# Patient Record
Sex: MALE | Race: WHITE | NOT HISPANIC OR LATINO | Employment: FULL TIME | URBAN - METROPOLITAN AREA
[De-identification: names, ages, dates, MRNs, and addresses within clinical notes are randomized per-mention and may not be internally consistent; named-entity substitution may affect disease eponyms.]

---

## 2017-11-02 ENCOUNTER — APPOINTMENT (OUTPATIENT)
Dept: RADIOLOGY | Facility: MEDICAL CENTER | Age: 27
DRG: 580 | End: 2017-11-02
Attending: EMERGENCY MEDICINE

## 2017-11-02 ENCOUNTER — HOSPITAL ENCOUNTER (INPATIENT)
Facility: MEDICAL CENTER | Age: 27
LOS: 1 days | DRG: 580 | End: 2017-11-04
Attending: EMERGENCY MEDICINE | Admitting: INTERNAL MEDICINE

## 2017-11-02 DIAGNOSIS — R09.02 OXYGEN DESATURATION: ICD-10-CM

## 2017-11-02 DIAGNOSIS — E66.01 MORBID OBESITY (HCC): ICD-10-CM

## 2017-11-02 LAB
ALBUMIN SERPL BCP-MCNC: 3.9 G/DL (ref 3.2–4.9)
ALBUMIN/GLOB SERPL: 0.9 G/DL
ALP SERPL-CCNC: 91 U/L (ref 30–99)
ALT SERPL-CCNC: 21 U/L (ref 2–50)
ANION GAP SERPL CALC-SCNC: 9 MMOL/L (ref 0–11.9)
AST SERPL-CCNC: 21 U/L (ref 12–45)
BASOPHILS # BLD AUTO: 0.4 % (ref 0–1.8)
BASOPHILS # BLD: 0.04 K/UL (ref 0–0.12)
BILIRUB SERPL-MCNC: 0.4 MG/DL (ref 0.1–1.5)
BUN SERPL-MCNC: 9 MG/DL (ref 8–22)
CALCIUM SERPL-MCNC: 9.3 MG/DL (ref 8.5–10.5)
CHLORIDE SERPL-SCNC: 102 MMOL/L (ref 96–112)
CO2 SERPL-SCNC: 24 MMOL/L (ref 20–33)
CREAT SERPL-MCNC: 0.51 MG/DL (ref 0.5–1.4)
EOSINOPHIL # BLD AUTO: 0.17 K/UL (ref 0–0.51)
EOSINOPHIL NFR BLD: 1.8 % (ref 0–6.9)
ERYTHROCYTE [DISTWIDTH] IN BLOOD BY AUTOMATED COUNT: 53 FL (ref 35.9–50)
GFR SERPL CREATININE-BSD FRML MDRD: >60 ML/MIN/1.73 M 2
GLOBULIN SER CALC-MCNC: 4.3 G/DL (ref 1.9–3.5)
GLUCOSE SERPL-MCNC: 108 MG/DL (ref 65–99)
HCT VFR BLD AUTO: 52.8 % (ref 42–52)
HGB BLD-MCNC: 16.2 G/DL (ref 14–18)
IMM GRANULOCYTES # BLD AUTO: 0.04 K/UL (ref 0–0.11)
IMM GRANULOCYTES NFR BLD AUTO: 0.4 % (ref 0–0.9)
LACTATE BLD-SCNC: 1.8 MMOL/L (ref 0.5–2)
LYMPHOCYTES # BLD AUTO: 1.61 K/UL (ref 1–4.8)
LYMPHOCYTES NFR BLD: 17.3 % (ref 22–41)
MCH RBC QN AUTO: 26.4 PG (ref 27–33)
MCHC RBC AUTO-ENTMCNC: 30.7 G/DL (ref 33.7–35.3)
MCV RBC AUTO: 86 FL (ref 81.4–97.8)
MONOCYTES # BLD AUTO: 0.72 K/UL (ref 0–0.85)
MONOCYTES NFR BLD AUTO: 7.7 % (ref 0–13.4)
NEUTROPHILS # BLD AUTO: 6.72 K/UL (ref 1.82–7.42)
NEUTROPHILS NFR BLD: 72.4 % (ref 44–72)
NRBC # BLD AUTO: 0 K/UL
NRBC BLD AUTO-RTO: 0 /100 WBC
PLATELET # BLD AUTO: 282 K/UL (ref 164–446)
PMV BLD AUTO: 9.8 FL (ref 9–12.9)
POTASSIUM SERPL-SCNC: 4.2 MMOL/L (ref 3.6–5.5)
PROT SERPL-MCNC: 8.2 G/DL (ref 6–8.2)
RBC # BLD AUTO: 6.14 M/UL (ref 4.7–6.1)
SODIUM SERPL-SCNC: 135 MMOL/L (ref 135–145)
WBC # BLD AUTO: 9.3 K/UL (ref 4.8–10.8)

## 2017-11-02 PROCEDURE — 83036 HEMOGLOBIN GLYCOSYLATED A1C: CPT

## 2017-11-02 PROCEDURE — 83735 ASSAY OF MAGNESIUM: CPT

## 2017-11-02 PROCEDURE — 87040 BLOOD CULTURE FOR BACTERIA: CPT

## 2017-11-02 PROCEDURE — 99285 EMERGENCY DEPT VISIT HI MDM: CPT

## 2017-11-02 PROCEDURE — 80053 COMPREHEN METABOLIC PANEL: CPT

## 2017-11-02 PROCEDURE — 70487 CT MAXILLOFACIAL W/DYE: CPT

## 2017-11-02 PROCEDURE — 83605 ASSAY OF LACTIC ACID: CPT

## 2017-11-02 PROCEDURE — 84443 ASSAY THYROID STIM HORMONE: CPT

## 2017-11-02 PROCEDURE — 700111 HCHG RX REV CODE 636 W/ 250 OVERRIDE (IP): Performed by: EMERGENCY MEDICINE

## 2017-11-02 PROCEDURE — 700117 HCHG RX CONTRAST REV CODE 255: Performed by: EMERGENCY MEDICINE

## 2017-11-02 PROCEDURE — 304561 HCHG STAT O2

## 2017-11-02 PROCEDURE — 85025 COMPLETE CBC W/AUTO DIFF WBC: CPT

## 2017-11-02 PROCEDURE — 94760 N-INVAS EAR/PLS OXIMETRY 1: CPT

## 2017-11-02 PROCEDURE — 304562 HCHG STAT O2 MASK/CANNULA

## 2017-11-02 RX ORDER — AMPICILLIN AND SULBACTAM 2; 1 G/1; G/1
3 INJECTION, POWDER, FOR SOLUTION INTRAMUSCULAR; INTRAVENOUS ONCE
Status: COMPLETED | OUTPATIENT
Start: 2017-11-02 | End: 2017-11-02

## 2017-11-02 RX ADMIN — AMPICILLIN SODIUM AND SULBACTAM SODIUM 3 G: 2; 1 INJECTION, POWDER, FOR SOLUTION INTRAMUSCULAR; INTRAVENOUS at 01:02

## 2017-11-02 RX ADMIN — IOHEXOL 80 ML: 350 INJECTION, SOLUTION INTRAVENOUS at 23:42

## 2017-11-02 ASSESSMENT — PAIN SCALES - GENERAL: PAINLEVEL_OUTOF10: 10

## 2017-11-03 ENCOUNTER — RESOLUTE PROFESSIONAL BILLING HOSPITAL PROF FEE (OUTPATIENT)
Dept: HOSPITALIST | Facility: MEDICAL CENTER | Age: 27
End: 2017-11-03

## 2017-11-03 PROBLEM — J45.909 ASTHMA: Status: ACTIVE | Noted: 2017-11-03

## 2017-11-03 PROBLEM — L03.211 FACIAL CELLULITIS: Status: ACTIVE | Noted: 2017-11-03

## 2017-11-03 PROBLEM — R60.0 LOWER EXTREMITY EDEMA: Status: ACTIVE | Noted: 2017-11-03

## 2017-11-03 PROBLEM — E66.01 MORBID OBESITY (HCC): Status: ACTIVE | Noted: 2017-11-03

## 2017-11-03 PROBLEM — R09.02 HYPOXIA: Status: ACTIVE | Noted: 2017-11-03

## 2017-11-03 LAB
BNP SERPL-MCNC: 17 PG/ML (ref 0–100)
DEPRECATED D DIMER PPP IA-ACNC: 623 NG/ML(D-DU)
EKG IMPRESSION: NORMAL
EST. AVERAGE GLUCOSE BLD GHB EST-MCNC: 120 MG/DL
HBA1C MFR BLD: 5.8 % (ref 0–5.6)
MAGNESIUM SERPL-MCNC: 2.1 MG/DL (ref 1.5–2.5)
TSH SERPL DL<=0.005 MIU/L-ACNC: 2.57 UIU/ML (ref 0.3–3.7)

## 2017-11-03 PROCEDURE — 501838 HCHG SUTURE GENERAL: Performed by: ORAL & MAXILLOFACIAL SURGERY

## 2017-11-03 PROCEDURE — 700105 HCHG RX REV CODE 258: Performed by: STUDENT IN AN ORGANIZED HEALTH CARE EDUCATION/TRAINING PROGRAM

## 2017-11-03 PROCEDURE — 0W950ZZ DRAINAGE OF LOWER JAW, OPEN APPROACH: ICD-10-PCS | Performed by: ORAL & MAXILLOFACIAL SURGERY

## 2017-11-03 PROCEDURE — 700101 HCHG RX REV CODE 250

## 2017-11-03 PROCEDURE — 93970 EXTREMITY STUDY: CPT | Mod: 26 | Performed by: SURGERY

## 2017-11-03 PROCEDURE — 93970 EXTREMITY STUDY: CPT

## 2017-11-03 PROCEDURE — 160027 HCHG SURGERY MINUTES - 1ST 30 MINS LEVEL 2: Performed by: ORAL & MAXILLOFACIAL SURGERY

## 2017-11-03 PROCEDURE — 83880 ASSAY OF NATRIURETIC PEPTIDE: CPT

## 2017-11-03 PROCEDURE — 700105 HCHG RX REV CODE 258

## 2017-11-03 PROCEDURE — 0C940ZZ DRAINAGE OF BUCCAL MUCOSA, OPEN APPROACH: ICD-10-PCS | Performed by: ORAL & MAXILLOFACIAL SURGERY

## 2017-11-03 PROCEDURE — 700111 HCHG RX REV CODE 636 W/ 250 OVERRIDE (IP)

## 2017-11-03 PROCEDURE — 500126 HCHG BOVIE, NEEDLE TIP: Performed by: ORAL & MAXILLOFACIAL SURGERY

## 2017-11-03 PROCEDURE — 99222 1ST HOSP IP/OBS MODERATE 55: CPT | Mod: GC | Performed by: INTERNAL MEDICINE

## 2017-11-03 PROCEDURE — 85379 FIBRIN DEGRADATION QUANT: CPT

## 2017-11-03 PROCEDURE — A9270 NON-COVERED ITEM OR SERVICE: HCPCS | Performed by: STUDENT IN AN ORGANIZED HEALTH CARE EDUCATION/TRAINING PROGRAM

## 2017-11-03 PROCEDURE — 87070 CULTURE OTHR SPECIMN AEROBIC: CPT

## 2017-11-03 PROCEDURE — 36415 COLL VENOUS BLD VENIPUNCTURE: CPT

## 2017-11-03 PROCEDURE — 71010 DX-CHEST-PORTABLE (1 VIEW): CPT

## 2017-11-03 PROCEDURE — 87075 CULTR BACTERIA EXCEPT BLOOD: CPT

## 2017-11-03 PROCEDURE — 160036 HCHG PACU - EA ADDL 30 MINS PHASE I: Performed by: ORAL & MAXILLOFACIAL SURGERY

## 2017-11-03 PROCEDURE — 770006 HCHG ROOM/CARE - MED/SURG/GYN SEMI*

## 2017-11-03 PROCEDURE — 160002 HCHG RECOVERY MINUTES (STAT): Performed by: ORAL & MAXILLOFACIAL SURGERY

## 2017-11-03 PROCEDURE — 160048 HCHG OR STATISTICAL LEVEL 1-5: Performed by: ORAL & MAXILLOFACIAL SURGERY

## 2017-11-03 PROCEDURE — 0CDXXZ0 EXTRACTION OF LOWER TOOTH, SINGLE, EXTERNAL APPROACH: ICD-10-PCS | Performed by: ORAL & MAXILLOFACIAL SURGERY

## 2017-11-03 PROCEDURE — 160009 HCHG ANES TIME/MIN: Performed by: ORAL & MAXILLOFACIAL SURGERY

## 2017-11-03 PROCEDURE — 93010 ELECTROCARDIOGRAM REPORT: CPT | Performed by: INTERNAL MEDICINE

## 2017-11-03 PROCEDURE — 87205 SMEAR GRAM STAIN: CPT

## 2017-11-03 PROCEDURE — 160038 HCHG SURGERY MINUTES - EA ADDL 1 MIN LEVEL 2: Performed by: ORAL & MAXILLOFACIAL SURGERY

## 2017-11-03 PROCEDURE — 93005 ELECTROCARDIOGRAM TRACING: CPT | Performed by: STUDENT IN AN ORGANIZED HEALTH CARE EDUCATION/TRAINING PROGRAM

## 2017-11-03 PROCEDURE — 160035 HCHG PACU - 1ST 60 MINS PHASE I: Performed by: ORAL & MAXILLOFACIAL SURGERY

## 2017-11-03 PROCEDURE — 700102 HCHG RX REV CODE 250 W/ 637 OVERRIDE(OP): Performed by: STUDENT IN AN ORGANIZED HEALTH CARE EDUCATION/TRAINING PROGRAM

## 2017-11-03 PROCEDURE — A6250 SKIN SEAL PROTECT MOISTURIZR: HCPCS | Performed by: STUDENT IN AN ORGANIZED HEALTH CARE EDUCATION/TRAINING PROGRAM

## 2017-11-03 RX ORDER — BISACODYL 10 MG
10 SUPPOSITORY, RECTAL RECTAL
Status: DISCONTINUED | OUTPATIENT
Start: 2017-11-03 | End: 2017-11-04 | Stop reason: HOSPADM

## 2017-11-03 RX ORDER — HEPARIN SODIUM 5000 [USP'U]/ML
5000 INJECTION, SOLUTION INTRAVENOUS; SUBCUTANEOUS EVERY 8 HOURS
Status: DISCONTINUED | OUTPATIENT
Start: 2017-11-03 | End: 2017-11-03

## 2017-11-03 RX ORDER — POLYETHYLENE GLYCOL 3350 17 G/17G
1 POWDER, FOR SOLUTION ORAL
Status: DISCONTINUED | OUTPATIENT
Start: 2017-11-03 | End: 2017-11-04 | Stop reason: HOSPADM

## 2017-11-03 RX ORDER — BUPIVACAINE HYDROCHLORIDE AND EPINEPHRINE 5; 5 MG/ML; UG/ML
INJECTION, SOLUTION EPIDURAL; INTRACAUDAL; PERINEURAL
Status: DISCONTINUED | OUTPATIENT
Start: 2017-11-03 | End: 2017-11-03 | Stop reason: HOSPADM

## 2017-11-03 RX ORDER — AMOXICILLIN 250 MG
2 CAPSULE ORAL 2 TIMES DAILY
Status: DISCONTINUED | OUTPATIENT
Start: 2017-11-03 | End: 2017-11-04 | Stop reason: HOSPADM

## 2017-11-03 RX ORDER — SODIUM CHLORIDE 9 MG/ML
INJECTION, SOLUTION INTRAVENOUS CONTINUOUS
Status: DISCONTINUED | OUTPATIENT
Start: 2017-11-03 | End: 2017-11-04 | Stop reason: HOSPADM

## 2017-11-03 RX ORDER — SODIUM CHLORIDE 9 MG/ML
INJECTION, SOLUTION INTRAVENOUS
Status: COMPLETED
Start: 2017-11-03 | End: 2017-11-03

## 2017-11-03 RX ORDER — LABETALOL HYDROCHLORIDE 5 MG/ML
10 INJECTION, SOLUTION INTRAVENOUS EVERY 4 HOURS PRN
Status: DISCONTINUED | OUTPATIENT
Start: 2017-11-03 | End: 2017-11-04 | Stop reason: HOSPADM

## 2017-11-03 RX ORDER — ACETAMINOPHEN 325 MG/1
650 TABLET ORAL EVERY 6 HOURS PRN
Status: DISCONTINUED | OUTPATIENT
Start: 2017-11-03 | End: 2017-11-04 | Stop reason: HOSPADM

## 2017-11-03 RX ORDER — MAGNESIUM HYDROXIDE 1200 MG/15ML
LIQUID ORAL
Status: DISCONTINUED | OUTPATIENT
Start: 2017-11-03 | End: 2017-11-03 | Stop reason: HOSPADM

## 2017-11-03 RX ADMIN — ACETAMINOPHEN 650 MG: 325 TABLET, FILM COATED ORAL at 02:47

## 2017-11-03 RX ADMIN — ACETAMINOPHEN 650 MG: 325 TABLET, FILM COATED ORAL at 11:20

## 2017-11-03 RX ADMIN — SODIUM CHLORIDE: 9 INJECTION, SOLUTION INTRAVENOUS at 15:18

## 2017-11-03 RX ADMIN — FENTANYL CITRATE 25 MCG: 50 INJECTION, SOLUTION INTRAMUSCULAR; INTRAVENOUS at 20:52

## 2017-11-03 RX ADMIN — SODIUM CHLORIDE: 9 INJECTION, SOLUTION INTRAVENOUS at 02:35

## 2017-11-03 RX ADMIN — SODIUM CHLORIDE: 9 INJECTION, SOLUTION INTRAVENOUS at 02:00

## 2017-11-03 ASSESSMENT — PATIENT HEALTH QUESTIONNAIRE - PHQ9
SUM OF ALL RESPONSES TO PHQ QUESTIONS 1-9: 0
SUM OF ALL RESPONSES TO PHQ9 QUESTIONS 1 AND 2: 0
2. FEELING DOWN, DEPRESSED, IRRITABLE, OR HOPELESS: NOT AT ALL
1. LITTLE INTEREST OR PLEASURE IN DOING THINGS: NOT AT ALL

## 2017-11-03 ASSESSMENT — ENCOUNTER SYMPTOMS
VOMITING: 0
FLANK PAIN: 0
INSOMNIA: 0
EYE PAIN: 0
HEADACHES: 0
DOUBLE VISION: 0
BLURRED VISION: 0
WEIGHT LOSS: 0
CLAUDICATION: 0
TINGLING: 0
SEIZURES: 0
STRIDOR: 0
SPUTUM PRODUCTION: 0
LOSS OF CONSCIOUSNESS: 0
MYALGIAS: 1
DEPRESSION: 0
EYE REDNESS: 0
CHILLS: 0
PND: 1
DIARRHEA: 0
ORTHOPNEA: 1
DIZZINESS: 0
FALLS: 0
PALPITATIONS: 0
TREMORS: 0
ORTHOPNEA: 0
MYALGIAS: 0
WEAKNESS: 0
EYE DISCHARGE: 0
HEARTBURN: 0
NAUSEA: 0
HEMOPTYSIS: 0
ABDOMINAL PAIN: 0
SPEECH CHANGE: 0
HALLUCINATIONS: 0
MEMORY LOSS: 0
CONSTIPATION: 0
POLYDIPSIA: 0
SHORTNESS OF BREATH: 0
COUGH: 0
FEVER: 0
NERVOUS/ANXIOUS: 0
DIAPHORESIS: 0
BLOOD IN STOOL: 0
PHOTOPHOBIA: 0
NECK PAIN: 0
SORE THROAT: 0
WHEEZING: 0
BACK PAIN: 0
FOCAL WEAKNESS: 0
BRUISES/BLEEDS EASILY: 0
SENSORY CHANGE: 0

## 2017-11-03 ASSESSMENT — PAIN SCALES - GENERAL
PAINLEVEL_OUTOF10: 2
PAINLEVEL_OUTOF10: 2
PAINLEVEL_OUTOF10: 7
PAINLEVEL_OUTOF10: 6
PAINLEVEL_OUTOF10: 2
PAINLEVEL_OUTOF10: 2
PAINLEVEL_OUTOF10: 7
PAINLEVEL_OUTOF10: 2
PAINLEVEL_OUTOF10: 6

## 2017-11-03 ASSESSMENT — LIFESTYLE VARIABLES
SUBSTANCE_ABUSE: 0
ALCOHOL_USE: NO
EVER_SMOKED: NEVER

## 2017-11-03 NOTE — CARE PLAN
Problem: Infection  Goal: Will remain free from infection  Outcome: PROGRESSING AS EXPECTED      Problem: Pain Management  Goal: Pain level will decrease to patient's comfort goal  Outcome: PROGRESSING AS EXPECTED

## 2017-11-03 NOTE — PROGRESS NOTES
Spoke with oral surgeon. States that the pt has two options can take abx to help with infection or he can have the tooth pulled in the OR. Both options were explained to pt by nurse with surgeon over the phone. Pt has opted for tooth removal at this time. Surgeon had requested a consent to be printed and placed on the chart however, without knowing exactly what procedure is being done consent could not be completed at this time.

## 2017-11-03 NOTE — SENIOR ADMIT NOTE
Senior Admit Note    26 y/o M with a HTN, Asthma and eczema p/w right facial swelling and pain.  He chipped his tooth on there right several days ago. He started having pain around his right face yesterday morning.  It has increasingly worsened and he developed swelling as well.  He denies fever or chills.  Denies toothache prior to this.  Denies any dyspnea, coughing, choking, drooling and feeling of foreign body sensation in throat.     Gen: NAD   Right lower facial swelling, induration and tenderness, warmth.  No drainage.   No fluctuance  [place] 4.  No uvula swelling or tonsillar swelling.  Protecting his airway.   No drooling.   Resp: CTAB  Ext:  Edema erythema.        CT maxillofacial   1.  There is a right periapical tooth abscess involving a premolar in the right lower mandible. There is associated bony resorption or erosion of the outer aspect of the mandible at this level with extensive overlying soft tissue edema or cellulitis.   There is no drainable abscess seen in the soft tissues.  2.  Slightly prominent tonsillar tissue is also noted with slight narrowing of the airway although it is patent.  3.  There are inflammatory lymph nodes in the right side of the neck.    Periapical dental abscess  Facial cellulitis  - Not septic, WBC wnl  - No sign of abscess as there is no fluctuance  - Will start Unasyn  - Day team to consult Dental surgeon.

## 2017-11-03 NOTE — NON-PROVIDER
Internal Medicine Medical Student Note  Note Author: Jessica Jvoel, Student    Name Michael Ontiveros     1990   Age/Sex 27 y.o. male   MRN [id number]   Code Status FULL     After 5PM or if no immediate response to page, please call for cross-coverage  Attending/Team: [place] See Patient List for primary contact information  Call [phone] to page after hours   1st Call - Day Intern (R1):   Dr. Pino 2nd Call - Day Sr. Resident (R2/R3):   Dr. Salvador         Reason for interval visit  (Principal Problem)   Facial cellulitis    Interval Problem Daily Status Update  (24 hours)   Mr. Ontiveros is a 26yo male here for right facial cellulitis/dental abscess. He reports that he chipped his tooth while eating about one week ago and was planning to see a dentist in January when his insurance kicks in, but he noticed that his cheek began to swell two days ago. When he woke up the next day he noticed it had gotten significantly worse. It continued to worsen throughout the day and impaired his speech so he came to the ED last night. He has not seen a dentist in over a year and reports not brushing his teeth a few times a week and only flossing occasionally. His PMHx includes HTN, asthma, eczema and morbid obesity with likely sleep apnea. His has been experiencing some shortness of breath while lying flat for several months.    ROS    Negative except as noted above.      Current Facility-Administered Medications:   •  senna-docusate (PERICOLACE or SENOKOT S) 8.6-50 MG per tablet 2 Tab, 2 Tab, Oral, BID **AND** polyethylene glycol/lytes (MIRALAX) PACKET 1 Packet, 1 Packet, Oral, QDAY PRN **AND** magnesium hydroxide (MILK OF MAGNESIA) suspension 30 mL, 30 mL, Oral, QDAY PRN **AND** bisacodyl (DULCOLAX) suppository 10 mg, 10 mg, Rectal, QDAY PRN, Ana Cristina Nicole M.D.  •  labetalol (NORMODYNE,TRANDATE) injection 10 mg, 10 mg, Intravenous, Q4HRS PRN, Ana Cristina Nicole M.D.  •  acetaminophen (TYLENOL) tablet  "650 mg, 650 mg, Oral, Q6HRS PRN, Ana Cristina Nicole M.D., 650 mg at 11/03/17 1120  •  NS infusion, , Intravenous, Continuous, Ana Cristina Nicole M.D., Last Rate: 83 mL/hr at 11/03/17 0235  •  heparin injection 5,000 Units, 5,000 Units, Subcutaneous, Q8HRS, Ana Cristina Nicole M.D., Stopped at 11/03/17 0600          Physical Exam       Vitals:    11/02/17 2104 11/02/17 2327 11/03/17 0227 11/03/17 0800   BP: 144/74  149/80 128/67   Pulse: 94 (!) 109 86 80   Resp: 20 (!) 26 20 20   Temp: 36.4 °C (97.6 °F)  36.3 °C (97.4 °F) 36 °C (96.8 °F)   TempSrc:       SpO2: 89% 92% 93% 93%   Weight:   (!) 161.5 kg (356 lb 0.7 oz)    Height:   1.753 m (5' 9\")      Body mass index is 52.58 kg/m². Weight: (!) 161.5 kg (356 lb 0.7 oz)  Oxygen Therapy:  Pulse Oximetry: 93 %, O2 (LPM): 2, FIO2%: 4, O2 Delivery: Silicone Nasal Cannula    Physical Exam    General: 26yo morbidly obese male resting comfortably in bed with head of bed elevated.  HEENT: R cheek visibly edematous and causing dysarthria. Oropahrynx with moderate erythema and significant edema on right. Decayed molar present on bottom right row. Patient unable to speak normally.  Cardiovascular: Heart with regular rhythm no m/g/r  Lungs: CTAB, no wheezing or strifor  Extremities: LLE and RLE with significant edema, erythema, and excoriation.    Labs: CBC WNL, WBC 9.3  CMP: Glucose elevated at 108  A1C: 5.8  TSH: WNL  Blood culture pending  BNP: WNL  D-dimer: 623      Imaging:  CT maxillofacial: R periapical tooth abscess into R lower mandible with bony resorption/erosion of outer mandible. External soft tissue edema c/w cellulitis. No drainable abscess of soft tissue. Prominent tonsilar tissue with slight narrowing of airway. Inflammation of LNs in R neck.    CXR: Cardiomegaly present.    LE Duplex: No evidence of DVT. Wall thickness in left small saphenous vein.      Assessment/Plan     #Dental Abscess/Facial cellulitis  -IV Unasyn for infection  -blood cultures pending, patient is " afebrile with no signs of sepsis  -oral surgeon has agreed to do tooth extraction surgery on patient today  -will allow oral surgeon to prescribe post-op ABX  -tylenol for pain    #LE Edema  -likely a result of venous stasis  -patient denies any pain and reports legs have been that way for some time  -d-dimer elevated 623  -Duplex r/o DVT  -recommend sleep study outpatient for JONES    #Morbid obesity  -patient counseled to seek outpatient f/u  -patient plans to seek care after his insurance begins in January    #HTN  -patient will seek medical care for this as outpatient  -not currently being treated

## 2017-11-03 NOTE — PROGRESS NOTES
Patient arrived to unit, oriented x4, ambulatory to the restroom. On 2 L of oxygen,  placed on patient. R facial swelling, ice pack provided and patient medicated with PRN tylenol. IV fluids running. Updated on POC, verbalizes understanding. NPO at this time. Redness under pannus. Educated on use of call light, patient declined use of treaded socks at this time. Educated on fall precautions and prevention. Admission profile completed.

## 2017-11-03 NOTE — ASSESSMENT & PLAN NOTE
- Pt reports chipping his tooth 1 week prior to presentation. Woke up day of presentation with R tooth/jaw pain and swelling that progressively worsened. No signs of systemic infection. In ED, pt started on IV abx  - Continue IV abx (unasyn)  - NO signs of systemic infection  - Had a successful oral surgery yesterday, today he is doing better   - Continue on Abxs, switch to oral clindamycin on D/C

## 2017-11-03 NOTE — ASSESSMENT & PLAN NOTE
- Pt reports history of asthma and eczema. Reports having albuterol inhaler from [place] prn exacerbations. Not currently having an exacerbation  - Albuterol prn SOB/wheezing

## 2017-11-03 NOTE — DIETARY
NUTRITION SERVICES: BMI - Pt with BMI 52.6. Weight loss counseling not appropriate in acute care setting. RECOMMEND - Referral to outpatient nutrition services for weight management after D/C.

## 2017-11-03 NOTE — ASSESSMENT & PLAN NOTE
-Pt reports h/o bilateral LE edema, not tender on examination  - Works at a call center, sits for most of the day. No recent long car rides. On physical exam, LE with hyperpigmentation, likely 2/2 venous stasis  - LE doppler come back negative for DVT,   - Plan  - DC heparin

## 2017-11-03 NOTE — H&P
Internal Medicine Admitting History and Physical    Note Author: Ana Cristina Nicole M.D.       Name Michael Ontiveros     1990   Age/Sex 27 y.o. male   MRN [id number]   Code Status FULL     After 5PM or if no immediate response to page, please call for cross-coverage  Attending/Team: Dr. Ziegler/Giovanni See Patient List for primary contact information  Call [phone] to page    1st Call - Day Intern (R1):   Dr Nicole 2nd Call - Day Sr. Resident (R2/R3):   Dr Ceja       Chief Complaint:  Facial swelling     HPI:  26 yo M with PMH HTN, asthma, and eczema presents with acute swelling and tenderness of R face. Pt reports he chipped his tooth about a week ago while eating a steak. He just got a new job and figured he would wait until , when he could afford dental insurance, to get his tooth checked out. The morning on , he woke up and felt pain around his right lower jaw. Over the course of the day, the area became increasingly swollen an tender. Minimal relief with aspirin at home. Pt reports he has not seen a dentist in year. He tries to brush his teeth every day but misses a few days. He flosses his teeth occasionally. He denied any fevers or chills. No drooling or stridor.   He denied any SOB, cough, or chest pain.     On presentation, pt was afebrile. Tachycardic to low 100s. SBPs in 150s. 93% on RA. RR 18. No acute distress. Pt reports 7/10 pain on R side of face. CBC WNL. CMP WNL. CXR showed enlarged cardiac silhouette. CT Maxillofacial showed right periapical tooth abscess involving a premolar in the right lower mandible, associated bony resorption or erosion of the outer aspect of the mandible at this level with extensive overlying soft tissue edema or cellulitis. No drainable abscess seen in the soft tissues. Pt started on unasyn.     Review of Systems   Constitutional: Negative for chills and fever.   HENT: Negative for congestion, ear pain, hearing loss and sore throat.     Eyes: Negative for blurred vision, double vision and pain.   Respiratory: Negative for cough, sputum production, shortness of breath, wheezing and stridor.    Cardiovascular: Positive for orthopnea and leg swelling. Negative for chest pain and palpitations.   Gastrointestinal: Negative for abdominal pain, constipation, diarrhea, heartburn, nausea and vomiting.   Genitourinary: Negative for dysuria and urgency.   Musculoskeletal: Negative for falls, myalgias and neck pain.   Skin: Positive for rash. Negative for itching.        Eczematous rash on UE and LE   Neurological: Negative for dizziness, tingling, seizures, loss of consciousness, weakness and headaches.   Endo/Heme/Allergies: Positive for environmental allergies. Negative for polydipsia. Does not bruise/bleed easily.   Psychiatric/Behavioral: Negative for depression and substance abuse. The patient is not nervous/anxious and does not have insomnia.              Past Medical History:   Asthma - reports 2-3 exacerbations a year  Eczema     Past Surgical History:  No past surgical history on file.    Current Outpatient Medications:  Home Medications    **Home medications have not yet been reviewed for this encounter**     Pt reports taking an inhaler his grandmother got from him from [place] prn for asthma exacerbations, believes active ingredient is albuterol    Medication Allergy/Sensitivities:  No Known Allergies      Family History:  Mother - COPD, heart disease. Father - hip injury/immobility.    Social History:  Social History     Social History   • Marital status: Single     Spouse name: N/A   • Number of children: N/A   • Years of education: N/A     Occupational History   • Not on file.     Social History Main Topics   • Smoking status: Not on file   • Smokeless tobacco: Not on file   • Alcohol use Not on file   • Drug use: Unknown   • Sexual activity: Not on file     Other Topics Concern   • Not on file     Social History Narrative   • No narrative on  file     Living situation: Lives at home  PCP : Miguel Miller D.O.      Physical Exam     Vitals:    11/02/17 2016 11/02/17 2027 11/02/17 2104 11/02/17 2327   BP: 158/74  144/74    Pulse: (!) 108  94 (!) 109   Resp: 16  20 (!) 26   Temp: 36.2 °C (97.2 °F)  36.4 °C (97.6 °F)    TempSrc: Temporal      SpO2: 93%  89% 92%   Weight:  (!) 173.9 kg (383 lb 6.1 oz)       There is no height or weight on file to calculate BMI.  /74   Pulse (!) 109   Temp 36.4 °C (97.6 °F)   Resp (!) 26   Wt (!) 173.9 kg (383 lb 6.1 oz)   SpO2 92%   O2 therapy: Pulse Oximetry: 92 %, O2 (LPM): 4, FIO2%: 4    Physical Exam   Constitutional: He is oriented to person, place, and time and well-developed, well-nourished, and in no distress.   Morbidly obese.   HENT:   Nose: Nose normal.   Mouth/Throat: Oropharyngeal exudate present.   R facial edema, rubor, and edema. Poor dentition. Difficulty with opening mouth.   Eyes: Conjunctivae and EOM are normal.   Neck: Normal range of motion. Neck supple.   R cervical adenopathy   Cardiovascular: Regular rhythm.  Exam reveals no gallop.    No murmur heard.  Tachycardic at times.   Pulmonary/Chest: Effort normal and breath sounds normal. No stridor. No respiratory distress. He has no wheezes. He exhibits no tenderness.   Abdominal: Soft. Bowel sounds are normal. There is no tenderness.   Musculoskeletal: Normal range of motion. He exhibits no edema, tenderness or deformity.   Lymphadenopathy:     He has cervical adenopathy.   Neurological: He is alert and oriented to person, place, and time. No cranial nerve deficit.   Skin: Rash noted. There is erythema.   Hyperpigmentation and xerosis of LE b/l. Erythematous papular lesions in antecubital fossa.    Psychiatric: Affect and judgment normal.             Data Review       Old Records Request:   Completed  Current Records review and summary: Completed    Lab Data Review:  Recent Results (from the past 24 hour(s))   LACTIC ACID    Collection Time:  11/02/17 11:11 PM   Result Value Ref Range    Lactic Acid 1.8 0.5 - 2.0 mmol/L   CBC WITH DIFFERENTIAL    Collection Time: 11/02/17 11:11 PM   Result Value Ref Range    WBC 9.3 4.8 - 10.8 K/uL    RBC 6.14 (H) 4.70 - 6.10 M/uL    Hemoglobin 16.2 14.0 - 18.0 g/dL    Hematocrit 52.8 (H) 42.0 - 52.0 %    MCV 86.0 81.4 - 97.8 fL    MCH 26.4 (L) 27.0 - 33.0 pg    MCHC 30.7 (L) 33.7 - 35.3 g/dL    RDW 53.0 (H) 35.9 - 50.0 fL    Platelet Count 282 164 - 446 K/uL    MPV 9.8 9.0 - 12.9 fL    Neutrophils-Polys 72.40 (H) 44.00 - 72.00 %    Lymphocytes 17.30 (L) 22.00 - 41.00 %    Monocytes 7.70 0.00 - 13.40 %    Eosinophils 1.80 0.00 - 6.90 %    Basophils 0.40 0.00 - 1.80 %    Immature Granulocytes 0.40 0.00 - 0.90 %    Nucleated RBC 0.00 /100 WBC    Neutrophils (Absolute) 6.72 1.82 - 7.42 K/uL    Lymphs (Absolute) 1.61 1.00 - 4.80 K/uL    Monos (Absolute) 0.72 0.00 - 0.85 K/uL    Eos (Absolute) 0.17 0.00 - 0.51 K/uL    Baso (Absolute) 0.04 0.00 - 0.12 K/uL    Immature Granulocytes (abs) 0.04 0.00 - 0.11 K/uL    NRBC (Absolute) 0.00 K/uL   COMP METABOLIC PANEL    Collection Time: 11/02/17 11:11 PM   Result Value Ref Range    Sodium 135 135 - 145 mmol/L    Potassium 4.2 3.6 - 5.5 mmol/L    Chloride 102 96 - 112 mmol/L    Co2 24 20 - 33 mmol/L    Anion Gap 9.0 0.0 - 11.9    Glucose 108 (H) 65 - 99 mg/dL    Bun 9 8 - 22 mg/dL    Creatinine 0.51 0.50 - 1.40 mg/dL    Calcium 9.3 8.5 - 10.5 mg/dL    AST(SGOT) 21 12 - 45 U/L    ALT(SGPT) 21 2 - 50 U/L    Alkaline Phosphatase 91 30 - 99 U/L    Total Bilirubin 0.4 0.1 - 1.5 mg/dL    Albumin 3.9 3.2 - 4.9 g/dL    Total Protein 8.2 6.0 - 8.2 g/dL    Globulin 4.3 (H) 1.9 - 3.5 g/dL    A-G Ratio 0.9 g/dL   ESTIMATED GFR    Collection Time: 11/02/17 11:11 PM   Result Value Ref Range    GFR If African American >60 >60 mL/min/1.73 m 2    GFR If Non African American >60 >60 mL/min/1.73 m 2   Magnesium    Collection Time: 11/02/17 11:11 PM   Result Value Ref Range    Magnesium 2.1 1.5 - 2.5  mg/dL       Imaging/Procedures Review:    ndependant Imaging Review: Completed  DX-CHEST-PORTABLE (1 VIEW)   Final Result      1.  There is mild enlargement of the cardiac silhouette.      CT-MAXILLOFACIAL WITH PLUS RECONS   Final Result      1.  There is a right periapical tooth abscess involving a premolar in the right lower mandible. There is associated bony resorption or erosion of the outer aspect of the mandible at this level with extensive overlying soft tissue edema or cellulitis.    There is no drainable abscess seen in the soft tissues.   2.  Slightly prominent tonsillar tissue is also noted with slight narrowing of the airway although it is patent.   3.  There are inflammatory lymph nodes in the right side of the neck.      LE VENOUS DUPLEX (DVT)    (Results Pending)            EKG:   Pending         Assessment/Plan     * Facial cellulitis   Assessment & Plan    Pt reports chipping his tooth 1 week prior to presentation. Woke up day of presentation with R tooth/jaw pain and swelling that progressively worsened. No signs of systemic infection. In ED, pt started on IV abx  - C/w IV abx (unasyn)  - CTM for signs of systemic infection  - Day team to consult oral surgery for dental extraction  - NPO for now, IVF  - May d/c heparin for dental procedure        Lower extremity edema   Assessment & Plan    Pt reports h/o LE. 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 No recent long car rides. On physical exam, LE with hyperpigmentation, likely 2/2 venous stasis  - F/u LE doppler to r/o DVT, although low suspicion for this        Morbid obesity (CMS-[facility])   Assessment & Plan    Pt is morbidly obese. Has developed HTN, which he was trying to control with weight loss. He has been unsuccessful at losing weight. Reports having SOB while lying flat at night. May have JONES.   - F/u HbA1c, lipid profile  - Consider OP follow up to evaluate for JONES OP        Asthma   Assessment & Plan    Pt reports history of asthma  and eczema. Reports having albuterol inhaler from [place] prn exacerbations. Not currently having an exacerbation  - Albuterol prn SOB/wheezing              Anticipated Hospital stay:  >2 midnights        Quality Measures    Reviewed items::  Radiology images reviewed, Labs reviewed and Medications reviewed  Matthew catheter::  No Matthew  DVT prophylaxis pharmacological::  Heparin  DVT prophylaxis - mechanical:  SCDs  Antibiotics:  Treating active infection/contamination beyond 24 hours perioperative coverage

## 2017-11-03 NOTE — PROGRESS NOTES
Pt is alert and oriented x4. Aware that he is NPO still as the Drs are to consult oral surgeon this morning. Pt complained of pain but declined need for intervention at this time. Will continue to monitor pt.

## 2017-11-03 NOTE — CARE PLAN
Problem: Knowledge Deficit  Goal: Knowledge of disease process/condition, treatment plan, diagnostic tests, and medications will improve  Outcome: PROGRESSING AS EXPECTED  Patient on IV abx, labs ordered for tomorrow. NPO at this time, IV fluids running, awaiting oral surgery consult. Awaiting US of LE to r/o DVT. Patient on 2 L of oxygen, hx of asthma, monitoring of saturations with .     Problem: Pain Management  Goal: Pain level will decrease to patient's comfort goal  Outcome: PROGRESSING AS EXPECTED  Patient reporting tolerable pain to right side of face.  Medicated with PRN tylenol and ice pack provided for swelling to right side of face.

## 2017-11-03 NOTE — ED PROVIDER NOTES
ED Provider Note    Scribed for Maurice Jordan M.D. by Maurice Jordan. 11/2/2017,  10:40 PM.    CHIEF COMPLAINT  Chief Complaint   Patient presents with   • Tooth Abscess     pt with large amount of swelling to rt lower jaw states chipped tooth last week and noticed swelling last night denies any pain to throat denies sob       HPI  Michael Ontiveros is a 27 y.o. male who presents to the Emergency Department For dental pain and right-sided facial swelling. He reports that he chipped his tooth about a week ago, and was hoping to be able to leave it alone until getting his dental insurance on January 1. He said he had some  swelling and dull pain when he went to bed last night, but then woke up with noteworthy swelling, which has become progressive over the course of the day. He denies fevers or chills or nausea or vomiting or shortness of breath or difficulty swallowing, his speech is slightly dysarthric because of his right cheek swelling. He is not diabetic or immunosuppressed    REVIEW OF SYSTEMS  See HPI for further details. All other systems are negative.     PAST MEDICAL HISTORY  No diabetes, no immunosuppression, no daily medications.    SOCIAL HISTORY  Social History     Social History Main Topics   • Smoking status: Not on file   • Smokeless tobacco: Not on file   • Alcohol use Not on file   • Drug use: Unknown   • Sexual activity: Not on file     History   Drug use: Unknown       SURGICAL HISTORY  patient denies any surgical history    CURRENT MEDICATIONS  Home Medications    **Home medications have not yet been reviewed for this encounter**         ALLERGIES  No Known Allergies    PHYSICAL EXAM  VITAL SIGNS: /74   Pulse (!) 109   Temp 36.4 °C (97.6 °F)   Resp (!) 26   Wt (!) 173.9 kg (383 lb 6.1 oz)   SpO2 92%   Pulse ox interpretation: I interpret this pulse ox as normal.  Constitutional: Alert in no apparent distress.  HENT: Very impressive right sided facial swelling of the  "mandibular cheek. The floor of the mouth is soft. There is no evidence of Gt's angina. He has generally poor dentition, with a chip to the right 1st molar. No signs of trauma, Bilateral external ears normal, Nose normal.   Eyes: Conjunctiva normal, Non-icteric.   Neck: Normal range of motion, Supple, No stridor.   Lymphatic: No lymphadenopathy noted.   Cardiovascular: Regular slightly tachycardic rate and rhythm, no murmurs.   Thorax & Lungs: Normal breath sounds, No respiratory distress, No wheezing, No chest tenderness.   Abdomen: Bowel sounds normal, Soft, No tenderness, No masses, No pulsatile masses. No peritoneal signs.  Skin: Warm, Dry, No erythema, No rash.   Extremities: Intact distal pulses, No edema, No cyanosis.  Musculoskeletal: Good range of motion in all major joints. No or major deformities noted.   Neurologic: Alert , Normal motor function, Normal sensory function, No focal deficits noted.   Psychiatric: Affect normal, Judgment normal, Mood normal.     DIAGNOSTIC STUDIES / PROCEDURES      LABS  Labs Reviewed   CBC WITH DIFFERENTIAL - Abnormal; Notable for the following:        Result Value    RBC 6.14 (*)     Hematocrit 52.8 (*)     MCH 26.4 (*)     MCHC 30.7 (*)     RDW 53.0 (*)     Neutrophils-Polys 72.40 (*)     Lymphocytes 17.30 (*)     All other components within normal limits   COMP METABOLIC PANEL - Abnormal; Notable for the following:     Glucose 108 (*)     Globulin 4.3 (*)     All other components within normal limits   LACTIC ACID   BLOOD CULTURE    Narrative:     Per Hospital Policy: Only change Specimen Src: to \"Line\" if  specified by physician order.   ESTIMATED GFR   LACTIC ACID   URINALYSIS   URINE CULTURE(NEW)   BLOOD CULTURE     All labs reviewed by me.    RADIOLOGY  CT-MAXILLOFACIAL WITH PLUS RECONS   Final Result      1.  There is a right periapical tooth abscess involving a premolar in the right lower mandible. There is associated bony resorption or erosion of the outer " aspect of the mandible at this level with extensive overlying soft tissue edema or cellulitis.    There is no drainable abscess seen in the soft tissues.   2.  Slightly prominent tonsillar tissue is also noted with slight narrowing of the airway although it is patent.   3.  There are inflammatory lymph nodes in the right side of the neck.      DX-CHEST-PORTABLE (1 VIEW)    (Results Pending)     The radiologist's interpretation of all radiological studies have been reviewed by me.    COURSE & MEDICAL DECISION MAKING  Nursing notes, VS, PMSFHx reviewed in chart.     10:40 PM Patient seen and examined at bedside. Differential diagnosis includes but is not limited to Dental abscess, facial cellulitis, lymphadenopathy, parotitis. Ordered for IV antibiotics, IV fluids, pain medication, nothing by mouth status, and a CT of the maxillofacial bones to evaluate.     12:16 AM This patient's laboratory tests and CT scan has been completed. He has a small periapical tooth abscess, with extensive surrounding facial cellulitis. The CT does not find any drainable abscess in the soft tissues. He'll need to be admitted for continued IV antibiotics, which were started immediately upon his initial evaluation, and consultation with oral surgery for tooth extraction. I paged the [facility] internal medicine's office for admission.    12:45 AM this patient will be admitted to the [facility] internal medicine service, with whom I have reviewed the patient's presentation and results of his workup.      DISPOSITION:  Patient will be admitted to R in guarded condition.      FINAL IMPRESSION  1. Facial cellulitis  2. Dental abscess

## 2017-11-03 NOTE — ED NOTES
.  Chief Complaint   Patient presents with   • Tooth Abscess     pt with large amount of swelling to rt lower jaw states chipped tooth last week and noticed swelling last night denies any pain to throat denies sob     .Pt Informed regarding triage process and verbalized understanding to inform triage tech or RN for any changes in condition.  Placed in lobby.

## 2017-11-03 NOTE — ASSESSMENT & PLAN NOTE
- Pt is morbidly obese. Has developed HTN, which he was trying to control with weight loss. He has been unsuccessful at losing weight.   - HbA1c is 5.8  - BMI 55  Plan  - Recommend lose weight, follow up with PCP as an outpatient

## 2017-11-04 ENCOUNTER — PATIENT OUTREACH (OUTPATIENT)
Dept: HEALTH INFORMATION MANAGEMENT | Facility: OTHER | Age: 27
End: 2017-11-04

## 2017-11-04 VITALS
HEIGHT: 69 IN | BODY MASS INDEX: 46.65 KG/M2 | DIASTOLIC BLOOD PRESSURE: 60 MMHG | OXYGEN SATURATION: 92 % | TEMPERATURE: 97.6 F | SYSTOLIC BLOOD PRESSURE: 116 MMHG | RESPIRATION RATE: 20 BRPM | WEIGHT: 315 LBS | HEART RATE: 90 BPM

## 2017-11-04 PROBLEM — R09.02 OXYGEN DESATURATION: Status: ACTIVE | Noted: 2017-11-04

## 2017-11-04 LAB
ALBUMIN SERPL BCP-MCNC: 3.9 G/DL (ref 3.2–4.9)
ALBUMIN/GLOB SERPL: 0.9 G/DL
ALP SERPL-CCNC: 86 U/L (ref 30–99)
ALT SERPL-CCNC: 16 U/L (ref 2–50)
ANION GAP SERPL CALC-SCNC: 6 MMOL/L (ref 0–11.9)
APPEARANCE UR: CLEAR
AST SERPL-CCNC: 16 U/L (ref 12–45)
BACTERIA #/AREA URNS HPF: NEGATIVE /HPF
BASOPHILS # BLD AUTO: 0.2 % (ref 0–1.8)
BASOPHILS # BLD: 0.02 K/UL (ref 0–0.12)
BILIRUB SERPL-MCNC: 1 MG/DL (ref 0.1–1.5)
BILIRUB UR QL STRIP.AUTO: NEGATIVE
BUN SERPL-MCNC: 9 MG/DL (ref 8–22)
CALCIUM SERPL-MCNC: 9.4 MG/DL (ref 8.5–10.5)
CHLORIDE SERPL-SCNC: 98 MMOL/L (ref 96–112)
CHOLEST SERPL-MCNC: 110 MG/DL (ref 100–199)
CO2 SERPL-SCNC: 26 MMOL/L (ref 20–33)
COLOR UR: YELLOW
CREAT SERPL-MCNC: 0.65 MG/DL (ref 0.5–1.4)
EOSINOPHIL # BLD AUTO: 0 K/UL (ref 0–0.51)
EOSINOPHIL NFR BLD: 0 % (ref 0–6.9)
EPI CELLS #/AREA URNS HPF: ABNORMAL /HPF
ERYTHROCYTE [DISTWIDTH] IN BLOOD BY AUTOMATED COUNT: 53.8 FL (ref 35.9–50)
GFR SERPL CREATININE-BSD FRML MDRD: >60 ML/MIN/1.73 M 2
GLOBULIN SER CALC-MCNC: 4.2 G/DL (ref 1.9–3.5)
GLUCOSE SERPL-MCNC: 154 MG/DL (ref 65–99)
GLUCOSE UR STRIP.AUTO-MCNC: NEGATIVE MG/DL
GRAM STN SPEC: NORMAL
HCT VFR BLD AUTO: 51.7 % (ref 42–52)
HDLC SERPL-MCNC: 39 MG/DL
HGB BLD-MCNC: 15.8 G/DL (ref 14–18)
HYALINE CASTS #/AREA URNS LPF: ABNORMAL /LPF
IMM GRANULOCYTES # BLD AUTO: 0.05 K/UL (ref 0–0.11)
IMM GRANULOCYTES NFR BLD AUTO: 0.4 % (ref 0–0.9)
KETONES UR STRIP.AUTO-MCNC: NEGATIVE MG/DL
LDLC SERPL CALC-MCNC: 60 MG/DL
LEUKOCYTE ESTERASE UR QL STRIP.AUTO: NEGATIVE
LYMPHOCYTES # BLD AUTO: 0.58 K/UL (ref 1–4.8)
LYMPHOCYTES NFR BLD: 5.2 % (ref 22–41)
MCH RBC QN AUTO: 26.6 PG (ref 27–33)
MCHC RBC AUTO-ENTMCNC: 30.6 G/DL (ref 33.7–35.3)
MCV RBC AUTO: 86.9 FL (ref 81.4–97.8)
MICRO URNS: ABNORMAL
MONOCYTES # BLD AUTO: 0.19 K/UL (ref 0–0.85)
MONOCYTES NFR BLD AUTO: 1.7 % (ref 0–13.4)
NEUTROPHILS # BLD AUTO: 10.32 K/UL (ref 1.82–7.42)
NEUTROPHILS NFR BLD: 92.5 % (ref 44–72)
NITRITE UR QL STRIP.AUTO: NEGATIVE
NRBC # BLD AUTO: 0 K/UL
NRBC BLD AUTO-RTO: 0 /100 WBC
PH UR STRIP.AUTO: 7 [PH]
PLATELET # BLD AUTO: 302 K/UL (ref 164–446)
PMV BLD AUTO: 10.6 FL (ref 9–12.9)
POTASSIUM SERPL-SCNC: 4.3 MMOL/L (ref 3.6–5.5)
PROT SERPL-MCNC: 8.1 G/DL (ref 6–8.2)
PROT UR QL STRIP: 30 MG/DL
RBC # BLD AUTO: 5.95 M/UL (ref 4.7–6.1)
RBC # URNS HPF: ABNORMAL /HPF
RBC UR QL AUTO: NEGATIVE
SIGNIFICANT IND 70042: NORMAL
SITE SITE: NORMAL
SODIUM SERPL-SCNC: 130 MMOL/L (ref 135–145)
SOURCE SOURCE: NORMAL
SP GR UR STRIP.AUTO: 1.01
TRIGL SERPL-MCNC: 56 MG/DL (ref 0–149)
UROBILINOGEN UR STRIP.AUTO-MCNC: 1 MG/DL
WBC # BLD AUTO: 11.2 K/UL (ref 4.8–10.8)
WBC #/AREA URNS HPF: ABNORMAL /HPF

## 2017-11-04 PROCEDURE — 81001 URINALYSIS AUTO W/SCOPE: CPT

## 2017-11-04 PROCEDURE — 36415 COLL VENOUS BLD VENIPUNCTURE: CPT

## 2017-11-04 PROCEDURE — 99238 HOSP IP/OBS DSCHRG MGMT 30/<: CPT | Mod: GC | Performed by: INTERNAL MEDICINE

## 2017-11-04 PROCEDURE — 85025 COMPLETE CBC W/AUTO DIFF WBC: CPT

## 2017-11-04 PROCEDURE — 80061 LIPID PANEL: CPT

## 2017-11-04 PROCEDURE — 80053 COMPREHEN METABOLIC PANEL: CPT

## 2017-11-04 PROCEDURE — 700101 HCHG RX REV CODE 250: Performed by: ORAL & MAXILLOFACIAL SURGERY

## 2017-11-04 PROCEDURE — 87086 URINE CULTURE/COLONY COUNT: CPT

## 2017-11-04 RX ORDER — CLINDAMYCIN PHOSPHATE 600 MG/50ML
600 INJECTION, SOLUTION INTRAVENOUS EVERY 8 HOURS
Status: DISCONTINUED | OUTPATIENT
Start: 2017-11-04 | End: 2017-11-04 | Stop reason: HOSPADM

## 2017-11-04 RX ORDER — CLINDAMYCIN HYDROCHLORIDE 300 MG/1
600 CAPSULE ORAL 3 TIMES DAILY
Qty: 42 CAP | Refills: 0 | Status: SHIPPED | OUTPATIENT
Start: 2017-11-04 | End: 2017-11-11

## 2017-11-04 RX ADMIN — CLINDAMYCIN IN 5 PERCENT DEXTROSE 600 MG: 12 INJECTION, SOLUTION INTRAVENOUS at 13:11

## 2017-11-04 ASSESSMENT — ENCOUNTER SYMPTOMS
FOCAL WEAKNESS: 0
INSOMNIA: 0
DIZZINESS: 0
CLAUDICATION: 0
DOUBLE VISION: 0
BLURRED VISION: 0
STRIDOR: 0
BACK PAIN: 0
NAUSEA: 0
FEVER: 0
BLOOD IN STOOL: 0
VOMITING: 0
NECK PAIN: 0
TREMORS: 0
CONSTIPATION: 0
EYE DISCHARGE: 0
COUGH: 0
SENSORY CHANGE: 0
HEARTBURN: 0
LOSS OF CONSCIOUSNESS: 0
NERVOUS/ANXIOUS: 0
SORE THROAT: 0
BRUISES/BLEEDS EASILY: 0
HEADACHES: 0
PHOTOPHOBIA: 0
PALPITATIONS: 0
HALLUCINATIONS: 0
POLYDIPSIA: 0
CHILLS: 0
ABDOMINAL PAIN: 0
WHEEZING: 0
EYE REDNESS: 0
SPEECH CHANGE: 0
MEMORY LOSS: 0
DIARRHEA: 0
HEMOPTYSIS: 0
TINGLING: 0
SEIZURES: 0
DEPRESSION: 0
EYE PAIN: 0
DIAPHORESIS: 0
FLANK PAIN: 0
SPUTUM PRODUCTION: 0

## 2017-11-04 ASSESSMENT — PAIN SCALES - GENERAL
PAINLEVEL_OUTOF10: 2
PAINLEVEL_OUTOF10: 2
PAINLEVEL_OUTOF10: 0
PAINLEVEL_OUTOF10: 0

## 2017-11-04 ASSESSMENT — LIFESTYLE VARIABLES
SUBSTANCE_ABUSE: 0
EVER_SMOKED: NEVER

## 2017-11-04 NOTE — CONSULTS
DATE OF SERVICE:  11/04/2017    REQUESTING PHYSICIAN:  Moni Tenorio MD    REASON FOR CONSULTATION:  Evaluation and management of possible obstructive   sleep apnea syndrome.    HISTORY OF PRESENT ILLNESS:  The patient is a 27-year-old    who does not currently have medical insurance, but will beginning in 2018.  He   was admitted to [facility] on 11/02/2017 with acute   swelling and tenderness in his right face.  He was seen by Dr. Olman Sparks and diagnosed to have tooth #29 abscess resulting in right buccal   and submandibular cellulitis.  The patient subsequently underwent a simple   extraction of tooth #29 and had simple incision and drainage of the right   buccal and submandibular fascial planes.    During this hospitalization, the patient was noted to have severe nocturnal   desaturation associated with snoring and we have been asked to see him   regarding the possibility that he has obstructive sleep apnea syndrome.    Patient lives alone.  He has been told he snores.  He occasionally has   resuscitated snorts.  His sleep is restorative typically 5 of 7 days and he   does suffer from daytime sleepiness at times, generally though he feels fairly   well.    In regard to his past medical history, is significant for hypertension,   asthma, eczema, and medically significant excessive weight.    REVIEW OF SYSTEMS:  As per the history of present illness.  Additionally, the   following CONSTITUTIONAL:  Negative for chills and fever.  HEENT:  Negative for congestive ear pain, hearing loss, sore throat.  Eyes,   negative for blurred vision, double vision or pain.  RESPIRATORY:  Negative for cough, sputum production, shortness of breath,   wheezing, stridor.  CARDIOVASCULAR:  Positive for orthopnea and leg swelling.  Negative for chest   pain or palpitations.  GASTROINTESTINAL:  Negative.  GENITOURINARY:  Negative.  MUSCULOSKELETAL:  Negative.  SKIN:  Positive  rash.  NEUROLOGIC:  Negative.    ALLERGIES:  POSITIVE FOR ENVIRONMENTAL ALLERGIES.    PAST MEDICAL HISTORY:  Aforementioned.    PAST SURGICAL HISTORY:  None.    HOME MEDICATIONS:  None listed.    DRUG ALLERGIES:  None known.    FAMILY HISTORY:  Mother has COPD and heart disease.  Father has had a hip   injury and suffers from immobility.    SOCIAL HISTORY:  Lives alone, unmarried, no children.  Works as an insurance   agent.    PHYSICAL EXAMINATION:  GENERAL:  At the present time, he is watching a video.  He is in absolutely no   acute distress.  VITAL SIGNS:  Blood pressure 125/67, heart rate 86, respiratory rate was 20,   saturation 95% on 5 liters of oxygen per minute.  HEENT:  Head reveals significant swelling of his face and neck.  Eyes:  Pupils   equal, round and reactive.  Mouth, Mallampati exam cannot be performed   secondary to significant swelling and his current situation is a Mallampati 4,   but that likely relates to acute swelling related to his tooth abscess and   cellulitis.  LUNGS:  Clear.  CARDIAC:  Regular rhythm.  ABDOMEN:  Obese with a large pannus.  EXTREMITIES:  Revealed stasis changes with 2+ peripheral edema.  NEUROLOGIC:  Awake, alert and appropriate.    LABORATORY DATA:  Labs and x-rays all reviewed.  He has white count 11,200,   hemoglobin 15.8, hematocrit 51.7, platelet count 302,000.  His sodium is 130,   glucose 154, globulin 4.2, HDL 39; otherwise, his studies are normal.    ASSESSMENT:  1.  Tooth abscess with associated right buccal and submandibular cellulitis.  2.  Status post tooth extraction and simple incision and drainage of right   buccal and submandibular fascial planes.  3.  Medically significant excessive weight.  4.  Obstructive sleep apnea syndrome per original diagnosis.  The patient has   typical symptoms and was noted to have significant desaturation episodes of   apnea here in the hospital.  5.  Asthma.  6.  Eczema.  7.  Hypertension.    PLAN:  The patient apparently  will be leaving the hospital this evening.  He   will be calling [facility] to establish an appointment so that he can get a   sleep study performed.  Evidently, his insurance does not kick in until the   first of the year.    Thank you very much for asking us to participate in the care of this   challenging patient.  Discussed with his RN at the bedside.       ____________________________________     MD VIRGIL KHAN / ALBERT    DD:  11/04/2017 12:13:05  DT:  11/04/2017 13:31:16    D#:  [id number]  Job#:  [id number]    cc: Moni Morejon MD

## 2017-11-04 NOTE — CONSULTS
DATE OF SERVICE:  11/03/2017    REQUESTING FACILITY:  [facility], Maurice Jordan MD and   José Ziegler MD    REASON FOR REFERRAL:  Dental infection.    HISTORY OF PRESENT ILLNESS:  This is a 27-year-old male with a greater than   24-hour history of progressive right facial swelling, requesting assistance   from [facility].  Currently, the patient denies any   odynophagia or difficulty breathing.    PAST MEDICAL HISTORY:  Asthma and eczema.    MEDICATIONS:  Topical eczema cream.    ALLERGIES:  No known drug allergies.    SOCIAL HISTORY:  Social use of tobacco.    PHYSICAL EXAMINATION:  GENERAL:  Displays a pleasant 27-year-old male in no acute distress.  HEENT:  Displays mild to moderate right submandibular edema.  No trismus.    Significant lipomatosis preventing an accurate examination.  Intraorally, the   patient has a restored dentition with carious tooth #29 and adjacent buccal   fullness.  There does not appear to be any floor of mouth edema.    IMAGING:  One computer tomography scan notes right facial cellulitis and an   apical radiolucency on tooth #29.    ASSESSMENT:  Tooth #29 resulting in a right buccal and submandibular   cellulitis.    PLAN:  The patient was informed of all surgical risks, benefits, and   alternatives of procedure.  He understands that under general anesthesia,   tooth #29 will be removed and intraoral incision and drainage,  knowing that   the tooth extracted will not be replaced and he requires further supportive   care until stable, then will be discharged to home.  All questions were thoroughly   answered.  Consent was signed freely for the planned procedure.       ____________________________________     HERNAN Cazares / ALBERT    DD:  11/03/2017 19:48:43  DT:  11/03/2017 21:28:59    D#:  [id number]  Job#:  [id number]

## 2017-11-04 NOTE — PROGRESS NOTES
Patient evaluated and informed of the extraction with I & D for a dental abscess. He was informed of all surgical risks, benefits and alterative to the procedure. Consent signed freely. Patient to the OR for I & D with dental extraction.

## 2017-11-04 NOTE — PROGRESS NOTES
Rounding completed on pt. Report given at bedside between night shift RN and day shift RN. Pt resting quietly, denies needs at this time, call light in reach. Assumed care of pt. Pt sleeping with 10L via oxymask, desats to 58%, after RN wakes pt and he takes a few breaths, sats >90%, however continues to desat when sleeping, will continue to monitor,  on.     Per night shift report, MD aware of pt desats.

## 2017-11-04 NOTE — PROGRESS NOTES
Fadia Wilkins Fall Risk Assessment:     Last Known Fall: No falls  Mobility: Immobilized/requires assist of one person, Dizziness/generalized weakness  Medications: No meds  Mental Status/LOC/Awareness: Awake, alert, and oriented to date, place, and person  Toileting Needs: Use of assistive device (Bedside commode, bedpan, urinal)  Volume/Electrolyte Status: Use of IV fluids/tube feeds  Communication/Sensory: Visual (Glasses)/hearing deficit  Behavior: Appropriate behavior  Fadia Wilkins Fall Risk Total: 9  Fall Risk Level: LOW RISK    Universal Fall Precautions:  call light/belongings in reach, bed in low position and locked, siderails up x 2, use non-slip footwear, adequate lighting, clutter free and spill free environment, educate on level of risk, educate to call for assistance    Fall Risk Level Interventions:   TRIAL (TELE 8, NEURO, MED LIVAN 5) Low Fall Risk Interventions  Place yellow fall risk ID band on patient: refused  Provide patient/family education based on risk assessment: completed  Educate patient/family to call staff for assistance when getting out of bed: completed  Place fall precaution signage outside patient door: completed      Patient Specific Interventions:     Medication: review medications with patient and family  Mental Status/LOC/Awareness: check on patient hourly and reinforce the use of call light  Toileting: provide frquent toileting and instruct patient/family on the need to call for assistance when toileting  Volume/Electrolyte Status: ensure patient remains hydrated, monitor abnormal lab values and ensure IV fluids are appropriate  Communication/Sensory: update plan of care on whiteboard, ensure proper positioning when transferrng/ambulating and ensure patient has glasses/contacts and hearing aids/dentures  Behavioral: encourage patient to voice feelings and administer medication as ordered  Mobility: ensure bed is locked and in lowest position, provide appropriate assistive device  and instruct patient to exit bed on their strongest side

## 2017-11-04 NOTE — ASSESSMENT & PLAN NOTE
- Reports having SOB while lying flat at night. Pt desaturated to 60-70s on O2 therapy  - O2 therapy now is 10 L and the patient O2 saturation is 94  - May have JONES. Pulmonologist have been consulted (Dr. Alcantar) and he is going to see him today.  - Nursing commiunication to measure the patient O2 saturation while ambulating.

## 2017-11-04 NOTE — PROGRESS NOTES
Patient oxygen saturation level between 60-70% while patient is asleep. While patient is awake patient oxygen level between 91-95%. [facility] MD aware of patient oxygen saturation. Per MD no new orders received. MD stated they will be up to assess patient.

## 2017-11-04 NOTE — PROGRESS NOTES
Internal Medicine Interval Note  Note Author: Moni Morejon M.D.     Name Michael Ontiveros     1990   Age/Sex 27 y.o. male   MRN [id number]   Code Status FULL     After 5PM or if no immediate response to page, please call for cross-coverage  Attending/Team: Dr. Stanford See Patient List for primary contact information  Call [phone] to page    1st Call - Day Intern (R1):   Dr. Morejon 2nd Call - Day Sr. Resident (R2/R3):   Dr. Salvador         Reason for interval visit  (Principal Problem)   Facial cellulitis, and shortness of breath on lying down to sleep at night    Interval Problem Daily Status Update  (24 hours)   Mr. Ontiveros is a 26 y/o M with a PMH of HTN, Asthma and eczema. Presented to the ED complaining of right facial swelling and pain.  He chipped his tooth 7 days ago. He started having pain around his right face two days ago. It has increasingly worsened and he developed swelling as well.  He denies fever or chills.  Denies toothache prior to this.  Denies any dyspnea, coughing, choking, drooling and feeling of foreign body sensation in throat.   Yesterday he had a successful oral surgery and he is doing much better today, The nurse paged me at the early morning today because the patient oxygen was desaturating to the 60-70s and they put him on 10 L of O2 therapy, most probably an JONES because of his morbid obesity, the pulmonologist has been consulted (Katharine WESTBROOK) and will follow his recommendations.     Principal Problem:    Facial cellulitis POA: Had a successful oral surgery yesterday, doing myuch better today , on Unasyn, switch him to clindamycin on D/C      Overview: CT maxillofacial       1.  There is a right periapical tooth abscess involving a premolar in the       right lower mandible. There is associated bony resorption or erosion of       the outer aspect of the mandible at this level with extensive overlying       soft tissue edema or cellulitis.       There  is no drainable abscess seen in the soft tissues.      2.  Slightly prominent tonsillar tissue is also noted with slight       narrowing of the airway although it is patent.      3.  There are inflammatory lymph nodes in the right side of the neck.  Active Problems:    Asthma POA: Stable, no SOB, or wheezing. Desaturate during the night to 60/70s, pulmonologist on board, will follow his recommendation    Morbid obesity (CMS-HCC) POA: BMI is 55, failed to loss weight previously, developed HTN.    Lower extremity edema POA: Chronic, with stasis dermatitis, bilateral, not tender on palpation, negative doppler study, enlarged cardiac silhoute on CXR, possible JONES      Overview: LE doppler study on 11/3/2017 shows:      Bilateral lower extremities -       Complete color filling and compressibility with normal venous flow       dynamics        including spontaneous flow, response to augmentation maneuvers, and        respiratory phasicity. No evidence of superficial or deep venous       thrombosis. The peroneal and posterior tibial veins are difficult to       assess for        compressibility, but flow response to augmentation is demonstrated.  Wall       thickness visualized in the left small saphenous vein.      No priors available for comparison.    Review of Systems   Constitutional: Positive for malaise/fatigue. Negative for chills, diaphoresis and fever.   HENT: Negative for ear discharge, ear pain, hearing loss, nosebleeds, sore throat and tinnitus.    Eyes: Negative for blurred vision, double vision, photophobia, pain, discharge and redness.   Respiratory: Negative for cough, hemoptysis, sputum production, wheezing and stridor.    Cardiovascular: Positive for leg swelling. Negative for chest pain, palpitations and claudication.   Gastrointestinal: Negative for abdominal pain, blood in stool, constipation, diarrhea, heartburn, melena, nausea and vomiting.   Genitourinary: Negative for dysuria, flank pain,  frequency, hematuria and urgency.   Musculoskeletal: Negative for back pain and neck pain.   Skin: Negative for itching.   Neurological: Negative for dizziness, tingling, tremors, sensory change, speech change, focal weakness, seizures, loss of consciousness and headaches.   Endo/Heme/Allergies: Negative for environmental allergies and polydipsia. Does not bruise/bleed easily.   Psychiatric/Behavioral: Negative for depression, hallucinations, memory loss, substance abuse and suicidal ideas. The patient is not nervous/anxious and does not have insomnia.        Consultants/Specialty  Pulmonologist  Oral surgeon    Disposition  Inpatient    Quality Measures    Reviewed items::  EKG reviewed, Labs reviewed and Medications reviewed  Matthew catheter::  No Matthew          Physical Exam       Vitals:    11/03/17 2203 11/04/17 0000 11/04/17 0400 11/04/17 0800   BP: 113/67 120/67 121/69 125/67   Pulse: 92 95 85 82   Resp: 20 (!) 21 (!) 22 20   Temp:  36.3 °C (97.3 °F) 36.3 °C (97.4 °F) 36.3 °C (97.3 °F)   TempSrc:       SpO2: 99% 93% 92% 95%   Weight:  (!) 171.2 kg (377 lb 6.8 oz)     Height:         Body mass index is 55.74 kg/m². Weight: (!) 171.2 kg (377 lb 6.8 oz)  Oxygen Therapy:  Pulse Oximetry: 95 %, O2 (LPM): 5, O2 Delivery: Oxymask    Physical Exam   Constitutional: He is oriented to person, place, and time.   HENT:   Head: Normocephalic and atraumatic.   Right Ear: External ear normal.   Left Ear: External ear normal.   Eyes: Conjunctivae and EOM are normal. Pupils are equal, round, and reactive to light. Right eye exhibits no discharge. Left eye exhibits no discharge. No scleral icterus.   Neck: No JVD present. No tracheal deviation present. No thyromegaly present.   Cardiovascular: Exam reveals no gallop and no friction rub.    No murmur heard.  Pulmonary/Chest: Breath sounds normal. No stridor. He has no wheezes. He has no rales. He exhibits no tenderness.   Abdominal: He exhibits no distension and no mass. There  is no tenderness. There is no rebound and no guarding.   Genitourinary: No discharge found.   Musculoskeletal: He exhibits edema. He exhibits no tenderness or deformity.   Lymphadenopathy:     He has cervical adenopathy.   Neurological: He is alert and oriented to person, place, and time. No cranial nerve deficit.   Skin: No rash noted. He is not diaphoretic. No erythema.         Lab Data Review:         11/4/2017  11:55 AM    Recent Labs      11/02/17 2311 11/04/17 0259   SODIUM  135  130*   POTASSIUM  4.2  4.3   CHLORIDE  102  98   CO2  24  26   BUN  9  9   CREATININE  0.51  0.65   MAGNESIUM  2.1   --    CALCIUM  9.3  9.4       Recent Labs      11/02/17 2311 11/04/17   0259   ALTSGPT  21  16   ASTSGOT  21  16   ALKPHOSPHAT  91  86   TBILIRUBIN  0.4  1.0   GLUCOSE  108*  154*       Recent Labs      11/02/17 2311 11/04/17 0259   RBC  6.14*  5.95   HEMOGLOBIN  16.2  15.8   HEMATOCRIT  52.8*  51.7   PLATELETCT  282  302       Recent Labs      11/02/17 2311 11/04/17 0259   WBC  9.3  11.2*   NEUTSPOLYS  72.40*  92.50*   LYMPHOCYTES  17.30*  5.20*   MONOCYTES  7.70  1.70   EOSINOPHILS  1.80  0.00   BASOPHILS  0.40  0.20   ASTSGOT  21  16   ALTSGPT  21  16   ALKPHOSPHAT  91  86   TBILIRUBIN  0.4  1.0           Assessment/Plan     * Facial cellulitis   Assessment & Plan    - Pt reports chipping his tooth 1 week prior to presentation. Woke up day of presentation with R tooth/jaw pain and swelling that progressively worsened. No signs of systemic infection. In ED, pt started on IV abx  - Continue IV abx (unasyn)  - NO signs of systemic infection  - Had a successful oral surgery yesterday, today he is doing better   - Continue on Abxs, switch to oral clindamycin on D/C        Oxygen desaturation   Assessment & Plan    - Reports having SOB while lying flat at night. Pt desaturated to 60-70s on O2 therapy  - O2 therapy now is 10 L and the patient O2 saturation is 94  - May have JONES. Pulmonologist have been  consulted (Dr. Alcantar) and he is going to see him today.  - Nursing commiunication to measure the patient O2 saturation while ambulating.        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 No recent long car rides. On physical exam, LE with hyperpigmentation, likely 2/2 venous stasis  - LE doppler come back negative for DVT,   - Plan  - DC heparin        Morbid obesity (CMS-HCC)   Assessment & Plan    - Pt is morbidly obese. Has developed HTN, which he was trying to control with weight loss. He has been unsuccessful at losing weight.   - HbA1c is 5.8  - BMI 55  Plan  - Recommend lose weight, follow up with PCP as an outpatient         Asthma   Assessment & Plan      - Pt reports history of asthma and eczema. Reports having albuterol inhaler from [place] prn exacerbations. Not currently having an exacerbation  - Albuterol prn SOB/wheezing

## 2017-11-04 NOTE — OR NURSING
Pt via bed, accompanied by transport, was transferred to [facility] at 2145. Report given to JOANNA Lo

## 2017-11-04 NOTE — PROGRESS NOTES
RN spoke with SW, attempting to set up home O2 for pt, also spoke with scheduling, attempting to set up sleep apnea appointment for pt. Will follow up.

## 2017-11-04 NOTE — PROGRESS NOTES
Internal Medicine Interval Note  Note Author: Moni Morejon M.D.     Name Michael Ontiveros     1990   Age/Sex 27 y.o. male   MRN [id number]   Code Status FULL     After 5PM or if no immediate response to page, please call for cross-coverage  Attending/Team: Dr. Stanford See Patient List for primary contact information  Call [phone] to page    1st Call - Day Intern (R1):   Dr. Morejon 2nd Call - Day Sr. Resident (R2/R3):   Dr. Salvador         Reason for interval visit  (Principal Problem)   Facial cellulitis    Interval Problem Daily Status Update  (24 hours)   Mr. Ontiveros is a 28 y/o M with a PMH of HTN, Asthma and eczema. Presented to the ED complaining of right facial swelling and pain.  He chipped his tooth 7 days ago. He started having pain around his right face two days ago. It has increasingly worsened and he developed swelling as well.  He denies fever or chills.  Denies toothache prior to this.  Denies any dyspnea, coughing, choking, drooling and feeling of foreign body sensation in throat.     Principal Problem:    Facial cellulitis POA: On IV Unasyn, scheduled to have an operation with dental surgeon today at 7.30 pm, D/C  Heparin      Overview: CT maxillofacial       1.  There is a right periapical tooth abscess involving a premolar in the       right lower mandible. There is associated bony resorption or erosion of       the outer aspect of the mandible at this level with extensive overlying       soft tissue edema or cellulitis.       There is no drainable abscess seen in the soft tissues.      2.  Slightly prominent tonsillar tissue is also noted with slight       narrowing of the airway although it is patent.      3.  There are inflammatory lymph nodes in the right side of the neck.  Active Problems:    Asthma POA: Stable, no SOB, or wheezing.    Morbid obesity (CMS-HCC) POA: BMI is 55, failed to loss weight previously, developed HTN.    Lower extremity edema POA:  Chronic, with stasis dermatitis, bilateral, not tender on palpation, negative doppler study, enlarged cardiac silhoute on CXR, possible JONES, recommended sleep study as an outpatient      Overview: LE doppler study on 11/3/2017 shows:      Bilateral lower extremities -       Complete color filling and compressibility with normal venous flow       dynamics        including spontaneous flow, response to augmentation maneuvers, and        respiratory phasicity. No evidence of superficial or deep venous       thrombosis. The peroneal and posterior tibial veins are difficult to       assess for        compressibility, but flow response to augmentation is demonstrated.  Wall       thickness visualized in the left small saphenous vein.      No priors available for comparison.        Review of Systems   Constitutional: Negative for chills, diaphoresis, fever, malaise/fatigue and weight loss.   HENT: Negative for congestion, ear discharge, ear pain, hearing loss, nosebleeds, sore throat and tinnitus.    Eyes: Negative for blurred vision, double vision, photophobia, pain, discharge and redness.   Respiratory: Negative for cough, hemoptysis, sputum production, shortness of breath, wheezing and stridor.    Cardiovascular: Positive for leg swelling and PND. Negative for chest pain, palpitations, orthopnea and claudication.   Gastrointestinal: Negative for abdominal pain, blood in stool, constipation, diarrhea, heartburn, melena, nausea and vomiting.   Genitourinary: Negative for dysuria, flank pain, frequency, hematuria and urgency.   Musculoskeletal: Positive for joint pain and myalgias. Negative for back pain, falls and neck pain.   Skin: Negative for itching and rash.   Neurological: Negative for dizziness, tingling, tremors, sensory change, speech change, focal weakness, seizures, loss of consciousness, weakness and headaches.   Endo/Heme/Allergies: Negative for environmental allergies and polydipsia. Does not bruise/bleed  "easily.   Psychiatric/Behavioral: Negative for depression, hallucinations, memory loss, substance abuse and suicidal ideas. The patient is not nervous/anxious.        Consultants/Specialty  - Dental surgeon    Disposition  Inpatient    Quality Measures    Reviewed items::  Labs reviewed, Medications reviewed, EKG reviewed and Radiology images reviewed  Matthew catheter::  No Matthew          Physical Exam       Vitals:    11/02/17 2327 11/03/17 0227 11/03/17 0800 11/03/17 1600   BP:  149/80 128/67 152/83   Pulse: (!) 109 86 80 83   Resp: (!) 26 20 20 (!) 22   Temp:  36.3 °C (97.4 °F) 36 °C (96.8 °F) 36.4 °C (97.5 °F)   TempSrc:       SpO2: 92% 93% 93% 95%   Weight:  (!) 161.5 kg (356 lb 0.7 oz)     Height:  1.753 m (5' 9\")       Body mass index is 52.58 kg/m². Weight: (!) 161.5 kg (356 lb 0.7 oz)  Oxygen Therapy:  Pulse Oximetry: 95 %, O2 (LPM): 0, FIO2%: 4, O2 Delivery: None (Room Air)    Physical Exam   Constitutional: He is oriented to person, place, and time. He appears distressed.   HENT:   Head: Normocephalic.   Right Ear: Tympanic membrane mobility is abnormal.   Left Ear: External ear normal.   Mouth/Throat: Oral lesions present. Abnormal dentition. Dental abscesses and dental caries present. No tonsillar abscesses.   Eyes: Conjunctivae are normal. Pupils are equal, round, and reactive to light. Right eye exhibits no discharge. Left eye exhibits no discharge. No scleral icterus.   Neck: No JVD present. No tracheal deviation present. No thyromegaly present.   Cardiovascular: Exam reveals no gallop and no friction rub.    No murmur heard.  Pulmonary/Chest: Effort normal and breath sounds normal. No stridor. No respiratory distress. He has no wheezes. He has no rales. He exhibits no tenderness.   Abdominal: Soft. Bowel sounds are normal. He exhibits no mass. There is no tenderness. There is no rebound and no guarding.   Genitourinary: No discharge found.   Musculoskeletal: He exhibits edema and tenderness. "   Lymphadenopathy:     He has cervical adenopathy.   Neurological: He is alert and oriented to person, place, and time. He displays normal reflexes. No cranial nerve deficit. Coordination normal.   Skin: He is not diaphoretic. There is erythema.         Lab Data Review:         11/3/2017  5:42 PM    Recent Labs      11/02/17   2311   SODIUM  135   POTASSIUM  4.2   CHLORIDE  102   CO2  24   BUN  9   CREATININE  0.51   MAGNESIUM  2.1   CALCIUM  9.3       Recent Labs      11/02/17   2311   ALTSGPT  21   ASTSGOT  21   ALKPHOSPHAT  91   TBILIRUBIN  0.4   GLUCOSE  108*       Recent Labs      11/02/17 2311   RBC  6.14*   HEMOGLOBIN  16.2   HEMATOCRIT  52.8*   PLATELETCT  282       Recent Labs      11/02/17 2311   WBC  9.3   NEUTSPOLYS  72.40*   LYMPHOCYTES  17.30*   MONOCYTES  7.70   EOSINOPHILS  1.80   BASOPHILS  0.40   ASTSGOT  21   ALTSGPT  21   ALKPHOSPHAT  91   TBILIRUBIN  0.4           Assessment/Plan     * Facial cellulitis   Assessment & Plan    - Pt reports chipping his tooth 1 week prior to presentation. Woke up day of presentation with R tooth/jaw pain and swelling that progressively worsened. No signs of systemic infection. In ED, pt started on IV abx  - Continue IV abx (unasyn)  - NO signs of systemic infection  - have an operation scheduled for today at 7:30 pm with the oral surgery  - NPO for now  - D/C heparin for dental procedure        Lower extremity edema   Assessment & Plan    - Pt reports h/o bilateral LE edema, not tender on examination  - Works at a call center, sits for most of the day. No recent long car rides. On physical exam, LE with hyperpigmentation, likely 2/2 venous stasis  - LE doppler come back negative for DVT,   - Plan  - DC heparin        Morbid obesity (CMS-HCC)   Assessment & Plan    - Pt is morbidly obese. Has developed HTN, which he was trying to control with weight loss. He has been unsuccessful at losing weight. Reports having SOB while lying flat at night. May have JONES.   -  HbA1c is 5.8  - BMI 55  Plan  - Recommended sleep study as an out patient  - Recommend lose weight, follow up with PCP as an outpatient         Asthma   Assessment & Plan    - Stable  - Pt reports history of asthma and eczema. Reports having albuterol inhaler from [place] prn exacerbations. Not currently having an exacerbation  - Albuterol prn SOB/wheezing

## 2017-11-04 NOTE — CARE PLAN
Problem: Safety  Goal: Will remain free from falls    Intervention: Assess risk factors for falls  Environment is clutter free. Personal possession and bedside table near patient. Patient encouraged to call when needing assistance. Call light within reach. Bed locked and in the lowest position. Hourly rounding in place.       Problem: Respiratory:  Goal: Respiratory status will improve    Intervention: Assess and monitor pulmonary status  Assessing patient respiratory pattern and breath sounds. No SOB and dyspnea noticed. Patient on 5L of O2 via Oxymask, monitoring the need to titrate oxygen therapy. Oxygen saturation monitoring in place.

## 2017-11-04 NOTE — PROGRESS NOTES
Fadia Wilkins Fall Risk Assessment:     Last Known Fall: No falls  Mobility: Immobilized/requires assist of one person, Dizziness/generalized weakness  Medications: No meds  Mental Status/LOC/Awareness: Awake, alert, and oriented to date, place, and person  Toileting Needs: Use of assistive device (Bedside commode, bedpan, urinal)  Volume/Electrolyte Status: Use of IV fluids/tube feeds  Communication/Sensory: Visual (Glasses)/hearing deficit  Behavior: Appropriate behavior  Fadia Wilkins Fall Risk Total: 5  Fall Risk Level: NO RISK     Universal Fall Precautions:  call light/belongings in reach, bed in low position and locked, siderails up x 2, use non-slip footwear, adequate lighting, clutter free and spill free environment, educate on level of risk, educate to call for assistance     Fall Risk Level Interventions:   TRIAL (TELE 8, NEURO, MED LIVAN 5) Low Fall Risk Interventions  Place yellow fall risk ID band on patient: refused  Provide patient/family education based on risk assessment: completed  Educate patient/family to call staff for assistance when getting out of bed: completed  Place fall precaution signage outside patient door: completed       Patient Specific Interventions:      Medication: review medications with patient and family  Mental Status/LOC/Awareness: check on patient hourly and reinforce the use of call light  Toileting: provide frquent toileting and instruct patient/family on the need to call for assistance when toileting  Volume/Electrolyte Status: ensure patient remains hydrated, monitor abnormal lab values and ensure IV fluids are appropriate  Communication/Sensory: update plan of care on whiteboard, ensure proper positioning when transferrng/ambulating and ensure patient has glasses/contacts and hearing aids/dentures  Behavioral: encourage patient to voice feelings and administer medication as ordered  Mobility: ensure bed is locked and in lowest position, provide appropriate assistive  device and instruct patient to exit bed on their strongest side

## 2017-11-04 NOTE — FACE TO FACE
Face to Face Note  -  Durable Medical Equipment    aGbriela Salvador M.D. - NPI: [id number]  I certify that this patient is under my care and that they had a durable medical equipment(DME)face to face encounter by myself that meets the physician DME face-to-face encounter requirements with this patient on:    Date of encounter:   Patient:                    MRN:                       YOB: 2017  Michael Ontiveros  [id number]  1990     The encounter with the patient was in whole, or in part, for the following medical condition, which is the primary reason for durable medical equipment:  Other - hypoxia    I certify that, based on my findings, the following durable medical equipment is medically necessary:  Oxygen.    HOME O2 Saturation Measurements:(Values must be present for Home Oxygen orders)  Room air sat at rest: 87  Room air sat with amb: 85 (pt can tolerate ambulating approx 200ft on RA, with sats >90)  With liters of O2: 2, O2 sat at rest with O2: 97  With Liters of O2: 2, O2 sat with amb with O2 : 95  Is the patient mobile?: Yes    My Clinical findings support the need for the above equipment due to:  Hypoxia    Supporting Symptoms: hypoxia

## 2017-11-04 NOTE — DISCHARGE PLANNING
Medical Social Work    Referral: SW notified by bedside RN that pt will possibly be needing O2 prior to dc and sleep apnea study. Bedside RN also mentioned there is some issues w/ pt's insurance.    Intervention: SW read PFA notes that explained pt works full-time but won't be able to enroll in insurance benefits until January 1st. In the meantime, PFA has completed a FAP application with pt to assist in paying for this hospital stay. Furthermore, pt is over income for the month of November and cannot qualify for Medicaid at this time.     Plan: SW waiting for results of O2 stats and O2 needs. SW will remain available for dc planning needs.

## 2017-11-04 NOTE — PROGRESS NOTES
Report received from JOANNA Sherman. Patient transported to room via Bed. Patient is AAOx4. Patient appears calm and in no acute distress. Patient on 5L of O2 via Oxymask. Swelling noted on Right side facial, ice pack in place. Patient oral mucosa cleansed with water and oral swabs. Patient states having pain 2 out of 10 on right side of JAW, declines intervention at this time. Labs and orders reviewed. Assessment completed.Plan of care discussed with patient; questions have been answered at this time. Patient needs have been met at this time. Patient encouraged to call when needing assistance. Call light within reach. SCDs On and in place. Bed locked and in the lowest position. Hourly rounding in place.

## 2017-11-04 NOTE — OP REPORT
DATE OF SERVICE:  11/03/2017    PROCEDURE:  1.  Simple extraction of tooth #29.  2.  Simple incision and drainage of right buccal and submandibular fascial   planes.    PREOPERATIVE DIAGNOSES:  1.  Carious, tooth #29.  2.  Odontogenic right buccal and submandibular cellulitis.    POSTOPERATIVE DIAGNOSES:  1.  Carious, tooth #29.  2.  Odontogenic right buccal and submandibular cellulitis.    SURGEON:  Olman Sparks DDS.    ANESTHESIOLOGIST:  Felix Peterson MD.    ANESTHESIA TYPE:  General endotracheal intubation.    DRAINS:  None.    SPECIMENS:  Culture and sensitivity from odontogenic intraoral wound.    ESTIMATED BLOOD LOSS:  Less than 10 mL    COMPLICATIONS:  None.    HISTORY OF PRESENT ILLNESS:  This is a 27-year-old male, well known to Dr. Sparks, scheduled to extract tooth #29 under main operating conditions and an   intraoral incision and drainage.  The patient was informed of all surgical   risks, benefits, and alternatives of procedure.  Consent was signed freely for   the following procedure.    PROCEDURE IN DETAIL:  Once uncomplicated general endotracheal intubation was   performed per anesthesia, the patient was then properly prepped and draped as   per any intraoral procedure.  Local anesthetic was applied via infiltrative and  block anesthetic techniques and a moist throat pack was placed.  Tooth #29 was   simply forceps removed.  A 1 cm depth of the vestibule incision was made   adjacent to tooth #29 and #30 with blunt dissection exploring the right buccal   space and submandibular space.  Approximately 3 mL of purulent discharge was   expressed from the wound, which was sent for cultures and sensitivities.  The   exploration continued bluntly to the right inferior border of the mandible. To   ensure no additional loculation of purulent material was present,  a sulcular   incision was made on the lingual aspect behind teeth #27, #28 across the   edentulous side of #29 to #30.  Subperiosteal  dissection explored the   right sublingual space noting no additional loculations of purulent material.    The wounds were irrigated with copious amounts of normal saline and loosely   closed with one 4-0 chromic gut in an interrupted fashion completing the   procedure.  The moist throat pack was removed.  There were no operative   complications.  The patient was extubated in the main operating room with   spontaneous respirations en route PACU.       ____________________________________     HRENAN Cazares / ALBERT    DD:  11/03/2017 20:13:05  DT:  11/03/2017 21:45:09    D#:  [id number]  Job#:  [id number]

## 2017-11-05 LAB
BACTERIA WND AEROBE CULT: NORMAL
GRAM STN SPEC: NORMAL
SIGNIFICANT IND 70042: NORMAL
SITE SITE: NORMAL
SOURCE SOURCE: NORMAL

## 2017-11-05 NOTE — DISCHARGE PLANNING
Medical Social Work    BERNARDA received page indicating pt has O2 orders and can d/c when this arrives. BERNARDA reviewed chart and notes pt does not have insurance. O2 companies contacted by BERNARDA Byrd and advised they cannot take self-pay patients after-hours. BERNARDA called [facility] [employer] and spoke with Malou who indicated pt can use portable concentrator 24/7 and the cost is $60/wk or $240/mo. BERNARDA informed that an approved services could not be used for a portable and would need administration approval. BERNARDA spoke with pt at bedside who presented as eager to leave. He cannot afford $240/mo, but states he can pay for 1 week. BERNARDA f/u with RN who stated she would speak with MD about pt's safety should he d/c with one week of O2. RN will call BERNARDA with MD response and BERNARDA will call [facility] [employer] to deliver equipment if approved.

## 2017-11-05 NOTE — PROGRESS NOTES
Page out to SW about setting up home O2 for pt, sats charted, pt aware awaiting for home O2 set up. Would like SW to come speak with pt. Waiting return call.

## 2017-11-05 NOTE — DISCHARGE INSTRUCTIONS
Discharge Instructions    Discharged to home by car with friend. Discharged via wheelchair, hospital escort: Yes.  Special equipment needed: Oxygen    Be sure to schedule a follow-up appointment with your primary care doctor or any specialists as instructed.     -Please advise pt to follow up with PCP and pulmonology.   Pt has  1week of oxygen supply, he needs to follow up with PCP within a week to arrange for home oxygen.   Pt agrees to schedule an appt within a week     Discharge Plan:   Diet Plan: Discussed (Regular)  Activity Level: Discussed (As tolerated)  Confirmed Follow up Appointment: Patient to Call and Schedule Appointment (Follow up with Pulmonologist as planned, and sleep apnea study)  Confirmed Symptoms Management: Discussed  Medication Reconciliation Updated: Yes  Influenza Vaccine Indication: Patient Refuses    I understand that a diet low in cholesterol, fat, and sodium is recommended for good health. Unless I have been given specific instructions below for another diet, I accept this instruction as my diet prescription.   Other diet: Regular    Special Instructions:     Clindamycin capsules  What is this medicine?  CLINDAMYCIN (KLIN da MYE sin) is a lincosamide antibiotic. It is used to treat certain kinds of bacterial infections. It will not work for colds, flu, or other viral infections.  This medicine may be used for other purposes; ask your health care provider or pharmacist if you have questions.  COMMON BRAND NAME(S): Cleocin  What should I tell my health care provider before I take this medicine?  They need to know if you have any of these conditions:  -kidney disease  -liver disease  -stomach problems like colitis  -an unusual or allergic reaction to clindamycin, lincomycin, or other medicines, foods, dyes like tartrazine or preservatives  -pregnant or trying to get pregnant  -breast-feeding  How should I use this medicine?  Take this medicine by mouth with a full glass of water. Follow  the directions on the prescription label. You can take this medicine with food or on an empty stomach. If the medicine upsets your stomach, take it with food. Take your medicine at regular intervals. Do not take your medicine more often than directed. Take all of your medicine as directed even if you think your are better. Do not skip doses or stop your medicine early.  Talk to your pediatrician regarding the use of this medicine in children. Special care may be needed.  Overdosage: If you think you have taken too much of this medicine contact a poison control center or emergency room at once.  NOTE: This medicine is only for you. Do not share this medicine with others.  What if I miss a dose?  If you miss a dose, take it as soon as you can. If it is almost time for your next dose, take only that dose. Do not take double or extra doses.  What may interact with this medicine?  -chloramphenicol  -erythromycin  -kaolin products  This list may not describe all possible interactions. Give your health care provider a list of all the medicines, herbs, non-prescription drugs, or dietary supplements you use. Also tell them if you smoke, drink alcohol, or use illegal drugs. Some items may interact with your medicine.  What should I watch for while using this medicine?  Tell your doctor or healthcare professional if your symptoms do not start to get better or if they get worse.  Do not treat diarrhea with over the counter products. Contact your doctor if you have diarrhea that lasts more than 2 days or if it is severe and watery.  What side effects may I notice from receiving this medicine?  Side effects that you should report to your doctor or health care professional as soon as possible:  -allergic reactions like skin rash, itching or hives, swelling of the face, lips, or tongue  -dark urine  -pain on swallowing  -redness, blistering, peeling or loosening of the skin, including inside the mouth  -unusual bleeding or  bruising  -unusually weak or tired  -yellowing of eyes or skin  Side effects that usually do not require medical attention (report to your doctor or health care professional if they continue or are bothersome):  -diarrhea  -itching in the rectal or genital area  -joint pain  -nausea, vomiting  -stomach pain  This list may not describe all possible side effects. Call your doctor for medical advice about side effects. You may report side effects to FDA at 9-772-TNE-2739.  Where should I keep my medicine?  Keep out of the reach of children.  Store at room temperature between 20 and 25 degrees C (68 and 77 degrees F). Throw away any unused medicine after the expiration date.  NOTE: This sheet is a summary. It may not cover all possible information. If you have questions about this medicine, talk to your doctor, pharmacist, or health care provider.  © 2014, Elsevier/Gold Standard. (5/11/2011 10:12:31 AM)      Cerebral Hypoxia  Cerebral hypoxia occurs when your brain does not get enough oxygen because of low blood flow. Cerebral hypoxia can affect parts of your brain or all of your brain. Without enough oxygen, brain cells can start to die within five minutes.  Cerebral hypoxia can be mild or severe. You may have cerebral hypoxia in which your brain receives less oxygen but you continue to have blood flow. You also may have cerebral hypoxia that causes reduced oxygen along with interrupted blood flow. Not getting enough blood and oxygen can cause a stroke. Mild cerebral hypoxia may clear up completely. Severe cerebral hypoxia can cause permanent brain damage.  CAUSES  Mild cerebral hypoxia may be caused by:  · Choking.  · Breathing in smoke.  · Being at extremely high altitudes for long periods of time or when your body is not used to being at moderately high altitudes.  · Carbon monoxide poisoning.  · Any disease or condition that prevents movement of the muscles that you need in order to breathe.  Severe cerebral  hypoxia may be caused by:  · Head trauma.  · Complications from general anesthetic.  · Drug overdose.  · Certain heart problems.  · Drowning.  · Suffocation.  · Blood clot in the brain (stroke).  RISK FACTORS  Cerebral hypoxia is often unexpected and accidental. However, the following factors may increase your risk for cerebral hypoxia:  · Being at higher risk for cardiovascular disease due to:  ¨ High blood pressure (hypertension).  ¨ High cholesterol.  ¨ Smoking.  ¨ Being male.  ¨ Being overweight or obese.  ¨ Being older in age.  ¨ A family history of heart disease.  ¨ Lack of exercise.  ¨ Diabetes.  ¨ Stress.  ¨ Drinking too much alcohol.  ¨ Using illegal street drugs, such as cocaine and methamphetamines.  · Participating in high-risk activities that can result in:  ¨ Head injury or trauma.  ¨ Drowning.  ¨ Suffocation.  · Hiking at high altitudes.  · Exposure to carbon monoxide.  · Having surgery that requires anesthetic.  SIGNS AND SYMPTOMS  Signs and symptoms of cerebral hypoxia depend on the severity and the length of time that the brain has been without oxygen.  Signs and symptoms of mild cerebral hypoxia may include:  · Memory loss.  · Not being able to pay attention.  · Loss of coordination.  · Poor judgment.  · Headache.  Signs and symptoms of severe cerebral hypoxia may include:  · Lack of breathing.  · A state of deep unconsciousness (coma).  · Eyes that do not respond to light.  · Seizures.  · Numbness or weakness on one side of your body.  · Slurred speech.  · Facial drooping.  DIAGNOSIS  Your health care provider may diagnose cerebral hypoxia based on your symptoms and your medical history. Your health care provider will also do a physical exam. You may have tests to determine the cause of hypoxia and to find out how severe it is. These may include:  · Blood tests to check the oxygen level in your blood.  · Electrocardiogram (ECG) to check how well your heart is functioning.  · Electroencephalogram  (EEG) to test for abnormal brain activity or seizures.  · Imaging studies to check your heart, lungs, and brain. These may include:  ¨ Echocardiogram to get an image of your heart.  ¨ MRI and CT scans to check for brain damage.  TREATMENT  Emergency treatment of cerebral hypoxia starts with taking measures to restore oxygen to your brain and keep your body functioning (life support). You may:  · Have a breathing tube put in to keep your airway open (intubation).  · Get oxygen.  · Have a machine to help you breathe (mechanical ventilation).  · Get fluids or blood through an IV line.  · Take medicines to control blood pressure, heart rate, and seizures.  Long-term treatment of cerebral hypoxia depends on the amount of brain damage. Treatment may include:  · Physical therapy.  · Mental health therapy.  · Nutritional therapy.  HOME CARE INSTRUCTIONS  What you need to do at home will depend on the severity of the cerebral hypoxia. Follow the instructions that your health care provider gives to you. In general:  · Keep all follow-up visits as directed by your health care provider. This is important. This includes any rehabilitation that your health care provider may suggest, such as:  ¨ Physical therapy.  ¨ Mental health therapy.  ¨ Nutritional therapy.  · Take medicines only as directed by your health care provider.  · Follow any restrictions that are given to you by your health care provider.  SEEK IMMEDIATE MEDICAL CARE IF:  · You have symptoms of cerebral hypoxia that change or get worse during recovery.  · You have symptoms of cerebral hypoxia that return during or after recovery.  · You have a seizure.  · You have chest pain or have difficulty breathing.     This information is not intended to replace advice given to you by your health care provider. Make sure you discuss any questions you have with your health care provider.     Document Released: 12/08/2003 Document Revised: 01/08/2016 Document Reviewed:  07/29/2015  [employer] Interactive Patient Education ©2016 [employer]        Cellulitis  Cellulitis is an infection of the skin and the tissue beneath it. The infected area is usually red and tender. Cellulitis occurs most often in the arms and lower legs.   CAUSES   Cellulitis is caused by bacteria that enter the skin through cracks or cuts in the skin. The most common types of bacteria that cause cellulitis are staphylococci and streptococci.  SIGNS AND SYMPTOMS   · Redness and warmth.  · Swelling.  · Tenderness or pain.  · Fever.  DIAGNOSIS   Your health care provider can usually determine what is wrong based on a physical exam. Blood tests may also be done.  TREATMENT   Treatment usually involves taking an antibiotic medicine.  HOME CARE INSTRUCTIONS   · Take your antibiotic medicine as directed by your health care provider. Finish the antibiotic even if you start to feel better.  · Keep the infected arm or leg elevated to reduce swelling.  · Apply a warm cloth to the affected area up to 4 times per day to relieve pain.  · Take medicines only as directed by your health care provider.  · Keep all follow-up visits as directed by your health care provider.  SEEK MEDICAL CARE IF:   · You notice red streaks coming from the infected area.  · Your red area gets larger or turns dark in color.  · Your bone or joint underneath the infected area becomes painful after the skin has healed.  · Your infection returns in the same area or another area.  · You notice a swollen bump in the infected area.  · You develop new symptoms.  · You have a fever.  SEEK IMMEDIATE MEDICAL CARE IF:   · You feel very sleepy.  · You develop vomiting or diarrhea.  · You have a general ill feeling (malaise) with muscle aches and pains.  MAKE SURE YOU:   · Understand these instructions.  · Will watch your condition.  · Will get help right away if you are not doing well or get worse.     This information is not intended to replace advice given to  you by your health care provider. Make sure you discuss any questions you have with your health care provider.     Document Released: 09/27/2006 Document Revised: 01/08/2016 Document Reviewed: 03/04/2013  Elsevier Interactive Patient Education ©2016 "[employer] Inc.      · Is patient discharged on Warfarin / Coumadin?   No     · Is patient Post Blood Transfusion?  No    Depression / Suicide Risk    As you are discharged from this [facility] facility, it is important to learn how to keep safe from harming yourself.    Recognize the warning signs:  · Abrupt changes in personality, positive or negative- including increase in energy   · Giving away possessions  · Change in eating patterns- significant weight changes-  positive or negative  · Change in sleeping patterns- unable to sleep or sleeping all the time   · Unwillingness or inability to communicate  · Depression  · Unusual sadness, discouragement and loneliness  · Talk of wanting to die  · Neglect of personal appearance   · Rebelliousness- reckless behavior  · Withdrawal from people/activities they love  · Confusion- inability to concentrate     If you or a loved one observes any of these behaviors or has concerns about self-harm, here's what you can do:  · Talk about it- your feelings and reasons for harming yourself  · Remove any means that you might use to hurt yourself (examples: pills, rope, extension cords, firearm)  · Get professional help from the community (Mental Health, Substance Abuse, psychological counseling)  · Do not be alone:Call your Safe Contact- someone whom you trust who will be there for you.  · Call your local CRISIS HOTLINE 763-2726 or 819-498-1151  · Call your local Children's Mobile Crisis Response Team [place] (810) 114-5286 or www.[employer]  · Call the toll free National Suicide Prevention Hotlines   · National Suicide Prevention Lifeline 003-956-JOFY (9345)  · National Hope Line Network 800-SUICIDE (650-6025)

## 2017-11-05 NOTE — DISCHARGE PLANNING
Received call from [facility] , referral has been received and faxed to [facility] at [phone] on 11-04-17.  Per [facility], she has called [facility], and will speak with patient about cash payment for services.

## 2017-11-05 NOTE — PROGRESS NOTES
Report received from night RN. Assumed patient care at 1900. Patient is AAOx4. Patient appears calm and in no acute distress. Labs and Orders reviewed. Assessment completed. Patient denies any pain at this time. Discharge instruction and education provided to patient. Patient educated on medication prescription. Patient instructed to schedule an appointment with Miguel Miller as soon as possible within a week to arrange for home oxygen.  Patient provided with one week of oxygen supply by [facility]. Patient verbally demonstrated understanding of using 1L of O2 while resting and 2L of O2 while ambulating. Patient needs have been met at this time. Patient question have been answered at this time. Wheelchair assistance provided, patient refuse. Patient escorted off unit with CNA via walking with 2L of oxygen via nasal cannula.

## 2017-11-06 LAB
BACTERIA SPEC ANAEROBE CULT: NORMAL
BACTERIA UR CULT: NORMAL
SIGNIFICANT IND 70042: NORMAL
SIGNIFICANT IND 70042: NORMAL
SITE SITE: NORMAL
SITE SITE: NORMAL
SOURCE SOURCE: NORMAL
SOURCE SOURCE: NORMAL

## 2017-11-08 LAB
BACTERIA BLD CULT: NORMAL
BACTERIA BLD CULT: NORMAL
SIGNIFICANT IND 70042: NORMAL
SIGNIFICANT IND 70042: NORMAL
SITE SITE: NORMAL
SITE SITE: NORMAL
SOURCE SOURCE: NORMAL
SOURCE SOURCE: NORMAL

## 2019-05-05 ENCOUNTER — HOSPITAL ENCOUNTER (EMERGENCY)
Facility: MEDICAL CENTER | Age: 29
End: 2019-05-05
Attending: EMERGENCY MEDICINE
Payer: COMMERCIAL

## 2019-05-05 VITALS
BODY MASS INDEX: 47.74 KG/M2 | RESPIRATION RATE: 18 BRPM | SYSTOLIC BLOOD PRESSURE: 123 MMHG | OXYGEN SATURATION: 93 % | DIASTOLIC BLOOD PRESSURE: 66 MMHG | HEIGHT: 68 IN | WEIGHT: 315 LBS | TEMPERATURE: 97.6 F | HEART RATE: 88 BPM

## 2019-05-05 DIAGNOSIS — J03.90 TONSILLITIS: ICD-10-CM

## 2019-05-05 DIAGNOSIS — H65.01 RIGHT ACUTE SEROUS OTITIS MEDIA, RECURRENCE NOT SPECIFIED: ICD-10-CM

## 2019-05-05 LAB — S PYO DNA SPEC NAA+PROBE: NOT DETECTED

## 2019-05-05 PROCEDURE — 87651 STREP A DNA AMP PROBE: CPT

## 2019-05-05 PROCEDURE — 99284 EMERGENCY DEPT VISIT MOD MDM: CPT

## 2019-05-05 PROCEDURE — A9270 NON-COVERED ITEM OR SERVICE: HCPCS | Performed by: EMERGENCY MEDICINE

## 2019-05-05 PROCEDURE — 700102 HCHG RX REV CODE 250 W/ 637 OVERRIDE(OP): Performed by: EMERGENCY MEDICINE

## 2019-05-05 RX ORDER — AZITHROMYCIN 250 MG/1
TABLET, FILM COATED ORAL
Qty: 6 TAB | Refills: 0 | Status: SHIPPED | OUTPATIENT
Start: 2019-05-05 | End: 2019-08-21

## 2019-05-05 RX ORDER — AZITHROMYCIN 250 MG/1
500 TABLET, FILM COATED ORAL ONCE
Status: COMPLETED | OUTPATIENT
Start: 2019-05-05 | End: 2019-05-05

## 2019-05-05 RX ADMIN — AZITHROMYCIN 500 MG: 250 TABLET, FILM COATED ORAL at 16:00

## 2019-05-05 NOTE — ED TRIAGE NOTES
.  Chief Complaint   Patient presents with   • Sore Throat     x 2    • Congestion     x 4    • Fever     intermittent     Ambulated to triage. Pt c/o sore throat and tonsil swelling x 2 days. cold and congestion x 4 days. Intermittent fevers resolved now. Speaking in full sentences

## 2019-05-05 NOTE — ED PROVIDER NOTES
"ED Provider Note    CHIEF COMPLAINT  Chief Complaint   Patient presents with   • Sore Throat     x 2    • Congestion     x 4    • Fever     intermittent       HPI  Michael Ontiveros is a 28 y.o. male who presents to emerge from today with sore throat, congestion, fever.  Patient states is been present for 4 days getting worse.  No nausea no vomiting.    REVIEW OF SYSTEMS  See HPI for further details. All other systems are negative.      PAST MEDICAL HISTORY  Past Medical History:   Diagnosis Date   • Eczema        FAMILY HISTORY  History reviewed. No pertinent family history.    SOCIAL HISTORY  Social History     Social History   • Marital status: Single     Spouse name: N/A   • Number of children: N/A   • Years of education: N/A     Social History Main Topics   • Smoking status: Never Smoker   • Smokeless tobacco: Never Used   • Alcohol use Yes      Comment: rare   • Drug use: No   • Sexual activity: Not on file     Other Topics Concern   • Not on file     Social History Narrative   • No narrative on file       SURGICAL HISTORY  Past Surgical History:   Procedure Laterality Date   • DENTAL EXTRACTION(S)  11/3/2017    Procedure: DENTAL EXTRACTION(S);  Surgeon: Olman Sparks D.D.S.;  Location: SURGERY [facility];  Service: Oral Surgery       CURRENT MEDICATIONS  Home Medications     Reviewed by Valerie Hernandez R.N. (Registered Nurse) on 05/05/19 at 1558  Med List Status: Complete   Medication Last Dose Status   Non Formulary Request  Active                ALLERGIES  Allergies   Allergen Reactions   • Pistachio    • [place] Anaphylaxis     pistachios       PHYSICAL EXAM  VITAL SIGNS: /87   Pulse 94   Temp 36.4 °C (97.6 °F) (Temporal)   Resp 17   Ht 1.727 m (5' 8\")   Wt (!) 174.5 kg (384 lb 11.2 oz)   SpO2 93%   BMI 58.49 kg/m²  Room air O2: 93    Constitutional :  Well developed, Well nourished, No acute distress, Non-toxic appearance.   HENT: Pharynx injected purulent discharge from " the left tonsillar pillar no obstruction or masses.  Right TM is erythematous and retracted.  Eyes: perrla  Neck: Normal range of motion, No tenderness, Supple, No stridor.   Lymphatic: Submandibular lymphadenopathy.   Cardiovascular: Normal heart rate, Normal rhythm, No murmurs, No rubs, No gallops.   Thorax & Lungs: Clear to auscultation all fields.  Skin: Warm, Dry, No erythema, No rash.   Extremities: Intact distal pulses, No edema, No tenderness, No cyanosis, No clubbing.           COURSE & MEDICAL DECISION MAKING  Pertinent Labs & Imaging studies reviewed. (See chart for details)  Culture obtained pending rapid strep negative.  Patient has otitis media he was placed on Zithromax given first dose here in the emergency room.  Follow-up with primary care physician Dr. Miller note for work was provided.    FINAL IMPRESSION  1.  Acute tonsillitis  2.  Right otitis media  3.      Electronically signed by: Jimmie Alatorre, 5/5/2019

## 2019-05-06 NOTE — ED NOTES
Pt has been provided written and  Verbal information on home care and worsening s/s of tonsillitis and ear infection. He has been educated on when to return to ED, how to take his antibiotics and the use of otc medications for symptoms. A work note was provided by MD. Pt left ED with family, he walked to lobby with upright and steady gait.

## 2019-05-19 ENCOUNTER — HOSPITAL ENCOUNTER (EMERGENCY)
Facility: MEDICAL CENTER | Age: 29
End: 2019-05-19
Attending: EMERGENCY MEDICINE
Payer: COMMERCIAL

## 2019-05-19 VITALS
TEMPERATURE: 98 F | WEIGHT: 315 LBS | OXYGEN SATURATION: 92 % | DIASTOLIC BLOOD PRESSURE: 67 MMHG | SYSTOLIC BLOOD PRESSURE: 109 MMHG | HEART RATE: 63 BPM | RESPIRATION RATE: 20 BRPM | BODY MASS INDEX: 57.96 KG/M2

## 2019-05-19 DIAGNOSIS — J02.9 PHARYNGITIS, UNSPECIFIED ETIOLOGY: ICD-10-CM

## 2019-05-19 LAB — HETEROPH AB SER QL: NEGATIVE

## 2019-05-19 PROCEDURE — 99283 EMERGENCY DEPT VISIT LOW MDM: CPT

## 2019-05-19 PROCEDURE — 36415 COLL VENOUS BLD VENIPUNCTURE: CPT

## 2019-05-19 PROCEDURE — 86308 HETEROPHILE ANTIBODY SCREEN: CPT

## 2019-05-19 NOTE — ED TRIAGE NOTES
"Pt to triage, pt c/o sore throat. Recurrent in nature, was sen here for same two weeks ago, put on antibx, \" was better for a little bit, but now is beginning to pain have again\"   "

## 2019-05-19 NOTE — ED PROVIDER NOTES
"ED Provider Note    ER PROVIDER NOTE        CHIEF COMPLAINT  Chief Complaint   Patient presents with   • Sore Throat       [facility]  Michael Ontiveros is a 28 y.o. male who presents to the emergency department complaining of sore throat and fatigue.  Patient reports he has had symptoms over the last 2 weeks, was seen here, at that point was having sore throat with some \"white dots\" as well as ear pain.  He states his strep test was negative, but was placed on azithromycin, seem to improve although his symptoms have persisted.  He denies any fevers or chills.  Denies any headaches.  Denies any rash.  Denies any cough or difficulty breathing.  No drooling or difficulty swallowing    REVIEW OF SYSTEMS  Pertinent positives include sore throat. Pertinent negatives include no fever. See HPI for details. All other systems reviewed and are negative.    PAST MEDICAL HISTORY   has a past medical history of Eczema.    SOCIAL HISTORY  Social History   Substance Use Topics   • Smoking status: Never Smoker   • Smokeless tobacco: Never Used   • Alcohol use Yes      Comment: rare       SURGICAL HISTORY   has a past surgical history that includes dental extraction(s) (11/3/2017).    CURRENT MEDICATIONS  Home Medications     Reviewed by Kimani Dietrich R.N. (Registered Nurse) on 05/19/19 at 1256  Med List Status: Partial   Medication Last Dose Status   azithromycin (ZITHROMAX) 250 MG Tab  Active   Non Formulary Request  Active                ALLERGIES  Allergies   Allergen Reactions   • Pistachio    • [place] Anaphylaxis     pistachios       PHYSICAL EXAM  VITAL SIGNS: /74   Pulse 62   Temp 36.7 °C (98 °F) (Temporal)   Resp 18   Wt (!) 172.9 kg (381 lb 2.8 oz)   SpO2 94%   BMI 57.96 kg/m²   Pulse ox interpretation: I interpret this pulse ox as normal.    Constitutional: Alert.  In no apparent distress.  HENT: Normocephalic, Atraumatic, Bilateral external ears normal. Nose normal.  Oropharynx is mildly erythematous, no " exudate, uvula is midline, minimally enlarged tonsils, no lingual elevation or pooled secretions, no intraoral edema  Canals are clear bilaterally, TMs are unremarkable without signs of infection  Lymph: No cervical lymphadenopathy  Eyes: Pupils are equal and reactive. Conjunctiva normal, non-icteric.   Heart: Regular rate and rhythm, no murmurs.    Lungs: Clear to auscultation bilaterally.  Skin: Warm, Dry, No erythema, No rash.   Musculoskeletal: No tenderness or major deformities noted. No edema.  Neurologic: Alert, Grossly non-focal.   Psychiatric: Affect normal, Judgment normal, Mood normal, Appears appropriate and not intoxicated.     DIAGNOSTIC STUDIES / PROCEDURES    Labs Reviewed   HETEROPHILE AB SCREEN         RADIOLOGY  No orders to display     The radiologist's interpretation of all radiological studies have been reviewed by me.    COURSE & MEDICAL DECISION MAKING  Nursing notes, VS, PMSFHx reviewed in chart.    1:24 PM - Patient seen and examined at bedside. Ordered for mono to evaluate his symptoms.     2:59 PM  Patient reevaluated, updated on results, plan for discharge    Decision Making:  This is a 28 y.o. male presents sore throat.  This may be more viral in nature even potential allergic which I discussed with patient.  His mono test is negative, he had a negative strep PCR, and I do not feel this represents a bacterial process. I also considered other diagnoses such as deep space infection, RPA, PTA however given the full range of motion in the neck, lack of swelling, clinical examination overall well appearance this seems unlikely. Also considered Ludwigs but the clinical examination, overall appearance, lack of submandibular symptoms make this unlikely.The pt is tolerating PO, there is no evidence of airway distress and will be discharged with strict return precautions which the patient understood well    The patient will return for new or worsening symptoms and is stable at the time of  discharge.    The patient is referred to a primary physician for blood pressure management, diabetic screening, and for all other preventative health concerns.      DISPOSITION:  Patient will be discharged home in stable condition.    FOLLOW UP:  Miguel Miller D.O.  [facility] [address]  [phone]    In 1 week  As needed      OUTPATIENT MEDICATIONS:  New Prescriptions    No medications on file         FINAL IMPRESSION  1. Pharyngitis, unspecified etiology        The note accurately reflects work and decisions made by me.  Gian Donohue  5/19/2019  3:00 PM

## 2019-08-21 ENCOUNTER — APPOINTMENT (OUTPATIENT)
Dept: RADIOLOGY | Facility: IMAGING CENTER | Age: 29
End: 2019-08-21
Attending: PHYSICIAN ASSISTANT
Payer: COMMERCIAL

## 2019-08-21 ENCOUNTER — APPOINTMENT (OUTPATIENT)
Dept: RADIOLOGY | Facility: MEDICAL CENTER | Age: 29
DRG: 871 | End: 2019-08-21
Attending: EMERGENCY MEDICINE
Payer: COMMERCIAL

## 2019-08-21 ENCOUNTER — HOSPITAL ENCOUNTER (INPATIENT)
Facility: MEDICAL CENTER | Age: 29
LOS: 7 days | DRG: 871 | End: 2019-08-28
Attending: EMERGENCY MEDICINE | Admitting: INTERNAL MEDICINE
Payer: COMMERCIAL

## 2019-08-21 ENCOUNTER — OFFICE VISIT (OUTPATIENT)
Dept: URGENT CARE | Facility: CLINIC | Age: 29
End: 2019-08-21
Payer: COMMERCIAL

## 2019-08-21 VITALS — TEMPERATURE: 99.8 F | RESPIRATION RATE: 24 BRPM | HEART RATE: 115 BPM | OXYGEN SATURATION: 75 %

## 2019-08-21 DIAGNOSIS — R05.9 COUGH: ICD-10-CM

## 2019-08-21 DIAGNOSIS — N17.9 AKI (ACUTE KIDNEY INJURY) (HCC): ICD-10-CM

## 2019-08-21 DIAGNOSIS — I27.20 PULMONARY HYPERTENSION (HCC): ICD-10-CM

## 2019-08-21 DIAGNOSIS — R09.02 HYPOXIA: ICD-10-CM

## 2019-08-21 DIAGNOSIS — R09.02 OXYGEN DESATURATION: ICD-10-CM

## 2019-08-21 DIAGNOSIS — I26.09 ACUTE PULMONARY EMBOLISM WITH ACUTE COR PULMONALE, UNSPECIFIED PULMONARY EMBOLISM TYPE (HCC): ICD-10-CM

## 2019-08-21 DIAGNOSIS — E66.01 MORBID OBESITY (HCC): ICD-10-CM

## 2019-08-21 DIAGNOSIS — J45.909 UNCOMPLICATED ASTHMA, UNSPECIFIED ASTHMA SEVERITY, UNSPECIFIED WHETHER PERSISTENT: ICD-10-CM

## 2019-08-21 DIAGNOSIS — J18.9 PNEUMONIA OF RIGHT LOWER LOBE DUE TO INFECTIOUS ORGANISM: ICD-10-CM

## 2019-08-21 LAB
ALBUMIN SERPL BCP-MCNC: 3.4 G/DL (ref 3.2–4.9)
ALBUMIN/GLOB SERPL: 0.9 G/DL
ALP SERPL-CCNC: 97 U/L (ref 30–99)
ALT SERPL-CCNC: 30 U/L (ref 2–50)
ANION GAP SERPL CALC-SCNC: 11 MMOL/L (ref 0–11.9)
ANISOCYTOSIS BLD QL SMEAR: ABNORMAL
APPEARANCE UR: CLEAR
AST SERPL-CCNC: 26 U/L (ref 12–45)
BASOPHILS # BLD AUTO: 1 % (ref 0–1.8)
BASOPHILS # BLD: 0.14 K/UL (ref 0–0.12)
BILIRUB SERPL-MCNC: 1.2 MG/DL (ref 0.1–1.5)
BILIRUB UR QL STRIP.AUTO: NEGATIVE
BUN SERPL-MCNC: 10 MG/DL (ref 8–22)
CALCIUM SERPL-MCNC: 9 MG/DL (ref 8.5–10.5)
CHLORIDE SERPL-SCNC: 102 MMOL/L (ref 96–112)
CO2 SERPL-SCNC: 25 MMOL/L (ref 20–33)
COLOR UR: NORMAL
CREAT SERPL-MCNC: 0.85 MG/DL (ref 0.5–1.4)
EKG IMPRESSION: NORMAL
EOSINOPHIL # BLD AUTO: 0 K/UL (ref 0–0.51)
EOSINOPHIL NFR BLD: 0 % (ref 0–6.9)
ERYTHROCYTE [DISTWIDTH] IN BLOOD BY AUTOMATED COUNT: 59.4 FL (ref 35.9–50)
FLUAV RNA SPEC QL NAA+PROBE: NEGATIVE
FLUBV RNA SPEC QL NAA+PROBE: NEGATIVE
GLOBULIN SER CALC-MCNC: 3.8 G/DL (ref 1.9–3.5)
GLUCOSE SERPL-MCNC: 103 MG/DL (ref 65–99)
GLUCOSE UR STRIP.AUTO-MCNC: NEGATIVE MG/DL
HCT VFR BLD AUTO: 55.7 % (ref 42–52)
HGB BLD-MCNC: 15.7 G/DL (ref 14–18)
INR PPP: 1.17 (ref 0.87–1.13)
KETONES UR STRIP.AUTO-MCNC: NEGATIVE MG/DL
LACTATE BLD-SCNC: 1.6 MMOL/L (ref 0.5–2)
LACTATE BLD-SCNC: 2.1 MMOL/L (ref 0.5–2)
LEUKOCYTE ESTERASE UR QL STRIP.AUTO: NEGATIVE
LYMPHOCYTES # BLD AUTO: 3.31 K/UL (ref 1–4.8)
LYMPHOCYTES NFR BLD: 24 % (ref 22–41)
MANUAL DIFF BLD: NORMAL
MCH RBC QN AUTO: 23.7 PG (ref 27–33)
MCHC RBC AUTO-ENTMCNC: 28.2 G/DL (ref 33.7–35.3)
MCV RBC AUTO: 84 FL (ref 81.4–97.8)
METAMYELOCYTES NFR BLD MANUAL: 1 %
MICRO URNS: NORMAL
MICROCYTES BLD QL SMEAR: ABNORMAL
MONOCYTES # BLD AUTO: 1.52 K/UL (ref 0–0.85)
MONOCYTES NFR BLD AUTO: 11 % (ref 0–13.4)
MORPHOLOGY BLD-IMP: NORMAL
NEUTROPHILS # BLD AUTO: 8.69 K/UL (ref 1.82–7.42)
NEUTROPHILS NFR BLD: 62 % (ref 44–72)
NEUTS BAND NFR BLD MANUAL: 1 % (ref 0–10)
NITRITE UR QL STRIP.AUTO: NEGATIVE
NRBC # BLD AUTO: 0.16 K/UL
NRBC BLD-RTO: 1.2 /100 WBC
NT-PROBNP SERPL IA-MCNC: 2524 PG/ML (ref 0–125)
PH UR STRIP.AUTO: 5.5 [PH] (ref 5–8)
PLATELET # BLD AUTO: 126 K/UL (ref 164–446)
PLATELET BLD QL SMEAR: NORMAL
POLYCHROMASIA BLD QL SMEAR: NORMAL
POTASSIUM SERPL-SCNC: 4.2 MMOL/L (ref 3.6–5.5)
PROCALCITONIN SERPL-MCNC: 0.25 NG/ML
PROT SERPL-MCNC: 7.2 G/DL (ref 6–8.2)
PROT UR QL STRIP: NEGATIVE MG/DL
PROTHROMBIN TIME: 15.2 SEC (ref 12–14.6)
RBC # BLD AUTO: 6.63 M/UL (ref 4.7–6.1)
RBC BLD AUTO: PRESENT
RBC UR QL AUTO: NEGATIVE
SMUDGE CELLS BLD QL SMEAR: NORMAL
SODIUM SERPL-SCNC: 138 MMOL/L (ref 135–145)
SP GR UR STRIP.AUTO: 1.02
TROPONIN T SERPL-MCNC: 117 NG/L (ref 6–19)
UROBILINOGEN UR STRIP.AUTO-MCNC: 1 MG/DL
WBC # BLD AUTO: 13.8 K/UL (ref 4.8–10.8)

## 2019-08-21 PROCEDURE — 94760 N-INVAS EAR/PLS OXIMETRY 1: CPT

## 2019-08-21 PROCEDURE — 84145 PROCALCITONIN (PCT): CPT

## 2019-08-21 PROCEDURE — 81003 URINALYSIS AUTO W/O SCOPE: CPT

## 2019-08-21 PROCEDURE — 700111 HCHG RX REV CODE 636 W/ 250 OVERRIDE (IP): Performed by: EMERGENCY MEDICINE

## 2019-08-21 PROCEDURE — A9270 NON-COVERED ITEM OR SERVICE: HCPCS | Performed by: INTERNAL MEDICINE

## 2019-08-21 PROCEDURE — 96375 TX/PRO/DX INJ NEW DRUG ADDON: CPT

## 2019-08-21 PROCEDURE — 80053 COMPREHEN METABOLIC PANEL: CPT

## 2019-08-21 PROCEDURE — 83880 ASSAY OF NATRIURETIC PEPTIDE: CPT

## 2019-08-21 PROCEDURE — 87040 BLOOD CULTURE FOR BACTERIA: CPT | Mod: 91

## 2019-08-21 PROCEDURE — 99204 OFFICE O/P NEW MOD 45 MIN: CPT | Performed by: PHYSICIAN ASSISTANT

## 2019-08-21 PROCEDURE — 87502 INFLUENZA DNA AMP PROBE: CPT

## 2019-08-21 PROCEDURE — 99285 EMERGENCY DEPT VISIT HI MDM: CPT

## 2019-08-21 PROCEDURE — 94640 AIRWAY INHALATION TREATMENT: CPT

## 2019-08-21 PROCEDURE — 700102 HCHG RX REV CODE 250 W/ 637 OVERRIDE(OP): Performed by: INTERNAL MEDICINE

## 2019-08-21 PROCEDURE — 85610 PROTHROMBIN TIME: CPT

## 2019-08-21 PROCEDURE — 96365 THER/PROPH/DIAG IV INF INIT: CPT

## 2019-08-21 PROCEDURE — 83605 ASSAY OF LACTIC ACID: CPT | Mod: 91

## 2019-08-21 PROCEDURE — 700105 HCHG RX REV CODE 258: Performed by: EMERGENCY MEDICINE

## 2019-08-21 PROCEDURE — 93005 ELECTROCARDIOGRAM TRACING: CPT | Performed by: EMERGENCY MEDICINE

## 2019-08-21 PROCEDURE — 85027 COMPLETE CBC AUTOMATED: CPT

## 2019-08-21 PROCEDURE — 71045 X-RAY EXAM CHEST 1 VIEW: CPT

## 2019-08-21 PROCEDURE — 99223 1ST HOSP IP/OBS HIGH 75: CPT | Performed by: INTERNAL MEDICINE

## 2019-08-21 PROCEDURE — 36415 COLL VENOUS BLD VENIPUNCTURE: CPT

## 2019-08-21 PROCEDURE — 85007 BL SMEAR W/DIFF WBC COUNT: CPT

## 2019-08-21 PROCEDURE — 770020 HCHG ROOM/CARE - TELE (206)

## 2019-08-21 PROCEDURE — 700111 HCHG RX REV CODE 636 W/ 250 OVERRIDE (IP): Performed by: INTERNAL MEDICINE

## 2019-08-21 PROCEDURE — 84484 ASSAY OF TROPONIN QUANT: CPT

## 2019-08-21 PROCEDURE — 71046 X-RAY EXAM CHEST 2 VIEWS: CPT | Mod: TC | Performed by: PHYSICIAN ASSISTANT

## 2019-08-21 PROCEDURE — 700101 HCHG RX REV CODE 250: Performed by: EMERGENCY MEDICINE

## 2019-08-21 RX ORDER — PROMETHAZINE HYDROCHLORIDE 25 MG/1
12.5-25 TABLET ORAL EVERY 4 HOURS PRN
Status: DISCONTINUED | OUTPATIENT
Start: 2019-08-21 | End: 2019-08-28 | Stop reason: HOSPADM

## 2019-08-21 RX ORDER — ACETAMINOPHEN 325 MG/1
650 TABLET ORAL EVERY 6 HOURS PRN
Status: DISCONTINUED | OUTPATIENT
Start: 2019-08-21 | End: 2019-08-22

## 2019-08-21 RX ORDER — SODIUM CHLORIDE 9 MG/ML
INJECTION, SOLUTION INTRAVENOUS CONTINUOUS
Status: DISCONTINUED | OUTPATIENT
Start: 2019-08-21 | End: 2019-08-22

## 2019-08-21 RX ORDER — ENALAPRILAT 1.25 MG/ML
1.25 INJECTION INTRAVENOUS EVERY 6 HOURS PRN
Status: DISCONTINUED | OUTPATIENT
Start: 2019-08-21 | End: 2019-08-28 | Stop reason: HOSPADM

## 2019-08-21 RX ORDER — ONDANSETRON 2 MG/ML
4 INJECTION INTRAMUSCULAR; INTRAVENOUS EVERY 4 HOURS PRN
Status: DISCONTINUED | OUTPATIENT
Start: 2019-08-21 | End: 2019-08-28 | Stop reason: HOSPADM

## 2019-08-21 RX ORDER — METHYLPREDNISOLONE SODIUM SUCCINATE 40 MG/ML
40 INJECTION, POWDER, LYOPHILIZED, FOR SOLUTION INTRAMUSCULAR; INTRAVENOUS EVERY 6 HOURS
Status: DISCONTINUED | OUTPATIENT
Start: 2019-08-21 | End: 2019-08-23

## 2019-08-21 RX ORDER — BISACODYL 10 MG
10 SUPPOSITORY, RECTAL RECTAL
Status: DISCONTINUED | OUTPATIENT
Start: 2019-08-21 | End: 2019-08-22

## 2019-08-21 RX ORDER — SODIUM CHLORIDE, SODIUM LACTATE, POTASSIUM CHLORIDE, AND CALCIUM CHLORIDE .6; .31; .03; .02 G/100ML; G/100ML; G/100ML; G/100ML
1000 INJECTION, SOLUTION INTRAVENOUS
Status: DISCONTINUED | OUTPATIENT
Start: 2019-08-21 | End: 2019-08-22

## 2019-08-21 RX ORDER — SODIUM CHLORIDE, SODIUM LACTATE, POTASSIUM CHLORIDE, AND CALCIUM CHLORIDE .6; .31; .03; .02 G/100ML; G/100ML; G/100ML; G/100ML
30 INJECTION, SOLUTION INTRAVENOUS ONCE
Status: COMPLETED | OUTPATIENT
Start: 2019-08-21 | End: 2019-08-21

## 2019-08-21 RX ORDER — POLYETHYLENE GLYCOL 3350 17 G/17G
1 POWDER, FOR SOLUTION ORAL
Status: DISCONTINUED | OUTPATIENT
Start: 2019-08-21 | End: 2019-08-22

## 2019-08-21 RX ORDER — AMOXICILLIN 250 MG
2 CAPSULE ORAL 2 TIMES DAILY
Status: DISCONTINUED | OUTPATIENT
Start: 2019-08-21 | End: 2019-08-22

## 2019-08-21 RX ORDER — IPRATROPIUM BROMIDE AND ALBUTEROL SULFATE 2.5; .5 MG/3ML; MG/3ML
3 SOLUTION RESPIRATORY (INHALATION)
Status: DISCONTINUED | OUTPATIENT
Start: 2019-08-21 | End: 2019-08-24

## 2019-08-21 RX ORDER — PROMETHAZINE HYDROCHLORIDE 25 MG/1
12.5-25 SUPPOSITORY RECTAL EVERY 4 HOURS PRN
Status: DISCONTINUED | OUTPATIENT
Start: 2019-08-21 | End: 2019-08-28 | Stop reason: HOSPADM

## 2019-08-21 RX ORDER — SODIUM CHLORIDE, SODIUM LACTATE, POTASSIUM CHLORIDE, AND CALCIUM CHLORIDE .6; .31; .03; .02 G/100ML; G/100ML; G/100ML; G/100ML
1000 INJECTION, SOLUTION INTRAVENOUS
Status: DISCONTINUED | OUTPATIENT
Start: 2019-08-21 | End: 2019-08-25

## 2019-08-21 RX ORDER — IPRATROPIUM BROMIDE AND ALBUTEROL SULFATE 2.5; .5 MG/3ML; MG/3ML
3 SOLUTION RESPIRATORY (INHALATION)
Status: COMPLETED | OUTPATIENT
Start: 2019-08-21 | End: 2019-08-21

## 2019-08-21 RX ORDER — AZITHROMYCIN 500 MG/1
500 INJECTION, POWDER, LYOPHILIZED, FOR SOLUTION INTRAVENOUS ONCE
Status: COMPLETED | OUTPATIENT
Start: 2019-08-21 | End: 2019-08-21

## 2019-08-21 RX ORDER — PROCHLORPERAZINE EDISYLATE 5 MG/ML
5-10 INJECTION INTRAMUSCULAR; INTRAVENOUS EVERY 4 HOURS PRN
Status: DISCONTINUED | OUTPATIENT
Start: 2019-08-21 | End: 2019-08-28 | Stop reason: HOSPADM

## 2019-08-21 RX ORDER — AZITHROMYCIN 250 MG/1
500 TABLET, FILM COATED ORAL DAILY
Status: DISCONTINUED | OUTPATIENT
Start: 2019-08-22 | End: 2019-08-22

## 2019-08-21 RX ORDER — ONDANSETRON 4 MG/1
4 TABLET, ORALLY DISINTEGRATING ORAL EVERY 4 HOURS PRN
Status: DISCONTINUED | OUTPATIENT
Start: 2019-08-21 | End: 2019-08-28 | Stop reason: HOSPADM

## 2019-08-21 RX ORDER — SODIUM CHLORIDE, SODIUM LACTATE, POTASSIUM CHLORIDE, AND CALCIUM CHLORIDE .6; .31; .03; .02 G/100ML; G/100ML; G/100ML; G/100ML
500 INJECTION, SOLUTION INTRAVENOUS
Status: DISCONTINUED | OUTPATIENT
Start: 2019-08-21 | End: 2019-08-22

## 2019-08-21 RX ADMIN — CEFTRIAXONE SODIUM 2 G: 2 INJECTION, POWDER, FOR SOLUTION INTRAMUSCULAR; INTRAVENOUS at 19:25

## 2019-08-21 RX ADMIN — IPRATROPIUM BROMIDE AND ALBUTEROL SULFATE 3 ML: .5; 3 SOLUTION RESPIRATORY (INHALATION) at 22:18

## 2019-08-21 RX ADMIN — AZITHROMYCIN MONOHYDRATE 500 MG: 500 INJECTION, POWDER, LYOPHILIZED, FOR SOLUTION INTRAVENOUS at 19:30

## 2019-08-21 RX ADMIN — SODIUM CHLORIDE, POTASSIUM CHLORIDE, SODIUM LACTATE AND CALCIUM CHLORIDE 5103 ML: 600; 310; 30; 20 INJECTION, SOLUTION INTRAVENOUS at 19:24

## 2019-08-21 RX ADMIN — METHYLPREDNISOLONE SODIUM SUCCINATE 40 MG: 40 INJECTION, POWDER, FOR SOLUTION INTRAMUSCULAR; INTRAVENOUS at 20:45

## 2019-08-21 RX ADMIN — IPRATROPIUM BROMIDE AND ALBUTEROL SULFATE 3 ML: .5; 3 SOLUTION RESPIRATORY (INHALATION) at 19:43

## 2019-08-21 RX ADMIN — SENNOSIDES, DOCUSATE SODIUM 2 TABLET: 50; 8.6 TABLET, FILM COATED ORAL at 20:50

## 2019-08-21 RX ADMIN — ALBUTEROL SULFATE 15 MG: 5 SOLUTION RESPIRATORY (INHALATION) at 20:13

## 2019-08-21 ASSESSMENT — COPD QUESTIONNAIRES
COPD SCREENING SCORE: 1
DO YOU EVER COUGH UP ANY MUCUS OR PHLEGM?: NO/ONLY WITH OCCASIONAL COLDS OR INFECTIONS
DURING THE PAST 4 WEEKS HOW MUCH DID YOU FEEL SHORT OF BREATH: NONE/LITTLE OF THE TIME
HAVE YOU SMOKED AT LEAST 100 CIGARETTES IN YOUR ENTIRE LIFE: NO/DON'T KNOW

## 2019-08-21 ASSESSMENT — LIFESTYLE VARIABLES
DO YOU DRINK ALCOHOL: NO
EVER_SMOKED: NEVER

## 2019-08-21 ASSESSMENT — ENCOUNTER SYMPTOMS
DIARRHEA: 0
SPUTUM PRODUCTION: 1
WHEEZING: 0
ABDOMINAL PAIN: 0
DEPRESSION: 0
FEVER: 1
HEADACHES: 0
SPUTUM PRODUCTION: 0
TINGLING: 0
DIZZINESS: 0
LOSS OF CONSCIOUSNESS: 0
NAUSEA: 0
MYALGIAS: 0
RHINORRHEA: 1
DIZZINESS: 0
CHILLS: 0
PALPITATIONS: 0
SYNCOPE: 0
HEMOPTYSIS: 0
WEAKNESS: 0
FEVER: 0
CONSTIPATION: 0
SHORTNESS OF BREATH: 1
DIARRHEA: 0
VOMITING: 0
FALLS: 0
VOMITING: 0
CHILLS: 0
MUSCULOSKELETAL NEGATIVE: 1
STRIDOR: 0
SHORTNESS OF BREATH: 1
COUGH: 1
ABDOMINAL PAIN: 0
COUGH: 1
NAUSEA: 0

## 2019-08-22 ENCOUNTER — APPOINTMENT (OUTPATIENT)
Dept: RADIOLOGY | Facility: MEDICAL CENTER | Age: 29
DRG: 871 | End: 2019-08-22
Attending: INTERNAL MEDICINE
Payer: COMMERCIAL

## 2019-08-22 ENCOUNTER — APPOINTMENT (OUTPATIENT)
Dept: CARDIOLOGY | Facility: MEDICAL CENTER | Age: 29
DRG: 871 | End: 2019-08-22
Attending: INTERNAL MEDICINE
Payer: COMMERCIAL

## 2019-08-22 PROBLEM — J96.02 ACUTE RESPIRATORY FAILURE WITH HYPOXIA AND HYPERCAPNIA (HCC): Status: ACTIVE | Noted: 2019-08-22

## 2019-08-22 PROBLEM — A41.9 SEPSIS (HCC): Status: ACTIVE | Noted: 2019-08-22

## 2019-08-22 PROBLEM — R73.9 HYPERGLYCEMIA: Status: ACTIVE | Noted: 2019-08-22

## 2019-08-22 PROBLEM — D75.1 POLYCYTHEMIA: Status: ACTIVE | Noted: 2019-08-22

## 2019-08-22 PROBLEM — J18.9 PNEUMONIA: Status: ACTIVE | Noted: 2019-08-22

## 2019-08-22 PROBLEM — R65.20 SEVERE SEPSIS (HCC): Status: ACTIVE | Noted: 2019-08-22

## 2019-08-22 PROBLEM — I21.A1 TYPE 2 MYOCARDIAL INFARCTION (HCC): Status: ACTIVE | Noted: 2019-08-22

## 2019-08-22 PROBLEM — J45.901 ASTHMA WITH EXACERBATION: Status: ACTIVE | Noted: 2019-08-22

## 2019-08-22 PROBLEM — G47.33 OSA (OBSTRUCTIVE SLEEP APNEA): Status: ACTIVE | Noted: 2019-08-22

## 2019-08-22 PROBLEM — J96.01 ACUTE RESPIRATORY FAILURE WITH HYPOXIA (HCC): Status: ACTIVE | Noted: 2019-08-22

## 2019-08-22 LAB
ACTION RANGE TRIGGERED IACRT: YES
ALBUMIN SERPL BCP-MCNC: 3.3 G/DL (ref 3.2–4.9)
ALBUMIN/GLOB SERPL: 0.8 G/DL
ALP SERPL-CCNC: 96 U/L (ref 30–99)
ALT SERPL-CCNC: 28 U/L (ref 2–50)
ANION GAP SERPL CALC-SCNC: 9 MMOL/L (ref 0–11.9)
ANISOCYTOSIS BLD QL SMEAR: ABNORMAL
APTT PPP: 26.8 SEC (ref 24.7–36)
AST SERPL-CCNC: 24 U/L (ref 12–45)
BASE EXCESS BLDA CALC-SCNC: -3 MMOL/L (ref -4–3)
BASE EXCESS BLDA CALC-SCNC: 0 MMOL/L (ref -4–3)
BASE EXCESS BLDA CALC-SCNC: 2 MMOL/L (ref -4–3)
BASE EXCESS BLDA CALC-SCNC: 2 MMOL/L (ref -4–3)
BASOPHILS # BLD AUTO: 0 % (ref 0–1.8)
BASOPHILS # BLD: 0 K/UL (ref 0–0.12)
BILIRUB SERPL-MCNC: 1.1 MG/DL (ref 0.1–1.5)
BODY TEMPERATURE: ABNORMAL CENTIGRADE
BODY TEMPERATURE: ABNORMAL DEGREES
BUN SERPL-MCNC: 10 MG/DL (ref 8–22)
CALCIUM SERPL-MCNC: 9.1 MG/DL (ref 8.5–10.5)
CFT BLD TEG: 5.2 MIN (ref 5–10)
CFT P HPASE BLD TEG: 5.4 MIN (ref 5–10)
CHLORIDE SERPL-SCNC: 102 MMOL/L (ref 96–112)
CLOT ANGLE BLD TEG: 58.7 DEGREES (ref 53–72)
CLOT ANGLE P HPASE BLD TEG: 58.7 DEGREES (ref 53–72)
CLOT INIT P HPASE BLD TEG: 2.5 MIN (ref 1–3)
CLOT LYSIS 30M P MA LENFR BLD TEG: 0 % (ref 0–8)
CLOT LYSIS 30M P MA LENFR BLD TEG: 0 % (ref 0–8)
CO2 BLDA-SCNC: 28 MMOL/L (ref 20–33)
CO2 BLDA-SCNC: 31 MMOL/L (ref 20–33)
CO2 BLDA-SCNC: 33 MMOL/L (ref 20–33)
CO2 SERPL-SCNC: 25 MMOL/L (ref 20–33)
CREAT SERPL-MCNC: 0.73 MG/DL (ref 0.5–1.4)
CT.EXTRINSIC BLD ROTEM: 2.5 MIN (ref 1–3)
D DIMER PPP IA.FEU-MCNC: 3.89 UG/ML (FEU) (ref 0–0.5)
EOSINOPHIL # BLD AUTO: 0 K/UL (ref 0–0.51)
EOSINOPHIL NFR BLD: 0 % (ref 0–6.9)
ERYTHROCYTE [DISTWIDTH] IN BLOOD BY AUTOMATED COUNT: 60 FL (ref 35.9–50)
FIBRINOGEN PPP-MCNC: 464 MG/DL (ref 215–460)
GLOBULIN SER CALC-MCNC: 3.9 G/DL (ref 1.9–3.5)
GLUCOSE BLD-MCNC: 156 MG/DL (ref 65–99)
GLUCOSE BLD-MCNC: 160 MG/DL (ref 65–99)
GLUCOSE BLD-MCNC: 168 MG/DL (ref 65–99)
GLUCOSE BLD-MCNC: 178 MG/DL (ref 65–99)
GLUCOSE SERPL-MCNC: 180 MG/DL (ref 65–99)
HCO3 BLDA-SCNC: 23 MMOL/L (ref 17–25)
HCO3 BLDA-SCNC: 27 MMOL/L (ref 17–25)
HCO3 BLDA-SCNC: 29 MMOL/L (ref 17–25)
HCO3 BLDA-SCNC: 31.1 MMOL/L (ref 17–25)
HCT VFR BLD AUTO: 53.6 % (ref 42–52)
HGB BLD-MCNC: 15 G/DL (ref 14–18)
HOROWITZ INDEX BLDA+IHG-RTO: 114 MM[HG]
HOROWITZ INDEX BLDA+IHG-RTO: 144 MM[HG]
HOROWITZ INDEX BLDA+IHG-RTO: 54 MM[HG]
HYPOCHROMIA BLD QL SMEAR: ABNORMAL
INR PPP: 1.29 (ref 0.87–1.13)
INST. QUALIFIED PATIENT IIQPT: YES
LACTATE BLD-SCNC: 1 MMOL/L (ref 0.5–2)
LACTATE BLD-SCNC: 1.4 MMOL/L (ref 0.5–2)
LG PLATELETS BLD QL SMEAR: NORMAL
LV EJECT FRACT  99904: 70
LV EJECT FRACT MOD 2C 99903: 67.23
LV EJECT FRACT MOD 4C 99902: 74.34
LV EJECT FRACT MOD BP 99901: 73.14
LYMPHOCYTES # BLD AUTO: 0.93 K/UL (ref 1–4.8)
LYMPHOCYTES NFR BLD: 9.8 % (ref 22–41)
MANUAL DIFF BLD: NORMAL
MCF BLD TEG: 64.6 MM (ref 50–70)
MCF P HPASE BLD TEG: 66.2 MM (ref 50–70)
MCH RBC QN AUTO: 23.4 PG (ref 27–33)
MCHC RBC AUTO-ENTMCNC: 28 G/DL (ref 33.7–35.3)
MCV RBC AUTO: 83.8 FL (ref 81.4–97.8)
MICROCYTES BLD QL SMEAR: ABNORMAL
MONOCYTES # BLD AUTO: 0.26 K/UL (ref 0–0.85)
MONOCYTES NFR BLD AUTO: 2.7 % (ref 0–13.4)
MORPHOLOGY BLD-IMP: NORMAL
NEUTROPHILS # BLD AUTO: 8.31 K/UL (ref 1.82–7.42)
NEUTROPHILS NFR BLD: 83.9 % (ref 44–72)
NEUTS BAND NFR BLD MANUAL: 3.6 % (ref 0–10)
NRBC # BLD AUTO: 0.12 K/UL
NRBC BLD-RTO: 1.3 /100 WBC
O2/TOTAL GAS SETTING VFR VENT: 100 %
O2/TOTAL GAS SETTING VFR VENT: 100 %
O2/TOTAL GAS SETTING VFR VENT: 70 %
PCO2 BLDA: 45.8 MMHG (ref 26–37)
PCO2 BLDA: 51 MMHG (ref 26–37)
PCO2 BLDA: 52.8 MMHG (ref 26–37)
PCO2 BLDA: 68.2 MMHG (ref 26–37)
PCO2 TEMP ADJ BLDA: 50.3 MMHG (ref 26–37)
PH BLDA: 7.27 [PH] (ref 7.4–7.5)
PH BLDA: 7.33 [PH] (ref 7.4–7.5)
PH BLDA: 7.33 [PH] (ref 7.4–7.5)
PH BLDA: 7.35 [PH] (ref 7.4–7.5)
PH TEMP ADJ BLDA: 7.34 [PH] (ref 7.4–7.5)
PLATELET # BLD AUTO: 123 K/UL (ref 164–446)
PLATELET BLD QL SMEAR: NORMAL
PO2 BLDA: 101 MMHG (ref 64–87)
PO2 BLDA: 114 MMHG (ref 64–87)
PO2 BLDA: 54 MMHG (ref 64–87)
PO2 BLDA: 81.9 MMHG (ref 64–87)
PO2 TEMP ADJ BLDA: 99 MMHG (ref 64–87)
POLYCHROMASIA BLD QL SMEAR: NORMAL
POTASSIUM SERPL-SCNC: 4.4 MMOL/L (ref 3.6–5.5)
PROT SERPL-MCNC: 7.2 G/DL (ref 6–8.2)
PROTHROMBIN TIME: 16.4 SEC (ref 12–14.6)
RBC # BLD AUTO: 6.4 M/UL (ref 4.7–6.1)
RBC BLD AUTO: PRESENT
SAO2 % BLDA: 81 % (ref 93–99)
SAO2 % BLDA: 93.8 % (ref 93–99)
SAO2 % BLDA: 97 % (ref 93–99)
SAO2 % BLDA: 98 % (ref 93–99)
SODIUM SERPL-SCNC: 136 MMOL/L (ref 135–145)
SPECIMEN DRAWN FROM PATIENT: ABNORMAL
TEG ALGORITHM TGALG: NORMAL
WBC # BLD AUTO: 9.5 K/UL (ref 4.8–10.8)

## 2019-08-22 PROCEDURE — 85007 BL SMEAR W/DIFF WBC COUNT: CPT

## 2019-08-22 PROCEDURE — 700111 HCHG RX REV CODE 636 W/ 250 OVERRIDE (IP): Performed by: INTERNAL MEDICINE

## 2019-08-22 PROCEDURE — 87449 NOS EACH ORGANISM AG IA: CPT

## 2019-08-22 PROCEDURE — 93970 EXTREMITY STUDY: CPT

## 2019-08-22 PROCEDURE — 770022 HCHG ROOM/CARE - ICU (200)

## 2019-08-22 PROCEDURE — 31624 DX BRONCHOSCOPE/LAVAGE: CPT | Performed by: INTERNAL MEDICINE

## 2019-08-22 PROCEDURE — 71045 X-RAY EXAM CHEST 1 VIEW: CPT

## 2019-08-22 PROCEDURE — 96376 TX/PRO/DX INJ SAME DRUG ADON: CPT

## 2019-08-22 PROCEDURE — 94640 AIRWAY INHALATION TREATMENT: CPT

## 2019-08-22 PROCEDURE — 85576 BLOOD PLATELET AGGREGATION: CPT

## 2019-08-22 PROCEDURE — 85384 FIBRINOGEN ACTIVITY: CPT

## 2019-08-22 PROCEDURE — C1751 CATH, INF, PER/CENT/MIDLINE: HCPCS

## 2019-08-22 PROCEDURE — 87102 FUNGUS ISOLATION CULTURE: CPT

## 2019-08-22 PROCEDURE — 87206 SMEAR FLUORESCENT/ACID STAI: CPT

## 2019-08-22 PROCEDURE — 82962 GLUCOSE BLOOD TEST: CPT | Mod: 91

## 2019-08-22 PROCEDURE — 5A1945Z RESPIRATORY VENTILATION, 24-96 CONSECUTIVE HOURS: ICD-10-PCS | Performed by: INTERNAL MEDICINE

## 2019-08-22 PROCEDURE — 99233 SBSQ HOSP IP/OBS HIGH 50: CPT | Performed by: HOSPITALIST

## 2019-08-22 PROCEDURE — 87281 PNEUMOCYSTIS CARINII AG IF: CPT

## 2019-08-22 PROCEDURE — 87205 SMEAR GRAM STAIN: CPT

## 2019-08-22 PROCEDURE — 85730 THROMBOPLASTIN TIME PARTIAL: CPT

## 2019-08-22 PROCEDURE — A9270 NON-COVERED ITEM OR SERVICE: HCPCS | Performed by: INTERNAL MEDICINE

## 2019-08-22 PROCEDURE — 99292 CRITICAL CARE ADDL 30 MIN: CPT | Mod: 25 | Performed by: INTERNAL MEDICINE

## 2019-08-22 PROCEDURE — 71275 CT ANGIOGRAPHY CHEST: CPT

## 2019-08-22 PROCEDURE — 02HV33Z INSERTION OF INFUSION DEVICE INTO SUPERIOR VENA CAVA, PERCUTANEOUS APPROACH: ICD-10-PCS | Performed by: INTERNAL MEDICINE

## 2019-08-22 PROCEDURE — 85027 COMPLETE CBC AUTOMATED: CPT

## 2019-08-22 PROCEDURE — 0B9C8ZX DRAINAGE OF RIGHT UPPER LUNG LOBE, VIA NATURAL OR ARTIFICIAL OPENING ENDOSCOPIC, DIAGNOSTIC: ICD-10-PCS | Performed by: INTERNAL MEDICINE

## 2019-08-22 PROCEDURE — 93306 TTE W/DOPPLER COMPLETE: CPT

## 2019-08-22 PROCEDURE — 85610 PROTHROMBIN TIME: CPT

## 2019-08-22 PROCEDURE — 83605 ASSAY OF LACTIC ACID: CPT

## 2019-08-22 PROCEDURE — 36556 INSERT NON-TUNNEL CV CATH: CPT

## 2019-08-22 PROCEDURE — 700105 HCHG RX REV CODE 258

## 2019-08-22 PROCEDURE — 36620 INSERTION CATHETER ARTERY: CPT

## 2019-08-22 PROCEDURE — 700105 HCHG RX REV CODE 258: Performed by: INTERNAL MEDICINE

## 2019-08-22 PROCEDURE — 36620 INSERTION CATHETER ARTERY: CPT | Performed by: INTERNAL MEDICINE

## 2019-08-22 PROCEDURE — 700102 HCHG RX REV CODE 250 W/ 637 OVERRIDE(OP): Performed by: INTERNAL MEDICINE

## 2019-08-22 PROCEDURE — 700117 HCHG RX CONTRAST REV CODE 255: Performed by: INTERNAL MEDICINE

## 2019-08-22 PROCEDURE — 87070 CULTURE OTHR SPECIMN AEROBIC: CPT

## 2019-08-22 PROCEDURE — 3E043XZ INTRODUCTION OF VASOPRESSOR INTO CENTRAL VEIN, PERCUTANEOUS APPROACH: ICD-10-PCS | Performed by: INTERNAL MEDICINE

## 2019-08-22 PROCEDURE — 0B9J8ZX DRAINAGE OF LEFT LOWER LUNG LOBE, VIA NATURAL OR ARTIFICIAL OPENING ENDOSCOPIC, DIAGNOSTIC: ICD-10-PCS | Performed by: INTERNAL MEDICINE

## 2019-08-22 PROCEDURE — 80053 COMPREHEN METABOLIC PANEL: CPT

## 2019-08-22 PROCEDURE — 87116 MYCOBACTERIA CULTURE: CPT

## 2019-08-22 PROCEDURE — 36556 INSERT NON-TUNNEL CV CATH: CPT | Mod: RT | Performed by: INTERNAL MEDICINE

## 2019-08-22 PROCEDURE — 85379 FIBRIN DEGRADATION QUANT: CPT

## 2019-08-22 PROCEDURE — 700101 HCHG RX REV CODE 250: Performed by: EMERGENCY MEDICINE

## 2019-08-22 PROCEDURE — 37799 UNLISTED PX VASCULAR SURGERY: CPT

## 2019-08-22 PROCEDURE — 82803 BLOOD GASES ANY COMBINATION: CPT | Mod: 91

## 2019-08-22 PROCEDURE — 93503 INSERT/PLACE HEART CATHETER: CPT

## 2019-08-22 PROCEDURE — 87186 SC STD MICRODIL/AGAR DIL: CPT

## 2019-08-22 PROCEDURE — 0BH17EZ INSERTION OF ENDOTRACHEAL AIRWAY INTO TRACHEA, VIA NATURAL OR ARTIFICIAL OPENING: ICD-10-PCS | Performed by: INTERNAL MEDICINE

## 2019-08-22 PROCEDURE — 0B9D8ZX DRAINAGE OF RIGHT MIDDLE LUNG LOBE, VIA NATURAL OR ARTIFICIAL OPENING ENDOSCOPIC, DIAGNOSTIC: ICD-10-PCS | Performed by: INTERNAL MEDICINE

## 2019-08-22 PROCEDURE — 700101 HCHG RX REV CODE 250: Performed by: INTERNAL MEDICINE

## 2019-08-22 PROCEDURE — 700111 HCHG RX REV CODE 636 W/ 250 OVERRIDE (IP)

## 2019-08-22 PROCEDURE — 0B9G8ZX DRAINAGE OF LEFT UPPER LUNG LOBE, VIA NATURAL OR ARTIFICIAL OPENING ENDOSCOPIC, DIAGNOSTIC: ICD-10-PCS | Performed by: INTERNAL MEDICINE

## 2019-08-22 PROCEDURE — 99291 CRITICAL CARE FIRST HOUR: CPT | Mod: 25 | Performed by: INTERNAL MEDICINE

## 2019-08-22 PROCEDURE — 93306 TTE W/DOPPLER COMPLETE: CPT | Mod: 26 | Performed by: INTERNAL MEDICINE

## 2019-08-22 PROCEDURE — 31500 INSERT EMERGENCY AIRWAY: CPT

## 2019-08-22 PROCEDURE — 85347 COAGULATION TIME ACTIVATED: CPT | Mod: 91

## 2019-08-22 PROCEDURE — 31500 INSERT EMERGENCY AIRWAY: CPT | Performed by: INTERNAL MEDICINE

## 2019-08-22 PROCEDURE — 94002 VENT MGMT INPAT INIT DAY: CPT

## 2019-08-22 PROCEDURE — 87015 SPECIMEN INFECT AGNT CONCNTJ: CPT | Mod: 91

## 2019-08-22 PROCEDURE — 302978 HCHG BRONCHOSCOPY-DIAGNOSTIC

## 2019-08-22 PROCEDURE — B548ZZA ULTRASONOGRAPHY OF SUPERIOR VENA CAVA, GUIDANCE: ICD-10-PCS | Performed by: INTERNAL MEDICINE

## 2019-08-22 PROCEDURE — 700101 HCHG RX REV CODE 250

## 2019-08-22 PROCEDURE — 2Y41X5Z PACKING OF NASAL REGION USING PACKING MATERIAL: ICD-10-PCS | Performed by: INTERNAL MEDICINE

## 2019-08-22 PROCEDURE — 0B9F8ZX DRAINAGE OF RIGHT LOWER LUNG LOBE, VIA NATURAL OR ARTIFICIAL OPENING ENDOSCOPIC, DIAGNOSTIC: ICD-10-PCS | Performed by: INTERNAL MEDICINE

## 2019-08-22 PROCEDURE — 30901 CONTROL OF NOSEBLEED: CPT | Mod: 50 | Performed by: INTERNAL MEDICINE

## 2019-08-22 PROCEDURE — 31645 BRNCHSC W/THER ASPIR 1ST: CPT | Performed by: INTERNAL MEDICINE

## 2019-08-22 PROCEDURE — 87077 CULTURE AEROBIC IDENTIFY: CPT

## 2019-08-22 RX ORDER — AZITHROMYCIN 250 MG/1
500 TABLET, FILM COATED ORAL DAILY
Status: COMPLETED | OUTPATIENT
Start: 2019-08-23 | End: 2019-08-23

## 2019-08-22 RX ORDER — KETAMINE HYDROCHLORIDE 50 MG/ML
INJECTION, SOLUTION INTRAMUSCULAR; INTRAVENOUS
Status: COMPLETED
Start: 2019-08-22 | End: 2019-08-22

## 2019-08-22 RX ORDER — AMOXICILLIN 250 MG
2 CAPSULE ORAL 2 TIMES DAILY
Status: DISCONTINUED | OUTPATIENT
Start: 2019-08-22 | End: 2019-08-26

## 2019-08-22 RX ORDER — PHENYLEPHRINE HCL IN 0.9% NACL 0.5 MG/5ML
SYRINGE (ML) INTRAVENOUS
Status: COMPLETED
Start: 2019-08-22 | End: 2019-08-22

## 2019-08-22 RX ORDER — BISACODYL 10 MG
10 SUPPOSITORY, RECTAL RECTAL
Status: DISCONTINUED | OUTPATIENT
Start: 2019-08-22 | End: 2019-08-26

## 2019-08-22 RX ORDER — SODIUM CHLORIDE 9 MG/ML
INJECTION, SOLUTION INTRAVENOUS
Status: COMPLETED
Start: 2019-08-22 | End: 2019-08-22

## 2019-08-22 RX ORDER — FAMOTIDINE 20 MG/1
20 TABLET, FILM COATED ORAL EVERY 12 HOURS
Status: DISCONTINUED | OUTPATIENT
Start: 2019-08-22 | End: 2019-08-24

## 2019-08-22 RX ORDER — MAGNESIUM SULFATE HEPTAHYDRATE 40 MG/ML
2 INJECTION, SOLUTION INTRAVENOUS ONCE
Status: COMPLETED | OUTPATIENT
Start: 2019-08-22 | End: 2019-08-22

## 2019-08-22 RX ORDER — KETAMINE HYDROCHLORIDE 50 MG/ML
100 INJECTION, SOLUTION INTRAMUSCULAR; INTRAVENOUS ONCE
Status: COMPLETED | OUTPATIENT
Start: 2019-08-22 | End: 2019-08-22

## 2019-08-22 RX ORDER — PROPOFOL 10 MG/ML
200 INJECTION, EMULSION INTRAVENOUS ONCE
Status: COMPLETED | OUTPATIENT
Start: 2019-08-22 | End: 2019-08-22

## 2019-08-22 RX ORDER — FUROSEMIDE 10 MG/ML
40 INJECTION INTRAMUSCULAR; INTRAVENOUS ONCE
Status: COMPLETED | OUTPATIENT
Start: 2019-08-22 | End: 2019-08-22

## 2019-08-22 RX ORDER — POTASSIUM CHLORIDE 20 MEQ/1
40 TABLET, EXTENDED RELEASE ORAL ONCE
Status: COMPLETED | OUTPATIENT
Start: 2019-08-22 | End: 2019-08-22

## 2019-08-22 RX ORDER — PROPOFOL 10 MG/ML
100 INJECTION, EMULSION INTRAVENOUS ONCE
Status: DISCONTINUED | OUTPATIENT
Start: 2019-08-22 | End: 2019-08-22

## 2019-08-22 RX ORDER — ROCURONIUM BROMIDE 10 MG/ML
40 INJECTION, SOLUTION INTRAVENOUS ONCE
Status: COMPLETED | OUTPATIENT
Start: 2019-08-22 | End: 2019-08-22

## 2019-08-22 RX ORDER — POLYETHYLENE GLYCOL 3350 17 G/17G
1 POWDER, FOR SOLUTION ORAL
Status: DISCONTINUED | OUTPATIENT
Start: 2019-08-22 | End: 2019-08-26

## 2019-08-22 RX ORDER — HEPARIN SODIUM 5000 [USP'U]/100ML
INJECTION, SOLUTION INTRAVENOUS CONTINUOUS
Status: DISCONTINUED | OUTPATIENT
Start: 2019-08-22 | End: 2019-08-23

## 2019-08-22 RX ORDER — FAMOTIDINE 20 MG/1
20 TABLET, FILM COATED ORAL EVERY 12 HOURS
Status: DISCONTINUED | OUTPATIENT
Start: 2019-08-22 | End: 2019-08-22

## 2019-08-22 RX ORDER — SODIUM CHLORIDE 9 MG/ML
INJECTION, SOLUTION INTRAVENOUS
Status: DISCONTINUED
Start: 2019-08-22 | End: 2019-08-22

## 2019-08-22 RX ORDER — PROPOFOL 10 MG/ML
100 INJECTION, EMULSION INTRAVENOUS ONCE
Status: COMPLETED | OUTPATIENT
Start: 2019-08-22 | End: 2019-08-22

## 2019-08-22 RX ORDER — ACETAMINOPHEN 325 MG/1
650 TABLET ORAL EVERY 6 HOURS PRN
Status: DISCONTINUED | OUTPATIENT
Start: 2019-08-22 | End: 2019-08-26

## 2019-08-22 RX ORDER — HEPARIN SODIUM 1000 [USP'U]/ML
4400 INJECTION, SOLUTION INTRAVENOUS; SUBCUTANEOUS PRN
Status: DISCONTINUED | OUTPATIENT
Start: 2019-08-22 | End: 2019-08-23

## 2019-08-22 RX ORDER — HEPARIN SODIUM 1000 [USP'U]/ML
4000 INJECTION, SOLUTION INTRAVENOUS; SUBCUTANEOUS ONCE
Status: COMPLETED | OUTPATIENT
Start: 2019-08-22 | End: 2019-08-22

## 2019-08-22 RX ADMIN — PROPOFOL 55 MCG/KG/MIN: 10 INJECTION, EMULSION INTRAVENOUS at 17:12

## 2019-08-22 RX ADMIN — Medication 50 MCG/HR: at 20:22

## 2019-08-22 RX ADMIN — ROCURONIUM BROMIDE 40 MG: 10 INJECTION, SOLUTION INTRAVENOUS at 12:58

## 2019-08-22 RX ADMIN — PROPOFOL 30 MCG/KG/MIN: 10 INJECTION, EMULSION INTRAVENOUS at 15:00

## 2019-08-22 RX ADMIN — FUROSEMIDE 40 MG: 10 INJECTION, SOLUTION INTRAMUSCULAR; INTRAVENOUS at 05:40

## 2019-08-22 RX ADMIN — POTASSIUM CHLORIDE 40 MEQ: 20 TABLET, EXTENDED RELEASE ORAL at 05:40

## 2019-08-22 RX ADMIN — INSULIN HUMAN 2 UNITS: 100 INJECTION, SOLUTION PARENTERAL at 12:51

## 2019-08-22 RX ADMIN — PROPOFOL 30 MCG/KG/MIN: 10 INJECTION, EMULSION INTRAVENOUS at 19:12

## 2019-08-22 RX ADMIN — IPRATROPIUM BROMIDE AND ALBUTEROL SULFATE 3 ML: .5; 3 SOLUTION RESPIRATORY (INHALATION) at 19:46

## 2019-08-22 RX ADMIN — INSULIN HUMAN 2 UNITS: 100 INJECTION, SOLUTION PARENTERAL at 07:38

## 2019-08-22 RX ADMIN — IPRATROPIUM BROMIDE AND ALBUTEROL SULFATE 3 ML: .5; 3 SOLUTION RESPIRATORY (INHALATION) at 06:30

## 2019-08-22 RX ADMIN — HEPARIN SODIUM 4000 UNITS: 1000 INJECTION, SOLUTION INTRAVENOUS; SUBCUTANEOUS at 18:27

## 2019-08-22 RX ADMIN — KETAMINE HYDROCHLORIDE 100 MG: 50 INJECTION, SOLUTION INTRAMUSCULAR; INTRAVENOUS at 13:07

## 2019-08-22 RX ADMIN — HUMAN ALBUMIN MICROSPHERES AND PERFLUTREN 3 ML: 10; .22 INJECTION, SOLUTION INTRAVENOUS at 18:00

## 2019-08-22 RX ADMIN — METHYLPREDNISOLONE SODIUM SUCCINATE 40 MG: 40 INJECTION, POWDER, FOR SOLUTION INTRAMUSCULAR; INTRAVENOUS at 17:08

## 2019-08-22 RX ADMIN — EPOPROSTENOL SODIUM 0.05 MCG/KG/MIN: 1.5 INJECTION, POWDER, LYOPHILIZED, FOR SOLUTION INTRAVENOUS at 16:11

## 2019-08-22 RX ADMIN — FUROSEMIDE 40 MG: 10 INJECTION, SOLUTION INTRAMUSCULAR; INTRAVENOUS at 08:56

## 2019-08-22 RX ADMIN — IPRATROPIUM BROMIDE AND ALBUTEROL SULFATE 3 ML: .5; 3 SOLUTION RESPIRATORY (INHALATION) at 14:58

## 2019-08-22 RX ADMIN — IPRATROPIUM BROMIDE AND ALBUTEROL SULFATE 3 ML: .5; 3 SOLUTION RESPIRATORY (INHALATION) at 10:44

## 2019-08-22 RX ADMIN — FENTANYL CITRATE 100 MCG: 50 INJECTION, SOLUTION INTRAMUSCULAR; INTRAVENOUS at 19:14

## 2019-08-22 RX ADMIN — PROPOFOL 30 MCG/KG/MIN: 10 INJECTION, EMULSION INTRAVENOUS at 21:36

## 2019-08-22 RX ADMIN — CEFTRIAXONE SODIUM 2 G: 2 INJECTION, POWDER, FOR SOLUTION INTRAMUSCULAR; INTRAVENOUS at 05:37

## 2019-08-22 RX ADMIN — AZITHROMYCIN 500 MG: 250 TABLET, FILM COATED ORAL at 05:36

## 2019-08-22 RX ADMIN — PROPOFOL 200 MG: 10 INJECTION, EMULSION INTRAVENOUS at 12:57

## 2019-08-22 RX ADMIN — FAMOTIDINE 20 MG: 20 TABLET ORAL at 17:08

## 2019-08-22 RX ADMIN — IOHEXOL 75 ML: 350 INJECTION, SOLUTION INTRAVENOUS at 15:56

## 2019-08-22 RX ADMIN — HEPARIN SODIUM 1500 UNITS/HR: 5000 INJECTION, SOLUTION INTRAVENOUS at 18:27

## 2019-08-22 RX ADMIN — EPOPROSTENOL SODIUM 0.05 MCG/KG/MIN: 1.5 INJECTION, POWDER, LYOPHILIZED, FOR SOLUTION INTRAVENOUS at 20:08

## 2019-08-22 RX ADMIN — ENOXAPARIN SODIUM 40 MG: 100 INJECTION SUBCUTANEOUS at 05:37

## 2019-08-22 RX ADMIN — IPRATROPIUM BROMIDE AND ALBUTEROL SULFATE 3 ML: .5; 3 SOLUTION RESPIRATORY (INHALATION) at 03:00

## 2019-08-22 RX ADMIN — NOREPINEPHRINE BITARTRATE 5 MCG/MIN: 1 INJECTION INTRAVENOUS at 14:03

## 2019-08-22 RX ADMIN — IPRATROPIUM BROMIDE AND ALBUTEROL SULFATE 3 ML: .5; 3 SOLUTION RESPIRATORY (INHALATION) at 22:08

## 2019-08-22 RX ADMIN — METHYLPREDNISOLONE SODIUM SUCCINATE 40 MG: 40 INJECTION, POWDER, FOR SOLUTION INTRAMUSCULAR; INTRAVENOUS at 05:36

## 2019-08-22 RX ADMIN — SENNOSIDES, DOCUSATE SODIUM 2 TABLET: 50; 8.6 TABLET, FILM COATED ORAL at 17:08

## 2019-08-22 RX ADMIN — MAGNESIUM SULFATE IN WATER 2 G: 40 INJECTION, SOLUTION INTRAVENOUS at 08:56

## 2019-08-22 RX ADMIN — METHYLPREDNISOLONE SODIUM SUCCINATE 40 MG: 40 INJECTION, POWDER, FOR SOLUTION INTRAMUSCULAR; INTRAVENOUS at 12:48

## 2019-08-22 RX ADMIN — INSULIN HUMAN 2 UNITS: 100 INJECTION, SOLUTION PARENTERAL at 20:39

## 2019-08-22 RX ADMIN — PROPOFOL 100 MG: 10 INJECTION, EMULSION INTRAVENOUS at 14:29

## 2019-08-22 RX ADMIN — FENTANYL CITRATE 100 MCG: 50 INJECTION, SOLUTION INTRAMUSCULAR; INTRAVENOUS at 23:07

## 2019-08-22 RX ADMIN — PROPOFOL 100 MG: 10 INJECTION, EMULSION INTRAVENOUS at 13:25

## 2019-08-22 RX ADMIN — Medication 200 MCG: at 13:39

## 2019-08-22 RX ADMIN — METHYLPREDNISOLONE SODIUM SUCCINATE 40 MG: 40 INJECTION, POWDER, FOR SOLUTION INTRAMUSCULAR; INTRAVENOUS at 23:07

## 2019-08-22 RX ADMIN — ALTEPLASE 100 MG: KIT at 16:41

## 2019-08-22 RX ADMIN — PROPOFOL 30 MCG/KG/MIN: 10 INJECTION, EMULSION INTRAVENOUS at 13:17

## 2019-08-22 RX ADMIN — EPOPROSTENOL SODIUM 0.03 MCG/KG/MIN: 1.5 INJECTION, POWDER, LYOPHILIZED, FOR SOLUTION INTRAVENOUS at 16:08

## 2019-08-22 RX ADMIN — METHYLPREDNISOLONE SODIUM SUCCINATE 40 MG: 40 INJECTION, POWDER, FOR SOLUTION INTRAMUSCULAR; INTRAVENOUS at 02:00

## 2019-08-22 RX ADMIN — INSULIN HUMAN 2 UNITS: 100 INJECTION, SOLUTION PARENTERAL at 17:06

## 2019-08-22 RX ADMIN — SODIUM CHLORIDE 50 ML: 9 INJECTION, SOLUTION INTRAVENOUS at 18:26

## 2019-08-22 ASSESSMENT — ENCOUNTER SYMPTOMS
NECK PAIN: 0
SPEECH CHANGE: 0
FEVER: 1
SPUTUM PRODUCTION: 1
HEADACHES: 0
STRIDOR: 0
SEIZURES: 0
SENSORY CHANGE: 0
CHILLS: 1
PALPITATIONS: 0
COUGH: 1
BLOOD IN STOOL: 0
ABDOMINAL PAIN: 0
DOUBLE VISION: 0
MYALGIAS: 0
VOMITING: 0
WHEEZING: 1
FOCAL WEAKNESS: 0
NERVOUS/ANXIOUS: 0
SHORTNESS OF BREATH: 1
HALLUCINATIONS: 0
PHOTOPHOBIA: 0
BRUISES/BLEEDS EASILY: 0
BLURRED VISION: 0
HEMOPTYSIS: 0
NAUSEA: 0
SINUS PAIN: 0
CLAUDICATION: 0

## 2019-08-22 ASSESSMENT — LIFESTYLE VARIABLES
HAVE YOU EVER FELT YOU SHOULD CUT DOWN ON YOUR DRINKING: NO
EVER FELT BAD OR GUILTY ABOUT YOUR DRINKING: NO
EVER HAD A DRINK FIRST THING IN THE MORNING TO STEADY YOUR NERVES TO GET RID OF A HANGOVER: NO
HAVE PEOPLE ANNOYED YOU BY CRITICIZING YOUR DRINKING: NO
ALCOHOL_USE: NO
CONSUMPTION TOTAL: NEGATIVE
HOW MANY TIMES IN THE PAST YEAR HAVE YOU HAD 5 OR MORE DRINKS IN A DAY: 0
TOTAL SCORE: 0
ON A TYPICAL DAY WHEN YOU DRINK ALCOHOL HOW MANY DRINKS DO YOU HAVE: 0
TOTAL SCORE: 0
TOTAL SCORE: 0
AVERAGE NUMBER OF DAYS PER WEEK YOU HAVE A DRINK CONTAINING ALCOHOL: 0

## 2019-08-22 ASSESSMENT — COGNITIVE AND FUNCTIONAL STATUS - GENERAL
DAILY ACTIVITIY SCORE: 24
SUGGESTED CMS G CODE MODIFIER DAILY ACTIVITY: CH
SUGGESTED CMS G CODE MODIFIER MOBILITY: CH
MOBILITY SCORE: 24

## 2019-08-22 ASSESSMENT — PATIENT HEALTH QUESTIONNAIRE - PHQ9
2. FEELING DOWN, DEPRESSED, IRRITABLE, OR HOPELESS: NOT AT ALL
SUM OF ALL RESPONSES TO PHQ9 QUESTIONS 1 AND 2: 0
1. LITTLE INTEREST OR PLEASURE IN DOING THINGS: NOT AT ALL

## 2019-08-22 NOTE — PROGRESS NOTES
2 RN Skin Check:    Bruise/cyst to sacrum, not open.  Bilateral ears red but blanching.  Tyler/scabs to LE.  Feet dry and cracked.  Otherwise skin intact.

## 2019-08-22 NOTE — ED NOTES
Critical troponin of 117 called in by lab. Reported to hospitalist. No new orders received, will continue to monitor. Pt denies any pain at this time.

## 2019-08-22 NOTE — PROCEDURES
Procedures  Procedure: right internal jugular central line insertion, us guided    : Dr Gant    Reason: Septic shock requiring vasopressors and acute respiratory failure with pneumonia and ards, severe hypoxia    The patient's right neck region was prepped and draped in sterile fashion using chlorhexidine scrub. Anesthesia was achieved with 1% lidocaine. The right internal jugular vein was accessed under ultrasound guidance using a finder needle Venous blood was withdrawn and the needle was withdrawn after a guidewire was advanced through the needle catheter. A small incision was made with a blade scalpel and the needle was exchanged for a dilator over the guidewire until appropriate dilation was obtained. The dilator was removed and a central venous triple-lumen catheter was advanced over the guidewire and secured into place with 2 sutures at 17 cm. At time of procedure completion, all ports aspirated and flushed properly. Post-procedure x-ray adequate placement.  2 additional sutures placed for skin closure.  Sterile procedure done throughout entire procedure by all participating.    Complications: none    Blood loss: < 4 cc

## 2019-08-22 NOTE — ED NOTES
RT at bedside. Pt changed from nonrebreather to oxymask but oxygen saturation down to 86% on oxymask. Pt to be placed on high flow nasal cannula by RT.

## 2019-08-22 NOTE — PROGRESS NOTES
Progress note, See Dr Ponce note for further details of transfer from medicine floor to ICU.    In summary, 27 yo male with hx of marcel and asthma feels like he has a cold.  Recent trip to [place].  He has been sleeping with air conditioner.  He came back to [place] on the 15th.  Since Sunday worsening cough and sputum protuction with wheezing.  He has a rescue inhaler at home that he uses occasionally.  At urgent care 75% fio2.  Sent  To [facility] ER.  He was initially admitted to medicine floor and now transferred to the ICU.  He has pneumonia on chest xray. Treated for sepsis.  RR 40s and on high flow nasal cannula 60 L and 100%, sating 80-95%.      Interval events  Alert and oriented by lethargic  On 100% fio2 high flow, 60%  sats as above  Deferred bipap prior, on further discussion ok with bipap  Needs to be stable bipap, then cta imaging chest  lovenox dvt px, ok for bid due to wt  azithro and rocephin  duonebs scheduled  Steroids  May need intubation  40 mg lasix additional on top of prior 40 mg    Addendum  Patient desating to mid 80s on high flow  Failed trial of non rebreather as he deferred bipap to see if could get imaging  Discussed options with patient, he continued to not want bipap.   Continued desaturations on high flow  Intubated  Bronchoscopy with thick secretions  RLL segment BAL, Improved peak and oxygenation post bronch  Significant hypoxia, likely ARDS, with bag mask vent with 15 peep to get sats to 85-90%  Put on vent, initially 12 peep, advanced to 20 peep, gas showed acidosis with pao2 54, sao2 80-82%  Changed to peep 22  Volume prior down to 400, approx 5.5 cc/kg, then increased back to 450  rr up to 35  Plateau 32-38, peak 30-40  Arterial line placed  Central line  Propofol drip  fent drip  Levophed for map > 65  Called mother to discuss patient condition  If continues not to improve then may need proning  Flolan started first as patient will be difficult to prone  sats did improve to  89%  Repeat abg reviewed  Multiple vent adjustments   Discussed with family  Massive PE on CTA, both pulmonary arteries occluded with multiple infarctions, likely pneumonia on top of infarcted areas, my guess is aspiration pneumonia from respiratory failure  Discussed with family/friend, ongoing sob for a few weeks, likely at some point had massive pe on top of smaller pe   mg dosing due to severehemodynamic instability    Reviewed labs, notes, orders and imaging    Assessment and Plan:  Sepsis with respiratory failure, lung likely source  Pneumonia  Pulmonary edema  Right heart failure  Lower extremity edema  Acute hypoxic respiratory failure  Massive PE  Aspiration  Septic, obstructive and cardiogenic shock    Patient is critically ill. The patient continues to have severe hypoxia on 60L 100% high flow nasal cannula High work of breathing in 40s.  He was eventually intubated due to not tolerating high flow. Hypoxic and progressed vent to peep 22, multiple vent changes.  Flolan started.  mg for massive PE.  Heparin drip.  Levophed for map > 65.  Extensive discussion with family.  If untreated there is a high chance of deterioration and eventually death. The critical that has been undertaken is medically complex. There has been no overlap in critical care time. Critical Care Time not including procedures: 168 minutes

## 2019-08-22 NOTE — ED TRIAGE NOTES
Chief Complaint   Patient presents with   • Shortness of Breath     Pt BIB EMS, report of pt with increased SOB for 2 wks. Pt reports hx of asthma, fevers on and off. pt reports pain upon inspiration in lower right ribs. pt found to be 75% on RM PTA. placed on non rebreather PTA. Pt sitting up in bed with mild WOB.    • Fever   • Flu Like Symptoms     ERP at bedside.

## 2019-08-22 NOTE — ASSESSMENT & PLAN NOTE
Likely aspiration secondary to PE/respiratory failure/encephalopathy  4 days ceftriaxone, neg bal, augmentin for last day  Complete 8/25 evening

## 2019-08-22 NOTE — ASSESSMENT & PLAN NOTE
Resolved, continue monitoring, continue oxygen per protocol respiratory care per protocol  Continue weaning o2

## 2019-08-22 NOTE — ASSESSMENT & PLAN NOTE
Massive PE  Pneumonia, aspiration  Tpa 100 mg, then ekos cath as was not fully improving  Intubated initial day, then extubated 8/24  Tolerated well  Fluids/diuresis as necessary  Right heart failure, acute on chronic pulmonary htn  JONES  Remains on o2  Needs continued diuresis  Mobilizing  Heparin drip, transition to  Long term anticoagulation

## 2019-08-22 NOTE — CONSULTS
"PULMONARY AND CRITICAL CARE MEDICINE CONSULTATION    Date of Consultation:  8/22/2019    Requesting Physician:  Richar Matt DO    Consulting Physician:  David Gifford MD    Reason for Consultation: Critical care management in gentleman with respiratory failure, severe sepsis, asthma and pneumonia    Chief Complaint: Dyspnea    History of Present Illness:    I was kindly asked to see and evaluate Michael Ontiveros, a 28 y.o. male for evaluation and management of the above problem.    This gentleman has a history of obesity and asthma.  He tells me that he slept under an air conditioner about 2 weeks ago when caught a \"chest cold.\"  Last week he went to [place] for a family reunion.  He returned to [place] on or about 8/15/2019.  Since Sunday, he has been feeling worse with worsening cough, sputum production, wheezing, dyspnea and chest tightness.  He is bringing up yellow sputum.  He denies hemoptysis.  He has lower extremity edema.  He does not use home oxygen.  He has a rescue inhaler at home that he uses on an as-needed basis.  He presented to an urgent care center where he was found to have an oxygen saturation of 75% on room air.  He was sent to [facility] for admission.  He was initially admitted to the telemetry unit.  This morning I was asked to see and evaluate him due to increasing oxygen requirements.    Medications Prior to Admission:    No current facility-administered medications on file prior to encounter.      No current outpatient medications on file prior to encounter.       Current Medications:      Current Facility-Administered Medications:   •  furosemide (LASIX) injection 40 mg, 40 mg, Intravenous, Once, David Gifford M.D.  •  potassium chloride SA (Kdur) tablet 40 mEq, 40 mEq, Oral, Once, David Gifford M.D.  •  insulin regular (HUMULIN R) injection 2-9 Units, 2-9 Units, Subcutaneous, 4X/DAY ACHS **AND** Accu-Chek ACHS, , , Q AC AND BEDTIME(S) **AND** NOTIFY MD " and PharmD, , , Once **AND** glucose 4 g chewable tablet 16 g, 16 g, Oral, Q15 MIN PRN **AND** DEXTROSE 10% BOLUS 250 mL, 250 mL, Intravenous, Q15 MIN PRN, David Gifford M.D.  •  senna-docusate (PERICOLACE or SENOKOT S) 8.6-50 MG per tablet 2 Tab, 2 Tab, Oral, BID, 2 Tab at 08/21/19 2050 **AND** polyethylene glycol/lytes (MIRALAX) PACKET 1 Packet, 1 Packet, Oral, QDAY PRN **AND** magnesium hydroxide (MILK OF MAGNESIA) suspension 30 mL, 30 mL, Oral, QDAY PRN **AND** bisacodyl (DULCOLAX) suppository 10 mg, 10 mg, Rectal, QDAY PRN, Richar Matt D.O.  •  Respiratory Care per Protocol, , Nebulization, Continuous RT, BARBARA Wolff.O.  •  lactated ringers infusion (BOLUS): BMI greater than 30, 1,000 mL, Intravenous, Once PRN, Richar Matt D.O.  •  enoxaparin (LOVENOX) inj 40 mg, 40 mg, Subcutaneous, DAILY, Richar Matt D.O.  •  acetaminophen (TYLENOL) tablet 650 mg, 650 mg, Oral, Q6HRS PRN, Richar Matt D.O.  •  cefTRIAXone (ROCEPHIN) 2 g in  mL IVPB, 2 g, Intravenous, Q24HRS, Richar Matt D.O.  •  azithromycin (ZITHROMAX) tablet 500 mg, 500 mg, Oral, DAILY, Richar Matt D.O.  •  enalaprilat (VASOTEC) injection 1.25 mg, 1.25 mg, Intravenous, Q6HRS PRN, Richar Matt D.O.  •  ondansetron (ZOFRAN) syringe/vial injection 4 mg, 4 mg, Intravenous, Q4HRS PRN, Richar Matt D.O.  •  ondansetron (ZOFRAN ODT) dispertab 4 mg, 4 mg, Oral, Q4HRS PRN, Richar Matt D.O.  •  promethazine (PHENERGAN) tablet 12.5-25 mg, 12.5-25 mg, Oral, Q4HRS PRN, SALINAS WolffO.  •  promethazine (PHENERGAN) suppository 12.5-25 mg, 12.5-25 mg, Rectal, Q4HRS PRN, SALINAS WolffO.  •  prochlorperazine (COMPAZINE) injection 5-10 mg, 5-10 mg, Intravenous, Q4HRS PRN, SALINAS WolffO.  •  methylPREDNISolone (SOLU-MEDROL) 40 MG injection 40 mg, 40 mg, Intravenous, Q6HRS, BARBARA Wolff.O., 40 mg at 08/22/19 0200  •  ipratropium-albuterol (DUONEB) nebulizer solution, 3 mL, Nebulization,  Q4HRS (RT), Antonio Mathis D.O., 3 mL at 08/22/19 0300    Allergies:    Pistachio and Bow    Past Surgical History:    Past Surgical History:   Procedure Laterality Date   • DENTAL EXTRACTION(S)  11/3/2017    Procedure: DENTAL EXTRACTION(S);  Surgeon: Olman Sparks D.D.S.;  [facility]: SURGERY [facility];  Service: Oral Surgery       Past Medical History:    Past Medical History:   Diagnosis Date   • Eczema        Social History:    Social History     Socioeconomic History   • Marital status: Single     Spouse name: Not on file   • Number of children: Not on file   • Years of education: Not on file   • Highest education level: Not on file   Occupational History   • Not on file   Social Needs   • Financial resource strain: Not on file   • Food insecurity:     Worry: Not on file     Inability: Not on file   • Transportation needs:     Medical: Not on file     Non-medical: Not on file   Tobacco Use   • Smoking status: Never Smoker   • Smokeless tobacco: Never Used   Substance and Sexual Activity   • Alcohol use: Yes     Comment: rare   • Drug use: No   • Sexual activity: Not on file   Lifestyle   • Physical activity:     Days per week: Not on file     Minutes per session: Not on file   • Stress: Not on file   Relationships   • Social connections:     Talks on phone: Not on file     Gets together: Not on file     Attends [employer] service: Not on file     Active member of club or organization: Not on file     Attends meetings of clubs or organizations: Not on file     Relationship status: Not on file   • Intimate partner violence:     Fear of current or ex partner: Not on file     Emotionally abused: Not on file     Physically abused: Not on file     Forced sexual activity: Not on file   Other Topics Concern   • Not on file   Social History Narrative   • Not on file       Family History:    No family history on file.    Review of System:    Review of Systems   Constitutional: Positive for chills,  "fever and malaise/fatigue.   HENT: Negative for ear discharge, ear pain and sinus pain.    Eyes: Negative for blurred vision, double vision and photophobia.   Respiratory: Positive for cough, sputum production, shortness of breath and wheezing. Negative for hemoptysis and stridor.    Cardiovascular: Positive for leg swelling. Negative for palpitations and claudication.   Gastrointestinal: Negative for abdominal pain, blood in stool, nausea and vomiting.   Genitourinary: Negative for dysuria, hematuria and urgency.   Musculoskeletal: Negative for joint pain, myalgias and neck pain.   Skin: Negative for rash.   Neurological: Negative for sensory change, speech change, focal weakness, seizures and headaches.   Endo/Heme/Allergies: Does not bruise/bleed easily.   Psychiatric/Behavioral: Negative for hallucinations and suicidal ideas. The patient is not nervous/anxious.        Physical Examination:    /73   Pulse 100   Temp 36.3 °C (97.3 °F) (Temporal)   Resp (!) 22   Ht 1.727 m (5' 8\")   Wt (!) 176.3 kg (388 lb 10.7 oz)   SpO2 92%   BMI 59.10 kg/m²   Physical Exam   Constitutional: He is oriented to person, place, and time.   Obese fellow   HENT:   Head: Normocephalic and atraumatic.   Right Ear: External ear normal.   Left Ear: External ear normal.   Nose: Nose normal.   Eyes: Pupils are equal, round, and reactive to light. Conjunctivae are normal. Right eye exhibits no discharge. Left eye exhibits no discharge.   Neck: Normal range of motion. Neck supple. No tracheal deviation present.   Cardiovascular: Intact distal pulses. Exam reveals no friction rub.   Sinus tachycardia   Pulmonary/Chest: No stridor. He has wheezes (Scattered wheezes bilaterally). He has rales (Scattered coarse crackles bilaterally).   Abdominal: Soft. Bowel sounds are normal. He exhibits no distension. There is no tenderness. There is no rebound.   Obese   Musculoskeletal: Normal range of motion. He exhibits edema. He exhibits no " tenderness.   No clubbing or cyanosis   Neurological: He is alert and oriented to person, place, and time. No cranial nerve deficit. Coordination normal. GCS score is 15.   No focal weakness   Skin: Skin is warm and dry. No rash noted. He is not diaphoretic. No pallor.       Laboratory Data:    Recent Labs     08/22/19  0441   [id number] 7.33*   [id number] 45.8*   [id number] 81.9   CWPP0GCA 93.8   ARTHCO3 23   ARTBE -3     Recent Labs     08/21/19 1900 08/22/19  0301   WBC 13.8* 9.5   RBC 6.63* 6.40*   HEMOGLOBIN 15.7 15.0   HEMATOCRIT 55.7* 53.6*   MCV 84.0 83.8   MCH 23.7* 23.4*   MCHC 28.2* 28.0*   RDW 59.4* 60.0*   PLATELETCT 126* 123*     Recent Labs     08/21/19 1900 08/22/19  0301   SODIUM 138 136   POTASSIUM 4.2 4.4   CHLORIDE 102 102   CO2 25 25   GLUCOSE 103* 180*   BUN 10 10   CREATININE 0.85 0.73   CALCIUM 9.0 9.1                   Imaging:    I personally viewed the CXR and CT scan images as well as reviewed the radiology interpretation reports.    DX-CHEST-PORTABLE (1 VIEW)   Final Result      1.  Enlarged cardiac silhouette consistent with findings of vascular congestion/edema.   2.  Right middle lobe and lower lobe airspace opacities are suspicious for pneumonia.      DX-CHEST-LIMITED (1 VIEW)    (Results Pending)   EC-ECHOCARDIOGRAM COMPLETE W/O CONT    (Results Pending)       Assessment and Plan:    * Acute respiratory failure with hypoxia and hypercapnia (HCC)- (present on admission)  Assessment & Plan  Secondary to community acquired pneumonia, acute exacerbation of asthma  He is on very high supplemental oxygen requiring a HFNC  I suspect that he has chronic right heart failure and pulmonary hypertension  Check echocardiogram  Stop IV fluids  Force diuresis with furosemide      Community acquired pneumonia of right lower lobe of lung (HCC)  Assessment & Plan  Blood cultures obtained - follow-up results  Empiric Rocephin and azithromycin    Severe sepsis (HCC)- (present on admission)  Assessment &  Plan  Associated acute respiratory failure  Pulmonary source  Lactic acid has normalized  Continue broad-spectrum source directed empiric antimicrobial chemotherapy  Follow-up cultures  Influenza screen negative    Asthma with exacerbation- (present on admission)  Assessment & Plan  IV Solu-Medrol, 40 mg every 6 hours  Bronchodilators    JONES (obstructive sleep apnea)  Assessment & Plan  Query JONES/OHS  He will need a PSG after he is dismissed and fully recovered from his acute pulmonary disease    Hyperglycemia- (present on admission)  Assessment & Plan  Check glycohemoglobin  Sliding scale insulin    Morbid obesity (HCC)- (present on admission)  Assessment & Plan  Nutrition counseling  BMI 59.1    This gentleman is critically ill.  He will be transferred to the ICU.  He is on very high supplemental oxygen requiring a HFNC.  He is at high risk for worsening respiratory system dysfunction and need for intubation and mechanical ventilatory support.  I have assessed and reassessed his respiratory status, blood pressure, hemodynamics and cardiovascular status.    High risk of deterioration and worsening vital organ dysfunction and death without the above critical care interventions.    Thank you for allowing me to participate in the care of this gentleman.  I will continue to follow him with great interest.    Critical Care Time:  60 minutes  [id number]  No time overlap  Time excludes procedures  Discussed with RN, RT, Dr. Shaka Gifford MD  Pulmonary and Critical Care Medicine

## 2019-08-22 NOTE — PROGRESS NOTES
[facility] Medicine Daily Progress Note    Date of Service  8/22/2019    Chief Complaint  28 y.o. male admitted 8/21/2019 with history of asthma admitted with suspected pneumonia and acute respiratory failure    Hospital Course         Interval Problem Update  Patient seen in the ICU  T-max 100.1  Sr ST   Afebrile  UO 1350 overnight  HFNC 60L 100%  Day 2 azithro ceftriaxone          Consultants/Specialty  Critical care    Code Status  Full code    Disposition  To be determined    Review of Systems  Review of Systems   Constitutional: Positive for fever.   Respiratory: Positive for cough and shortness of breath. Negative for hemoptysis.    Cardiovascular: Positive for leg swelling. Negative for chest pain.   Gastrointestinal: Negative for abdominal pain, nausea and vomiting.   All other systems reviewed and are negative.       Physical Exam  Temp:  [36.3 °C (97.3 °F)-37.8 °C (100.1 °F)] 36.3 °C (97.3 °F)  Pulse:  [] 100  Resp:  [15-38] 19  BP: (106-138)/(46-82) 114/73  SpO2:  [86 %-97 %] 94 %    Physical Exam   Constitutional: He is oriented to person, place, and time. He appears well-developed and well-nourished.   Obese   HENT:   Head: Normocephalic and atraumatic.   Mouth/Throat: No oropharyngeal exudate.   Eyes: Pupils are equal, round, and reactive to light. Conjunctivae are normal. Right eye exhibits no discharge. Left eye exhibits no discharge. No scleral icterus.   Neck: Neck supple. No JVD present. No tracheal deviation present.   Cardiovascular: Regular rhythm. Exam reveals no gallop and no friction rub.   No murmur heard.  Tachycardia   Pulmonary/Chest: Effort normal. No respiratory distress. He has decreased breath sounds. He has no wheezes. He has rales. He exhibits no tenderness.   Abdominal: Soft. Bowel sounds are normal. He exhibits no distension. There is no tenderness. There is no rebound.   Musculoskeletal: He exhibits edema. He exhibits no tenderness.   Neurological: He is alert and oriented  to person, place, and time. No cranial nerve deficit. He exhibits normal muscle tone.   Skin: Skin is warm and dry. He is not diaphoretic. No cyanosis. Nails show no clubbing.   Chronic stasis dermatitis changes   Psychiatric: He has a normal mood and affect. His behavior is normal. Thought content normal.   Nursing note and vitals reviewed.      Fluids    Intake/Output Summary (Last 24 hours) at 8/22/2019 0730  Last data filed at 8/21/2019 1955  Gross per 24 hour   Intake 100 ml   Output --   Net 100 ml       Laboratory  Recent Labs     08/21/19 1900 08/22/19  0301   WBC 13.8* 9.5   RBC 6.63* 6.40*   HEMOGLOBIN 15.7 15.0   HEMATOCRIT 55.7* 53.6*   MCV 84.0 83.8   MCH 23.7* 23.4*   MCHC 28.2* 28.0*   RDW 59.4* 60.0*   PLATELETCT 126* 123*     Recent Labs     08/21/19 1900 08/22/19  0301   SODIUM 138 136   POTASSIUM 4.2 4.4   CHLORIDE 102 102   CO2 25 25   GLUCOSE 103* 180*   BUN 10 10   CREATININE 0.85 0.73   CALCIUM 9.0 9.1     Recent Labs     08/21/19 1900   INR 1.17*               Imaging  DX-CHEST-LIMITED (1 VIEW)   Final Result      No significant change in pulmonary edema      DX-CHEST-PORTABLE (1 VIEW)   Final Result      1.  Enlarged cardiac silhouette consistent with findings of vascular congestion/edema.   2.  Right middle lobe and lower lobe airspace opacities are suspicious for pneumonia.      EC-ECHOCARDIOGRAM COMPLETE W/O CONT    (Results Pending)   US-EXTREMITY VENOUS LOWER BILAT    (Results Pending)   CT-CTA CHEST PULMONARY ARTERY W/ RECONS    (Results Pending)        Assessment/Plan  * Acute respiratory failure with hypoxia and hypercapnia (HCC)- (present on admission)  Assessment & Plan  Secondary to pneumonia sepsis and likely some component of obesity hypoventilation atelectasis and sleep apnea  Patient with high oxygen requirements on high flow nasal cannula  Continue antibiotics  Discussed with Dr. Gant he recommends checking CTA to rule out PE  We will also check lower extremity duplex  given stasis dermatitis changes    Community acquired pneumonia of right lower lobe of lung (HCC)  Assessment & Plan  Continue ceftriaxone and azithromycin  RT protocol    Severe sepsis (HCC)- (present on admission)  Assessment & Plan  -This is severe sepsis with the following associated acute organ dysfunction(s): acute respiratory failure.       Source pneumonia  Continue ceftriaxone and azithromycin  Follow-up on cultures  Continue supportive care and close monitoring in the ICU given high oxygen requirements at high risk of failing and requiring intubation    Asthma with exacerbation- (present on admission)  Assessment & Plan  Continue Solu-Medrol  Bronchodilators per RT protocol    JONES (obstructive sleep apnea)  Assessment & Plan  Will need formal evaluation when clinically stable    Hyperglycemia- (present on admission)  Assessment & Plan  Will start insulin sliding scale insulin and monitor CBGs  Check HbA1c    Polycythemia- (present on admission)  Assessment & Plan  Monitor CBC  Suspect component of severe obstructive sleep apnea and possible obesity hypoventilation    Morbid obesity (HCC)- (present on admission)  Assessment & Plan  Body mass index is 59.1 kg/m².      Type 2 myocardial infarction (HCC)  Assessment & Plan  Secondary to acute respiratory failure pneumonia  We will check echocardiogram      Plan of care reviewed with patient and discussed with nursing staff pharmacist and critical care    VTE prophylaxis:Lovenox

## 2019-08-22 NOTE — ED NOTES
Report received from Rajiv MARSHALL. ERP at bedside. Pt tripoding, tachypneic, rhonchi auscultated in lung fields. Pt hypoxic on room air, up to 94% on 15 L nonrebreather.

## 2019-08-22 NOTE — PROCEDURES
Procedures  DATE OF OPERATION: 8/22/2019     PREOPERATIVE DIAGNOSIS: increase in oxygen requirement, chest x-ray infiltrate and Pneumonia     POSTOPERATIVE DIAGNOSIS: purulent secretions, same and erythema     PROCEDURE PERFORMED: Fiberoptic bronchoscopy with bronchoalveolar lavage    SURGEON: Marbin Gant M.D.    ANESTHESIA: Intravenous sedation, analgesia, and pharmacologic restraint     INDICATIONS: The patient is a 28 y.o. male with acute respiratory failure with pneumonia and severe hypoxia, ARDS    FINDINGS: Purulent secretions  RUL, RML, RLL, FARZANEH and LLL and erythema of bronchial segments in all segments    SPECIMEN: Bronchoalveolar lavage for cultures, DFA, fungal culture    PROCEDURE: Following informed consent, the patient was properly identified and optimally positioned in bed. He was preoxygenated with 100% oxygen and placed on a regular ventilatory rate. Intravenous sedation, analgesia, and pharmacologic restraint was administered.    The fiberoptic bronchoscope was advanced through the indwelling endotracheal tube.  The upper airways were suctioned. The airways were systematically and sequentially inspected and lavaged. The effluent from a right lower lobe segment was collected in a sterile trap and submitted for cultures (bacterial, fungal, DFA).  There were purulent secretions present in all segments on lavage and suctioning with normal saline RLL and LLL with most significant and also present in FARZANEH, RUL and RML in moderate amounts.  Erythema of airway especially of bronchial segments and right and left main stem. Extensive lavage in all airway segment in RLL, RML, RUL, LLL and FARZANEH.     The patient tolerated the procedure well. There were no apparent complications.    ____________________________________   Marbin Gant M.D.    DD: 8/22/2019  3:34 PM

## 2019-08-22 NOTE — PROCEDURES
Procedures  Procedure: endotracheal intubation    : Dr Gant    Reason: Acute respiratory failure with severe hypoxia, septic shock with pneumonia and ARDS    Permit obtained from patient and consent signed. A MAC 4 blade was inserted into the oropharynx at which time the vocal cords were visualized. A 8.0-Polish endotracheal tube was inserted and visualized going through the vocal cords. The stylette was removed. Colorimetric change was visualized on the CO2 meter. Breath sounds were heard in both lung fields equally. The endotracheal tube was placed at 22 cm, measured at the teeth. ET tube pulled back 2 cm with post procedure bronchoscopy.  Post procedure cxray reviewed and adequate placement.     Complications: none

## 2019-08-22 NOTE — ASSESSMENT & PLAN NOTE
Monitor CBC  Suspect component of severe obstructive sleep apnea and possible obesity hypoventilation

## 2019-08-22 NOTE — H&P
Hospital Medicine History & Physical Note    Date of Service  8/21/2019    Primary Care Physician  Miguel Miller D.O.    Consultants  None    Code Status  Full    Chief Complaint  Shortness of breath    History of Presenting Illness  28 y.o. male who presented 8/21/2019 with shortness of breath.  Patient states 2 weeks ago he fell asleep in front of an air conditioning unit, after that he received a chest cold.  Patient then got on a flight flew out of town for a couple days and then back and his chest cold became worse.  He then complained of some chest tightness.  On Sunday night he had improved and thought everything was getting better.  On Monday his shortness of breath became worse.  Last night and today he began having right chest pain, went to urgent care today and they sent him here.  His pain is worse with inhalation, sharp, 5/10 at its worse.  He does have a history of asthma.  Upon arrival he was noted to be hypoxic, satting 70% on room air.  He does complain of a mild cough that is nonproductive.  He also complains of mild fever.  I did discuss the case including labs and imaging with the ER physician.    Review of Systems  Review of Systems   Constitutional: Negative for chills, fever and malaise/fatigue.   HENT: Negative for congestion.    Respiratory: Positive for cough and shortness of breath. Negative for sputum production and stridor.    Cardiovascular: Positive for chest pain. Negative for palpitations and leg swelling.   Gastrointestinal: Negative for abdominal pain, constipation, diarrhea, nausea and vomiting.   Genitourinary: Negative for dysuria and urgency.   Musculoskeletal: Negative for falls and myalgias.   Neurological: Negative for dizziness, tingling, loss of consciousness, weakness and headaches.   Psychiatric/Behavioral: Negative for depression and suicidal ideas.   All other systems reviewed and are negative.      Past Medical History   has a past medical history of  Eczema.    Surgical History   has a past surgical history that includes dental extraction(s) (11/3/2017).     Family History  Asthma, coronary artery disease    Social History   reports that he has never smoked. He has never used smokeless tobacco. He reports that he drinks alcohol. He reports that he does not use drugs.    Allergies  Allergies   Allergen Reactions   • Pistachio    • [place] Anaphylaxis     pistachios       Medications  None       Physical Exam  Temp:  [37.2 °C (99 °F)] 37.2 °C (99 °F)  Pulse:  [100-109] 109  Resp:  [30-53] 53  BP: (138)/(82) 138/82  SpO2:  [86 %-97 %] 86 %    Physical Exam   Constitutional: He is oriented to person, place, and time. He appears well-developed and well-nourished.  Non-toxic appearance. No distress.   HENT:   Head: Normocephalic and atraumatic. Not macrocephalic and not microcephalic. Head is without raccoon's eyes and without Stuart's sign.   Right Ear: External ear normal.   Left Ear: External ear normal.   Mouth/Throat: Mucous membranes are dry. No oropharyngeal exudate.   Eyes: Conjunctivae are normal. Right eye exhibits no discharge. Left eye exhibits no discharge. No scleral icterus.   Neck: Normal range of motion. Neck supple. No tracheal deviation, no edema and no erythema present.   Cardiovascular: Regular rhythm, normal heart sounds and intact distal pulses. Tachycardia present. Exam reveals no gallop, no friction rub and no decreased pulses.   No murmur heard.  Pulmonary/Chest: Effort normal. No stridor. Tachypnea noted. No respiratory distress. He has decreased breath sounds. He has no wheezes. He has no rhonchi. He has rales. He exhibits no tenderness.   Abdominal: Soft. Bowel sounds are normal. He exhibits no distension. There is no splenomegaly or hepatomegaly. There is no tenderness. There is no rebound and no guarding.   Morbidly obese    Musculoskeletal: Normal range of motion. He exhibits no edema, tenderness or deformity.   Lymphadenopathy:     He  has no cervical adenopathy.   Neurological: He is alert and oriented to person, place, and time. No cranial nerve deficit. Coordination normal.   Skin: Skin is warm and dry. No rash noted. He is not diaphoretic. No cyanosis or erythema. No pallor. Nails show no clubbing.   Bilateral LE changes of chronic venous stasis    Psychiatric: He has a normal mood and affect. His speech is normal and behavior is normal. Judgment and thought content normal. Cognition and memory are normal.   Nursing note and vitals reviewed.      Laboratory:  Recent Labs     08/21/19 1900   WBC 13.8*   RBC 6.63*   HEMOGLOBIN 15.7   HEMATOCRIT 55.7*   MCV 84.0   MCH 23.7*   MCHC 28.2*   RDW 59.4*   PLATELETCT 126*     Recent Labs     08/21/19 1900   SODIUM 138   POTASSIUM 4.2   CHLORIDE 102   CO2 25   GLUCOSE 103*   BUN 10   CREATININE 0.85   CALCIUM 9.0     Recent Labs     08/21/19 1900   ALTSGPT 30   ASTSGOT 26   ALKPHOSPHAT 97   TBILIRUBIN 1.2   GLUCOSE 103*         Recent Labs     08/21/19 1900   NTPROBNP 2524*         Recent Labs     08/21/19 1900   TROPONINT 117*       Urinalysis:    No results found     Imaging:  DX-CHEST-PORTABLE (1 VIEW)   Final Result      1.  Enlarged cardiac silhouette consistent with findings of vascular congestion/edema.   2.  Right middle lobe and lower lobe airspace opacities are suspicious for pneumonia.      DX-CHEST-LIMITED (1 VIEW)    (Results Pending)         Assessment/Plan:  I anticipate this patient will require at least two midnights for appropriate medical management, necessitating inpatient admission.    * Acute respiratory failure with hypoxia (HCC)- (present on admission)  Assessment & Plan  -Due to pneumonia causing asthma exacerbation  -Patient initially was placed on a nonrebreather but then was quickly able to be on mask at 12 L  -I have discussed the case with respiratory therapy, I feel he be more comfortable on high flow  -Transition to high flow nasal cannula    Sepsis (HCC)- (present  on admission)  Assessment & Plan  -This is severe sepsis with the following associated acute organ dysfunction(s): acute respiratory failure.   -Due to pneumonia  -Start IV Rocephin and azithromycin  -Await culture results  -Sepsis order set has been initiated, patient will receive significant IV fluids  -Trend lactic acid  -Patient currently is hemodynamically stable    Asthma with exacerbation- (present on admission)  Assessment & Plan  -Due to pneumonia  -Start Rocephin and azithromycin  -Start IV Solu-Medrol  -Start respiratory care per protocol    Pneumonia  Assessment & Plan  -Community-acquired pneumonia, start Rocephin and azithromycin  -Await culture results  -Causing sepsis  -I did personally review the chest x-ray, also compared it to the previous one obtained earlier in the ER visit, I did note right-sided multi lobe pneumonia  -There was some mention by the radiologist about return for interstitial edema, patient does not have cause to be fluid overloaded however will closely monitor this and repeat chest x-ray in the morning    Hyperglycemia- (present on admission)  Assessment & Plan  -Mild however patient will now be on IV steroids  -Continue to monitor, if worsens, consider sliding scale    Polycythemia- (present on admission)  Assessment & Plan  -Due to hypoxia  -Repeat CBC in the morning      VTE prophylaxis: Lovenox

## 2019-08-22 NOTE — ED NOTES
Pt updated that room is no cleaning. Apologized to pt about continued delay. Pt verbalized understanding. Watching TV. VSS.

## 2019-08-22 NOTE — ED PROVIDER NOTES
ED Provider Note    CHIEF COMPLAINT  Chief Complaint   Patient presents with   • Shortness of Breath     Pt BIB EMS, report of pt with increased SOB for 2 wks. Pt reports hx of asthma, fevers on and off. pt reports pain upon inspiration in lower right ribs. pt found to be 75% on RM PTA. placed on non rebreather PTA. Pt sitting up in bed with mild WOB.    • Fever   • Flu Like Symptoms       HPI  Michael Ontiveros is a 28 y.o. male who presents to the emergency department complaint of shortness of breath, cough and chest discomfort in the right side.  He states it started approximately 2 weeks ago it is any increasing symptomatology.  Increasing shortness of breath and productive cough.  Subjective fevers associate as well.  Denies severe chest pain although does have chest pressure in the right side of his chest especially takes a big breath.  Denies swelling of his lower extremities, rash, nausea, vomiting, abdominal pain.  He does use Advair at home and does have a history of asthma.  The patient was seen by EMS earlier today and saturation of oxygen was 75% on room air therefore he is placed on a nonrebreather.  He did respond appropriately to oxygen supplementation with a saturation 95 percentile on a nonrebreather mask.  REVIEW OF SYSTEMS  Positives as above. Pertinent negatives include pedal edema, hemoptysis, abdominal pain  All other review of systems are negative    PAST MEDICAL HISTORY  Past Medical History:   Diagnosis Date   • Eczema        FAMILY HISTORY  Noncontributory    SOCIAL HISTORY  Social History     Socioeconomic History   • Marital status: Single     Spouse name: Not on file   • Number of children: Not on file   • Years of education: Not on file   • Highest education level: Not on file   Occupational History   • Not on file   Social Needs   • Financial resource strain: Not on file   • Food insecurity:     Worry: Not on file     Inability: Not on file   • Transportation needs:      Medical: Not on file     Non-medical: Not on file   Tobacco Use   • Smoking status: Never Smoker   • Smokeless tobacco: Never Used   Substance and Sexual Activity   • Alcohol use: Yes     Comment: rare   • Drug use: No   • Sexual activity: Not on file   Lifestyle   • Physical activity:     Days per week: Not on file     Minutes per session: Not on file   • Stress: Not on file   Relationships   • Social connections:     Talks on phone: Not on file     Gets together: Not on file     Attends Yazidi service: Not on file     Active member of club or organization: Not on file     Attends meetings of clubs or organizations: Not on file     Relationship status: Not on file   • Intimate partner violence:     Fear of current or ex partner: Not on file     Emotionally abused: Not on file     Physically abused: Not on file     Forced sexual activity: Not on file   Other Topics Concern   • Not on file   Social History Narrative   • Not on file       SURGICAL HISTORY  Past Surgical History:   Procedure Laterality Date   • DENTAL EXTRACTION(S)  11/3/2017    Procedure: DENTAL EXTRACTION(S);  Surgeon: Olman Sparks D.D.S.;  Location: SURGERY [facility];  Service: Oral Surgery       CURRENT MEDICATIONS  Home Medications    **Home medications have not yet been reviewed for this encounter**         ALLERGIES  Allergies   Allergen Reactions   • Pistachio    • [place] Anaphylaxis     pistachios       PHYSICAL EXAM  VITAL SIGNS: /82   Pulse 100   Temp 37.2 °C (99 °F) (Temporal)   Resp (!) 53   Wt (!) 170.1 kg (375 lb)   SpO2 93%   BMI 57.02 kg/m²      Constitutional: Obese male non-toxic appearance.   Eyes: PERRLA, EOMI, Conjunctiva normal, No discharge.   Cardiovascular: Normal heart rate, Normal rhythm, No murmurs, No rubs, No gallops, and intact distal pulses.   Thorax & Lungs: Slight respiratory distress with use of accessory muscles are inspiration and expiration, rales or rhonchi specifically in the right  lower lung field, positive egophony  abdomen: Bowel sounds normal, Soft, No tenderness, No guarding, No rebound, No pulsatile masses.   Skin: Warm, Dry, left lower extremity erythema, no purpura, no petechiae   extremities: Full range of motion, no deformity, bipedal edema edema.  Neurologic: Alert & oriented x 3, No focal deficits noted, acting appropriately on exam.  Psychiatric: Affect normal for clinical presentation.      RADIOLOGY/PROCEDURES  DX-CHEST-PORTABLE (1 VIEW)   Final Result      1.  Enlarged cardiac silhouette consistent with findings of vascular congestion/edema.   2.  Right middle lobe and lower lobe airspace opacities are suspicious for pneumonia.        Results for orders placed or performed during the hospital encounter of 08/21/19   LACTIC ACID   Result Value Ref Range    Lactic Acid 2.1 (H) 0.5 - 2.0 mmol/L   CBC WITH DIFFERENTIAL   Result Value Ref Range    WBC 13.8 (H) 4.8 - 10.8 K/uL    RBC 6.63 (H) 4.70 - 6.10 M/uL    Hemoglobin 15.7 14.0 - 18.0 g/dL    Hematocrit 55.7 (H) 42.0 - 52.0 %    MCV 84.0 81.4 - 97.8 fL    MCH 23.7 (L) 27.0 - 33.0 pg    MCHC 28.2 (L) 33.7 - 35.3 g/dL    RDW 59.4 (H) 35.9 - 50.0 fL    Platelet Count 126 (L) 164 - 446 K/uL    Neutrophils-Polys 62.00 44.00 - 72.00 %    Lymphocytes 24.00 22.00 - 41.00 %    Monocytes 11.00 0.00 - 13.40 %    Eosinophils 0.00 0.00 - 6.90 %    Basophils 1.00 0.00 - 1.80 %    Nucleated RBC 1.20 /100 WBC    Neutrophils (Absolute) 8.69 (H) 1.82 - 7.42 K/uL    Lymphs (Absolute) 3.31 1.00 - 4.80 K/uL    Monos (Absolute) 1.52 (H) 0.00 - 0.85 K/uL    Eos (Absolute) 0.00 0.00 - 0.51 K/uL    Baso (Absolute) 0.14 (H) 0.00 - 0.12 K/uL    NRBC (Absolute) 0.16 K/uL    Anisocytosis 1+     Microcytosis 1+    COMP METABOLIC PANEL   Result Value Ref Range    Sodium 138 135 - 145 mmol/L    Potassium 4.2 3.6 - 5.5 mmol/L    Chloride 102 96 - 112 mmol/L    Co2 25 20 - 33 mmol/L    Anion Gap 11.0 0.0 - 11.9    Glucose 103 (H) 65 - 99 mg/dL    Bun 10 8 - 22  mg/dL    Creatinine 0.85 0.50 - 1.40 mg/dL    Calcium 9.0 8.5 - 10.5 mg/dL    AST(SGOT) 26 12 - 45 U/L    ALT(SGPT) 30 2 - 50 U/L    Alkaline Phosphatase 97 30 - 99 U/L    Total Bilirubin 1.2 0.1 - 1.5 mg/dL    Albumin 3.4 3.2 - 4.9 g/dL    Total Protein 7.2 6.0 - 8.2 g/dL    Globulin 3.8 (H) 1.9 - 3.5 g/dL    A-G Ratio 0.9 g/dL   proBrain Natriuretic Peptide, NT   Result Value Ref Range    NT-proBNP 2524 (H) 0 - 125 pg/mL   Influenza By PCR, A/B   Result Value Ref Range    Influenza virus A RNA Negative Negative    Influenza virus B, PCR Negative Negative   ESTIMATED GFR   Result Value Ref Range    GFR If African American >60 >60 mL/min/1.73 m 2    GFR If Non African American >60 >60 mL/min/1.73 m 2   DIFFERENTIAL MANUAL   Result Value Ref Range    Bands-Stabs 1.00 0.00 - 10.00 %    Metamyelocytes 1.00 %    Manual Diff Status PERFORMED    PERIPHERAL SMEAR REVIEW   Result Value Ref Range    Peripheral Smear Review see below    PLATELET ESTIMATE   Result Value Ref Range    Plt Estimation Decreased    MORPHOLOGY   Result Value Ref Range    RBC Morphology Present     Polychromia 1+     Smudge Cells Few    EKG   Result Value Ref Range    Report       [facility] Emergency Dept.    Test Date:  2019  Pt Name:    VERN STARR                Department: ER  MRN:        [id number]                      Room:       [facility]  Gender:     Male                         Technician: [id number]  :        1990                   Requested By:SUNIL DAVIS  Order #:    [id number]                    Reading MD: SUNIL DAVIS, DO    Measurements  Intervals                                Axis  Rate:       109                          P:          62  GA:         148                          QRS:        157  QRSD:       68                           T:          9  QT:         324  QTc:        437    Interpretive Statements  SINUS TACHYCARDIA  LEFT POSTERIOR FASCICULAR BLOCK  Compared to ECG 2017  12:39:09  Left posterior fascicular block now present  Sinus rhythm no longer present  Right-axis deviation no longer present    Electronically Signed On 8- 20:09:23 PDT by SUNIL DAVIS DO             COURSE & MEDICAL DECISION MAKING  Pertinent Labs & Imaging studies reviewed. (See chart for details)  This is a pleasant 20-year-old gentleman presents with pneumonia and possible heart failure with elevated BNP of 2500.  X-ray also does reveal evidence of vascular congestion.  Does not have a history and is likely the patient is experiencing pneumonia resulting in his fluid like presentation on x-ray as well as BNP.  The patient has received Pedroza antibiotic to form of ceftriaxone 2 g IV, azithromycin 500 mg IV for presumed commune acquired pneumonia.  He is received albuterol Atrovent nebulized treatment with slight improvement of symptoms, we have switched him over to an oxygen mask at 10 L/min, saturations are maintaining in the high 90 percentile.  I discussed the patient with Dr. Matt for further evaluation and management hospitalization.  The initial fluid bolus was 30 mils per kilogram of the patient has a lactic acid of 2.1, evidence of vascular congestion on x-ray as well as elevated BNP.  For this reason, the patient has had his fluid  to 150 mils per hour.    CRITICAL CARE  The very real possibilty of a deterioration of this patient's condition required the highest level of my preparedness for sudden, emergent intervention.  I provided critical care services, which included medication orders, frequent reevaluations of the patient's condition and response to treatment, ordering and reviewing test results, and discussing the case with various consultants.  The critical care time associated with the care of the patient was 35 minutes. Review chart for interventions. This time is exclusive of any other billable procedures.       FINAL IMPRESSION  Pneumonia  Sepsis  Hypoxia  Critical care time  35-minute  Electronically signed by: Antonio Mathis, 8/21/2019 7:12 PM

## 2019-08-22 NOTE — PROCEDURES
Procedures  Arterial line  Reason: Hemodynamic monitoring and respiratory failure with frequent ABG, on vasopressors    Procedure:  Sterile procedure.  Draped appropriately.  Jareth test normal.  US guided.  Needle inserted with flash obtained.  Guidewire inserted through needle, catheter progressed over wire, wire removed, catheter sutured in place with two sutures, chlorhexidine patch and bandage applied.  Connected appropriately.  No complications. Less than 4 cc blood loss.

## 2019-08-22 NOTE — CARE PLAN
Problem: Safety  Goal: Will remain free from injury  Note:   Bed in low position, 2 side rails up, call light within reach, patient refusing to wear socks but verbalizes understanding to call when needing to use bathroom and states will use socks, will continue to monitor.      Problem: Knowledge Deficit  Goal: Knowledge of disease process/condition, treatment plan, diagnostic tests, and medications will improve  Note:   Patient updated on plan of care for the day, patient verbalized understanding. All questions and concerns addressed at this time.

## 2019-08-22 NOTE — DIETARY
NUTRITION SERVICES: BMI - Pt with BMI >40 (=Body mass index is 59.1 kg/m².), morbid obesity. Weight loss counseling not appropriate in acute care setting. RECOMMEND - Referral to outpatient nutrition services for weight management after D/C.

## 2019-08-22 NOTE — PROGRESS NOTES
0428 - Patient's oxygen saturation 85-89% on high flow 50/90. Patient's RR 28-36. When patient falls asleep oxygen drops to low 80's. Respiratory updated and rapid nurse came to see patient. Dr. Matt paged and notified. Orders received for STAT ABG and stated he'd have Dr. Gifford come and see the patient.     2257 - Dr. Gifford at bedside to see patient. Orders to be placed to transfer patient to ICU for closer monitoring.    5641 - Patient transferred to T606 with chart and belongings along with 2 ICU RN's.

## 2019-08-22 NOTE — ASSESSMENT & PLAN NOTE
-This is severe sepsis with the following associated acute organ dysfunction(s): acute respiratory failure.       Sepsis resolved  Completed 5 days of amoxicillin.

## 2019-08-22 NOTE — PROGRESS NOTES
Received report from ER and assumed care of patient. Patient is alert and oriented x4. Patient very SOB with ambulation on 15 L nonrebreather mask for transportation. High flow NC set up in room, respiratory at bedside, patient's oxygen saturation at 90% with highflow. Tele monitor reads SR/ low ST. Patient able to ambulate as standby. Currently has LR running at 150ml/hr for his bolus. Per ER RN after current liter bag hanging that'll only be his second liter given from bolus order. Patient was updated on the plan of care for the day. Call light within reach, bed in low position, 2 side rails up. Educated on fall risk, verbalizes understanding. Will continue to monitor.

## 2019-08-22 NOTE — PROGRESS NOTES
Subjective:      Michael Ontiveros is a 28 y.o. male who presents with Shortness of Breath (x 2 days with chest tightness)            Shortness of Breath   This is a new problem. The current episode started 1 to 4 weeks ago (2 weeks). The problem occurs constantly. The problem has been gradually worsening. Associated symptoms include a fever, leg swelling, rhinorrhea and sputum production. Pertinent negatives include no abdominal pain, chest pain, hemoptysis, rash, syncope, vomiting or wheezing. Nothing aggravates the symptoms. The treatment provided mild relief.     Patient presents to urgent care reporting a 2 week history of worsening productive cough, chest congestion, and SOB. Over the past 2 days he has become extremely SOB. [place] 1 [place] ago. PMH is significant for asthma. No history of smoking or pneumonia. He denies hemoptysis or history of blood clots.     Review of Systems   Constitutional: Positive for fever and malaise/fatigue. Negative for chills.   HENT: Positive for rhinorrhea. Negative for congestion.    Respiratory: Positive for cough, sputum production and shortness of breath. Negative for hemoptysis and wheezing.    Cardiovascular: Positive for leg swelling. Negative for chest pain and syncope.   Gastrointestinal: Negative for abdominal pain, diarrhea, nausea and vomiting.   Genitourinary: Negative.    Musculoskeletal: Negative.    Skin: Negative for rash.   Neurological: Negative for dizziness.   All other systems reviewed and are negative.       Objective:     Pulse (!) 115   Temp 37.7 °C (99.8 °F) (Temporal)   Resp (!) 24   SpO2 (!) 75%      Physical Exam   Constitutional: He is oriented to person, place, and time. He appears well-developed and well-nourished. He appears distressed.   Morbidly obese, tri-poding throughout exam   HENT:   Head: Normocephalic and atraumatic.   Eyes: Pupils are equal, round, and reactive  to light.   Neck: Normal range of motion.   Cardiovascular: Regular rhythm and normal heart sounds.   No murmur heard.  tachycardic   Pulmonary/Chest: Tachypnea noted. He is in respiratory distress. He has wheezes. He has rales.   Musculoskeletal: Normal range of motion.   Lower extremity edema bilaterally with stasis dermatitis    Neurological: He is alert and oriented to person, place, and time.   Skin: Skin is warm. He is diaphoretic.   Psychiatric: He has a normal mood and affect. His behavior is normal.   Nursing note and vitals reviewed.         PMH:  has a past medical history of Eczema.  MEDS:   Current Outpatient Medications:   •  Non Formulary Request, Gelmacin - topic cream from [place], Disp: , Rfl:   •  azithromycin (ZITHROMAX) 250 MG Tab, Take two tabs by mouth on day one, then one tab by mouth daily on days 2-5., Disp: 6 Tab, Rfl: 0  ALLERGIES:   Allergies   Allergen Reactions   • Pistachio    • [place] Anaphylaxis     pistachios     SURGHX:   Past Surgical History:   Procedure Laterality Date   • DENTAL EXTRACTION(S)  11/3/2017    Procedure: DENTAL EXTRACTION(S);  Surgeon: Olman Sparks D.D.S.;  Location: SURGERY [facility];  Service: Oral Surgery     SOCHX:  reports that he has never smoked. He has never used smokeless tobacco. He reports that he drinks alcohol. He reports that he does not use drugs.  FH: family history is not on file.       Assessment/Plan:     1. Pneumonia of right lower lobe due to infectious organism (HCC)  - DX-CHEST-2 VIEWS; Future        Impression       Right middle lobe and right lower lobe airspace opacities likely represent pneumonia.    Stable mild cardiomegaly.       2. Hypoxia    Patient found to be hypoxic at 75% upon arrival to urgent care. Patient placed on 6 L oxygen with oxygen saturation improving to 90 %. CXR shows significant right sided pneumonia. Recommend patient go directly to the ED for further evaluation and likely inpatient management. Patient  ultimately agreed to be transported by ambulance. Patient left urgent care with EMS.

## 2019-08-22 NOTE — ASSESSMENT & PLAN NOTE
Secondary to pneumonia sepsis and PE    Tolerated extubation on 8/24/2019  Continue Xarelto, change to lasix po in am.

## 2019-08-22 NOTE — ED NOTES
Pt ambulated steady to & from restroom. Pt continues to become dyspneic on exertion, improves with rest. Pt offered urinal to decrease exertion but pt declines. Will continue to monitor.

## 2019-08-22 NOTE — ED NOTES
Med Rec Updated and Complete per Pt at bedside  Allergies Reviewed  No PO ABX last 14 days.    Pt states he has inhalers but has not been using them in months.  Pt reports using Gelmacin from [place] but that it has been more than 2 weeks since his last use.

## 2019-08-22 NOTE — ASSESSMENT & PLAN NOTE
Sleep study post hospitalizaiton recommended  Likely needs cpap or bipap especially in setting of severe pulmonary htn  o2 prn for sao2 90% or better

## 2019-08-22 NOTE — ED NOTES
Pt states feeling better, sitting on side of bed watching videos on cellphone. Waiting for ready room assignment. Apologized to pt about delay. On continuous cardiac monitoring & pulse oximetry/repeat blood presure. Will continue to monitor.

## 2019-08-22 NOTE — ED NOTES
Pt continues resting, sitting up at bedside watching videos on cellphone. Denies any needs/concerns. Continues to report improvement with interventions. NS infusion delayed due to LR running in at 150 ml/hr. MD stated only 1 L to go at a time due to elevated BNP.

## 2019-08-23 ENCOUNTER — APPOINTMENT (OUTPATIENT)
Dept: RADIOLOGY | Facility: MEDICAL CENTER | Age: 29
DRG: 871 | End: 2019-08-23
Attending: RADIOLOGY
Payer: COMMERCIAL

## 2019-08-23 ENCOUNTER — APPOINTMENT (OUTPATIENT)
Dept: RADIOLOGY | Facility: MEDICAL CENTER | Age: 29
DRG: 871 | End: 2019-08-23
Attending: INTERNAL MEDICINE
Payer: COMMERCIAL

## 2019-08-23 PROBLEM — R57.0 CARDIOGENIC SHOCK (HCC): Status: ACTIVE | Noted: 2019-08-23

## 2019-08-23 PROBLEM — I26.09 PULMONARY EMBOLISM WITH ACUTE COR PULMONALE (HCC): Status: ACTIVE | Noted: 2019-08-23

## 2019-08-23 PROBLEM — N17.9 AKI (ACUTE KIDNEY INJURY) (HCC): Status: ACTIVE | Noted: 2019-08-23

## 2019-08-23 PROBLEM — I27.20 PULMONARY HYPERTENSION (HCC): Status: ACTIVE | Noted: 2019-08-23

## 2019-08-23 PROBLEM — R57.9 SHOCK (HCC): Status: ACTIVE | Noted: 2019-08-23

## 2019-08-23 LAB
ACTION RANGE TRIGGERED IACRT: NO
ACTION RANGE TRIGGERED IACRT: NO
ANION GAP SERPL CALC-SCNC: 10 MMOL/L (ref 0–11.9)
ANISOCYTOSIS BLD QL SMEAR: ABNORMAL
APTT PPP: 30.5 SEC (ref 24.7–36)
APTT PPP: 31.4 SEC (ref 24.7–36)
APTT PPP: 32.7 SEC (ref 24.7–36)
BASE EXCESS BLDA CALC-SCNC: 1 MMOL/L (ref -4–3)
BASE EXCESS BLDA CALC-SCNC: 2 MMOL/L (ref -4–3)
BASOPHILS # BLD AUTO: 0 % (ref 0–1.8)
BASOPHILS # BLD: 0 K/UL (ref 0–0.12)
BODY TEMPERATURE: ABNORMAL DEGREES
BODY TEMPERATURE: ABNORMAL DEGREES
BUN SERPL-MCNC: 31 MG/DL (ref 8–22)
CALCIUM SERPL-MCNC: 8.9 MG/DL (ref 8.5–10.5)
CHLORIDE SERPL-SCNC: 102 MMOL/L (ref 96–112)
CO2 BLDA-SCNC: 27 MMOL/L (ref 20–33)
CO2 BLDA-SCNC: 30 MMOL/L (ref 20–33)
CO2 SERPL-SCNC: 25 MMOL/L (ref 20–33)
CREAT SERPL-MCNC: 1.77 MG/DL (ref 0.5–1.4)
CREAT UR-MCNC: 45.1 MG/DL
EOSINOPHIL # BLD AUTO: 0 K/UL (ref 0–0.51)
EOSINOPHIL NFR BLD: 0 % (ref 0–6.9)
ERYTHROCYTE [DISTWIDTH] IN BLOOD BY AUTOMATED COUNT: 59.1 FL (ref 35.9–50)
EST. AVERAGE GLUCOSE BLD GHB EST-MCNC: 128 MG/DL
GLUCOSE BLD-MCNC: 157 MG/DL (ref 65–99)
GLUCOSE BLD-MCNC: 163 MG/DL (ref 65–99)
GLUCOSE BLD-MCNC: 175 MG/DL (ref 65–99)
GLUCOSE SERPL-MCNC: 187 MG/DL (ref 65–99)
GRAM STN SPEC: NORMAL
HBA1C MFR BLD: 6.1 % (ref 0–5.6)
HCO3 BLDA-SCNC: 26 MMOL/L (ref 17–25)
HCO3 BLDA-SCNC: 28.1 MMOL/L (ref 17–25)
HCT VFR BLD AUTO: 47.5 % (ref 42–52)
HGB BLD-MCNC: 14 G/DL (ref 14–18)
HOROWITZ INDEX BLDA+IHG-RTO: 132 MM[HG]
HOROWITZ INDEX BLDA+IHG-RTO: 196 MM[HG]
INST. QUALIFIED PATIENT IIQPT: YES
INST. QUALIFIED PATIENT IIQPT: YES
L PNEUMO AG UR QL IA: NEGATIVE
LG PLATELETS BLD QL SMEAR: NORMAL
LYMPHOCYTES # BLD AUTO: 2.4 K/UL (ref 1–4.8)
LYMPHOCYTES NFR BLD: 26.1 % (ref 22–41)
MAGNESIUM SERPL-MCNC: 2.3 MG/DL (ref 1.5–2.5)
MANUAL DIFF BLD: NORMAL
MCH RBC QN AUTO: 24.6 PG (ref 27–33)
MCHC RBC AUTO-ENTMCNC: 29.5 G/DL (ref 33.7–35.3)
MCV RBC AUTO: 83.6 FL (ref 81.4–97.8)
MICROCYTES BLD QL SMEAR: ABNORMAL
MONOCYTES # BLD AUTO: 0.24 K/UL (ref 0–0.85)
MONOCYTES NFR BLD AUTO: 2.6 % (ref 0–13.4)
MORPHOLOGY BLD-IMP: NORMAL
NEUTROPHILS # BLD AUTO: 6.56 K/UL (ref 1.82–7.42)
NEUTROPHILS NFR BLD: 67 % (ref 44–72)
NEUTS BAND NFR BLD MANUAL: 4.3 % (ref 0–10)
NRBC # BLD AUTO: 0.02 K/UL
NRBC BLD-RTO: 0.2 /100 WBC
O2/TOTAL GAS SETTING VFR VENT: 50 %
O2/TOTAL GAS SETTING VFR VENT: 65 %
PCO2 BLDA: 42.1 MMHG (ref 26–37)
PCO2 BLDA: 49.1 MMHG (ref 26–37)
PCO2 TEMP ADJ BLDA: 41.4 MMHG (ref 26–37)
PCO2 TEMP ADJ BLDA: 49.1 MMHG (ref 26–37)
PH BLDA: 7.37 [PH] (ref 7.4–7.5)
PH BLDA: 7.4 [PH] (ref 7.4–7.5)
PH TEMP ADJ BLDA: 7.37 [PH] (ref 7.4–7.5)
PH TEMP ADJ BLDA: 7.4 [PH] (ref 7.4–7.5)
PHOSPHATE SERPL-MCNC: 5.1 MG/DL (ref 2.5–4.5)
PLATELET # BLD AUTO: 116 K/UL (ref 164–446)
PLATELET BLD QL SMEAR: NORMAL
PO2 BLDA: 86 MMHG (ref 64–87)
PO2 BLDA: 98 MMHG (ref 64–87)
PO2 TEMP ADJ BLDA: 83 MMHG (ref 64–87)
PO2 TEMP ADJ BLDA: 98 MMHG (ref 64–87)
POLYCHROMASIA BLD QL SMEAR: NORMAL
POTASSIUM SERPL-SCNC: 4.3 MMOL/L (ref 3.6–5.5)
RBC # BLD AUTO: 5.68 M/UL (ref 4.7–6.1)
RBC BLD AUTO: PRESENT
RHODAMINE-AURAMINE STN SPEC: NORMAL
SAO2 % BLDA: 96 % (ref 93–99)
SAO2 % BLDA: 97 % (ref 93–99)
SIGNIFICANT IND 70042: NORMAL
SIGNIFICANT IND 70042: NORMAL
SITE SITE: NORMAL
SITE SITE: NORMAL
SMUDGE CELLS BLD QL SMEAR: NORMAL
SODIUM SERPL-SCNC: 137 MMOL/L (ref 135–145)
SODIUM UR-SCNC: 81 MMOL/L
SOURCE SOURCE: NORMAL
SOURCE SOURCE: NORMAL
SPECIMEN DRAWN FROM PATIENT: ABNORMAL
SPECIMEN DRAWN FROM PATIENT: ABNORMAL
WBC # BLD AUTO: 9.2 K/UL (ref 4.8–10.8)

## 2019-08-23 PROCEDURE — 83735 ASSAY OF MAGNESIUM: CPT

## 2019-08-23 PROCEDURE — B31T1ZZ FLUOROSCOPY OF LEFT PULMONARY ARTERY USING LOW OSMOLAR CONTRAST: ICD-10-PCS | Performed by: RADIOLOGY

## 2019-08-23 PROCEDURE — 85730 THROMBOPLASTIN TIME PARTIAL: CPT | Mod: 91

## 2019-08-23 PROCEDURE — 3E06317 INTRODUCTION OF OTHER THROMBOLYTIC INTO CENTRAL ARTERY, PERCUTANEOUS APPROACH: ICD-10-PCS | Performed by: RADIOLOGY

## 2019-08-23 PROCEDURE — 770022 HCHG ROOM/CARE - ICU (200)

## 2019-08-23 PROCEDURE — 71045 X-RAY EXAM CHEST 1 VIEW: CPT

## 2019-08-23 PROCEDURE — 99233 SBSQ HOSP IP/OBS HIGH 50: CPT | Performed by: HOSPITALIST

## 2019-08-23 PROCEDURE — 36015 PLACE CATHETER IN ARTERY: CPT | Mod: LT

## 2019-08-23 PROCEDURE — 700105 HCHG RX REV CODE 258: Performed by: INTERNAL MEDICINE

## 2019-08-23 PROCEDURE — 84300 ASSAY OF URINE SODIUM: CPT

## 2019-08-23 PROCEDURE — 37211 THROMBOLYTIC ART THERAPY: CPT | Mod: RT

## 2019-08-23 PROCEDURE — 99153 MOD SED SAME PHYS/QHP EA: CPT

## 2019-08-23 PROCEDURE — 99222 1ST HOSP IP/OBS MODERATE 55: CPT | Performed by: INTERNAL MEDICINE

## 2019-08-23 PROCEDURE — 80048 BASIC METABOLIC PNL TOTAL CA: CPT

## 2019-08-23 PROCEDURE — 94640 AIRWAY INHALATION TREATMENT: CPT

## 2019-08-23 PROCEDURE — 82803 BLOOD GASES ANY COMBINATION: CPT

## 2019-08-23 PROCEDURE — 700111 HCHG RX REV CODE 636 W/ 250 OVERRIDE (IP): Performed by: RADIOLOGY

## 2019-08-23 PROCEDURE — 84100 ASSAY OF PHOSPHORUS: CPT

## 2019-08-23 PROCEDURE — 83036 HEMOGLOBIN GLYCOSYLATED A1C: CPT

## 2019-08-23 PROCEDURE — 700101 HCHG RX REV CODE 250: Performed by: EMERGENCY MEDICINE

## 2019-08-23 PROCEDURE — 99291 CRITICAL CARE FIRST HOUR: CPT | Performed by: INTERNAL MEDICINE

## 2019-08-23 PROCEDURE — 82962 GLUCOSE BLOOD TEST: CPT | Mod: 91

## 2019-08-23 PROCEDURE — 99292 CRITICAL CARE ADDL 30 MIN: CPT | Performed by: INTERNAL MEDICINE

## 2019-08-23 PROCEDURE — 700102 HCHG RX REV CODE 250 W/ 637 OVERRIDE(OP): Performed by: INTERNAL MEDICINE

## 2019-08-23 PROCEDURE — 700111 HCHG RX REV CODE 636 W/ 250 OVERRIDE (IP)

## 2019-08-23 PROCEDURE — 700117 HCHG RX CONTRAST REV CODE 255: Performed by: RADIOLOGY

## 2019-08-23 PROCEDURE — 82570 ASSAY OF URINE CREATININE: CPT

## 2019-08-23 PROCEDURE — 700111 HCHG RX REV CODE 636 W/ 250 OVERRIDE (IP): Performed by: INTERNAL MEDICINE

## 2019-08-23 PROCEDURE — 85007 BL SMEAR W/DIFF WBC COUNT: CPT

## 2019-08-23 PROCEDURE — 94003 VENT MGMT INPAT SUBQ DAY: CPT

## 2019-08-23 PROCEDURE — A9270 NON-COVERED ITEM OR SERVICE: HCPCS | Performed by: INTERNAL MEDICINE

## 2019-08-23 PROCEDURE — 700101 HCHG RX REV CODE 250: Performed by: INTERNAL MEDICINE

## 2019-08-23 PROCEDURE — 37799 UNLISTED PX VASCULAR SURGERY: CPT

## 2019-08-23 PROCEDURE — 85027 COMPLETE CBC AUTOMATED: CPT

## 2019-08-23 PROCEDURE — B31S1ZZ FLUOROSCOPY OF RIGHT PULMONARY ARTERY USING LOW OSMOLAR CONTRAST: ICD-10-PCS | Performed by: RADIOLOGY

## 2019-08-23 RX ORDER — NYSTATIN 100000 [USP'U]/G
POWDER TOPICAL 3 TIMES DAILY
Status: DISCONTINUED | OUTPATIENT
Start: 2019-08-23 | End: 2019-08-28 | Stop reason: HOSPADM

## 2019-08-23 RX ORDER — HEPARIN SODIUM 5000 [USP'U]/100ML
INJECTION, SOLUTION INTRAVENOUS CONTINUOUS
Status: DISCONTINUED | OUTPATIENT
Start: 2019-08-23 | End: 2019-08-24

## 2019-08-23 RX ORDER — SODIUM CHLORIDE 9 MG/ML
500 INJECTION, SOLUTION INTRAVENOUS
Status: DISCONTINUED | OUTPATIENT
Start: 2019-08-23 | End: 2019-08-23 | Stop reason: HOSPADM

## 2019-08-23 RX ORDER — SODIUM CHLORIDE 9 MG/ML
INJECTION, SOLUTION INTRAVENOUS CONTINUOUS
Status: DISCONTINUED | OUTPATIENT
Start: 2019-08-23 | End: 2019-08-23

## 2019-08-23 RX ORDER — HEPARIN SODIUM 1000 [USP'U]/ML
4400 INJECTION, SOLUTION INTRAVENOUS; SUBCUTANEOUS PRN
Status: DISCONTINUED | OUTPATIENT
Start: 2019-08-23 | End: 2019-08-24

## 2019-08-23 RX ORDER — MIDAZOLAM HYDROCHLORIDE 1 MG/ML
INJECTION INTRAMUSCULAR; INTRAVENOUS
Status: COMPLETED
Start: 2019-08-23 | End: 2019-08-23

## 2019-08-23 RX ORDER — SODIUM CHLORIDE 9 MG/ML
INJECTION, SOLUTION INTRAVENOUS CONTINUOUS
Status: ACTIVE | OUTPATIENT
Start: 2019-08-23 | End: 2019-08-23

## 2019-08-23 RX ORDER — MIDAZOLAM HYDROCHLORIDE 1 MG/ML
.5-2 INJECTION INTRAMUSCULAR; INTRAVENOUS PRN
Status: DISCONTINUED | OUTPATIENT
Start: 2019-08-23 | End: 2019-08-23 | Stop reason: HOSPADM

## 2019-08-23 RX ORDER — HEPARIN SODIUM,PORCINE 1000/ML
VIAL (ML) INJECTION
Status: DISPENSED
Start: 2019-08-23 | End: 2019-08-24

## 2019-08-23 RX ORDER — FUROSEMIDE 10 MG/ML
40 INJECTION INTRAMUSCULAR; INTRAVENOUS
Status: DISCONTINUED | OUTPATIENT
Start: 2019-08-23 | End: 2019-08-24

## 2019-08-23 RX ORDER — PREDNISONE 10 MG/1
40 TABLET ORAL DAILY
Status: COMPLETED | OUTPATIENT
Start: 2019-08-24 | End: 2019-08-24

## 2019-08-23 RX ORDER — HEPARIN SODIUM 5000 [USP'U]/100ML
500 INJECTION, SOLUTION INTRAVENOUS CONTINUOUS
Status: ACTIVE | OUTPATIENT
Start: 2019-08-23 | End: 2019-08-23

## 2019-08-23 RX ORDER — HEPARIN SODIUM 5000 [USP'U]/100ML
INJECTION, SOLUTION INTRAVENOUS
Status: COMPLETED
Start: 2019-08-23 | End: 2019-08-23

## 2019-08-23 RX ORDER — HEPARIN SODIUM 5000 [USP'U]/100ML
500 INJECTION, SOLUTION INTRAVENOUS CONTINUOUS
Status: DISCONTINUED | OUTPATIENT
Start: 2019-08-23 | End: 2019-08-23

## 2019-08-23 RX ORDER — FUROSEMIDE 10 MG/ML
40 INJECTION INTRAMUSCULAR; INTRAVENOUS ONCE
Status: DISCONTINUED | OUTPATIENT
Start: 2019-08-23 | End: 2019-08-23

## 2019-08-23 RX ADMIN — PROPOFOL 25 MCG/KG/MIN: 10 INJECTION, EMULSION INTRAVENOUS at 03:18

## 2019-08-23 RX ADMIN — SODIUM CHLORIDE: 9 INJECTION, SOLUTION INTRAVENOUS at 17:15

## 2019-08-23 RX ADMIN — HEPARIN SODIUM 4400 UNITS: 1000 INJECTION, SOLUTION INTRAVENOUS; SUBCUTANEOUS at 16:34

## 2019-08-23 RX ADMIN — INSULIN HUMAN 2 UNITS: 100 INJECTION, SOLUTION PARENTERAL at 11:39

## 2019-08-23 RX ADMIN — IPRATROPIUM BROMIDE AND ALBUTEROL SULFATE 3 ML: .5; 3 SOLUTION RESPIRATORY (INHALATION) at 14:25

## 2019-08-23 RX ADMIN — FENTANYL CITRATE 50 MCG: 50 INJECTION, SOLUTION INTRAMUSCULAR; INTRAVENOUS at 17:50

## 2019-08-23 RX ADMIN — IPRATROPIUM BROMIDE AND ALBUTEROL SULFATE 3 ML: .5; 3 SOLUTION RESPIRATORY (INHALATION) at 06:17

## 2019-08-23 RX ADMIN — HEPARIN SODIUM 500 UNITS/HR: 5000 INJECTION, SOLUTION INTRAVENOUS at 18:43

## 2019-08-23 RX ADMIN — NYSTATIN: 100000 POWDER TOPICAL at 11:41

## 2019-08-23 RX ADMIN — SENNOSIDES, DOCUSATE SODIUM 2 TABLET: 50; 8.6 TABLET, FILM COATED ORAL at 05:02

## 2019-08-23 RX ADMIN — IPRATROPIUM BROMIDE AND ALBUTEROL SULFATE 3 ML: .5; 3 SOLUTION RESPIRATORY (INHALATION) at 10:17

## 2019-08-23 RX ADMIN — HEPARIN SODIUM 4400 UNITS: 1000 INJECTION, SOLUTION INTRAVENOUS; SUBCUTANEOUS at 01:38

## 2019-08-23 RX ADMIN — CEFTRIAXONE SODIUM 2 G: 2 INJECTION, POWDER, FOR SOLUTION INTRAMUSCULAR; INTRAVENOUS at 05:03

## 2019-08-23 RX ADMIN — PROPOFOL 25 MCG/KG/MIN: 10 INJECTION, EMULSION INTRAVENOUS at 00:08

## 2019-08-23 RX ADMIN — INSULIN HUMAN 2 UNITS: 100 INJECTION, SOLUTION PARENTERAL at 05:39

## 2019-08-23 RX ADMIN — IPRATROPIUM BROMIDE AND ALBUTEROL SULFATE 3 ML: .5; 3 SOLUTION RESPIRATORY (INHALATION) at 02:32

## 2019-08-23 RX ADMIN — ALTEPLASE 1 MG/HR: KIT at 18:42

## 2019-08-23 RX ADMIN — IPRATROPIUM BROMIDE AND ALBUTEROL SULFATE 3 ML: .5; 3 SOLUTION RESPIRATORY (INHALATION) at 22:22

## 2019-08-23 RX ADMIN — IPRATROPIUM BROMIDE AND ALBUTEROL SULFATE 3 ML: .5; 3 SOLUTION RESPIRATORY (INHALATION) at 19:23

## 2019-08-23 RX ADMIN — EPOPROSTENOL SODIUM 0.05 MCG/KG/MIN: 1.5 INJECTION, POWDER, LYOPHILIZED, FOR SOLUTION INTRAVENOUS at 04:57

## 2019-08-23 RX ADMIN — ALTEPLASE 1 MG/HR: KIT at 17:15

## 2019-08-23 RX ADMIN — EPOPROSTENOL SODIUM 0.05 MCG/KG/MIN: 1.5 INJECTION, POWDER, LYOPHILIZED, FOR SOLUTION INTRAVENOUS at 08:55

## 2019-08-23 RX ADMIN — MIDAZOLAM 2 MG: 1 INJECTION INTRAMUSCULAR; INTRAVENOUS at 17:50

## 2019-08-23 RX ADMIN — EPOPROSTENOL SODIUM 0.05 MCG/KG/MIN: 1.5 INJECTION, POWDER, LYOPHILIZED, FOR SOLUTION INTRAVENOUS at 00:40

## 2019-08-23 RX ADMIN — HEPARIN SODIUM 4400 UNITS: 1000 INJECTION, SOLUTION INTRAVENOUS; SUBCUTANEOUS at 08:45

## 2019-08-23 RX ADMIN — EPOPROSTENOL SODIUM 0.05 MCG/KG/MIN: 1.5 INJECTION, POWDER, LYOPHILIZED, FOR SOLUTION INTRAVENOUS at 12:54

## 2019-08-23 RX ADMIN — MIDAZOLAM HYDROCHLORIDE 2 MG: 1 INJECTION, SOLUTION INTRAMUSCULAR; INTRAVENOUS at 17:50

## 2019-08-23 RX ADMIN — MIDAZOLAM 2 MG: 1 INJECTION INTRAMUSCULAR; INTRAVENOUS at 18:08

## 2019-08-23 RX ADMIN — FUROSEMIDE 40 MG: 10 INJECTION, SOLUTION INTRAMUSCULAR; INTRAVENOUS at 09:31

## 2019-08-23 RX ADMIN — FENTANYL CITRATE 50 MCG: 50 INJECTION, SOLUTION INTRAMUSCULAR; INTRAVENOUS at 18:07

## 2019-08-23 RX ADMIN — HEPARIN SODIUM 1700 UNITS: 5000 INJECTION, SOLUTION INTRAVENOUS at 11:15

## 2019-08-23 RX ADMIN — AZITHROMYCIN 500 MG: 250 TABLET, FILM COATED ORAL at 05:02

## 2019-08-23 RX ADMIN — FAMOTIDINE 20 MG: 20 TABLET ORAL at 05:02

## 2019-08-23 RX ADMIN — IOHEXOL 70 ML: 300 INJECTION, SOLUTION INTRAVENOUS at 18:51

## 2019-08-23 RX ADMIN — METHYLPREDNISOLONE SODIUM SUCCINATE 40 MG: 40 INJECTION, POWDER, FOR SOLUTION INTRAMUSCULAR; INTRAVENOUS at 05:02

## 2019-08-23 NOTE — CONSULTS
Reason for Consult:  Asked by Dr Gant to see this patient with  Pulmonary embolism  Patient's PCP: Miguel Miller D.O.    CC:   Chief Complaint   Patient presents with   • Shortness of Breath     Pt BIB EMS, report of pt with increased SOB for 2 wks. Pt reports hx of asthma, fevers on and off. pt reports pain upon inspiration in lower right ribs. pt found to be 75% on RM PTA. placed on non rebreather PTA. Pt sitting up in bed with mild WOB.    • Fever   • Flu Like Symptoms       HPI: 28-year-old male patient admitted for pneumonia, massive pulmonary embolism.  He is awake but currently intubated, history is limited from the patient.  History obtained from chart review, other physicians.  He was admitted initially on 821 with shortness of breath, chest x-ray showed possible pneumonia, he required significant amount of oxygen was also complaining of sharp chest pains.  He underwent CTA of the chest which showed massive pulmonary embolism, bronchoscopy revealed associated pneumonia as well.  Patient was intubated yesterday for respiratory failure, hemodynamic collapse and was given TPA, currently feels better, blood pressure is stabilized.  Echocardiogram showed severe right ventricular enlargement.  Denies pain currently.    Medications / Drug list prior to admission:  No current facility-administered medications on file prior to encounter.      No current outpatient medications on file prior to encounter.       Current list of administered Medications:    Current Facility-Administered Medications:   •  nystatin (MYCOSTATIN) powder, , Topical, TID, Marbin Gant M.D.  •  [START ON 8/24/2019] predniSONE (DELTASONE) tablet 40 mg, 40 mg, Oral, DAILY, Marbin Gant M.D.  •  furosemide (LASIX) injection 40 mg, 40 mg, Intravenous, Q DAY, Marbin Gant M.D., 40 mg at 08/23/19 0931  •  furosemide (LASIX) injection 40 mg, 40 mg, Intravenous, Once, Marbin Gant M.D.  •  insulin regular (HUMULIN R) injection 2-9 Units, 2-9  Units, Subcutaneous, 4X/DAY ACHS, 2 Units at 08/23/19 1139 **AND** Accu-Chek ACHS, , , Q AC AND BEDTIME(S) **AND** NOTIFY MD and PharmD, , , Once **AND** glucose 4 g chewable tablet 16 g, 16 g, Oral, Q15 MIN PRN **AND** DEXTROSE 10% BOLUS 250 mL, 250 mL, Intravenous, Q15 MIN PRN, David Gifford M.D.  •  norepinephrine (LEVOPHED) 8 mg in  mL Infusion, 0-30 mcg/min, Intravenous, Continuous, Marbin Gant M.D., Stopped at 08/22/19 1632  •  Respiratory Care per Protocol, , Nebulization, Continuous RT, Marbin Gant M.D.  •  MD Alert...ICU Electrolyte Replacement per Pharmacy, , Other, PHARMACY TO DOSE, Marbin Gant M.D.  •  lidocaine (XYLOCAINE) 1 % injection 1-2 mL, 1-2 mL, Tracheal Tube, Q30 MIN PRN, Marbin Gant M.D.  •  Pharmacy Consult: Enteral tube insertion - review meds/change route/product selection, , Other, PHARMACY TO DOSE, Marbin Gant M.D.  •  propofol (DIPRIVAN) injection, 0-80 mcg/kg/min, Intravenous, Continuous, Stopped at 08/23/19 0700 **AND** Triglycerides Starting now and then Every 3 Days, , , Every 3 Days (0300), Marbin Gant M.D.  •  fentaNYL (SUBLIMAZE) injection 25 mcg, 25 mcg, Intravenous, Q HOUR PRN **OR** fentaNYL (SUBLIMAZE) injection 50 mcg, 50 mcg, Intravenous, Q HOUR PRN **OR** fentaNYL (SUBLIMAZE) injection 100 mcg, 100 mcg, Intravenous, Q HOUR PRN, Marbin Gant M.D., 100 mcg at 08/22/19 2307  •  acetaminophen (TYLENOL) tablet 650 mg, 650 mg, Enteral Tube, Q6HRS PRN, Marbin Gant M.D.  •  famotidine (PEPCID) tablet 20 mg, 20 mg, Enteral Tube, Q12HRS, 20 mg at 08/23/19 0502 **OR** famotidine (PEPCID) injection 20 mg, 20 mg, Intravenous, Q12HRS, Marbin Gant M.D.  •  senna-docusate (PERICOLACE or SENOKOT S) 8.6-50 MG per tablet 2 Tab, 2 Tab, Enteral Tube, BID, 2 Tab at 08/23/19 0502 **AND** polyethylene glycol/lytes (MIRALAX) PACKET 1 Packet, 1 Packet, Enteral Tube, QDAY PRN **AND** magnesium hydroxide (MILK OF MAGNESIA) suspension 30 mL, 30 mL, Enteral Tube,  QDAY PRN **AND** bisacodyl (DULCOLAX) suppository 10 mg, 10 mg, Rectal, QDAY PRN, Marbin Gant M.D.  •  lidocaine (XYLOCAINE) 1 % injection 0.5 mL, 0.5 mL, Intradermal, Once PRN, Marbin Gant M.D.  •  epoprostenol (FLOLAN) 1.5 mg in glycine diluent for flolan 50 mL for Inhalation, 0.01-0.05 mcg/kg/min (Order-Specific), Inhalation, Continuous, Marbin Gant M.D., Last Rate: 12 mL/hr at 08/23/19 1254, 0.05 mcg/kg/min at 08/23/19 1254  •  [COMPLETED] heparin injection 4,000 Units, 4,000 Units, Intravenous, Once, 4,000 Units at 08/22/19 1827 **AND** heparin injection 4,400 Units, 4,400 Units, Intravenous, PRN, 4,400 Units at 08/23/19 0845 **AND** heparin infusion 25,000 units in 500 mL 0.45% NACL, , Intravenous, Continuous, Last Rate: 34 mL/hr at 08/23/19 0845, 1,700 Units at 08/23/19 1115 **AND** Protocol 440 Heparin Weight Based DO NOT GIVE ANY HEPARIN BOLUS TO STROKE PATIENT, , , CONTINUOUS **AND** Protocol 440 Heparin Weight Based Discontinue Enoxaparin (Lovenox), Dabigatran (Pradaxa), Rivaroxaban (Xarelto), Apixaban (Eliquis), Edoxaban (Savaysa, Lixiana), Fondaparinux (Arixtra) and Argatroban prior to heparin administration, , , CONTINUOUS **AND** Protocol 440 Heparin Weight Based Draw baseline aPTT, PT, and platelet count if not already done, , , CONTINUOUS **AND** Protocol 440 Heparin Weight Based Draw aPTT 6 hours after beginning infusion. , , , CONTINUOUS **AND** Protocol 440 Heparin Weight Based Draw Platelet count every three days. Contact MD if platelet is 50% lower than baseline count., , , CONTINUOUS **AND** Protocol 440 Heparin Weight Based Record Patient Data, , , CONTINUOUS **AND** Protocol 440 Heparin Weight Based INSTRUCTIONS, , , CONTINUOUS **AND** Protocol 440 Heparin Weight Based Review aPTT results 6 hours after infusion is begun as detailed, , , CONTINUOUS **AND** Protocol 440 Heparin Weight Based Adjust heparin to maintain aPTT between 55-96 sec, , , CONTINUOUS **AND** Protocol 440 Heparin  Weight Based Order aPTT 6 hours after any rate change or hold until aPTT is therapeutic (55-96 seconds), , , CONTINUOUS **AND** Protocol 440 Heparin Weight Based Documentation and verification, , , CONTINUOUS, Marbin Gant M.D.  •  fentaNYL (SUBLIMAZE) 50 mcg/mL in 50mL (Continuous Infusion), , Intravenous, Continuous, David Gifford M.D., Last Rate: 1.5 mL/hr at 08/23/19 0847, 75 mcg/hr at 08/23/19 0847  •  lactated ringers infusion (BOLUS): BMI greater than 30, 1,000 mL, Intravenous, Once PRN, BARBARA Wolff.O.  •  cefTRIAXone (ROCEPHIN) 2 g in  mL IVPB, 2 g, Intravenous, Q24HRS, Richar Matt D.O., Stopped at 08/23/19 0533  •  enalaprilat (VASOTEC) injection 1.25 mg, 1.25 mg, Intravenous, Q6HRS PRN, BARBARA Wolff.O.  •  ondansetron (ZOFRAN) syringe/vial injection 4 mg, 4 mg, Intravenous, Q4HRS PRN, BARBARA Wolff.O.  •  ondansetron (ZOFRAN ODT) dispertab 4 mg, 4 mg, Oral, Q4HRS PRN, Richar Matt D.O.  •  promethazine (PHENERGAN) tablet 12.5-25 mg, 12.5-25 mg, Oral, Q4HRS PRN, Richar Matt D.O.  •  promethazine (PHENERGAN) suppository 12.5-25 mg, 12.5-25 mg, Rectal, Q4HRS PRN, BARBARA Wolff.O.  •  prochlorperazine (COMPAZINE) injection 5-10 mg, 5-10 mg, Intravenous, Q4HRS PRN, BARBARA Wolff.O.  •  ipratropium-albuterol (DUONEB) nebulizer solution, 3 mL, Nebulization, Q4HRS (RT), Antonio Mathis D.O., 3 mL at 08/23/19 1017    Past Medical History:   Diagnosis Date   • Eczema        Past Surgical History:   Procedure Laterality Date   • DENTAL EXTRACTION(S)  11/3/2017    Procedure: DENTAL EXTRACTION(S);  Surgeon: Olman Sparks D.D.S.;  Location: SURGERY [facility];  Service: Oral Surgery     No family history of coronary artery disease    Social History     Socioeconomic History   • Marital status: Single     Spouse name: Not on file   • Number of children: Not on file   • Years of education: Not on file   • Highest education level: Not on file    Occupational History   • Not on file   Social Needs   • Financial resource strain: Not on file   • Food insecurity:     Worry: Not on file     Inability: Not on file   • Transportation needs:     Medical: Not on file     Non-medical: Not on file   Tobacco Use   • Smoking status: Never Smoker   • Smokeless tobacco: Never Used   Substance and Sexual Activity   • Alcohol use: Yes     Comment: rare   • Drug use: No   • Sexual activity: Not on file   Lifestyle   • Physical activity:     Days per week: Not on file     Minutes per session: Not on file   • Stress: Not on file   Relationships   • Social connections:     Talks on phone: Not on file     Gets together: Not on file     Attends [employer] service: Not on file     Active member of club or organization: Not on file     Attends meetings of clubs or organizations: Not on file     Relationship status: Not on file   • Intimate partner violence:     Fear of current or ex partner: Not on file     Emotionally abused: Not on file     Physically abused: Not on file     Forced sexual activity: Not on file   Other Topics Concern   • Not on file   Social History Narrative   • Not on file       ALLERGIES:  Allergies   Allergen Reactions   • Pistachio    • [place] Anaphylaxis     pistachios       Review of systems:  Unable to obtain, patient is intubated.    Physical exam:  Patient Vitals for the past 24 hrs:   BP Temp Temp src Pulse Resp SpO2 Height Weight   08/23/19 1016 -- -- -- -- -- 96 % -- --   08/23/19 0929 -- -- -- -- -- 97 % -- --   08/23/19 0900 -- -- -- 80 (!) 22 97 % -- --   08/23/19 0859 -- -- -- -- -- 96 % -- --   08/23/19 0800 116/57 -- Bladder 78 (!) 32 96 % -- --   08/23/19 0700 (!) 87/75 -- -- 85 (!) 34 98 % -- --   08/23/19 0615 -- -- -- -- -- 96 % -- --   08/23/19 0600 -- -- -- 78 (!) 32 96 % -- --   08/23/19 0510 -- -- -- 67 (!) 32 95 % -- --   08/23/19 0500 101/46 -- -- 68 (!) 32 -- -- --   08/23/19 0414 -- -- -- 66 (!) 32 95 % -- --   08/23/19 0400 106/49  "-- -- 75 (!) 32 96 % 1.727 m (5' 8\") (!) 173.5 kg (382 lb 8 oz)   08/23/19 0310 -- -- -- 82 (!) 32 93 % -- --   08/23/19 0300 118/55 -- -- 80 (!) 32 -- -- --   08/23/19 0232 -- -- -- -- -- 93 % -- --   08/23/19 0205 -- -- -- 71 (!) 32 93 % -- --   08/23/19 0200 110/54 -- -- 74 (!) 32 93 % -- --   08/23/19 0130 110/52 -- -- 76 (!) 32 -- -- --   08/23/19 0106 -- -- -- 74 (!) 32 93 % -- --   08/23/19 0100 104/56 -- -- 77 (!) 32 -- -- --   08/23/19 0040 -- -- -- 77 (!) 32 93 % -- --   08/23/19 0030 106/55 -- -- 78 (!) 32 -- -- --   08/23/19 0008 -- -- -- 78 (!) 30 93 % -- --   08/23/19 0000 -- -- Bladder 75 (!) 32 93 % -- --   08/22/19 2330 -- -- -- 79 (!) 32 -- -- --   08/22/19 2308 -- -- -- 78 (!) 32 96 % -- --   08/22/19 2307 -- -- -- 77 (!) 32 96 % -- --   08/22/19 2300 115/55 -- -- 79 (!) 32 -- -- --   08/22/19 2230 114/58 -- -- 80 (!) 32 -- -- --   08/22/19 2208 -- -- -- 80 (!) 32 97 % -- --   08/22/19 2200 104/55 -- -- 81 (!) 32 -- -- --   08/22/19 2130 110/56 -- -- 81 (!) 32 -- -- --   08/22/19 2108 -- -- -- 80 (!) 32 96 % -- --   08/22/19 2100 111/56 -- -- 81 (!) 32 -- -- --   08/22/19 2030 110/56 -- -- 83 (!) 32 -- -- --   08/22/19 2008 -- -- -- 83 (!) 32 96 % -- --   08/22/19 2000 114/63 -- -- 89 (!) 32 -- -- --   08/22/19 1955 -- -- -- -- -- 97 % -- --   08/22/19 1930 118/61 -- -- 81 (!) 32 -- -- --   08/22/19 1915 -- -- -- 86 (!) 32 97 % -- --   08/22/19 1914 -- -- -- 86 (!) 36 97 % -- --   08/22/19 1908 -- -- -- 83 (!) 29 96 % -- --   08/22/19 1900 126/66 -- -- 80 (!) 32 96 % -- --   08/22/19 1845 -- -- -- 83 (!) 32 95 % -- --   08/22/19 1830 111/70 -- -- 83 (!) 32 95 % -- --   08/22/19 1815 -- -- -- 85 (!) 32 94 % -- --   08/22/19 1808 -- -- -- -- -- 94 % -- --   08/22/19 1800 115/60 -- -- 87 (!) 32 94 % -- --   08/22/19 1745 -- -- -- 88 (!) 32 94 % -- --   08/22/19 1730 122/67 -- -- 90 (!) 32 89 % -- --   08/22/19 1715 -- -- -- 90 (!) 32 95 % -- --   08/22/19 1700 127/73 -- -- 93 (!) 32 93 % -- -- "   08/22/19 1645 -- -- -- 88 (!) 31 94 % -- --   08/22/19 1630 133/71 -- -- 84 (!) 26 97 % -- --   08/22/19 1615 122/71 -- -- 77 (!) 32 97 % -- --   08/22/19 1612 -- -- -- -- -- 96 % -- --   08/22/19 1600 -- -- -- 82 (!) 32 97 % -- --   08/22/19 1500 117/66 -- -- 76 (!) 32 97 % -- --   08/22/19 1459 -- -- -- 76 (!) 32 97 % -- --   08/22/19 1445 114/61 -- -- 81 (!) 10 91 % -- --   08/22/19 1430 108/54 -- -- 84 (!) 31 88 % -- --   08/22/19 1415 (!) 96/46 -- -- 84 17 (!) 83 % -- --   08/22/19 1400 (!) 87/32 36.6 °C (97.9 °F) Bladder 90 (!) 26 (!) 80 % -- --   08/22/19 1345 -- -- -- -- -- (!) 80 % -- --     General: Awake, intubated.  EYES: no jaundice  HEENT: OP clear   Neck: No bruits No JVD.   CVS:   RRR. S1 + S2. No M/R/G  Resp: Clear anteriorly.  Abdomen: Soft, NT, ND,  Skin: Grossly nothing acute no obvious rashes  Neurological: Alert, Moves all extremities, no cranial nerve defects on limited exam  Extremities: Pulse 2+ in b/l LE. No edema. No cyanosis.       Data:  Laboratory studies:  Recent Results (from the past 24 hour(s))   ISTAT ARTERIAL BLOOD GAS    Collection Time: 08/22/19  2:10 PM   Result Value Ref Range    Ph 7.267 (LL) 7.400 - 7.500    Pco2 68.2 (HH) 26.0 - 37.0 mmHg    Po2 54 (L) 64 - 87 mmHg    Tco2 33 20 - 33 mmol/L    S02 81 (L) 93 - 99 %    Hco3 31.1 (H) 17.0 - 25.0 mmol/L    BE 2 -4 - 3 mmol/L    Body Temp see below degrees    O2 Therapy 100 %    iPF Ratio 54     Specimen Arterial     Action Range Triggered YES     Inst. Qualified Patient YES    BASIC TEG    Collection Time: 08/22/19  2:30 PM   Result Value Ref Range    Reaction Time Initial-R 5.2 5.0 - 10.0 min    Clot Kinetics-K 2.5 1.0 - 3.0 min    Clot Angle-Angle 58.7 53.0 - 72.0 degrees    Maximum Clot Strength-MA 64.6 50.0 - 70.0 mm    Lysis 30 minutes-LY30 0.0 0.0 - 8.0 %    TEG Algorithm Link Algorithm    D-DIMER    Collection Time: 08/22/19  2:30 PM   Result Value Ref Range    D-Dimer Screen 3.89 (H) 0.00 - 0.50 ug/mL (FEU)    FIBRINOGEN    Collection Time: 08/22/19  2:30 PM   Result Value Ref Range    Fibrinogen 464 (H) 215 - 460 mg/dL   Prothrombin Time    Collection Time: 08/22/19  2:30 PM   Result Value Ref Range    PT 16.4 (H) 12.0 - 14.6 sec    INR 1.29 (H) 0.87 - 1.13   BASIC TEG W HEPARINASE    Collection Time: 08/22/19  2:30 PM   Result Value Ref Range    React Time Initial Hep 5.4 5.0 - 10.0 min    Clot Kinetics Heparinase 2.5 1.0 - 3.0 min    Clot Angle Heparinase 58.7 53.0 - 72.0 degrees    Max Clot Heparinase 66.2 50.0 - 70.0 mm    Lysis 30 min Heparinase 0.0 0.0 - 8.0 %   APTT    Collection Time: 08/22/19  2:30 PM   Result Value Ref Range    APTT 26.8 24.7 - 36.0 sec   ISTAT ARTERIAL BLOOD GAS    Collection Time: 08/22/19  3:22 PM   Result Value Ref Range    Ph 7.348 (L) 7.400 - 7.500    Pco2 52.8 (HH) 26.0 - 37.0 mmHg    Po2 114 (H) 64 - 87 mmHg    Tco2 31 20 - 33 mmol/L    S02 98 93 - 99 %    Hco3 29.0 (H) 17.0 - 25.0 mmol/L    BE 2 -4 - 3 mmol/L    Body Temp see below degrees    O2 Therapy 100 %    iPF Ratio 114     Specimen Arterial     Action Range Triggered YES     Inst. Qualified Patient YES    ISTAT LACTATE    Collection Time: 08/22/19  3:22 PM   Result Value Ref Range    iStat Lactate 1.0 0.5 - 2.0 mmol/L   ACCU-CHEK GLUCOSE    Collection Time: 08/22/19  5:06 PM   Result Value Ref Range    Glucose - Accu-Ck 156 (H) 65 - 99 mg/dL   EC-ECHOCARDIOGRAM COMPLETE W/ CONT    Collection Time: 08/22/19  5:20 PM   Result Value Ref Range    Eject.Frac. MOD BP 73.14     Eject.Frac. MOD 4C 74.34     Eject.Frac. MOD 2C 67.23     Left Ventrical Ejection Fraction 70    ACCU-CHEK GLUCOSE    Collection Time: 08/22/19  8:35 PM   Result Value Ref Range    Glucose - Accu-Ck 160 (H) 65 - 99 mg/dL   ISTAT ARTERIAL BLOOD GAS    Collection Time: 08/22/19  8:37 PM   Result Value Ref Range    Ph 7.331 (L) 7.400 - 7.500    Pco2 51.0 (HH) 26.0 - 37.0 mmHg    Po2 101 (H) 64 - 87 mmHg    Tco2 28 20 - 33 mmol/L    S02 97 93 - 99 %    Hco3 27.0  (H) 17.0 - 25.0 mmol/L    BE 0 -4 - 3 mmol/L    Body Temp 36.7 C degrees    O2 Therapy 70 %    iPF Ratio 144     Ph Temp [place] 7.336 (L) 7.400 - 7.500    Pco2 Temp Co 50.3 (H) 26.0 - 37.0 mmHg    Po2 Temp Cor 99 (H) 64 - 87 mmHg    Specimen Arterial     Action Range Triggered YES     Inst. Qualified Patient YES    GRAM STAIN    Collection Time: 08/22/19 10:12 PM   Result Value Ref Range    Significant Indicator .     Source RESP     Site Bronchoalveolar Lavage RLL     Gram Stain Result Rare WBCs.  No organisms seen.      APTT    Collection Time: 08/23/19 12:35 AM   Result Value Ref Range    APTT 32.7 24.7 - 36.0 sec   ACCU-CHEK GLUCOSE    Collection Time: 08/23/19 12:38 AM   Result Value Ref Range    Glucose - Accu-Ck 163 (H) 65 - 99 mg/dL   ISTAT ARTERIAL BLOOD GAS    Collection Time: 08/23/19  4:31 AM   Result Value Ref Range    Ph 7.398 (L) 7.400 - 7.500    Pco2 42.1 (H) 26.0 - 37.0 mmHg    Po2 86 64 - 87 mmHg    Tco2 27 20 - 33 mmol/L    S02 96 93 - 99 %    Hco3 26.0 (H) 17.0 - 25.0 mmol/L    BE 1 -4 - 3 mmol/L    Body Temp 36.6 C degrees    O2 Therapy 65 %    iPF Ratio 132     Ph Temp [place] 7.404 7.400 - 7.500    Pco2 Temp Co 41.4 (H) 26.0 - 37.0 mmHg    Po2 Temp Cor 83 64 - 87 mmHg    Specimen Arterial     Action Range Triggered NO     Inst. Qualified Patient YES    ACCU-CHEK GLUCOSE    Collection Time: 08/23/19  5:15 AM   Result Value Ref Range    Glucose - Accu-Ck 175 (H) 65 - 99 mg/dL   CBC WITH DIFFERENTIAL    Collection Time: 08/23/19  5:16 AM   Result Value Ref Range    WBC 9.2 4.8 - 10.8 K/uL    RBC 5.68 4.70 - 6.10 M/uL    Hemoglobin 14.0 14.0 - 18.0 g/dL    Hematocrit 47.5 42.0 - 52.0 %    MCV 83.6 81.4 - 97.8 fL    MCH 24.6 (L) 27.0 - 33.0 pg    MCHC 29.5 (L) 33.7 - 35.3 g/dL    RDW 59.1 (H) 35.9 - 50.0 fL    Platelet Count 116 (L) 164 - 446 K/uL    Neutrophils-Polys 67.00 44.00 - 72.00 %    Lymphocytes 26.10 22.00 - 41.00 %    Monocytes 2.60 0.00 - 13.40 %    Eosinophils 0.00 0.00 - 6.90 %     Basophils 0.00 0.00 - 1.80 %    Nucleated RBC 0.20 /100 WBC    Neutrophils (Absolute) 6.56 1.82 - 7.42 K/uL    Lymphs (Absolute) 2.40 1.00 - 4.80 K/uL    Monos (Absolute) 0.24 0.00 - 0.85 K/uL    Eos (Absolute) 0.00 0.00 - 0.51 K/uL    Baso (Absolute) 0.00 0.00 - 0.12 K/uL    NRBC (Absolute) 0.02 K/uL    Anisocytosis 1+     Microcytosis 1+    Basic Metabolic Panel    Collection Time: 08/23/19  5:16 AM   Result Value Ref Range    Sodium 137 135 - 145 mmol/L    Potassium 4.3 3.6 - 5.5 mmol/L    Chloride 102 96 - 112 mmol/L    Co2 25 20 - 33 mmol/L    Glucose 187 (H) 65 - 99 mg/dL    Bun 31 (H) 8 - 22 mg/dL    Creatinine 1.77 (H) 0.50 - 1.40 mg/dL    Calcium 8.9 8.5 - 10.5 mg/dL    Anion Gap 10.0 0.0 - 11.9   Magnesium    Collection Time: 08/23/19  5:16 AM   Result Value Ref Range    Magnesium 2.3 1.5 - 2.5 mg/dL   Phosphorus    Collection Time: 08/23/19  5:16 AM   Result Value Ref Range    Phosphorus 5.1 (H) 2.5 - 4.5 mg/dL   ESTIMATED GFR    Collection Time: 08/23/19  5:16 AM   Result Value Ref Range    GFR If  55 (A) >60 mL/min/1.73 m 2    GFR If Non  46 (A) >60 mL/min/1.73 m 2   DIFFERENTIAL MANUAL    Collection Time: 08/23/19  5:16 AM   Result Value Ref Range    Bands-Stabs 4.30 0.00 - 10.00 %    Manual Diff Status PERFORMED    PERIPHERAL SMEAR REVIEW    Collection Time: 08/23/19  5:16 AM   Result Value Ref Range    Peripheral Smear Review see below    PLATELET ESTIMATE    Collection Time: 08/23/19  5:16 AM   Result Value Ref Range    Plt Estimation Decreased    MORPHOLOGY    Collection Time: 08/23/19  5:16 AM   Result Value Ref Range    RBC Morphology Present     Large Platelets 1+     Polychromia 1+     Smudge Cells Moderate    APTT    Collection Time: 08/23/19  7:25 AM   Result Value Ref Range    APTT 31.4 24.7 - 36.0 sec       Imaging:  DX-CHEST-PORTABLE (1 VIEW)   Final Result      Bibasilar underinflation atelectasis which could obscure an additional process. This is  unchanged.      EC-ECHOCARDIOGRAM COMPLETE W/ CONT   Final Result      DX-ABDOMEN FOR TUBE PLACEMENT   Final Result      Feeding tube tip projects over the expected location of the gastric antrum.      CT-CTA CHEST PULMONARY ARTERY W/ RECONS   Final Result      1.  Right-sided lobar, segmental and subsegmental pulmonary emboli. Suspected subsegmental left-sided pulmonary emboli. Associated right heart strain.   2.  Scattered pulmonary infarcts. Dependent opacities in the lower lobes may represent a combination of atelectasis and infarcts, however, superimposed pneumonia is also possible.   3.  Cardiomegaly.   4.  Large right thyroid nodule measuring 2.9 cm. It can be followed on an outpatient basis.   5.  Splenomegaly.      MICHELLE SAEED was paged at 8/22/2019 4:08 PM.      DX-CHEST-PORTABLE (1 VIEW)   Final Result      1.  Well-positioned lines and tubes. No pneumothorax.   2.  Interval worsening of bilateral perihilar and bibasilar opacities, which may represent a continuation of atelectasis and developing alveolar edema.   3.  Small bilateral pleural effusions.      US-EXTREMITY VENOUS LOWER BILAT   Final Result      DX-CHEST-LIMITED (1 VIEW)   Final Result      No significant change in pulmonary edema      DX-CHEST-PORTABLE (1 VIEW)   Final Result      1.  Enlarged cardiac silhouette consistent with findings of vascular congestion/edema.   2.  Right middle lobe and lower lobe airspace opacities are suspicious for pneumonia.        Echo 8/22/2019    CONCLUSIONS  No prior study is available for comparison.   Technically difficult study - adequate information is obtained.   Sinus rhythm.  Reduced right ventricular systolic function.  Akinetic right   ventricular free wall.  Estimated right ventricular systolic pressure is >85 mmHg.  Left ventricular ejection fraction is visually estimated to be 70%.    Abnormal septal motion consistent with right ventricular (RV) volume   overload and/or elevated RV end-diastolic  pressure.  Mild tricuspid regurgitation.  Pulmonary team notified at time of reading.      EKG : personally reviewed by me  Sinus tachycardia.    All pertinent features of laboratory and imaging reviewed including primary images where applicable    Assessment / Plan:  28-year-old male patient with history of obesity, obstructive sleep apnea, polycythemia admitted with shortness of breath found to have significant bilateral pulmonary embolisms with pulmonary infarct, superimposed pneumonia.  -Right heart strain is secondary to severe pulmonary embolism.  -Patient improved hemodynamically after TPA.  Requiring less pressure support from ventilator.  -Continue anticoagulation.  -As far as pulmonary hypertension, I will make a follow-up with Dr. Mendez in few weeks to repeat an echocardiogram and follow-up on pulmonary hypertension, start therapy if needed.      It is my pleasure to participate in the care of Mr. Ontiveros.  Please do not hesitate to contact me with questions or concerns.    Dago Escamilla    8/23/2019

## 2019-08-23 NOTE — ASSESSMENT & PLAN NOTE
Secondary to large PE and also component of obstructive sleep apnea    Continue Lasix and Xarelto  Will need outpatient sleep study

## 2019-08-23 NOTE — DIETARY
"Nutrition Support Assessment:  Day 2 of admit.  Michael Ontiverso is a 28 y.o. male with admitting DX of Sepsis (HCC), Pneumonia     Current problem list:  1. VDRF  2. PNA  3. Bilateral PEs     Assessment:  Estimated Nutritional Needs based on:   Height: 172.7 cm (5' 8\")  Weight: (!) 173.5 kg (382 lb 8 oz)  Ideal Body Weight: 69.9 kg (154 lb)  Percent Ideal Body Weight: 248.4  Body mass index is 58.16 kg/m²., BMI classification: Extreme (Class III) morbid obesity    Calculation/Equation: REE per MSJ = 2683 kcal/day; RMR per [facility] (vent L/min 14.3, T max/24 hours 36.8) = 2949 kcal/day (65-70% = 1765-0657 kcal/day)  Total Calories / day: 1538 - 1748 (Calories / kg IBW: 22 - 25)  Total Grams Protein / day: 174 (Grams Protein / kg IBW: 2.5)     Evaluation:   1. Pt is intubated and unable to take PO diet.  2. Gastric Cortrak in place. Plan start TF today per discussion in Rounds.  3. Estimated needs based on critical care obesity guidelines.   4. Labs and meds reviewed.  5. BM PTA. Bowel protocol per MAR.  6. Low-calorie, low-carbohydrate, high-protein TF formula is indicated to meet pt needs.      Malnutrition Risk: None identified, negative nutritional screen.     Recommendations/Plan:  1. Start Peptamen Intense VHP @ 25 ml/hr and advance per protocol to goal rate 80 ml/hr to provide 1920 kcal, 177 grams protein, and 1613 ml free water per day.  2. Fluids per MD.    RD following.     "

## 2019-08-23 NOTE — CARE PLAN
Problem: Hemodynamic Status  Goal: Vital Signs and Fluid Balance Management  Outcome: PROGRESSING AS EXPECTED     Problem: Mechanical Ventilation  Goal: Safe management of artificial airway and ventilation  Outcome: PROGRESSING SLOWER THAN EXPECTED  Note:   Pt intubated this shift.

## 2019-08-23 NOTE — ASSESSMENT & PLAN NOTE
Secondary to massive PE  Right heart failure  Diuresis as tolerated as stabilizes  Flolan, complete doses

## 2019-08-23 NOTE — CARE PLAN
Adult Ventilation Update    Total Vent Days: 2    Patient Lines/Drains/Airways Status      Active Airway       Name: Placement date: Placement time: Site: Days:    Airway ETT Oral 8.0  08/22/19   1308   Oral  less than 1                    In the last 24 hours, the patient tolerated SBT for 0 hours.                Static Compliance (ml / cm H2O): 42 (08/22/19 2208)    Patient failed trials because of    Barriers to SBT    Length of Weaning Trial      Sputum/Suction   Cough: Moist;Productive (08/22/19 1459)  Sputum Amount: Small (08/23/19 0000)  Sputum Color: Bloody (08/23/19 0000)  Sputum Consistency: Thin;Thick (08/23/19 0000)    Mobility  Level of Mobility: Level IV (08/23/19 0000)  Activity Performed: Unable to mobilize (08/22/19 1930)  Time Activity Tolerated: 5 min (08/22/19 0600)  Distance Per Occurrence (ft.): 4 feet (08/22/19 0600)  # of Times Distance was Traveled: 6 (08/22/19 0600)  Assistance: Standby Assist (08/22/19 0600)  Ambulation Tolerance: Tolerates Well;Tires Quickly (08/22/19 0600)  Pt Calls for Assistance: Yes (08/22/19 0600)  Staff Present for Mobilization: RN (08/22/19 0600)  Gait: Steady;Wide Based (08/22/19 0600)  Assistive Devices: None (08/22/19 0600)  Mobilization Comments: kiarra Hennessy  (08/22/19 1930)    Events/Summary/Plan: Flolan syringe change (08/23/19 0040)

## 2019-08-23 NOTE — PROGRESS NOTES
[facility] Medicine Daily Progress Note    Date of Service  8/23/2019    Chief Complaint  28 y.o. male admitted 8/21/2019 with history of asthma admitted with suspected pneumonia and acute respiratory failure    Hospital Course         Interval Problem Update      CTA pos for bilateral PE and RV strain received alteplase  Alert and oriented   Off pressors   Writing notes  Fentanyl 75  -130  Afebrile  VD#2  Heparin drip  Day 2/5 of ceftriaxone  Start TF            Consultants/Specialty  Critical care    Code Status  Full code    Disposition  To be determined    Review of Systems  Review of Systems   Unable to perform ROS: Intubated        Physical Exam  Temp:  [36.6 °C (97.9 °F)] 36.6 °C (97.9 °F)  Pulse:  [] 78  Resp:  [10-36] 32  BP: ()/(32-75) 116/57  SpO2:  [74 %-98 %] 96 %    Physical Exam   Constitutional: He is oriented to person, place, and time. He appears well-developed. He is intubated.   Obese   HENT:   Head: Normocephalic and atraumatic.   Right Ear: External ear normal.   Left Ear: External ear normal.   Mouth/Throat: No oropharyngeal exudate.   cortrak in place     Eyes: Conjunctivae are normal. Right eye exhibits no discharge. Left eye exhibits no discharge. No scleral icterus.   Neck: Neck supple. No JVD present. No tracheal deviation present.   Cardiovascular: Normal rate and regular rhythm. Exam reveals no gallop and no friction rub.   No murmur heard.  Pulmonary/Chest: Effort normal. No accessory muscle usage. He is intubated. No respiratory distress. He has no wheezes. He has rales. He exhibits no tenderness.   Abdominal: Soft. Bowel sounds are normal. He exhibits no distension. There is no tenderness. There is no rebound.   Musculoskeletal: He exhibits edema. He exhibits no tenderness.   Neurological: He is alert and oriented to person, place, and time. No cranial nerve deficit. He exhibits normal muscle tone.    follows commands   Skin: Skin is warm and dry. He is not  diaphoretic. No cyanosis. Nails show no clubbing.   Stasis dermatitis changes   Psychiatric: He has a normal mood and affect. His behavior is normal.   Communicates by writing   Nursing note and vitals reviewed.      Fluids    Intake/Output Summary (Last 24 hours) at 8/23/2019 0908  Last data filed at 8/23/2019 0600  Gross per 24 hour   Intake 1128.3 ml   Output 3050 ml   Net -1921.7 ml       Laboratory  Recent Labs     08/21/19  1900 08/22/19  0301 08/23/19  0516   WBC 13.8* 9.5 9.2   RBC 6.63* 6.40* 5.68   HEMOGLOBIN 15.7 15.0 14.0   HEMATOCRIT 55.7* 53.6* 47.5   MCV 84.0 83.8 83.6   MCH 23.7* 23.4* 24.6*   MCHC 28.2* 28.0* 29.5*   RDW 59.4* 60.0* 59.1*   PLATELETCT 126* 123* 116*     Recent Labs     08/21/19  1900 08/22/19  0301 08/23/19  0516   SODIUM 138 136 137   POTASSIUM 4.2 4.4 4.3   CHLORIDE 102 102 102   CO2 25 25 25   GLUCOSE 103* 180* 187*   BUN 10 10 31*   CREATININE 0.85 0.73 1.77*   CALCIUM 9.0 9.1 8.9     Recent Labs     08/21/19  1900 08/22/19  1430 08/23/19  0035 08/23/19  0725   APTT  --  26.8 32.7 31.4   INR 1.17* 1.29*  --   --                Imaging  DX-CHEST-PORTABLE (1 VIEW)   Final Result      Bibasilar underinflation atelectasis which could obscure an additional process. This is unchanged.      EC-ECHOCARDIOGRAM COMPLETE W/ CONT   Final Result      DX-ABDOMEN FOR TUBE PLACEMENT   Final Result      Feeding tube tip projects over the expected location of the gastric antrum.      CT-CTA CHEST PULMONARY ARTERY W/ RECONS   Final Result      1.  Right-sided lobar, segmental and subsegmental pulmonary emboli. Suspected subsegmental left-sided pulmonary emboli. Associated right heart strain.   2.  Scattered pulmonary infarcts. Dependent opacities in the lower lobes may represent a combination of atelectasis and infarcts, however, superimposed pneumonia is also possible.   3.  Cardiomegaly.   4.  Large right thyroid nodule measuring 2.9 cm. It can be followed on an outpatient basis.   5.   Splenomegaly.      MICHELLE GANT was paged at 8/22/2019 4:08 PM.      DX-CHEST-PORTABLE (1 VIEW)   Final Result      1.  Well-positioned lines and tubes. No pneumothorax.   2.  Interval worsening of bilateral perihilar and bibasilar opacities, which may represent a continuation of atelectasis and developing alveolar edema.   3.  Small bilateral pleural effusions.      US-EXTREMITY VENOUS LOWER BILAT   Final Result      DX-CHEST-LIMITED (1 VIEW)   Final Result      No significant change in pulmonary edema      DX-CHEST-PORTABLE (1 VIEW)   Final Result      1.  Enlarged cardiac silhouette consistent with findings of vascular congestion/edema.   2.  Right middle lobe and lower lobe airspace opacities are suspicious for pneumonia.           Assessment/Plan  * Acute respiratory failure with hypoxia and hypercapnia (HCC)- (present on admission)  Assessment & Plan  Secondary to pneumonia  sepsis and PE  Vent management per critical care  Discussed with Dr. Gant patient's oxygen requirements improving possible extubation tomorrow if continues to improve and tolerates SBT    Community acquired pneumonia  Assessment & Plan  Continue ceftriaxone and monitor    Severe sepsis (HCC)- (present on admission)  Assessment & Plan  -This is severe sepsis with the following associated acute organ dysfunction(s): acute respiratory failure.       Source pneumonia    Continue current antibiotics    Asthma with exacerbation- (present on admission)  Assessment & Plan  No wheezing on exam today Taper steroids    JONES (obstructive sleep apnea)  Assessment & Plan  Will need formal evaluation when clinically stable    Hyperglycemia- (present on admission)  Assessment & Plan  HbA1c pending follow-up on results    Morbid obesity (HCC)- (present on admission)  Assessment & Plan  Body mass index is 59.1 kg/m².      TASHI (acute kidney injury) ([facility])  Assessment & Plan  Likely  ATN from shock and possible contrast-induced nephropathy  Monitor renal  function avoid nephrotoxic agents  We will check urine sodium  Further work-up per nephrology consultation if not improved    Shock (HCC)  Assessment & Plan  Secondary to PE  Improved    Pulmonary embolism with acute cor pulmonale (HCC)  Assessment & Plan  Status post alteplase  On heparin post fibrinolytic protocol  Transition to DOAC when more clinically stable and renal function is recovered    Pulmonary hypertension (HCC)  Assessment & Plan  Secondary to PE and also suspect underlying severe sleep apnea which will need further work-up as outpatient    Type 2 myocardial infarction (HCC)  Assessment & Plan  Secondary to acute respiratory failure and PE  Reviewed echocardiogram          VTE prophylaxis:Heparin

## 2019-08-23 NOTE — CARE PLAN
Adult Ventilation Update    Total Vent Days: 2    Patient Lines/Drains/Airways Status      Active Airway       Name: Placement date: Placement time: Site: Days:    Airway ETT Oral 8.0  08/22/19   1308   Oral  2                  Patient failed trials because of Barriers to Wean: FiO2 >60% or PEEP >10 CM H2O (08/23/19 0615)    Sputum/Suction   Cough: Non Productive (08/23/19 0232)  Sputum Amount: Small (08/23/19 0400)  Sputum Color: Clear (08/23/19 0400)  Sputum Consistency: Thin;Thick (08/23/19 0400)    Mobility  Activity Performed: Chair    Events/Summary/Plan: lowered oxygen and peep as joey, d/c flolan

## 2019-08-23 NOTE — ASSESSMENT & PLAN NOTE
Multifactorial  Obstructive with massive PE  Cardiogenic from right heart failure  Septic shock from pneumonia  Keep map > 65  Pressors as needed  Diuresis as toelrated

## 2019-08-23 NOTE — ASSESSMENT & PLAN NOTE
rvsp 80 mmhg  Massive pe  marcel contributory, obesity contributory  Right heart failure  Flolan, complete doses

## 2019-08-23 NOTE — ASSESSMENT & PLAN NOTE
Massive pe, hemodynamic instability  Both pulmonary arteries occluded   mg, then ekos  Heparin drip  Will need long term anticoagulation  rvsp 80 mmhg

## 2019-08-23 NOTE — PROGRESS NOTES
Critical Care Progress Note    Date of admission  8/21/2019    Chief Complaint  29 yo male with hx of marcel and asthma feels like he has a cold.  Recent trip to [place].  He has been sleeping with air conditioner.  He came back to [place] on the 15th.  Since Sunday worsening cough and sputum protuction with wheezing.  He has a rescue inhaler at home that he uses occasionally.  At urgent care 75% fio2.  Sent  To [facility] ER.  He was initially admitted to medicine floor and now transferred to the ICU.  He has pneumonia on chest xray. Treated for sepsis.  RR 40s and on high flow nasal cannula 60 L and 100%, sating 80-95%. Patient intubated 8/22, he refused bipap, on max high flow, high rr, unable to get to CTA for PE study, intubated, up to peep 22, flolan started, cta showed bilateral pulmonary artery PE, massive, hemodynamically unstable, given TPA, rhino rockets for nasal bleeding.  Rt heart pressure with rvsp 80 mmhg.    Hospital Course          Interval Problem Update  Reviewed last 24 hour events:  Right heart failure secondary to massive pe  S/p TPA 8/22  Rhino rockets in place  Fentanyl at 75/hr  Off propofol  Afebrile  Npo  bm prior to arrival  1 peripheral  1 central   1 art line  Ok to start tube feeds  Vent day 2  Pneumonia/infarctions/edema on chf  Weaning 20 this am, down to 18, then 16  fio2     Addendum;  Down to 16 and plateau  Still on flolan, titrating  If worsens off flolan overnight then will restart  Discussed with IR, Dr Rodriguez, due to continued hypoxia ordered EKOS as likely there is some clot remaining given unable to further wean peep and remains at high level  Discussed with patient, Benefit > risk, he expressed understanding and ok with procedure  Discussed with family  Multiple reassessments  Lasix 40 mg evening pending I/o status at that time  Reassessed post placement, successful per IR note  Tolerated well other than I gave orderes to increase peep to 20 for procedure given hypoxia.    Review  of Systems  Review of Systems   Unable to perform ROS: Intubated        Vital Signs for last 24 hours   Temp:  [35.6 °C (96.1 °F)-36.6 °C (97.9 °F)] 36.6 °C (97.9 °F)  Pulse:  [] 78  Resp:  [10-36] 32  BP: ()/(32-73) 101/46  SpO2:  [74 %-97 %] 96 %    Hemodynamic parameters for last 24 hours       Respiratory Information for the last 24 hours  [place] Vent Mode: APVCMV  Rate (breaths/min): 32  Vt Target (mL): 450  PEEP/CPAP: 20  FiO2: 65  P MEAN: 27  Control VTE (exp VT): 457    Physical Exam   Physical Exam   Constitutional: He is oriented to person, place, and time. He appears distressed.   Alert and oriented, writing messages   HENT:   Head: Normocephalic and atraumatic.   Right Ear: External ear normal.   Left Ear: External ear normal.   Mouth/Throat: No oropharyngeal exudate.   Eyes: Pupils are equal, round, and reactive to light. Conjunctivae and EOM are normal. Right eye exhibits no discharge. Left eye exhibits no discharge.   Neck: JVD present. No tracheal deviation present.   Cardiovascular: Normal rate, regular rhythm and normal heart sounds.   No murmur heard.  Pulmonary/Chest: No stridor. He is in respiratory distress. He has no wheezes. He has rales. He exhibits no tenderness.   Reduced air movement, more relaxed on ventilator   Abdominal: He exhibits distension. He exhibits no mass. There is no tenderness. There is no rebound and no guarding.   From obesity   Musculoskeletal: He exhibits edema. He exhibits no tenderness or deformity.   Neurological: He is alert and oriented to person, place, and time. He displays normal reflexes. No cranial nerve deficit. Coordination normal.   Skin: Skin is warm and dry. No rash noted. He is not diaphoretic. No erythema.   Psychiatric: He has a normal mood and affect. His behavior is normal.   Nursing note and vitals reviewed.      Medications  Current Facility-Administered Medications   Medication Dose Route Frequency Provider Last Rate Last Dose   •  insulin regular (HUMULIN R) injection 2-9 Units  2-9 Units Subcutaneous 4X/DAY ACHS David Gifford M.D.   2 Units at 08/23/19 0539    And   • glucose 4 g chewable tablet 16 g  16 g Oral Q15 MIN PRN David Gifford M.D.        And   • DEXTROSE 10% BOLUS 250 mL  250 mL Intravenous Q15 MIN PRN David Gifford M.D.       • norepinephrine (LEVOPHED) 8 mg in  mL Infusion  0-30 mcg/min Intravenous Continuous Marbin Gant M.D.   Stopped at 08/22/19 1632   • Respiratory Care per Protocol   Nebulization Continuous RT Marbin Gant M.D.       • MD Alert...ICU Electrolyte Replacement per Pharmacy   Other PHARMACY TO DOSE Marbin Gant M.D.       • lidocaine (XYLOCAINE) 1 % injection 1-2 mL  1-2 mL Tracheal Tube Q30 MIN PRN Marbin Gant M.D.       • Pharmacy Consult: Enteral tube insertion - review meds/change route/product selection   Other PHARMACY TO DOSE Marbin Gant M.D.       • propofol (DIPRIVAN) injection  0-80 mcg/kg/min Intravenous Continuous Marbin Gant M.D. 15.9 mL/hr at 08/23/19 0612 15 mcg/kg/min at 08/23/19 0612   • fentaNYL (SUBLIMAZE) injection 25 mcg  25 mcg Intravenous Q HOUR PRN Marbin Gant M.D.        Or   • fentaNYL (SUBLIMAZE) injection 50 mcg  50 mcg Intravenous Q HOUR PRN Marbin Gant M.D.        Or   • fentaNYL (SUBLIMAZE) injection 100 mcg  100 mcg Intravenous Q HOUR PRN Marbin Gant M.D.   100 mcg at 08/22/19 2307   • acetaminophen (TYLENOL) tablet 650 mg  650 mg Enteral Tube Q6HRS PRN Marbin Gant M.D.       • famotidine (PEPCID) tablet 20 mg  20 mg Enteral Tube Q12HRS Marbin Gant M.D.   20 mg at 08/23/19 0502    Or   • famotidine (PEPCID) injection 20 mg  20 mg Intravenous Q12HRS Marbin Gant M.D.       • senna-docusate (PERICOLACE or SENOKOT S) 8.6-50 MG per tablet 2 Tab  2 Tab Enteral Tube BID Marbin Gant M.D.   2 Tab at 08/23/19 0502    And   • polyethylene glycol/lytes (MIRALAX) PACKET 1 Packet  1 Packet Enteral Tube QDAY PRN Marbin Gant,  M.D.        And   • magnesium hydroxide (MILK OF MAGNESIA) suspension 30 mL  30 mL Enteral Tube QDAY PRN Marbin Gant M.D.        And   • bisacodyl (DULCOLAX) suppository 10 mg  10 mg Rectal QDAY PRN Marbin Gant M.D.       • lidocaine (XYLOCAINE) 1 % injection 0.5 mL  0.5 mL Intradermal Once PRN Marbin Gant M.D.       • epoprostenol (FLOLAN) 1.5 mg in glycine diluent for flolan 50 mL for Inhalation  0.01-0.05 mcg/kg/min (Order-Specific) Inhalation Continuous Marbin Gant M.D. 12 mL/hr at 08/23/19 0457 0.05 mcg/kg/min at 08/23/19 0457   • heparin injection 4,400 Units  4,400 Units Intravenous PRN Marbin Gant M.D.   4,400 Units at 08/23/19 0138    And   • heparin infusion 25,000 units in 500 mL 0.45% NACL   Intravenous Continuous Marbin Gant M.D. 32 mL/hr at 08/23/19 0703 1,600 Units/hr at 08/23/19 0703   • fentaNYL (SUBLIMAZE) 50 mcg/mL in 50mL (Continuous Infusion)   Intravenous Continuous David Gifford M.D. 1.5 mL/hr at 08/23/19 0319 75 mcg/hr at 08/23/19 0319   • lactated ringers infusion (BOLUS): BMI greater than 30  1,000 mL Intravenous Once PRN Richar Matt D.O.       • cefTRIAXone (ROCEPHIN) 2 g in  mL IVPB  2 g Intravenous Q24HRS Richar Matt D.O.   Stopped at 08/23/19 0533   • enalaprilat (VASOTEC) injection 1.25 mg  1.25 mg Intravenous Q6HRS PRN SALINAS WolffO.       • ondansetron (ZOFRAN) syringe/vial injection 4 mg  4 mg Intravenous Q4HRS PRN BARBARA Wolff.O.       • ondansetron (ZOFRAN ODT) dispertab 4 mg  4 mg Oral Q4HRS PRN BARBARA Wolff.O.       • promethazine (PHENERGAN) tablet 12.5-25 mg  12.5-25 mg Oral Q4HRS PRN SALINAS WolffO.       • promethazine (PHENERGAN) suppository 12.5-25 mg  12.5-25 mg Rectal Q4HRS PRN BARBARA Wolff.O.       • prochlorperazine (COMPAZINE) injection 5-10 mg  5-10 mg Intravenous Q4HRS PRN SALINAS WolffO.       • methylPREDNISolone (SOLU-MEDROL) 40 MG injection 40 mg  40 mg Intravenous Q6HRS Richar FITZGERALD  CARMEN Matt   40 mg at 08/23/19 0502   • ipratropium-albuterol (DUONEB) nebulizer solution  3 mL Nebulization Q4HRS (RT) Antonio Mathis D.O.   3 mL at 08/23/19 0617       Fluids    Intake/Output Summary (Last 24 hours) at 8/23/2019 0710  Last data filed at 8/23/2019 0600  Gross per 24 hour   Intake 1151.63 ml   Output 3500 ml   Net -2348.37 ml       Laboratory  Recent Labs     08/22/19  0441  08/22/19  1522 08/22/19  2037 08/23/19  0431   [id number] 7.33*  --   --   --   --    YJCIGM861I 45.8*  --   --   --   --    [id number] 81.9  --   --   --   --    UAMU7WYL 93.8  --   --   --   --    ARTHCO3 23  --   --   --   --    ARTBE -3  --   --   --   --    ISTATAPH  --    < > 7.348* 7.331* 7.398*   ISTATAPCO2  --    < > 52.8* 51.0* 42.1*   ISTATAPO2  --    < > 114* 101* 86   ISTATATCO2  --    < > 31 28 27   [id number]  --    < > 98 97 96   ISTATARTHCO3  --    < > 29.0* 27.0* 26.0*   ISTATARTBE  --    < > 2 0 1   ISTATTEMP  --    < > see below 36.7 C 36.6 C   ISTATFIO2  --    < > 100 70 65   ISTATSPEC  --    < > Arterial Arterial Arterial   ISTATAPHTC  --   --   --  7.336* 7.404   NDCPUYPJ3XT  --   --   --  99* 83    < > = values in this interval not displayed.         Recent Labs     08/21/19  1900 08/22/19  0301 08/23/19  0516   SODIUM 138 136 137   POTASSIUM 4.2 4.4 4.3   CHLORIDE 102 102 102   CO2 25 25 25   BUN 10 10 31*   CREATININE 0.85 0.73 1.77*   MAGNESIUM  --   --  2.3   PHOSPHORUS  --   --  5.1*   CALCIUM 9.0 9.1 8.9     Recent Labs     08/21/19  1900 08/22/19  0301 08/23/19  0516   ALTSGPT 30 28  --    ASTSGOT 26 24  --    ALKPHOSPHAT 97 96  --    TBILIRUBIN 1.2 1.1  --    GLUCOSE 103* 180* 187*     Recent Labs     08/21/19  1900 08/22/19  0301 08/23/19  0516   WBC 13.8* 9.5 9.2   NEUTSPOLYS 62.00 83.90*  --    LYMPHOCYTES 24.00 9.80*  --    MONOCYTES 11.00 2.70  --    EOSINOPHILS 0.00 0.00  --    BASOPHILS 1.00 0.00  --    ASTSGOT 26 24  --    ALTSGPT 30 28  --    ALKPHOSPHAT 97 96  --    TBILIRUBIN  1.2 1.1  --      Recent Labs     08/21/19  1900 08/22/19  0301 08/22/19  1430 08/23/19  0035 08/23/19  0516   RBC 6.63* 6.40*  --   --  5.68   HEMOGLOBIN 15.7 15.0  --   --  14.0   HEMATOCRIT 55.7* 53.6*  --   --  47.5   PLATELETCT 126* 123*  --   --  116*   PROTHROMBTM 15.2*  --  16.4*  --   --    APTT  --   --  26.8 32.7  --    INR 1.17*  --  1.29*  --   --        Imaging  X-Ray:  I have personally reviewed the images and compared with prior images.  EKG:  I have personally reviewed the images and compared with prior images.  CT:    Reviewed  Echo:   Reviewed  Ultrasound:  Reviewed    Assessment/Plan  * Acute respiratory failure with hypoxia and hypercapnia (HCC)- (present on admission)  Assessment & Plan  Massive PE, received tpa and on heparin drip  Right heart failure, rvsp 80 mmhg  Aspiration pna vs cap, continue 5 day course, may be related to encephalopathy/terrible respiratory status during PE.  Also recent trip to [place] and had air conditioner over bed. BAL purulent secretions RLL, present in other lobes  Keep map > 65  Flolan, stop after scheduled doses  Wean peep as tolerated, peep up to 22  Will need cardiology consult for right heart failure once stabilized      Community acquired pneumonia  Assessment & Plan  Treating for cap  Possible aspiration  Purulent secretions more in lower lobes, present in other lobes  5 day therapy    Severe sepsis (HCC)- (present on admission)  Assessment & Plan  Flu negative  Likely aspiration vs cap  5 day course ab  Purulent secretions bal rll, present in other lobes  Likely secondary to poor state with massive PE  Bal ntd    Asthma with exacerbation- (present on admission)  Assessment & Plan  IV Solu-Medrol, 40 mg every 6 hours  Scheduled duonebs  Change steroids to prednisone 40 mg daily for total 5 days    JONES (obstructive sleep apnea)  Assessment & Plan  Sleep study post hospitalizaiton recommended  Likely needs cpap or bipap especially in setting of severe  pulmonary htn    Hyperglycemia- (present on admission)  Assessment & Plan  SSI    Polycythemia- (present on admission)  Assessment & Plan  Likely fluid contraction, may also be due to chronic hypoxic state with JONES    Morbid obesity (HCC)- (present on admission)  Assessment & Plan  Nutrition counseling  BMI 59.1  JONES contributory to respiratory failure    Shock (HCC)  Assessment & Plan  Multifactorial  Obstructive with massive PE  Cardiogenic from right heart failure  Septic shock from pneumonia  Keep map > 65  Pressors as needed  Diuresis as toelrated    Pulmonary embolism with acute cor pulmonale (HCC)  Assessment & Plan  Massive pe, hemodynamic instability  Both pulmonary arteries occluded   mg  Heparin drip  Will need long term anticoagulation  rvsp 80 mmhg    Pulmonary hypertension (HCC)  Assessment & Plan  rvsp 80 mmhg  Massive pe  jones contributory, obesity contributory  Right heart failure  Flolan, complete doses    Cardiogenic shock (HCC)  Assessment & Plan  Secondary to massive PE  Right heart failure  Diuresis as tolerated as stabilizes  Flolan, complete doses    Type 2 myocardial infarction (HCC)  Assessment & Plan  Secondary to PE and septic/cardiogenic/obstructive shock       VTE:  Heparin  Ulcer: H2 Antagonist  Lines: Central Line  Ongoing indication addressed, Arterial Line  Ongoing indication addressed and Matthew Catheter  Ongoing indication addressed    I have performed a physical exam and reviewed and updated ROS and Plan today (8/23/2019). In review of yesterday's note (8/22/2019), there are no changes except as documented above.     Discussed patient condition and risk of morbidity and/or mortality with Family, RN, RT, Pharmacy, Code status disscussed, Charge nurse / hot rounds and Patient     The patient remains critically ill.  He is on ventilator, peep 20, titrating per abg and sao2.  Keep sao2 > 92%.  Within 24 hours of receiving TPA for massive PE, on heparin drip, adjusting per  sequential PTT.  ON vasopressors for map > 65.  Flolan for hypoxia.  Septic shock treating for pneumonia, likely aspiration. Multiple adjustments of peep, down to 16, then up to 20, multiple discussions with patient on plan of care.  Critical care time = 145 minutes in directly providing and coordinating critical care and extensive data review.  No time overlap and excludes procedures.

## 2019-08-23 NOTE — PROCEDURES
Procedures  Procedure, nasal packing with rhino rockets bilateral.    Reason, epistaxis bilateral following TPA therapy    Details:    1. Placed rhino rocket (nasal packing) in right nare, no complication and easily placed..      2. I then placed rhino rocket into left nare alongside ng tube.  No complication and easily placed.      No blood loss, patient comfortable post placement and tolerated well.

## 2019-08-23 NOTE — PROGRESS NOTES
Pulmonary and Critical Care Medicine Progress Note:    Date of service:  8/22/2019      Reassessed patient.    SR - 85, /67  Propofol at 40 - will arouse, nod and follow - moves all 4  Vent - CMV - 32, PEEP 22, 100% - on Flolan 0.05 mcg/kg/min - ABG 7.34/53/114 - vent changed to PEEP 20 and 80%  PIP - 35, Ve 14.3 - ventilator waveforms and airway mechanics reviewed in detail  Few crackles on exam - no wheezing  Cortrak in place  Epistaxis after alteplase - nasal packing in place - bleeding is controlled    CTA of pulmonary artery images reviewed.  LE venous doppler reviewed.  TEG normal at 1430 hours.    DIAGNOSES:    Acute hypoxemic respiratory failure  Severe sepsis  CAP  Acute bilateral PE - S/P alteplase  Obese  Probable JONES/OHS    MEDICAL DECISION MAKING/PLAN:    Continue full vent support.  Continue Flolan.  Try to wean PEEP as tolerated.  Continue full anticoagulation on a heparin drip on the post-fibrinolytic protocol.  Continue Rocephin and Zithromax.    Addendum:    On PEEP 20 and 70% - repeat ABG 7.33/50/99    I have assessed and reassessed his respiratory status with ventilator adjustments, airway mechanics, ventilator waveforms, blood pressure, hemodynamics, cardiovascular status, urine output and neurologic status with titration of propofol.  He is at high risk for worsening respiratory and cardiovascular system dysfunction.    The patient remains critically ill.  Critical care time = 78 minutes in directly providing and coordinating critical care and extensive data review.  No time overlap and excludes procedures.    The time spent is in addition to the time that I spent earlier today and the time spent by Dr. Gant earlier today.    Discussed with RN, RT    David Gifford MD  Pulmonary and Critical Care Medicine

## 2019-08-23 NOTE — WOUND TEAM
Wound consult received for groin/srotum, right axilla and bilateral shins. Patient seen in T606. Bilateral shins assessed, pt noted to have stable scabs that were left DEMARCO. No drainage noted. Right axilla cleansed w/ NS and gauze. Appears to be red w/ some satellite lesions extending at distal area. Pt refused for this wound care RN to assess groin and scrotum. Nystatin ordered for right axilla per wound care protocol. Bedside RN updated.

## 2019-08-24 ENCOUNTER — APPOINTMENT (OUTPATIENT)
Dept: RADIOLOGY | Facility: MEDICAL CENTER | Age: 29
DRG: 871 | End: 2019-08-24
Attending: INTERNAL MEDICINE
Payer: COMMERCIAL

## 2019-08-24 PROBLEM — J69.0 ASPIRATION PNEUMONIA (HCC): Status: ACTIVE | Noted: 2019-08-22

## 2019-08-24 PROBLEM — R57.0 CARDIOGENIC SHOCK (HCC): Status: RESOLVED | Noted: 2019-08-23 | Resolved: 2019-08-24

## 2019-08-24 LAB
ACTION RANGE TRIGGERED IACRT: NO
ACTION RANGE TRIGGERED IACRT: NO
ANION GAP SERPL CALC-SCNC: 11 MMOL/L (ref 0–11.9)
ANISOCYTOSIS BLD QL SMEAR: ABNORMAL
APTT PPP: 26.9 SEC (ref 24.7–36)
APTT PPP: 50 SEC (ref 24.7–36)
APTT PPP: 53.3 SEC (ref 24.7–36)
APTT PPP: 53.8 SEC (ref 24.7–36)
BASE EXCESS BLDA CALC-SCNC: 2 MMOL/L (ref -4–3)
BASE EXCESS BLDA CALC-SCNC: 6 MMOL/L (ref -4–3)
BASOPHILS # BLD AUTO: 0 % (ref 0–1.8)
BASOPHILS # BLD: 0 K/UL (ref 0–0.12)
BODY TEMPERATURE: ABNORMAL DEGREES
BODY TEMPERATURE: ABNORMAL DEGREES
BUN SERPL-MCNC: 43 MG/DL (ref 8–22)
BURR CELLS BLD QL SMEAR: NORMAL
CALCIUM SERPL-MCNC: 9.3 MG/DL (ref 8.5–10.5)
CHLORIDE SERPL-SCNC: 107 MMOL/L (ref 96–112)
CO2 BLDA-SCNC: 26 MMOL/L (ref 20–33)
CO2 BLDA-SCNC: 33 MMOL/L (ref 20–33)
CO2 SERPL-SCNC: 26 MMOL/L (ref 20–33)
CREAT SERPL-MCNC: 2.62 MG/DL (ref 0.5–1.4)
CRP SERPL HS-MCNC: 6.99 MG/DL (ref 0–0.75)
EOSINOPHIL # BLD AUTO: 0 K/UL (ref 0–0.51)
EOSINOPHIL NFR BLD: 0 % (ref 0–6.9)
ERYTHROCYTE [DISTWIDTH] IN BLOOD BY AUTOMATED COUNT: 59.7 FL (ref 35.9–50)
GLUCOSE BLD-MCNC: 108 MG/DL (ref 65–99)
GLUCOSE BLD-MCNC: 118 MG/DL (ref 65–99)
GLUCOSE BLD-MCNC: 122 MG/DL (ref 65–99)
GLUCOSE BLD-MCNC: 133 MG/DL (ref 65–99)
GLUCOSE BLD-MCNC: 92 MG/DL (ref 65–99)
GLUCOSE SERPL-MCNC: 118 MG/DL (ref 65–99)
HCO3 BLDA-SCNC: 25.4 MMOL/L (ref 17–25)
HCO3 BLDA-SCNC: 31.2 MMOL/L (ref 17–25)
HCT VFR BLD AUTO: 47.3 % (ref 42–52)
HGB BLD-MCNC: 13.8 G/DL (ref 14–18)
HOROWITZ INDEX BLDA+IHG-RTO: 126 MM[HG]
HOROWITZ INDEX BLDA+IHG-RTO: ABNORMAL MM[HG]
INST. QUALIFIED PATIENT IIQPT: YES
INST. QUALIFIED PATIENT IIQPT: YES
LG PLATELETS BLD QL SMEAR: NORMAL
LYMPHOCYTES # BLD AUTO: 3.33 K/UL (ref 1–4.8)
LYMPHOCYTES NFR BLD: 43.2 % (ref 22–41)
MAGNESIUM SERPL-MCNC: 2.4 MG/DL (ref 1.5–2.5)
MANUAL DIFF BLD: NORMAL
MCH RBC QN AUTO: 24.5 PG (ref 27–33)
MCHC RBC AUTO-ENTMCNC: 29.2 G/DL (ref 33.7–35.3)
MCV RBC AUTO: 83.9 FL (ref 81.4–97.8)
MICROCYTES BLD QL SMEAR: ABNORMAL
MONOCYTES # BLD AUTO: 0.19 K/UL (ref 0–0.85)
MONOCYTES NFR BLD AUTO: 2.5 % (ref 0–13.4)
MORPHOLOGY BLD-IMP: NORMAL
NEUTROPHILS # BLD AUTO: 4.18 K/UL (ref 1.82–7.42)
NEUTROPHILS NFR BLD: 53.4 % (ref 44–72)
NEUTS BAND NFR BLD MANUAL: 0.9 % (ref 0–10)
NRBC # BLD AUTO: 0 K/UL
NRBC BLD-RTO: 0 /100 WBC
O2/TOTAL GAS SETTING VFR VENT: 0.5 %
O2/TOTAL GAS SETTING VFR VENT: 50 %
PCO2 BLDA: 35.5 MMHG (ref 26–37)
PCO2 BLDA: 47.5 MMHG (ref 26–37)
PCO2 TEMP ADJ BLDA: 34.9 MMHG (ref 26–37)
PCO2 TEMP ADJ BLDA: 47.3 MMHG (ref 26–37)
PH BLDA: 7.42 [PH] (ref 7.4–7.5)
PH BLDA: 7.46 [PH] (ref 7.4–7.5)
PH TEMP ADJ BLDA: 7.43 [PH] (ref 7.4–7.5)
PH TEMP ADJ BLDA: 7.47 [PH] (ref 7.4–7.5)
PHOSPHATE SERPL-MCNC: 5.3 MG/DL (ref 2.5–4.5)
PLATELET # BLD AUTO: 123 K/UL (ref 164–446)
PLATELET BLD QL SMEAR: NORMAL
PMV BLD AUTO: 11.3 FL (ref 9–12.9)
PO2 BLDA: 63 MMHG (ref 64–87)
PO2 BLDA: 80 MMHG (ref 64–87)
PO2 TEMP ADJ BLDA: 62 MMHG (ref 64–87)
PO2 TEMP ADJ BLDA: 78 MMHG (ref 64–87)
POIKILOCYTOSIS BLD QL SMEAR: NORMAL
POTASSIUM SERPL-SCNC: 4 MMOL/L (ref 3.6–5.5)
PREALB SERPL-MCNC: 14 MG/DL (ref 18–38)
RBC # BLD AUTO: 5.64 M/UL (ref 4.7–6.1)
RBC BLD AUTO: PRESENT
SAO2 % BLDA: 92 % (ref 93–99)
SAO2 % BLDA: 97 % (ref 93–99)
SMUDGE CELLS BLD QL SMEAR: NORMAL
SODIUM SERPL-SCNC: 144 MMOL/L (ref 135–145)
SPECIMEN DRAWN FROM PATIENT: ABNORMAL
SPECIMEN DRAWN FROM PATIENT: ABNORMAL
TRIGL SERPL-MCNC: 228 MG/DL (ref 0–149)
WBC # BLD AUTO: 7.7 K/UL (ref 4.8–10.8)

## 2019-08-24 PROCEDURE — 84478 ASSAY OF TRIGLYCERIDES: CPT

## 2019-08-24 PROCEDURE — 99292 CRITICAL CARE ADDL 30 MIN: CPT | Performed by: INTERNAL MEDICINE

## 2019-08-24 PROCEDURE — 700102 HCHG RX REV CODE 250 W/ 637 OVERRIDE(OP): Performed by: INTERNAL MEDICINE

## 2019-08-24 PROCEDURE — 99291 CRITICAL CARE FIRST HOUR: CPT | Performed by: INTERNAL MEDICINE

## 2019-08-24 PROCEDURE — 85007 BL SMEAR W/DIFF WBC COUNT: CPT

## 2019-08-24 PROCEDURE — 83735 ASSAY OF MAGNESIUM: CPT

## 2019-08-24 PROCEDURE — 700101 HCHG RX REV CODE 250: Performed by: EMERGENCY MEDICINE

## 2019-08-24 PROCEDURE — 84100 ASSAY OF PHOSPHORUS: CPT

## 2019-08-24 PROCEDURE — 700111 HCHG RX REV CODE 636 W/ 250 OVERRIDE (IP): Performed by: INTERNAL MEDICINE

## 2019-08-24 PROCEDURE — 82962 GLUCOSE BLOOD TEST: CPT | Mod: 91

## 2019-08-24 PROCEDURE — 80048 BASIC METABOLIC PNL TOTAL CA: CPT

## 2019-08-24 PROCEDURE — 99233 SBSQ HOSP IP/OBS HIGH 50: CPT | Performed by: HOSPITALIST

## 2019-08-24 PROCEDURE — 85730 THROMBOPLASTIN TIME PARTIAL: CPT

## 2019-08-24 PROCEDURE — 94640 AIRWAY INHALATION TREATMENT: CPT

## 2019-08-24 PROCEDURE — 85027 COMPLETE CBC AUTOMATED: CPT

## 2019-08-24 PROCEDURE — 86140 C-REACTIVE PROTEIN: CPT

## 2019-08-24 PROCEDURE — 94003 VENT MGMT INPAT SUBQ DAY: CPT

## 2019-08-24 PROCEDURE — 92610 EVALUATE SWALLOWING FUNCTION: CPT

## 2019-08-24 PROCEDURE — 770022 HCHG ROOM/CARE - ICU (200)

## 2019-08-24 PROCEDURE — 84134 ASSAY OF PREALBUMIN: CPT

## 2019-08-24 PROCEDURE — 37799 UNLISTED PX VASCULAR SURGERY: CPT

## 2019-08-24 PROCEDURE — 99232 SBSQ HOSP IP/OBS MODERATE 35: CPT | Performed by: INTERNAL MEDICINE

## 2019-08-24 PROCEDURE — 71045 X-RAY EXAM CHEST 1 VIEW: CPT

## 2019-08-24 PROCEDURE — 94150 VITAL CAPACITY TEST: CPT

## 2019-08-24 PROCEDURE — 700105 HCHG RX REV CODE 258: Performed by: INTERNAL MEDICINE

## 2019-08-24 PROCEDURE — 82803 BLOOD GASES ANY COMBINATION: CPT | Mod: 91

## 2019-08-24 PROCEDURE — A9270 NON-COVERED ITEM OR SERVICE: HCPCS | Performed by: INTERNAL MEDICINE

## 2019-08-24 RX ORDER — SODIUM CHLORIDE, SODIUM LACTATE, POTASSIUM CHLORIDE, CALCIUM CHLORIDE 600; 310; 30; 20 MG/100ML; MG/100ML; MG/100ML; MG/100ML
1000 INJECTION, SOLUTION INTRAVENOUS
Status: DISPENSED | OUTPATIENT
Start: 2019-08-24 | End: 2019-08-24

## 2019-08-24 RX ORDER — HEPARIN SODIUM 5000 [USP'U]/100ML
INJECTION, SOLUTION INTRAVENOUS CONTINUOUS
Status: DISPENSED | OUTPATIENT
Start: 2019-08-24 | End: 2019-08-25

## 2019-08-24 RX ORDER — AMOXICILLIN AND CLAVULANATE POTASSIUM 875; 125 MG/1; MG/1
1 TABLET, FILM COATED ORAL EVERY 12 HOURS
Status: COMPLETED | OUTPATIENT
Start: 2019-08-25 | End: 2019-08-25

## 2019-08-24 RX ORDER — FUROSEMIDE 10 MG/ML
40 INJECTION INTRAMUSCULAR; INTRAVENOUS
Status: DISCONTINUED | OUTPATIENT
Start: 2019-08-24 | End: 2019-08-25

## 2019-08-24 RX ORDER — HEPARIN SODIUM 1000 [USP'U]/ML
4400 INJECTION, SOLUTION INTRAVENOUS; SUBCUTANEOUS PRN
Status: DISCONTINUED | OUTPATIENT
Start: 2019-08-24 | End: 2019-08-25

## 2019-08-24 RX ORDER — IPRATROPIUM BROMIDE AND ALBUTEROL SULFATE 2.5; .5 MG/3ML; MG/3ML
3 SOLUTION RESPIRATORY (INHALATION)
Status: DISCONTINUED | OUTPATIENT
Start: 2019-08-24 | End: 2019-08-28 | Stop reason: HOSPADM

## 2019-08-24 RX ADMIN — SENNOSIDES, DOCUSATE SODIUM 2 TABLET: 50; 8.6 TABLET, FILM COATED ORAL at 17:45

## 2019-08-24 RX ADMIN — HEPARIN SODIUM 2500 UNITS/HR: 5000 INJECTION, SOLUTION INTRAVENOUS at 19:34

## 2019-08-24 RX ADMIN — SENNOSIDES, DOCUSATE SODIUM 2 TABLET: 50; 8.6 TABLET, FILM COATED ORAL at 04:57

## 2019-08-24 RX ADMIN — HEPARIN SODIUM 4400 UNITS: 1000 INJECTION, SOLUTION INTRAVENOUS; SUBCUTANEOUS at 15:14

## 2019-08-24 RX ADMIN — NYSTATIN: 100000 POWDER TOPICAL at 17:46

## 2019-08-24 RX ADMIN — HEPARIN SODIUM 4400 UNITS: 1000 INJECTION, SOLUTION INTRAVENOUS; SUBCUTANEOUS at 07:28

## 2019-08-24 RX ADMIN — FUROSEMIDE 40 MG: 10 INJECTION, SOLUTION INTRAMUSCULAR; INTRAVENOUS at 16:03

## 2019-08-24 RX ADMIN — NYSTATIN: 100000 POWDER TOPICAL at 04:58

## 2019-08-24 RX ADMIN — IPRATROPIUM BROMIDE AND ALBUTEROL SULFATE 3 ML: .5; 3 SOLUTION RESPIRATORY (INHALATION) at 02:27

## 2019-08-24 RX ADMIN — HEPARIN SODIUM 4400 UNITS: 1000 INJECTION, SOLUTION INTRAVENOUS; SUBCUTANEOUS at 21:39

## 2019-08-24 RX ADMIN — CEFTRIAXONE SODIUM 2 G: 2 INJECTION, POWDER, FOR SOLUTION INTRAMUSCULAR; INTRAVENOUS at 04:57

## 2019-08-24 RX ADMIN — HEPARIN SODIUM 2300 UNITS/HR: 5000 INJECTION, SOLUTION INTRAVENOUS at 07:27

## 2019-08-24 RX ADMIN — NYSTATIN: 100000 POWDER TOPICAL at 12:00

## 2019-08-24 RX ADMIN — PREDNISONE 40 MG: 10 TABLET ORAL at 04:57

## 2019-08-24 RX ADMIN — SODIUM CHLORIDE, POTASSIUM CHLORIDE, SODIUM LACTATE AND CALCIUM CHLORIDE 1000 ML: 600; 310; 30; 20 INJECTION, SOLUTION INTRAVENOUS at 16:04

## 2019-08-24 RX ADMIN — FUROSEMIDE 40 MG: 10 INJECTION, SOLUTION INTRAMUSCULAR; INTRAVENOUS at 04:57

## 2019-08-24 RX ADMIN — FAMOTIDINE 20 MG: 20 TABLET ORAL at 04:57

## 2019-08-24 RX ADMIN — HEPARIN SODIUM 4400 UNITS: 1000 INJECTION, SOLUTION INTRAVENOUS; SUBCUTANEOUS at 00:37

## 2019-08-24 RX ADMIN — IPRATROPIUM BROMIDE AND ALBUTEROL SULFATE 3 ML: .5; 3 SOLUTION RESPIRATORY (INHALATION) at 06:52

## 2019-08-24 ASSESSMENT — ENCOUNTER SYMPTOMS
COUGH: 1
ABDOMINAL PAIN: 0
VOMITING: 0
PALPITATIONS: 0
NAUSEA: 0
FEVER: 0
SHORTNESS OF BREATH: 1

## 2019-08-24 ASSESSMENT — LIFESTYLE VARIABLES: EVER_SMOKED: NEVER

## 2019-08-24 ASSESSMENT — PULMONARY FUNCTION TESTS: FVC: 1.1

## 2019-08-24 NOTE — CARE PLAN
Problem: Mechanical Ventilation  Goal: Safe management of artificial airway and ventilation  Outcome: PROGRESSING AS EXPECTED     Problem: Risk for Impaired Mobility--Activity Intolerance  Goal: Mobilize and/or Transfer Safely with Maximum [place]  Outcome: PROGRESSING AS EXPECTED

## 2019-08-24 NOTE — CARE PLAN
Problem: Bronchoconstriction:  Goal: Improve in air movement and diminished wheezing  Outcome: PROGRESSING AS EXPECTED     Duoneb Q4      Problem: Ventilation Defect:  Goal: Ability to achieve and maintain unassisted ventilation or tolerate decreased levels of ventilator support  Outcome: PROGRESSING AS EXPECTED       Adult Ventilation Update    Total Vent Days: 3  CMV  32  450  +18  50%      Patient Lines/Drains/Airways Status      Active Airway       Name: Placement date: Placement time: Site: Days:    Airway ETT Oral 8.0  08/22/19   1308   Oral  1                  Plateau Pressure (Q Shift): 33 (08/23/19 1912)  Static Compliance (ml / cm H2O): 42.5 (08/24/19 0227)    Patient failed trials because of Barriers to Wean: FiO2 >60% or PEEP >10 CM H2O (08/23/19 0615)    Sputum/Suction   Cough: (no sxn at this time) (08/24/19 0227)  Sputum Amount: Small (08/24/19 0000)  Sputum Color: Clear (08/24/19 0000)  Sputum Consistency: Thin;Thick (08/24/19 0000)    Mobility  Level of Mobility: Level IV (08/23/19 0000)  Activity Performed: Unable to mobilize (08/23/19 2000)  Time Activity Tolerated: 5 min (08/22/19 0600)  Distance Per Occurrence (ft.): 4 feet (08/22/19 0600)  # of Times Distance was Traveled: 6 (08/22/19 0600)  Assistance: Standby Assist (08/22/19 0600)  Ambulation Tolerance: Tolerates Well;Tires Quickly (08/22/19 0600)  Pt Calls for Assistance: Yes (08/22/19 0600)  Staff Present for Mobilization: RN (08/22/19 0600)  Gait: Steady;Wide Based (08/22/19 0600)  Assistive Devices: None (08/22/19 0600)  Reason Not Mobilized: Bed rest (08/23/19 2000)  Mobilization Comments: post EKOS (08/23/19 2000)    Events/Summary/Plan: Ventilator Check; Duoneb tx (08/24/19 0227)  Pt slowly weaned from 100% FIO2 to 50% over night.  Initiate PEEP weaning.

## 2019-08-24 NOTE — PROGRESS NOTES
[facility] Medicine Daily Progress Note    Date of Service  8/24/2019    Chief Complaint  28 y.o. male admitted 8/21/2019 with history of asthma admitted with suspected pneumonia and acute respiratory failure    Hospital Course         Interval Problem Update      Heparin drip  SR  -170  Afebrile  TF at 25  Self diuresing  Matthew in place  Extubated this morning on 10 L nasal cannula            Consultants/Specialty  Critical care    Code Status  Full code    Disposition  To be determined    Review of Systems  Review of Systems   Constitutional: Negative for fever.   Respiratory: Positive for cough and shortness of breath.    Cardiovascular: Positive for leg swelling. Negative for chest pain and palpitations.   Gastrointestinal: Negative for abdominal pain, nausea and vomiting.   All other systems reviewed and are negative.       Physical Exam  Temp:  [36.8 °C (98.2 °F)-37 °C (98.6 °F)] 36.8 °C (98.2 °F)  Pulse:  [63-96] 96  Resp:  [16-32] 20  BP: ()/(31-74) 126/54  SpO2:  [89 %-100 %] 91 %    Physical Exam   Constitutional: He is oriented to person, place, and time. He appears well-developed and well-nourished.   HENT:   Head: Normocephalic and atraumatic.   Right Ear: External ear normal.   Left Ear: External ear normal.   Mouth/Throat: No oropharyngeal exudate.   Eyes: Conjunctivae are normal. Right eye exhibits no discharge. Left eye exhibits no discharge.   Neck: Neck supple. No JVD present. No tracheal deviation present.   Cardiovascular: Normal rate and regular rhythm. Exam reveals no gallop and no friction rub.   No murmur heard.  Pulmonary/Chest: Effort normal. No respiratory distress. He has decreased breath sounds. He has no wheezes. He has rales. He exhibits no tenderness.   Abdominal: Soft. Bowel sounds are normal. He exhibits no distension. There is no tenderness. There is no rebound.   Musculoskeletal: He exhibits edema (Improved). He exhibits no tenderness.   Neurological: He is alert and  oriented to person, place, and time. No cranial nerve deficit. He exhibits normal muscle tone.   Skin: Skin is warm and dry. He is not diaphoretic. No cyanosis. Nails show no clubbing.   Psychiatric: He has a normal mood and affect. His behavior is normal. Thought content normal.   Nursing note and vitals reviewed.      Fluids    Intake/Output Summary (Last 24 hours) at 8/24/2019 0910  Last data filed at 8/24/2019 0800  Gross per 24 hour   Intake 1698.62 ml   Output 3150 ml   Net -1451.38 ml       Laboratory  Recent Labs     08/22/19  0301 08/23/19  0516 08/24/19  0600   WBC 9.5 9.2 7.7   RBC 6.40* 5.68 5.64   HEMOGLOBIN 15.0 14.0 13.8*   HEMATOCRIT 53.6* 47.5 47.3   MCV 83.8 83.6 83.9   MCH 23.4* 24.6* 24.5*   MCHC 28.0* 29.5* 29.2*   RDW 60.0* 59.1* 59.7*   PLATELETCT 123* 116* 123*   MPV  --   --  11.3     Recent Labs     08/22/19  0301 08/23/19  0516 08/24/19  0600   SODIUM 136 137 144   POTASSIUM 4.4 4.3 4.0   CHLORIDE 102 102 107   CO2 25 25 26   GLUCOSE 180* 187* 118*   BUN 10 31* 43*   CREATININE 0.73 1.77* 2.62*   CALCIUM 9.1 8.9 9.3     Recent Labs     08/21/19  1900 08/22/19  1430  08/23/19  1530 08/23/19  2356 08/24/19  0600   APTT  --  26.8   < > 30.5 26.9 50.0*   INR 1.17* 1.29*  --   --   --   --     < > = values in this interval not displayed.         Recent Labs     08/24/19  0600   TRIGLYCERIDE 228*       Imaging  DX-CHEST-PORTABLE (1 VIEW)   Final Result         1. No significant interval change.      IR-PULMONARY ANGIOGRAM-BILATERAL   Final Result      1.  Ultrasound guided access RIGHT common femoral vein   2.  BILATERAL selective pulmonary arteriogram   3.  Placement of BILATERAL EKOS catheters   4.  Initiation of thrombolytic infusion with EKOS acoustic pulse thrombolysis therapy (US-assisted thrombolysis)            DX-CHEST-PORTABLE (1 VIEW)   Final Result      Bibasilar underinflation atelectasis which could obscure an additional process. This is unchanged.      EC-ECHOCARDIOGRAM COMPLETE  W/ CONT   Final Result      DX-ABDOMEN FOR TUBE PLACEMENT   Final Result      Feeding tube tip projects over the expected location of the gastric antrum.      CT-CTA CHEST PULMONARY ARTERY W/ RECONS   Final Result      1.  Right-sided lobar, segmental and subsegmental pulmonary emboli. Suspected subsegmental left-sided pulmonary emboli. Associated right heart strain.   2.  Scattered pulmonary infarcts. Dependent opacities in the lower lobes may represent a combination of atelectasis and infarcts, however, superimposed pneumonia is also possible.   3.  Cardiomegaly.   4.  Large right thyroid nodule measuring 2.9 cm. It can be followed on an outpatient basis.   5.  Splenomegaly.      MICHELLE SAEED was paged at 8/22/2019 4:08 PM.      DX-CHEST-PORTABLE (1 VIEW)   Final Result      1.  Well-positioned lines and tubes. No pneumothorax.   2.  Interval worsening of bilateral perihilar and bibasilar opacities, which may represent a continuation of atelectasis and developing alveolar edema.   3.  Small bilateral pleural effusions.      US-EXTREMITY VENOUS LOWER BILAT   Final Result      DX-CHEST-LIMITED (1 VIEW)   Final Result      No significant change in pulmonary edema      DX-CHEST-PORTABLE (1 VIEW)   Final Result      1.  Enlarged cardiac silhouette consistent with findings of vascular congestion/edema.   2.  Right middle lobe and lower lobe airspace opacities are suspicious for pneumonia.           Assessment/Plan  * Acute respiratory failure with hypoxia and hypercapnia (HCC)- (present on admission)  Assessment & Plan  Secondary to pneumonia sepsis and PE    Tolerated extubation on 8/24/2019  Continue close clinical monitoring  RT protocol    Community acquired pneumonia  Assessment & Plan  Antibiotics de-escalated to Augmentin follow-up on final culture and sensitivity    Severe sepsis (HCC)- (present on admission)  Assessment & Plan  -This is severe sepsis with the following associated acute organ dysfunction(s):  acute respiratory failure.       Source pneumonia  Follow-up on final cultures and sensitivities    Antibiotics de-escalated to Augmentin    Asthma with exacerbation- (present on admission)  Assessment & Plan  No wheezing on exam today Taper steroids    JONES (obstructive sleep apnea)  Assessment & Plan  Will need formal evaluation when clinically stable    Hyperglycemia- (present on admission)  Assessment & Plan  HbA1c 6.1    Reinforce lifestyle changes after discharge    Insulin sliding scale and monitor CBGs    Morbid obesity (HCC)- (present on admission)  Assessment & Plan  Body mass index is 59.1 kg/m².      TASHI (acute kidney injury) ([facility])  Assessment & Plan  Likely  ATN from shock and possible contrast-induced nephropathy    Serum creatinine worse today continue to monitor  If does not plateau and  start improving check ultrasound and consider nephrology consultation      Shock ([facility])  Assessment & Plan  Resolved    Pulmonary embolism with acute cor pulmonale (HCC)  Assessment & Plan  Status post systemic alteplase and EKOS  Continue heparin drip  Transition to DOAC if remains stable    Pulmonary hypertension (HCC)  Assessment & Plan  Secondary to PE and also suspect underlying severe sleep apnea which will need further work-up as outpatient    Consider diuresis if blood pressure remains stable    Type 2 myocardial infarction (HCC)  Assessment & Plan  Secondary to acute respiratory failure and PE          Plan of care reviewed with patient and discussed with nursing staff pharmacist and Dr. Gant    VTE prophylaxis:Heparin

## 2019-08-24 NOTE — ASSESSMENT & PLAN NOTE
Multifactorial  Likely some contrast induced nephropathy  Given 1 L ivf today  Diuresis as necessary  Adequate output  Also complicated by poor perfusion  Holding on lovenox  Transition to long term ac  Continue heparin drip

## 2019-08-24 NOTE — CARE PLAN
Problem: Safety  Goal: Will remain free from injury  Outcome: PROGRESSING AS EXPECTED     Problem: Respiratory:  Goal: Respiratory status will improve  Outcome: PROGRESSING AS EXPECTED  Intervention: Assess and monitor pulmonary status  Flowsheets  Taken 8/23/2019 2000 by Enmanuel Donohue R.N.  Resp: 24 !  SpO2: 95 %  Taken 8/22/2019 1200 by Clarisse Chavez R.N.  O2 (LPM): 60  Taken 8/24/2019 0400 by Enmanuel Donohue R.N.  Work Of Breathing / Effort: Vented  RUL Breath Sounds: Clear  RML Breath Sounds: Diminished  RLL Breath Sounds: Diminished  FARZANEH Breath Sounds: Clear  LLL Breath Sounds: Diminished     Problem: Safety  Goal: Will remain free from falls  Outcome: MET  Intervention: Assess risk factors for falls  Flowsheets  Taken 8/22/2019 0600 by Nacnie Alston R.N.  Pt Calls for Assistance: Yes  Taken 8/22/2019 0150 by Liliam Elder R.N.  History of fall: 0  Taken 8/24/2019 0410 by Enmanuel Donohue R.N.  Mobility Status Assessment: 2-2 Healthcare Providers Required for Assistance with Ambulation & Transfer  Intervention: Implement fall precautions  Flowsheets  Taken 8/24/2019 0410  Bed Alarm: Yes - Alarm On  Chair/Bed Strip Alarm: Yes - Alarm On  Taken 8/23/2019 2200  Environmental Precautions: Personal Belongings, Wastebasket, Call [place] etc. in Easy Reach;Report Given to Other Health Care Providers Regarding Fall Risk;Bed in Low Position;Communication Sign for Patients & Families

## 2019-08-24 NOTE — CARE PLAN
Problem: Communication  Goal: The ability to communicate needs accurately and effectively will improve  Intervention: [place] patient and significant other/support system to call light to alert staff of needs  Flowsheets (Taken 8/24/2019 4921)  Oriented to:: All of the Following : Location of Bathroom, Visiting Policy, Unit Routine, Call Light and Bedside Controls, Bedside Rail Policy, Smoking Policy, Rights and Responsibilities, Bedside Report, and Patient Education Notebook; Call Light & Bedside Controls; Location of bathroom; Bedside Rail Policy; Bedside Report  Intervention: Educate patient and significant other/support system about the plan of care, procedures, treatments, medications and allow for questions  Flowsheets (Taken 8/24/2019 4790)  Pt & Family Have Been Educated on Methods Available to Report Concerns Related to Care, Treatment, Services, and Patient Safety Issues: Yes  Intervention: Collaborate with speech therapy  Note:   Speech therapy notified and present after extubation.

## 2019-08-24 NOTE — PROGRESS NOTES
IR Procedure RN's Note:    BILATERAL pulmonary angiogram with thrombolysis done by Dr. Rodriguez; RIGHT femoral vein access site; pt assessed upon arrival, pt A/Ox4, pt aware of the procedure, pt baseline - intubated but oriented and able to consent; tPA infusion intervention performed; pt appears comfortable throughout the procedure with no signs of distress or discomfort; RT in procedure room to monitor the vent; access site CDI, soft with no signs of hematoma or bleeding, gauze and tegaderm for dressing; telephone report given to Clarisse; pt is back to baseline and still on the vent; pt transported to back to room by this RN, RT and transport.

## 2019-08-24 NOTE — PROGRESS NOTES
"Interval History:  Underwent EKOS catheter placement and TPA administration yesterday.  Feels well this morning.  Denies pain.    Physical Exam   Blood pressure 126/54, pulse 96, temperature 36.8 °C (98.2 °F), temperature source Bladder, resp. rate 20, height 1.727 m (5' 8\"), weight (!) 168 kg (370 lb 6 oz), SpO2 91 %.    General: Awake, intubated.  EYES: no jaundice  HEENT: OP clear   Neck: No bruits No JVD.   CVS:   RRR. S1 + S2. No M/R/G  Resp: Clear anteriorly.  Abdomen: Soft, NT, ND,  Skin: Grossly nothing acute no obvious rashes  Neurological: Alert, Moves all extremities, no cranial nerve defects on limited exam  Extremities: Pulse 2+ in b/l LE. No edema. No cyanosis.     ROS: Unable to obtain patient is intubated.      Intake/Output Summary (Last 24 hours) at 8/24/2019 0956  Last data filed at 8/24/2019 0921  Gross per 24 hour   Intake 1728.62 ml   Output 3150 ml   Net -1421.38 ml       Recent Labs     08/22/19  0301 08/23/19  0516 08/24/19  0600   WBC 9.5 9.2 7.7   RBC 6.40* 5.68 5.64   HEMOGLOBIN 15.0 14.0 13.8*   HEMATOCRIT 53.6* 47.5 47.3   MCV 83.8 83.6 83.9   MCH 23.4* 24.6* 24.5*   MCHC 28.0* 29.5* 29.2*   RDW 60.0* 59.1* 59.7*   PLATELETCT 123* 116* 123*   MPV  --   --  11.3     Recent Labs     08/22/19  0301 08/23/19  0516 08/24/19  0600   SODIUM 136 137 144   POTASSIUM 4.4 4.3 4.0   CHLORIDE 102 102 107   CO2 25 25 26   GLUCOSE 180* 187* 118*   BUN 10 31* 43*   CREATININE 0.73 1.77* 2.62*   CALCIUM 9.1 8.9 9.3     Recent Labs     08/21/19  1900 08/22/19  1430  08/23/19  1530 08/23/19  2356 08/24/19  0600   APTT  --  26.8   < > 30.5 26.9 50.0*   INR 1.17* 1.29*  --   --   --   --     < > = values in this interval not displayed.             Recent Labs     08/24/19  0600   TRIGLYCERIDE 228*       No current facility-administered medications on file prior to encounter.      No current outpatient medications on file prior to encounter.       Current Facility-Administered Medications   Medication Dose " Frequency Provider Last Rate Last Dose   • lactated ringers infusion  1,000 mL CONTINUOUS (1600 Start) Marbin Gant M.D.       • [START ON 8/25/2019] amoxicillin-clavulanate (AUGMENTIN) 875-125 MG per tablet 1 Tab  1 Tab Q12HRS Marbin Gant M.D.       • nystatin (MYCOSTATIN) powder   TID Marbin Gant M.D.       • insulin regular (HUMULIN R) injection 2-9 Units  2-9 Units Q6HRS Marbin Gant M.D.   Stopped at 08/24/19 0000    And   • glucose 4 g chewable tablet 16 g  16 g Q15 MIN PRN Marbin Gant M.D.        And   • DEXTROSE 10% BOLUS 250 mL  250 mL Q15 MIN PRN Marbin Gant M.D.       • heparin injection 4,400 Units  4,400 Units PRN Marbin Gant M.D.   4,400 Units at 08/24/19 0728    And   • heparin infusion 25,000 units in 500 mL 0.45% NACL   Continuous Marbin Gant M.D. 46 mL/hr at 08/24/19 0727 2,300 Units/hr at 08/24/19 0727   • norepinephrine (LEVOPHED) 8 mg in  mL Infusion  0-30 mcg/min Continuous Marbin Gant M.D.   Stopped at 08/22/19 1632   • Respiratory Care per Protocol   Continuous RT Marbin Gant M.D.       • MD Alert...ICU Electrolyte Replacement per Pharmacy   PHARMACY TO DOSE Marbin Gant M.D.       • lidocaine (XYLOCAINE) 1 % injection 1-2 mL  1-2 mL Q30 MIN PRN Marbni Gant M.D.       • Pharmacy Consult: Enteral tube insertion - review meds/change route/product selection   PHARMACY TO DOSE Marbin Gant M.D.       • fentaNYL (SUBLIMAZE) injection 25 mcg  25 mcg Q HOUR PRN Marbin Gant M.D.        Or   • fentaNYL (SUBLIMAZE) injection 50 mcg  50 mcg Q HOUR PRN Marbin Gant M.D.        Or   • fentaNYL (SUBLIMAZE) injection 100 mcg  100 mcg Q HOUR PRN Marbin Gant M.D.   100 mcg at 08/22/19 2307   • acetaminophen (TYLENOL) tablet 650 mg  650 mg Q6HRS PRN Marbin Gant M.D.       • famotidine (PEPCID) tablet 20 mg  20 mg Q12HRS Marbin Gant M.D.   20 mg at 08/24/19 0457    Or   • famotidine (PEPCID) injection 20 mg  20 mg Q12HRS Marbin Gant M.D.       •  senna-docusate (PERICOLACE or SENOKOT S) 8.6-50 MG per tablet 2 Tab  2 Tab BID Marbin Gant M.D.   2 Tab at 08/24/19 0457    And   • polyethylene glycol/lytes (MIRALAX) PACKET 1 Packet  1 Packet QDAY PRN Marbin Gant M.D.        And   • magnesium hydroxide (MILK OF MAGNESIA) suspension 30 mL  30 mL QDAY PRN Marbin Gant M.D.        And   • bisacodyl (DULCOLAX) suppository 10 mg  10 mg QDAY PRN Marbin Gant M.D.       • fentaNYL (SUBLIMAZE) 50 mcg/mL in 50mL (Continuous Infusion)   Continuous David Gifford M.D.   Stopped at 08/23/19 1700   • lactated ringers infusion (BOLUS): BMI greater than 30  1,000 mL Once PRN SALINAS WolffO.       • enalaprilat (VASOTEC) injection 1.25 mg  1.25 mg Q6HRS PRN SALINAS WolffO.       • ondansetron (ZOFRAN) syringe/vial injection 4 mg  4 mg Q4HRS PRN SALINAS WolffO.       • ondansetron (ZOFRAN ODT) dispertab 4 mg  4 mg Q4HRS PRN BARBARA Wolff.O.       • promethazine (PHENERGAN) tablet 12.5-25 mg  12.5-25 mg Q4HRS PRN SALINAS WolffO.       • promethazine (PHENERGAN) suppository 12.5-25 mg  12.5-25 mg Q4HRS PRN BARBARA Wolff.O.       • prochlorperazine (COMPAZINE) injection 5-10 mg  5-10 mg Q4HRS PRN SALINAS WolffO.       • ipratropium-albuterol (DUONEB) nebulizer solution  3 mL Q4HRS (RT) Antonio Mathis D.O.   3 mL at 08/24/19 0652     Last reviewed on 8/22/2019  2:28 AM by Kacy Mulligan, REYAL.  Medications reviewed    Imaging reviewed    CT-CTA CHEST PULMONARY ARTERY W/ RECONS   Final Result       1.  Right-sided lobar, segmental and subsegmental pulmonary emboli. Suspected subsegmental left-sided pulmonary emboli. Associated right heart strain.   2.  Scattered pulmonary infarcts. Dependent opacities in the lower lobes may represent a combination of atelectasis and infarcts, however, superimposed pneumonia is also possible.   3.  Cardiomegaly.   4.  Large right thyroid nodule measuring 2.9 cm. It can be followed on an  outpatient basis.   5.  Splenomegaly.       MICHELLE SAEED was paged at 8/22/2019 4:08 PM.     Echo 8/22/2019    CONCLUSIONS  No prior study is available for comparison.   Technically difficult study - adequate information is obtained.   Sinus rhythm.  Reduced right ventricular systolic function.  Akinetic right   ventricular free wall.  Estimated right ventricular systolic pressure is >85 mmHg.  Left ventricular ejection fraction is visually estimated to be 70%.    Abnormal septal motion consistent with right ventricular (RV) volume   overload and/or elevated RV end-diastolic pressure.  Mild tricuspid regurgitation.  Pulmonary team notified at time of reading.    Impressions:  28-year-old male patient with history of obesity, obstructive sleep apnea, polycythemia admitted with shortness of breath found to have significant bilateral pulmonary embolisms with pulmonary infarct, superimposed pneumonia.    Recommendations:  Patient underwent successful TPA, catheter directed thrombolysis.  This should help with the pulmonary hypertension long-term.  I already made a follow-up appointment with Dr. Mendez in few weeks.  Continue current management, anticoagulation.  Likely need lifetime anticoagulation.  Had acute kidney injury from shock, dye from CAT scan.  Most likely will improve with supportive care.  Recommend hypercoagulable work-up factor V, lupus anticoagulant.  I will sign off for now.    Discussed with primary physician and bedside nursing.    Do not hesitate to call me with questions regarding the patient care.    Dago Escamilla  Interventional cardiologist  Cell: [phone]

## 2019-08-24 NOTE — PROGRESS NOTES
Critical Care Progress Note    Date of admission  8/21/2019    Chief Complaint  27 yo male with hx of marcel and asthma feels like he has a cold.  Recent trip to [place].  He has been sleeping with air conditioner.  He came back to [place] on the 15th.  Since Sunday worsening cough and sputum protuction with wheezing.  He has a rescue inhaler at home that he uses occasionally.  At urgent care 75% fio2.  Sent  To [facility] ER.  He was initially admitted to medicine floor and now transferred to the ICU.  He has pneumonia on chest xray. Treated for sepsis.  RR 40s and on high flow nasal cannula 60 L and 100%, sating 80-95%. Patient intubated 8/22, he refused bipap, on max high flow, high rr, unable to get to CTA for PE study, intubated, up to peep 22, flolan started, cta showed bilateral pulmonary artery PE, massive, hemodynamically unstable, given TPA, rhino rockets for nasal bleeding.  Rt heart pressure with rvsp 80 mmhg.    Hospital Course          Interval Problem Update  Reviewed last 24 hour events:  Tolerated ekos  Catheters out this am  Not on pressors  Afebrile  Tube feeds  350 ml urine an hour  LR one liter  Right radial art line  Central line  Reduced peep 18 to 8, tolerated  sbt  Heparin drip  pepcid  Ab to augmentin tomorrow for one day  4 days of ceftriaxone  Total 5 days  Bal negative    addendum  sbt after weaning peep to 8, adequate  abg adequate  Extubated  Tolerated well  On 10 L oxymask  Diuresis as necessary  Given fluids am because of contrast induced nephropathy      Review of Systems  Review of Systems   Unable to perform ROS: Intubated        Vital Signs for last 24 hours   Temp:  [36.8 °C (98.2 °F)-37 °C (98.6 °F)] 36.8 °C (98.2 °F)  Pulse:  [63-95] 63  Resp:  [16-32] 32  BP: ()/(31-74) 124/60  SpO2:  [89 %-100 %] 97 %    Hemodynamic parameters for last 24 hours       Respiratory Information for the last 24 hours  Light Vent Mode: APVCMV  Rate (breaths/min): 26  Vt Target (mL):  450  PEEP/CPAP: 14  FiO2: 50  P MEAN: 25  Control VTE (exp VT): 453    Physical Exam   Physical Exam   Constitutional: He is oriented to person, place, and time. He appears distressed.   Alert and oriented, writing messages   HENT:   Head: Normocephalic and atraumatic.   Right Ear: External ear normal.   Left Ear: External ear normal.   Mouth/Throat: No oropharyngeal exudate.   Eyes: Pupils are equal, round, and reactive to light. Conjunctivae and EOM are normal. Right eye exhibits no discharge. Left eye exhibits no discharge.   Neck: JVD present. No tracheal deviation present.   Cardiovascular: Normal rate, regular rhythm and normal heart sounds.   No murmur heard.  Pulmonary/Chest: No stridor. He is in respiratory distress. He has no wheezes. He has rales. He exhibits no tenderness.   Reduced air movement, more relaxed on ventilator   Abdominal: He exhibits distension. He exhibits no mass. There is no tenderness. There is no rebound and no guarding.   From obesity   Musculoskeletal: He exhibits edema. He exhibits no tenderness or deformity.   Neurological: He is alert and oriented to person, place, and time. He displays normal reflexes. No cranial nerve deficit. Coordination normal.   Skin: Skin is warm and dry. No rash noted. He is not diaphoretic. No erythema.   Psychiatric: He has a normal mood and affect. His behavior is normal.   Nursing note and vitals reviewed.      Medications  Current Facility-Administered Medications   Medication Dose Route Frequency Provider Last Rate Last Dose   • lactated ringers infusion  1,000 mL Intravenous Once Marbin Gant M.D.       • [START ON 8/25/2019] amoxicillin-clavulanate (AUGMENTIN) 875-125 MG per tablet 1 Tab  1 Tab Oral Q12HRS Marbin Gant M.D.       • nystatin (MYCOSTATIN) powder   Topical TID Marbin Gant M.D.       • insulin regular (HUMULIN R) injection 2-9 Units  2-9 Units Subcutaneous Q6HRS Marbin Gant M.D.   Stopped at 08/24/19 0000    And   • glucose 4  g chewable tablet 16 g  16 g Oral Q15 MIN PRN Marbin Gant M.D.        And   • DEXTROSE 10% BOLUS 250 mL  250 mL Intravenous Q15 MIN PRN Marbin Gant M.D.       • heparin injection 4,400 Units  4,400 Units Intravenous PRN Marbin Gant M.D.   4,400 Units at 08/24/19 0037    And   • heparin infusion 25,000 units in 500 mL 0.45% NACL   Intravenous Continuous Marbin Gant M.D. 42 mL/hr at 08/24/19 0035 2,100 Units/hr at 08/24/19 0035   • norepinephrine (LEVOPHED) 8 mg in  mL Infusion  0-30 mcg/min Intravenous Continuous Marbin Gant M.D.   Stopped at 08/22/19 1632   • Respiratory Care per Protocol   Nebulization Continuous RT Marbin Gant M.D.       • MD Alert...ICU Electrolyte Replacement per Pharmacy   Other PHARMACY TO DOSE Marbin Gant M.D.       • lidocaine (XYLOCAINE) 1 % injection 1-2 mL  1-2 mL Tracheal Tube Q30 MIN PRN Marbin Gant M.D.       • Pharmacy Consult: Enteral tube insertion - review meds/change route/product selection   Other PHARMACY TO DOSE Marbin Gant M.D.       • fentaNYL (SUBLIMAZE) injection 25 mcg  25 mcg Intravenous Q HOUR PRN Marbin Gant M.D.        Or   • fentaNYL (SUBLIMAZE) injection 50 mcg  50 mcg Intravenous Q HOUR PRN Marbin Gant M.D.        Or   • fentaNYL (SUBLIMAZE) injection 100 mcg  100 mcg Intravenous Q HOUR PRN Marbin Gant M.D.   100 mcg at 08/22/19 2307   • acetaminophen (TYLENOL) tablet 650 mg  650 mg Enteral Tube Q6HRS PRN Marbin Gant M.D.       • famotidine (PEPCID) tablet 20 mg  20 mg Enteral Tube Q12HRS Marbin Gant M.D.   20 mg at 08/24/19 0457    Or   • famotidine (PEPCID) injection 20 mg  20 mg Intravenous Q12HRS Marbin Gant M.D.       • senna-docusate (PERICOLACE or SENOKOT S) 8.6-50 MG per tablet 2 Tab  2 Tab Enteral Tube BID Marbin Gant M.D.   2 Tab at 08/24/19 0457    And   • polyethylene glycol/lytes (MIRALAX) PACKET 1 Packet  1 Packet Enteral Tube QDAY PRN Marbin Gant M.D.        And   • magnesium hydroxide (MILK OF  MAGNESIA) suspension 30 mL  30 mL Enteral Tube QDAY PRN Marbin Gant M.D.        And   • bisacodyl (DULCOLAX) suppository 10 mg  10 mg Rectal QDAY PRN Marbin Gant M.D.       • fentaNYL (SUBLIMAZE) 50 mcg/mL in 50mL (Continuous Infusion)   Intravenous Continuous David Gifford M.D.   Stopped at 08/23/19 1700   • lactated ringers infusion (BOLUS): BMI greater than 30  1,000 mL Intravenous Once PRN Richar Matt D.O.       • enalaprilat (VASOTEC) injection 1.25 mg  1.25 mg Intravenous Q6HRS PRN BARBARA Wolff.O.       • ondansetron (ZOFRAN) syringe/vial injection 4 mg  4 mg Intravenous Q4HRS PRN BARBARA Wolff.O.       • ondansetron (ZOFRAN ODT) dispertab 4 mg  4 mg Oral Q4HRS PRN Richar Matt D.O.       • promethazine (PHENERGAN) tablet 12.5-25 mg  12.5-25 mg Oral Q4HRS PRN Richar Matt D.O.       • promethazine (PHENERGAN) suppository 12.5-25 mg  12.5-25 mg Rectal Q4HRS PRN BARBARA Wolff.O.       • prochlorperazine (COMPAZINE) injection 5-10 mg  5-10 mg Intravenous Q4HRS PRN Richar Matt D.O.       • ipratropium-albuterol (DUONEB) nebulizer solution  3 mL Nebulization Q4HRS (RT) BARBARA [email or web address]   3 mL at 08/24/19 0652       Fluids    Intake/Output Summary (Last 24 hours) at 8/24/2019 0700  Last data filed at 8/24/2019 0600  Gross per 24 hour   Intake 1693.05 ml   Output 2950 ml   Net -1256.95 ml       Laboratory  Recent Labs     08/22/19  0441  08/23/19  0431 08/23/19  1539 08/24/19  0444   HZVRC10G 7.33*  --   --   --   --    UYYCQW624M 45.8*  --   --   --   --    IXFCT396V 81.9  --   --   --   --    BROM6SKZ 93.8  --   --   --   --    ARTHCO3 23  --   --   --   --    ARTBE -3  --   --   --   --    ISTATAPH  --    < > 7.398* 7.366* 7.462   ISTATAPCO2  --    < > 42.1* 49.1* 35.5   ISTATAPO2  --    < > 86 98* 80   ISTATATCO2  --    < > 27 30 26   LBGCQHQ7ICP  --    < > 96 97 97   ISTATARTHCO3  --    < > 26.0* 28.1* 25.4*   ISTATARTBE  --    < > 1 2 2   ISTATTEMP  --     < > 36.6 C 37.0 C 36.6 C   ISTATFIO2  --    < > 65 50 .50   ISTATSPEC  --    < > Arterial Arterial Arterial   ISTATAPHTC  --    < > 7.404 7.366* 7.468   [id number]  --    < > 83 98* 78    < > = values in this interval not displayed.         Recent Labs     08/22/19 0301 08/23/19  0516 08/24/19  0600   SODIUM 136 137 144   POTASSIUM 4.4 4.3 4.0   CHLORIDE 102 102 107   CO2 25 25 26   BUN 10 31* 43*   CREATININE 0.73 1.77* 2.62*   MAGNESIUM  --  2.3 2.4   PHOSPHORUS  --  5.1* 5.3*   CALCIUM 9.1 8.9 9.3     Recent Labs     08/21/19 1900 08/22/19 0301 08/23/19  0516 08/24/19  0600   ALTSGPT 30 28  --   --    ASTSGOT 26 24  --   --    ALKPHOSPHAT 97 96  --   --    TBILIRUBIN 1.2 1.1  --   --    PREALBUMIN  --   --   --  14.0*   GLUCOSE 103* 180* 187* 118*     Recent Labs     08/21/19 1900 08/22/19 0301 08/23/19  0516 08/24/19  0600   WBC 13.8* 9.5 9.2 7.7   NEUTSPOLYS 62.00 83.90* 67.00  --    LYMPHOCYTES 24.00 9.80* 26.10  --    MONOCYTES 11.00 2.70 2.60  --    EOSINOPHILS 0.00 0.00 0.00  --    BASOPHILS 1.00 0.00 0.00  --    ASTSGOT 26 24  --   --    ALTSGPT 30 28  --   --    ALKPHOSPHAT 97 96  --   --    TBILIRUBIN 1.2 1.1  --   --      Recent Labs     08/21/19 1900 08/22/19 0301 08/22/19  1430  08/23/19  0516  08/23/19  1530 08/23/19  2356 08/24/19  0600   RBC 6.63* 6.40*  --   --  5.68  --   --   --  5.64   HEMOGLOBIN 15.7 15.0  --   --  14.0  --   --   --  13.8*   HEMATOCRIT 55.7* 53.6*  --   --  47.5  --   --   --  47.3   PLATELETCT 126* 123*  --   --  116*  --   --   --  123*   PROTHROMBTM 15.2*  --  16.4*  --   --   --   --   --   --    APTT  --   --  26.8   < >  --    < > 30.5 26.9 50.0*   INR 1.17*  --  1.29*  --   --   --   --   --   --     < > = values in this interval not displayed.       Imaging  X-Ray:  I have personally reviewed the images and compared with prior images.  EKG:  I have personally reviewed the images and compared with prior images.  CT:    Reviewed  Echo:   Reviewed  Ultrasound:   Reviewed    Assessment/Plan  * Acute respiratory failure with hypoxia and hypercapnia ([facility])- (present on admission)  Assessment & Plan  Massive PE  Pneumonia, aspiration  Tpa 100 mg, then ekos cath as was not fully improving  Intubated initial day, then extubated 8/24  Tolerated well  Fluids/diuresis as necessary  Right heart failure, acute on chronic pulmonary htn  JONES      Aspiration pneumonia ([facility])  Assessment & Plan  Likely aspiration secondary to PE/respiratory failure/encephalopathy  4 days ceftriaxone, neg bal, augmentin for last day      Severe sepsis ([facility])- (present on admission)  Assessment & Plan  5 days ab  Likely aspiration pna  Ceftriaxone, last day will do augmentin now that extubated    Asthma with exacerbation- (present on admission)  Assessment & Plan  3 days therapy, done  exac by PE/pna  Scheduled duonebs      JONES (obstructive sleep apnea)  Assessment & Plan  Sleep study post hospitalizaiton recommended  Likely needs cpap or bipap especially in setting of severe pulmonary htn    Hyperglycemia- (present on admission)  Assessment & Plan  SSI    Polycythemia- (present on admission)  Assessment & Plan  Likely fluid contraction, may also be due to chronic hypoxic state with JONES    Morbid obesity ([facility])- (present on admission)  Assessment & Plan  Nutrition counseling  BMI 59.1  JONES contributory to respiratory failure    TASHI (acute kidney injury) ([facility])  Assessment & Plan  Multifactorial  Likely some contrast induced nephropathy  Given 1 L ivf today  Diuresis as necessary  Adequate output  Also complicated by poor perfusion    Shock ([facility])  Assessment & Plan  Multifactorial  Obstructive with massive PE  Cardiogenic from right heart failure  Septic shock from pneumonia  Keep map > 65  Pressors as needed  Diuresis as toelrated    Pulmonary embolism with acute cor pulmonale ([facility])  Assessment & Plan  Massive pe, hemodynamic instability  Both pulmonary arteries occluded   mg  Heparin drip  Will need  long term anticoagulation  rvsp 80 mmhg    Pulmonary hypertension ([facility])  Assessment & Plan  rvsp 80 mmhg  Massive pe  marcel contributory, obesity contributory  Right heart failure  Flolan, complete doses    Type 2 myocardial infarction ([facility])  Assessment & Plan  Secondary to PE and septic/cardiogenic/obstructive shock       VTE:  Heparin  Ulcer: H2 Antagonist  Lines: Central Line  Ongoing indication addressed, Arterial Line  Ongoing indication addressed and Matthew Catheter  Ongoing indication addressed    I have performed a physical exam and reviewed and updated ROS and Plan today (8/24/2019). In review of yesterday's note (8/23/2019), there are no changes except as documented above.     Discussed patient condition and risk of morbidity and/or mortality with Family, RN, RT, Pharmacy, Code status disscussed, Charge nurse / hot rounds and Patient     The patient remains critically ill.  On mechanical ventilator, reduced peep from 18 to 8 over course of day, extubated, tolerated well.  Heparin drip, adjusting per sequential PTT.  S/p TPA and TPA per catheters.  vasopressors for map > 65.  Severe sepsis due to aspiration pneumonia. Critical care time = 112 minutes in directly providing and coordinating critical care and extensive data review.  No time overlap and excludes procedures.

## 2019-08-24 NOTE — PROGRESS NOTES
Patient extubated at 1028. RT and RN present for extubation. Patient on 10 liters O2 via oxy mask and tolerating well.

## 2019-08-24 NOTE — CARE PLAN
Problem: Safety  Goal: Will remain free from injury  8/24/2019 1550 by Carito Kline R.N.  Outcome: MET  8/24/2019 1550 by Carito Kline R.N.  Reactivated  8/24/2019 1549 by Carito Kline R.N.  Outcome: MET  Goal: Will remain free from falls  8/24/2019 1550 by Carito Kline R.N.  Outcome: MET  8/24/2019 1550 by Carito Kline R.N.  Reactivated  Intervention: Assess risk factors for falls  Flowsheets  Taken 8/24/2019 1550 by Carito Kline R.N.  Pt Calls for Assistance: Yes  Mobility Status Assessment: 1-1 Healthcare Provider Required for Assistance with Ambulation & Transfer  Risk for Injury-Any positive answers results in the pt being at high risk for fall related injury: Coagulation:  Therapeutic anticoagulation use (NOT prophylaxis)  Taken 8/22/2019 0150 by Liliam Elder R.N.  History of fall: 0  Intervention: Implement fall precautions  Flowsheets  Taken 8/24/2019 0410 by Enmanuel Donohue R.N.  Bed Alarm: Yes - Alarm On  Chair/Bed Strip Alarm: Yes - Alarm On  Taken 8/24/2019 1400 by Carito Kline R.N.  Environmental Precautions: Treaded Slipper Socks on Patient;Personal Belongings, Wastebasket, Call Bell etc. in Easy Reach;Transferred to Stronger Side;Bed in Low Position;Report Given to Other Health Care Providers Regarding Fall Risk;Communication Sign for Patients & Families;Mobility Assessed & Appropriate Sign Placed  Taken 8/24/2019 1550 by Carito Kline R.N.  IV Pole on Same Side of Bed as Bathroom: Yes     Problem: Respiratory:  Goal: Respiratory status will improve  Outcome: MET

## 2019-08-24 NOTE — THERAPY
"  Speech Language Therapy Clinical Swallow Evaluation completed.  Functional Status: Received call from RN regarding clinical swallow evaluation post extubation. Per RN, pt extubated at 10:28 am and voice is strong. Pt currently on 10L oxymask. No gross deficits appreciated during the oral motor evaluation and voice was slightly raspy, which pt reports is not baseline. PO trials of NTL, purees (apple sauce and pudding), and thin liquids resulted in immediate and delayed coughing and intermittent wet voice following PO which is concerning for aspiration. No overt s/sx of noted with single ice chips. Upon palpation, laryngeal elevation was weak and pt triggering 2-3 swallows per bite/sip which is concerning for potential pharyngeal residue. Pt has productive volitional cough and clearing secretions to mouth. Increased WOB noted with all PO trials and saturations dropped from mid to high 90's to 90 with quick removal and replacement of oxymask. Respiratory rate increasing to 30 intermittently. At this time, would recommend continuation of NPO with non-oral source. Okay for 5 single ice chips an hour with RN to maintain integrity of the swallow and reduce xerostomia. Dysphagia education and recommendations provided to pt and he verbalized understanding, however, pt will need ongoing education as pt with poor insight into current medical status. SLP following.    Recommendations - Diet:  Continuation of NPO with non-oral source of nutrition. Okay for 5 single ice chips an hour with RN only to maintain integrity of the swallow and reduce xerostomia                          Strategies: to be determined                          Medication Administration:  Via tubefeed  Plan of Care: Will benefit from Speech Therapy 3 times per week  Post-Acute Therapy: Recommend inpatient transitional care services for continued speech therapy services.        See \"Rehab Therapy-Acute\" Patient Summary Report for complete documentation.   "

## 2019-08-24 NOTE — OR SURGEON
Immediate Post- Operative Note        PostOp Diagnosis: B PE with R heart strain.       Procedure(s): B pulmonary angiograms with EKOS catheter placement for TPA administration.       Estimated Blood Loss: Less than 5 ml        Complications: None            8/23/2019     1825 PM     Peter Rodriguez

## 2019-08-25 ENCOUNTER — APPOINTMENT (OUTPATIENT)
Dept: RADIOLOGY | Facility: MEDICAL CENTER | Age: 29
DRG: 871 | End: 2019-08-25
Attending: INTERNAL MEDICINE
Payer: COMMERCIAL

## 2019-08-25 PROBLEM — R57.9 SHOCK (HCC): Status: RESOLVED | Noted: 2019-08-23 | Resolved: 2019-08-25

## 2019-08-25 PROBLEM — R04.2 HEMOPTYSIS: Status: ACTIVE | Noted: 2019-08-25

## 2019-08-25 LAB
ANION GAP SERPL CALC-SCNC: 9 MMOL/L (ref 0–11.9)
APTT PPP: 66.4 SEC (ref 24.7–36)
APTT PPP: 74.1 SEC (ref 24.7–36)
BACTERIA SPEC RESP CULT: ABNORMAL
BACTERIA SPEC RESP CULT: ABNORMAL
BASOPHILS # BLD AUTO: 0 % (ref 0–1.8)
BASOPHILS # BLD: 0 K/UL (ref 0–0.12)
BUN SERPL-MCNC: 47 MG/DL (ref 8–22)
CALCIUM SERPL-MCNC: 10.1 MG/DL (ref 8.5–10.5)
CHLORIDE SERPL-SCNC: 106 MMOL/L (ref 96–112)
CO2 SERPL-SCNC: 29 MMOL/L (ref 20–33)
CREAT SERPL-MCNC: 2.1 MG/DL (ref 0.5–1.4)
EOSINOPHIL # BLD AUTO: 0 K/UL (ref 0–0.51)
EOSINOPHIL NFR BLD: 0 % (ref 0–6.9)
ERYTHROCYTE [DISTWIDTH] IN BLOOD BY AUTOMATED COUNT: 62.9 FL (ref 35.9–50)
GLUCOSE BLD-MCNC: 104 MG/DL (ref 65–99)
GLUCOSE BLD-MCNC: 81 MG/DL (ref 65–99)
GLUCOSE SERPL-MCNC: 94 MG/DL (ref 65–99)
GRAM STN SPEC: ABNORMAL
HCT VFR BLD AUTO: 53.6 % (ref 42–52)
HGB BLD-MCNC: 15.2 G/DL (ref 14–18)
LYMPHOCYTES # BLD AUTO: 4.74 K/UL (ref 1–4.8)
LYMPHOCYTES NFR BLD: 66.7 % (ref 22–41)
MAGNESIUM SERPL-MCNC: 2.2 MG/DL (ref 1.5–2.5)
MANUAL DIFF BLD: NORMAL
MCH RBC QN AUTO: 24.5 PG (ref 27–33)
MCHC RBC AUTO-ENTMCNC: 28.4 G/DL (ref 33.7–35.3)
MCV RBC AUTO: 86.5 FL (ref 81.4–97.8)
MONOCYTES # BLD AUTO: 0.56 K/UL (ref 0–0.85)
MONOCYTES NFR BLD AUTO: 7.9 % (ref 0–13.4)
MORPHOLOGY BLD-IMP: NORMAL
MYELOCYTES NFR BLD MANUAL: 0.9 %
NEUTROPHILS # BLD AUTO: 1.74 K/UL (ref 1.82–7.42)
NEUTROPHILS NFR BLD: 24.5 % (ref 44–72)
NRBC # BLD AUTO: 0 K/UL
NRBC BLD-RTO: 0 /100 WBC
PHOSPHATE SERPL-MCNC: 5.6 MG/DL (ref 2.5–4.5)
PLATELET # BLD AUTO: 141 K/UL (ref 164–446)
PLATELET BLD QL SMEAR: NORMAL
PMV BLD AUTO: 10.3 FL (ref 9–12.9)
POTASSIUM SERPL-SCNC: 4.2 MMOL/L (ref 3.6–5.5)
RBC # BLD AUTO: 6.2 M/UL (ref 4.7–6.1)
SIGNIFICANT IND 70042: ABNORMAL
SITE SITE: ABNORMAL
SODIUM SERPL-SCNC: 144 MMOL/L (ref 135–145)
SOURCE SOURCE: ABNORMAL
WBC # BLD AUTO: 7.1 K/UL (ref 4.8–10.8)

## 2019-08-25 PROCEDURE — A9270 NON-COVERED ITEM OR SERVICE: HCPCS | Performed by: INTERNAL MEDICINE

## 2019-08-25 PROCEDURE — 770020 HCHG ROOM/CARE - TELE (206)

## 2019-08-25 PROCEDURE — 71045 X-RAY EXAM CHEST 1 VIEW: CPT

## 2019-08-25 PROCEDURE — 700102 HCHG RX REV CODE 250 W/ 637 OVERRIDE(OP): Performed by: INTERNAL MEDICINE

## 2019-08-25 PROCEDURE — 700111 HCHG RX REV CODE 636 W/ 250 OVERRIDE (IP): Performed by: INTERNAL MEDICINE

## 2019-08-25 PROCEDURE — 83735 ASSAY OF MAGNESIUM: CPT

## 2019-08-25 PROCEDURE — 85027 COMPLETE CBC AUTOMATED: CPT

## 2019-08-25 PROCEDURE — 82962 GLUCOSE BLOOD TEST: CPT

## 2019-08-25 PROCEDURE — 85730 THROMBOPLASTIN TIME PARTIAL: CPT | Mod: 91

## 2019-08-25 PROCEDURE — 84100 ASSAY OF PHOSPHORUS: CPT

## 2019-08-25 PROCEDURE — 80048 BASIC METABOLIC PNL TOTAL CA: CPT

## 2019-08-25 PROCEDURE — 92526 ORAL FUNCTION THERAPY: CPT

## 2019-08-25 PROCEDURE — 99233 SBSQ HOSP IP/OBS HIGH 50: CPT | Performed by: HOSPITALIST

## 2019-08-25 PROCEDURE — 99291 CRITICAL CARE FIRST HOUR: CPT | Performed by: INTERNAL MEDICINE

## 2019-08-25 PROCEDURE — 85007 BL SMEAR W/DIFF WBC COUNT: CPT

## 2019-08-25 PROCEDURE — A9270 NON-COVERED ITEM OR SERVICE: HCPCS | Performed by: HOSPITALIST

## 2019-08-25 PROCEDURE — 700102 HCHG RX REV CODE 250 W/ 637 OVERRIDE(OP): Performed by: HOSPITALIST

## 2019-08-25 RX ORDER — FUROSEMIDE 10 MG/ML
40 INJECTION INTRAMUSCULAR; INTRAVENOUS
Status: DISCONTINUED | OUTPATIENT
Start: 2019-08-25 | End: 2019-08-26

## 2019-08-25 RX ADMIN — RIVAROXABAN 15 MG: 15 TABLET, FILM COATED ORAL at 16:37

## 2019-08-25 RX ADMIN — AMOXICILLIN AND CLAVULANATE POTASSIUM 1 TABLET: 875; 125 TABLET, FILM COATED ORAL at 16:37

## 2019-08-25 RX ADMIN — SENNOSIDES, DOCUSATE SODIUM 2 TABLET: 50; 8.6 TABLET, FILM COATED ORAL at 05:10

## 2019-08-25 RX ADMIN — NYSTATIN: 100000 POWDER TOPICAL at 05:10

## 2019-08-25 RX ADMIN — HEPARIN SODIUM 2700 UNITS/HR: 5000 INJECTION, SOLUTION INTRAVENOUS at 06:55

## 2019-08-25 RX ADMIN — AMOXICILLIN AND CLAVULANATE POTASSIUM 1 TABLET: 875; 125 TABLET, FILM COATED ORAL at 05:10

## 2019-08-25 RX ADMIN — FUROSEMIDE 40 MG: 10 INJECTION, SOLUTION INTRAMUSCULAR; INTRAVENOUS at 16:59

## 2019-08-25 RX ADMIN — FUROSEMIDE 40 MG: 10 INJECTION, SOLUTION INTRAMUSCULAR; INTRAVENOUS at 05:10

## 2019-08-25 ASSESSMENT — ENCOUNTER SYMPTOMS
STRIDOR: 0
COUGH: 1
DIZZINESS: 0
COUGH: 0
POLYDIPSIA: 0
PALPITATIONS: 0
FOCAL WEAKNESS: 0
DIAPHORESIS: 0
SHORTNESS OF BREATH: 1
SPUTUM PRODUCTION: 0
NAUSEA: 0
FEVER: 0
HEARTBURN: 0
PHOTOPHOBIA: 0
HEMOPTYSIS: 0
TINGLING: 0
SENSORY CHANGE: 0
BACK PAIN: 0
BLOOD IN STOOL: 0
CHILLS: 0
VOMITING: 0
ABDOMINAL PAIN: 0
HEADACHES: 0
WEIGHT LOSS: 0
SPEECH CHANGE: 0
NERVOUS/ANXIOUS: 0
MYALGIAS: 0
BRUISES/BLEEDS EASILY: 0
BLURRED VISION: 0
HEMOPTYSIS: 1
SINUS PAIN: 0
WHEEZING: 0
DEPRESSION: 0
NECK PAIN: 0
DOUBLE VISION: 0

## 2019-08-25 NOTE — PROGRESS NOTES
Hospital Medicine Daily Progress Note    Date of Service  8/25/2019    Chief Complaint  28 y.o. male admitted 8/21/2019 with history of asthma admitted with suspected pneumonia and acute respiratory failure    Hospital Course         Interval Problem Update      Tolerated extubation yesterday  Passed SLP keithal  I 3820 O 4870  IS 2500  6L NC  Day 5 of augmentin  Increase lasix bid              Consultants/Specialty  Critical care    Code Status  Full code    Disposition  Home when medically stable    Review of Systems  Review of Systems   Constitutional: Negative for chills and fever.   Respiratory: Positive for cough, hemoptysis (mild) and shortness of breath (improved).    Cardiovascular: Positive for leg swelling. Negative for chest pain.   Gastrointestinal: Negative for abdominal pain, nausea and vomiting.   All other systems reviewed and are negative.       Physical Exam  Temp:  [36.4 °C (97.5 °F)-36.7 °C (98.1 °F)] 36.6 °C (97.9 °F)  Pulse:  [73-97] 73  Resp:  [12-32] 30  BP: ()/(32-76) 111/52  SpO2:  [75 %-100 %] 89 %    Physical Exam   Constitutional: He is oriented to person, place, and time. He appears well-developed and well-nourished.   obese   HENT:   Head: Normocephalic and atraumatic.   Mouth/Throat: Oropharynx is clear and moist.   Eyes: Pupils are equal, round, and reactive to light. Conjunctivae are normal. Right eye exhibits no discharge. Left eye exhibits no discharge.   Neck: Neck supple. No JVD present. No tracheal deviation present.   Cardiovascular: Normal rate, regular rhythm and normal heart sounds.   Pulmonary/Chest: Effort normal. No stridor. No respiratory distress. He has decreased breath sounds. He has rales. He exhibits no tenderness.   Abdominal: Soft. Bowel sounds are normal. He exhibits no distension. There is no tenderness. There is no rebound.   Musculoskeletal: He exhibits edema. He exhibits no tenderness.   Neurological: He is alert and oriented to person, place, and time.  No cranial nerve deficit. He exhibits normal muscle tone.   Skin: Skin is warm and dry. No rash noted. He is not diaphoretic. No cyanosis. Nails show no clubbing.   Psychiatric: He has a normal mood and affect. Judgment normal.   Nursing note and vitals reviewed.      Fluids    Intake/Output Summary (Last 24 hours) at 8/25/2019 0721  Last data filed at 8/25/2019 0600  Gross per 24 hour   Intake 3820.1 ml   Output 4870 ml   Net -1049.9 ml       Laboratory  Recent Labs     08/23/19  0516 08/24/19  0600 08/25/19  0524   WBC 9.2 7.7 7.1   RBC 5.68 5.64 6.20*   HEMOGLOBIN 14.0 13.8* 15.2   HEMATOCRIT 47.5 47.3 53.6*   MCV 83.6 83.9 86.5   MCH 24.6* 24.5* 24.5*   MCHC 29.5* 29.2* 28.4*   RDW 59.1* 59.7* 62.9*   PLATELETCT 116* 123* 141*   MPV  --  11.3 10.3     Recent Labs     08/23/19  0516 08/24/19  0600 08/25/19  0524   SODIUM 137 144 144   POTASSIUM 4.3 4.0 4.2   CHLORIDE 102 107 106   CO2 25 26 29   GLUCOSE 187* 118* 94   BUN 31* 43* 47*   CREATININE 1.77* 2.62* 2.10*   CALCIUM 8.9 9.3 10.1     Recent Labs     08/22/19  1430  08/24/19  1350 08/24/19  2107 08/25/19  0319   APTT 26.8   < > 53.3* 53.8* 74.1*   INR 1.29*  --   --   --   --     < > = values in this interval not displayed.         Recent Labs     08/24/19  0600   TRIGLYCERIDE 228*       Imaging  DX-CHEST-PORTABLE (1 VIEW)   Final Result      Improving interstitial opacities and atelectasis.      DX-CHEST-PORTABLE (1 VIEW)   Final Result         1. No significant interval change.      IR-PULMONARY ANGIOGRAM-BILATERAL   Final Result      1.  Ultrasound guided access RIGHT common femoral vein   2.  BILATERAL selective pulmonary arteriogram   3.  Placement of BILATERAL EKOS catheters   4.  Initiation of thrombolytic infusion with EKOS acoustic pulse thrombolysis therapy (US-assisted thrombolysis)            DX-CHEST-PORTABLE (1 VIEW)   Final Result      Bibasilar underinflation atelectasis which could obscure an additional process. This is unchanged.       EC-ECHOCARDIOGRAM COMPLETE W/ CONT   Final Result      DX-ABDOMEN FOR TUBE PLACEMENT   Final Result      Feeding tube tip projects over the expected location of the gastric antrum.      CT-CTA CHEST PULMONARY ARTERY W/ RECONS   Final Result      1.  Right-sided lobar, segmental and subsegmental pulmonary emboli. Suspected subsegmental left-sided pulmonary emboli. Associated right heart strain.   2.  Scattered pulmonary infarcts. Dependent opacities in the lower lobes may represent a combination of atelectasis and infarcts, however, superimposed pneumonia is also possible.   3.  Cardiomegaly.   4.  Large right thyroid nodule measuring 2.9 cm. It can be followed on an outpatient basis.   5.  Splenomegaly.      MICHELLE SAEED was paged at 8/22/2019 4:08 PM.      DX-CHEST-PORTABLE (1 VIEW)   Final Result      1.  Well-positioned lines and tubes. No pneumothorax.   2.  Interval worsening of bilateral perihilar and bibasilar opacities, which may represent a continuation of atelectasis and developing alveolar edema.   3.  Small bilateral pleural effusions.      US-EXTREMITY VENOUS LOWER BILAT   Final Result      DX-CHEST-LIMITED (1 VIEW)   Final Result      No significant change in pulmonary edema      DX-CHEST-PORTABLE (1 VIEW)   Final Result      1.  Enlarged cardiac silhouette consistent with findings of vascular congestion/edema.   2.  Right middle lobe and lower lobe airspace opacities are suspicious for pneumonia.           Assessment/Plan  * Acute respiratory failure with hypoxia and hypercapnia (HCC)- (present on admission)  Assessment & Plan  Secondary to pneumonia sepsis and PE    Tolerated extubation on 8/24/2019  Continue anticoagulation  Increase IV Lasix  Monitor renal function electrolytes    Aspiration pneumonia (HCC)  Assessment & Plan  Will complete 5-day course of Augmentin    Severe sepsis (HCC)- (present on admission)  Assessment & Plan  -This is severe sepsis with the following associated acute organ  dysfunction(s): acute respiratory failure.       Sepsis resolved  Will complete 5-day course of Augmentin for pneumonia today    Asthma with exacerbation- (present on admission)  Assessment & Plan  No wheezing on exam steroids discontinued    JONES (obstructive sleep apnea)  Assessment & Plan  Will need formal evaluation when clinically stable    Hyperglycemia- (present on admission)  Assessment & Plan  HbA1c 6.1    Counseled on lifestyle changes  Discussed need to establish with PCP after discharge    Morbid obesity (HCC)- (present on admission)  Assessment & Plan  Body mass index is 59.1 kg/m².      Hemoptysis  Assessment & Plan  Secondary to PE monitor    TASHI (acute kidney injury) (HCC)  Assessment & Plan  Likely  ATN from shock and possible contrast-induced nephropathy    Renal function improving  Monitor with diuresis      Pulmonary embolism with acute cor pulmonale (HCC)  Assessment & Plan  Status post systemic alteplase and EKOS  On heparin drip reviewed with patient treatment options for long-term anticoagulation with warfarin versus DOAC  He prefers DOAC we will start him on Xarelto  Will need to verify coverage prior to discharge    Pulmonary hypertension (HCC)  Assessment & Plan  Secondary to large PE and also component of obstructive sleep apnea    Continue anticoagulation  Increase Lasix  Discussed with patient need for outpatient work-up for sleep apnea    Type 2 myocardial infarction (HCC)  Assessment & Plan  Secondary to acute respiratory failure and PE          Plan of care reviewed with the patient and discussed with nursing staff and pharmacist and Dr. Gant    VTE prophylaxis:Heparin transitioning to Xarelto

## 2019-08-25 NOTE — PROGRESS NOTES
Received bedside report from PM nurse. Assumed patient care. Chart reviewed. Pt was resting in chair. A&O x 4. No concerns, complaints or distress. Patient denies pain.  SR 90's.  6L O2 via NC.  POC updated with pt and on the patient communication board. Bed locked and in the lowest position. Call light within reach.  Will continue to monitor.

## 2019-08-25 NOTE — PROGRESS NOTES
Lab called with critical result of Bronchial lavage +MRSA, rare growth at 1220. Critical lab result read back to Lab.   Dr. Gant notified of critical lab result at 1222.  Critical lab result read back by Dr. Gant.  Per Dr. Gant, no isolation needed at this time.

## 2019-08-25 NOTE — PROGRESS NOTES
Critical Care Progress Note    Date of admission  8/21/2019    Chief Complaint  27 yo male with hx of marcel and asthma feels like he has a cold.  Recent trip to [place].  He has been sleeping with air conditioner.  He came back to [place] on the 15th.  Since Sunday worsening cough and sputum protuction with wheezing.  He has a rescue inhaler at home that he uses occasionally.  At urgent care 75% fio2.  Sent  To [facility] ER.  He was initially admitted to medicine floor and now transferred to the ICU.  He has pneumonia on chest xray. Treated for sepsis.  RR 40s and on high flow nasal cannula 60 L and 100%, sating 80-95%. Patient intubated 8/22, he refused bipap, on max high flow, high rr, unable to get to CTA for PE study, intubated, up to peep 22, flolan started, cta showed bilateral pulmonary artery PE, massive, hemodynamically unstable, given TPA, rhino rockets for nasal bleeding.  Rt heart pressure with rvsp 80 mmhg.    Hospital Course          Interval Problem Update  Reviewed last 24 hour events:  Heparin drip, unable to transition to lovenox with renal failure  Swallow test ok, pull core trak  Remove triple IJ  Keep peripherals  mobilizng  IS  2500 on IS this am  6 L o2, wean to > equal 89%  Change lasix to bid  Heparin drip  augmentin last dose today  Ongoing hemoptysis, small amounts    Addendum:  MRSA small growth BAL  Improved on ceftriaxone and augmentin  Aspiration  Likely contaminant given history, non contributory to current clinical status  No hx of high risk for mrsa  Last day antibiotics today, completing therapy  In my opinion no need for isolation    Review of Systems  Review of Systems   Constitutional: Positive for malaise/fatigue. Negative for chills, diaphoresis, fever and weight loss.   HENT: Negative for congestion and sinus pain.    Eyes: Negative for blurred vision, double vision and photophobia.   Respiratory: Positive for shortness of breath. Negative for cough, hemoptysis, sputum production,  wheezing and stridor.    Cardiovascular: Negative for chest pain, palpitations and leg swelling.   Gastrointestinal: Negative for blood in stool, heartburn, melena and vomiting.   Genitourinary: Negative for dysuria and urgency.   Musculoskeletal: Negative for back pain, myalgias and neck pain.   Skin: Negative for itching and rash.   Neurological: Negative for dizziness, tingling, sensory change, speech change, focal weakness and headaches.   Endo/Heme/Allergies: Negative for polydipsia. Does not bruise/bleed easily.   Psychiatric/Behavioral: Negative for depression. The patient is not nervous/anxious.         Vital Signs for last 24 hours   Temp:  [36.4 °C (97.5 °F)-36.7 °C (98.1 °F)] 36.6 °C (97.9 °F)  Pulse:  [73-97] 73  Resp:  [12-32] 30  BP: ()/(32-76) 111/52  SpO2:  [75 %-100 %] 89 %    Hemodynamic parameters for last 24 hours       Respiratory Information for the last 24 hours  Light Vent Mode: Spont  PEEP/CPAP: 8  FiO2: 50  P Support: 5  Length of Weaning Trial (Hours): 1    Physical Exam   Physical Exam   Constitutional: He is oriented to person, place, and time. He appears well-developed and well-nourished. No distress.   Alert and oriented, writing messages   HENT:   Head: Normocephalic and atraumatic.   Right Ear: External ear normal.   Left Ear: External ear normal.   Mouth/Throat: No oropharyngeal exudate.   Eyes: Pupils are equal, round, and reactive to light. Conjunctivae and EOM are normal. Right eye exhibits no discharge. Left eye exhibits no discharge.   Neck: JVD present. No tracheal deviation present.   Cardiovascular: Normal rate, regular rhythm and normal heart sounds.   No murmur heard.  Pulmonary/Chest: No stridor. He is in respiratory distress. He has no wheezes. He has rales. He exhibits no tenderness.   Abdominal: He exhibits distension. He exhibits no mass. There is no tenderness. There is no rebound and no guarding.   From obesity   Musculoskeletal: He exhibits edema. He  exhibits no tenderness or deformity.   Neurological: He is alert and oriented to person, place, and time. He displays normal reflexes. No cranial nerve deficit. Coordination normal.   Skin: Skin is warm and dry. No rash noted. He is not diaphoretic. No erythema.   Psychiatric: He has a normal mood and affect. His behavior is normal.   Nursing note and vitals reviewed.      Medications  Current Facility-Administered Medications   Medication Dose Route Frequency Provider Last Rate Last Dose   • amoxicillin-clavulanate (AUGMENTIN) 875-125 MG per tablet 1 Tab  1 Tab Oral Q12HRS Marbin Gant M.D.   1 Tab at 08/25/19 0510   • ipratropium-albuterol (DUONEB) nebulizer solution  3 mL Nebulization Q4H PRN (RT) Marbin Gant M.D.       • furosemide (LASIX) injection 40 mg  40 mg Intravenous Q DAY Marbin Gant M.D.   40 mg at 08/25/19 0510   • MD Alert...HEPARIN WEIGHT BASED PROTOCOL Pharmacist to implement   Other PRN Marbin Gant M.D.       • heparin injection 4,400 Units  4,400 Units Intravenous PRN Marbin Gant M.D.   4,400 Units at 08/24/19 2139   • heparin infusion 25,000 units in 500 mL 0.45% NACL   Intravenous Continuous Marbin Gant M.D. 54 mL/hr at 08/25/19 0655 2,700 Units/hr at 08/25/19 0655   • nystatin (MYCOSTATIN) powder   Topical TID Marbin Gant M.D.       • insulin regular (HUMULIN R) injection 2-9 Units  2-9 Units Subcutaneous Q6HRS Marbin Gant M.D.   Stopped at 08/24/19 0000    And   • glucose 4 g chewable tablet 16 g  16 g Oral Q15 MIN PRN Marbin Gant M.D.        And   • DEXTROSE 10% BOLUS 250 mL  250 mL Intravenous Q15 MIN PRN Marbin Gant M.D.       • norepinephrine (LEVOPHED) 8 mg in  mL Infusion  0-30 mcg/min Intravenous Continuous Marbin Gant M.D.   Stopped at 08/22/19 1632   • Respiratory Care per Protocol   Nebulization Continuous RT Marbin Gant M.D.       • MD Alert...ICU Electrolyte Replacement per Pharmacy   Other PHARMACY TO DOSE Marbin Gant M.D.       •  lidocaine (XYLOCAINE) 1 % injection 1-2 mL  1-2 mL Tracheal Tube Q30 MIN PRN Marbin Gant M.D.       • Pharmacy Consult: Enteral tube insertion - review meds/change route/product selection   Other PHARMACY TO DOSE Marbin Gant M.D.       • acetaminophen (TYLENOL) tablet 650 mg  650 mg Enteral Tube Q6HRS PRN Marbin Gant M.D.       • senna-docusate (PERICOLACE or SENOKOT S) 8.6-50 MG per tablet 2 Tab  2 Tab Enteral Tube BID Marbin Gant M.D.   2 Tab at 08/25/19 0510    And   • polyethylene glycol/lytes (MIRALAX) PACKET 1 Packet  1 Packet Enteral Tube QDAY PRN Marbin Gant M.D.        And   • magnesium hydroxide (MILK OF MAGNESIA) suspension 30 mL  30 mL Enteral Tube QDAY PRN Marbin Gant M.D.        And   • bisacodyl (DULCOLAX) suppository 10 mg  10 mg Rectal QDAY PRN Marbin Gant M.D.       • lactated ringers infusion (BOLUS): BMI greater than 30  1,000 mL Intravenous Once PRN Richar Matt D.O.       • enalaprilat (VASOTEC) injection 1.25 mg  1.25 mg Intravenous Q6HRS PRN BARBARA Wolff.O.       • ondansetron (ZOFRAN) syringe/vial injection 4 mg  4 mg Intravenous Q4HRS PRN Richar Matt D.O.       • ondansetron (ZOFRAN ODT) dispertab 4 mg  4 mg Oral Q4HRS PRN Richar Matt D.O.       • promethazine (PHENERGAN) tablet 12.5-25 mg  12.5-25 mg Oral Q4HRS PRN Richar Matt D.O.       • promethazine (PHENERGAN) suppository 12.5-25 mg  12.5-25 mg Rectal Q4HRS PRN Richar Matt D.O.       • prochlorperazine (COMPAZINE) injection 5-10 mg  5-10 mg Intravenous Q4HRS PRN Richar Matt D.O.           Fluids    Intake/Output Summary (Last 24 hours) at 8/25/2019 0720  Last data filed at 8/25/2019 0600  Gross per 24 hour   Intake 3820.1 ml   Output 4870 ml   Net -1049.9 ml       Laboratory  Recent Labs     08/23/19  1539 08/24/19  0444 08/24/19  1019   ISTATAPH 7.366* 7.462 7.425   ISTATAPCO2 49.1* 35.5 47.5*   ISTATAPO2 98* 80 63*   ISTATATCO2 30 26 33   [id number] 97 97 92*   ISTATARTHCO3  28.1* 25.4* 31.2*   ISTATARTBE 2 2 6*   ISTATTEMP 37.0 C 36.6 C 36.9 C   ISTATFIO2 50 .50 50   ISTATSPEC Arterial Arterial Arterial   ISTATAPHTC 7.366* 7.468 7.427   [id number] 98* 78 62*         Recent Labs     08/23/19  0516 08/24/19  0600 08/25/19  0524   SODIUM 137 144 144   POTASSIUM 4.3 4.0 4.2   CHLORIDE 102 107 106   CO2 25 26 29   BUN 31* 43* 47*   CREATININE 1.77* 2.62* 2.10*   MAGNESIUM 2.3 2.4 2.2   PHOSPHORUS 5.1* 5.3* 5.6*   CALCIUM 8.9 9.3 10.1     Recent Labs     08/23/19  0516 08/24/19  0600 08/25/19  0524   PREALBUMIN  --  14.0*  --    GLUCOSE 187* 118* 94     Recent Labs     08/23/19  0516 08/24/19  0600 08/25/19  0524   WBC 9.2 7.7 7.1   NEUTSPOLYS 67.00 53.40 24.50*   LYMPHOCYTES 26.10 43.20* 66.70*   MONOCYTES 2.60 2.50 7.90   EOSINOPHILS 0.00 0.00 0.00   BASOPHILS 0.00 0.00 0.00     Recent Labs     08/22/19  1430  08/23/19  0516  08/24/19  0600 08/24/19  1350 08/24/19  2107 08/25/19  0319 08/25/19  0524   RBC  --   --  5.68  --  5.64  --   --   --  6.20*   HEMOGLOBIN  --   --  14.0  --  13.8*  --   --   --  15.2   HEMATOCRIT  --   --  47.5  --  47.3  --   --   --  53.6*   PLATELETCT  --   --  116*  --  123*  --   --   --  141*   PROTHROMBTM 16.4*  --   --   --   --   --   --   --   --    APTT 26.8   < >  --    < > 50.0* 53.3* 53.8* 74.1*  --    INR 1.29*  --   --   --   --   --   --   --   --     < > = values in this interval not displayed.       Imaging  X-Ray:  I have personally reviewed the images and compared with prior images.  EKG:  I have personally reviewed the images and compared with prior images.  CT:    Reviewed  Echo:   Reviewed  Ultrasound:  Reviewed    Assessment/Plan  * Acute respiratory failure with hypoxia and hypercapnia (HCC)- (present on admission)  Assessment & Plan  Massive PE  Pneumonia, aspiration  Tpa 100 mg, then ekos cath as was not fully improving  Intubated initial day, then extubated 8/24  Tolerated well  Fluids/diuresis as necessary  Right heart failure, acute  on chronic pulmonary htn  JONES  Remains on o2  Needs continued diuresis  Mobilizing  Heparin drip, transition to  Long term anticoagulation      Aspiration pneumonia (HCC)  Assessment & Plan  Likely aspiration secondary to PE/respiratory failure/encephalopathy  4 days ceftriaxone, neg bal, augmentin for last day  Complete 8/25 evening      Severe sepsis (HCC)- (present on admission)  Assessment & Plan  5 days ab  Likely aspiration pna  Ceftriaxone 4 days, 1 day augmentin  Complete 8/25 evening    Asthma with exacerbation- (present on admission)  Assessment & Plan  3 days therapy, done  exac by PE/pna  Scheduled duonebs      JONES (obstructive sleep apnea)  Assessment & Plan  Sleep study post hospitalizaiton recommended  Likely needs cpap or bipap especially in setting of severe pulmonary htn  o2 prn for sao2 90% or better    Hyperglycemia- (present on admission)  Assessment & Plan  SSI    Polycythemia- (present on admission)  Assessment & Plan  Likely fluid contraction, may also be due to chronic hypoxic state with JONES    Morbid obesity ([facility])- (present on admission)  Assessment & Plan  Nutrition counseling  BMI 59.1  JONES contributory to respiratory failure    TASHI (acute kidney injury) ([facility])  Assessment & Plan  Multifactorial  Likely some contrast induced nephropathy  Given 1 L ivf today  Diuresis as necessary  Adequate output  Also complicated by poor perfusion  Holding on lovenox  Transition to long term ac  Continue heparin drip    Shock ([facility])  Assessment & Plan  Multifactorial  Obstructive with massive PE  Cardiogenic from right heart failure  Septic shock from pneumonia  Keep map > 65  Pressors as needed  Diuresis as toelrated    Pulmonary embolism with acute cor pulmonale ([facility])  Assessment & Plan  Massive pe, hemodynamic instability  Both pulmonary arteries occluded   mg, then ekos  Heparin drip  Will need long term anticoagulation  rvsp 80 mmhg    Pulmonary hypertension (HCC)  Assessment & Plan  rvsp 80  mmhg  Massive pe  marcel contributory, obesity contributory  Right heart failure  Flolan, complete doses    Type 2 myocardial infarction (HCC)  Assessment & Plan  Secondary to PE and septic/cardiogenic/obstructive shock       VTE:  Heparin  Ulcer: H2 Antagonist  Lines: Central Line  Ongoing indication addressed, Arterial Line  Ongoing indication addressed and Matthew Catheter  Ongoing indication addressed    I have performed a physical exam and reviewed and updated ROS and Plan today (8/25/2019). In review of yesterday's note (8/24/2019), there are no changes except as documented above.     Discussed patient condition and risk of morbidity and/or mortality with Family, RN, RT, Pharmacy, Code status disscussed, Charge nurse / hot rounds and Patient     The patient remains critically ill.  Heparin drip, managing per sequential PTT and adjusting as appropriate, in setting of hemoptysis.  Remains on 6 L oxygen post TPA and ekos directed tpa for massive PE and right heart failure.  Forced diuresis.  Critical care time = 36 minutes in directly providing and coordinating critical care and extensive data review.  No time overlap and excludes procedures.

## 2019-08-25 NOTE — THERAPY
"Speech Language Therapy dysphagia treatment completed.   Functional Status:  Patient was seen for dysphagia re-assess this date per RN request. Patient doing better with 02 saturations and per RN tolerating ice chips with no s/sx of aspiration. Patient was upright in chair at bedside. Patient currently on 6L 02 via nasal cannula, labile respiratory status with increased WOB with exertion. Patient consumed PO trials of ice, thins, nectars, purees, and soft solids. Patient tolerated with no clinical s/sx of aspiration. However fragile respiratory status noted with desaturations if fast impulsive rate of eating with consecutive swallows. Patient was cued to slow down and breathing/swallowing coordination increased with out desaturations. At this time patient is at high risk for aspiration without strict adherence to swallow precautions. Recommend a D3/thin liquid diet with monitor by staff for all meals. Patient verbalized understanding of education and demonstrated swallow strategies, SBA needed. SLP to follow. HOLD PO with any changes or s/sx of aspiration.   Recommendations: Dysphagia III/thins, monitor by staff for all meals cues for breathing and slow rate during PO intake needed.   Plan of Care: Will benefit from Speech Therapy 3 times per week  Post-Acute Therapy: Discharge to home with outpatient or home health for additional skilled therapy services.    See \"Rehab Therapy-Acute\" Patient Summary Report for complete documentation.     "

## 2019-08-26 LAB
ANION GAP SERPL CALC-SCNC: 7 MMOL/L (ref 0–11.9)
ANISOCYTOSIS BLD QL SMEAR: ABNORMAL
APTT PPP: 28.4 SEC (ref 24.7–36)
BACTERIA BLD CULT: NORMAL
BACTERIA BLD CULT: NORMAL
BASOPHILS # BLD AUTO: 0 % (ref 0–1.8)
BASOPHILS # BLD: 0 K/UL (ref 0–0.12)
BUN SERPL-MCNC: 42 MG/DL (ref 8–22)
CALCIUM SERPL-MCNC: 9.8 MG/DL (ref 8.5–10.5)
CHLORIDE SERPL-SCNC: 99 MMOL/L (ref 96–112)
CO2 SERPL-SCNC: 31 MMOL/L (ref 20–33)
CREAT SERPL-MCNC: 1.69 MG/DL (ref 0.5–1.4)
EOSINOPHIL # BLD AUTO: 0.07 K/UL (ref 0–0.51)
EOSINOPHIL NFR BLD: 0.9 % (ref 0–6.9)
ERYTHROCYTE [DISTWIDTH] IN BLOOD BY AUTOMATED COUNT: 60.2 FL (ref 35.9–50)
GLUCOSE SERPL-MCNC: 107 MG/DL (ref 65–99)
HCT VFR BLD AUTO: 55.3 % (ref 42–52)
HGB BLD-MCNC: 15.4 G/DL (ref 14–18)
HYPOCHROMIA BLD QL SMEAR: ABNORMAL
LYMPHOCYTES # BLD AUTO: 4.81 K/UL (ref 1–4.8)
LYMPHOCYTES NFR BLD: 60.9 % (ref 22–41)
MANUAL DIFF BLD: NORMAL
MCH RBC QN AUTO: 23.7 PG (ref 27–33)
MCHC RBC AUTO-ENTMCNC: 27.8 G/DL (ref 33.7–35.3)
MCV RBC AUTO: 84.9 FL (ref 81.4–97.8)
MICROCYTES BLD QL SMEAR: ABNORMAL
MONOCYTES # BLD AUTO: 0.62 K/UL (ref 0–0.85)
MONOCYTES NFR BLD AUTO: 7.8 % (ref 0–13.4)
MORPHOLOGY BLD-IMP: NORMAL
NEUTROPHILS # BLD AUTO: 2.4 K/UL (ref 1.82–7.42)
NEUTROPHILS NFR BLD: 30.4 % (ref 44–72)
NRBC # BLD AUTO: 0 K/UL
NRBC BLD-RTO: 0 /100 WBC
PLATELET # BLD AUTO: 167 K/UL (ref 164–446)
PLATELET BLD QL SMEAR: NORMAL
POTASSIUM SERPL-SCNC: 4.7 MMOL/L (ref 3.6–5.5)
RBC # BLD AUTO: 6.51 M/UL (ref 4.7–6.1)
RBC BLD AUTO: PRESENT
SIGNIFICANT IND 70042: NORMAL
SIGNIFICANT IND 70042: NORMAL
SITE SITE: NORMAL
SITE SITE: NORMAL
SMUDGE CELLS BLD QL SMEAR: NORMAL
SODIUM SERPL-SCNC: 137 MMOL/L (ref 135–145)
SOURCE SOURCE: NORMAL
SOURCE SOURCE: NORMAL
WBC # BLD AUTO: 7.9 K/UL (ref 4.8–10.8)

## 2019-08-26 PROCEDURE — 700111 HCHG RX REV CODE 636 W/ 250 OVERRIDE (IP): Performed by: INTERNAL MEDICINE

## 2019-08-26 PROCEDURE — 85007 BL SMEAR W/DIFF WBC COUNT: CPT

## 2019-08-26 PROCEDURE — 85730 THROMBOPLASTIN TIME PARTIAL: CPT

## 2019-08-26 PROCEDURE — 770020 HCHG ROOM/CARE - TELE (206)

## 2019-08-26 PROCEDURE — 99232 SBSQ HOSP IP/OBS MODERATE 35: CPT | Performed by: HOSPITALIST

## 2019-08-26 PROCEDURE — 80048 BASIC METABOLIC PNL TOTAL CA: CPT

## 2019-08-26 PROCEDURE — 85027 COMPLETE CBC AUTOMATED: CPT

## 2019-08-26 PROCEDURE — 700102 HCHG RX REV CODE 250 W/ 637 OVERRIDE(OP): Performed by: HOSPITALIST

## 2019-08-26 PROCEDURE — 36415 COLL VENOUS BLD VENIPUNCTURE: CPT

## 2019-08-26 PROCEDURE — A9270 NON-COVERED ITEM OR SERVICE: HCPCS | Performed by: HOSPITALIST

## 2019-08-26 RX ORDER — FUROSEMIDE 10 MG/ML
40 INJECTION INTRAMUSCULAR; INTRAVENOUS
Status: DISCONTINUED | OUTPATIENT
Start: 2019-08-27 | End: 2019-08-28 | Stop reason: HOSPADM

## 2019-08-26 RX ORDER — BISACODYL 10 MG
10 SUPPOSITORY, RECTAL RECTAL
Status: DISCONTINUED | OUTPATIENT
Start: 2019-08-26 | End: 2019-08-28 | Stop reason: HOSPADM

## 2019-08-26 RX ORDER — ACETAMINOPHEN 325 MG/1
650 TABLET ORAL EVERY 6 HOURS PRN
Status: DISCONTINUED | OUTPATIENT
Start: 2019-08-26 | End: 2019-08-28 | Stop reason: HOSPADM

## 2019-08-26 RX ORDER — AMOXICILLIN 250 MG
2 CAPSULE ORAL 2 TIMES DAILY
Status: DISCONTINUED | OUTPATIENT
Start: 2019-08-26 | End: 2019-08-28 | Stop reason: HOSPADM

## 2019-08-26 RX ORDER — POLYETHYLENE GLYCOL 3350 17 G/17G
1 POWDER, FOR SOLUTION ORAL
Status: DISCONTINUED | OUTPATIENT
Start: 2019-08-26 | End: 2019-08-28 | Stop reason: HOSPADM

## 2019-08-26 RX ADMIN — RIVAROXABAN 15 MG: 15 TABLET, FILM COATED ORAL at 16:33

## 2019-08-26 RX ADMIN — FUROSEMIDE 40 MG: 10 INJECTION, SOLUTION INTRAMUSCULAR; INTRAVENOUS at 05:11

## 2019-08-26 RX ADMIN — RIVAROXABAN 15 MG: 15 TABLET, FILM COATED ORAL at 08:53

## 2019-08-26 ASSESSMENT — ENCOUNTER SYMPTOMS
FEVER: 0
DIZZINESS: 0
HEMOPTYSIS: 0
VOMITING: 0
SHORTNESS OF BREATH: 1
DEPRESSION: 0
COUGH: 0
NAUSEA: 0

## 2019-08-26 NOTE — PROGRESS NOTES
Pt's nasal cannula keeps falling off. Pt can be heard snoring and having apnea episodes. Educated pt and placed oxygen mask on him at 6L. Snoring and apnea much improved after using mask. All skin under mask CDI. Will monitor.

## 2019-08-26 NOTE — CARE PLAN
Problem: Respiratory:  Goal: Respiratory status will improve  Outcome: PROGRESSING AS EXPECTED  Intervention: Administer and titrate oxygen therapy  Note:   Patient's respiratory status will improve with use of antibiotics and O2 therapy. Weaning oxygen per patient's tolerance.     Problem: Skin Integrity  Goal: Skin Integrity is maintained or improved  Outcome: PROGRESSING AS EXPECTED  Intervention: Measure and Document any Skin Breakdown  Note:   Patient's skin integrity will be monitored frequently for any breakdown or emaciation

## 2019-08-26 NOTE — PROGRESS NOTES
2 RN skin check complete.   Devices in place Oxygen.  Skin assessed under devices:ears red, blanchable .  Confirmed pressure ulcers found on N/A.  New potential pressure ulcers noted on N/A. Wound consult placed N/A  Pannus pink, blanchable  BLLE purple, lee, scabs noted to shins  Right groin dressing with gauze and tegaderm C/D/I  R IJ removal with gauze and tegaderm C/D/I  The following interventions in place: Pillows and extra blankets for support, silicone oxygen tubing, moisturizer at bedside

## 2019-08-26 NOTE — PROGRESS NOTES
Assumed care this am from night shift RN. Patient asleep with O2 mask on at 6L. Call light and personal items within reach, bed locked in low position. Hourly rounding in place.

## 2019-08-26 NOTE — CARE PLAN
Problem: Nutritional:  Goal: Achieve adequate nutritional intake  Description  Patient will consume 50% of meals  Outcome: PROGRESSING AS EXPECTED    See RD note.

## 2019-08-26 NOTE — PROGRESS NOTES
Pt fell asleep on side and pulled off nasal cannula. When CNA went in to perform vitals, oxygen saturation was 75% on RA. 6L oxygen immediately placed back on pt and saturation went back up to 92%. Pt educated regarding need for oxygen and that he must have it on. CNA and this RN will monitor more often to make sure pt is wearing oxygen. No other complaints at this time. Pt A&Ox4. Will continue to monitor frequently.

## 2019-08-26 NOTE — PROGRESS NOTES
Paged hospitalist regarding isolation precautions with MRSA positive, rare growth. MD agrees that isolation not needed due to rare growth. Charge RN notified.

## 2019-08-26 NOTE — PROGRESS NOTES
[facility] Medicine Daily Progress Note    Date of Service  8/26/2019    Chief Complaint  28 y.o. male admitted 8/21/2019 with history of asthma admitted with suspected pneumonia and acute respiratory failure    Hospital Course         Interval Problem Update    Patient is resting in bed, he is having breakfast, denies any shortness of breath, he continued to be on 6 L of oxygen no chest pain no nausea no vomiting, however discussed with patient regarding the need to use incentive spirometry and try to ambulate, will decrease his Lasix to 40 daily since his blood pressure is soft today, he denies any dizziness or lightheadedness.      Consultants/Specialty  Critical care    Code Status  Full code    Disposition  Home when medically stable    Review of Systems  Review of Systems   Constitutional: Negative for fever.   Respiratory: Positive for shortness of breath (Improving). Negative for cough and hemoptysis.    Cardiovascular: Negative for chest pain and leg swelling.   Gastrointestinal: Negative for nausea and vomiting.   Genitourinary: Negative for dysuria.   Neurological: Negative for dizziness.   Psychiatric/Behavioral: Negative for depression.        Physical Exam  Temp:  [35.9 °C (96.7 °F)-36.6 °C (97.9 °F)] 36.2 °C (97.1 °F)  Pulse:  [] 81  Resp:  [20-27] 21  BP: ()/(58-82) 98/62  SpO2:  [90 %-98 %] 90 %    Physical Exam   Constitutional: He is oriented to person, place, and time. He appears well-developed and well-nourished. No distress.   HENT:   Head: Normocephalic and atraumatic.   Eyes: Conjunctivae are normal. No scleral icterus.   Neck: Neck supple.   Cardiovascular: Normal rate, regular rhythm and normal heart sounds.   Pulmonary/Chest: Effort normal. No respiratory distress. He has decreased breath sounds. He has rales. He exhibits no tenderness.   Abdominal: Soft. Bowel sounds are normal. He exhibits no distension. There is no tenderness. There is no guarding.   Musculoskeletal: He  exhibits no tenderness.   Lymphadenopathy:     He has no cervical adenopathy.   Neurological: He is alert and oriented to person, place, and time. No cranial nerve deficit. He exhibits normal muscle tone.   Skin: Skin is warm and dry. No cyanosis. Nails show no clubbing.   Psychiatric: He has a normal mood and affect. Judgment normal.   Nursing note and vitals reviewed.      Fluids    Intake/Output Summary (Last 24 hours) at 8/26/2019 1317  Last data filed at 8/25/2019 1730  Gross per 24 hour   Intake 1218 ml   Output 1600 ml   Net -382 ml       Laboratory  Recent Labs     08/24/19  0600 08/25/19  0524 08/26/19  0306   WBC 7.7 7.1 7.9   RBC 5.64 6.20* 6.51*   HEMOGLOBIN 13.8* 15.2 15.4   HEMATOCRIT 47.3 53.6* 55.3*   MCV 83.9 86.5 84.9   MCH 24.5* 24.5* 23.7*   MCHC 29.2* 28.4* 27.8*   RDW 59.7* 62.9* 60.2*   PLATELETCT 123* 141* 167   MPV 11.3 10.3  --      Recent Labs     08/24/19  0600 08/25/19  0524 08/26/19  0306   SODIUM 144 144 137   POTASSIUM 4.0 4.2 4.7   CHLORIDE 107 106 99   CO2 26 29 31   GLUCOSE 118* 94 107*   BUN 43* 47* 42*   CREATININE 2.62* 2.10* 1.69*   CALCIUM 9.3 10.1 9.8     Recent Labs     08/25/19  0319 08/25/19  0933 08/26/19  0306   APTT 74.1* 66.4* 28.4         Recent Labs     08/24/19  0600   TRIGLYCERIDE 228*       Imaging  DX-CHEST-PORTABLE (1 VIEW)   Final Result      Improving interstitial opacities and atelectasis.      DX-CHEST-PORTABLE (1 VIEW)   Final Result         1. No significant interval change.      IR-PULMONARY ANGIOGRAM-BILATERAL   Final Result      1.  Ultrasound guided access RIGHT common femoral vein   2.  BILATERAL selective pulmonary arteriogram   3.  Placement of BILATERAL EKOS catheters   4.  Initiation of thrombolytic infusion with EKOS acoustic pulse thrombolysis therapy (US-assisted thrombolysis)            DX-CHEST-PORTABLE (1 VIEW)   Final Result      Bibasilar underinflation atelectasis which could obscure an additional process. This is unchanged.       EC-ECHOCARDIOGRAM COMPLETE W/ CONT   Final Result      DX-ABDOMEN FOR TUBE PLACEMENT   Final Result      Feeding tube tip projects over the expected location of the gastric antrum.      CT-CTA CHEST PULMONARY ARTERY W/ RECONS   Final Result      1.  Right-sided lobar, segmental and subsegmental pulmonary emboli. Suspected subsegmental left-sided pulmonary emboli. Associated right heart strain.   2.  Scattered pulmonary infarcts. Dependent opacities in the lower lobes may represent a combination of atelectasis and infarcts, however, superimposed pneumonia is also possible.   3.  Cardiomegaly.   4.  Large right thyroid nodule measuring 2.9 cm. It can be followed on an outpatient basis.   5.  Splenomegaly.      MICHELLE SAEED was paged at 8/22/2019 4:08 PM.      DX-CHEST-PORTABLE (1 VIEW)   Final Result      1.  Well-positioned lines and tubes. No pneumothorax.   2.  Interval worsening of bilateral perihilar and bibasilar opacities, which may represent a continuation of atelectasis and developing alveolar edema.   3.  Small bilateral pleural effusions.      US-EXTREMITY VENOUS LOWER BILAT   Final Result      DX-CHEST-LIMITED (1 VIEW)   Final Result      No significant change in pulmonary edema      DX-CHEST-PORTABLE (1 VIEW)   Final Result      1.  Enlarged cardiac silhouette consistent with findings of vascular congestion/edema.   2.  Right middle lobe and lower lobe airspace opacities are suspicious for pneumonia.           Assessment/Plan  * Acute respiratory failure with hypoxia and hypercapnia (HCC)- (present on admission)  Assessment & Plan  Secondary to pneumonia sepsis and PE    Tolerated extubation on 8/24/2019  Continue Xarelto, decrease Lasix to once daily since his blood pressure rates low.    Aspiration pneumonia (HCC)  Assessment & Plan  Completed Augmentin treatment    Severe sepsis (HCC)- (present on admission)  Assessment & Plan  -This is severe sepsis with the following associated acute organ  dysfunction(s): acute respiratory failure.       Sepsis resolved  Completed 5 days of amoxicillin.    Asthma with exacerbation- (present on admission)  Assessment & Plan  Resolved, continue monitoring, continue oxygen per protocol respiratory care per protocol    JONES (obstructive sleep apnea)  Assessment & Plan  Will need formal evaluation when clinically stable  Will need outpatient sleep study    Hyperglycemia- (present on admission)  Assessment & Plan  HbA1c 6.1    Counseled on lifestyle changes  Off steroids this should help.    Polycythemia- (present on admission)  Assessment & Plan  Could be related to chronic hypoxia    Morbid obesity (HCC)- (present on admission)  Assessment & Plan  Body mass index is 59.1 kg/m².   Needs to lose weight      Hemoptysis  Assessment & Plan  Secondary to PE continue monitoring  hemoglobin stable    TASHI (acute kidney injury) (HCC)  Assessment & Plan  Likely  ATN from shock and possible contrast-induced nephropathy    Creatinine is stable, continue monitoring.    Pulmonary embolism with acute cor pulmonale (HCC)  Assessment & Plan  Status post systemic alteplase and EKOS  Continue on Xarelto  Continue oxygen per protocol    Pulmonary hypertension (HCC)  Assessment & Plan  Secondary to large PE and also component of obstructive sleep apnea    Continue Lasix and Xarelto  Will need outpatient sleep study    Type 2 myocardial infarction (HCC)  Assessment & Plan  Secondary to acute respiratory failure and PE  Chest pain            VTE prophylaxis: Xarelto

## 2019-08-26 NOTE — PROGRESS NOTES
Received bedside report from RN, pt care assumed, VSS. Pt AAOx4, On 6L NC, c/o 0/10 pain at this time. No signs of acute distress noted at this time. POC discussed with pt and family and verbalizes no questions. Pt denies any additional needs at this time. Bed in lowest position, pt educated on fall risk and verbalized understanding, call light within reach, hourly rounding initiated.

## 2019-08-26 NOTE — DIETARY
Nutrition Services: Update    Patient transitioned to PO diet with dysphagia 3, thin liquids, noted 2 meals with % PO intake consumed.     RD monitoring for PO intake greater than 50%.

## 2019-08-27 LAB
ANION GAP SERPL CALC-SCNC: 7 MMOL/L (ref 0–11.9)
BASOPHILS # BLD AUTO: 0.7 % (ref 0–1.8)
BASOPHILS # BLD: 0.06 K/UL (ref 0–0.12)
BUN SERPL-MCNC: 32 MG/DL (ref 8–22)
CALCIUM SERPL-MCNC: 10.3 MG/DL (ref 8.5–10.5)
CHLORIDE SERPL-SCNC: 99 MMOL/L (ref 96–112)
CO2 SERPL-SCNC: 31 MMOL/L (ref 20–33)
CREAT SERPL-MCNC: 1.24 MG/DL (ref 0.5–1.4)
EOSINOPHIL # BLD AUTO: 0.07 K/UL (ref 0–0.51)
EOSINOPHIL NFR BLD: 0.9 % (ref 0–6.9)
ERYTHROCYTE [DISTWIDTH] IN BLOOD BY AUTOMATED COUNT: 61.1 FL (ref 35.9–50)
FUNGUS SPEC FUNGUS STN: NORMAL
GLUCOSE SERPL-MCNC: 110 MG/DL (ref 65–99)
HCT VFR BLD AUTO: 57.2 % (ref 42–52)
HGB BLD-MCNC: 16.2 G/DL (ref 14–18)
IMM GRANULOCYTES # BLD AUTO: 0.03 K/UL (ref 0–0.11)
IMM GRANULOCYTES NFR BLD AUTO: 0.4 % (ref 0–0.9)
LYMPHOCYTES # BLD AUTO: 4.46 K/UL (ref 1–4.8)
LYMPHOCYTES NFR BLD: 54.9 % (ref 22–41)
MAGNESIUM SERPL-MCNC: 1.8 MG/DL (ref 1.5–2.5)
MCH RBC QN AUTO: 24.5 PG (ref 27–33)
MCHC RBC AUTO-ENTMCNC: 28.3 G/DL (ref 33.7–35.3)
MCV RBC AUTO: 86.7 FL (ref 81.4–97.8)
MONOCYTES # BLD AUTO: 0.73 K/UL (ref 0–0.85)
MONOCYTES NFR BLD AUTO: 9 % (ref 0–13.4)
NEUTROPHILS # BLD AUTO: 2.77 K/UL (ref 1.82–7.42)
NEUTROPHILS NFR BLD: 34.1 % (ref 44–72)
NRBC # BLD AUTO: 0 K/UL
NRBC BLD-RTO: 0 /100 WBC
NT-PROBNP SERPL IA-MCNC: 268 PG/ML (ref 0–125)
PLATELET # BLD AUTO: 147 K/UL (ref 164–446)
PMV BLD AUTO: 9.7 FL (ref 9–12.9)
POTASSIUM SERPL-SCNC: 4.7 MMOL/L (ref 3.6–5.5)
RBC # BLD AUTO: 6.6 M/UL (ref 4.7–6.1)
SIGNIFICANT IND 70042: NORMAL
SITE SITE: NORMAL
SODIUM SERPL-SCNC: 137 MMOL/L (ref 135–145)
SOURCE SOURCE: NORMAL
TEST NAME 95000: NORMAL
WBC # BLD AUTO: 8.1 K/UL (ref 4.8–10.8)

## 2019-08-27 PROCEDURE — 99232 SBSQ HOSP IP/OBS MODERATE 35: CPT | Performed by: HOSPITALIST

## 2019-08-27 PROCEDURE — 700111 HCHG RX REV CODE 636 W/ 250 OVERRIDE (IP): Performed by: HOSPITALIST

## 2019-08-27 PROCEDURE — 92526 ORAL FUNCTION THERAPY: CPT

## 2019-08-27 PROCEDURE — A9270 NON-COVERED ITEM OR SERVICE: HCPCS | Performed by: HOSPITALIST

## 2019-08-27 PROCEDURE — 770020 HCHG ROOM/CARE - TELE (206)

## 2019-08-27 PROCEDURE — 83735 ASSAY OF MAGNESIUM: CPT

## 2019-08-27 PROCEDURE — 80048 BASIC METABOLIC PNL TOTAL CA: CPT

## 2019-08-27 PROCEDURE — 85025 COMPLETE CBC W/AUTO DIFF WBC: CPT

## 2019-08-27 PROCEDURE — 83880 ASSAY OF NATRIURETIC PEPTIDE: CPT

## 2019-08-27 PROCEDURE — 36415 COLL VENOUS BLD VENIPUNCTURE: CPT

## 2019-08-27 PROCEDURE — 700102 HCHG RX REV CODE 250 W/ 637 OVERRIDE(OP): Performed by: HOSPITALIST

## 2019-08-27 RX ADMIN — RIVAROXABAN 15 MG: 15 TABLET, FILM COATED ORAL at 16:31

## 2019-08-27 RX ADMIN — RIVAROXABAN 15 MG: 15 TABLET, FILM COATED ORAL at 07:51

## 2019-08-27 RX ADMIN — FUROSEMIDE 40 MG: 10 INJECTION, SOLUTION INTRAMUSCULAR; INTRAVENOUS at 05:02

## 2019-08-27 ASSESSMENT — ENCOUNTER SYMPTOMS
COUGH: 0
NAUSEA: 0
ABDOMINAL PAIN: 0
DEPRESSION: 0
DIZZINESS: 0
SHORTNESS OF BREATH: 1
CHILLS: 0

## 2019-08-27 NOTE — PROGRESS NOTES
Walk test complete. Pt maintained on 4L oxymask throughout walk around the unit, approximately 500 ft. Oxygen saturation range is 86-93% with no c/o SOB, lightheadedness or weakness.

## 2019-08-27 NOTE — DISCHARGE PLANNING
Received Choice form at 0206  Agency/Facility Name: [facility]  Referral sent per Choice form @ [phone]

## 2019-08-27 NOTE — DISCHARGE PLANNING
RN CM met with pt and obtained choice for home oxygen. Choice faxed to [facility] at ext. [phone].

## 2019-08-27 NOTE — PROGRESS NOTES
Bedside report received. Assumed care of pt. Pt moved from 701 to 722 with day shift RN, pt sitting up in bed, family at bedside, A&O X4. No signs of distress, no complaints of pain at this time. Tele box on.Call light and belongings within reach. Bed locked and in lowest position.

## 2019-08-27 NOTE — FACE TO FACE
"Face to Face Note  -  Durable Medical Equipment    Walt Méndez M.D. - NPI: [id number]  I certify that this patient is under my care and that they had a durable medical equipment(DME)face to face encounter by myself that meets the physician DME face-to-face encounter requirements with this patient on:    Date of encounter:   Patient:                    MRN:                       YOB: 2019  Michael Ontiveros  [id number]  1990     The encounter with the patient was in whole, or in part, for the following medical condition, which is the primary reason for durable medical equipment:  Other - asthma, obesity, PE with right sided heart strain, likely obesity hypoventilation syndrome. pulmonary htn.    I certify that, based on my findings, the following durable medical equipment is medically necessary:  Oxygen.    HOME O2 Saturation Measurements:(Values must be present for Home Oxygen orders)        With liters of O2: 5, O2 sat at rest with O2: 94  With Liters of O2: 4, O2 sat with amb with O2 : 86-93%  Is the patient mobile?: Yes    My Clinical findings support the need for the above equipment due to:  Hypoxia    Supporting Symptoms: The patient requires supplemental oxygen, as the following interventions have been tried with limited or no improvement: \"Bronchodilators and/or steroid inhalers    "

## 2019-08-27 NOTE — THERAPY
"Speech Language Therapy dysphagia treatment completed.   Functional Status:  Pt seen for dysphagia tx with lunch meal of D3/thins via cup/straw. Pt was on 5L O2 via oxymask, though removed for meal with RN permission. Pt did not appear SOB or in any respiratory distress. Pt was impulsive with self-feeding, requiring min verbal cues and education on dysphagia/aspiration risk to reduce rate of intake and reduce bite size. Pt verbalized understanding, but did not consistently implement these strategies. No s/sx of aspiration occurred with PO intake. Recommend continuing current diet of Dysphagia 3/thins with monitoring during meal for safe swallow strategy use to minimize risk of aspiration.  Recommendations: Dysphagia 3/thins; monitor during meals; Please hold PO with any change in status or difficulty swallowing.   Plan of Care: Will benefit from Speech Therapy 3 times per week  Post-Acute Therapy: Recommend outpatient or home health transitional care services for continued speech therapy services      See \"Rehab Therapy-Acute\" Patient Summary Report for complete documentation.     "

## 2019-08-27 NOTE — PROGRESS NOTES
Monitors notified that pt had run of PSVT in 150-160 before dropping back down to sinus. Paged on call hospitalist, Dr. Gibbs returned page, updated on pt, ordered magnesium lab draw for the morning. Will continue to monitor pt.

## 2019-08-27 NOTE — CARE PLAN
Problem: Communication  Goal: The ability to communicate needs accurately and effectively will improve  Intervention: Educate patient and significant other/support system about the plan of care, procedures, treatments, medications and allow for questions  Note:   Pt and family educated on plan of care and treatments for the day.      Problem: Infection  Goal: Will remain free from infection  Intervention: Implement standard precautions and perform hand washing before and after patient contact  Note:   Standard precautions in place, foaming in and out of room

## 2019-08-27 NOTE — PROGRESS NOTES
[facility] Medicine Daily Progress Note    Date of Service  8/27/2019    Chief Complaint  28 y.o. male admitted 8/21/2019 with history of asthma admitted with suspected pneumonia and acute respiratory failure    Hospital Course         Interval Problem Update    Resting in bed, still on 6L of o2 discussed again regarding using IS, ambulating, no chest pain no focal weakness.   o2 with ambulation today.   Most likely will need o2 at home.       Consultants/Specialty  Critical care    Code Status  Full code    Disposition  Home when medically stable    Review of Systems  Review of Systems   Constitutional: Negative for chills.   Respiratory: Positive for shortness of breath (Improving). Negative for cough.    Cardiovascular: Negative for chest pain.   Gastrointestinal: Negative for abdominal pain and nausea.   Genitourinary: Negative for dysuria.   Neurological: Negative for dizziness.   Psychiatric/Behavioral: Negative for depression.        Physical Exam  Temp:  [36.2 °C (97.1 °F)-36.6 °C (97.9 °F)] 36.3 °C (97.3 °F)  Pulse:  [86-97] 96  Resp:  [19-26] 20  BP: (115-138)/(64-80) 118/75  SpO2:  [90 %-99 %] 92 %    Physical Exam   Constitutional: He is oriented to person, place, and time. He appears well-developed and well-nourished. No distress.   HENT:   Head: Normocephalic and atraumatic.   Eyes: Conjunctivae are normal.   Neck: Neck supple.   Cardiovascular: Normal rate, regular rhythm and normal heart sounds.   Pulmonary/Chest: Effort normal. No respiratory distress. He has decreased breath sounds. He has rales. He exhibits no tenderness.   Abdominal: Soft. Bowel sounds are normal. He exhibits no distension. There is no tenderness.   Musculoskeletal: He exhibits no tenderness.   Neurological: He is alert and oriented to person, place, and time. No cranial nerve deficit. He exhibits normal muscle tone.   Skin: Skin is warm and dry. No cyanosis. Nails show no clubbing.   Psychiatric: He has a normal mood and affect.  Judgment normal.   Nursing note and vitals reviewed.      Fluids  No intake or output data in the 24 hours ending 08/27/19 1314    Laboratory  Recent Labs     08/25/19  0524 08/26/19  0306 08/27/19  0241   WBC 7.1 7.9 8.1   RBC 6.20* 6.51* 6.60*   HEMOGLOBIN 15.2 15.4 16.2   HEMATOCRIT 53.6* 55.3* 57.2*   MCV 86.5 84.9 86.7   MCH 24.5* 23.7* 24.5*   MCHC 28.4* 27.8* 28.3*   RDW 62.9* 60.2* 61.1*   PLATELETCT 141* 167 147*   MPV 10.3  --  9.7     Recent Labs     08/25/19  0524 08/26/19  0306 08/27/19  0241   SODIUM 144 137 137   POTASSIUM 4.2 4.7 4.7   CHLORIDE 106 99 99   CO2 29 31 31   GLUCOSE 94 107* 110*   BUN 47* 42* 32*   CREATININE 2.10* 1.69* 1.24   CALCIUM 10.1 9.8 10.3     Recent Labs     08/25/19  0319 08/25/19  0933 08/26/19  0306   APTT 74.1* 66.4* 28.4               Imaging  DX-CHEST-PORTABLE (1 VIEW)   Final Result      Improving interstitial opacities and atelectasis.      DX-CHEST-PORTABLE (1 VIEW)   Final Result         1. No significant interval change.      IR-PULMONARY ANGIOGRAM-BILATERAL   Final Result      1.  Ultrasound guided access RIGHT common femoral vein   2.  BILATERAL selective pulmonary arteriogram   3.  Placement of BILATERAL EKOS catheters   4.  Initiation of thrombolytic infusion with EKOS acoustic pulse thrombolysis therapy (US-assisted thrombolysis)            DX-CHEST-PORTABLE (1 VIEW)   Final Result      Bibasilar underinflation atelectasis which could obscure an additional process. This is unchanged.      EC-ECHOCARDIOGRAM COMPLETE W/ CONT   Final Result      DX-ABDOMEN FOR TUBE PLACEMENT   Final Result      Feeding tube tip projects over the expected location of the gastric antrum.      CT-CTA CHEST PULMONARY ARTERY W/ RECONS   Final Result      1.  Right-sided lobar, segmental and subsegmental pulmonary emboli. Suspected subsegmental left-sided pulmonary emboli. Associated right heart strain.   2.  Scattered pulmonary infarcts. Dependent opacities in the lower lobes may  represent a combination of atelectasis and infarcts, however, superimposed pneumonia is also possible.   3.  Cardiomegaly.   4.  Large right thyroid nodule measuring 2.9 cm. It can be followed on an outpatient basis.   5.  Splenomegaly.      MICHELLE SAEED was paged at 8/22/2019 4:08 PM.      DX-CHEST-PORTABLE (1 VIEW)   Final Result      1.  Well-positioned lines and tubes. No pneumothorax.   2.  Interval worsening of bilateral perihilar and bibasilar opacities, which may represent a continuation of atelectasis and developing alveolar edema.   3.  Small bilateral pleural effusions.      US-EXTREMITY VENOUS LOWER BILAT   Final Result      DX-CHEST-LIMITED (1 VIEW)   Final Result      No significant change in pulmonary edema      DX-CHEST-PORTABLE (1 VIEW)   Final Result      1.  Enlarged cardiac silhouette consistent with findings of vascular congestion/edema.   2.  Right middle lobe and lower lobe airspace opacities are suspicious for pneumonia.           Assessment/Plan  * Acute respiratory failure with hypoxia and hypercapnia (HCC)- (present on admission)  Assessment & Plan  Secondary to pneumonia sepsis and PE    Tolerated extubation on 8/24/2019  Continue Xarelto, change to lasix po in am.    Aspiration pneumonia (HCC)  Assessment & Plan  Completed Augmentin treatment    Severe sepsis (HCC)- (present on admission)  Assessment & Plan  -This is severe sepsis with the following associated acute organ dysfunction(s): acute respiratory failure.       Sepsis resolved  Completed 5 days of amoxicillin.    Asthma with exacerbation- (present on admission)  Assessment & Plan  Resolved, continue monitoring, continue oxygen per protocol respiratory care per protocol  Continue weaning o2    JONES (obstructive sleep apnea)  Assessment & Plan  Will need formal evaluation when clinically stable  Will need outpatient sleep study    Hyperglycemia- (present on admission)  Assessment & Plan  HbA1c 6.1    Counseled on lifestyle  changes  Off steroids this should help.    Polycythemia- (present on admission)  Assessment & Plan  Could be related to chronic hypoxia    Morbid obesity (HCC)- (present on admission)  Assessment & Plan  Body mass index is 59.1 kg/m².   Needs to lose weight      Hemoptysis  Assessment & Plan  Secondary to PE continue monitoring  hemoglobin stable  Resolved.    TASHI (acute kidney injury) ([facility])  Assessment & Plan  Likely  ATN from shock and possible contrast-induced nephropathy    Cr back to baseline.     Pulmonary embolism with acute cor pulmonale (HCC)  Assessment & Plan  Status post systemic alteplase and EKOS  Continue on Xarelto  Continue oxygen per protocol    Pulmonary hypertension (HCC)  Assessment & Plan  Secondary to large PE and also component of obstructive sleep apnea    Continue Lasix and Xarelto  Will need outpatient sleep study    Type 2 myocardial infarction (HCC)  Assessment & Plan  Secondary to acute respiratory failure and PE  Chest pain            VTE prophylaxis: Xarelto

## 2019-08-27 NOTE — CARE PLAN
Problem: Communication  Goal: The ability to communicate needs accurately and effectively will improve  Outcome: PROGRESSING AS EXPECTED     Problem: Infection  Goal: Will remain free from infection  Outcome: PROGRESSING AS EXPECTED     Problem: Venous Thromboembolism (VTW)/Deep Vein Thrombosis (DVT) Prevention:  Goal: Patient will participate in Venous Thrombosis (VTE)/Deep Vein Thrombosis (DVT)Prevention Measures  Outcome: PROGRESSING AS EXPECTED     Problem: Bowel/Gastric:  Goal: Normal bowel function is maintained or improved  Outcome: PROGRESSING AS EXPECTED  Goal: Will not experience complications related to bowel motility  Outcome: PROGRESSING AS EXPECTED     Problem: Knowledge Deficit  Goal: Knowledge of disease process/condition, treatment plan, diagnostic tests, and medications will improve  Outcome: PROGRESSING AS EXPECTED  Goal: Knowledge of the prescribed therapeutic regimen will improve  Outcome: PROGRESSING AS EXPECTED     Problem: Discharge Barriers/Planning  Goal: Patient's continuum of care needs will be met  Outcome: PROGRESSING AS EXPECTED     Problem: Respiratory:  Goal: Respiratory status will improve  Outcome: PROGRESSING AS EXPECTED     Problem: Fluid Volume:  Goal: Will maintain balanced intake and output  Outcome: PROGRESSING AS EXPECTED     Problem: Safety - Medical Restraint  Goal: Remains free of injury from restraints (Restraint for Interference with Medical Device)  Description  INTERVENTIONS:  1. Determine that other, less restrictive measures have been tried or would not be effective before applying the restraint  2. Evaluate the patient's condition at the time of restraint application  3. Inform patient/family regarding the reason for restraint  4. Q2H: Monitor safety, psychosocial status, comfort, nutrition and hydration  Outcome: PROGRESSING AS EXPECTED  Goal: Free from restraint(s) (Restraint for Interference with Medical Device)  Description  INTERVENTIONS:  1. ONCE/SHIFT or  MINIMUM Q12H: Assess and document the continuing need for restraints  2. Q24H: Continued use of restraint requires LIP to perform face to face examination and written order  3. Identify and implement measures to help patient regain control  Outcome: PROGRESSING AS EXPECTED     Problem: Skin Integrity  Goal: Risk for impaired skin integrity will decrease  Outcome: PROGRESSING AS EXPECTED     Problem: Skin Integrity  Goal: Skin Integrity is maintained or improved  Outcome: PROGRESSING AS EXPECTED     Problem: Oxygenation/Respiratory Function  Goal: Patient will Achieve/Maintain Optimum Respiratory Rate/Effort  Outcome: PROGRESSING AS EXPECTED     Problem: Mechanical Ventilation  Goal: Safe management of artificial airway and ventilation  Outcome: PROGRESSING AS EXPECTED  Goal: Ability to express needs and understand communication  Outcome: PROGRESSING AS EXPECTED  Goal: Successful weaning off mechanical ventilator. Spontaneously maintains adequate gas exchange  Outcome: PROGRESSING AS EXPECTED     Problem: Hemodynamic Status  Goal: Vital Signs and Fluid Balance Management  Outcome: PROGRESSING AS EXPECTED     Problem: Pain Management  Goal: Pain level will decrease to patient's comfort goal  Outcome: PROGRESSING AS EXPECTED     Problem: Psychosocial Needs:  Goal: Level of anxiety will decrease  Outcome: PROGRESSING AS EXPECTED     Problem: Risk for Impaired Mobility--Activity Intolerance  Goal: Mobilize and/or Transfer Safely with Maximum [place]  Outcome: PROGRESSING AS EXPECTED  Goal: Activity Level appropriate for Discharge or Transfer  Outcome: PROGRESSING AS EXPECTED

## 2019-08-28 ENCOUNTER — PATIENT OUTREACH (OUTPATIENT)
Dept: HEALTH INFORMATION MANAGEMENT | Facility: OTHER | Age: 29
End: 2019-08-28

## 2019-08-28 VITALS
HEIGHT: 68 IN | OXYGEN SATURATION: 92 % | TEMPERATURE: 98.1 F | HEART RATE: 104 BPM | WEIGHT: 315 LBS | DIASTOLIC BLOOD PRESSURE: 71 MMHG | SYSTOLIC BLOOD PRESSURE: 116 MMHG | BODY MASS INDEX: 47.74 KG/M2 | RESPIRATION RATE: 20 BRPM

## 2019-08-28 PROCEDURE — 700111 HCHG RX REV CODE 636 W/ 250 OVERRIDE (IP): Performed by: HOSPITALIST

## 2019-08-28 PROCEDURE — A9270 NON-COVERED ITEM OR SERVICE: HCPCS | Performed by: HOSPITALIST

## 2019-08-28 PROCEDURE — 700102 HCHG RX REV CODE 250 W/ 637 OVERRIDE(OP): Performed by: HOSPITALIST

## 2019-08-28 PROCEDURE — 99239 HOSP IP/OBS DSCHRG MGMT >30: CPT | Performed by: HOSPITALIST

## 2019-08-28 RX ORDER — FUROSEMIDE 20 MG/1
20 TABLET ORAL DAILY
Qty: 5 TAB | Refills: 0 | Status: SHIPPED | OUTPATIENT
Start: 2019-08-28 | End: 2019-09-02

## 2019-08-28 RX ORDER — ALBUTEROL SULFATE 90 UG/1
2 AEROSOL, METERED RESPIRATORY (INHALATION) EVERY 6 HOURS PRN
Qty: 8.5 G | Refills: 0 | Status: SHIPPED | OUTPATIENT
Start: 2019-08-28 | End: 2020-03-05

## 2019-08-28 RX ADMIN — RIVAROXABAN 15 MG: 15 TABLET, FILM COATED ORAL at 07:37

## 2019-08-28 RX ADMIN — FUROSEMIDE 40 MG: 10 INJECTION, SOLUTION INTRAMUSCULAR; INTRAVENOUS at 05:27

## 2019-08-28 NOTE — DISCHARGE INSTRUCTIONS
Discharge Instructions    Discharged to home by car with relative. Discharged via wheelchair, hospital escort: Yes.  Special equipment needed: Not Applicable    Be sure to schedule a follow-up appointment with your primary care doctor or any specialists as instructed.     Discharge Plan:   Diet Plan: Discussed  Activity Level: Discussed  Confirmed Follow up Appointment: Appointment Scheduled  Confirmed Symptoms Management: Discussed  Medication Reconciliation Updated: Yes  Influenza Vaccine Indication: Patient Refuses    I understand that a diet low in cholesterol, fat, and sodium is recommended for good health. Unless I have been given specific instructions below for another diet, I accept this instruction as my diet prescription.   Other diet:     Special Instructions: None    · Is patient discharged on Warfarin / Coumadin?   No     Depression / Suicide Risk    As you are discharged from this [facility] facility, it is important to learn how to keep safe from harming yourself.    Recognize the warning signs:  · Abrupt changes in personality, positive or negative- including increase in energy   · Giving away possessions  · Change in eating patterns- significant weight changes-  positive or negative  · Change in sleeping patterns- unable to sleep or sleeping all the time   · Unwillingness or inability to communicate  · Depression  · Unusual sadness, discouragement and loneliness  · Talk of wanting to die  · Neglect of personal appearance   · Rebelliousness- reckless behavior  · Withdrawal from people/activities they love  · Confusion- inability to concentrate     If you or a loved one observes any of these behaviors or has concerns about self-harm, here's what you can do:  · Talk about it- your feelings and reasons for harming yourself  · Remove any means that you might use to hurt yourself (examples: pills, rope, extension cords, firearm)  · Get professional help from the community (Mental Health, Substance  Abuse, psychological counseling)  · Do not be alone:Call your Safe Contact- someone whom you trust who will be there for you.  · Call your local CRISIS HOTLINE 800-5250 or 673-619-4431  · Call your local Children's Mobile Crisis Response Team [place] (899) 864-1834 or www.Shozu  · Call the toll free National Suicide Prevention Hotlines   · National Suicide Prevention Lifeline 591-422-GROR (4503)  · National Hope Line Network 800-SUICIDE (413-1206)      Discharge Instructions per Walt Méndez M.D.    Needs to f/u with pulmonologist and PCP in 1 week  Continue lasix 5 more days  Check bmp in 5 days.   Needs to continue using incentive spirometry at home.       DIET: healthy diet    ACTIVITY: as tolerate    DIAGNOSIS: pulmonary embolism, asthma attack.    Return to ER if increasing shortness of breath, chest pain, fever,  chills, chest pain, dizziness, lightheadedness.            Asthma Attack Prevention  Although there is no way to prevent asthma from starting, you can take steps to control the disease and reduce its symptoms. Learn about your asthma and how to control it. Take an active role to control your asthma by working with your health care provider to create and follow an asthma action plan. An asthma action plan guides you in:  · Taking your medicines properly.  · Avoiding things that set off your asthma or make your asthma worse (asthma triggers).  · Tracking your level of asthma control.  · Responding to worsening asthma.  · Seeking emergency care when needed.  To track your asthma, keep records of your symptoms, check your peak flow number using a handheld device that shows how well air moves out of your lungs (peak flow meter), and get regular asthma checkups.   WHAT ARE SOME WAYS TO PREVENT AN ASTHMA ATTACK?  · Take medicines as directed by your health care provider.  · Keep track of your asthma symptoms and level of control.  · With your health care provider, write a detailed  plan for taking medicines and managing an asthma attack. Then be sure to follow your action plan. Asthma is an ongoing condition that needs regular monitoring and treatment.  · Identify and avoid asthma triggers. Many outdoor allergens and irritants (such as pollen, mold, cold air, and air pollution) can trigger asthma attacks. Find out what your asthma triggers are and take steps to avoid them.  · Monitor your breathing. Learn to recognize warning signs of an attack, such as coughing, wheezing, or shortness of breath. Your lung function may decrease before you notice any signs or symptoms, so regularly measure and record your peak airflow with a home peak flow meter.  · Identify and treat attacks early. If you act quickly, you are less likely to have a severe attack. You will also need less medicine to control your symptoms. When your peak flow measurements decrease and alert you to an upcoming attack, take your medicine as instructed and immediately stop any activity that may have triggered the attack. If your symptoms do not improve, get medical help.  · Pay attention to increasing quick-relief inhaler use. If you find yourself relying on your quick-relief inhaler, your asthma is not under control. See your health care provider about adjusting your treatment.  WHAT CAN MAKE MY SYMPTOMS WORSE?  A number of common things can set off or make your asthma symptoms worse and cause temporary increased inflammation of your airways. Keep track of your asthma symptoms for several weeks, detailing all the environmental and emotional factors that are linked with your asthma. When you have an asthma attack, go back to your asthma diary to see which factor, or combination of factors, might have contributed to it. Once you know what these factors are, you can take steps to control many of them. If you have allergies and asthma, it is important to take asthma prevention steps at home. Minimizing contact with the substance to  which you are allergic will help prevent an asthma attack. Some triggers and ways to avoid these triggers are:  Animal Dander:   Some people are allergic to the flakes of skin or dried saliva from animals with fur or feathers.   · There is no such thing as a hypoallergenic dog or cat breed. All dogs or cats can cause allergies, even if they don't shed.  · Keep these pets out of your home.  · If you are not able to keep a pet outdoors, keep the pet out of your bedroom and other sleeping areas at all times, and keep the door closed.  · Remove carpets and furniture covered with cloth from your home. If that is not possible, keep the pet away from fabric-covered furniture and carpets.  Dust Mites:  Many people with asthma are allergic to dust mites. Dust mites are tiny bugs that are found in every home in mattresses, pillows, carpets, fabric-covered furniture, bedcovers, clothes, stuffed toys, and other fabric-covered items.   · Cover your mattress in a special dust-proof cover.  · Cover your pillow in a special dust-proof cover, or wash the pillow each week in hot water. Water must be hotter than 130° F (54.4° C) to kill dust mites. Cold or warm water used with detergent and bleach can also be effective.  · Wash the sheets and blankets on your bed each week in hot water.  · Try not to sleep or lie on cloth-covered cushions.  · Call ahead when traveling and ask for a smoke-free hotel room. Bring your own bedding and pillows in case the hotel only supplies feather pillows and down comforters, which may contain dust mites and cause asthma symptoms.  · Remove carpets from your bedroom and those laid on concrete, if you can.  · Keep stuffed toys out of the bed, or wash the toys weekly in hot water or cooler water with detergent and bleach.  Cockroaches:  Many people with asthma are allergic to the droppings and remains of cockroaches.   · Keep food and garbage in closed containers. Never leave food out.  · Use poison baits,  traps, powders, gels, or paste (for example, boric acid).  · If a spray is used to kill cockroaches, stay out of the room until the odor goes away.  Indoor Mold:  · Fix leaky faucets, pipes, or other sources of water that have mold around them.  · Clean floors and moldy surfaces with a fungicide or diluted bleach.  · Avoid using humidifiers, vaporizers, or swamp coolers. These can spread molds through the air.  Pollen and Outdoor Mold:  · When pollen or mold spore counts are high, try to keep your windows closed.  · Stay indoors with windows closed from late morning to afternoon. Pollen and some mold spore counts are highest at that time.  · Ask your health care provider whether you need to take anti-inflammatory medicine or increase your dose of the medicine before your allergy season starts.  Other Irritants to Avoid:  · Tobacco smoke is an irritant. If you smoke, ask your health care provider how you can quit. Ask family members to quit smoking, too. Do not allow smoking in your home or car.  · If possible, do not use a wood-burning stove, kerosene heater, or fireplace. Minimize exposure to all sources of smoke, including incense, candles, fires, and fireworks.  · Try to stay away from strong odors and sprays, such as perfume, talcum powder, hair spray, and paints.  · Decrease humidity in your home and use an indoor air cleaning device. Reduce indoor humidity to below 60%. Dehumidifiers or central air conditioners can do this.  · Decrease house dust exposure by changing furnace and air cooler filters frequently.  · Try to have someone else vacuum for you once or twice a week. Stay out of rooms while they are being vacuumed and for a short while afterward.  · If you vacuum, use a dust mask from a hardware store, a double-layered or microfilter vacuum  bag, or a vacuum  with a HEPA filter.  · Sulfites in foods and beverages can be irritants. Do not drink beer or wine or eat dried fruit, processed  potatoes, or shrimp if they cause asthma symptoms.  · Cold air can trigger an asthma attack. Cover your nose and mouth with a scarf on cold or windy days.  · Several health conditions can make asthma more difficult to manage, including a runny nose, sinus infections, reflux disease, psychological stress, and sleep apnea. Work with your health care provider to manage these conditions.  · Avoid close contact with people who have a respiratory infection such as a cold or the flu, since your asthma symptoms may get worse if you catch the infection. Wash your hands thoroughly after touching items that may have been handled by people with a respiratory infection.  · Get a flu shot every year to protect against the flu virus, which often makes asthma worse for days or weeks. Also get a pneumonia shot if you have not previously had one. Unlike the flu shot, the pneumonia shot does not need to be given yearly.  Medicines:  · Talk to your health care provider about whether it is safe for you to take aspirin or non-steroidal anti-inflammatory medicines (NSAIDs). In a small number of people with asthma, aspirin and NSAIDs can cause asthma attacks. These medicines must be avoided by people who have known aspirin-sensitive asthma. It is important that people with aspirin-sensitive asthma read labels of all over-the-counter medicines used to treat pain, colds, coughs, and fever.  · Beta-blockers and ACE inhibitors are other medicines you should discuss with your health care provider.  HOW CAN I FIND OUT WHAT I AM ALLERGIC TO?  Ask your asthma health care provider about allergy skin testing or blood testing (the RAST test) to identify the allergens to which you are sensitive. If you are found to have allergies, the most important thing to do is to try to avoid exposure to any allergens that you are sensitive to as much as possible. Other treatments for allergies, such as medicines and allergy shots (immunotherapy) are available.    CAN I EXERCISE?  Follow your health care provider's advice regarding asthma treatment before exercising. It is important to maintain a regular exercise program, but vigorous exercise or exercise in cold, humid, or dry environments can cause asthma attacks, especially for those people who have exercise-induced asthma.     This information is not intended to replace advice given to you by your health care provider. Make sure you discuss any questions you have with your health care provider.     Document Released: 12/06/2010 Document Revised: 12/23/2014 Document Reviewed: 06/25/2014  [employer] Patient Education ©2016 [employer]    Asthma, F.L.A.R.E.  Most people with asthma do not get so sick that they need emergency care. The fact that you had to get emergency care may mean:  · You are not taking your long-term control medicine the right way.   · You have not been prescribed any or enough long-term control medicine.   · You are still exposed to triggers that start your asthma symptoms.   You can avoid asthma flare-ups by using this F.L.A.R.E. plan until you see your primary doctor.  F  FOLLOW UP WITH YOUR PRIMARY DOCTOR - CALL TO MAKE AN APPOINTMENT TO BE SEEN.  · If you have trouble making an appointment, ask to speak to the office nurse.   · If you do not have a primary care doctor, call to get one.   At the follow-up appointment:  · Bring all of your medications and this plan with you.   · Make an asthma action plan with your doctor that you can follow every day to keep your asthma under control.   · Write down your questions and your doctor's answers.   This will make your emergency visits rare.  L  LEARN ABOUT YOUR ASTHMA MEDICINES. TAKE ALL OF THESE MEDICINES JUST AS THE DOCTOR TELLS YOU, EVEN IF YOU ARE FEELING MUCH BETTER.  Quick-relief or Rescue  · Kind of medicine   · Name of medicine   · How much   · How often & how long you need to take it   Long-term control  · Kind of medicine   · Name of  "medicine   · How much   · How often & how long you need to take it   Steroid pills or syrup  · Kind of medicine   · Name of medicine   · How much   · How often & how long you need to take it   A  ASTHMA IS A LIFE-LONG (CHRONIC) DISEASE.  Even though your breathing is better after getting emergency care, you still need to get long-term control of your asthma. If you do not, you are at risk for more severe flare-ups and even death.  · If you use quick-relief medicine more than 2 times per week, your asthma is not under control. You need to see your doctor or an asthma specialist to make a plan to get control of your asthma.   · Take long-term control medicine every day as ordered by your doctor.   · Figure out what things make your asthma flare up and try to stay away from these \"triggers.\"   R  RESPOND TO THESE WARNING SIGNS THAT YOUR ASTHMA IS GETTING WORSE:  · Your chest feels tight.   · You are short of breath.   · You are wheezing.   · You are coughing.   · Your peak flow is getting low.   Keep taking your medicines as prescribed and call your doctor.  E  EMERGENCY CARE MAY BE NEEDED IF YOU:  · Have trouble talking.   · Are working hard to breathe (may see skin sucking in at rib cage or above breast bone).   · Need to use quick-relief medicine more often than every 4 hours.   · See your peak flow dropping.   Take your quick-relief medicine and wait 20 minutes. If you do not feel better, take it again and wait 20 minutes. If you still do not feel better, take it again and call your doctor or 911 right away!  LEARN ABOUT ASTHMA  Asthma is a life-long disease that can make it hard for you to get air in and out of your lungs. Your asthma triggers make the air tubes in your lungs get smaller. These are the tubes that carry air in and out of your lungs.  Here is what happens:  · Small breathing tubes in your lungs swell and make extra mucus.   · Muscles around the small breathing tubes get tight and make them smaller. "   · Smaller breathing tubes then get clogged with the extra mucus.   · Swelling, muscle tightness, and mucus make it harder for you to breathe. You start to cough and wheeze and your chest might feel tight.   Not all asthma flare-ups are the same. Some are worse than others. In a severe asthma flare-up, the breathing tubes get so small that air cannot get in and out of the lungs. People can die if their asthma flare-up is severe.  ASTHMA MEDICINES  · Quick-relief or Rescue medicine: should help for about 4 hours, relaxes the muscles around your breathing tubes so air can get in and out. If you need to take quick-relief medicine more than 2 times per week, your asthma is not under control, and you should ask your doctor about long-term control medicine.   · Long-term control medicine: must be taken every day in order to work right. It keeps your breathing tubes from swelling, and can prevent most asthma flare-ups. This medicine cannot stop a flare-up once it starts. During flare-ups, use quick-relief medicine right away and take your long-term control medicine as usual.   · Steroid pills or syrup: can help the swelling in your breathing tubes go away. You must take this medicine just as the doctor tells you to. Do not skip a dose, and do not stop taking it unless a doctor tells you to stop.   TRIGGERS: TELL YOUR DOCTOR ABOUT THE THINGS THAT MAKE YOUR ASTHMA WORSE.  What started or triggered your asthma flare-up this time?  COMMON ASTHMA TRIGGERS:  · Breathing in chemicals, dusts, or fumes at work.   · Colds or flu.   · Animals.   · Dust.   · Pollen and mold.   · Strong odors.   · Weather.   · Exercise.   · Cigarette and other smoke.   · Medicines.   Smoking and secondhand smoke are asthma triggers. If you smoke, choose to quit. Never let others smoke near you or your children. Call your doctor or your health plan for help quitting.  FOR MORE INFORMATION  Asthma Initiative of [place]: www.GetAsthmaHelp.org  American  Lung Association: www.lungusa.org  Asthma and Allergy Foundation of [place]: www.aafa.org  This plan and asthma information are based on the NAEPP Guidelines for the Diagnosis and Management of Asthma, (http://www.nhlbi.nig.gov/guidelines/asthma/asthgdln.htm).  Document Released: 07/26/2007 Document Revised: 03/11/2013 Document Reviewed: 10/04/2007  [employer]Care® Patient Information ©2013 [facility].        Pulmonary Embolism  A pulmonary embolism (PE) is a sudden blockage or decrease of blood flow in one lung or both lungs. Most blockages come from a blood clot that travels from the legs or the pelvis to the lungs. PE is a dangerous and potentially life-threatening condition if it is not treated right away.  What are the causes?  A pulmonary embolism occurs most commonly when a blood clot travels from one of your veins to your lungs. Rarely, PE is caused by air, fat, amniotic fluid, or part of a tumor traveling through your veins to your lungs.  What increases the risk?  A PE is more likely to develop in:  · People who smoke.  · People who are older, especially over 60 years of age.  · People who are overweight (obese).  · People who sit or lie still for a long time, such as during long-distance travel (over 4 hours), bed rest, hospitalization, or during recovery from certain medical conditions like a stroke.  · People who do not engage in much physical activity (sedentary lifestyle).  · People who have chronic breathing disorders.  · People who have a personal or family history of blood clots or blood clotting disease.  · People who have peripheral vascular disease (PVD), diabetes, or some types of cancer.  · People who have heart disease, especially if the person had a recent heart attack or has congestive heart failure.  · People who have neurological diseases that affect the legs (leg paresis).  · People who have had a traumatic injury, such as breaking a hip or leg.  · People who have recently had major or  lengthy surgery, especially on the hip, knee, or abdomen.  · People who have had a central line placed inside a large vein.  · People who take medicines that contain the hormone estrogen. These include birth control pills and hormone replacement therapy.  · Pregnancy or during childbirth or the postpartum period.  What are the signs or symptoms?  The symptoms of a PE usually start suddenly and include:  · Shortness of breath while active or at rest.  · Coughing or coughing up blood or blood-tinged mucus.  · Chest pain that is often worse with deep breaths.  · Rapid or irregular heartbeat.  · Feeling light-headed or dizzy.  · Fainting.  · Feeling anxious.  · Sweating.  There may also be pain and swelling in a leg if that is where the blood clot started.  These symptoms may represent a serious problem that is an emergency. Do not wait to see if the symptoms will go away. Get medical help right away. Call your local emergency services (911 in the [place] Do not drive yourself to the hospital.   How is this diagnosed?  Your health care provider will take a medical history and perform a physical exam. You may also have other tests, including:  · Blood tests to assess the clotting properties of your blood, assess oxygen levels in your blood, and find blood clots.  · Imaging tests, such as CT, ultrasound, MRI, X-ray, and other tests to see if you have clots anywhere in your body.  · An electrocardiogram (ECG) to look for heart strain from blood clots in the lungs.  How is this treated?  The main goals of PE treatment are:  · To stop a blood clot from growing larger.  · To stop new blood clots from forming.  The type of treatment that you receive depends on many factors, such as the cause of your PE, your risk for bleeding or developing more clots, and other medical conditions that you have. Sometimes, a combination of treatments is necessary.  This condition may be treated with:  · Medicines, including newer oral blood  thinners (anticoagulants), warfarin, low molecular weight heparins, thrombolytics, or heparins.  · Wearing compression stockings or using different types of devices.  · Surgery (rare) to remove the blood clot or to place a filter in your abdomen to stop the blood clot from traveling to your lungs.  Treatments for a PE are often divided into immediate treatment, long-term treatment (up to 3 months after PE), and extended treatment (more than 3 months after PE). Your treatment may continue for several months. This is called maintenance therapy, and it is used to prevent the forming of new blood clots. You can work with your health care provider to choose the treatment program that is best for you.  What are anticoagulants?   Anticoagulants are medicines that treat PEs. They can stop current blood clots from growing and stop new clots from forming. They cannot dissolve existing clots. Your body dissolves clots by itself over time. Anticoagulants are given by mouth, by injection, or through an IV tube.  What are thrombolytics?   Thrombolytics are clot-dissolving medicines that are used to dissolve a PE. They carry a high risk of bleeding, so they tend to be used only in severe cases or if you have very low blood pressure.  Follow these instructions at home:  If you are taking a newer oral anticoagulant:  · Take the medicine every single day at the same time each day.  · Understand what foods and drugs interact with this medicine.  · Understand that there are no regular blood tests required when using this medicine.  · Understand the side effects of this medicine, including excessive bruising or bleeding. Ask your health care provider or pharmacist about other possible side effects.  If you are taking warfarin:  · Understand how to take warfarin and know which foods can affect how warfarin works in your body.  · Understand that it is dangerous to take too much or too little warfarin. Too much warfarin increases the risk  of bleeding. Too little warfarin continues to allow the risk for blood clots.  · Follow your PT and INR blood testing schedule. The PT and INR results allow your health care provider to adjust your dose of warfarin. It is very important that you have your PT and INR tested as often as told by your health care provider.  · Avoid major changes in your diet, or tell your health care provider before you change your diet. Arrange a visit with a registered dietitian to answer your questions. Many foods, especially foods that are high in vitamin K, can interfere with warfarin and affect the PT and INR results. Eat a consistent amount of foods that are high in vitamin K, such as:  ¨ Spinach, kale, broccoli, cabbage, david greens, turnip greens, [place] sprouts, peas, cauliflower, seaweed, and parsley.  ¨ Beef liver and pork liver.  ¨ Green tea.  ¨ Soybean oil.  · Tell your health care provider about any and all medicines, vitamins, and supplements that you take, including aspirin and other over-the-counter anti-inflammatory medicines. Be especially cautious with aspirin and anti-inflammatory medicines. Do not take those before you ask your health care provider if it is safe to do so. This is important because many medicines can interfere with warfarin and affect the PT and INR results.  · Do not start or stop taking any over-the-counter or prescription medicine unless your health care provider or pharmacist tells you to do so.  If you take warfarin, you will also need to do these things:  · Hold pressure over cuts for longer than usual.  · Tell your dentist and other health care providers that you are taking warfarin before you have any procedures in which bleeding may occur.  · Avoid alcohol or drink very small amounts. Tell your health care provider if you change your alcohol intake.  · Do not use tobacco products, including cigarettes, chewing tobacco, and e-cigarettes. If you need help quitting, ask your health care  provider.  · Avoid contact sports.  General instructions  · Take over-the-counter and prescription medicines only as told by your health care provider. Anticoagulant medicines can have side effects, including easy bruising and difficulty stopping bleeding. If you are prescribed an anticoagulant, you will also need to do these things:  ¨ Hold pressure over cuts for longer than usual.  ¨ Tell your dentist and other health care providers that you are taking anticoagulants before you have any procedures in which bleeding may occur.  ¨ Avoid contact sports.  · Wear a medical alert bracelet or carry a medical alert card that says you have had a PE.  · Ask your health care provider how soon you can go back to your normal activities. Stay active to prevent new blood clots from forming.  · Make sure to exercise while traveling or when you have been sitting or standing for a long period of time. It is very important to exercise. Exercise your legs by walking or by tightening and relaxing your leg muscles often. Take frequent walks.  · Wear compression stockings as told by your health care provider to help prevent more blood clots from forming.  · Do not use tobacco products, including cigarettes, chewing tobacco, and e-cigarettes. If you need help quitting, ask your health care provider.  · Keep all follow-up appointments with your health care provider. This is important.  How is this prevented?  Take these actions to decrease your risk of developing another PE:  · Exercise regularly. For at least 30 minutes every day, engage in:  ¨ Activity that involves moving your arms and legs.  ¨ Activity that encourages good blood flow through your body by increasing your heart rate.  · Exercise your arms and legs every hour during long-distance travel (over 4 hours). Drink plenty of water and avoid drinking alcohol while traveling.  · Avoid sitting or lying in bed for long periods of time without moving your legs.  · Maintain a weight  that is appropriate for your height. Ask your health care provider what weight is healthy for you.  · If you are a woman who is over 35 years of age, avoid unnecessary use of medicines that contain estrogen. These include birth control pills.  · Do not smoke, especially if you take estrogen medicines. If you need help quitting, ask your health care provider.  · If you are at very high risk for PE, wear compression stockings.  · If you recently had a PE, have regularly scheduled ultrasound testing on your legs to check for new blood clots.  If you are hospitalized, prevention measures may include:  · Early walking after surgery, as soon as your health care provider says that it is safe.  · Receiving anticoagulants to prevent blood clots. If you cannot take anticoagulants, other options may be available, such as wearing compression stockings or using different types of devices.  Get help right away if:  · You have new or increased pain, swelling, or redness in an arm or leg.  · You have numbness or tingling in an arm or leg.  · You have shortness of breath while active or at rest.  · You have chest pain.  · You have a rapid or irregular heartbeat.  · You feel light-headed or dizzy.  · You cough up blood.  · You notice blood in your vomit, bowel movement, or urine.  · You have a fever.  These symptoms may represent a serious problem that is an emergency. Do not wait to see if the symptoms will go away. Get medical help right away. Call your local emergency services (911 in the [place] Do not drive yourself to the hospital.   This information is not intended to replace advice given to you by your health care provider. Make sure you discuss any questions you have with your health care provider.  Document Released: 12/15/2001 Document Revised: 05/25/2017 Document Reviewed: 04/13/2016  Elsevier Interactive Patient Education © 2017 [employer]      Pulmonary Hypertension  Pulmonary hypertension is high blood pressure  within the arteries in your lungs (pulmonary arteries). It is different than having high blood pressure elsewhere in your body, such as blood pressure that is measured with a blood pressure cuff. Pulmonary hypertension makes it harder for blood to flow through the lungs. As a result, the heart must work harder to pump blood through the lungs, and it may be harder for you to breathe. Over time, this can weaken the heart muscle. Pulmonary hypertension is a serious condition and it can be fatal.  What are the causes?  Many different medical conditions can cause pulmonary hypertension. Pulmonary hypertension can be categorized by cause into five groups:  Group 1   Pulmonary hypertension that is caused by abnormal growth of small blood vessels in the lungs (pulmonary arterial hypertension). The abnormal blood vessel growth may have no known cause, or it may be:  · Passed along from a parent (hereditary).  · Caused by another disease, such as a connective tissue disease (including lupus or scleroderma) or HIV.  · Caused by certain drugs or toxins.  Group 2   Pulmonary hypertension that is caused by weakness of the main chamber of the heart (left ventricle) or heart valve disease.  Group 3   Pulmonary hypertension that is caused by lung disease or low oxygen levels. Causes in this group include:  · Emphysema or chronic obstructive pulmonary disease (COPD).  · Untreated sleep apnea.  · Pulmonary fibrosis.  Group 4   Pulmonary hypertension that is caused by blood clots in the lungs (pulmonary emboli).  Group 5   Other causes of pulmonary hypertension, such as sickle cell anemia, or a mix of multiple causes.  What are the signs or symptoms?  Symptoms of this condition include:  · Shortness of breath. You may notice shortness of breath with:  ¨ Activity, such as walking.  ¨ No activity.  · Tiredness and fatigue.  · Dizziness or fainting.  · Rapid heartbeat or feeling your heart flutter or skip a beat (palpitations).  · Neck  vein enlargement.  · Bluish color to your lips and fingertips.  How is this diagnosed?  This condition may be diagnosed by:  · Chest X-ray.  · Arterial blood gases. This test checks the acidity of your blood as well as your blood oxygen and carbon dioxide levels.  · CT scan. This test can provide detailed images of your lungs.  · Pulmonary function test. This test measures how much air your lungs can hold. It also tests how well air moves in and out of your lungs.  · Electrocardiogram (ECG). This test traces the electrical activity of your heart.  · Echocardiogram. This test is used to look at your heart in motion and check how it is functioning.  · Heart catheterization. This test can measure the pressure in your pulmonary artery and the right side of your heart.  · Lung biopsy. This procedure involves checking a sample of lung tissue to find underlying causes.  How is this treated?  There is no cure for pulmonary hypertension, but treatment can help to relieve symptoms and slow the progress of the condition. Treatment can involve:  · Medicines, such as:  ¨ Blood pressure medicines.  ¨ Medicines to relax (dilate) the pulmonary blood vessels.  ¨ Water pills to get rid of extra fluid (diuretic medicines).  ¨ Blood-thinning medicines.  · Surgery. For severe pulmonary hypertension that does not respond to medical treatment, heart-lung or lung transplant may be needed.  Follow these instructions at home:  · Take medicines only as directed by your health care provider. These include over-the-counter medicines and prescription medicines. Take all medicines exactly as instructed. Do not change or stop medicines without first checking with your health care provider.  · Do not smoke. If you need help quitting, ask your health care provider.  · Eat a healthy diet.  · Limit your salt (sodium) intake to less than 2,300 mg per day.  · Stay as active as possible. Exercise as directed by your health care provider. Talk with your  health care provider about what type of exercise is safe for you.  · Avoid high altitudes.  · Avoid hot tubs and saunas.  · Avoid becoming pregnant, if this applies. Talk with your health care provider about safe methods of birth control.  · Keep all follow-up visits as directed by your health care provider. This is important.  Get help right away if:  · You have severe shortness of breath.  · You develop chest pain or pressure in your chest.  · You cough up blood.  · You develop swelling of your feet or legs.  · You have a significant increase in weight within 1-2 days.  This information is not intended to replace advice given to you by your health care provider. Make sure you discuss any questions you have with your health care provider.  Document Released: 10/14/2008 Document Revised: 07/07/2017 Document Reviewed: 06/09/2014  Elsevier Interactive Patient Education © 2017 [employer] Inc[employer]

## 2019-08-28 NOTE — PROGRESS NOTES
2 RN skin check complete.   Devices in place O2 mask, glasses.  Skin assessed under devices, redness/blanching under O2 mask straps both ears, foams placed.  Confirmed pressure ulcers found on N/A. Moisture related redness to scrotum and R axillary. Redness/dryness BLE/feet  New potential pressure ulcers noted on N/A.   The following interventions in place foam ear protectors, interdry under panus

## 2019-08-28 NOTE — PROGRESS NOTES
Pt given dc instructions, follow up, lab draws, and medication changes. Pt verbalized understanding. All questions addressed and answered. Pt dc'd with spouse to home.

## 2019-08-28 NOTE — DISCHARGE PLANNING
Agency/Facility Name: [facility]  Outcome: Resent fax.    [phone]  Agency/Facility Name: [facility]  Spoke To: Polly  Outcome: Needs O2 sat on room air to prove need for O2.

## 2019-08-28 NOTE — DISCHARGE PLANNING
LSW called Pts Pharmacy to check his co pay for Xarelto.  LSW was informed Pts co pay for 15mg and 20mg would be $40.00 each, total $80.00, for the month. FOSTERW met with Pt at bedside and provided him with the total.  Pt reported he could pay his co pay.  LSW informed Pts nurse and doctor. LSW will continue to follow and assist as needed.

## 2019-08-28 NOTE — DISCHARGE SUMMARY
Discharge Summary    CHIEF COMPLAINT ON ADMISSION  Chief Complaint   Patient presents with   • Shortness of Breath     Pt BIB EMS, report of pt with increased SOB for 2 wks. Pt reports hx of asthma, fevers on and off. pt reports pain upon inspiration in lower right ribs. pt found to be 75% on RM PTA. placed on non rebreather PTA. Pt sitting up in bed with mild WOB.    • Fever   • Flu Like Symptoms       Reason for Admission  SOB    Admission Date  8/21/2019    CODE STATUS  Full Code    HPI & HOSPITAL COURSE  Please see original H&P and consult note for specific information, patient was admitted due to SOB found to have sepsis, pneumonia, acute respiratory failure patient was intubated CTA chest showed PE he went for TPA and EKOS echo showed normal EF but showed pulmonary htn rvsp >85mmhg cardiologist consulted and recommended to continue medical management and to f/u with cardio as outpatient, patient gradually improved he was able to be extubated, started on iv lasix, patient now on xarelto, patient was transferred to telemetry patient has been weaned off o2 he is alert and oriented completed atb treatment patient hemodynamically stable, he will be dc home today he needs to f/u with PCP, cardio and pulmonologist.   Patient expressed understanding of his dc plan and agreed with it all questions answered  xarelto approved by insurance  As per patient 's nurse and charge nurse he is been ambulating w/o o2.   Patient needs sleep study as outpatient needs to f/u with PCP for this.       Therefore, he is discharged in good and stable condition to home with close outpatient follow-up.    The patient met 2-midnight criteria for an inpatient stay at the time of discharge.    Discharge Date  8/28/2019    FOLLOW UP ITEMS POST DISCHARGE  PCP in 1 week  cardio  Pulmonologist  Needs bmp in 5 days.     DISCHARGE DIAGNOSES  Principal Problem:    Acute respiratory failure with hypoxia and hypercapnia (HCC) POA: Yes  Active  Problems:    Asthma with exacerbation POA: Yes    Severe sepsis (HCC) POA: Yes    Aspiration pneumonia (HCC) POA: Unknown    Morbid obesity (HCC) POA: Yes    Polycythemia POA: Yes    Hyperglycemia POA: Yes    JONES (obstructive sleep apnea) POA: Unknown    Type 2 myocardial infarction (HCC) POA: Unknown    Pulmonary hypertension (HCC) POA: Unknown    Pulmonary embolism with acute cor pulmonale (HCC) POA: Unknown    TASHI (acute kidney injury) (HCC) POA: Unknown    Hemoptysis POA: Unknown  Resolved Problems:    Cardiogenic shock (HCC) POA: Unknown    Shock (HCC) POA: Unknown      FOLLOW UP  Future Appointments   Date Time Provider Department Center   9/6/2019  9:45 AM ASAF [facility] None   11/1/2019 10:00 AM Enmanuel Mendez M.D. [facility] None     Primary Care Physician   [facility]  [address]  [facility] [address]  [facility] [address]  [phone]    Please call to establish with a Primary Care Physician. Thank you     [facility] Primary Care Physician  [phone]    Please call to establish with a Primary Care Physician . Thank you       MEDICATIONS ON DISCHARGE     Medication List      START taking these medications      Instructions   albuterol 108 (90 Base) MCG/ACT Aers inhalation aerosol   Inhale 2 Puffs by mouth every 6 hours as needed for Shortness of Breath.  Dose:  2 Puff     furosemide 20 MG Tabs  Commonly known as:  LASIX   Take 1 Tab by mouth every day for 5 days.  Dose:  20 mg     * rivaroxaban 15 MG Tabs tablet  Commonly known as:  XARELTO   Take 1 Tab by mouth 2 times a day, with meals for 18 days.  Dose:  15 mg     * rivaroxaban 20 MG Tabs tablet  Start taking on:  9/15/2019  Commonly known as:  XARELTO   Take 1 Tab by mouth with dinner for 69 days.  Dose:  20 mg         * This list has 2 medication(s) that are the same as other medications prescribed for you. Read the directions carefully, and ask your doctor or other care provider to review them with you.                Allergies  Allergies   Allergen  Reactions   • Pistachio    • [place] Anaphylaxis     pistachios       DIET  Orders Placed This Encounter   Procedures   • Diet Order Regular     Standing Status:   Standing     Number of Occurrences:   1     Order Specific Question:   Diet:     Answer:   Regular [1]     Order Specific Question:   Texture/Fiber modifications:     Answer:   Dysphagia 3(Mechanical Soft)specify fluid consistency(question 6) [3]     Order Specific Question:   Consistency/Fluid modifications:     Answer:   Thin Liquids [3]       ACTIVITY  As tolerated.  Weight bearing as tolerated    CONSULTATIONS  Cardio  Pulmonologist      PROCEDURES  ekos     LABORATORY  Lab Results   Component Value Date    SODIUM 137 08/27/2019    POTASSIUM 4.7 08/27/2019    CHLORIDE 99 08/27/2019    CO2 31 08/27/2019    GLUCOSE 110 (H) 08/27/2019    BUN 32 (H) 08/27/2019    CREATININE 1.24 08/27/2019        Lab Results   Component Value Date    WBC 8.1 08/27/2019    HEMOGLOBIN 16.2 08/27/2019    HEMATOCRIT 57.2 (H) 08/27/2019    PLATELETCT 147 (L) 08/27/2019        Total time of the discharge process exceeds 40 minutes.

## 2019-08-28 NOTE — DISCHARGE PLANNING
Due to Pts current Oxygen stats, Pt does not qualify for home O2.  LSW sent a tiger text to doctor and also called and let nurse know.

## 2019-08-28 NOTE — CARE PLAN
Problem: Communication  Goal: The ability to communicate needs accurately and effectively will improve  Intervention: Educate patient and significant other/support system about the plan of care, procedures, treatments, medications and allow for questions  Note:   Pt educated on plan of care and treatments for the day.      Problem: Infection  Goal: Will remain free from infection  Intervention: Implement standard precautions and perform hand washing before and after patient contact  Note:   Standard precautions in place, foaming in and out of room.

## 2019-09-04 NOTE — PROGRESS NOTES
New patient; pulmonary consult suspicion of JONES/OHS; placed on Xarelto for PE; required O2 on DC  Needs sleep study STAT    Admission Date  8/21/2019     CODE STATUS  Full Code     HPI & HOSPITAL COURSE  Please see original H&P and consult note for specific information, patient was admitted due to SOB found to have sepsis, pneumonia, acute respiratory failure patient was intubated CTA chest showed PE he went for TPA and EKOS echo showed normal EF but showed pulmonary htn rvsp >85mmhg cardiologist consulted and recommended to continue medical management and to f/u with cardio as outpatient, patient gradually improved he was able to be extubated, started on iv lasix, patient now on xarelto, patient was transferred to telemetry patient has been weaned off o2 he is alert and oriented completed atb treatment patient hemodynamically stable, he will be dc home today he needs to f/u with PCP, cardio and pulmonologist.   Patient expressed understanding of his dc plan and agreed with it all questions answered  xarelto approved by insurance  As per patient 's nurse and charge nurse he is been ambulating w/o o2.   Patient needs sleep study as outpatient needs to f/u with PCP for this.      Therefore, he is discharged in good and stable condition to home with close outpatient follow-up.     The patient met 2-midnight criteria for an inpatient stay at the time of discharge.

## 2019-09-06 ENCOUNTER — OFFICE VISIT (OUTPATIENT)
Dept: PULMONOLOGY | Facility: HOSPICE | Age: 29
End: 2019-09-06
Payer: COMMERCIAL

## 2019-09-06 VITALS
OXYGEN SATURATION: 91 % | HEART RATE: 108 BPM | SYSTOLIC BLOOD PRESSURE: 110 MMHG | BODY MASS INDEX: 47.74 KG/M2 | WEIGHT: 315 LBS | DIASTOLIC BLOOD PRESSURE: 70 MMHG | RESPIRATION RATE: 16 BRPM | HEIGHT: 68 IN

## 2019-09-06 DIAGNOSIS — R93.89 ABNORMAL CT OF THE CHEST: ICD-10-CM

## 2019-09-06 DIAGNOSIS — G47.33 OSA (OBSTRUCTIVE SLEEP APNEA): ICD-10-CM

## 2019-09-06 DIAGNOSIS — R00.0 TACHYCARDIA: ICD-10-CM

## 2019-09-06 DIAGNOSIS — I27.20 PULMONARY HYPERTENSION (HCC): ICD-10-CM

## 2019-09-06 DIAGNOSIS — R60.0 LOWER EXTREMITY EDEMA: ICD-10-CM

## 2019-09-06 DIAGNOSIS — R09.02 OXYGEN DESATURATION: ICD-10-CM

## 2019-09-06 DIAGNOSIS — Z09 HOSPITAL DISCHARGE FOLLOW-UP: ICD-10-CM

## 2019-09-06 DIAGNOSIS — A41.9 SEVERE SEPSIS (HCC): ICD-10-CM

## 2019-09-06 DIAGNOSIS — R65.20 SEVERE SEPSIS (HCC): ICD-10-CM

## 2019-09-06 DIAGNOSIS — D75.1 POLYCYTHEMIA: ICD-10-CM

## 2019-09-06 DIAGNOSIS — J96.02 ACUTE RESPIRATORY FAILURE WITH HYPOXIA AND HYPERCAPNIA (HCC): ICD-10-CM

## 2019-09-06 DIAGNOSIS — Z78.9 NONSMOKER: ICD-10-CM

## 2019-09-06 DIAGNOSIS — E66.2 OBESITY HYPOVENTILATION SYNDROME (HCC): ICD-10-CM

## 2019-09-06 DIAGNOSIS — J96.01 ACUTE RESPIRATORY FAILURE WITH HYPOXIA AND HYPERCAPNIA (HCC): ICD-10-CM

## 2019-09-06 DIAGNOSIS — E66.01 MORBID OBESITY (HCC): ICD-10-CM

## 2019-09-06 DIAGNOSIS — I26.09 ACUTE PULMONARY EMBOLISM WITH ACUTE COR PULMONALE, UNSPECIFIED PULMONARY EMBOLISM TYPE (HCC): Chronic | ICD-10-CM

## 2019-09-06 PROCEDURE — 94761 N-INVAS EAR/PLS OXIMETRY MLT: CPT | Performed by: NURSE PRACTITIONER

## 2019-09-06 PROCEDURE — 99215 OFFICE O/P EST HI 40 MIN: CPT | Mod: 25 | Performed by: NURSE PRACTITIONER

## 2019-09-06 NOTE — PROCEDURES
Multi-Ox Readings  Multi Ox #1 Room air   O2 sat % at rest 91   O2 sat % on exertion 85   O2 sat average on exertion     Multi Ox #2 2 LPM   O2 sat % at rest 94   O2 sat % on exertion 91   O2 sat average on exertion       Oxygen Use     Oxygen Frequency     Duration of need     Is the patient mobile within the home?     CPAP Use?     BIPAP Use?     Servo Titration

## 2019-09-06 NOTE — PROGRESS NOTES
Chief Complaint   Patient presents with   • Follow-Up     [facility] 8/29/19, Consulted by Pulmonary/ [facility]        HPI:  Michael Ontiveros is a 28 y.o. year old male here today for follow-up on recent hospital stay.    Patient is new to clinic.  Hospitalized 8/21/2019 for shortness of breath and found to have sepsis, pneumonia and acute respiratory failure requiring intubation.  CTA of chest 8/22/2019 showed PE and went for TPA and EKOS.  Echo showed normal EF but pulmonary hypertension of RVSP greater than 85 mmHg.  Cardiology consulted and continued medical management recommended with outpatient evaluation.  Patient did gradually improve and was extubated, started on IV Lasix, transition to oral anticoagulation on Xarelto and transferred to telemetry and able to be weaned off O2.  Patient was discharged home without oxygen due to adequate saturations in hospital.  Pulmonology consulted with high suspicion of obesity hypoventilation and possible JONES.  Recommendations outpatient testing.    CTA 8/22/19:  1.  Right-sided lobar, segmental and subsegmental pulmonary emboli. Suspected subsegmental left-sided pulmonary emboli. Associated right heart strain.  2.  Scattered pulmonary infarcts. Dependent opacities in the lower lobes may represent a combination of atelectasis and infarcts, however, superimposed pneumonia is also possible.  3.  Cardiomegaly.  4.  Large right thyroid nodule measuring 2.9 cm. It can be followed on an outpatient basis.  5.  Splenomegaly.    ECHO 8/22/19:  CONCLUSIONS  No prior study is available for comparison.   Technically difficult study - adequate information is obtained.   Sinus rhythm.  Reduced right ventricular systolic function.  Akinetic right   ventricular free wall.  Estimated right ventricular systolic pressure is >85 mmHg.  Left ventricular ejection fraction is visually estimated to be 70%.    Abnormal septal motion consistent with right ventricular (RV) volume   overload  and/or elevated RV end-diastolic pressure.  Mild tricuspid regurgitation.  Pulmonary team notified at time of reading.    Patient denies a family history of blood clots or bleeding disorders.  He notes his grandfather to have had a stroke in the past.  He denies a family history of sleep apnea.  He denies any recent trauma to lower extremities.  He was recently flying to multiple destinations due to his work just prior to incident and most likely the cause.  He has never had a previous blood clot.  BMI 57.  He notes a history of asthma since childhood but well controlled with albuterol HFA inhaler.  He notes as long as he was remaining active he did not have significant symptoms.  In college if he worked out too hard he may have some symptoms but resolved with inhaler therapy.  He currently has albuterol HFA inhaler at home but is not required it.  He notes gaining weight after college.  He is very active in multiple sports in college.  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  In 2017 he weighed about 300 pounds.  In 2018 he was working long hours with multiple different shifts.  He notes a week before his hospital stay his work schedule changed to a regular 8-5 schedule with minimal traveling.    Today he denies all cardiac or respiratory symptoms.  He does have a history of pedal edema with some lower extremity discoloration.  He is tolerating anticoagulation but notes some bruising.  We reviewed precautions.  He feels he sleeps well at night and rested.  He denies morning headaches.  He has been told he snores significantly.  He denies any apneas at night per friends/family.  He notes going to bed between 10-11 PM and waking by 7 AM and only waking 1 time at night for the restroom.  He is never been previously tested for sleep apnea.  He is concerned about costs of testing due  to his insurance.          ROS: As per HPI and otherwise negative if not stated.    Past Medical History:   Diagnosis Date   • Chickenpox    • Eczema    • Influenza    • Tonsillitis        Past Surgical History:   Procedure Laterality Date   • DENTAL EXTRACTION(S)  11/3/2017    Procedure: DENTAL EXTRACTION(S);  Surgeon: Olman Sparks D.D.S.;  Location: SURGERY [facility];  Service: Oral Surgery       Family History   Problem Relation Age of Onset   • Respiratory Disease Mother        Social History     Socioeconomic History   • Marital status: Single     Spouse name: Not on file   • Number of children: Not on file   • Years of education: Not on file   • Highest education level: Not on file   Occupational History   • Not on file   Social Needs   • Financial resource strain: Not on file   • Food insecurity:     Worry: Not on file     Inability: Not on file   • Transportation needs:     Medical: Not on file     Non-medical: Not on file   Tobacco Use   • Smoking status: Never Smoker   • Smokeless tobacco: Never Used   Substance and Sexual Activity   • Alcohol use: Yes     Comment: rare   • Drug use: No   • Sexual activity: Not on file   Lifestyle   • Physical activity:     Days per week: Not on file     Minutes per session: Not on file   • Stress: Not on file   Relationships   • Social connections:     Talks on phone: Not on file     Gets together: Not on file     Attends Worship service: Not on file     Active member of club or organization: Not on file     Attends meetings of clubs or organizations: Not on file     Relationship status: Not on file   • Intimate partner violence:     Fear of current or ex partner: Not on file     Emotionally abused: Not on file     Physically abused: Not on file     Forced sexual activity: Not on file   Other Topics Concern   • Not on file   Social History Narrative   • Not on file       Allergies as of 09/06/2019 - Reviewed 09/06/2019   Allergen Reaction Noted   •  "Pistachio  05/05/2019   • [place] Anaphylaxis 05/05/2019        Vitals:  /70   Pulse (!) 108   Resp 16   Ht 1.727 m (5' 8\")   Wt (!) 170.1 kg (375 lb)   SpO2 91%     Current medications as of today   Current Outpatient Medications   Medication Sig Dispense Refill   • rivaroxaban (XARELTO) 15 MG Tab tablet Take 1 Tab by mouth 2 times a day, with meals for 18 days. 36 Tab 0   • [START ON 9/15/2019] rivaroxaban (XARELTO) 20 MG Tab tablet Take 1 Tab by mouth with dinner for 69 days. 69 Tab 0   • albuterol 108 (90 Base) MCG/ACT Aero Soln inhalation aerosol Inhale 2 Puffs by mouth every 6 hours as needed for Shortness of Breath. 8.5 g 0     No current facility-administered medications for this visit.          Physical Exam:   Gen:           Alert and oriented, No apparent distress. Mood and affect appropriate, normal interaction with examiner.  Eyes:          PERRL, EOM intact, sclere white, conjunctive moist.  Ears:          Not examined.   Hearing:     Grossly intact.  Nose:          Normal, no lesions or deformities.  Dentition:    Good dentition.  Oropharynx:   Tongue normal, posterior pharynx without erythema or exudate.  Mallampati Classification: 3/4  Neck:        Supple, trachea midline, no masses.  Respiratory Effort: No intercostal retractions or use of accessory muscles.   Lung Auscultation:      Clear to auscultation bilaterally; no rales, rhonchi or wheezing.  CV:            Regular rate and rhythm. No murmurs, rubs or gallops.  Abd:           Not examined. Large abdomen with pannus.  Lymphadenopathy: Not examined.  Gait and Station: Normal.  Digits and Nails: No clubbing, cyanosis, petechiae, or nodes.   Cranial Nerves: II-XII grossly intact.  Skin:        No rashes, lesions or ulcers noted.               Ext:           BLE discoloration and 2+ edema with mild pitting.      Assessment:  1. Hospital discharge follow-up     2. Acute respiratory failure with hypoxia and hypercapnia (HCC)  Multiple " Oximetry    CT-CHEST (THORAX) W/O    Multiple Oximetry    DME O2 New Set Up   3. Severe sepsis (HCC)     4. Acute pulmonary embolism with acute cor pulmonale, unspecified pulmonary embolism type (HCC)  CT-CHEST (THORAX) W/O   5. Pulmonary hypertension (HCC)  DME O2 New Set Up   6. JONES (obstructive sleep apnea)  Polysomnography, 4 or More   7. Oxygen desaturation  Multiple Oximetry    Multiple Oximetry    DME O2 New Set Up   8. Polycythemia     9. Lower extremity edema     10. Morbid obesity (HCC)     11. Nonsmoker  CT-CHEST (THORAX) W/O   12. Tachycardia     13. Adult BMI 50.0-59.9 kg/sq m ([facility])  Height And Weight   14. Obesity hypoventilation syndrome ([facility])  Polysomnography, 4 or More   15. Abnormal CT of the chest  CT-CHEST (THORAX) W/O       Immunizations:    Flu:recommend this fall  Pneumovax 23:not given  Prevnar 13:not given    Plan:  1.  Patient recently suffered from an acute PE with respiratory failure requiring intubation.  He is recovered quite well.  Multi-ox in office did indicate the need for oxygen therapy.  DME O2 2 L/min on exertion and nocturnally.  Continue Xarelto 15 mg twice daily and then transition to Xarelto 20 mg daily September 15, 2019.  2.  High suspicion of sleep apnea.  PSG split-night now.  May call results to patient.  3.  Discussed respiratory and sleep hygiene.  4.  Strongly encourage weight loss.  Patient is motivated to start exercising again.  I precautioned him against lifting.  He may start gentle exercise with cardio.  Reviewed dietary changes.  5.  Follow-up with cardiology as scheduled.  6.  CT scan of chest without contrast in 2 months to verify resolution of lower lobe opacities.  7.  Follow-up in 2 months at pulmonary clinic with CT scan results/PSG results to check symptoms, sooner if needed.  This is the patient's first incident of blood clot, recommend at least 3 to 6 months of therapy on Xarelto.  Due to his excessive weight and sedentary lifestyle at this moment he  is at an increased risk of this recurring.  We will discuss the possibility of stopping anticoagulation in the future.      Please note that this dictation was created using voice recognition software. I have made every reasonable attempt to correct obvious errors, but it is possible there are errors of grammar and possibly content that I did not discover before finalizing the note.

## 2019-09-12 ENCOUNTER — TELEPHONE (OUTPATIENT)
Dept: PULMONOLOGY | Facility: HOSPICE | Age: 29
End: 2019-09-12

## 2019-09-12 NOTE — TELEPHONE ENCOUNTER
LVM for pt regarding Sleep Study. Pt has results apt 9/17 and study has not been scheduled. Pt needs to call back and schedule testing prior to apt

## 2019-09-17 ENCOUNTER — TELEPHONE (OUTPATIENT)
Dept: PULMONOLOGY | Facility: HOSPICE | Age: 29
End: 2019-09-17

## 2019-09-17 ENCOUNTER — APPOINTMENT (OUTPATIENT)
Dept: SLEEP MEDICINE | Facility: MEDICAL CENTER | Age: 29
End: 2019-09-17
Payer: COMMERCIAL

## 2019-09-18 LAB
FUNGUS SPEC CULT: NORMAL
SIGNIFICANT IND 70042: NORMAL
SITE SITE: NORMAL
SOURCE SOURCE: NORMAL

## 2019-10-15 ENCOUNTER — TELEPHONE (OUTPATIENT)
Dept: CARDIOLOGY | Facility: MEDICAL CENTER | Age: 29
End: 2019-10-15

## 2019-10-15 NOTE — TELEPHONE ENCOUNTER
Called patient in regards to records for upcoming appt. W/. No reply, left voicemail to call back.

## 2019-10-18 LAB
MYCOBACTERIUM SPEC CULT: NORMAL
RHODAMINE-AURAMINE STN SPEC: NORMAL
SIGNIFICANT IND 70042: NORMAL
SITE SITE: NORMAL
SOURCE SOURCE: NORMAL

## 2019-10-27 ENCOUNTER — HOSPITAL ENCOUNTER (EMERGENCY)
Facility: MEDICAL CENTER | Age: 29
End: 2019-10-27
Attending: EMERGENCY MEDICINE
Payer: COMMERCIAL

## 2019-10-27 VITALS
RESPIRATION RATE: 16 BRPM | DIASTOLIC BLOOD PRESSURE: 90 MMHG | OXYGEN SATURATION: 91 % | SYSTOLIC BLOOD PRESSURE: 155 MMHG | TEMPERATURE: 98.4 F | HEIGHT: 69 IN | WEIGHT: 315 LBS | HEART RATE: 93 BPM | BODY MASS INDEX: 46.65 KG/M2

## 2019-10-27 DIAGNOSIS — I83.899 RUPTURED VARICOSITY: ICD-10-CM

## 2019-10-27 PROCEDURE — 304217 HCHG IRRIGATION SYSTEM

## 2019-10-27 PROCEDURE — 700101 HCHG RX REV CODE 250

## 2019-10-27 PROCEDURE — 700101 HCHG RX REV CODE 250: Performed by: EMERGENCY MEDICINE

## 2019-10-27 PROCEDURE — 99283 EMERGENCY DEPT VISIT LOW MDM: CPT

## 2019-10-27 RX ORDER — LIDOCAINE HYDROCHLORIDE 20 MG/ML
20 INJECTION, SOLUTION INFILTRATION; PERINEURAL ONCE
Status: DISCONTINUED | OUTPATIENT
Start: 2019-10-27 | End: 2019-10-28 | Stop reason: HOSPADM

## 2019-10-27 RX ORDER — LIDOCAINE HYDROCHLORIDE AND EPINEPHRINE BITARTRATE 20; .01 MG/ML; MG/ML
INJECTION, SOLUTION SUBCUTANEOUS
Status: DISCONTINUED
Start: 2019-10-27 | End: 2019-10-28 | Stop reason: HOSPADM

## 2019-10-27 RX ORDER — LIDOCAINE HYDROCHLORIDE AND EPINEPHRINE BITARTRATE 20; .01 MG/ML; MG/ML
INJECTION, SOLUTION SUBCUTANEOUS
Status: COMPLETED
Start: 2019-10-27 | End: 2019-10-27

## 2019-10-27 RX ADMIN — SILVER NITRATE APPLICATORS 3 APPLICATOR: 25; 75 STICK TOPICAL at 23:00

## 2019-10-27 RX ADMIN — LIDOCAINE HYDROCHLORIDE AND EPINEPHRINE: 20; 10 INJECTION, SOLUTION INFILTRATION; PERINEURAL at 21:45

## 2019-10-27 ASSESSMENT — LIFESTYLE VARIABLES
TOTAL SCORE: 0
EVER FELT BAD OR GUILTY ABOUT YOUR DRINKING: NO
HAVE PEOPLE ANNOYED YOU BY CRITICIZING YOUR DRINKING: NO
TOTAL SCORE: 0
HAVE YOU EVER FELT YOU SHOULD CUT DOWN ON YOUR DRINKING: NO
DOES PATIENT WANT TO STOP DRINKING: NO
CONSUMPTION TOTAL: INCOMPLETE
TOTAL SCORE: 0
DO YOU DRINK ALCOHOL: NO
EVER HAD A DRINK FIRST THING IN THE MORNING TO STEADY YOUR NERVES TO GET RID OF A HANGOVER: NO

## 2019-10-28 NOTE — ED NOTES
Patient verbalized understanding of discharge instructions, provided with discharge paperwork, gait steady, ambulated independently to SHAHEEN hong.

## 2019-10-28 NOTE — ED TRIAGE NOTES
Chief Complaint   Patient presents with   • Laceration     Left foot/ ankle laceration, on blood thinner, from hot shower

## 2019-10-28 NOTE — ED NOTES
Patient awake alert and oriented x 4, Glascow 15, bed in low position, call light within reach, on room air, attached to cardiac monitor, unlabored breathing noted, no cough noted, interacts with staff, interactions noted as appropriate, left foot/ ankle dressing applied to laceration site, no active bleeding noted at this time.

## 2019-10-28 NOTE — ED PROVIDER NOTES
"ED Provider Note    CHIEF COMPLAINT  Chief Complaint   Patient presents with   • Laceration     Left foot/ ankle laceration, on blood thinner, from hot shower       HPI  Michael Ontiveros is a 29 y.o. male who presents with bleeding from the left lower extremity.  The patient states that he got out of the shower today and he noticed there is an area medial to the left ankle that was bleeding.  He states he initially can control with pressure however subsequently took down the pressure in the scab came off and is been unable to control the bleeding.  The patient does take Xarelto for a pulmonary embolus.  He has not taken his dose this evening.  He does not have any pain to his legs.  He does not have any change in his breathing patterns.    REVIEW OF SYSTEMS  No dizziness, no recent fevers    PHYSICAL EXAM  VITAL SIGNS: /90   Pulse 97   Temp 36.9 °C (98.4 °F) (Temporal)   Resp 16   Ht 1.753 m (5' 9\")   Wt (!) 167.8 kg (370 lb)   SpO2 93%   BMI 54.64 kg/m²   In general the patient does not appear toxic    Pulmonary chest clear to auscultation bilaterally    GI abdomen is soft, the patient does have some slight erythema to his pannus inferiorly    Skin erythema described above, left lower extremity has a varicosity with mild bleeding to the medial aspect of the left ankle    COURSE & MEDICAL DECISION MAKING  Pertinent Labs & Imaging studies reviewed. (See chart for details)  This is a 29-year-old gentleman with a bleeding varicosity to the left lower extremity.  I suspect his bleeding is exacerbated by his obesity and increased venous congestion to his lower extremities.  The patient is also on Xarelto.  We applied pressure for approximately 30 minutes and the dressing was taken down the patient continued to have some mild bleeding.  Attempted cauterization however due to bleeding this was not effective.  I then placed a 4 x 4 as well as another pressure dressing and we will discharge the patient " home with instructions to elevate his lower extremity for the next 8 to 10 hours and then take the dressing down.  If he has continued bleeding he will again reapply a pressure dressing.  At this persist by tomorrow afternoon he will return for repeat examination.    FINAL IMPRESSION  1.  Bleeding varicosity left lower extremity  2.  Iatrogenic coagulopathy       Disposition  The patient will be discharged in stable condition      Electronically signed by: Denys Salter, 10/27/2019 10:32 PM

## 2019-10-28 NOTE — ED NOTES
Patient awake alert and oriented x 4, Glascow 15, bed in low position, call light within reach, on room air, attached to cardiac monitor, unlabored breathing noted, no cough noted, interacts with staff, interactions noted as appropriate, friend at bedside.

## 2019-10-31 NOTE — TELEPHONE ENCOUNTER
2nd attempt- Called patient in regards to standing lab order. No reply, left voicemail to call back.

## 2019-11-01 ENCOUNTER — OFFICE VISIT (OUTPATIENT)
Dept: CARDIOLOGY | Facility: MEDICAL CENTER | Age: 29
End: 2019-11-01
Payer: COMMERCIAL

## 2019-11-01 VITALS
BODY MASS INDEX: 46.65 KG/M2 | OXYGEN SATURATION: 92 % | HEIGHT: 69 IN | SYSTOLIC BLOOD PRESSURE: 122 MMHG | HEART RATE: 86 BPM | DIASTOLIC BLOOD PRESSURE: 74 MMHG | WEIGHT: 315 LBS

## 2019-11-01 DIAGNOSIS — J96.01 ACUTE RESPIRATORY FAILURE WITH HYPOXIA AND HYPERCAPNIA (HCC): ICD-10-CM

## 2019-11-01 DIAGNOSIS — G47.33 OSA (OBSTRUCTIVE SLEEP APNEA): Chronic | ICD-10-CM

## 2019-11-01 DIAGNOSIS — I21.A1 TYPE 2 MYOCARDIAL INFARCTION (HCC): ICD-10-CM

## 2019-11-01 DIAGNOSIS — I27.20 PULMONARY HYPERTENSION (HCC): Chronic | ICD-10-CM

## 2019-11-01 DIAGNOSIS — E66.01 MORBID OBESITY (HCC): Chronic | ICD-10-CM

## 2019-11-01 DIAGNOSIS — J69.0 ASPIRATION PNEUMONIA OF LOWER LOBE, UNSPECIFIED ASPIRATION PNEUMONIA TYPE, UNSPECIFIED LATERALITY (HCC): ICD-10-CM

## 2019-11-01 DIAGNOSIS — J96.02 ACUTE RESPIRATORY FAILURE WITH HYPOXIA AND HYPERCAPNIA (HCC): ICD-10-CM

## 2019-11-01 DIAGNOSIS — I26.09 ACUTE PULMONARY EMBOLISM WITH ACUTE COR PULMONALE, UNSPECIFIED PULMONARY EMBOLISM TYPE (HCC): Chronic | ICD-10-CM

## 2019-11-01 DIAGNOSIS — D75.1 POLYCYTHEMIA: ICD-10-CM

## 2019-11-01 DIAGNOSIS — R60.0 LOWER EXTREMITY EDEMA: ICD-10-CM

## 2019-11-01 PROCEDURE — 99214 OFFICE O/P EST MOD 30 MIN: CPT | Performed by: INTERNAL MEDICINE

## 2019-11-01 ASSESSMENT — ENCOUNTER SYMPTOMS
CHILLS: 0
CARDIOVASCULAR NEGATIVE: 1
DIZZINESS: 0
MUSCULOSKELETAL NEGATIVE: 1
CONSTITUTIONAL NEGATIVE: 1
CLAUDICATION: 0
ORTHOPNEA: 0
PND: 0
BRUISES/BLEEDS EASILY: 0
EYES NEGATIVE: 1
NEUROLOGICAL NEGATIVE: 1
PALPITATIONS: 0
RESPIRATORY NEGATIVE: 1
COUGH: 0
GASTROINTESTINAL NEGATIVE: 1
SPUTUM PRODUCTION: 0
SHORTNESS OF BREATH: 0
LOSS OF CONSCIOUSNESS: 0
HEMOPTYSIS: 0
WEAKNESS: 0
STRIDOR: 0
WHEEZING: 0
FEVER: 0
SORE THROAT: 0

## 2019-11-01 NOTE — PROGRESS NOTES
Chief Complaint   Patient presents with   • Pulmonary Hypertension   • Pulmonary Embolism     Follow up       Subjective:   Michael Ontiveros is a 29 y.o. male who presents today as a new consultation for a submassive PE.  He was started on Tikosyn TPA.  Since he left the hospital he is feeling back to normal.  Is working on weight loss.  He is pending an evaluation for sleep apnea.  He has no history of DVTs and no family history of clotting disorders.  He continues on blood thinners.  He did have a lot of travel prior to his blood clot.  Otherwise there is no inducible issues.  His echocardiogram during hospitalization showed severe pulmonary hypertension.    Past Medical History:   Diagnosis Date   • Chickenpox    • Eczema    • Influenza    • Tonsillitis      Past Surgical History:   Procedure Laterality Date   • DENTAL EXTRACTION(S)  11/3/2017    Procedure: DENTAL EXTRACTION(S);  Surgeon: Olman Sparks D.D.S.;  Location: SURGERY [facility];  Service: Oral Surgery     Family History   Problem Relation Age of Onset   • Respiratory Disease Mother      Social History     Socioeconomic History   • Marital status: Single     Spouse name: Not on file   • Number of children: Not on file   • Years of education: Not on file   • Highest education level: Not on file   Occupational History   • Not on file   Social Needs   • Financial resource strain: Not on file   • Food insecurity:     Worry: Not on file     Inability: Not on file   • Transportation needs:     Medical: Not on file     Non-medical: Not on file   Tobacco Use   • Smoking status: Never Smoker   • Smokeless tobacco: Never Used   Substance and Sexual Activity   • Alcohol use: Yes     Comment: rare   • Drug use: No   • Sexual activity: Not on file   Lifestyle   • Physical activity:     Days per week: Not on file     Minutes per session: Not on file   • Stress: Not on file   Relationships   • Social connections:     Talks on phone: Not on file     " Gets together: Not on file     Attends [employer] service: Not on file     Active member of club or organization: Not on file     Attends meetings of clubs or organizations: Not on file     Relationship status: Not on file   • Intimate partner violence:     Fear of current or ex partner: Not on file     Emotionally abused: Not on file     Physically abused: Not on file     Forced sexual activity: Not on file   Other Topics Concern   • Not on file   Social History Narrative   • Not on file     Allergies   Allergen Reactions   • Pistachio    • [place] Anaphylaxis     pistachios     Outpatient Encounter Medications as of 11/1/2019   Medication Sig Dispense Refill   • albuterol 108 (90 Base) MCG/ACT Aero Soln inhalation aerosol Inhale 2 Puffs by mouth every 6 hours as needed for Shortness of Breath. 8.5 g 0   • rivaroxaban (XARELTO) 20 MG Tab tablet Take 1 Tab by mouth with dinner for 69 days. 69 Tab 0     No facility-administered encounter medications on file as of 11/1/2019.      Review of Systems   Constitutional: Negative.  Negative for chills, fever and malaise/fatigue.   HENT: Negative.  Negative for sore throat.    Eyes: Negative.    Respiratory: Negative.  Negative for cough, hemoptysis, sputum production, shortness of breath, wheezing and stridor.    Cardiovascular: Negative.  Negative for chest pain, palpitations, orthopnea, claudication, leg swelling and PND.   Gastrointestinal: Negative.    Genitourinary: Negative.    Musculoskeletal: Negative.    Skin: Negative.    Neurological: Negative.  Negative for dizziness, loss of consciousness and weakness.   Endo/Heme/Allergies: Negative.  Does not bruise/bleed easily.   All other systems reviewed and are negative.       Objective:   /74 (BP Location: Left arm, Patient Position: Sitting, BP Cuff Size: Adult)   Pulse 86   Ht 1.74 m (5' 8.5\")   Wt (!) 175.1 kg (386 lb)   SpO2 92%   BMI 57.84 kg/m²     Physical Exam   Constitutional: He appears " well-developed and well-nourished. No distress.   HENT:   Head: Normocephalic and atraumatic.   Right Ear: External ear normal.   Left Ear: External ear normal.   Nose: Nose normal.   Mouth/Throat: No oropharyngeal exudate.   Eyes: Pupils are equal, round, and reactive to light. Conjunctivae and EOM are normal. Right eye exhibits no discharge. Left eye exhibits no discharge. No scleral icterus.   Neck: Neck supple. No JVD present.   Cardiovascular: Normal rate, regular rhythm and intact distal pulses. Exam reveals no gallop and no friction rub.   No murmur heard.  Pulmonary/Chest: Effort normal. No stridor. No respiratory distress. He has no wheezes. He has no rales. He exhibits no tenderness.   Abdominal: Soft. He exhibits no distension. There is no guarding.   Musculoskeletal: Normal range of motion. He exhibits no edema, tenderness or deformity.   Neurological: He is alert. He has normal reflexes. He displays normal reflexes. No cranial nerve deficit. He exhibits normal muscle tone. Coordination normal.   Skin: Skin is warm and dry. No rash noted. He is not diaphoretic. No erythema. No pallor.   Psychiatric: He has a normal mood and affect. His behavior is normal. Judgment and thought content normal.   Nursing note and vitals reviewed.      Assessment:     1. Acute respiratory failure with hypoxia and hypercapnia ([facility])     2. Aspiration pneumonia of lower lobe, unspecified aspiration pneumonia type, unspecified laterality ([facility])     3. Lower extremity edema  EC-ECHOCARDIOGRAM COMPLETE W/O CONT   4. Morbid obesity (HCC)  EC-ECHOCARDIOGRAM COMPLETE W/O CONT   5. JONES (obstructive sleep apnea)  EC-ECHOCARDIOGRAM COMPLETE W/O CONT   6. Polycythemia     7. Acute pulmonary embolism with acute cor pulmonale, unspecified pulmonary embolism type (HCC)  EC-ECHOCARDIOGRAM COMPLETE W/O CONT   8. Type 2 myocardial infarction ([facility])  EC-ECHOCARDIOGRAM COMPLETE W/O CONT   9. Pulmonary hypertension (HCC)  EC-ECHOCARDIOGRAM COMPLETE  W/O CONT       Medical Decision Making:  Today's Assessment / Status / Plan:     29-year-old male with a blood clot with submassive PE sleep apnea morbid obesity causing a type II myocardial infarction and severe pulmonary hypertension.  He will stay on the Xarelto for his blood clot.  We will do a follow-up echocardiogram in approximately 1 month.  Once he is through his acute issues we will consider work-up for secondary cause for blood clots.  We will await an evaluation for sleep apnea.  We will see him back in 3 months.

## 2019-11-13 ENCOUNTER — TELEPHONE (OUTPATIENT)
Dept: PULMONOLOGY | Facility: HOSPICE | Age: 29
End: 2019-11-13

## 2019-11-13 NOTE — TELEPHONE ENCOUNTER
Left detailed VM for pt to call back and schedule testing ( Sleep Study & Ct Scan) and reschedule results apt. Pt is currently scheduled to see karla on 11/15 but no testing has been completed.    Awaiting pts call back

## 2019-11-13 NOTE — TELEPHONE ENCOUNTER
----- Message from ASAF Callahan sent at 11/12/2019  8:32 AM PST -----  Patient has not completed sleep study or CT of chest for f/u 11/15/19  Would you call to see if you can ask him about this

## 2020-02-28 ENCOUNTER — HOSPITAL ENCOUNTER (OUTPATIENT)
Dept: CARDIOLOGY | Facility: MEDICAL CENTER | Age: 30
End: 2020-02-28
Attending: INTERNAL MEDICINE
Payer: COMMERCIAL

## 2020-02-28 DIAGNOSIS — G47.33 OSA (OBSTRUCTIVE SLEEP APNEA): Chronic | ICD-10-CM

## 2020-02-28 DIAGNOSIS — I21.A1 TYPE 2 MYOCARDIAL INFARCTION (HCC): ICD-10-CM

## 2020-02-28 DIAGNOSIS — R60.0 LOWER EXTREMITY EDEMA: ICD-10-CM

## 2020-02-28 DIAGNOSIS — I27.20 PULMONARY HYPERTENSION (HCC): Chronic | ICD-10-CM

## 2020-02-28 DIAGNOSIS — I26.09 ACUTE PULMONARY EMBOLISM WITH ACUTE COR PULMONALE, UNSPECIFIED PULMONARY EMBOLISM TYPE (HCC): Chronic | ICD-10-CM

## 2020-02-28 DIAGNOSIS — E66.01 MORBID OBESITY (HCC): Chronic | ICD-10-CM

## 2020-02-28 LAB
LV EJECT FRACT  99904: 70
LV EJECT FRACT MOD 4C 99902: 69.65

## 2020-02-28 PROCEDURE — 93306 TTE W/DOPPLER COMPLETE: CPT

## 2020-02-28 PROCEDURE — 93306 TTE W/DOPPLER COMPLETE: CPT | Mod: 26 | Performed by: INTERNAL MEDICINE

## 2020-03-05 ENCOUNTER — APPOINTMENT (OUTPATIENT)
Dept: RADIOLOGY | Facility: IMAGING CENTER | Age: 30
End: 2020-03-05
Attending: NURSE PRACTITIONER
Payer: COMMERCIAL

## 2020-03-05 ENCOUNTER — HOSPITAL ENCOUNTER (INPATIENT)
Facility: MEDICAL CENTER | Age: 30
LOS: 2 days | DRG: 175 | End: 2020-03-07
Attending: EMERGENCY MEDICINE | Admitting: INTERNAL MEDICINE
Payer: COMMERCIAL

## 2020-03-05 ENCOUNTER — OFFICE VISIT (OUTPATIENT)
Dept: URGENT CARE | Facility: CLINIC | Age: 30
End: 2020-03-05
Payer: COMMERCIAL

## 2020-03-05 ENCOUNTER — APPOINTMENT (OUTPATIENT)
Dept: RADIOLOGY | Facility: MEDICAL CENTER | Age: 30
DRG: 175 | End: 2020-03-05
Attending: EMERGENCY MEDICINE
Payer: COMMERCIAL

## 2020-03-05 VITALS
WEIGHT: 315 LBS | SYSTOLIC BLOOD PRESSURE: 120 MMHG | TEMPERATURE: 98 F | HEIGHT: 69 IN | DIASTOLIC BLOOD PRESSURE: 56 MMHG | BODY MASS INDEX: 46.65 KG/M2 | OXYGEN SATURATION: 93 % | HEART RATE: 105 BPM

## 2020-03-05 DIAGNOSIS — Z87.09 HISTORY OF ASTHMA: ICD-10-CM

## 2020-03-05 DIAGNOSIS — D75.1 POLYCYTHEMIA: ICD-10-CM

## 2020-03-05 DIAGNOSIS — Z86.711 HISTORY OF PULMONARY EMBOLISM: ICD-10-CM

## 2020-03-05 DIAGNOSIS — I27.20 PULMONARY HYPERTENSION (HCC): Chronic | ICD-10-CM

## 2020-03-05 DIAGNOSIS — I26.09 ACUTE PULMONARY EMBOLISM WITH ACUTE COR PULMONALE, UNSPECIFIED PULMONARY EMBOLISM TYPE (HCC): Chronic | ICD-10-CM

## 2020-03-05 DIAGNOSIS — J69.0 ASPIRATION PNEUMONIA OF LOWER LOBE, UNSPECIFIED ASPIRATION PNEUMONIA TYPE, UNSPECIFIED LATERALITY (HCC): ICD-10-CM

## 2020-03-05 DIAGNOSIS — R06.02 SHORTNESS OF BREATH: ICD-10-CM

## 2020-03-05 DIAGNOSIS — R09.02 HYPOXIA: ICD-10-CM

## 2020-03-05 DIAGNOSIS — E66.01 MORBID OBESITY (HCC): Chronic | ICD-10-CM

## 2020-03-05 DIAGNOSIS — R05.9 COUGH: ICD-10-CM

## 2020-03-05 PROBLEM — J96.01 ACUTE RESPIRATORY FAILURE WITH HYPOXEMIA (HCC): Status: ACTIVE | Noted: 2020-03-05

## 2020-03-05 PROBLEM — I26.94 MULTIPLE SUBSEGMENTAL PULMONARY EMBOLI WITHOUT ACUTE COR PULMONALE (HCC): Status: ACTIVE | Noted: 2020-03-05

## 2020-03-05 LAB
ALBUMIN SERPL BCP-MCNC: 3.8 G/DL (ref 3.2–4.9)
ALBUMIN/GLOB SERPL: 1 G/DL
ALP SERPL-CCNC: 65 U/L (ref 30–99)
ALT SERPL-CCNC: 8 U/L (ref 2–50)
ANION GAP SERPL CALC-SCNC: 11 MMOL/L (ref 7–16)
ANISOCYTOSIS BLD QL SMEAR: ABNORMAL
AST SERPL-CCNC: 23 U/L (ref 12–45)
BASOPHILS # BLD AUTO: 0.3 % (ref 0–1.8)
BASOPHILS # BLD: 0.04 K/UL (ref 0–0.12)
BILIRUB SERPL-MCNC: 1.6 MG/DL (ref 0.1–1.5)
BUN SERPL-MCNC: 10 MG/DL (ref 8–22)
CALCIUM SERPL-MCNC: 9 MG/DL (ref 8.4–10.2)
CHLORIDE SERPL-SCNC: 100 MMOL/L (ref 96–112)
CO2 SERPL-SCNC: 26 MMOL/L (ref 20–33)
COMMENT 1642: NORMAL
CREAT SERPL-MCNC: 0.59 MG/DL (ref 0.5–1.4)
EKG IMPRESSION: NORMAL
EOSINOPHIL # BLD AUTO: 0.01 K/UL (ref 0–0.51)
EOSINOPHIL NFR BLD: 0.1 % (ref 0–6.9)
ERYTHROCYTE [DISTWIDTH] IN BLOOD BY AUTOMATED COUNT: 55.1 FL (ref 35.9–50)
GIANT PLATELETS BLD QL SMEAR: NORMAL
GLOBULIN SER CALC-MCNC: 3.8 G/DL (ref 1.9–3.5)
GLUCOSE SERPL-MCNC: 107 MG/DL (ref 65–99)
HCT VFR BLD AUTO: 50.6 % (ref 42–52)
HGB BLD-MCNC: 13.8 G/DL (ref 14–18)
HYPOCHROMIA BLD QL SMEAR: ABNORMAL
IMM GRANULOCYTES # BLD AUTO: 0.06 K/UL (ref 0–0.11)
IMM GRANULOCYTES NFR BLD AUTO: 0.5 % (ref 0–0.9)
INR PPP: 1.3 (ref 0.87–1.13)
LYMPHOCYTES # BLD AUTO: 2.32 K/UL (ref 1–4.8)
LYMPHOCYTES NFR BLD: 18.8 % (ref 22–41)
MACROCYTES BLD QL SMEAR: ABNORMAL
MCH RBC QN AUTO: 20.5 PG (ref 27–33)
MCHC RBC AUTO-ENTMCNC: 27.3 G/DL (ref 33.7–35.3)
MCV RBC AUTO: 75.1 FL (ref 81.4–97.8)
MONOCYTES # BLD AUTO: 1.17 K/UL (ref 0–0.85)
MONOCYTES NFR BLD AUTO: 9.5 % (ref 0–13.4)
NEUTROPHILS # BLD AUTO: 8.74 K/UL (ref 1.82–7.42)
NEUTROPHILS NFR BLD: 70.8 % (ref 44–72)
NRBC # BLD AUTO: 0.05 K/UL
NRBC BLD-RTO: 0.4 /100 WBC
PLATELET # BLD AUTO: 196 K/UL (ref 164–446)
PLATELET BLD QL SMEAR: NORMAL
POLYCHROMASIA BLD QL SMEAR: NORMAL
POTASSIUM SERPL-SCNC: 4.7 MMOL/L (ref 3.6–5.5)
PROCALCITONIN SERPL-MCNC: 0.29 NG/ML
PROT SERPL-MCNC: 7.6 G/DL (ref 6–8.2)
PROTHROMBIN TIME: 16.4 SEC (ref 12–14.6)
RBC # BLD AUTO: 6.74 M/UL (ref 4.7–6.1)
RBC BLD AUTO: PRESENT
SODIUM SERPL-SCNC: 137 MMOL/L (ref 135–145)
TROPONIN T SERPL-MCNC: 25 NG/L (ref 6–19)
WBC # BLD AUTO: 12.3 K/UL (ref 4.8–10.8)

## 2020-03-05 PROCEDURE — 84484 ASSAY OF TROPONIN QUANT: CPT

## 2020-03-05 PROCEDURE — 85610 PROTHROMBIN TIME: CPT

## 2020-03-05 PROCEDURE — 71045 X-RAY EXAM CHEST 1 VIEW: CPT

## 2020-03-05 PROCEDURE — 99215 OFFICE O/P EST HI 40 MIN: CPT | Mod: 25 | Performed by: NURSE PRACTITIONER

## 2020-03-05 PROCEDURE — 99223 1ST HOSP IP/OBS HIGH 75: CPT | Mod: AI | Performed by: INTERNAL MEDICINE

## 2020-03-05 PROCEDURE — 87040 BLOOD CULTURE FOR BACTERIA: CPT | Mod: 91

## 2020-03-05 PROCEDURE — 85025 COMPLETE CBC W/AUTO DIFF WBC: CPT

## 2020-03-05 PROCEDURE — 80053 COMPREHEN METABOLIC PANEL: CPT

## 2020-03-05 PROCEDURE — 700105 HCHG RX REV CODE 258: Performed by: EMERGENCY MEDICINE

## 2020-03-05 PROCEDURE — 96368 THER/DIAG CONCURRENT INF: CPT

## 2020-03-05 PROCEDURE — 93005 ELECTROCARDIOGRAM TRACING: CPT

## 2020-03-05 PROCEDURE — 700117 HCHG RX CONTRAST REV CODE 255: Performed by: EMERGENCY MEDICINE

## 2020-03-05 PROCEDURE — 700111 HCHG RX REV CODE 636 W/ 250 OVERRIDE (IP): Performed by: EMERGENCY MEDICINE

## 2020-03-05 PROCEDURE — 94640 AIRWAY INHALATION TREATMENT: CPT | Performed by: NURSE PRACTITIONER

## 2020-03-05 PROCEDURE — 96375 TX/PRO/DX INJ NEW DRUG ADDON: CPT

## 2020-03-05 PROCEDURE — 84145 PROCALCITONIN (PCT): CPT

## 2020-03-05 PROCEDURE — 96366 THER/PROPH/DIAG IV INF ADDON: CPT

## 2020-03-05 PROCEDURE — 770020 HCHG ROOM/CARE - TELE (206)

## 2020-03-05 PROCEDURE — 71275 CT ANGIOGRAPHY CHEST: CPT

## 2020-03-05 PROCEDURE — 96367 TX/PROPH/DG ADDL SEQ IV INF: CPT

## 2020-03-05 PROCEDURE — 94640 AIRWAY INHALATION TREATMENT: CPT

## 2020-03-05 PROCEDURE — 99285 EMERGENCY DEPT VISIT HI MDM: CPT

## 2020-03-05 PROCEDURE — 700101 HCHG RX REV CODE 250: Performed by: EMERGENCY MEDICINE

## 2020-03-05 PROCEDURE — 96365 THER/PROPH/DIAG IV INF INIT: CPT

## 2020-03-05 RX ORDER — METHYLPREDNISOLONE SODIUM SUCCINATE 125 MG/2ML
125 INJECTION, POWDER, LYOPHILIZED, FOR SOLUTION INTRAMUSCULAR; INTRAVENOUS ONCE
Status: COMPLETED | OUTPATIENT
Start: 2020-03-05 | End: 2020-03-05

## 2020-03-05 RX ORDER — MAGNESIUM SULFATE HEPTAHYDRATE 40 MG/ML
2 INJECTION, SOLUTION INTRAVENOUS ONCE
Status: COMPLETED | OUTPATIENT
Start: 2020-03-05 | End: 2020-03-05

## 2020-03-05 RX ORDER — PROCHLORPERAZINE EDISYLATE 5 MG/ML
5-10 INJECTION INTRAMUSCULAR; INTRAVENOUS EVERY 4 HOURS PRN
Status: DISCONTINUED | OUTPATIENT
Start: 2020-03-05 | End: 2020-03-07 | Stop reason: HOSPADM

## 2020-03-05 RX ORDER — ACETAMINOPHEN 325 MG/1
650 TABLET ORAL EVERY 6 HOURS PRN
Status: DISCONTINUED | OUTPATIENT
Start: 2020-03-05 | End: 2020-03-07 | Stop reason: HOSPADM

## 2020-03-05 RX ORDER — IPRATROPIUM BROMIDE AND ALBUTEROL SULFATE 2.5; .5 MG/3ML; MG/3ML
3 SOLUTION RESPIRATORY (INHALATION)
Status: DISCONTINUED | OUTPATIENT
Start: 2020-03-05 | End: 2020-03-05

## 2020-03-05 RX ORDER — HEPARIN SODIUM 1000 [USP'U]/ML
8000 INJECTION, SOLUTION INTRAVENOUS; SUBCUTANEOUS ONCE
Status: COMPLETED | OUTPATIENT
Start: 2020-03-05 | End: 2020-03-05

## 2020-03-05 RX ORDER — IPRATROPIUM BROMIDE AND ALBUTEROL SULFATE 2.5; .5 MG/3ML; MG/3ML
3 SOLUTION RESPIRATORY (INHALATION)
Status: DISCONTINUED | OUTPATIENT
Start: 2020-03-05 | End: 2020-03-07 | Stop reason: HOSPADM

## 2020-03-05 RX ORDER — AMOXICILLIN 250 MG
2 CAPSULE ORAL 2 TIMES DAILY
Status: DISCONTINUED | OUTPATIENT
Start: 2020-03-05 | End: 2020-03-07 | Stop reason: HOSPADM

## 2020-03-05 RX ORDER — HEPARIN SODIUM 5000 [USP'U]/100ML
INJECTION, SOLUTION INTRAVENOUS CONTINUOUS
Status: DISCONTINUED | OUTPATIENT
Start: 2020-03-05 | End: 2020-03-07

## 2020-03-05 RX ORDER — POLYETHYLENE GLYCOL 3350 17 G/17G
1 POWDER, FOR SOLUTION ORAL
Status: DISCONTINUED | OUTPATIENT
Start: 2020-03-05 | End: 2020-03-07 | Stop reason: HOSPADM

## 2020-03-05 RX ORDER — IPRATROPIUM BROMIDE AND ALBUTEROL SULFATE 2.5; .5 MG/3ML; MG/3ML
3 SOLUTION RESPIRATORY (INHALATION) ONCE
Status: COMPLETED | OUTPATIENT
Start: 2020-03-05 | End: 2020-03-05

## 2020-03-05 RX ORDER — BISACODYL 10 MG
10 SUPPOSITORY, RECTAL RECTAL
Status: DISCONTINUED | OUTPATIENT
Start: 2020-03-05 | End: 2020-03-07 | Stop reason: HOSPADM

## 2020-03-05 RX ORDER — PROMETHAZINE HYDROCHLORIDE 25 MG/1
12.5-25 TABLET ORAL EVERY 4 HOURS PRN
Status: DISCONTINUED | OUTPATIENT
Start: 2020-03-05 | End: 2020-03-07 | Stop reason: HOSPADM

## 2020-03-05 RX ORDER — ONDANSETRON 4 MG/1
4 TABLET, ORALLY DISINTEGRATING ORAL EVERY 4 HOURS PRN
Status: DISCONTINUED | OUTPATIENT
Start: 2020-03-05 | End: 2020-03-07 | Stop reason: HOSPADM

## 2020-03-05 RX ORDER — HEPARIN SODIUM 1000 [USP'U]/ML
4400 INJECTION, SOLUTION INTRAVENOUS; SUBCUTANEOUS PRN
Status: DISCONTINUED | OUTPATIENT
Start: 2020-03-05 | End: 2020-03-07

## 2020-03-05 RX ORDER — PROMETHAZINE HYDROCHLORIDE 25 MG/1
12.5-25 SUPPOSITORY RECTAL EVERY 4 HOURS PRN
Status: DISCONTINUED | OUTPATIENT
Start: 2020-03-05 | End: 2020-03-07 | Stop reason: HOSPADM

## 2020-03-05 RX ORDER — AZITHROMYCIN 500 MG/1
500 INJECTION, POWDER, LYOPHILIZED, FOR SOLUTION INTRAVENOUS ONCE
Status: COMPLETED | OUTPATIENT
Start: 2020-03-05 | End: 2020-03-05

## 2020-03-05 RX ORDER — ONDANSETRON 2 MG/ML
4 INJECTION INTRAMUSCULAR; INTRAVENOUS EVERY 4 HOURS PRN
Status: DISCONTINUED | OUTPATIENT
Start: 2020-03-05 | End: 2020-03-07 | Stop reason: HOSPADM

## 2020-03-05 RX ADMIN — IPRATROPIUM BROMIDE AND ALBUTEROL SULFATE 3 ML: 2.5; .5 SOLUTION RESPIRATORY (INHALATION) at 15:06

## 2020-03-05 RX ADMIN — MAGNESIUM SULFATE 2 G: 2 INJECTION INTRAVENOUS at 16:10

## 2020-03-05 RX ADMIN — AZITHROMYCIN MONOHYDRATE 500 MG: 500 INJECTION, POWDER, LYOPHILIZED, FOR SOLUTION INTRAVENOUS at 20:07

## 2020-03-05 RX ADMIN — IOHEXOL 100 ML: 350 INJECTION, SOLUTION INTRAVENOUS at 19:40

## 2020-03-05 RX ADMIN — CEFTRIAXONE SODIUM 2 G: 2 INJECTION, POWDER, FOR SOLUTION INTRAMUSCULAR; INTRAVENOUS at 20:07

## 2020-03-05 RX ADMIN — IPRATROPIUM BROMIDE AND ALBUTEROL SULFATE 3 ML: .5; 3 SOLUTION RESPIRATORY (INHALATION) at 15:51

## 2020-03-05 RX ADMIN — HEPARIN SODIUM 8000 UNITS: 1000 INJECTION, SOLUTION INTRAVENOUS; SUBCUTANEOUS at 20:10

## 2020-03-05 RX ADMIN — METHYLPREDNISOLONE SODIUM SUCCINATE 125 MG: 125 INJECTION, POWDER, FOR SOLUTION INTRAMUSCULAR; INTRAVENOUS at 15:44

## 2020-03-05 RX ADMIN — HEPARIN SODIUM 25000 UNITS: 5000 INJECTION, SOLUTION INTRAVENOUS at 20:11

## 2020-03-05 SDOH — ECONOMIC STABILITY: FOOD INSECURITY: WITHIN THE PAST 12 MONTHS, THE FOOD YOU BOUGHT JUST DIDN'T LAST AND YOU DIDN'T HAVE MONEY TO GET MORE.: NEVER TRUE

## 2020-03-05 SDOH — HEALTH STABILITY: MENTAL HEALTH: HOW OFTEN DO YOU HAVE A DRINK CONTAINING ALCOHOL?: 2-4 TIMES A MONTH

## 2020-03-05 SDOH — ECONOMIC STABILITY: TRANSPORTATION INSECURITY
IN THE PAST 12 MONTHS, HAS LACK OF TRANSPORTATION KEPT YOU FROM MEETINGS, WORK, OR FROM GETTING THINGS NEEDED FOR DAILY LIVING?: NO

## 2020-03-05 SDOH — ECONOMIC STABILITY: FOOD INSECURITY: WITHIN THE PAST 12 MONTHS, YOU WORRIED THAT YOUR FOOD WOULD RUN OUT BEFORE YOU GOT MONEY TO BUY MORE.: NEVER TRUE

## 2020-03-05 SDOH — ECONOMIC STABILITY: TRANSPORTATION INSECURITY
IN THE PAST 12 MONTHS, HAS THE LACK OF TRANSPORTATION KEPT YOU FROM MEDICAL APPOINTMENTS OR FROM GETTING MEDICATIONS?: NO

## 2020-03-05 ASSESSMENT — ENCOUNTER SYMPTOMS
SHORTNESS OF BREATH: 1
CHILLS: 0
DIZZINESS: 0
HEMOPTYSIS: 0
FEVER: 0
SINUS PAIN: 0
EYE REDNESS: 0
WHEEZING: 1
SORE THROAT: 0
VOMITING: 0
COUGH: 1
ABDOMINAL PAIN: 0
SORE THROAT: 0
DIARRHEA: 0
WEAKNESS: 0
HEADACHES: 0
NAUSEA: 0
CONSTIPATION: 0
PALPITATIONS: 0
COUGH: 1
FOCAL WEAKNESS: 0
DEPRESSION: 0
EYE DISCHARGE: 0
ABDOMINAL PAIN: 0
SPUTUM PRODUCTION: 1
HEADACHES: 0
EYE DISCHARGE: 0
VOMITING: 0
FEVER: 0
STRIDOR: 0
SHORTNESS OF BREATH: 1
EYE REDNESS: 0
NAUSEA: 0
CHILLS: 0

## 2020-03-05 ASSESSMENT — LIFESTYLE VARIABLES
ALCOHOL_USE: NO
CONSUMPTION TOTAL: NEGATIVE
ON A TYPICAL DAY WHEN YOU DRINK ALCOHOL HOW MANY DRINKS DO YOU HAVE: 0
EVER_SMOKED: NEVER
EVER HAD A DRINK FIRST THING IN THE MORNING TO STEADY YOUR NERVES TO GET RID OF A HANGOVER: NO
AVERAGE NUMBER OF DAYS PER WEEK YOU HAVE A DRINK CONTAINING ALCOHOL: 0
TOTAL SCORE: 0
HOW MANY TIMES IN THE PAST YEAR HAVE YOU HAD 5 OR MORE DRINKS IN A DAY: 0
EVER FELT BAD OR GUILTY ABOUT YOUR DRINKING: NO
HAVE PEOPLE ANNOYED YOU BY CRITICIZING YOUR DRINKING: NO
HAVE YOU EVER FELT YOU SHOULD CUT DOWN ON YOUR DRINKING: NO
EVER_SMOKED: NEVER

## 2020-03-05 ASSESSMENT — PATIENT HEALTH QUESTIONNAIRE - PHQ9
1. LITTLE INTEREST OR PLEASURE IN DOING THINGS: NOT AT ALL
2. FEELING DOWN, DEPRESSED, IRRITABLE, OR HOPELESS: NOT AT ALL
SUM OF ALL RESPONSES TO PHQ9 QUESTIONS 1 AND 2: 0

## 2020-03-05 ASSESSMENT — FIBROSIS 4 INDEX
FIB4 SCORE: 0.89
FIB4 SCORE: 0.89

## 2020-03-05 NOTE — ED PROVIDER NOTES
ED Provider Note    CHIEF COMPLAINT  Chief Complaint   Patient presents with   • Shortness of Breath       [facility] [place]  Michael Ontiveros is a 29 y.o. male who presents with chief complaint of shortness of breath.  Patient with a history of pulmonary embolus, this occurred after patient was on multiple flights and patient was taken off anticoagulants last year.  Patient reports that he also has a history of asthma.  Reports on Monday, approximately 4 days ago he woke up with some right-sided chest pain that was worse on deep breaths with associated shortness of breath.  He reports that he feels like it is difficult to take a deep breath in and he has had some decreased exertional capacity since the onset of symptoms.  Patient denies any fevers or constitutional symptoms.  He denies any recent leg pain or leg swelling.  He reports that his symptoms are considerably less mild than the last time he had a pulmonary embolus and thinks this feels more like his asthma.  He is not a smoker.  Patient reports he has been taking albuterol with some improvement however it is progressively was less effective and therefore he came to the emergency department for further care.  Patient denies any chest pain aside from the right-sided chest pain are discussed.  He denies any associate abdominal pain.  He denies any diaphoresis.    REVIEW OF SYSTEMS  ROS    See HPI for further details. All other systems are negative.     PAST MEDICAL HISTORY   has a past medical history of Chickenpox, Eczema, Influenza, and Tonsillitis.    SOCIAL HISTORY  Social History     Tobacco Use   • Smoking status: Never Smoker   • Smokeless tobacco: Never Used   Substance and Sexual Activity   • Alcohol use: Yes     Frequency: 2-4 times a month     Comment: rare   • Drug use: No   • Sexual activity: Not Currently       SURGICAL HISTORY   has a past surgical history that includes dental extraction(s) (11/3/2017).    CURRENT MEDICATIONS  Home Medications     **Home medications have not yet been reviewed for this encounter**         ALLERGIES  Allergies   Allergen Reactions   • Pistachio    • [place] Anaphylaxis     pistachios       PHYSICAL EXAM  Physical Exam   Constitutional: He is oriented to person, place, and time. He appears well-developed and well-nourished.   Morbidly obese   HENT:   Head: Normocephalic and atraumatic.   Eyes: Pupils are equal, round, and reactive to light. Conjunctivae are normal.   Neck: Normal range of motion. Neck supple.   Cardiovascular: Normal rate and regular rhythm. Exam reveals no gallop and no friction rub.   No murmur heard.  Pulmonary/Chest: He is in respiratory distress. He has wheezes.   Scattered wheezes and rhonchi, satting in the high 70s on room air, on 15 L satting at 90%   Abdominal: Soft. Bowel sounds are normal. He exhibits no distension. There is no abdominal tenderness. There is no rebound.   Neurological: He is alert and oriented to person, place, and time.   Skin: Skin is warm and dry.   Psychiatric: He has a normal mood and affect. His behavior is normal.         DIAGNOSTIC STUDIES / PROCEDURES    EKG  EKG is as rhythm, poor R progression precordial leads, normal axis normal intervals, no ST changes consistent with acute regional ischemia    LABS  Results for orders placed or performed during the hospital encounter of 03/05/20   CBC WITH DIFFERENTIAL   Result Value Ref Range    WBC 12.3 (H) 4.8 - 10.8 K/uL    RBC 6.74 (H) 4.70 - 6.10 M/uL    Hemoglobin 13.8 (L) 14.0 - 18.0 g/dL    Hematocrit 50.6 42.0 - 52.0 %    MCV 75.1 (L) 81.4 - 97.8 fL    MCH 20.5 (L) 27.0 - 33.0 pg    MCHC 27.3 (L) 33.7 - 35.3 g/dL    RDW 55.1 (H) 35.9 - 50.0 fL    Platelet Count 196 164 - 446 K/uL    Neutrophils-Polys 70.80 44.00 - 72.00 %    Lymphocytes 18.80 (L) 22.00 - 41.00 %    Monocytes 9.50 0.00 - 13.40 %    Eosinophils 0.10 0.00 - 6.90 %    Basophils 0.30 0.00 - 1.80 %    Immature Granulocytes 0.50 0.00 - 0.90 %    Nucleated RBC 0.40  /100 WBC    Neutrophils (Absolute) 8.74 (H) 1.82 - 7.42 K/uL    Lymphs (Absolute) 2.32 1.00 - 4.80 K/uL    Monos (Absolute) 1.17 (H) 0.00 - 0.85 K/uL    Eos (Absolute) 0.01 0.00 - 0.51 K/uL    Baso (Absolute) 0.04 0.00 - 0.12 K/uL    Immature Granulocytes (abs) 0.06 0.00 - 0.11 K/uL    NRBC (Absolute) 0.05 K/uL    Hypochromia 1+     Anisocytosis 1+     Macrocytosis 1+    CMP   Result Value Ref Range    Sodium 137 135 - 145 mmol/L    Potassium 4.7 3.6 - 5.5 mmol/L    Chloride 100 96 - 112 mmol/L    Co2 26 20 - 33 mmol/L    Anion Gap 11.0 7.0 - 16.0    Glucose 107 (H) 65 - 99 mg/dL    Bun 10 8 - 22 mg/dL    Creatinine 0.59 0.50 - 1.40 mg/dL    Calcium 9.0 8.4 - 10.2 mg/dL    AST(SGOT) 23 12 - 45 U/L    ALT(SGPT) 8 2 - 50 U/L    Alkaline Phosphatase 65 30 - 99 U/L    Total Bilirubin 1.6 (H) 0.1 - 1.5 mg/dL    Albumin 3.8 3.2 - 4.9 g/dL    Total Protein 7.6 6.0 - 8.2 g/dL    Globulin 3.8 (H) 1.9 - 3.5 g/dL    A-G Ratio 1.0 g/dL   TROPONIN   Result Value Ref Range    Troponin T 25 (H) 6 - 19 ng/L   PT/INR   Result Value Ref Range    PT 16.4 (H) 12.0 - 14.6 sec    INR 1.30 (H) 0.87 - 1.13   ESTIMATED GFR   Result Value Ref Range    GFR If African American >60 >60 mL/min/1.73 m 2    GFR If Non African American >60 >60 mL/min/1.73 m 2   PLATELET ESTIMATE   Result Value Ref Range    Plt Estimation Normal    MORPHOLOGY   Result Value Ref Range    RBC Morphology Present     Giant Platelets 1+     Polychromia 1+    DIFFERENTIAL COMMENT   Result Value Ref Range    Comments-Diff see below    Procalcitonin   Result Value Ref Range    Procalcitonin 0.29 (H) <0.25 ng/mL   EKG   Result Value Ref Range    Report       [facility] Emergency Dept.    Test Date:  2020  Pt Name:    VERN STARR                Department: [facility]  MRN:        [id number]                      [facility]  Gender:     Male                         Technician: [id number]  :        1990                   Requested By:ER  TRIAGE PROTOCOL  Order #:    [id number]                    Reading MD:    Measurements  Intervals                                Axis  Rate:       99                           P:          15  AK:         168                          QRS:        147  QRSD:       76                           T:          12  QT:         344  QTc:        442    Interpretive Statements  SINUS RHYTHM  LEFT POSTERIOR FASCICULAR BLOCK  Compared to ECG 08/21/2019 19:58:13  Sinus tachycardia no longer present           RADIOLOGY  CT-CTA CHEST PULMONARY ARTERY W/ RECONS   Final Result      1.  Filling defects within right lower lobe segmental coronary arteries consistent with pulmonary emboli. No evidence of right heart strain.      2.  Consolidation within the right lung base.      3.  Patchy bilateral groundglass opacities.      4.  Small right pleural effusion.      This was discussed with CONSTANZA LIZAMA at 7:48 PM on 3/5/2020.      DX-CHEST-PORTABLE (1 VIEW)   Final Result      Ill-defined right basilar opacity is worrisome for aspiration or pneumonia      Stable cardiac silhouette enlargement            45 minutes of critical care time were spent with patient    COURSE & MEDICAL DECISION MAKING  Pertinent Labs & Imaging studies reviewed. (See chart for details)    Patient with asthma exacerbation possibly caused by pulmonary embolus.  Will check x-ray first to ensure that there is no complicating pneumonia or pneumothorax that would be causing patient's symptoms otherwise and if this is negative patient will have CTA to evaluate for new pulmonary embolus.  In the interim we will treat patient's associated asthma exacerbation with methylprednisolone and hour-long nebulization.  We will also give magnesium.  Following hour-long nebulization his wheezing has resolved entirely, he does remain hypoxic  Patient x-ray is read as a pneumonia however it is relatively wedge appearing and I believe there may be a pulmonary embolus especially given  patient's history, will CTA.  Patient CTA is positive for PE, patient started on heparin, given the appearance of the infarct that is questionably complicated by a pneumonia as well I have also covered him with ceftriaxone and azithromycin after sending blood cultures.  Patient will be admitted to the hospitalist for ongoing care.  The patient will return for worsening symptoms and is stable at the time of discharge. The patient verbalizes understanding and will comply.    FINAL IMPRESSION  1.  Submassive PE  Hypoxia, shortness of breath         Electronically signed by: Gian Koch M.D., 3/5/2020 3:39 PM

## 2020-03-05 NOTE — PROGRESS NOTES
"Subjective:   Michael Ontiveros is a 29 y.o. male who presents for Cough (painful breathing, sob.  Pt has asthma. Pt o2 was at 79 when he came in.)        Cough   This is a new problem. The current episode started in the past 7 days. The problem has been gradually worsening. The problem occurs every few minutes. Associated symptoms include chest pain, nasal congestion, postnasal drip, shortness of breath and wheezing. Pertinent negatives include no chills, ear pain, eye redness, fever, headaches, rash or sore throat. The symptoms are aggravated by lying down. He has tried a beta-agonist inhaler and OTC cough suppressant for the symptoms. The treatment provided no relief. His past medical history is significant for asthma. There is no history of pneumonia.   History of asthma - states inhaler not working per usual.  History of PE in 11/2019 - states was on Eliquis, but is no longer taking    Review of Systems   Constitutional: Negative for chills and fever.   HENT: Positive for congestion and postnasal drip. Negative for ear discharge, ear pain and sore throat.    Eyes: Negative for discharge and redness.   Respiratory: Positive for cough, shortness of breath and wheezing. Negative for stridor.    Cardiovascular: Positive for chest pain. Negative for palpitations and leg swelling.   Gastrointestinal: Negative for abdominal pain, nausea and vomiting.   Skin: Negative for itching and rash.   Neurological: Negative for headaches.   All other systems reviewed and are negative.    PMH:  has a past medical history of Chickenpox, Eczema, Influenza, and Tonsillitis.  ALLERGIES:   Allergies   Allergen Reactions   • Pistachio    • [place] Anaphylaxis     pistachios       Patient's PMH, SocHx, SurgHx, FamHx, Drug allergies and medications reviewed.     Objective:   /56   Pulse (!) 105   Temp 36.7 °C (98 °F)   Ht 1.74 m (5' 8.5\")   Wt (!) 175.1 kg (386 lb)   SpO2 93% Comment: on 4 L of o2  BMI 57.84 kg/m² "   Physical Exam  Vitals signs reviewed.   Constitutional:       Appearance: He is well-developed.   HENT:      Head: Normocephalic.      Right Ear: Tympanic membrane and ear canal normal. No middle ear effusion. Tympanic membrane is not perforated or erythematous.      Left Ear: Tympanic membrane and ear canal normal.  No middle ear effusion. Tympanic membrane is not perforated or erythematous.      Nose: Nose normal. No rhinorrhea.      Right Sinus: No maxillary sinus tenderness or frontal sinus tenderness.      Left Sinus: No maxillary sinus tenderness or frontal sinus tenderness.      Mouth/Throat:      Lips: Pink.      Mouth: Mucous membranes are moist.      Pharynx: Oropharynx is clear. Uvula midline. No oropharyngeal exudate, posterior oropharyngeal erythema or uvula swelling.      Tonsils: No tonsillar exudate.   Eyes:      General: Lids are normal.      Extraocular Movements: Extraocular movements intact.      Conjunctiva/sclera: Conjunctivae normal.      Pupils: Pupils are equal, round, and reactive to light.   Neck:      Musculoskeletal: Normal range of motion.      Thyroid: No thyromegaly.   Cardiovascular:      Rate and Rhythm: Normal rate and regular rhythm.      Heart sounds: Normal heart sounds.   Pulmonary:      Effort: Pulmonary effort is normal. No tachypnea, bradypnea, accessory muscle usage, prolonged expiration or respiratory distress.      Breath sounds: Examination of the right-upper field reveals wheezing. Wheezing present.   Lymphadenopathy:      Head:      Right side of head: No submandibular or tonsillar adenopathy.      Left side of head: No submandibular or tonsillar adenopathy.   Skin:     General: Skin is warm and dry.      Findings: No rash.   Neurological:      Mental Status: He is alert and oriented to person, place, and time.   Psychiatric:         Mood and Affect: Mood normal.         Speech: Speech normal.         Behavior: Behavior normal. Behavior is cooperative.          Thought Content: Thought content normal.         Judgment: Judgment normal.             Assessment/Plan:   Assessment    1. Cough  EKG    ipratropium-albuterol (DUONEB) nebulizer solution    CANCELED: DX-CHEST-2 VIEWS   2. History of pulmonary embolism  EKG    CANCELED: DX-CHEST-2 VIEWS   3. History of asthma  EKG    CANCELED: DX-CHEST-2 VIEWS   4. Hypoxia     5. Shortness of breath       Initial oxygen 80% Room Air. Patient immediately placed on 4L NC and oxygen reading 94%.  Albuterol breathing treatment given without improvement of O2 saturation. Patient increased needs for oxygen - placed on non rebreather 10L.    Due to patient's history of PE and hypoxia, [facility]. High suspicion for PE. Called ER [place] and spoke to Dr. Lopez, who is aware patient is arriving.  EKG sinus tachycardiac - no acute changes to ST segment. No changes when compared to prior EKG.    Differential diagnosis, natural history, supportive care, and indications for immediate follow-up discussed.     **Please note that all invasive procedures during this visit were performed by myself and/or the Medical Assistant under the supervision of the PA or MD in office**

## 2020-03-06 LAB
ALBUMIN SERPL BCP-MCNC: 4.4 G/DL (ref 3.2–4.9)
ALBUMIN/GLOB SERPL: 1 G/DL
ALP SERPL-CCNC: 82 U/L (ref 30–99)
ALT SERPL-CCNC: 10 U/L (ref 2–50)
ANION GAP SERPL CALC-SCNC: 13 MMOL/L (ref 7–16)
APTT PPP: 27.5 SEC (ref 24.7–36)
APTT PPP: 38.8 SEC (ref 24.7–36)
APTT PPP: 44.2 SEC (ref 24.7–36)
AST SERPL-CCNC: 19 U/L (ref 12–45)
BASOPHILS # BLD AUTO: 0.3 % (ref 0–1.8)
BASOPHILS # BLD: 0.05 K/UL (ref 0–0.12)
BILIRUB SERPL-MCNC: 0.9 MG/DL (ref 0.1–1.5)
BUN SERPL-MCNC: 13 MG/DL (ref 8–22)
CALCIUM SERPL-MCNC: 9.6 MG/DL (ref 8.4–10.2)
CHLORIDE SERPL-SCNC: 98 MMOL/L (ref 96–112)
CO2 SERPL-SCNC: 27 MMOL/L (ref 20–33)
CREAT SERPL-MCNC: 0.71 MG/DL (ref 0.5–1.4)
EOSINOPHIL # BLD AUTO: 0.01 K/UL (ref 0–0.51)
EOSINOPHIL NFR BLD: 0.1 % (ref 0–6.9)
ERYTHROCYTE [DISTWIDTH] IN BLOOD BY AUTOMATED COUNT: 57.8 FL (ref 35.9–50)
GLOBULIN SER CALC-MCNC: 4.5 G/DL (ref 1.9–3.5)
GLUCOSE SERPL-MCNC: 188 MG/DL (ref 65–99)
HCT VFR BLD AUTO: 56.7 % (ref 42–52)
HGB BLD-MCNC: 15 G/DL (ref 14–18)
IMM GRANULOCYTES # BLD AUTO: 0.25 K/UL (ref 0–0.11)
IMM GRANULOCYTES NFR BLD AUTO: 1.6 % (ref 0–0.9)
LYMPHOCYTES # BLD AUTO: 1.38 K/UL (ref 1–4.8)
LYMPHOCYTES NFR BLD: 9 % (ref 22–41)
MCH RBC QN AUTO: 20.4 PG (ref 27–33)
MCHC RBC AUTO-ENTMCNC: 26.5 G/DL (ref 33.7–35.3)
MCV RBC AUTO: 76.9 FL (ref 81.4–97.8)
MONOCYTES # BLD AUTO: 0.36 K/UL (ref 0–0.85)
MONOCYTES NFR BLD AUTO: 2.4 % (ref 0–13.4)
NEUTROPHILS # BLD AUTO: 13.2 K/UL (ref 1.82–7.42)
NEUTROPHILS NFR BLD: 86.6 % (ref 44–72)
NRBC # BLD AUTO: 0.07 K/UL
NRBC BLD-RTO: 0.5 /100 WBC
PLATELET # BLD AUTO: 255 K/UL (ref 164–446)
POTASSIUM SERPL-SCNC: 5.1 MMOL/L (ref 3.6–5.5)
PROT SERPL-MCNC: 8.9 G/DL (ref 6–8.2)
RBC # BLD AUTO: 7.37 M/UL (ref 4.7–6.1)
SODIUM SERPL-SCNC: 138 MMOL/L (ref 135–145)
WBC # BLD AUTO: 15.3 K/UL (ref 4.8–10.8)

## 2020-03-06 PROCEDURE — 81241 F5 GENE: CPT

## 2020-03-06 PROCEDURE — 85730 THROMBOPLASTIN TIME PARTIAL: CPT | Mod: 91

## 2020-03-06 PROCEDURE — 770020 HCHG ROOM/CARE - TELE (206)

## 2020-03-06 PROCEDURE — 700111 HCHG RX REV CODE 636 W/ 250 OVERRIDE (IP): Performed by: INTERNAL MEDICINE

## 2020-03-06 PROCEDURE — 86147 CARDIOLIPIN ANTIBODY EA IG: CPT

## 2020-03-06 PROCEDURE — 80053 COMPREHEN METABOLIC PANEL: CPT

## 2020-03-06 PROCEDURE — 700111 HCHG RX REV CODE 636 W/ 250 OVERRIDE (IP): Performed by: EMERGENCY MEDICINE

## 2020-03-06 PROCEDURE — 85025 COMPLETE CBC W/AUTO DIFF WBC: CPT

## 2020-03-06 PROCEDURE — 700105 HCHG RX REV CODE 258: Performed by: INTERNAL MEDICINE

## 2020-03-06 PROCEDURE — 99233 SBSQ HOSP IP/OBS HIGH 50: CPT | Performed by: HOSPITALIST

## 2020-03-06 PROCEDURE — 81240 F2 GENE: CPT

## 2020-03-06 PROCEDURE — 85306 CLOT INHIBIT PROT S FREE: CPT

## 2020-03-06 PROCEDURE — 85300 ANTITHROMBIN III ACTIVITY: CPT

## 2020-03-06 PROCEDURE — 85303 CLOT INHIBIT PROT C ACTIVITY: CPT

## 2020-03-06 RX ADMIN — CEFTRIAXONE SODIUM 2 G: 2 INJECTION, POWDER, FOR SOLUTION INTRAMUSCULAR; INTRAVENOUS at 06:37

## 2020-03-06 RX ADMIN — HEPARIN SODIUM 4400 UNITS: 1000 INJECTION, SOLUTION INTRAVENOUS; SUBCUTANEOUS at 20:33

## 2020-03-06 RX ADMIN — HEPARIN SODIUM 4400 UNITS: 1000 INJECTION, SOLUTION INTRAVENOUS; SUBCUTANEOUS at 12:22

## 2020-03-06 RX ADMIN — HEPARIN SODIUM 4400 UNITS: 1000 INJECTION, SOLUTION INTRAVENOUS; SUBCUTANEOUS at 04:51

## 2020-03-06 RX ADMIN — HEPARIN SODIUM 2900 UNITS/HR: 5000 INJECTION, SOLUTION INTRAVENOUS at 20:38

## 2020-03-06 RX ADMIN — HEPARIN SODIUM 2100 UNITS/HR: 5000 INJECTION, SOLUTION INTRAVENOUS at 10:04

## 2020-03-06 ASSESSMENT — ENCOUNTER SYMPTOMS
HEARTBURN: 0
MYALGIAS: 1
VOMITING: 0
FEVER: 0
PALPITATIONS: 0
BLOOD IN STOOL: 0
SHORTNESS OF BREATH: 1
COUGH: 0
CHILLS: 0
SENSORY CHANGE: 0
ABDOMINAL PAIN: 0
CONSTIPATION: 0
BLURRED VISION: 0
TREMORS: 0
DIARRHEA: 0
TINGLING: 0
DOUBLE VISION: 0
NAUSEA: 0

## 2020-03-06 NOTE — PROGRESS NOTES
Received report from Deedee MARSHALL and assumed patient's cares Pt is up in bed. Pt denies any pain, discomfort, or any needs at this time. Encouraged Pt to call for assistance if needed. Call light within reach.

## 2020-03-06 NOTE — PROGRESS NOTES
Received report from JOANNA Mcdaniel. Patient is awake, Pt resting comfortably in bed, plan of care discussed with patient, declines any needs at this time and denies pain. Call light within reach and safety precautions in place.

## 2020-03-06 NOTE — PROGRESS NOTES
APTT lab came back- 38.8, gave bolus of heparin and changed heparin drip rate.    Patient denies pain at this time. Will continue to monitor. Safety precautions in place, call light and belongs are in reach.

## 2020-03-06 NOTE — PROGRESS NOTES
Pt has been very sleepy and difficult to arouse; but when awaken, he is alert and oriented x4, and denies any symptom. O2 via facial mask at 6L (he is a mouth breather) He keeps O2Sat >90%

## 2020-03-06 NOTE — PROGRESS NOTES
Telemetry summary:  Rhythm: SR, ST     HR Range: 80s to 100s      Measurements from strip printed at 21:45:46   MN: 0.14   QRS 0.08   QT 0.36     Ectopies: Rare PVC.

## 2020-03-06 NOTE — DIETARY
NUTRITION SERVICES: BMI - Pt with BMI >40 (=Body mass index is 58.69 kg/m².), morbid obesity. Weight loss counseling not appropriate in acute care setting. RECOMMEND - Referral to outpatient nutrition services for weight management after D/C.

## 2020-03-06 NOTE — ASSESSMENT & PLAN NOTE
Recurrent off OAC    Possible pulm infarct, no RV strain on CT  Elevated procalcitonin and leukocytosis are concerning, continue antibiotics  Had echo within last week, will not repeat  Heparin drip  Given weight, warfarin may be preferable over Xarelto

## 2020-03-06 NOTE — H&P
Hospital Medicine History & Physical Note    Date of Service  3/5/2020    Primary Care Physician  Pcp Pt States None    Consultants  none    Code Status  Full    Chief Complaint  SOB, CP    History of Presenting Illness  29 y.o. male with history of massive pulmonary embolus status post TIKOS/mechanical ventilation in August of last year who was on Xarelto for 3 months, it was discontinued in November who presented 3/5/2020 with 3-day history of worsening shortness of breath and pleuritic chest pain.  He has had cough productive of yellowish sputum but no hemoptysis.  He denies dizziness.  He denies palpitations.  He has had no fever or chills.  CT scan shows filling defects in the right lower lobe segmental coronary arteries consistent with pulmonary emboli there is consolidation in the right lung base and a small right pleural effusion.  There is no evidence of RV strain.    He has chronic venous stasis dermatitis and chronic lower extremity edema due to his severe obesity.  He states his legs are not more swollen than normal-last summer when he had his PE his lower extremity ultrasound was technically difficult below the knee and no obvious clots were seen.      Review of Systems  Review of Systems   Constitutional: Positive for malaise/fatigue. Negative for chills and fever.   HENT: Negative for sinus pain and sore throat.    Eyes: Negative for discharge and redness.   Respiratory: Positive for cough, sputum production and shortness of breath. Negative for hemoptysis.    Cardiovascular: Positive for chest pain and leg swelling (chronic only).   Gastrointestinal: Negative for abdominal pain, constipation, diarrhea, nausea and vomiting.   Genitourinary: Negative for dysuria.   Skin: Positive for rash. Negative for itching.        Chronic rash legs   Neurological: Negative for dizziness, focal weakness, weakness and headaches.   Psychiatric/Behavioral: Negative for depression and suicidal ideas.       Past Medical  History   has a past medical history of Chickenpox, Eczema, Influenza, and Tonsillitis.    Surgical History   has a past surgical history that includes dental extraction(s) (11/3/2017).     Family History  family history includes Respiratory Disease in his mother.     Social History   reports that he has never smoked. He has never used smokeless tobacco. He reports current alcohol use. He reports that he does not use drugs.    Allergies  Allergies   Allergen Reactions   • Pistachio    • [place] Anaphylaxis     pistachios       Medications  None       Physical Exam  Temp:  [37.1 °C (98.8 °F)] 37.1 °C (98.8 °F)  Pulse:  [82-98] 90  Resp:  [20-29] 20  BP: (110-139)/() 115/43  SpO2:  [91 %-96 %] 91 %    Physical Exam  Vitals signs and nursing note reviewed.   Constitutional:       General: He is not in acute distress.     Appearance: He is obese. He is not toxic-appearing or diaphoretic.      Comments: Mildly disheveled   HENT:      Head: Normocephalic and atraumatic.      Right Ear: External ear normal.      Left Ear: External ear normal.      Nose: Nose normal.      Mouth/Throat:      Mouth: Mucous membranes are moist.      Pharynx: Oropharynx is clear.   Eyes:      Extraocular Movements: Extraocular movements intact.      Conjunctiva/sclera: Conjunctivae normal.      Pupils: Pupils are equal, round, and reactive to light.   Cardiovascular:      Rate and Rhythm: Normal rate and regular rhythm.   Pulmonary:      Effort: Pulmonary effort is normal.      Comments: Poor excursion but breath sounds are clear  Abdominal:      General: There is distension (2/2 obese).   Musculoskeletal:      Right lower leg: Edema present.      Left lower leg: Edema present.      Comments: Stasis hyperemia and dermatitis B   Skin:     General: Skin is warm and dry.      Findings: Erythema and rash present.   Neurological:      General: No focal deficit present.      Mental Status: He is alert and oriented to person, place, and time.  Mental status is at baseline.   Psychiatric:         Mood and Affect: Mood normal.         Laboratory:  Recent Labs     03/05/20  1530   WBC 12.3*   RBC 6.74*   HEMOGLOBIN 13.8*   HEMATOCRIT 50.6   MCV 75.1*   MCH 20.5*   MCHC 27.3*   RDW 55.1*   PLATELETCT 196     Recent Labs     03/05/20  1530   SODIUM 137   POTASSIUM 4.7   CHLORIDE 100   CO2 26   GLUCOSE 107*   BUN 10   CREATININE 0.59   CALCIUM 9.0     Recent Labs     03/05/20  1530   ALTSGPT 8   ASTSGOT 23   ALKPHOSPHAT 65   TBILIRUBIN 1.6*   GLUCOSE 107*     Recent Labs     03/05/20  1530   INR 1.30*     No results for input(s): NTPROBNP in the last 72 hours.      Recent Labs     03/05/20  1530   TROPONINT 25*       Urinalysis:    No results found     Imaging:  CT-CTA CHEST PULMONARY ARTERY W/ RECONS   Final Result      1.  Filling defects within right lower lobe segmental coronary arteries consistent with pulmonary emboli. No evidence of right heart strain.      2.  Consolidation within the right lung base.      3.  Patchy bilateral groundglass opacities.      4.  Small right pleural effusion.      This was discussed with CONSTANZA LIZAMA at 7:48 PM on 3/5/2020.      DX-CHEST-PORTABLE (1 VIEW)   Final Result      Ill-defined right basilar opacity is worrisome for aspiration or pneumonia      Stable cardiac silhouette enlargement            Assessment/Plan:  I anticipate this patient will require at least two midnights for appropriate medical management, necessitating inpatient admission.    JONES (obstructive sleep apnea)- (present on admission)  Assessment & Plan  Has not yet had sleep study - so no CPAP, no O2    Morbid obesity (HCC)- (present on admission)  Assessment & Plan  BMI 58.69  Last A1c 6.1  CC diet  Bariatric bed    Multiple subsegmental pulmonary emboli without acute cor pulmonale  Assessment & Plan  Recurrent off OAC  Possible pulm infarct, no RV strain on CT  Had echo within last week  Heparin drip    Acute respiratory failure with hypoxemia  (HCC)  Assessment & Plan  Due to PE, Pulm infarct    Pulmonary hypertension (HCC)- (present on admission)  Assessment & Plan  RVSP 67        VTE prophylaxis: Heparin drip

## 2020-03-06 NOTE — CARE PLAN
Problem: Communication  Goal: The ability to communicate needs accurately and effectively will improve  Outcome: PROGRESSING AS EXPECTED  Note: Pt will feel comfortable communicating questions and concerns with treatment team. Will continue to foster this relationship.        Problem: Safety  Goal: Will remain free from injury  Outcome: PROGRESSING AS EXPECTED  Note: Discussed fall risk related to heparin gtt.      Problem: Venous Thromboembolism (VTW)/Deep Vein Thrombosis (DVT) Prevention:  Goal: Patient will participate in Venous Thrombosis (VTE)/Deep Vein Thrombosis (DVT)Prevention Measures  Outcome: PROGRESSING AS EXPECTED  Note: Heparin gtt

## 2020-03-06 NOTE — PROGRESS NOTES
2105 Pt on the unit. Verified heparin gtt with ER, RN.    2236 Assessment and admit profile complete. Discussed POC and answered questions. Liliam, roommate verbalized concerns regarding breathing while sleeping. Mentioned that he has tried to get sleep studies in the past but struggled to make an appointment. Encouraged follow up.     0029 Report given to JOANNA Mcdaniel, care released.

## 2020-03-06 NOTE — CARE PLAN
Problem: Safety  Goal: Will remain free from injury  Outcome: PROGRESSING AS EXPECTED   Patient calling before getting out of bed or out of chair,  Personal belongs are within reach. Tread socks on, bed is locked and low. Rounding on patient hourly   Problem: Venous Thromboembolism (VTW)/Deep Vein Thrombosis (DVT) Prevention:  Goal: Patient will participate in Venous Thrombosis (VTE)/Deep Vein Thrombosis (DVT)Prevention Measures  Outcome: PROGRESSING AS EXPECTED   Encouraged patient to sit in chair for meals or at edge of bed and to ambulate several times during day, will continue to give medications for VTE

## 2020-03-06 NOTE — PROGRESS NOTES
2 RN Skin Check    2 RN skin check complete.   Devices in place: Nasal Cannula.  Skin assessed under devices: yes.  Confirmed pressure ulcers found on: none.  New potential pressure ulcers noted on none. Wound consult placed N/A.  The following interventions in place interdry.    Pt has varicose veins on his feet bilaterally. Chronic BLE swelling is present. The skin near his groin is red with yeast present. Perineal care completed, interdry in place. Education given.

## 2020-03-06 NOTE — PROGRESS NOTES
Hospital Medicine Daily Progress Note    Date of Service  3/6/2020    Chief Complaint  29 y.o. male admitted 3/5/2020 with shortness of breath    Hospital Course    29-year-old male with morbid obesity, likely untreated JONES presenting with shortness of breath.  CTA demonstrates recurrent pulmonary embolus.  Had history of late 2019 and completed 3 months of Xarelto.      Interval Problem Update  Seen and examined with nurse at bedside  Morbidly obese male, sleepy  No respiratory distress  Lung sounds dim due to body habitus  Leukocytosis elevated procalcitonin, continue ceftriaxone    In consideration of recurrent PE, may require lifelong anticoagulation, given weight may need warfarin rather than NOAC    Consultants/Specialty  None    Code Status  Full    Disposition  Inpatient, anticipate home on discharge    Review of Systems  Review of Systems   Constitutional: Negative for chills and fever.   Eyes: Negative for blurred vision and double vision.   Respiratory: Positive for shortness of breath. Negative for cough.    Cardiovascular: Negative for chest pain and palpitations.   Gastrointestinal: Negative for abdominal pain, blood in stool, constipation, diarrhea, heartburn, melena, nausea and vomiting.   Musculoskeletal: Positive for myalgias.   Skin: Negative for itching and rash.   Neurological: Negative for tingling, tremors and sensory change.   All other systems reviewed and are negative.       Physical Exam  Temp:  [36.4 °C (97.6 °F)-37.3 °C (99.1 °F)] 36.4 °C (97.6 °F)  Pulse:  [] 80  Resp:  [18-29] 20  BP: (110-141)/() 133/71  SpO2:  [91 %-97 %] 97 %    Physical Exam  Vitals signs and nursing note reviewed.   Constitutional:       General: He is not in acute distress.     Appearance: He is obese. He is ill-appearing. He is not diaphoretic.      Comments: Body mass index is 58.69 kg/m².   HENT:      Head: Normocephalic and atraumatic.      Nose: No congestion or rhinorrhea.      Mouth/Throat:       Mouth: Mucous membranes are moist.      Pharynx: Oropharynx is clear. No oropharyngeal exudate.   Eyes:      General: No scleral icterus.        Right eye: No discharge.         Left eye: No discharge.      Extraocular Movements: Extraocular movements intact.      Conjunctiva/sclera: Conjunctivae normal.   Neck:      Musculoskeletal: Normal range of motion. No neck rigidity.   Cardiovascular:      Rate and Rhythm: Normal rate.      Pulses: Normal pulses.   Pulmonary:      Effort: Pulmonary effort is normal.      Breath sounds: No stridor. No wheezing or rhonchi.      Comments: Dim breath sounds  Abdominal:      General: Abdomen is flat. There is no distension.      Palpations: Abdomen is soft.      Tenderness: There is no abdominal tenderness. There is no guarding.   Musculoskeletal: Normal range of motion.         General: No swelling or tenderness.   Skin:     General: Skin is warm and dry.      Coloration: Skin is not jaundiced.      Findings: Erythema present.      Comments: Brawny induration lower extremities   Neurological:      General: No focal deficit present.      Mental Status: He is alert and oriented to person, place, and time. Mental status is at baseline.      Cranial Nerves: No cranial nerve deficit.   Psychiatric:         Mood and Affect: Mood normal.         Thought Content: Thought content normal.         Judgment: Judgment normal.         Fluids    Intake/Output Summary (Last 24 hours) at 3/6/2020 1300  Last data filed at 3/6/2020 0900  Gross per 24 hour   Intake 300 ml   Output --   Net 300 ml       Laboratory  Recent Labs     03/05/20  1530 03/06/20  0305   WBC 12.3* 15.3*   RBC 6.74* 7.37*   HEMOGLOBIN 13.8* 15.0   HEMATOCRIT 50.6 56.7*   MCV 75.1* 76.9*   MCH 20.5* 20.4*   MCHC 27.3* 26.5*   RDW 55.1* 57.8*   PLATELETCT 196 255     Recent Labs     03/05/20  1530 03/06/20  0305   SODIUM 137 138   POTASSIUM 4.7 5.1   CHLORIDE 100 98   CO2 26 27   GLUCOSE 107* 188*   BUN 10 13   CREATININE 0.59  0.71   CALCIUM 9.0 9.6     Recent Labs     03/05/20  1530 03/06/20  0305 03/06/20  1102   APTT  --  27.5 38.8*   INR 1.30*  --   --                Imaging  CT-CTA CHEST PULMONARY ARTERY W/ RECONS   Final Result      1.  Filling defects within right lower lobe segmental coronary arteries consistent with pulmonary emboli. No evidence of right heart strain.      2.  Consolidation within the right lung base.      3.  Patchy bilateral groundglass opacities.      4.  Small right pleural effusion.      This was discussed with CONSTANZA LIZAMA at 7:48 PM on 3/5/2020.      DX-CHEST-PORTABLE (1 VIEW)   Final Result      Ill-defined right basilar opacity is worrisome for aspiration or pneumonia      Stable cardiac silhouette enlargement           Assessment/Plan  * Multiple subsegmental pulmonary emboli without acute cor pulmonale  Assessment & Plan  Recurrent off OAC    Possible pulm infarct, no RV strain on CT  Elevated procalcitonin and leukocytosis are concerning, continue antibiotics  Had echo within last week, will not repeat  Heparin drip  Given weight, warfarin may be preferable over Xarelto      JONES (obstructive sleep apnea)- (present on admission)  Assessment & Plan  Has not yet had sleep study - so no CPAP, no O2    Polycythemia- (present on admission)  Assessment & Plan  Due to chronic hypoxia    Morbid obesity (HCC)- (present on admission)  Assessment & Plan  BMI 58.69  Last A1c 6.1  CC diet  Bariatric bed    Acute respiratory failure with hypoxemia (HCC)  Assessment & Plan  Due to PE, Pulm infarct?  Continue antibiotics  Wean oxygen as able  Repeat a.m. CBC    Pulmonary hypertension (HCC)- (present on admission)  Assessment & Plan  RVSP 67       VTE prophylaxis: Heparin

## 2020-03-07 VITALS
WEIGHT: 315 LBS | DIASTOLIC BLOOD PRESSURE: 71 MMHG | RESPIRATION RATE: 18 BRPM | HEART RATE: 80 BPM | SYSTOLIC BLOOD PRESSURE: 115 MMHG | OXYGEN SATURATION: 95 % | HEIGHT: 68 IN | BODY MASS INDEX: 47.74 KG/M2 | TEMPERATURE: 98.1 F

## 2020-03-07 LAB
APTT PPP: 49.2 SEC (ref 24.7–36)
APTT PPP: 61.4 SEC (ref 24.7–36)
APTT PPP: 64.9 SEC (ref 24.7–36)
ERYTHROCYTE [DISTWIDTH] IN BLOOD BY AUTOMATED COUNT: 58.4 FL (ref 35.9–50)
HCT VFR BLD AUTO: 51.6 % (ref 42–52)
HGB BLD-MCNC: 13.6 G/DL (ref 14–18)
INR PPP: 1.12 (ref 0.87–1.13)
MCH RBC QN AUTO: 20.6 PG (ref 27–33)
MCHC RBC AUTO-ENTMCNC: 26.4 G/DL (ref 33.7–35.3)
MCV RBC AUTO: 78.2 FL (ref 81.4–97.8)
PLATELET # BLD AUTO: 215 K/UL (ref 164–446)
PMV BLD AUTO: 10.1 FL (ref 9–12.9)
PROTHROMBIN TIME: 14.6 SEC (ref 12–14.6)
RBC # BLD AUTO: 6.6 M/UL (ref 4.7–6.1)
WBC # BLD AUTO: 13 K/UL (ref 4.8–10.8)

## 2020-03-07 PROCEDURE — A9270 NON-COVERED ITEM OR SERVICE: HCPCS | Performed by: HOSPITALIST

## 2020-03-07 PROCEDURE — 700102 HCHG RX REV CODE 250 W/ 637 OVERRIDE(OP): Performed by: HOSPITALIST

## 2020-03-07 PROCEDURE — 85610 PROTHROMBIN TIME: CPT

## 2020-03-07 PROCEDURE — 99239 HOSP IP/OBS DSCHRG MGMT >30: CPT | Performed by: HOSPITALIST

## 2020-03-07 PROCEDURE — 85027 COMPLETE CBC AUTOMATED: CPT

## 2020-03-07 PROCEDURE — 700111 HCHG RX REV CODE 636 W/ 250 OVERRIDE (IP): Performed by: INTERNAL MEDICINE

## 2020-03-07 PROCEDURE — 85730 THROMBOPLASTIN TIME PARTIAL: CPT | Mod: 91

## 2020-03-07 PROCEDURE — 700111 HCHG RX REV CODE 636 W/ 250 OVERRIDE (IP): Performed by: EMERGENCY MEDICINE

## 2020-03-07 PROCEDURE — 700111 HCHG RX REV CODE 636 W/ 250 OVERRIDE (IP): Performed by: HOSPITALIST

## 2020-03-07 PROCEDURE — 700105 HCHG RX REV CODE 258: Performed by: INTERNAL MEDICINE

## 2020-03-07 RX ORDER — WARFARIN SODIUM 5 MG/1
10 TABLET ORAL ONCE
Status: COMPLETED | OUTPATIENT
Start: 2020-03-07 | End: 2020-03-07

## 2020-03-07 RX ORDER — WARFARIN SODIUM 7.5 MG/1
7.5 TABLET ORAL EVERY EVENING
Qty: 30 TAB | Refills: 0 | Status: SHIPPED | OUTPATIENT
Start: 2020-03-08 | End: 2020-03-16 | Stop reason: SDUPTHER

## 2020-03-07 RX ORDER — AMOXICILLIN AND CLAVULANATE POTASSIUM 875; 125 MG/1; MG/1
1 TABLET, FILM COATED ORAL EVERY 12 HOURS
Qty: 10 TAB | Refills: 0 | Status: SHIPPED | OUTPATIENT
Start: 2020-03-07 | End: 2020-03-12

## 2020-03-07 RX ORDER — WARFARIN SODIUM 7.5 MG/1
7.5 TABLET ORAL
Status: DISCONTINUED | OUTPATIENT
Start: 2020-03-07 | End: 2020-03-07

## 2020-03-07 RX ORDER — AMOXICILLIN AND CLAVULANATE POTASSIUM 875; 125 MG/1; MG/1
1 TABLET, FILM COATED ORAL EVERY 12 HOURS
Status: DISCONTINUED | OUTPATIENT
Start: 2020-03-07 | End: 2020-03-07 | Stop reason: HOSPADM

## 2020-03-07 RX ORDER — AMOXICILLIN AND CLAVULANATE POTASSIUM 875; 125 MG/1; MG/1
1 TABLET, FILM COATED ORAL EVERY 12 HOURS
Qty: 10 TAB | Refills: 0 | Status: SHIPPED
Start: 2020-03-07 | End: 2020-03-07

## 2020-03-07 RX ORDER — WARFARIN SODIUM 7.5 MG/1
7.5 TABLET ORAL EVERY EVENING
Qty: 30 TAB | Refills: 0 | Status: SHIPPED
Start: 2020-03-08 | End: 2020-03-07

## 2020-03-07 RX ADMIN — AMOXICILLIN AND CLAVULANATE POTASSIUM 1 TABLET: 875; 125 TABLET, FILM COATED ORAL at 18:09

## 2020-03-07 RX ADMIN — WARFARIN SODIUM 10 MG: 5 TABLET ORAL at 14:59

## 2020-03-07 RX ADMIN — ENOXAPARIN SODIUM 100 MG: 100 INJECTION SUBCUTANEOUS at 18:09

## 2020-03-07 RX ADMIN — HEPARIN SODIUM 2900 UNITS/HR: 5000 INJECTION, SOLUTION INTRAVENOUS at 05:14

## 2020-03-07 RX ADMIN — HEPARIN SODIUM 2700 UNITS/HR: 5000 INJECTION, SOLUTION INTRAVENOUS at 14:59

## 2020-03-07 RX ADMIN — ENOXAPARIN SODIUM 80 MG: 100 INJECTION SUBCUTANEOUS at 18:07

## 2020-03-07 RX ADMIN — CEFTRIAXONE SODIUM 2 G: 2 INJECTION, POWDER, FOR SOLUTION INTRAMUSCULAR; INTRAVENOUS at 05:06

## 2020-03-07 ASSESSMENT — FIBROSIS 4 INDEX: FIB4 SCORE: 0.68

## 2020-03-07 NOTE — CARE PLAN
Problem: Safety  Goal: Will remain free from falls  Outcome: PROGRESSING AS EXPECTED  Note: Bed locked and in lowest position. Call bell within reach. Patient calls appropriately. Room properly lit while ambulating.     Problem: Pain Management  Goal: Pain level will decrease to patient's comfort goal  Outcome: PROGRESSING AS EXPECTED  Flowsheets  Taken 3/6/2020 2227  Comfort Goal:   0   Comfort at Rest   Comfort with Movement  Taken 3/6/2020 2040  Pain Rating Scale (NPRS): 0  Note: Patient has no complaints of pain.

## 2020-03-07 NOTE — PROGRESS NOTES
Inpatient Anticoagulation Service Note    Date: 3/7/2020    Reason for Anticoagulation: New Pulmonary Embolism   Target INR: 2.0 to 3.0    Hemoglobin Value: 15  Hematocrit Value: (!) 56.7  Lab Platelet Value: 255    INR from last 7 days     Date/Time INR Value    03/05/20 1530  (!) 1.3        Dose from last 7 days     Date/Time Dose (mg)    03/07/20 0848  10        Significant Interactions: Antibiotics  Bridge Therapy: Yes, Weight Based Heparin Infusion  Date of Last VTE Event: 03/05/20  Bridge Therapy Start Date: 03/07/20  Days of Overlap Therapy: 0   INR Value Greater than 2 Prior to Discontinuation of Parenteral Anticoagulation: Yes        Plan:  Warfarin 10 mg PO tonight for INR 1.3 as d/w Dr. Bae.  Plan to discharge on Lovenox 180 mg SQ Q12H and warfarin 7.5 mg daily.  Education Material Provided?: No  Pharmacist suggested discharge dosing: to be determined, 5-10 mg daily     Maddy Alston, AdyD

## 2020-03-07 NOTE — PROGRESS NOTES
Telemetry Shift Summary    Rhythm:  SR  HR Range: 79-93  Ectopy: r PVC  Measurements: .18/.08/.38          Normal Values  Rhythm SR  HR Range   Measurements 0.12 / 0.20 / 0.06-0.10 / 0.30-0.52

## 2020-03-07 NOTE — PROGRESS NOTES
Received report from JOANNA Valles. Patient sitting at edge of bed playing video game. POC discussed. Heparin rate verified (see MAR). Patient has no complaints of pain and denies any further needs at this time. Safety precautions in place. Call bell within reach. Will continue to monitor.

## 2020-03-07 NOTE — PROGRESS NOTES
Patient rebolused per protocol and rate changed (see MAR). Patient assisted to bathroom and back to bed. Steady on feet no assistance needed. Patient denies any further needs at this time. Safety precautions in place. Call bell within reach. Will continue to monitor.

## 2020-03-07 NOTE — CARE PLAN
Problem: Knowledge Deficit  Goal: Knowledge of disease process/condition, treatment plan, diagnostic tests, and medications will improve  Outcome: PROGRESSING AS EXPECTED   Discussed with patient labs and medications and therapeutic range of INR. Patient verbalized understanding, will continue to give heparin as ordered pre protocol   Problem: Respiratory:  Goal: Respiratory status will improve  Outcome: PROGRESSING AS EXPECTED   Patient still on 3-4 L Oxygen with face mask, explained that O2 will have to be weaned before going home.

## 2020-03-07 NOTE — PROGRESS NOTES
Received report from Malathi,  Patient is sleeping resting comfortably in bed, Call light within reach and safety precautions in place.         09:30 Assess patient, plan of care discussed with patient, declines any needs at this time and denies pain.     10:20 received INR of 1.12, pre protocol changed drip rate and ordered APTT for 18:20

## 2020-03-07 NOTE — PROGRESS NOTES
Telemetry Strip     Strip printed: 1843  Measurements from am strip were as follows:  Rhythm: SR  HR: 84  Measurements: 0.16/0.08/0.38        Telemetry Shift Summary:    Rhythm: SR   HR: 77-96  Ectopy: Rare PAC        Normal Values  Rhythm SR  HR Range    Measurements 0.12-0.20 / 0.06-0.10  / 0.30-0.52

## 2020-03-08 NOTE — DISCHARGE SUMMARY
Discharge Summary    CHIEF COMPLAINT ON ADMISSION  Chief Complaint   Patient presents with   • Shortness of Breath     Reason for Admission  New PE    Admission Date  3/5/2020    CODE STATUS  Full    HPI & HOSPITAL COURSE  29-year-old male with morbid obesity, likely untreated JONES presenting with shortness of breath.  CTA demonstrates recurrent pulmonary embolus.  Had history of late 2019 and completed 3 months of Xarelto.  Given patient's morbid obesity, sedentary lifestyle is possible recurrent PE was provoked -however given quick recurrence need to consider hypercoagulable state.  Thrombophilia lab panel drawn and pending at this time.    Also given weight of 36 pounds, concerned that Xarelto and Eliquis would not be therapeutic.  Patient started on Lovenox injections 180 mg twice daily (near max) and warfarin here.  Patient is to continue Lovenox injections next 7 days and follow-up with INR clinic on Monday.    CT also demonstrated possible pneumonia although patient without cough fever or leukocytosis.  Antibiotics were given on discharge.    He is in desperate need of outpatient sleep study, patient is working on this with his insurance.    Therefore, he is discharged in fair and stable condition to home with close outpatient follow-up.    The patient met 2-midnight criteria for an inpatient stay at the time of discharge.    Discharge Date  3/7/2020    FOLLOW UP ITEMS POST DISCHARGE  Continue LMWH + warfarin f/u INR clinic    DISCHARGE DIAGNOSES  Principal Problem:    Multiple subsegmental pulmonary emboli without acute cor pulmonale POA: Unknown  Active Problems:    Morbid obesity (HCC) (Chronic) POA: Yes    Polycythemia POA: Yes    JONES (obstructive sleep apnea) (Chronic) POA: Yes    Pulmonary hypertension (HCC) (Chronic) POA: Yes    Acute respiratory failure with hypoxemia (HCC) POA: Unknown  Resolved Problems:    * No resolved hospital problems. *      FOLLOW UP  No future appointments.  [facility]      [phone]    Pcp Pt States None            MEDICATIONS ON DISCHARGE     Medication List      START taking these medications      Instructions   amoxicillin-clavulanate 875-125 MG Tabs  Commonly known as:  AUGMENTIN   Take 1 Tab by mouth every 12 hours for 5 days.  Dose:  1 Tab     * enoxaparin 80 MG/0.8ML Soln inj  Commonly known as:  LOVENOX   Doctor's comments:  Total 180mg BID  Inject 80 mg as instructed every 12 hours for 7 days.  Dose:  80 mg     * enoxaparin 100 MG/ML Soln inj  Commonly known as:  LOVENOX   Doctor's comments:  Total 180mg BID  Inject 100 mg as instructed every 12 hours for 7 days.  Dose:  100 mg     warfarin 7.5 MG Tabs  Commonly known as:  COUMADIN   Take 1 Tab by mouth every evening for 30 days.  Dose:  7.5 mg         * This list has 2 medication(s) that are the same as other medications prescribed for you. Read the directions carefully, and ask your doctor or other care provider to review them with you.            CONTINUE taking these medications      Instructions   ALBUTEROL INH   Inhale  by mouth. Dose unknown            Allergies  Allergies   Allergen Reactions   • Pistachio    • Tree Nuts Food Allergy    • [place] Anaphylaxis     pistachios       DIET  No orders of the defined types were placed in this encounter.      ACTIVITY  As tolerated.  Weight bearing as tolerated    CONSULTATIONS  None    PROCEDURES  None    LABORATORY  Lab Results   Component Value Date    SODIUM 138 03/06/2020    POTASSIUM 5.1 03/06/2020    CHLORIDE 98 03/06/2020    CO2 27 03/06/2020    GLUCOSE 188 (H) 03/06/2020    BUN 13 03/06/2020    CREATININE 0.71 03/06/2020        Lab Results   Component Value Date    WBC 13.0 (H) 03/07/2020    HEMOGLOBIN 13.6 (L) 03/07/2020    HEMATOCRIT 51.6 03/07/2020    PLATELETCT 215 03/07/2020        Total time of the discharge process exceeds 33 minutes.

## 2020-03-08 NOTE — PROGRESS NOTES
Telemetry Shift Summary    Rhythm:  SR ST  HR Range:   Ectopy: r PAC, r PVC  Measurements: .16/.10/.32          Normal Values  Rhythm SR  HR Range   Measurements 0.12 / 0.20 / 0.06-0.10 / 0.30-0.52

## 2020-03-08 NOTE — PROGRESS NOTES
Discharged order written, IV removed, tele removed and returned to the monitor tech. Patient discharged to home with friend. AVS printed and revieved copied signed and placed on chart. Discharged instructions provided to patient. Patient verbalizes understanding. Patent states all questions have been answered. Patient states that all personal belongings are in possesion. [facility]  Patient off unit by self.

## 2020-03-08 NOTE — DISCHARGE INSTRUCTIONS
Discharge Instructions    Discharged to home by car with relative. Discharged via walking, hospital escort: Refused.  Special equipment needed: Not Applicable    Be sure to schedule a follow-up appointment with your primary care doctor or any specialists as instructed.     Discharge Plan:   Influenza Vaccine Indication: Patient Refuses    I understand that a diet low in cholesterol, fat, and sodium is recommended for good health. Unless I have been given specific instructions below for another diet, I accept this instruction as my diet prescription.   Other diet: reg    Special Instructions: None    · Is patient discharged on Warfarin / Coumadin?   Yes    You are receiving the drug warfarin. Please understand the importance of monitoring warfarin with scheduled PT/INR blood draws.  Follow-up with the [facility] in one week for INR lab..    IMPORTANT: HOW TO USE THIS INFORMATION:  This is a summary and does NOT have all possible information about this product. This information does not assure that this product is safe, effective, or appropriate for you. This information is not individual medical advice and does not substitute for the advice of your health care professional. Always ask your health care professional for complete information about this product and your specific health needs.      WARFARIN - ORAL (WARF-uh-rin)      COMMON BRAND NAME(S): Coumadin      WARNING:  Warfarin can cause very serious (possibly fatal) bleeding. This is more likely to occur when you first start taking this medication or if you take too much warfarin. To decrease your risk for bleeding, your doctor or other health care provider will monitor you closely and check your lab results (INR test) to make sure you are not taking too much warfarin. Keep all medical and laboratory appointments. Tell your doctor right away if you notice any signs of serious bleeding. See also Side Effects section.      USES:  This medication is used to  "treat blood clots (such as in deep vein thrombosis-DVT or pulmonary embolus-PE) and/or to prevent new clots from forming in your body. Preventing harmful blood clots helps to reduce the risk of a stroke or heart attack. Conditions that increase your risk of developing blood clots include a certain type of irregular heart rhythm (atrial fibrillation), heart valve replacement, recent heart attack, and certain surgeries (such as hip/knee replacement). Warfarin is commonly called a \"blood thinner,\" but the more correct term is \"anticoagulant.\" It helps to keep blood flowing smoothly in your body by decreasing the amount of certain substances (clotting proteins) in your blood.      HOW TO USE:  Read the Medication Guide provided by your pharmacist before you start taking warfarin and each time you get a refill. If you have any questions, ask your doctor or pharmacist. Take this medication by mouth with or without food as directed by your doctor or other health care professional, usually once a day. It is very important to take it exactly as directed. Do not increase the dose, take it more frequently, or stop using it unless directed by your doctor. Dosage is based on your medical condition, laboratory tests (such as INR), and response to treatment. Your doctor or other health care provider will monitor you closely while you are taking this medication to determine the right dose for you. Use this medication regularly to get the most benefit from it. To help you remember, take it at the same time each day. It is important to eat a balanced, consistent diet while taking warfarin. Some foods can affect how warfarin works in your body and may affect your treatment and dose. Avoid sudden large increases or decreases in your intake of foods high in vitamin K (such as broccoli, cauliflower, cabbage, brussels sprouts, kale, spinach, and other green leafy vegetables, liver, green tea, certain vitamin supplements). If you are " trying to lose weight, check with your doctor before you try to go on a diet. Cranberry products may also affect how your warfarin works. Limit the amount of cranberry juice (16 ounces/480 milliliters a day) or other cranberry products you may drink or eat.      SIDE EFFECTS:  Nausea, loss of appetite, or stomach/abdominal pain may occur. If any of these effects persist or worsen, tell your doctor or pharmacist promptly. Remember that your doctor has prescribed this medication because he or she has judged that the benefit to you is greater than the risk of side effects. Many people using this medication do not have serious side effects. This medication can cause serious bleeding if it affects your blood clotting proteins too much (shown by unusually high INR lab results). Even if your doctor stops your medication, this risk of bleeding can continue for up to a week. Tell your doctor right away if you have any signs of serious bleeding, including: unusual pain/swelling/discomfort, unusual/easy bruising, prolonged bleeding from cuts or gums, persistent/frequent nosebleeds, unusually heavy/prolonged menstrual flow, pink/dark urine, coughing up blood, vomit that is bloody or looks like coffee grounds, severe headache, dizziness/fainting, unusual or persistent tiredness/weakness, bloody/black/tarry stools, chest pain, shortness of breath, difficulty swallowing. Tell your doctor right away if any of these unlikely but serious side effects occur: persistent nausea/vomiting, severe stomach/abdominal pain, yellowing eyes/skin. This drug rarely has caused very serious (possibly fatal) problems if its effects lead to small blood clots (usually at the beginning of treatment). This can lead to severe skin/tissue damage that may require surgery or amputation if left untreated. Patients with certain blood conditions (protein C or S deficiency) may be at greater risk. Get medical help right away if any of these rare but serious  side effects occur: painful/red/purplish patches on the skin (such as on the toe, breast, abdomen), change in the amount of urine, vision changes, confusion, slurred speech, weakness on one side of the body. A very serious allergic reaction to this drug is rare. However, get medical help right away if you notice any symptoms of a serious allergic reaction, including: rash, itching/swelling (especially of the face/tongue/throat), severe dizziness, trouble breathing. This is not a complete list of possible side effects. If you notice other effects not listed above, contact your doctor or pharmacist. In the [place] - Call your doctor for medical advice about side effects. You may report side effects to FDA at 2-236-TFO-1367. In [place] - Call your doctor for medical advice about side effects. You may report side effects to [facility] [place] at 1-771.377.1584.      PRECAUTIONS:  Before taking warfarin, tell your doctor or pharmacist if you are allergic to it; or if you have any other allergies. This product may contain inactive ingredients, which can cause allergic reactions or other problems. Talk to your pharmacist for more details. Before using this medication, tell your doctor or pharmacist your medical history, especially of: blood disorders (such as anemia, hemophilia), bleeding problems (such as bleeding of the stomach/intestines, bleeding in the brain), blood vessel disorders (such as aneurysms), recent major injury/surgery, liver disease, alcohol use, mental/mood disorders (including memory problems), frequent falls/injuries. It is important that all your doctors and dentists know that you take warfarin. Before having surgery or any medical/dental procedures, tell your doctor or dentist that you are taking this medication and about all the products you use (including prescription drugs, nonprescription drugs, and herbal products). Avoid getting injections into the muscles. If you must have an injection into a muscle (for  example, a flu shot), it should be given in the arm. This way, it will be easier to check for bleeding and/or apply pressure bandages. This medication may cause stomach bleeding. Daily use of alcohol while using this medicine will increase your risk for stomach bleeding and may also affect how this medication works. Limit or avoid alcoholic beverages. If you have not been eating well, if you have an illness or infection that causes fever, vomiting, or diarrhea for more than 2 days, or if you start using any antibiotic medications, contact your doctor or pharmacist immediately because these conditions can affect how warfarin works. This medication can cause heavy bleeding. To lower the chance of getting cut, bruised, or injured, use great caution with sharp objects like safety razors and nail cutters. Use an electric razor when shaving and a soft toothbrush when brushing your teeth. Avoid activities such as contact sports. If you fall or injure yourself, especially if you hit your head, call your doctor immediately. Your doctor may need to check you. The Food & Drug Administration has stated that generic warfarin products are interchangeable. However, consult your doctor or pharmacist before switching warfarin products. Be careful not to take more than one medication that contains warfarin unless specifically directed by the doctor or health care provider who is monitoring your warfarin treatment. Older adults may be at greater risk for bleeding while using this drug. This medication is not recommended for use during pregnancy because of serious (possibly fatal) harm to an unborn baby. Discuss the use of reliable forms of birth control with your doctor. If you become pregnant or think you may be pregnant, tell your doctor immediately. If you are planning pregnancy, discuss a plan for managing your condition with your doctor before you become pregnant. Your doctor may switch the type of medication you use during  "pregnancy. Very small amounts of this medication may pass into breast milk but is unlikely to harm a nursing infant. Consult your doctor before breast-feeding.      DRUG INTERACTIONS:  Drug interactions may change how your medications work or increase your risk for serious side effects. This document does not contain all possible drug interactions. Keep a list of all the products you use (including prescription/nonprescription drugs and herbal products) and share it with your doctor and pharmacist. Do not start, stop, or change the dosage of any medicines without your doctor's approval. Warfarin interacts with many prescription, nonprescription, vitamin, and herbal products. This includes medications that are applied to the skin or inside the vagina or rectum. The interactions with warfarin usually result in an increase or decrease in the \"blood-thinning\" (anticoagulant) effect. Your doctor or other health care professional should closely monitor you to prevent serious bleeding or clotting problems. While taking warfarin, it is very important to tell your doctor or pharmacist of any changes in medications, vitamins, or herbal products that you are taking. Some products that may interact with this drug include: capecitabine, imatinib, mifepristone. Aspirin, aspirin-like drugs (salicylates), and nonsteroidal anti-inflammatory drugs (NSAIDs such as ibuprofen, naproxen, celecoxib) may have effects similar to warfarin. These drugs may increase the risk of bleeding problems if taken during treatment with warfarin. Carefully check all prescription/nonprescription product labels (including drugs applied to the skin such as pain-relieving creams) since the products may contain NSAIDs or salicylates. Talk to your doctor about using a different medication (such as acetaminophen) to treat pain/fever. Low-dose aspirin and related drugs (such as clopidogrel, ticlopidine) should be continued if prescribed by your doctor for " specific medical reasons such as heart attack or stroke prevention. Consult your doctor or pharmacist for more details. Many herbal products interact with warfarin. Tell your doctor before taking any herbal products, especially bromelains, coenzyme Q10, cranberry, danshen, dong quai, fenugreek, garlic, ginkgo biloba, ginseng, and Maximus's wort, among others. This medication may interfere with a certain laboratory test to measure theophylline levels, possibly causing false test results. Make sure laboratory personnel and all your doctors know you use this drug.      OVERDOSE:  If overdose is suspected, contact a poison control center or emergency room immediately. [place] residents can call the [place] National Poison Hotline at 1-474.957.8290. [place] residents can call a provincial poison control center. Symptoms of overdose may include: bloody/black/tarry stools, pink/dark urine, unusual/prolonged bleeding.      NOTES:  Do not share this medication with others. Laboratory and/or medical tests (such as INR, complete blood count) must be performed periodically to monitor your progress or check for side effects. Consult your doctor for more details.      MISSED DOSE:  For the best possible benefit, do not miss any doses. If you do miss a dose and remember on the same day, take it as soon as you remember. If you remember on the next day, skip the missed dose and resume your usual dosing schedule. Do not double the dose to catch up because this could increase your risk for bleeding. Keep a record of missed doses to give to your doctor or pharmacist. Contact your doctor or pharmacist if you miss 2 or more doses in a row.      STORAGE:  Store at room temperature away from light and moisture. Do not store in the bathroom. Keep all medications away from children and pets. Do not flush medications down the toilet or pour them into a drain unless instructed to do so. Properly discard this product when it is  or no longer  needed. Consult your pharmacist or local waste disposal company for more details about how to safely discard your product.      MEDICAL ALERT:  Your condition and medication can cause complications in a medical emergency. For information about enrolling in MedicAlert, call 1-572.862.7008 ([place]) or 1-653.365.6499 ([place]).      Information last revised October 2010 Copyright(c) 2010 [employer]             Depression / Suicide Risk    As you are discharged from this [facility] facility, it is important to learn how to keep safe from harming yourself.    Recognize the warning signs:  · Abrupt changes in personality, positive or negative- including increase in energy   · Giving away possessions  · Change in eating patterns- significant weight changes-  positive or negative  · Change in sleeping patterns- unable to sleep or sleeping all the time   · Unwillingness or inability to communicate  · Depression  · Unusual sadness, discouragement and loneliness  · Talk of wanting to die  · Neglect of personal appearance   · Rebelliousness- reckless behavior  · Withdrawal from people/activities they love  · Confusion- inability to concentrate     If you or a loved one observes any of these behaviors or has concerns about self-harm, here's what you can do:  · Talk about it- your feelings and reasons for harming yourself  · Remove any means that you might use to hurt yourself (examples: pills, rope, extension cords, firearm)  · Get professional help from the community (Mental Health, Substance Abuse, psychological counseling)  · Do not be alone:Call your Safe Contact- someone whom you trust who will be there for you.  · Call your local CRISIS HOTLINE 277-9345 or 456-210-0826  · Call your local Children's Mobile Crisis Response Team [place] (247) 752-8060 or www.[employer]  · Call the toll free National Suicide Prevention Hotlines   · National Suicide Prevention Lifeline 254-167-VSPL (9443)  · National Hope  Line Network 800-SUICIDE (161-3467)            Pulmonary Embolism  A pulmonary embolism (PE) is a sudden blockage or decrease of blood flow in one lung or both lungs. Most blockages come from a blood clot that travels from the legs or the pelvis to the lungs. PE is a dangerous and potentially life-threatening condition if it is not treated right away.  What are the causes?  A pulmonary embolism occurs most commonly when a blood clot travels from one of your veins to your lungs. Rarely, PE is caused by air, fat, amniotic fluid, or part of a tumor traveling through your veins to your lungs.  What increases the risk?  A PE is more likely to develop in:  People who smoke.  People who are older, especially over 60 years of age.  People who are overweight (obese).  People who sit or lie still for a long time, such as during long-distance travel (over 4 hours), bed rest, hospitalization, or during recovery from certain medical conditions like a stroke.  People who do not engage in much physical activity (sedentary lifestyle).  People who have chronic breathing disorders.  People who have a personal or family history of blood clots or blood clotting disease.  People who have peripheral vascular disease (PVD), diabetes, or some types of cancer.  People who have heart disease, especially if the person had a recent heart attack or has congestive heart failure.  People who have neurological diseases that affect the legs (leg paresis).  People who have had a traumatic injury, such as breaking a hip or leg.  People who have recently had major or lengthy surgery, especially on the hip, knee, or abdomen.  People who have had a central line placed inside a large vein.  People who take medicines that contain the hormone estrogen. These include birth control pills and hormone replacement therapy.  Pregnancy or during childbirth or the postpartum period.  What are the signs or symptoms?  The symptoms of a PE usually start suddenly and  include:  Shortness of breath while active or at rest.  Coughing or coughing up blood or blood-tinged mucus.  Chest pain that is often worse with deep breaths.  Rapid or irregular heartbeat.  Feeling light-headed or dizzy.  Fainting.  Feeling anxious.  Sweating.  There may also be pain and swelling in a leg if that is where the blood clot started.  These symptoms may represent a serious problem that is an emergency. Do not wait to see if the symptoms will go away. Get medical help right away. Call your local emergency services (911 in the [place] Do not drive yourself to the hospital.   How is this diagnosed?  Your health care provider will take a medical history and perform a physical exam. You may also have other tests, including:  Blood tests to assess the clotting properties of your blood, assess oxygen levels in your blood, and find blood clots.  Imaging tests, such as CT, ultrasound, MRI, X-ray, and other tests to see if you have clots anywhere in your body.  An electrocardiogram (ECG) to look for heart strain from blood clots in the lungs.  How is this treated?  The main goals of PE treatment are:  To stop a blood clot from growing larger.  To stop new blood clots from forming.  The type of treatment that you receive depends on many factors, such as the cause of your PE, your risk for bleeding or developing more clots, and other medical conditions that you have. Sometimes, a combination of treatments is necessary.  This condition may be treated with:  Medicines, including newer oral blood thinners (anticoagulants), warfarin, low molecular weight heparins, thrombolytics, or heparins.  Wearing compression stockings or using different types of devices.  Surgery (rare) to remove the blood clot or to place a filter in your abdomen to stop the blood clot from traveling to your lungs.  Treatments for a PE are often divided into immediate treatment, long-term treatment (up to 3 months after PE), and extended treatment  (more than 3 months after PE). Your treatment may continue for several months. This is called maintenance therapy, and it is used to prevent the forming of new blood clots. You can work with your health care provider to choose the treatment program that is best for you.  What are anticoagulants?   Anticoagulants are medicines that treat PEs. They can stop current blood clots from growing and stop new clots from forming. They cannot dissolve existing clots. Your body dissolves clots by itself over time. Anticoagulants are given by mouth, by injection, or through an IV tube.  What are thrombolytics?   Thrombolytics are clot-dissolving medicines that are used to dissolve a PE. They carry a high risk of bleeding, so they tend to be used only in severe cases or if you have very low blood pressure.  Follow these instructions at home:  If you are taking a newer oral anticoagulant:  Take the medicine every single day at the same time each day.  Understand what foods and drugs interact with this medicine.  Understand that there are no regular blood tests required when using this medicine.  Understand the side effects of this medicine, including excessive bruising or bleeding. Ask your health care provider or pharmacist about other possible side effects.  If you are taking warfarin:  Understand how to take warfarin and know which foods can affect how warfarin works in your body.  Understand that it is dangerous to take too much or too little warfarin. Too much warfarin increases the risk of bleeding. Too little warfarin continues to allow the risk for blood clots.  Follow your PT and INR blood testing schedule. The PT and INR results allow your health care provider to adjust your dose of warfarin. It is very important that you have your PT and INR tested as often as told by your health care provider.  Avoid major changes in your diet, or tell your health care provider before you change your diet. Arrange a visit with a  registered dietitian to answer your questions. Many foods, especially foods that are high in vitamin K, can interfere with warfarin and affect the PT and INR results. Eat a consistent amount of foods that are high in vitamin K, such as:  Spinach, kale, broccoli, cabbage, david greens, turnip greens, [place] sprouts, peas, cauliflower, seaweed, and parsley.  Beef liver and pork liver.  Green tea.  Soybean oil.  Tell your health care provider about any and all medicines, vitamins, and supplements that you take, including aspirin and other over-the-counter anti-inflammatory medicines. Be especially cautious with aspirin and anti-inflammatory medicines. Do not take those before you ask your health care provider if it is safe to do so. This is important because many medicines can interfere with warfarin and affect the PT and INR results.  Do not start or stop taking any over-the-counter or prescription medicine unless your health care provider or pharmacist tells you to do so.  If you take warfarin, you will also need to do these things:  Hold pressure over cuts for longer than usual.  Tell your dentist and other health care providers that you are taking warfarin before you have any procedures in which bleeding may occur.  Avoid alcohol or drink very small amounts. Tell your health care provider if you change your alcohol intake.  Do not use tobacco products, including cigarettes, chewing tobacco, and e-cigarettes. If you need help quitting, ask your health care provider.  Avoid contact sports.  General instructions  Take over-the-counter and prescription medicines only as told by your health care provider. Anticoagulant medicines can have side effects, including easy bruising and difficulty stopping bleeding. If you are prescribed an anticoagulant, you will also need to do these things:  Hold pressure over cuts for longer than usual.  Tell your dentist and other health care providers that you are taking  anticoagulants before you have any procedures in which bleeding may occur.  Avoid contact sports.  Wear a medical alert bracelet or carry a medical alert card that says you have had a PE.  Ask your health care provider how soon you can go back to your normal activities. Stay active to prevent new blood clots from forming.  Make sure to exercise while traveling or when you have been sitting or standing for a long period of time. It is very important to exercise. Exercise your legs by walking or by tightening and relaxing your leg muscles often. Take frequent walks.  Wear compression stockings as told by your health care provider to help prevent more blood clots from forming.  Do not use tobacco products, including cigarettes, chewing tobacco, and e-cigarettes. If you need help quitting, ask your health care provider.  Keep all follow-up appointments with your health care provider. This is important.  How is this prevented?  Take these actions to decrease your risk of developing another PE:  Exercise regularly. For at least 30 minutes every day, engage in:  Activity that involves moving your arms and legs.  Activity that encourages good blood flow through your body by increasing your heart rate.  Exercise your arms and legs every hour during long-distance travel (over 4 hours). Drink plenty of water and avoid drinking alcohol while traveling.  Avoid sitting or lying in bed for long periods of time without moving your legs.  Maintain a weight that is appropriate for your height. Ask your health care provider what weight is healthy for you.  If you are a woman who is over 35 years of age, avoid unnecessary use of medicines that contain estrogen. These include birth control pills.  Do not smoke, especially if you take estrogen medicines. If you need help quitting, ask your health care provider.  If you are at very high risk for PE, wear compression stockings.  If you recently had a PE, have regularly scheduled ultrasound  testing on your legs to check for new blood clots.  If you are hospitalized, prevention measures may include:  Early walking after surgery, as soon as your health care provider says that it is safe.  Receiving anticoagulants to prevent blood clots. If you cannot take anticoagulants, other options may be available, such as wearing compression stockings or using different types of devices.  Get help right away if:  You have new or increased pain, swelling, or redness in an arm or leg.  You have numbness or tingling in an arm or leg.  You have shortness of breath while active or at rest.  You have chest pain.  You have a rapid or irregular heartbeat.  You feel light-headed or dizzy.  You cough up blood.  You notice blood in your vomit, bowel movement, or urine.  You have a fever.  These symptoms may represent a serious problem that is an emergency. Do not wait to see if the symptoms will go away. Get medical help right away. Call your local emergency services (911 in the [place] Do not drive yourself to the hospital.   This information is not intended to replace advice given to you by your health care provider. Make sure you discuss any questions you have with your health care provider.  Document Released: 12/15/2001 Document Revised: 05/25/2017 Document Reviewed: 04/13/2016  [employer] Interactive Patient Education © 2017 [employer]        Enoxaparin injection  What is this medicine?  ENOXAPARIN (ee nox a PA rin) is used after knee, hip, or abdominal surgeries to prevent blood clotting. It is also used to treat existing blood clots in the lungs or in the veins.  This medicine may be used for other purposes; ask your health care provider or pharmacist if you have questions.  COMMON BRAND NAME(S): Lovenox  What should I tell my health care provider before I take this medicine?  They need to know if you have any of these conditions:  -bleeding disorders, hemorrhage, or hemophilia  -infection of the heart or heart  valves  -kidney or liver disease  -previous stroke  -prosthetic heart valve  -recent surgery or delivery of a baby  -ulcer in the stomach or intestine, diverticulitis, or other bowel disease  -an unusual or allergic reaction to enoxaparin, heparin, pork or pork products, other medicines, foods, dyes, or preservatives  -pregnant or trying to get pregnant  -breast-feeding  How should I use this medicine?  This medicine is for injection under the skin. It is usually given by a health-care professional. You or a family member may be trained on how to give the injections. If you are to give yourself injections, make sure you understand how to use the syringe, measure the dose if necessary, and give the injection. To avoid bruising, do not rub the site where this medicine has been injected. Do not take your medicine more often than directed. Do not stop taking except on the advice of your doctor or health care professional.  Make sure you receive a puncture-resistant container to dispose of the needles and syringes once you have finished with them. Do not reuse these items. Return the container to your doctor or health care professional for proper disposal.  Talk to your pediatrician regarding the use of this medicine in children. Special care may be needed.  Overdosage: If you think you have taken too much of this medicine contact a poison control center or emergency room at once.  NOTE: This medicine is only for you. Do not share this medicine with others.  What if I miss a dose?  If you miss a dose, take it as soon as you can. If it is almost time for your next dose, take only that dose. Do not take double or extra doses.  What may interact with this medicine?  -aspirin and aspirin-like medicines  -certain medicines that treat or prevent blood clots  -dipyridamole  -NSAIDs, medicines for pain and inflammation, like ibuprofen or naproxen  This list may not describe all possible interactions. Give your health care  provider a list of all the medicines, herbs, non-prescription drugs, or dietary supplements you use. Also tell them if you smoke, drink alcohol, or use illegal drugs. Some items may interact with your medicine.  What should I watch for while using this medicine?  Visit your doctor or health care professional for regular checks on your progress. Your condition will be monitored carefully while you are receiving this medicine.  Notify your doctor or health care professional and seek emergency treatment if you develop breathing problems; changes in vision; chest pain; severe, sudden headache; pain, swelling, warmth in the leg; trouble speaking; sudden numbness or weakness of the face, arm, or leg. These can be signs that your condition has gotten worse.  If you are going to have surgery, tell your doctor or health care professional that you are taking this medicine.  Do not stop taking this medicine without first talking to your doctor. Be sure to refill your prescription before you run out of medicine.  Avoid sports and activities that might cause injury while you are using this medicine. Severe falls or injuries can cause unseen bleeding. Be careful when using sharp tools or knives. Consider using an electric razor. Take special care brushing or flossing your teeth. Report any injuries, bruising, or red spots on the skin to your doctor or health care professional.  What side effects may I notice from receiving this medicine?  Side effects that you should report to your doctor or health care professional as soon as possible:  -allergic reactions like skin rash, itching or hives, swelling of the face, lips, or tongue  -feeling faint or lightheaded, falls  -signs and symptoms of bleeding such as bloody or black, tarry stools; red or dark-brown urine; spitting up blood or brown material that looks like coffee grounds; red spots on the skin; unusual bruising or bleeding from the eye, gums, or nose  Side effects that  usually do not require medical attention (report to your doctor or health care professional if they continue or are bothersome):  -pain, redness, or irritation at site where injected  This list may not describe all possible side effects. Call your doctor for medical advice about side effects. You may report side effects to FDA at 1-094-FDA-9813.  Where should I keep my medicine?  Keep out of the reach of children.  Store at room temperature between 15 and 30 degrees C (59 and 86 degrees F). Do not freeze. If your injections have been specially prepared, you may need to store them in the refrigerator. Ask your pharmacist. Throw away any unused medicine after the expiration date.  NOTE: This sheet is a summary. It may not cover all possible information. If you have questions about this medicine, talk to your doctor, pharmacist, or health care provider.  © 2018 Elsevier/Gold Standard (2015-04-21 16:06:21)        Amoxicillin; Clavulanic Acid tablets  What is this medicine?  AMOXICILLIN; CLAVULANIC ACID (a mox i JAVED in; MEGAN mortensen AS id) is a penicillin antibiotic. It is used to treat certain kinds of bacterial infections. It will not work for colds, flu, or other viral infections.  This medicine may be used for other purposes; ask your health care provider or pharmacist if you have questions.  COMMON BRAND NAME(S): Augmentin  What should I tell my health care provider before I take this medicine?  They need to know if you have any of these conditions:  -bowel disease, like colitis  -kidney disease  -liver disease  -mononucleosis  -an unusual or allergic reaction to amoxicillin, penicillin, cephalosporin, other antibiotics, clavulanic acid, other medicines, foods, dyes, or preservatives  -pregnant or trying to get pregnant  -breast-feeding  How should I use this medicine?  Take this medicine by mouth with a full glass of water. Follow the directions on the prescription label. Take at the start of a meal. Do not  crush or chew. If the tablet has a score line, you may cut it in half at the score line for easier swallowing. Take your medicine at regular intervals. Do not take your medicine more often than directed. Take all of your medicine as directed even if you think you are better. Do not skip doses or stop your medicine early.  Talk to your pediatrician regarding the use of this medicine in children. Special care may be needed.  Overdosage: If you think you have taken too much of this medicine contact a poison control center or emergency room at once.  NOTE: This medicine is only for you. Do not share this medicine with others.  What if I miss a dose?  If you miss a dose, take it as soon as you can. If it is almost time for your next dose, take only that dose. Do not take double or extra doses.  What may interact with this medicine?  -allopurinol  -anticoagulants  -birth control pills  -methotrexate  -probenecid  This list may not describe all possible interactions. Give your health care provider a list of all the medicines, herbs, non-prescription drugs, or dietary supplements you use. Also tell them if you smoke, drink alcohol, or use illegal drugs. Some items may interact with your medicine.  What should I watch for while using this medicine?  Tell your doctor or health care professional if your symptoms do not improve.  Do not treat diarrhea with over the counter products. Contact your doctor if you have diarrhea that lasts more than 2 days or if it is severe and watery.  If you have diabetes, you may get a false-positive result for sugar in your urine. Check with your doctor or health care professional.  Birth control pills may not work properly while you are taking this medicine. Talk to your doctor about using an extra method of birth control.  What side effects may I notice from receiving this medicine?  Side effects that you should report to your doctor or health care professional as soon as possible:  -allergic  reactions like skin rash, itching or hives, swelling of the face, lips, or tongue  -breathing problems  -dark urine  -fever or chills, sore throat  -redness, blistering, peeling or loosening of the skin, including inside the mouth  -seizures  -trouble passing urine or change in the amount of urine  -unusual bleeding, bruising  -unusually weak or tired  -white patches or sores in the mouth or throat  Side effects that usually do not require medical attention (report to your doctor or health care professional if they continue or are bothersome):  -diarrhea  -dizziness  -headache  -nausea, vomiting  -stomach upset  -vaginal or anal irritation  This list may not describe all possible side effects. Call your doctor for medical advice about side effects. You may report side effects to FDA at 6-148-FDA-4091.  Where should I keep my medicine?  Keep out of the reach of children.  Store at room temperature below 25 degrees C (77 degrees F). Keep container tightly closed. Throw away any unused medicine after the expiration date.  NOTE: This sheet is a summary. It may not cover all possible information. If you have questions about this medicine, talk to your doctor, pharmacist, or health care provider.  © 2018 Elsevier/Gold Standard (2009-03-12 12:04:30)

## 2020-03-08 NOTE — DISCHARGE PLANNING
Called [facility] pharmacy to [facility] insurance coverage for lovenox rx. Per pharmacy, rx went through insurance however they don't have medication in stock. Called remaining [facility] pharmacies which also didn't have rx in stock. Called [facility] who confirmed they do.   Rx e-prescribed to [facility]. Confirmed with pharmacy rx went through for $80 co pay.     Met with pt at bedside to discuss. Pt agreeable to co pay amount. Pharmacy closes at 6 pm today. Pt called family and requested they  his medications prior to closing time today. .

## 2020-03-09 ENCOUNTER — TELEPHONE (OUTPATIENT)
Dept: VASCULAR LAB | Facility: MEDICAL CENTER | Age: 30
End: 2020-03-09

## 2020-03-09 LAB
AT III ACT/NOR PPP CHRO: 71 % (ref 76–128)
CARDIOLIPIN IGA SER IA-ACNC: 0 APL (ref 0–11)
CARDIOLIPIN IGG SER IA-ACNC: 4 GPL (ref 0–14)
CARDIOLIPIN IGM SER IA-ACNC: 0 MPL (ref 0–12)
PROT C ACT/NOR PPP: 85 % (ref 83–168)
PROT S ACT/NOR PPP: 59 % (ref 66–143)

## 2020-03-09 NOTE — TELEPHONE ENCOUNTER
Received anticoagulation referral for warfarin/lovenox due to recurrent PE from Dr. Mascorro  on 3/07/20.    Left a msg for pt to call back ASAP. Needs INR appt or lab sent in.     Per 3/8/20 MD LEÓN notes:  29-year-old male with morbid obesity, likely untreated JONES presenting with shortness of breath.  CTA demonstrates recurrent pulmonary embolus.  Had history of late 2019 and completed 3 months of Xarelto.  Given patient's morbid obesity, sedentary lifestyle is possible recurrent PE was provoked -however given quick recurrence need to consider hypercoagulable state.  Thrombophilia lab panel drawn and pending at this time.     Also given weight of 36 (I'm assuming this is supposed to be 386 lbs)  pounds, concerned that Xarelto and Eliquis would not be therapeutic.  Patient started on Lovenox injections 180 mg twice daily (near max) and warfarin here.  Patient is to continue Lovenox injections next 7 days and follow-up with INR clinic on Monday.          Insurance: [employer]   PCP: None  [facility]ations [facility] be seen[facility] at [phone], fax [phone]    Malathi Mei, PharmD

## 2020-03-10 LAB
BACTERIA BLD CULT: NORMAL
SIGNIFICANT IND 70042: NORMAL
SITE SITE: NORMAL
SOURCE SOURCE: NORMAL

## 2020-03-11 LAB
BACTERIA BLD CULT: NORMAL
SIGNIFICANT IND 70042: NORMAL
SITE SITE: NORMAL
SOURCE SOURCE: NORMAL

## 2020-03-12 LAB
F2 C.20210G>A GENO BLD/T: NEGATIVE
F5 P.R506Q BLD/T QL: NEGATIVE

## 2020-03-16 ENCOUNTER — ANTICOAGULATION VISIT (OUTPATIENT)
Dept: VASCULAR LAB | Facility: MEDICAL CENTER | Age: 30
End: 2020-03-16
Attending: INTERNAL MEDICINE
Payer: COMMERCIAL

## 2020-03-16 DIAGNOSIS — I26.09 ACUTE PULMONARY EMBOLISM WITH ACUTE COR PULMONALE, UNSPECIFIED PULMONARY EMBOLISM TYPE (HCC): ICD-10-CM

## 2020-03-16 DIAGNOSIS — Z79.01 CHRONIC ANTICOAGULATION: ICD-10-CM

## 2020-03-16 LAB
INR BLD: 2.4 (ref 0.9–1.2)
INR PPP: 2.4 (ref 2–3.5)

## 2020-03-16 PROCEDURE — 85610 PROTHROMBIN TIME: CPT

## 2020-03-16 PROCEDURE — 99213 OFFICE O/P EST LOW 20 MIN: CPT | Performed by: PHARMACIST

## 2020-03-16 RX ORDER — WARFARIN SODIUM 7.5 MG/1
7.5 TABLET ORAL EVERY EVENING
Qty: 90 TAB | Refills: 3 | Status: SHIPPED | OUTPATIENT
Start: 2020-03-16 | End: 2020-04-15

## 2020-03-16 NOTE — PROGRESS NOTES
.  Anticoagulation Summary  As of 3/16/2020    INR goal:   2.0-3.0   TTR:   --   INR used for dosin.40 (3/16/2020)   Warfarin maintenance plan:   7.5 mg (7.5 mg x 1) every day   Weekly warfarin total:   52.5 mg   Plan last modified:   Malathi Mei, PharmD (3/16/2020)   Next INR check:   3/27/2020   Target end date:       Indications    Pulmonary embolism with acute cor pulmonale (HCC) [I26.09]             Anticoagulation Episode Summary     INR check location:       Preferred lab:       Send INR reminders to:       Comments:         Anticoagulation Care Providers     Provider Role Specialty Phone number    [facility] Responsible  [phone]        Anticoagulation Patient Findings      PCP - none  Pt hx - Had PE in 2019, was treated for 3 months and then taken off. Now found to have PE again, unclear if it's new or residual from before.   Per Hospital DC note:  29-year-old male with morbid obesity, likely untreated JONES presenting with shortness of breath.  CTA demonstrates recurrent pulmonary embolus.  Had history of late  and completed 3 months of Xarelto.  Given patient's morbid obesity, sedentary lifestyle is possible recurrent PE was provoked -however given quick recurrence need to consider hypercoagulable state.  Thrombophilia lab panel drawn and pending at this time.  HAS-BLED = 1 (HTN)  DOAC = not recommended due to BMI and also prior hx.    Pt is new to warfarin and new to [facility]. Discussed:   · Indication for warfarin therapy and INR goal range.   · Importance of monitoring and compliance.   · Monitoring parameters, signs and symptoms of bleeding or clotting.  · Warfarin therapy, side effects, potential DDIs, OTC medications  · Importance of diet consistency, ie vitamin K intake, supplements  · Lifestyle safety, ie smoking, ETOH, hobby safety, fall safety/prevention  · Procedures for missed doses or suspected missed doses, surgeries/procedures, travel, dental work, any  medication changes    Pt denies any unusual s/s of bleeding, bruising, clotting or any changes to diet or medications.    INR is therapeutic today.   Will continue to titrate pt's warfarin dose. Pt's been taking 7.5mg daily. Finished all lovenox he had on hand last night.      Will have pt continue with warfarin 7.5mg daily.     Discussed having pt f/u with [facility] at some point as no PCP, no family hx of VTE.     Recheck INR in 10 days due to pt's work schedule. As pt has been on warfarin 10 days and has a therapeutic INR, this seems reasonable.     Malathi Mei, PharmD

## 2020-03-17 ENCOUNTER — TELEPHONE (OUTPATIENT)
Dept: VASCULAR LAB | Facility: MEDICAL CENTER | Age: 30
End: 2020-03-17

## 2020-03-18 ENCOUNTER — TELEPHONE (OUTPATIENT)
Dept: VASCULAR LAB | Facility: MEDICAL CENTER | Age: 30
End: 2020-03-18

## 2020-03-18 DIAGNOSIS — I26.09 ACUTE PULMONARY EMBOLISM WITH ACUTE COR PULMONALE, UNSPECIFIED PULMONARY EMBOLISM TYPE (HCC): ICD-10-CM

## 2020-03-18 DIAGNOSIS — I27.20 PULMONARY HYPERTENSION (HCC): ICD-10-CM

## 2020-03-18 NOTE — PROGRESS NOTES
Referrals to pulmonary and vascular medicine placed.    Michael Bloch, MD  Anticoagulation Clinic

## 2020-03-18 NOTE — TELEPHONE ENCOUNTER
Initial anticoag note and most recent d/c summary reviewed.    Anticoag restarted due to PE - unclear if this is new event or residua of previous PE.     Protein S and ATIII  mildly low during hospitalization.  ACLA, factor v leiden, and ptgm all unremarkable.     Echo demonstrates chronic pulmonary htn (although improved from last visit when he required EKOS for PE)    Pending further recs, we will continue with 3 month of oral anticaogulation.    Would recommend vascular medicine consultation (will arrange) and pulmonary medicine consultation.     Michael Bloch, MD  Anticoagulation Clinic

## 2020-03-25 ENCOUNTER — TELEPHONE (OUTPATIENT)
Dept: VASCULAR LAB | Facility: MEDICAL CENTER | Age: 30
End: 2020-03-25

## 2020-03-27 ENCOUNTER — ANTICOAGULATION VISIT (OUTPATIENT)
Dept: VASCULAR LAB | Facility: MEDICAL CENTER | Age: 30
End: 2020-03-27
Attending: INTERNAL MEDICINE
Payer: COMMERCIAL

## 2020-03-27 DIAGNOSIS — Z79.01 CHRONIC ANTICOAGULATION: ICD-10-CM

## 2020-03-27 LAB
INR BLD: 3.6 (ref 0.9–1.2)
INR PPP: 3.6 (ref 2–3.5)

## 2020-03-27 PROCEDURE — 99212 OFFICE O/P EST SF 10 MIN: CPT

## 2020-03-27 PROCEDURE — 85610 PROTHROMBIN TIME: CPT

## 2020-03-27 NOTE — PROGRESS NOTES
Anticoagulation Summary  As of 3/27/2020    INR goal:   2.0-3.0   TTR:   0.0 % (1 d)   INR used for dosing:   3.60! (3/27/2020)   Warfarin maintenance plan:   7.5 mg (7.5 mg x 1) every day   Weekly warfarin total:   52.5 mg   Plan last modified:   Malathi Mei, PharmD (3/16/2020)   Next INR check:   4/3/2020   Target end date:   6/16/2020    Indications    Pulmonary embolism with acute cor pulmonale (HCC) [I26.09]             Anticoagulation Episode Summary     INR check location:       Preferred lab:       Send INR reminders to:       Comments:         Anticoagulation Care Providers     Provider Role Specialty Phone number    [facility] Responsible  [phone]        Anticoagulation Patient Findings      HPI:  Michael Ontiveros seen in clinic today for follow up on anticoagulation therapy in the presence of PE.   Denies any changes to current medical/health status since last appointment.   Denies any medication or diet changes.   No current symptoms of bleeding or thrombosis reported.    A/P:   INR is supra-therapeutic at 3.6.   Patient is to take 3.75 mg tonight then continue current regimen.     Follow up appointment in 1 week(s).    Next Appointment: 04/03/2020    Lanette Mckeon, Pharm.D

## 2020-04-03 ENCOUNTER — TELEPHONE (OUTPATIENT)
Dept: VASCULAR LAB | Facility: MEDICAL CENTER | Age: 30
End: 2020-04-03

## 2020-04-06 ENCOUNTER — TELEPHONE (OUTPATIENT)
Dept: VASCULAR LAB | Facility: MEDICAL CENTER | Age: 30
End: 2020-04-06

## 2020-04-08 ENCOUNTER — TELEPHONE (OUTPATIENT)
Dept: VASCULAR LAB | Facility: MEDICAL CENTER | Age: 30
End: 2020-04-08

## 2020-04-09 ENCOUNTER — TELEPHONE (OUTPATIENT)
Dept: VASCULAR LAB | Facility: MEDICAL CENTER | Age: 30
End: 2020-04-09

## 2020-04-10 ENCOUNTER — ANTICOAGULATION VISIT (OUTPATIENT)
Dept: VASCULAR LAB | Facility: MEDICAL CENTER | Age: 30
End: 2020-04-10
Attending: INTERNAL MEDICINE
Payer: COMMERCIAL

## 2020-04-10 DIAGNOSIS — Z79.01 CHRONIC ANTICOAGULATION: ICD-10-CM

## 2020-04-10 LAB
INR BLD: 3.7 (ref 0.9–1.2)
INR PPP: 3.7 (ref 2–3.5)

## 2020-04-10 PROCEDURE — 85610 PROTHROMBIN TIME: CPT

## 2020-04-10 PROCEDURE — 99212 OFFICE O/P EST SF 10 MIN: CPT

## 2020-04-10 NOTE — PROGRESS NOTES
Anticoagulation Summary  As of 4/10/2020    INR goal:   2.0-3.0   TTR:   0.0 % (2.1 wk)   INR used for dosing:   3.70! (4/10/2020)   Warfarin maintenance plan:   3.75 mg (7.5 mg x 0.5) every Wed; 7.5 mg (7.5 mg x 1) all other days   Weekly warfarin total:   48.75 mg   Plan last modified:   Ady WillD (4/10/2020)   Next INR check:   4/24/2020   Target end date:   6/16/2020    Indications    Pulmonary embolism with acute cor pulmonale (HCC) [I26.09]             Anticoagulation Episode Summary     INR check location:       Preferred lab:       Send INR reminders to:       Comments:         Anticoagulation Care Providers     Provider Role Specialty Phone number    [facility] Responsible  [phone]        Anticoagulation Patient Findings      HPI:  Michael Ontiveros seen in clinic today for follow up on anticoagulation therapy in the presence of PE.   Denies any changes to current medical/health status since last appointment.   Denies any medication or diet changes.   No current symptoms of bleeding or thrombosis reported.    A/P:   INR is supratherapeutic at 3.7.   Patient to begin 7% decreased regimen    Follow up appointment in 2 week(s). Discussed with patient that next visit will be a car visit, located at [address] on the west side of the building    Next Appointment: 04/24/2020    Ady Velasquez.D

## 2020-04-10 NOTE — PATIENT INSTRUCTIONS
You will be seen at our drive thru service at [facility] located on the west side of the building at  [address] [place] [address].  [place] [address]         Please call [phone] with any questions.

## 2020-04-23 ENCOUNTER — HOSPITAL ENCOUNTER (EMERGENCY)
Facility: MEDICAL CENTER | Age: 30
End: 2020-04-23
Attending: EMERGENCY MEDICINE
Payer: COMMERCIAL

## 2020-04-23 ENCOUNTER — OFFICE VISIT (OUTPATIENT)
Dept: URGENT CARE | Facility: CLINIC | Age: 30
End: 2020-04-23
Payer: COMMERCIAL

## 2020-04-23 VITALS
DIASTOLIC BLOOD PRESSURE: 88 MMHG | TEMPERATURE: 97.7 F | BODY MASS INDEX: 47.74 KG/M2 | OXYGEN SATURATION: 93 % | SYSTOLIC BLOOD PRESSURE: 134 MMHG | RESPIRATION RATE: 16 BRPM | HEART RATE: 94 BPM | WEIGHT: 315 LBS | HEIGHT: 68 IN

## 2020-04-23 VITALS
OXYGEN SATURATION: 94 % | HEIGHT: 68 IN | BODY MASS INDEX: 47.74 KG/M2 | RESPIRATION RATE: 24 BRPM | WEIGHT: 315 LBS | TEMPERATURE: 99 F | HEART RATE: 90 BPM | SYSTOLIC BLOOD PRESSURE: 104 MMHG | DIASTOLIC BLOOD PRESSURE: 62 MMHG

## 2020-04-23 DIAGNOSIS — L24.A9 WOUND DRAINAGE: ICD-10-CM

## 2020-04-23 DIAGNOSIS — L03.115 BILATERAL CELLULITIS OF LOWER LEG: ICD-10-CM

## 2020-04-23 DIAGNOSIS — R60.9 PERIPHERAL EDEMA: ICD-10-CM

## 2020-04-23 DIAGNOSIS — L03.116 BILATERAL CELLULITIS OF LOWER LEG: ICD-10-CM

## 2020-04-23 PROCEDURE — 700102 HCHG RX REV CODE 250 W/ 637 OVERRIDE(OP): Performed by: EMERGENCY MEDICINE

## 2020-04-23 PROCEDURE — 99283 EMERGENCY DEPT VISIT LOW MDM: CPT

## 2020-04-23 PROCEDURE — 99213 OFFICE O/P EST LOW 20 MIN: CPT | Performed by: PHYSICIAN ASSISTANT

## 2020-04-23 PROCEDURE — A9270 NON-COVERED ITEM OR SERVICE: HCPCS | Performed by: EMERGENCY MEDICINE

## 2020-04-23 RX ORDER — WARFARIN SODIUM 7.5 MG/1
7.5 TABLET ORAL DAILY
COMMUNITY
End: 2020-07-17 | Stop reason: SDUPTHER

## 2020-04-23 RX ORDER — FUROSEMIDE 40 MG/1
20 TABLET ORAL ONCE
Status: COMPLETED | OUTPATIENT
Start: 2020-04-23 | End: 2020-04-23

## 2020-04-23 RX ORDER — POTASSIUM CHLORIDE 20 MEQ/1
20 TABLET, EXTENDED RELEASE ORAL ONCE
Status: COMPLETED | OUTPATIENT
Start: 2020-04-23 | End: 2020-04-23

## 2020-04-23 RX ORDER — CEPHALEXIN 500 MG/1
500 CAPSULE ORAL ONCE
Status: COMPLETED | OUTPATIENT
Start: 2020-04-23 | End: 2020-04-23

## 2020-04-23 RX ORDER — CEPHALEXIN 500 MG/1
500 CAPSULE ORAL 2 TIMES DAILY
Qty: 14 CAP | Refills: 0 | Status: SHIPPED | OUTPATIENT
Start: 2020-04-23 | End: 2020-08-25

## 2020-04-23 RX ORDER — FUROSEMIDE 20 MG/1
20 TABLET ORAL DAILY
Qty: 7 TAB | Refills: 0 | Status: SHIPPED | OUTPATIENT
Start: 2020-04-23 | End: 2020-04-30

## 2020-04-23 RX ORDER — POTASSIUM CHLORIDE 20 MEQ/1
20 TABLET, EXTENDED RELEASE ORAL DAILY
Qty: 7 TAB | Refills: 0 | Status: SHIPPED | OUTPATIENT
Start: 2020-04-23 | End: 2020-05-29 | Stop reason: SDUPTHER

## 2020-04-23 RX ADMIN — FUROSEMIDE 20 MG: 40 TABLET ORAL at 19:48

## 2020-04-23 RX ADMIN — POTASSIUM CHLORIDE 20 MEQ: 1500 TABLET, EXTENDED RELEASE ORAL at 19:49

## 2020-04-23 RX ADMIN — CEPHALEXIN 500 MG: 500 CAPSULE ORAL at 19:48

## 2020-04-23 ASSESSMENT — FIBROSIS 4 INDEX
FIB4 SCORE: 0.81
FIB4 SCORE: 0.81

## 2020-04-23 ASSESSMENT — ENCOUNTER SYMPTOMS
CHILLS: 0
FEVER: 0

## 2020-04-23 ASSESSMENT — PAIN SCALES - WONG BAKER: WONGBAKER_NUMERICALRESPONSE: DOESN'T HURT AT ALL

## 2020-04-23 ASSESSMENT — LIFESTYLE VARIABLES: DO YOU DRINK ALCOHOL: NO

## 2020-04-24 ENCOUNTER — APPOINTMENT (OUTPATIENT)
Dept: VASCULAR LAB | Facility: MEDICAL CENTER | Age: 30
End: 2020-04-24
Attending: INTERNAL MEDICINE
Payer: COMMERCIAL

## 2020-04-24 NOTE — DISCHARGE INSTRUCTIONS
In addition to the 1 week of diuretic and potassium supplements and antibiotic, continue to wash once daily with soap and water.  Keep the broken areas of skin covered with a dry bandage.  In addition to the medications, when you were at the pharmacy, purchase a pair of compression tights, which can help move the extra fluid in your legs back into your central circulation so that it can be removed more easily through urination.    Return to medical care if you are not noticing significant improvement in 3 or 4 days, or at any time if you are obviously getting worse, especially with worsening signs of infection such as increasing drainage, increasing pain, increasing redness or warmth or fevers or chills.

## 2020-04-24 NOTE — ED NOTES
Pt given discharge instructions and follow up care instructions  Pt was given information regarding medications at home and medication prescription  Pt had no further questions or concerns.   Pt ambulated out of the ER without difficulty

## 2020-04-24 NOTE — ED PROVIDER NOTES
ED Provider Note    Scribed for Maurice Jordan M.D. by Fredo Roy. 4/23/2020,  7:27 PM.    CHIEF COMPLAINT  Chief Complaint   Patient presents with   • Wound Check     weeping wounds, bilat LE x2wks       [facility]  Michael Dillan Ontiveros is a 29 y.o. male who presents to the Emergency Department for worsening bilateral leg wounds with redness and weeping onset 2 weeks ago. Patient states that 2 weeks ago his wounds started to appear, which she attributes to eating a lot more salt than he is supposed to be, and started worsening a couple days ago. He notes that he has had this same issue around 3 times previously, each time related to excessive salt intake, and each time treated with diuretics temporarily. He was seen at  earlier today who referred him here. There are no known alleviating or exacerbating factors. Patient denies any associated vomiting or fevers. He has a history of pneumonia, hypertension, but denies any cardiac issues or history of diabetes. Patient is on coumadin.    REVIEW OF SYSTEMS  See HPI for further details. All other systems are negative.     PAST MEDICAL HISTORY   has a past medical history of Asthma, Blood clotting disorder (HCC), Chickenpox, Eczema, Hypertension, Influenza, and Tonsillitis.    SOCIAL HISTORY  Social History     Tobacco Use   • Smoking status: Never Smoker   • Smokeless tobacco: Never Used   Substance and Sexual Activity   • Alcohol use: Not Currently     Frequency: 2-4 times a month     Comment: rare   • Drug use: No   • Sexual activity: Not Currently     Social History     Substance and Sexual Activity   Drug Use No       SURGICAL HISTORY   has a past surgical history that includes dental extraction(s) (11/3/2017).    CURRENT MEDICATIONS  Home Medications     Reviewed by Karma Naranjo R.N. (Registered Nurse) on 04/23/20 at 1843  Med List Status: Complete   Medication Last Dose Status   ALBUTEROL INH  Active   warfarin (COUMADIN) 7.5 MG Tab  Active           "      ALLERGIES  Allergies   Allergen Reactions   • Pistachio    • Tree Nuts Food Allergy    • [place] Anaphylaxis     pistachios       PHYSICAL EXAM  VITAL SIGNS: /93   Pulse (!) 103   Temp 36.5 °C (97.7 °F) (Temporal)   Resp 16   Ht 1.727 m (5' 8\")   Wt (!) 183 kg (403 lb 7.1 oz)   SpO2 90%   BMI 61.34 kg/m²   Pulse ox interpretation: I interpret this pulse ox as normal.  Constitutional: Alert in no apparent distress.  HENT: No signs of trauma, Bilateral external ears normal, Nose normal.   Eyes: Conjunctiva normal, Non-icteric.   Neck: Normal range of motion, Supple, No stridor.   Lymphatic: No lymphadenopathy noted.   Cardiovascular: Regular rate and rhythm, no murmurs.   Thorax & Lungs: Normal breath sounds, No respiratory distress, No wheezing, No chest tenderness.   Abdomen: Bowel sounds normal, Soft, No tenderness, No masses, No pulsatile masses. No peritoneal signs.  Skin: Symmetric bilateral lower extremity pitting edema with stasis color changes.  On each leg, there are 1 or 2 small breaks in the skin weeping a small amount of serous fluid.    Musculoskeletal: Good range of motion in all major joints. No or major deformities noted.   Neurologic: Alert , Normal motor function, Normal sensory function, No focal deficits noted.   Psychiatric: Affect normal, Judgment normal, Mood normal.       COURSE & MEDICAL DECISION MAKING  Nursing notes, VS PMSFHx reviewed in chart.     7:27 PM Patient seen and examined at bedside.  He was sent from urgent care out of concern that he may have a bilateral lower extremity cellulitis.  He does not have signs of significant infection.  Much more likely, in accordance with the patient's report, he has fluid overload causing some breaks in the skin and weeping.  He has extreme morbid obesity contributing to this situation.  He has no fevers or chills or systemic signs or symptoms.  Patient will be treated with Lasix, Kdur, and Keflex for his symptoms.  Though there " is no evidence of significant infection at this time, I think Keflex is appropriate given the patient's comorbidities, as a prophylactic approach, in case of mild early infection.  Discussed plan to placed the patient on a diuretic for around 1 week, as well as an oral antibiotic and potassium. I otherwise advised the patient to follow up with his PCP and cleared him for discharge. He was understanding and agreeable to plan of care.  I considered DVT/PE, but he is already anticoagulated, has no shortness of breath or cough or hemoptysis, and no calf tenderness or asymmetric swelling.    The patient will return for new or worsening symptoms and is stable at the time of discharge.    The patient is referred to a primary physician for blood pressure management, diabetic screening, and for all other preventative health concerns.    DISPOSITION:  Patient will be discharged home in stable condition.    FOLLOW UP:  [address] [address]    for primary care    [address] [address] [phone]    for primary care    [address] [address]  [phone]    for primary care      OUTPATIENT MEDICATIONS:  Discharge Medication List as of 4/23/2020  7:54 PM      START taking these medications    Details   furosemide (LASIX) 20 MG Tab Take 1 Tab by mouth every day for 7 days., Disp-7 Tab, R-0, Normal      potassium chloride SA (KDUR) 20 MEQ Tab CR Take 1 Tab by mouth every day., Disp-7 Tab, R-0, Normal      cephALEXin (KEFLEX) 500 MG Cap Take 1 Cap by mouth 2 times a day., Disp-14 Cap, R-0, Normal             FINAL IMPRESSION  1. Peripheral edema    2. Wound drainage         Fredo SILVA (Otf), am scribing for, and in the presence of, Maurice Jordan M.D..    Electronically signed by: Fredo Rodriguez), 4/23/2020    Maurice SILVA M.D. personally performed the services described  in this documentation, as scribed by Fredo Roy in my presence, and it is both accurate and complete. C.    The note accurately reflects work and decisions made by me.  Maurice Jordan M.D.  4/23/2020  10:29 PM

## 2020-04-24 NOTE — ED TRIAGE NOTES
Pt amb to triage. Wearing a mask.  Chief Complaint   Patient presents with   • Wound Check     weeping wounds, bilat LE x2wks     Sent from  for further eval.    Pt denies cough, fever, sob or any known contact with a person that has tested positive for covid.

## 2020-04-24 NOTE — PROGRESS NOTES
"  Subjective:   Michael Ontiveros is a 29 y.o. male who presents today with   Chief Complaint   Patient presents with   • Open Wound     bilateral lower extremities x2 weeks        Wound Infection   This is a new problem. The current episode started 1 to 4 weeks ago. The problem occurs constantly. The problem has been unchanged. Pertinent negatives include no chills or fever. Treatments tried: dressing changes, elevation. The treatment provided no relief.     Patient attributes these lesions to eating too much sodium a couple of weeks ago and his leg swelled up and now have ulcers on them that have been weeping.  Patient has history of MRSA and pulmonary embolism.  PMH:  has a past medical history of Asthma, Blood clotting disorder (HCC), Chickenpox, Eczema, Hypertension, Influenza, and Tonsillitis.  MEDS:   Current Outpatient Medications:   •  warfarin (COUMADIN) 7.5 MG Tab, Take 7.5 mg by mouth every day., Disp: , Rfl:   •  ALBUTEROL INH, Inhale  by mouth. Dose unknown, Disp: , Rfl:   ALLERGIES:   Allergies   Allergen Reactions   • Pistachio    • Tree Nuts Food Allergy    • [place] Anaphylaxis     pistachios     SURGHX:   Past Surgical History:   Procedure Laterality Date   • DENTAL EXTRACTION(S)  11/3/2017    Procedure: DENTAL EXTRACTION(S);  Surgeon: Olman Sparks D.D.S.;  Location: SURGERY [facility];  Service: Oral Surgery     SOCHX:  reports that he has never smoked. He has never used smokeless tobacco. He reports previous alcohol use. He reports that he does not use drugs.  FH: Reviewed with patient, not pertinent to this visit.       Review of Systems   Constitutional: Negative for chills and fever.   Skin:        Ulcerated lesions   All other systems reviewed and are negative.       Objective:   /62   Pulse 90   Temp 37.2 °C (99 °F) (Temporal)   Resp (!) 24   Ht 1.727 m (5' 8\")   Wt (!) 174.6 kg (385 lb)   SpO2 94%   BMI 58.54 kg/m²   Physical Exam  Vitals signs and nursing " note reviewed.   Constitutional:       General: He is not in acute distress.     Appearance: He is well-developed.   HENT:      Head: Normocephalic and atraumatic.      Right Ear: Hearing normal.      Left Ear: Hearing normal.   Eyes:      Pupils: Pupils are equal, round, and reactive to light.   Cardiovascular:      Rate and Rhythm: Normal rate and regular rhythm.      Heart sounds: Normal heart sounds.   Pulmonary:      Comments: Labored breathing  Musculoskeletal:      Comments: Normal movement in all 4 extremities   Skin:     General: Skin is warm and dry.      Comments: Multiple ulcerated lesions to bilateral lower extremities with areas of weeping. Dusky colored skin of lower legs with 1+ pitting edema. Erythema from toes up to knees bilaterally.   Neurological:      Mental Status: He is alert.      Coordination: Coordination normal.   Psychiatric:         Mood and Affect: Mood normal.           Assessment/Plan:   Assessment    1. Bilateral cellulitis of lower leg    Other orders  - warfarin (COUMADIN) 7.5 MG Tab; Take 7.5 mg by mouth every day.  At this time given nature of wound bilaterally with excessive weeping and comorbidities discussed with patient he would likely benefit from inpatient antibiotics, wound care and further evaluation at this time.  He is agreeable to this plan and states he will head to the emergency room at this time.      Please note that this dictation was created using voice recognition software. I have made every reasonable attempt to correct obvious errors, but I expect that there are errors of grammar and possibly content that I did not discover before finalizing the note.    Tolu Casillas PA-C

## 2020-04-27 ENCOUNTER — TELEPHONE (OUTPATIENT)
Dept: VASCULAR LAB | Facility: MEDICAL CENTER | Age: 30
End: 2020-04-27

## 2020-05-01 ENCOUNTER — ANTICOAGULATION VISIT (OUTPATIENT)
Dept: VASCULAR LAB | Facility: MEDICAL CENTER | Age: 30
End: 2020-05-01
Attending: INTERNAL MEDICINE
Payer: COMMERCIAL

## 2020-05-01 DIAGNOSIS — Z79.01 CHRONIC ANTICOAGULATION: ICD-10-CM

## 2020-05-01 LAB — INR PPP: 2.8 (ref 2–3.5)

## 2020-05-01 PROCEDURE — 99211 OFF/OP EST MAY X REQ PHY/QHP: CPT

## 2020-05-01 PROCEDURE — 85610 PROTHROMBIN TIME: CPT

## 2020-05-01 NOTE — PROGRESS NOTES
Anticoagulation Summary  As of 2020    INR goal:   2.0-3.0   TTR:   13.0 % (1.2 mo)   INR used for dosin.80 (2020)   Warfarin maintenance plan:   3.75 mg (7.5 mg x 0.5) every Wed; 7.5 mg (7.5 mg x 1) all other days   Weekly warfarin total:   48.75 mg   Plan last modified:   Ady WillD (4/10/2020)   Next INR check:   2020   Target end date:   2020    Indications    Pulmonary embolism with acute cor pulmonale (HCC) [I26.09]             Anticoagulation Episode Summary     INR check location:       Preferred lab:       Send INR reminders to:       Comments:         Anticoagulation Care Providers     Provider Role Specialty Phone number    [facility] Responsible  [phone]        Anticoagulation Patient Findings      HPI:  Michael Patelori seen in clinic today for follow up on anticoagulation therapy in the presence of PE.   Denies any changes to current medical/health status since last appointment.   Patient has been on Keflex for foot infection   No current symptoms of bleeding or thrombosis reported.    A/P:   INR is therapeutic.   Continue current regimen.     Follow up appointment in 3 week(s).      Ady Velasquez.D

## 2020-05-10 ENCOUNTER — HOSPITAL ENCOUNTER (EMERGENCY)
Facility: MEDICAL CENTER | Age: 30
End: 2020-05-10
Attending: EMERGENCY MEDICINE
Payer: COMMERCIAL

## 2020-05-10 VITALS
OXYGEN SATURATION: 92 % | TEMPERATURE: 97 F | BODY MASS INDEX: 47.74 KG/M2 | WEIGHT: 315 LBS | HEIGHT: 68 IN | RESPIRATION RATE: 38 BRPM | SYSTOLIC BLOOD PRESSURE: 129 MMHG | HEART RATE: 84 BPM | DIASTOLIC BLOOD PRESSURE: 68 MMHG

## 2020-05-10 DIAGNOSIS — T14.8XXA BLEEDING FROM WOUND: ICD-10-CM

## 2020-05-10 DIAGNOSIS — I83.009 CHRONIC CUTANEOUS VENOUS STASIS ULCER (HCC): ICD-10-CM

## 2020-05-10 DIAGNOSIS — E66.01 MORBID OBESITY (HCC): ICD-10-CM

## 2020-05-10 DIAGNOSIS — R79.1 SUPRATHERAPEUTIC INR: ICD-10-CM

## 2020-05-10 DIAGNOSIS — L97.909 CHRONIC CUTANEOUS VENOUS STASIS ULCER (HCC): ICD-10-CM

## 2020-05-10 DIAGNOSIS — Z79.01 ANTICOAGULATED ON COUMADIN: ICD-10-CM

## 2020-05-10 LAB
ALBUMIN SERPL BCP-MCNC: 3.5 G/DL (ref 3.2–4.9)
ALBUMIN/GLOB SERPL: 1 G/DL
ALP SERPL-CCNC: 74 U/L (ref 30–99)
ALT SERPL-CCNC: 15 U/L (ref 2–50)
ANION GAP SERPL CALC-SCNC: 14 MMOL/L (ref 7–16)
ANISOCYTOSIS BLD QL SMEAR: ABNORMAL
AST SERPL-CCNC: 25 U/L (ref 12–45)
BASOPHILS # BLD AUTO: 0.4 % (ref 0–1.8)
BASOPHILS # BLD: 0.03 K/UL (ref 0–0.12)
BILIRUB SERPL-MCNC: 0.7 MG/DL (ref 0.1–1.5)
BUN SERPL-MCNC: 13 MG/DL (ref 8–22)
CALCIUM SERPL-MCNC: 9.4 MG/DL (ref 8.5–10.5)
CHLORIDE SERPL-SCNC: 95 MMOL/L (ref 96–112)
CO2 SERPL-SCNC: 22 MMOL/L (ref 20–33)
COMMENT 1642: NORMAL
CREAT SERPL-MCNC: 0.7 MG/DL (ref 0.5–1.4)
EOSINOPHIL # BLD AUTO: 0.12 K/UL (ref 0–0.51)
EOSINOPHIL NFR BLD: 1.4 % (ref 0–6.9)
ERYTHROCYTE [DISTWIDTH] IN BLOOD BY AUTOMATED COUNT: 57.8 FL (ref 35.9–50)
GLOBULIN SER CALC-MCNC: 3.4 G/DL (ref 1.9–3.5)
GLUCOSE BLD-MCNC: 115 MG/DL (ref 65–99)
GLUCOSE SERPL-MCNC: 105 MG/DL (ref 65–99)
HCT VFR BLD AUTO: 50 % (ref 42–52)
HGB BLD-MCNC: 13.9 G/DL (ref 14–18)
HYPOCHROMIA BLD QL SMEAR: ABNORMAL
IMM GRANULOCYTES # BLD AUTO: 0.03 K/UL (ref 0–0.11)
IMM GRANULOCYTES NFR BLD AUTO: 0.4 % (ref 0–0.9)
INR PPP: 3.74 (ref 0.87–1.13)
LYMPHOCYTES # BLD AUTO: 3.23 K/UL (ref 1–4.8)
LYMPHOCYTES NFR BLD: 38.8 % (ref 22–41)
MCH RBC QN AUTO: 20.9 PG (ref 27–33)
MCHC RBC AUTO-ENTMCNC: 27.8 G/DL (ref 33.7–35.3)
MCV RBC AUTO: 75.1 FL (ref 81.4–97.8)
MICROCYTES BLD QL SMEAR: ABNORMAL
MONOCYTES # BLD AUTO: 0.49 K/UL (ref 0–0.85)
MONOCYTES NFR BLD AUTO: 5.9 % (ref 0–13.4)
MORPHOLOGY BLD-IMP: NORMAL
NEUTROPHILS # BLD AUTO: 4.43 K/UL (ref 1.82–7.42)
NEUTROPHILS NFR BLD: 53.1 % (ref 44–72)
NRBC # BLD AUTO: 0.02 K/UL
NRBC BLD-RTO: 0.2 /100 WBC
OVALOCYTES BLD QL SMEAR: NORMAL
PLATELET # BLD AUTO: 321 K/UL (ref 164–446)
PLATELET BLD QL SMEAR: NORMAL
PMV BLD AUTO: 10.4 FL (ref 9–12.9)
POIKILOCYTOSIS BLD QL SMEAR: NORMAL
POLYCHROMASIA BLD QL SMEAR: NORMAL
POTASSIUM SERPL-SCNC: 4.5 MMOL/L (ref 3.6–5.5)
PROT SERPL-MCNC: 6.9 G/DL (ref 6–8.2)
PROTHROMBIN TIME: 38.5 SEC (ref 12–14.6)
RBC # BLD AUTO: 6.66 M/UL (ref 4.7–6.1)
RBC BLD AUTO: PRESENT
SODIUM SERPL-SCNC: 131 MMOL/L (ref 135–145)
WBC # BLD AUTO: 8.3 K/UL (ref 4.8–10.8)

## 2020-05-10 PROCEDURE — 700101 HCHG RX REV CODE 250

## 2020-05-10 PROCEDURE — 82962 GLUCOSE BLOOD TEST: CPT

## 2020-05-10 PROCEDURE — 80053 COMPREHEN METABOLIC PANEL: CPT

## 2020-05-10 PROCEDURE — 85610 PROTHROMBIN TIME: CPT

## 2020-05-10 PROCEDURE — 99283 EMERGENCY DEPT VISIT LOW MDM: CPT

## 2020-05-10 PROCEDURE — 85025 COMPLETE CBC W/AUTO DIFF WBC: CPT

## 2020-05-10 PROCEDURE — 303747 HCHG EXTRA SUTURE

## 2020-05-10 RX ORDER — LIDOCAINE HYDROCHLORIDE AND EPINEPHRINE BITARTRATE 20; .01 MG/ML; MG/ML
20 INJECTION, SOLUTION SUBCUTANEOUS ONCE
Status: COMPLETED | OUTPATIENT
Start: 2020-05-10 | End: 2020-05-10

## 2020-05-10 RX ORDER — LIDOCAINE HYDROCHLORIDE AND EPINEPHRINE BITARTRATE 20; .01 MG/ML; MG/ML
INJECTION, SOLUTION SUBCUTANEOUS
Status: COMPLETED
Start: 2020-05-10 | End: 2020-05-10

## 2020-05-10 RX ADMIN — LIDOCAINE HYDROCHLORIDE AND EPINEPHRINE 20 ML: 20; 10 INJECTION, SOLUTION INFILTRATION; PERINEURAL at 04:48

## 2020-05-10 RX ADMIN — LIDOCAINE HYDROCHLORIDE AND EPINEPHRINE BITARTRATE 20 ML: 20; .01 INJECTION, SOLUTION SUBCUTANEOUS at 04:48

## 2020-05-10 ASSESSMENT — ENCOUNTER SYMPTOMS
HEADACHES: 0
FALLS: 0
VOMITING: 0
BRUISES/BLEEDS EASILY: 1
SHORTNESS OF BREATH: 0
FEVER: 0

## 2020-05-10 ASSESSMENT — FIBROSIS 4 INDEX: FIB4 SCORE: 0.81

## 2020-05-10 NOTE — ED TRIAGE NOTES
Pt presents to the ED for spontaneous bleeding to his left ankle. Pt denies any trauma. Pt states he is on coumadin for a recent PE. Bleeding controlled at this time with coban and gauze

## 2020-05-10 NOTE — DISCHARGE INSTRUCTIONS
I would recommend, not taking your Coumadin dose today and resuming your normal schedule tomorrow.  This stitch, please to stop the bleeding, can be removed in 3 to 5 days.

## 2020-05-10 NOTE — ED PROVIDER NOTES
ED Provider Note    ED Provider Note    Primary care provider: Pcp Pt States None  Means of arrival: POV  History obtained from: patient  History limited by: None    CHIEF COMPLAINT  Chief Complaint   Patient presents with   • Bleeding/Bruising       HPI  Michael Ontiveros is a 29 y.o. male who presents to the Emergency Department, driving himself here, with a chief complaint of bleeding from his left lower extremity.  Patient has chronic venous stasis changes and ulcerations with a history of weeping wounds to his bilateral lower extremities.  He is anticoagulated secondary to pulmonary embolism.  He states his wounds overall, have been improving and his lower extremity edema has been improving however, he noted, bleeding from an ulceration to the posterior aspect of the left lower extremity prior to arrival which he could not get to stop.  He has had labile INRs in the past.  He states most recently was 2.8 that was early this month, prior to that it was 3.7.  Patient otherwise reports that he is in his normal state of health.  He denies fever, chest pain, shortness of breath.  He does feel like he is lost a lot of blood and is weak related to that.  He also states that he has not eaten for about 12 hours and complains of being hungry.    REVIEW OF SYSTEMS  Review of Systems   Constitutional: Negative for fever.   HENT: Negative for congestion.    Respiratory: Negative for shortness of breath.    Cardiovascular: Negative for chest pain.   Gastrointestinal: Negative for vomiting.   Musculoskeletal: Negative for falls.   Skin: Positive for rash.   Neurological: Negative for headaches.   Endo/Heme/Allergies: Bruises/bleeds easily.   All other systems reviewed and are negative.      PAST MEDICAL HISTORY   has a past medical history of Asthma, Blood clotting disorder (HCC), Chickenpox, Eczema, Hypertension, Influenza, and Tonsillitis.    SURGICAL HISTORY   has a past surgical history that includes dental  "extraction(s) (11/3/2017).    SOCIAL HISTORY  Social History     Tobacco Use   • Smoking status: Never Smoker   • Smokeless tobacco: Never Used   Substance Use Topics   • Alcohol use: Not Currently     Frequency: 2-4 times a month     Comment: rare   • Drug use: No      Social History     Substance and Sexual Activity   Drug Use No       FAMILY HISTORY  Family History   Problem Relation Age of Onset   • Respiratory Disease Mother        CURRENT MEDICATIONS  Home Medications    **Home medications have not yet been reviewed for this encounter**         ALLERGIES  Allergies   Allergen Reactions   • Pistachio    • Tree Nuts Food Allergy    • [place] Anaphylaxis     pistachios       PHYSICAL EXAM  VITAL SIGNS: /68   Pulse 84   Temp 36.1 °C (97 °F) (Oral)   Resp (!) 38   Ht 1.727 m (5' 8\")   Wt (!) 172.4 kg (380 lb)   SpO2 92%   BMI 57.78 kg/m²   Vitals reviewed.  Constitutional: Patient is oriented to person, place, and time.  Morbidly obese.  Mild distress.    Head: Normocephalic and atraumatic.   Ears: Normal external ears bilaterally.   Mouth/Throat: Oropharynx is clear and moist  Eyes: Conjunctivae are normal.  Neck: Normal range of motion.   Cardiovascular: Tachycardia, regular rhythm and normal heart sounds. Normal peripheral pulses.  Pulmonary/Chest: Effort normal and breath sounds normal. No respiratory distress, no wheezes.  No increased work of breathing.  Abdominal: Soft. Bowel sounds are normal. There is no tenderness.   Musculoskeletal: Chronic lower extremity hyperpigmentation, venous stasis changes, scattered thickened skin with a few chronic appearing ulcerations of the bilateral lower extremities.  Patient's left limb, is covered in dried blood and grass.  There is ulceration about 2 cm, to the posterior aspect of the left lower extremity that has active bleeding.  Neurological: No focal deficits.   Skin: Skin is warm and dry. No erythema.  Patient appears pale.  Psychiatric: Patient has a " normal mood and affect.     LABS  Results for orders placed or performed during the hospital encounter of 05/10/20   Prothrombin Time   Result Value Ref Range    PT 38.5 (H) 12.0 - 14.6 sec    INR 3.74 (H) 0.87 - 1.13   CBC WITH DIFFERENTIAL   Result Value Ref Range    WBC 8.3 4.8 - 10.8 K/uL    RBC 6.66 (H) 4.70 - 6.10 M/uL    Hemoglobin 13.9 (L) 14.0 - 18.0 g/dL    Hematocrit 50.0 42.0 - 52.0 %    MCV 75.1 (L) 81.4 - 97.8 fL    MCH 20.9 (L) 27.0 - 33.0 pg    MCHC 27.8 (L) 33.7 - 35.3 g/dL    RDW 57.8 (H) 35.9 - 50.0 fL    Platelet Count 321 164 - 446 K/uL    MPV 10.4 9.0 - 12.9 fL    Neutrophils-Polys 53.10 44.00 - 72.00 %    Lymphocytes 38.80 22.00 - 41.00 %    Monocytes 5.90 0.00 - 13.40 %    Eosinophils 1.40 0.00 - 6.90 %    Basophils 0.40 0.00 - 1.80 %    Immature Granulocytes 0.40 0.00 - 0.90 %    Nucleated RBC 0.20 /100 WBC    Neutrophils (Absolute) 4.43 1.82 - 7.42 K/uL    Lymphs (Absolute) 3.23 1.00 - 4.80 K/uL    Monos (Absolute) 0.49 0.00 - 0.85 K/uL    Eos (Absolute) 0.12 0.00 - 0.51 K/uL    Baso (Absolute) 0.03 0.00 - 0.12 K/uL    Immature Granulocytes (abs) 0.03 0.00 - 0.11 K/uL    NRBC (Absolute) 0.02 K/uL    Hypochromia 1+     Anisocytosis 2+     Microcytosis 2+    Comp Metabolic Panel   Result Value Ref Range    Sodium 131 (L) 135 - 145 mmol/L    Potassium 4.5 3.6 - 5.5 mmol/L    Chloride 95 (L) 96 - 112 mmol/L    Co2 22 20 - 33 mmol/L    Anion Gap 14.0 7.0 - 16.0    Glucose 105 (H) 65 - 99 mg/dL    Bun 13 8 - 22 mg/dL    Creatinine 0.70 0.50 - 1.40 mg/dL    Calcium 9.4 8.5 - 10.5 mg/dL    AST(SGOT) 25 12 - 45 U/L    ALT(SGPT) 15 2 - 50 U/L    Alkaline Phosphatase 74 30 - 99 U/L    Total Bilirubin 0.7 0.1 - 1.5 mg/dL    Albumin 3.5 3.2 - 4.9 g/dL    Total Protein 6.9 6.0 - 8.2 g/dL    Globulin 3.4 1.9 - 3.5 g/dL    A-G Ratio 1.0 g/dL   PERIPHERAL SMEAR REVIEW   Result Value Ref Range    Peripheral Smear Review see below    PLATELET ESTIMATE   Result Value Ref Range    Plt Estimation Normal     MORPHOLOGY   Result Value Ref Range    RBC Morphology Present     Polychromia 1+     Poikilocytosis 1+     Ovalocytes 1+    DIFFERENTIAL COMMENT   Result Value Ref Range    Comments-Diff see below    ESTIMATED GFR   Result Value Ref Range    GFR If African American >60 >60 mL/min/1.73 m 2    GFR If Non African American >60 >60 mL/min/1.73 m 2   ACCU-CHEK GLUCOSE   Result Value Ref Range    Glucose - Accu-Ck 115 (H) 65 - 99 mg/dL       All labs reviewed by me.    COURSE & MEDICAL DECISION MAKING  Pertinent Labs & Imaging studies reviewed. (See chart for details)    Obtained and reviewed past medical records.  Patient's last encounter was at the coagulation clinic May 1.  Patient has a history of pulmonary embolism for which she is on anticoagulation.  Patient is on warfarin and his last INR was 2.8.  Last seen in the emergency department April 23 of this year for a wound check.  At that time, patient's noted bilateral lower extremity weeping wounds, secondary to peripheral edema.  Patient was admitted to the hospital March 7.  At that time, patient noted to be an obese male with obstructive sleep apnea and shortness of breath.  CTA revealed a pulmonary embolism.    4:30 AM - Patient seen and examined at bedside.  This is a 29-year-old male, on anticoagulation for history of PE.  He has no new symptoms other than bleeding from a ulceration to his left lower extremity.  He is anticoagulated.  And IVs been established.  Will check CBC, chemistry and an INR.  When the wound dressing is removed, there is evidence of a small, punctate area where there is active bleeding.  This area was infiltrated with 1% lidocaine with epinephrine.  A #4-0, figure-of-eight stitch was placed around the bleeding vessel and hemostasis was achieved.  We will continue to monitor this area.  Patient was given dietary snacks.  Await lab results.    6:35 AM patient's reevaluated bedside.  He continues to have hemostasis over the wound that had  a figure of 8 stitch applied.  INR is 3.7.  He is advised to not take his dose of Coumadin which he has not had yet today and resume his normal course tomorrow.  Otherwise labs are unrevealing.  Patient has a normal hemoglobin and his platelet count is normal.  At this point, I feel he can safely be discharged home.  He is advised on removal of the suture in the next 3 to 5 days.  Patient demonstrates no increased work of breathing, prior to discharge.  He has a normal temperature, blood pressure is normal.  Normal heart rate.  He is not hypoxic.      Patient will be discharged home in stable condition.    FINAL IMPRESSION  1. Chronic cutaneous venous stasis ulcer (HCC)    2. Anticoagulated on Coumadin    3. Supratherapeutic INR    4. Bleeding from wound    5. Morbid obesity (HCC)

## 2020-05-18 ENCOUNTER — OFFICE VISIT (OUTPATIENT)
Dept: URGENT CARE | Facility: CLINIC | Age: 30
End: 2020-05-18
Payer: COMMERCIAL

## 2020-05-18 VITALS
WEIGHT: 315 LBS | BODY MASS INDEX: 58.54 KG/M2 | SYSTOLIC BLOOD PRESSURE: 112 MMHG | OXYGEN SATURATION: 96 % | HEART RATE: 88 BPM | RESPIRATION RATE: 18 BRPM | TEMPERATURE: 99 F | DIASTOLIC BLOOD PRESSURE: 70 MMHG

## 2020-05-18 DIAGNOSIS — Z79.01 CHRONIC ANTICOAGULATION: ICD-10-CM

## 2020-05-18 DIAGNOSIS — Z48.02 VISIT FOR SUTURE REMOVAL: ICD-10-CM

## 2020-05-18 PROCEDURE — 99212 OFFICE O/P EST SF 10 MIN: CPT | Performed by: PHYSICIAN ASSISTANT

## 2020-05-18 ASSESSMENT — ENCOUNTER SYMPTOMS
NAUSEA: 0
COUGH: 0
ABDOMINAL PAIN: 0
FEVER: 0
SHORTNESS OF BREATH: 0
CHILLS: 0
VOMITING: 0

## 2020-05-18 ASSESSMENT — FIBROSIS 4 INDEX: FIB4 SCORE: 0.58

## 2020-05-19 ENCOUNTER — TELEPHONE (OUTPATIENT)
Dept: MEDICAL GROUP | Facility: PHYSICIAN GROUP | Age: 30
End: 2020-05-19

## 2020-05-19 NOTE — PROGRESS NOTES
Subjective:   Michael Ontiveros  is a 29 y.o. male who presents for Suture / Staple Removal (Left leg .)      Suture / Staple Removal     Patient comes to clinic 8 days status post ER evaluation during which time a suture was placed to aid in hemostasis of the bleeding venous stasis ulcer and posterior left calf.  Patient takes Coumadin secondary to pulmonary embolism diagnosed to the emergency department in March.  Patient is awaiting follow-up with primary care but has been seen vascular medicine has a follow-up appointment with him this Friday.  He denies fevers chills.  He notes challenges with dressing over bilateral lower extremities.  States he had been keeping gauze over weeping ulcers of lower extremities but has run out today.  He is here for suture removal today. Pt notes slight improved edema to bilat legs over interim since ER. Denies any chest pain or palpitations. Denies any change from mild baseline labored breathing. Sees coumadin clinic in 4 days.     Review of Systems   Constitutional: Negative for chills and fever.   Respiratory: Negative for cough and shortness of breath.    Cardiovascular: Positive for leg swelling ( bilat).   Gastrointestinal: Negative for abdominal pain, nausea and vomiting.   Musculoskeletal:        Edema to bilat LE's   Skin: Negative for rash.       Allergies   Allergen Reactions   • Pistachio    • Tree Nuts Food Allergy    • [place] Anaphylaxis     pistachios        Objective:   /70   Pulse 88   Temp 37.2 °C (99 °F)   Resp 18   Wt (!) 174.6 kg (385 lb)   SpO2 96%   BMI 58.54 kg/m²     Physical Exam  Vitals signs and nursing note reviewed.   Constitutional:       General: He is not in acute distress.     Appearance: He is well-developed. He is not diaphoretic.   HENT:      Head: Normocephalic and atraumatic.      Right Ear: External ear normal.      Left Ear: External ear normal.      Nose: Nose normal.   Eyes:      General: No scleral icterus.      Right eye: No discharge.         Left eye: No discharge.      Conjunctiva/sclera: Conjunctivae normal.   Neck:      Musculoskeletal: Neck supple.   Pulmonary:      Effort: Pulmonary effort is normal. No respiratory distress.   Musculoskeletal: Normal range of motion.      Right lower leg: He exhibits swelling. Edema present.      Left lower leg: He exhibits swelling. Edema present.        Legs:       Comments: Bilateral LE's w/ taut/tight edema bilat, one ulceration on each leg w/ clear serous appearing drainage, dusky erythema to bilat LE's   Skin:     General: Skin is warm and dry.      Coloration: Skin is not pale.   Neurological:      Mental Status: He is alert and oriented to person, place, and time.      Coordination: Coordination normal.         Assessment/Plan:   1. Visit for suture removal    2. Chronic anticoagulation  - REFERRAL TO [facility]  suture removed without difficulty, gauze and Ace wrap was placed on patient's bilateral lower extremities, given referral information for follow-up and scheduling (scheduling office is closed at this time)    I have worn an N95 mask, gloves and eye protection for the entire encounter with this patient.     Differential diagnosis, natural history, supportive care, and indications for immediate follow-up discussed.

## 2020-05-19 NOTE — PATIENT INSTRUCTIONS
Vascular:  [facility]  [address] [place] [address]  Phone: [phone]     Pulmonology:  [facility]  [address] [place] [address]  Phone: [phone]     Primary Care:   Call [phone] to schedule tomorrow    Anticoag:  [facility]  [address] [place] [address]  Phone: [phone]

## 2020-05-22 ENCOUNTER — ANTICOAGULATION VISIT (OUTPATIENT)
Dept: VASCULAR LAB | Facility: MEDICAL CENTER | Age: 30
End: 2020-05-22
Attending: INTERNAL MEDICINE
Payer: COMMERCIAL

## 2020-05-22 DIAGNOSIS — Z79.01 CHRONIC ANTICOAGULATION: ICD-10-CM

## 2020-05-22 LAB — INR PPP: 2.4 (ref 2–3.5)

## 2020-05-22 PROCEDURE — 85610 PROTHROMBIN TIME: CPT

## 2020-05-22 PROCEDURE — 99211 OFF/OP EST MAY X REQ PHY/QHP: CPT

## 2020-05-23 NOTE — PROGRESS NOTES
Anticoagulation Summary  As of 2020    INR goal:   2.0-3.0   TTR:   20.9 % (1.9 mo)   INR used for dosin.40 (2020)   Warfarin maintenance plan:   3.75 mg (7.5 mg x 0.5) every Wed; 7.5 mg (7.5 mg x 1) all other days   Weekly warfarin total:   48.75 mg   Plan last modified:   Ady WillD (4/10/2020)   Next INR check:   2020   Target end date:   2020    Indications    Pulmonary embolism with acute cor pulmonale (HCC) [I26.09]             Anticoagulation Episode Summary     INR check location:       Preferred lab:       Send INR reminders to:       Comments:         Anticoagulation Care Providers     Provider Role Specialty Phone number    [facility] Responsible  [phone]        Anticoagulation Patient Findings      HPI:  Michael Lowey Weston seen in clinic today for follow up on anticoagulation therapy in the presence of PE.   Denies any changes to current medical/health status since last appointment.   Denies any medication or diet changes.   No current symptoms of bleeding or thrombosis reported.    A/P:   INR is therapeutic.   Continue current regimen.     Follow up appointment in 3 week(s).      Ady Velasquez.D

## 2020-05-29 ENCOUNTER — OFFICE VISIT (OUTPATIENT)
Dept: MEDICAL GROUP | Facility: MEDICAL CENTER | Age: 30
End: 2020-05-29
Payer: COMMERCIAL

## 2020-05-29 VITALS
OXYGEN SATURATION: 94 % | SYSTOLIC BLOOD PRESSURE: 108 MMHG | WEIGHT: 315 LBS | TEMPERATURE: 98.1 F | HEART RATE: 84 BPM | DIASTOLIC BLOOD PRESSURE: 62 MMHG | BODY MASS INDEX: 46.65 KG/M2 | HEIGHT: 69 IN

## 2020-05-29 DIAGNOSIS — E66.01 MORBID OBESITY (HCC): Chronic | ICD-10-CM

## 2020-05-29 DIAGNOSIS — Z76.89 ENCOUNTER TO ESTABLISH CARE: ICD-10-CM

## 2020-05-29 DIAGNOSIS — L97.819 VENOUS STASIS ULCER OF OTHER PART OF RIGHT LOWER LEG, UNSPECIFIED ULCER STAGE, UNSPECIFIED WHETHER VARICOSE VEINS PRESENT (HCC): ICD-10-CM

## 2020-05-29 DIAGNOSIS — I83.018 VENOUS STASIS ULCER OF OTHER PART OF RIGHT LOWER LEG, UNSPECIFIED ULCER STAGE, UNSPECIFIED WHETHER VARICOSE VEINS PRESENT (HCC): ICD-10-CM

## 2020-05-29 DIAGNOSIS — Z79.01 CHRONIC ANTICOAGULATION: ICD-10-CM

## 2020-05-29 DIAGNOSIS — R60.0 LOWER EXTREMITY EDEMA: ICD-10-CM

## 2020-05-29 PROBLEM — R09.02 OXYGEN DESATURATION: Status: RESOLVED | Noted: 2017-11-04 | Resolved: 2020-05-29

## 2020-05-29 PROBLEM — L03.211 FACIAL CELLULITIS: Status: RESOLVED | Noted: 2017-11-03 | Resolved: 2020-05-29

## 2020-05-29 PROBLEM — A41.9 SEVERE SEPSIS (HCC): Status: RESOLVED | Noted: 2019-08-22 | Resolved: 2020-05-29

## 2020-05-29 PROBLEM — J96.02 ACUTE RESPIRATORY FAILURE WITH HYPOXIA AND HYPERCAPNIA (HCC): Status: RESOLVED | Noted: 2019-08-22 | Resolved: 2020-05-29

## 2020-05-29 PROBLEM — R04.2 HEMOPTYSIS: Status: RESOLVED | Noted: 2019-08-25 | Resolved: 2020-05-29

## 2020-05-29 PROBLEM — J96.01 ACUTE RESPIRATORY FAILURE WITH HYPOXIA AND HYPERCAPNIA (HCC): Status: RESOLVED | Noted: 2019-08-22 | Resolved: 2020-05-29

## 2020-05-29 PROBLEM — R65.20 SEVERE SEPSIS (HCC): Status: RESOLVED | Noted: 2019-08-22 | Resolved: 2020-05-29

## 2020-05-29 PROBLEM — J69.0 ASPIRATION PNEUMONIA (HCC): Status: RESOLVED | Noted: 2019-08-22 | Resolved: 2020-05-29

## 2020-05-29 PROBLEM — N17.9 AKI (ACUTE KIDNEY INJURY) (HCC): Status: RESOLVED | Noted: 2019-08-23 | Resolved: 2020-05-29

## 2020-05-29 PROBLEM — J96.01 ACUTE RESPIRATORY FAILURE WITH HYPOXEMIA (HCC): Status: RESOLVED | Noted: 2020-03-05 | Resolved: 2020-05-29

## 2020-05-29 PROBLEM — D75.1 POLYCYTHEMIA: Status: RESOLVED | Noted: 2019-08-22 | Resolved: 2020-05-29

## 2020-05-29 PROCEDURE — 99214 OFFICE O/P EST MOD 30 MIN: CPT | Performed by: PHYSICIAN ASSISTANT

## 2020-05-29 RX ORDER — FUROSEMIDE 40 MG/1
40 TABLET ORAL DAILY
Qty: 20 TAB | Refills: 0 | Status: SHIPPED | OUTPATIENT
Start: 2020-05-29 | End: 2021-04-30

## 2020-05-29 RX ORDER — POTASSIUM CHLORIDE 20 MEQ/1
20 TABLET, EXTENDED RELEASE ORAL DAILY
Qty: 20 TAB | Refills: 0 | Status: SHIPPED | OUTPATIENT
Start: 2020-05-29 | End: 2021-04-30

## 2020-05-29 ASSESSMENT — PAIN SCALES - GENERAL: PAINLEVEL: NO PAIN

## 2020-05-29 ASSESSMENT — PATIENT HEALTH QUESTIONNAIRE - PHQ9: CLINICAL INTERPRETATION OF PHQ2 SCORE: 0

## 2020-05-29 ASSESSMENT — FIBROSIS 4 INDEX: FIB4 SCORE: 0.58

## 2020-05-29 NOTE — PROGRESS NOTES
"Subjective:   CC:   Chief Complaint   Patient presents with   • Establish Care     Edema in (B) legs, open wounds, leaking clear fluid x 1 month       Michael Ontiveros is a 29 y.o. male here today for establishing care.    Pt has been suffering from chronic venous stasis and Ulcers.   Has been in ED for bleeding from L lower leg ulcer, ulcer got deep enough to get to vesel and pt was bleeding. Is on chronic anticoagulation due to prior PE.   neg for coagulopathy, was flying frequently at the time. Has had 2 pneumonia in the past.   Currently he is here for R lower leg ulcer that has been going on for a long time.  Very slowly improving, liquid oozes out an dit is leaking which is uncomfortable for pt, denies any pain. Pt states he has never had MERSA. Pt has evere obesity.       Current medicines (including changes today)  Current Outpatient Medications   Medication Sig Dispense Refill   • furosemide (LASIX) 40 MG Tab Take 1 Tab by mouth every day. 20 Tab 0   • potassium chloride SA (KDUR) 20 MEQ Tab CR Take 1 Tab by mouth every day. 20 Tab 0   • warfarin (COUMADIN) 7.5 MG Tab Take 7.5 mg by mouth every day.     • ALBUTEROL INH Inhale  by mouth. Dose unknown     • cephALEXin (KEFLEX) 500 MG Cap Take 1 Cap by mouth 2 times a day. (Patient not taking: Reported on 5/18/2020) 14 Cap 0     No current facility-administered medications for this visit.          Past medical, surgical, family, and social history are reviewed in Epic chart by me today.   Medications and allergies reviewed in Epic chart by me today.         ROS   No chest pain, no shortness of breath, no abdominal pain, no fatigue or tiredness  As documented in history of present illness above     Objective:     /62 (BP Location: Right arm, Patient Position: Sitting, BP Cuff Size: Adult long)   Pulse 84   Temp 36.7 °C (98.1 °F) (Temporal)   Ht 1.74 m (5' 8.5\")   Wt (!) 181.9 kg (401 lb 0.3 oz)   SpO2 94%  Body mass index is 60.09 kg/m². "   Physical Exam:  Constitutional: Alert, oriented in no acute distress.  Psych: Eye contact is good, speech goal directed, affect calm  Eyes: Conjunctiva non-injected, sclera non-icteric.  CV: distant heat sounds.  Pos bilateral ower extremity edema w color changes  Due to venous stasis. Open ulcer over anterior R lower sheen,      Assessment and Plan:   The following treatment plan was discussed    1. Lower extremity edema  laxis and potassium, advised put leg up, use compressionstocking if possible or wrap leg to help w edema, pt has very high BMI and needs to loose wt    2. Venous stasis ulcer of other part of right lower leg, unspecified ulcer stage, unspecified whether varicose veins present (HCC)    - REFERRAL TO WOUND CLINIC  - furosemide (LASIX) 40 MG Tab; Take 1 Tab by mouth every day.  Dispense: 20 Tab; Refill: 0    3. Encounter to establish care      4. Chronic anticoagulation  Goes to anticoagulation clinic    5. Morbid obesity (HCC)  Advised pneumonia shot specially considering PMH, pt declined  Declines ref to weight management or bariatric surgery. states he wants to do it on his own     declines labs     Followup: Return if symptoms worsen or fail to improve.         Please note that this dictation was created using voice recognition software. I have made every reasonable attempt to correct obvious errors, but I expect that there are errors of grammar and possibly content that I did not discover before finalizing the note.

## 2020-06-03 ENCOUNTER — NON-PROVIDER VISIT (OUTPATIENT)
Dept: WOUND CARE | Facility: MEDICAL CENTER | Age: 30
End: 2020-06-03
Attending: PHYSICIAN ASSISTANT
Payer: COMMERCIAL

## 2020-06-03 PROCEDURE — 99211 OFF/OP EST MAY X REQ PHY/QHP: CPT

## 2020-06-03 PROCEDURE — 97597 DBRDMT OPN WND 1ST 20 CM/<: CPT

## 2020-06-04 NOTE — CERTIFICATION
"Non Provider Encounter- Lower Extremity Ulcer    HISTORY OF PRESENT ILLNESS  Wound History:    START OF CARE IN CLINIC: 6/3/2020    REFERRING PROVIDER: WHITLEY Bedoya (PCP)     WOUND ETIOLOGY: Edema/Venous   LOCATION: Bilateral Lower Extremities   HISTORY: 30 y/o male with BLE wounds present since early March 2020. PMH includes morbid obesity and Hx of PE on coumadin.    Pertinent Medical History:    Pt denies prior LE wounds, however chart shows hx of same.  He reports he wears compression stockings \"sometimes\".  He elevates his legs at night and reports drainage is less at night.  Varicose veins noted.  He reports \"my Dad had them too\".  Pt declines nutrition referral, states \"I've lost 60 pounds before, I can do it again.\"      TOBACCO USE: Never smoker    FALL RISK ASSESSMENT: Not a fall risk   65 years or older     Fall within the last 2 years   Uses ambulatory devices  Loss of protective sensation in feet   Use of prostethic/orthotic    Presence of lower extremity/foot/toe amputation   Taking medication that increases risk (per facility policy)    MOST RECENT VASCULAR STUDIES: None pertinent  VASCULAR ASSESSMENT:  Right/Left DP pulses palpable 2+ manually.  Unable to palpate PT pulses (due to edema?).  Right/Left DP and PT pulses multiphasic per handheld doppler.  Pt denies claudication, reports elevating his legs is comfortable.    PAST MEDICAL HISTORY:   Past Medical History:   Diagnosis Date   • Asthma    • Blood clotting disorder (HCC)    • Chickenpox    • Eczema    • Hypertension    • Influenza    • Tonsillitis        PAST SURGICAL HISTORY:   Past Surgical History:   Procedure Laterality Date   • DENTAL EXTRACTION(S)  11/3/2017    Procedure: DENTAL EXTRACTION(S);  Surgeon: Olman Sparks D.D.S.;  Location: SURGERY [facility];  Service: Oral Surgery        MEDICATIONS:   Current Outpatient Medications   Medication   • furosemide (LASIX) 40 MG Tab   • potassium chloride SA (KDUR) 20 MEQ " Tab CR   • warfarin (COUMADIN) 7.5 MG Tab   • ALBUTEROL INH   • cephALEXin (KEFLEX) 500 MG Cap     No current facility-administered medications for this visit.        ALLERGIES:    Allergies   Allergen Reactions   • Pistachio    • Tree Nuts Food Allergy    • [place] Anaphylaxis     pistachios         SOCIAL HISTORY:   Social History     Socioeconomic History   • Marital status: Single     Spouse name: Not on file   • Number of children: Not on file   • Years of education: Not on file   • Highest education level: Not on file   Occupational History   • Not on file   Social Needs   • Financial resource strain: Not on file   • Food insecurity     Worry: Never true     Inability: Never true   • Transportation needs     Medical: No     Non-medical: No   Tobacco Use   • Smoking status: Never Smoker   • Smokeless tobacco: Never Used   Substance and Sexual Activity   • Alcohol use: Not Currently     Frequency: 2-4 times a month     Comment: rare   • Drug use: No   • Sexual activity: Yes     Partners: Female     Comment: no kids   Lifestyle   • Physical activity     Days per week: Not on file     Minutes per session: Not on file   • Stress: Not on file   Relationships   • Social connections     Talks on phone: Not on file     Gets together: Not on file     Attends Jain service: Not on file     Active member of club or organization: Not on file     Attends meetings of clubs or organizations: Not on file     Relationship status: Not on file   • Intimate partner violence     Fear of current or ex partner: Not on file     Emotionally abused: Not on file     Physically abused: Not on file     Forced sexual activity: Not on file   Other Topics Concern   • Not on file   Social History Narrative   • Not on file       FAMILY HISTORY:   Family History   Problem Relation Age of Onset   • Respiratory Disease Mother    • Diabetes Paternal Aunt        WOUND ASSESSMENT         Wound 06/03/20 Venous Ulcer RLE Anterior (Active)   Wound  Image    06/03/20 1600   Site Assessment Red;Pink;Yellow 06/03/20 1600   Periwound Assessment Edema;Blanchable erythema 06/03/20 1600   Margins Attached edges 06/03/20 1600   Closure Secondary intention 06/03/20 1600   Drainage Amount Large 06/03/20 1600   Drainage Description Serous 06/03/20 1600   Treatments Cleansed;Topical Lidocaine;CSWD - Conservative Sharp Wound Debridement 06/03/20 1600   Wound Cleansing Normal Saline Irrigation 06/03/20 1600   Periwound Protectant Barrier Paste;Skin Moisturizer 06/03/20 1600   Dressing Cleansing/Solutions Not Applicable 06/03/20 1600   Dressing Options Hydrofiber Silver;Calcium Alginate;Absorbent Abdominal Pad;Compression Wrap Two Layer 06/03/20 1600   Dressing Changed New 06/03/20 1600   Dressing Status Clean;Dry;Intact 06/03/20 1600   Dressing Change/Treatment Frequency Weekly, and As Needed 06/03/20 1600   Non-staged Wound Description Full thickness 06/03/20 1600   Wound Length (cm) 3 cm 06/03/20 1600   Wound Width (cm) 1.5 cm 06/03/20 1600   Wound Surface Area (cm^2) 4.5 cm^2 06/03/20 1600   Post-Procedure Length (cm) 3 cm 06/03/20 1600   Post-Procedure Width (cm) 1.7 cm 06/03/20 1600   Post-Procedure Depth (cm) 0.1 cm 06/03/20 1600   Post-Procedure Surface Area (cm^2) 5.1 cm^2 06/03/20 1600   Post-Procedure Volume (cm^3) 0.51 cm^3 06/03/20 1600   Tunneling (cm) 0 cm 06/03/20 1600   Undermining (cm) 0 cm 06/03/20 1600   Wound Odor None 06/03/20 1600   Pulses See vascular assessment above 06/03/20 1600   Exposed Structures None 06/03/20 1600       Wound 06/03/20 Venous Ulcer RLE Lateral (Active)   Wound Image    06/03/20 1600   Site Assessment Pink;Yellow 06/03/20 1600   Periwound Assessment Blanchable erythema;Edema 06/03/20 1600   Margins Attached edges 06/03/20 1600   Closure Secondary intention 06/03/20 1600   Drainage Amount Scant 06/03/20 1600   Drainage Description Serous 06/03/20 1600   Treatments Cleansed;Topical Lidocaine;CSWD - Conservative Sharp Wound  Debridement 06/03/20 1600   Wound Cleansing Normal Saline Irrigation 06/03/20 1600   Periwound Protectant Barrier Paste;Skin Moisturizer 06/03/20 1600   Dressing Cleansing/Solutions Not Applicable 06/03/20 1600   Dressing Options Hydrofiber Silver;Calcium Alginate;Absorbent Abdominal Pad;Compression Wrap Two Layer 06/03/20 1600   Dressing Changed New 06/03/20 1600   Dressing Status Clean;Dry;Intact 06/03/20 1600   Dressing Change/Treatment Frequency Weekly, and As Needed 06/03/20 1600   Non-staged Wound Description Full thickness 06/03/20 1600   Wound Length (cm) 3.6 cm 06/03/20 1600   Wound Width (cm) 0.7 cm 06/03/20 1600   Wound Surface Area (cm^2) 2.52 cm^2 06/03/20 1600   Post-Procedure Length (cm) 3.6 cm 06/03/20 1600   Post-Procedure Width (cm) 0.5 cm 06/03/20 1600   Post-Procedure Depth (cm) 0.1 cm 06/03/20 1600   Post-Procedure Surface Area (cm^2) 1.8 cm^2 06/03/20 1600   Post-Procedure Volume (cm^3) 0.18 cm^3 06/03/20 1600   Tunneling (cm) 0 cm 06/03/20 1600   Undermining (cm) 0 cm 06/03/20 1600   Wound Odor None 06/03/20 1600   Pulses See vascular assessment above 06/03/20 1600   Exposed Structures None 06/03/20 1600       Wound 06/03/20 Venous Ulcer LLE Medial (Active)   Wound Image    06/03/20 1600   Site Assessment Red;Yellow;[place] 06/03/20 1600   Periwound Assessment Blanchable erythema;Edema 06/03/20 1600   Margins Attached edges 06/03/20 1600   Closure Secondary intention 06/03/20 1600   Drainage Amount Moderate 06/03/20 1600   Drainage Description Serous 06/03/20 1600   Treatments Cleansed;Topical Lidocaine;CSWD - Conservative Sharp Wound Debridement 06/03/20 1600   Wound Cleansing Normal Saline Irrigation 06/03/20 1600   Periwound Protectant Barrier Paste;Skin Moisturizer 06/03/20 1600   Dressing Cleansing/Solutions Not Applicable 06/03/20 1600   Dressing Options Hydrofiber Silver;Calcium Alginate;Absorbent Abdominal Pad;Compression Wrap Two Layer 06/03/20 1600   Dressing Changed New 06/03/20 1600    Dressing Status Clean;Dry;Intact 06/03/20 1600   Dressing Change/Treatment Frequency Weekly, and As Needed 06/03/20 1600   Non-staged Wound Description Full thickness 06/03/20 1600   Wound Length (cm) 1 cm 06/03/20 1600   Wound Width (cm) 1.2 cm 06/03/20 1600   Wound Surface Area (cm^2) 1.2 cm^2 06/03/20 1600   Post-Procedure Length (cm) 1 cm 06/03/20 1600   Post-Procedure Width (cm) 1.2 cm 06/03/20 1600   Post-Procedure Depth (cm) 0.2 cm 06/03/20 1600   Post-Procedure Surface Area (cm^2) 1.2 cm^2 06/03/20 1600   Post-Procedure Volume (cm^3) 0.24 cm^3 06/03/20 1600   Tunneling (cm) 0 cm 06/03/20 1600   Undermining (cm) 0 cm 06/03/20 1600   Wound Odor None 06/03/20 1600   Pulses See vascular assessment above 06/03/20 1600   Exposed Structures None 06/03/20 1600       Wound 06/03/20 Venous Ulcer LLE Posterior (Active)   Wound Image    06/03/20 1600   Site Assessment Pink;Red;Yellow 06/03/20 1600   Periwound Assessment Blanchable erythema;Edema 06/03/20 1600   Margins Attached edges 06/03/20 1600   Closure Secondary intention 06/03/20 1600   Drainage Amount Moderate 06/03/20 1600   Drainage Description Serous 06/03/20 1600   Treatments Cleansed;Topical Lidocaine;CSWD - Conservative Sharp Wound Debridement 06/03/20 1600   Wound Cleansing Normal Saline Irrigation 06/03/20 1600   Periwound Protectant Barrier Paste;Skin Moisturizer 06/03/20 1600   Dressing Cleansing/Solutions Not Applicable 06/03/20 1600   Dressing Options Hydrofiber Silver;Calcium Alginate;Absorbent Abdominal Pad;Compression Wrap Two Layer 06/03/20 1600   Dressing Changed New 06/03/20 1600   Dressing Status Clean;Dry;Intact 06/03/20 1600   Dressing Change/Treatment Frequency Weekly, and As Needed 06/03/20 1600   Non-staged Wound Description Full thickness 06/03/20 1600   Wound Length (cm) 2 cm 06/03/20 1600   Wound Width (cm) 2.7 cm 06/03/20 1600   Wound Surface Area (cm^2) 5.4 cm^2 06/03/20 1600   Post-Procedure Length (cm) 1.8 cm 06/03/20 1600    Post-Procedure Width (cm) 2.7 cm 06/03/20 1600   Post-Procedure Depth (cm) 0.2 cm 06/03/20 1600   Post-Procedure Surface Area (cm^2) 4.86 cm^2 06/03/20 1600   Post-Procedure Volume (cm^3) 0.97 cm^3 06/03/20 1600   Tunneling (cm) 0 cm 06/03/20 1600   Undermining (cm) 0 cm 06/03/20 1600   Wound Odor None 06/03/20 1600   Pulses See Vascular assessment above 06/03/20 1600   Exposed Structures None 06/03/20 1600      PATIENT EDUCATION   - Importance of managing edema for healing of ulcer, and for prevention of new ulcer development  -Elevate legs above the level of the heart periodically throughout the day.  - Importance of adequate nutrition for wound healing  -Increase protein intake (unless contraindicated by renal status)  -Advised to go to ER for any increased redness, swelling, drainage or odor, or if patient develops fever, chills, nausea or vomiting.  -Advised to remove compression wraps due to loss of feeling, numbness, tingling or pain.  -Supplies provided for patient in the event the dressing needs to be changed.   -Patient to return to clinic 1 to 2 times per week for follow-up.

## 2020-06-04 NOTE — PROCEDURES
CSWD using curette to remove ~10cm2 nonviable material/biofilm from BLE wound beds.  2% topical lidocaine applied prior to debridement with ~5 minute dwell time.  Pt tolerated well, although did report pain.

## 2020-06-04 NOTE — PATIENT INSTRUCTIONS
-Keep your wound dressing clean, dry, and intact.    -Change your dressing if it becomes soiled, soaked, or falls off.    -Remove your compression wrap if you have severe pain, severe swelling, numbness, color change, or temperature change in your toes. If you need to remove your compression wrap, do so by unrolling it. Do not cut the compression wrap off to prevent cutting yourself on accident.    -Should you experience any significant changes in your wound(s), such as infection (redness, swelling, localized heat, increased pain, fever > 101 F, chills) or have any questions regarding your home care instructions, please contact the wound center at [phone]. If after hours, contact your primary care physician or go to the hospital emergency room.

## 2020-06-09 ENCOUNTER — NON-PROVIDER VISIT (OUTPATIENT)
Dept: WOUND CARE | Facility: MEDICAL CENTER | Age: 30
End: 2020-06-09
Attending: PHYSICIAN ASSISTANT
Payer: COMMERCIAL

## 2020-06-09 DIAGNOSIS — I83.019 VENOUS ULCER OF BOTH LOWER EXTREMITIES WITH VARICOSE VEINS (HCC): ICD-10-CM

## 2020-06-09 DIAGNOSIS — I83.029 VENOUS ULCER OF BOTH LOWER EXTREMITIES WITH VARICOSE VEINS (HCC): ICD-10-CM

## 2020-06-09 DIAGNOSIS — L97.929 VENOUS ULCER OF BOTH LOWER EXTREMITIES WITH VARICOSE VEINS (HCC): ICD-10-CM

## 2020-06-09 DIAGNOSIS — L97.919 VENOUS ULCER OF BOTH LOWER EXTREMITIES WITH VARICOSE VEINS (HCC): ICD-10-CM

## 2020-06-09 PROCEDURE — 97602 WOUND(S) CARE NON-SELECTIVE: CPT

## 2020-06-09 NOTE — PATIENT INSTRUCTIONS
Avoid prolonged standing or sitting without elevating your legs.    - Multilayer compression wrap to both legs. Do not get wet and keep on for the week. Only remove if temperature or sensation changes.    Keep dressing clean and dry and cover while bathing. Only change dressing if over saturated, soiled or its falling off.     Should you experience any significant changes in your wound(s) such as infection (redness, swelling, localized heat, increased pain, fever >101 F, chills) or have any questions regarding your home care instructions, please contact the wound center [phone]. If after hours, contact your primary care physician or go the hospital emergency room.

## 2020-06-12 ENCOUNTER — ANTICOAGULATION VISIT (OUTPATIENT)
Dept: VASCULAR LAB | Facility: MEDICAL CENTER | Age: 30
End: 2020-06-12
Attending: INTERNAL MEDICINE
Payer: COMMERCIAL

## 2020-06-12 ENCOUNTER — APPOINTMENT (OUTPATIENT)
Dept: WOUND CARE | Facility: MEDICAL CENTER | Age: 30
End: 2020-06-12
Attending: PHYSICIAN ASSISTANT
Payer: COMMERCIAL

## 2020-06-12 DIAGNOSIS — Z79.01 CHRONIC ANTICOAGULATION: ICD-10-CM

## 2020-06-12 LAB — INR PPP: 2.4 (ref 2–3.5)

## 2020-06-12 PROCEDURE — 99211 OFF/OP EST MAY X REQ PHY/QHP: CPT

## 2020-06-12 PROCEDURE — 85610 PROTHROMBIN TIME: CPT

## 2020-06-12 NOTE — PROGRESS NOTES
Anticoagulation Summary  As of 2020    INR goal:   2.0-3.0   TTR:   42.2 % (2.6 mo)   INR used for dosin.40 (2020)   Warfarin maintenance plan:   3.75 mg (7.5 mg x 0.5) every Wed; 7.5 mg (7.5 mg x 1) all other days   Weekly warfarin total:   48.75 mg   Plan last modified:   Ady WillD (4/10/2020)   Next INR check:   7/10/2020   Target end date:   2020    Indications    Pulmonary embolism with acute cor pulmonale (HCC) [I26.09]             Anticoagulation Episode Summary     INR check location:       Preferred lab:       Send INR reminders to:       Comments:         Anticoagulation Care Providers     Provider Role Specialty Phone number    [facility] Responsible  [phone]        Anticoagulation Patient Findings      HPI:  Michael Lowey Weston seen in clinic today for follow up on anticoagulation therapy in the presence of PE.   Denies any changes to current medical/health status since last appointment.   Denies any medication or diet changes.   No current symptoms of bleeding or thrombosis reported.    A/P:   INR is therapeutic.   Continue current regimen.   Follow up appointment in 4 week(s).    Ady Velasquez.D

## 2020-06-16 ENCOUNTER — APPOINTMENT (OUTPATIENT)
Dept: WOUND CARE | Facility: MEDICAL CENTER | Age: 30
End: 2020-06-16
Attending: PHYSICIAN ASSISTANT
Payer: COMMERCIAL

## 2020-06-19 ENCOUNTER — NON-PROVIDER VISIT (OUTPATIENT)
Dept: WOUND CARE | Facility: MEDICAL CENTER | Age: 30
End: 2020-06-19
Attending: PHYSICIAN ASSISTANT
Payer: COMMERCIAL

## 2020-06-19 PROCEDURE — 97602 WOUND(S) CARE NON-SELECTIVE: CPT

## 2020-06-19 NOTE — PATIENT INSTRUCTIONS
-Keep your wound dressing clean, dry, and intact.    -Change your dressing if it becomes soiled, soaked, or falls off.    -Remove your compression wrap if you have severe pain, severe swelling, numbness, color change, or temperature change in your toes. If you need to remove your compression wrap, do so by unrolling it. Do not cut the compression wrap off to prevent cutting yourself on accident.    -Should you experience any significant changes in your wound(s), such as infection (redness, swelling, localized heat, increased pain, fever > 101 F, chills) or have any questions regarding your home care instructions, please contact the wound center at [phone]. If after hours, contact your primary care physician or go to the hospital emergency room.

## 2020-06-23 ENCOUNTER — APPOINTMENT (OUTPATIENT)
Dept: WOUND CARE | Facility: MEDICAL CENTER | Age: 30
End: 2020-06-23
Attending: PHYSICIAN ASSISTANT
Payer: COMMERCIAL

## 2020-06-26 ENCOUNTER — NON-PROVIDER VISIT (OUTPATIENT)
Dept: WOUND CARE | Facility: MEDICAL CENTER | Age: 30
End: 2020-06-26
Attending: PHYSICIAN ASSISTANT
Payer: COMMERCIAL

## 2020-06-26 PROCEDURE — 97597 DBRDMT OPN WND 1ST 20 CM/<: CPT

## 2020-06-26 NOTE — PATIENT INSTRUCTIONS
Avoid prolonged standing or sitting without elevating your legs.    - Multilayer compression wrap to both legs. Do not get wet and keep on for the week. Only remove if temperature or sensation changes.    Keep dressing clean and dry and cover while bathing. Only change dressing if over saturated, soiled or its falling off.     Should you experience any significant changes in your wound(s) such as infection (redness, swelling, localized heat, increased pain, fever >101 F, chills) or have any questions regarding your home care instructions, please contact the wound center [phone]. If after hours, contact your primary care physician or go the hospital emergency room.

## 2020-06-26 NOTE — PROCEDURES
2% Viscous lidocaine applied to wound and periwound 10 minutes dwell time.  CSWD using curette to remove approx. ~7.5cm2 of nonviable tissue from all wound beds.     Pt states coflex wrap lasted almost whole week until yesterday. Pt states LLE wrap came undone somehow, and RLE wrap got wet accidentally so pt removed. Pt did not have anything on RLE and had tubigrip on LLE however was bunched up. Reinforced education on compression wraps to keep it dry and to make sure it is about two fingerwidth below the knee fold. Pt verbalized understanding. Coflex 2 layer applied to BLE again this visit and provided tubigrips in case wraps has to be removed.

## 2020-06-30 ENCOUNTER — APPOINTMENT (OUTPATIENT)
Dept: WOUND CARE | Facility: MEDICAL CENTER | Age: 30
End: 2020-06-30
Attending: PHYSICIAN ASSISTANT
Payer: COMMERCIAL

## 2020-07-06 ENCOUNTER — NON-PROVIDER VISIT (OUTPATIENT)
Dept: WOUND CARE | Facility: MEDICAL CENTER | Age: 30
End: 2020-07-06
Attending: PHYSICIAN ASSISTANT
Payer: COMMERCIAL

## 2020-07-06 PROCEDURE — 97597 DBRDMT OPN WND 1ST 20 CM/<: CPT

## 2020-07-06 NOTE — PATIENT INSTRUCTIONS
-Keep dressings clean, dry and covered while bathing. Change dressings if they become over saturated, soiled or fall off; or once in between clinic visits.     -Avoid prolonged standing or sitting without elevating your legs.    -Remove your compression garments if you have severe pain, severe swelling, numbness, color change, or temperature change in your toes. If you need to remove your compression garments, do so by unrolling them. Do not cut the compression garments off, this is to prevent cutting yourself on accident.    -Never walk around the house barefoot. Always wear a rubber soled slipper when walking around the house.    -Should you experience any significant changes in your wound(s), such as infection (redness, swelling, localized heat, increased pain, fever > 101 F, chills) or have any questions regarding your home care instructions, please contact the wound center at [phone]. If after hours, contact your primary care physician or go to the hospital emergency room.

## 2020-07-06 NOTE — PROCEDURES
2% viscous lidocaine applied to wounds with approximate 10 minute dwell time. CSWD with curette as patient wound tolerate to remove approximately 8.5cm of nonviable tissue from all wound beds.   Pt stated that he has had trouble this last week with wounds leaking through coflex 2 layer on right lower extremity, iIncreased absorptive layers to accomodate exudate. Pt is scheduled to come twice a week for dressing changes.

## 2020-07-10 ENCOUNTER — NON-PROVIDER VISIT (OUTPATIENT)
Dept: WOUND CARE | Facility: MEDICAL CENTER | Age: 30
End: 2020-07-10
Attending: PHYSICIAN ASSISTANT
Payer: COMMERCIAL

## 2020-07-10 PROCEDURE — 97597 DBRDMT OPN WND 1ST 20 CM/<: CPT

## 2020-07-10 NOTE — PATIENT INSTRUCTIONS
Avoid prolonged standing or sitting without elevating your legs.    - Multilayer compression wrap to both legs. Do not get wet and keep on for the week. Only remove if temperature or sensation changes.    Keep dressing clean and dry and cover while bathing. Only change dressing if over saturated, soiled or its falling off.     Should you experience any significant changes in your wound(s) such as infection (redness, swelling, localized heat, increased pain, fever >101 F, chills) or have any questions regarding your home care instructions, please contact the wound center [phone]. If after hours, contact your primary care physician or go the hospital emergency room.

## 2020-07-10 NOTE — PROCEDURES
2% Viscous lidocaine applied to wound and periwound ~5 minutes dwell time.   CSWD using curette to remove approx. ~15cm2 of nonviable tissue from all wound beds.

## 2020-07-14 ENCOUNTER — NON-PROVIDER VISIT (OUTPATIENT)
Dept: WOUND CARE | Facility: MEDICAL CENTER | Age: 30
End: 2020-07-14
Attending: PHYSICIAN ASSISTANT
Payer: COMMERCIAL

## 2020-07-14 PROCEDURE — 97597 DBRDMT OPN WND 1ST 20 CM/<: CPT

## 2020-07-14 NOTE — PROCEDURES
@% Viscous lidocaine applied to wound beds on RLE with ~10 minutes of dwell time. CSWD using curette to remove ~7cm2 of nonviable tissue from RLE wound beds. LLE wounds nonselectively debrided with saline and gauze to removed loose nonviable tissue. Patient tolerated well. Patient's  breath rapid and appears laborious through out appointment, denies shortness of breath and states just the stress of appointment and debridement.

## 2020-07-14 NOTE — PATIENT INSTRUCTIONS
-Keep dressings clean, dry and covered while bathing. Change dressings if they become over saturated, soiled or fall off; or once in between clinic visits.     -Avoid prolonged standing or sitting without elevating your legs.    -Remove your compression garments if you have severe pain, severe swelling, numbness, color change, or temperature change in your toes. If you need to remove your compression garments, do so by unrolling them. Do not cut the compression garments off, this is to prevent cutting yourself on accident.    -Never walk around the house barefoot. Always wear a rubber soled slipper when walking around the house.    -Should you experience any significant changes in your wound(s), such as infection (redness, swelling, localized heat, increased pain, fever > 101 F, chills) or have any questions regarding your home care instructions, please contact the wound center at [phone]. If after hours, contact your primary care physician or go to the hospital emergency room.

## 2020-07-15 ENCOUNTER — TELEPHONE (OUTPATIENT)
Dept: VASCULAR LAB | Facility: MEDICAL CENTER | Age: 30
End: 2020-07-15

## 2020-07-15 NOTE — TELEPHONE ENCOUNTER
[facility]    Left VM with pt requesting a call back to discuss length of therapy for warfarin.  On 4/27 it was documented our  reached out to the patient multiple times to schedule for an assessment for the length of therapy of anticoagulation.  Pt recently established with PCP.  If pt does not return our call in the next week, will reach out to PCP to try and help coordinate the decision for length of therapy.    Charles Lazaro, AdyD

## 2020-07-17 ENCOUNTER — NON-PROVIDER VISIT (OUTPATIENT)
Dept: WOUND CARE | Facility: MEDICAL CENTER | Age: 30
End: 2020-07-17
Attending: PHYSICIAN ASSISTANT
Payer: COMMERCIAL

## 2020-07-17 ENCOUNTER — ANTICOAGULATION VISIT (OUTPATIENT)
Dept: VASCULAR LAB | Facility: MEDICAL CENTER | Age: 30
End: 2020-07-17
Attending: INTERNAL MEDICINE
Payer: COMMERCIAL

## 2020-07-17 DIAGNOSIS — Z79.01 CHRONIC ANTICOAGULATION: ICD-10-CM

## 2020-07-17 LAB — INR PPP: 2.8 (ref 2–3.5)

## 2020-07-17 PROCEDURE — 97597 DBRDMT OPN WND 1ST 20 CM/<: CPT

## 2020-07-17 PROCEDURE — 99211 OFF/OP EST MAY X REQ PHY/QHP: CPT

## 2020-07-17 PROCEDURE — 85610 PROTHROMBIN TIME: CPT

## 2020-07-17 RX ORDER — WARFARIN SODIUM 7.5 MG/1
7.5 TABLET ORAL DAILY
Qty: 30 TAB | Refills: 6 | Status: SHIPPED | OUTPATIENT
Start: 2020-07-17 | End: 2020-11-25 | Stop reason: SDUPTHER

## 2020-07-17 NOTE — PATIENT INSTRUCTIONS
Avoid prolonged standing or sitting without elevating your legs.    - Multilayer compression wrap to both legs. Do not get wet and keep on for the week. Only remove if temperature or sensation changes.    Keep dressing clean and dry and cover while bathing. Only change dressing if over saturated, soiled or its falling off.     Should you experience any significant changes in your wound(s) such as infection (redness, swelling, localized heat, increased pain, fever >101 F, chills) or have any questions regarding your home care instructions, please contact the wound center [phone]. If after hours, contact your primary care physician or go the hospital emergency room.

## 2020-07-17 NOTE — PROGRESS NOTES
Anticoagulation Summary  As of 2020    INR goal:   2.0-3.0   TTR:   60.1 % (3.8 mo)   INR used for dosin.80 (2020)   Warfarin maintenance plan:   3.75 mg (7.5 mg x 0.5) every Wed; 7.5 mg (7.5 mg x 1) all other days   Weekly warfarin total:   48.75 mg   Plan last modified:   Ady WillD (4/10/2020)   Next INR check:   2020   Target end date:   2020    Indications    Pulmonary embolism with acute cor pulmonale (HCC) [I26.09]             Anticoagulation Episode Summary     INR check location:       Preferred lab:       Send INR reminders to:       Comments:         Anticoagulation Care Providers     Provider Role Specialty Phone number    [facility] Responsible  [phone]        Anticoagulation Patient Findings      HPI:  Michael Lowey Weston seen in clinic today for follow up on anticoagulation therapy in the presence of PE.   Denies any changes to current medical/health status since last appointment.   Denies any medication or diet changes.   No current symptoms of bleeding or thrombosis reported.    A/P:   INR is therapeutic.   Continue current regimen.     Follow up appointment in 4 week(s).    Ady Velasquez.D

## 2020-07-17 NOTE — PROCEDURES
2% Viscous lidocaine applied to wound and periwound 10 minutes dwell time.  CSWD using curette to remove approx. ~15cm2 of nonviable tissue from all wound beds.

## 2020-07-20 LAB — INR BLD: 2.8 (ref 0.9–1.2)

## 2020-07-21 ENCOUNTER — NON-PROVIDER VISIT (OUTPATIENT)
Dept: WOUND CARE | Facility: MEDICAL CENTER | Age: 30
End: 2020-07-21
Attending: PHYSICIAN ASSISTANT
Payer: COMMERCIAL

## 2020-07-21 PROCEDURE — 97602 WOUND(S) CARE NON-SELECTIVE: CPT

## 2020-07-22 ENCOUNTER — TELEPHONE (OUTPATIENT)
Dept: VASCULAR LAB | Facility: MEDICAL CENTER | Age: 30
End: 2020-07-22

## 2020-07-22 NOTE — TELEPHONE ENCOUNTER
[facility]    Spoke with pt concerning making a decision for LOT.  Pt has passed his estimated LOT to reassess duration.  He is unable to come to the clinic on Mondays to see our APRN.  All other APRN's are completely full for the next few weeks.  PT would benefit from a LOT visit.      Per our initial note:    PCP - none  Pt hx - Had PE in August 2019, was treated for 3 months and then taken off. Now found to have PE again, unclear if it's new or residual from before.   Per Hospital DC note:  29-year-old male with morbid obesity, likely untreated JONES presenting with shortness of breath.  CTA demonstrates recurrent pulmonary embolus.  Had history of late 2019 and completed 3 months of Xarelto.  Given patient's morbid obesity, sedentary lifestyle is possible recurrent PE was provoked -however given quick recurrence need to consider hypercoagulable state.  Thrombophilia lab panel drawn and pending at this time.  HAS-BLED = 1 (HTN)  DOAC = not recommended due to BMI and also prior hx.    Please see Dr Bloch's note from 3/17/20 for further details.     Charles Lazaro, PharmD

## 2020-07-24 ENCOUNTER — NON-PROVIDER VISIT (OUTPATIENT)
Dept: WOUND CARE | Facility: MEDICAL CENTER | Age: 30
End: 2020-07-24
Attending: PHYSICIAN ASSISTANT
Payer: COMMERCIAL

## 2020-07-24 PROCEDURE — 97602 WOUND(S) CARE NON-SELECTIVE: CPT

## 2020-07-24 NOTE — PATIENT INSTRUCTIONS
Avoid prolonged standing or sitting without elevating your legs.  - Multilayer compression wrap to both legs. Do not get wet and keep on for the week. Only remove if temperature or sensation changes.    Keep dressing clean and dry and cover while bathing. Only change dressing if over saturated, soiled or its falling off.     Should you experience any significant changes in your wound(s) such as infection (redness, swelling, localized heat, increased pain, fever >101 F, chills) or have any questions regarding your home care instructions, please contact the wound center [phone]. If after hours, contact your primary care physician or go the hospital emergency room.

## 2020-07-28 ENCOUNTER — NON-PROVIDER VISIT (OUTPATIENT)
Dept: WOUND CARE | Facility: MEDICAL CENTER | Age: 30
End: 2020-07-28
Attending: PHYSICIAN ASSISTANT
Payer: COMMERCIAL

## 2020-07-28 PROCEDURE — 97602 WOUND(S) CARE NON-SELECTIVE: CPT

## 2020-07-28 NOTE — PROCEDURES
Non selective debridement with no rinse foam cleanser and washcloth to remove non viable tissue from wound beds and tianna wound

## 2020-07-31 ENCOUNTER — NON-PROVIDER VISIT (OUTPATIENT)
Dept: WOUND CARE | Facility: MEDICAL CENTER | Age: 30
End: 2020-07-31
Attending: PHYSICIAN ASSISTANT
Payer: COMMERCIAL

## 2020-07-31 PROCEDURE — 97602 WOUND(S) CARE NON-SELECTIVE: CPT

## 2020-08-04 ENCOUNTER — NON-PROVIDER VISIT (OUTPATIENT)
Dept: WOUND CARE | Facility: MEDICAL CENTER | Age: 30
End: 2020-08-04
Attending: PHYSICIAN ASSISTANT
Payer: COMMERCIAL

## 2020-08-04 PROCEDURE — 97602 WOUND(S) CARE NON-SELECTIVE: CPT

## 2020-08-04 NOTE — PROCEDURES
"Non selective with NS and gauze to remove non viable tissue from wound beds. Patient tolerated well.     Wounds to LLE are larger today. Patient states that he \"sweat more\" this week. Imagine this may be the reason that the wounds are larger today.   "

## 2020-08-07 ENCOUNTER — APPOINTMENT (OUTPATIENT)
Dept: VASCULAR LAB | Facility: MEDICAL CENTER | Age: 30
End: 2020-08-07
Payer: COMMERCIAL

## 2020-08-07 ENCOUNTER — NON-PROVIDER VISIT (OUTPATIENT)
Dept: WOUND CARE | Facility: MEDICAL CENTER | Age: 30
End: 2020-08-07
Attending: PHYSICIAN ASSISTANT
Payer: COMMERCIAL

## 2020-08-07 PROCEDURE — 97602 WOUND(S) CARE NON-SELECTIVE: CPT

## 2020-08-07 ASSESSMENT — PAIN SCALES - GENERAL: PAINLEVEL: NO PAIN

## 2020-08-07 NOTE — PATIENT INSTRUCTIONS
Reviewed POC, importance of keeping the secondary dressing dry and intact, nutrition for wound healing, compression therapy, s/s of complications/infection, when to notify MD/go to ER.  Pt verbalized understanding to all.

## 2020-08-11 ENCOUNTER — NON-PROVIDER VISIT (OUTPATIENT)
Dept: WOUND CARE | Facility: MEDICAL CENTER | Age: 30
End: 2020-08-11
Attending: PHYSICIAN ASSISTANT
Payer: COMMERCIAL

## 2020-08-11 ENCOUNTER — TELEPHONE (OUTPATIENT)
Dept: VASCULAR LAB | Facility: MEDICAL CENTER | Age: 30
End: 2020-08-11

## 2020-08-11 PROCEDURE — 97602 WOUND(S) CARE NON-SELECTIVE: CPT

## 2020-08-11 NOTE — PATIENT INSTRUCTIONS
Avoid prolonged standing or sitting without elevating your legs.    - Multilayer compression wrap to both legs. Do not get wet and keep on for the week. Only remove if temperature or sensation changes.    Keep dressing clean and dry and cover while bathing. Only change dressing if over saturated, soiled or its falling off.     Should you experience any significant changes in your wound(s) such as infection (redness, swelling, localized heat, increased pain, fever >101 F, chills) or have any questions regarding your home care instructions, please contact the wound center [phone]. If after hours, contact your primary care physician or go the hospital emergency room.

## 2020-08-11 NOTE — PROCEDURES
Nonselective debridement with washcloth and no rinse foam then NS and gauze to remove nonviable tissue from wound bed.       BLE ulcers have resolved. Treating circumferential weeping areas to both legs at this time.

## 2020-08-11 NOTE — TELEPHONE ENCOUNTER
[facility]      Left VM with pt requesting a call back to discuss LOT.  Pt cancelled appt on 8/7 with Dr Tejada.  Would like to establish and estimated LOT.     Charles Lazaro, AdyD

## 2020-08-14 ENCOUNTER — NON-PROVIDER VISIT (OUTPATIENT)
Dept: WOUND CARE | Facility: MEDICAL CENTER | Age: 30
End: 2020-08-14
Attending: PHYSICIAN ASSISTANT
Payer: COMMERCIAL

## 2020-08-14 PROCEDURE — 97602 WOUND(S) CARE NON-SELECTIVE: CPT

## 2020-08-14 NOTE — PATIENT INSTRUCTIONS
Avoid prolonged standing or sitting without elevating your legs.    - Multilayer compression wrap to both legs. Do not get wet and keep on for the week. Only remove if temperature or sensation changes.    Keep dressing clean and dry and cover while bathing. Only change dressing if over saturated, soiled or its falling off.     Should you experience any significant changes in your wound(s) such as infection (redness, swelling, localized heat, increased pain, fever >101 F, chills) or have any questions regarding your home care instructions, please contact the wound center [phone]. If after hours, contact your primary care physician or go the hospital emergency room.

## 2020-08-17 ENCOUNTER — TELEPHONE (OUTPATIENT)
Dept: VASCULAR LAB | Facility: MEDICAL CENTER | Age: 30
End: 2020-08-17

## 2020-08-18 ENCOUNTER — NON-PROVIDER VISIT (OUTPATIENT)
Dept: WOUND CARE | Facility: MEDICAL CENTER | Age: 30
End: 2020-08-18
Attending: PHYSICIAN ASSISTANT
Payer: COMMERCIAL

## 2020-08-18 PROCEDURE — 97602 WOUND(S) CARE NON-SELECTIVE: CPT

## 2020-08-18 NOTE — PROCEDURES
Nonselective debridement with Foam cleanser and a wash cloth to remove nonviable tissue from wound beds and tianna wound areas. The patient tolerated the procedure well.

## 2020-08-18 NOTE — PATIENT INSTRUCTIONS
-Keep your wound dressing clean, dry, and intact.    -Remove your compression wrap if you have severe pain, severe swelling, numbness, color change, or temperature change in your toes. If you need to remove your compression wrap, do so by unrolling it. Do not cut the compression wrap off to prevent cutting yourself on accident.    -Should you experience any significant changes in your wound(s), such as infection (redness, swelling, localized heat, increased pain, fever > 101 F, chills) or have any questions regarding your home care instructions, please contact the wound center at [phone]. If after hours, contact your primary care physician or go to the hospital emergency room.

## 2020-08-19 ENCOUNTER — TELEPHONE (OUTPATIENT)
Dept: WOUND CARE | Facility: MEDICAL CENTER | Age: 30
End: 2020-08-19

## 2020-08-19 NOTE — TELEPHONE ENCOUNTER
Pt requesting FMLA. Chart review with KERRI Lo. Pt needs to be seen by a provider before decision for FMLA can be determined. PAR to call pt to schedule on a provider so FMLA request can be determined.

## 2020-08-21 ENCOUNTER — NON-PROVIDER VISIT (OUTPATIENT)
Dept: WOUND CARE | Facility: MEDICAL CENTER | Age: 30
End: 2020-08-21
Attending: PHYSICIAN ASSISTANT
Payer: COMMERCIAL

## 2020-08-21 PROCEDURE — 97602 WOUND(S) CARE NON-SELECTIVE: CPT

## 2020-08-21 NOTE — PATIENT INSTRUCTIONS
Avoid prolonged standing or sitting without elevating your legs.    - Knee-high gradient compression to legs  - Multilayer compression wrap to both legs. Do not get wet and keep on for the week. Only remove if temperature or sensation changes.   If compression needs to be removed, un-wrap it do not cut it off.     Should you experience any significant changes in your wound(s), such as infection (redness, swelling, localized heat, increased pain, fever > 101 F, chills) or have any questions regarding your home care instructions, please contact the wound center at [phone]. If after hours, contact your primary care physician or go to the hospital emergency room.   Keep dressing clean, dry and covered while bathing. Only change dressing if it becomes over saturated, soiled or falls off.

## 2020-08-21 NOTE — PROCEDURES
Nonselective debridement with Foam cleanser and a wash cloth to remove nonviable tissue from wound beds and tianna wound areas. The patient tolerated the procedure well

## 2020-08-25 ENCOUNTER — NON-PROVIDER VISIT (OUTPATIENT)
Dept: WOUND CARE | Facility: MEDICAL CENTER | Age: 30
End: 2020-08-25
Attending: PHYSICIAN ASSISTANT
Payer: COMMERCIAL

## 2020-08-25 DIAGNOSIS — I83.019 VENOUS ULCER OF BOTH LOWER EXTREMITIES WITH VARICOSE VEINS (HCC): ICD-10-CM

## 2020-08-25 DIAGNOSIS — L97.919 VENOUS ULCER OF BOTH LOWER EXTREMITIES WITH VARICOSE VEINS (HCC): ICD-10-CM

## 2020-08-25 DIAGNOSIS — I83.029 VENOUS ULCER OF BOTH LOWER EXTREMITIES WITH VARICOSE VEINS (HCC): ICD-10-CM

## 2020-08-25 DIAGNOSIS — L97.929 VENOUS ULCER OF BOTH LOWER EXTREMITIES WITH VARICOSE VEINS (HCC): ICD-10-CM

## 2020-08-25 PROCEDURE — 97602 WOUND(S) CARE NON-SELECTIVE: CPT

## 2020-08-25 RX ORDER — AMOXICILLIN AND CLAVULANATE POTASSIUM 875; 125 MG/1; MG/1
1 TABLET, FILM COATED ORAL 2 TIMES DAILY
Qty: 20 TAB | Refills: 0 | Status: SHIPPED | OUTPATIENT
Start: 2020-08-25 | End: 2020-09-04

## 2020-08-25 NOTE — NON-PROVIDER
Nonselective debridement with Foam cleanser and a wash cloth to remove nonviable tissue from wound beds and tianna wound areas. The patient tolerated the procedure well    Patient measured for dual layer compression stockings.

## 2020-08-25 NOTE — PATIENT INSTRUCTIONS
-Keep your wound dressing clean, dry, and intact.    -Remove your compression wrap if you have severe pain, severe swelling, numbness, color change, or temperature change in your toes. If you need to remove your compression wrap, do so by unrolling it. Do not cut the compression wrap off to prevent cutting yourself on accident.    -Should you experience any significant changes in your wound(s), such as infection (redness, swelling, localized heat, increased pain, fever > 101 F, chills) or have any questions regarding your home care instructions, please contact the wound center at [phone]. If after hours, contact your primary care physician or go to the hospital emergency room.

## 2020-08-25 NOTE — NON-PROVIDER
[employer] dual layer compression stocking order  Wound 08/11/20 RLE Circumferential (Active)   Wound Image   08/25/20 0730   Site Assessment Red 08/25/20 0730   Periwound Assessment Hemosiderin Staining;Edema;Hyperpigmented 08/25/20 0730   Margins Undefined edges 08/25/20 0730   Closure Secondary intention 08/25/20 0730   Drainage Amount Large 08/25/20 0730   Drainage Description Serosanguineous 08/25/20 0730   Treatments Cleansed;Site care 08/25/20 0730   Wound Cleansing Foam Cleanser/Washcloth 08/25/20 0730   Periwound Protectant Viscopaste 08/25/20 0730   Dressing Cleansing/Solutions Not Applicable 08/25/20 0730   Dressing Options Viscopaste;Compression Wrap Two Layer 08/25/20 0730   Dressing Changed Changed 08/25/20 0730   Dressing Status Clean;Dry;Intact 08/25/20 0730   Wound Length (cm) 15 cm 08/11/20 0730   Post-Procedure Length (cm) 0.2 cm 08/25/20 0730   Post-Procedure Width (cm) 0.2 cm 08/25/20 0730   Post-Procedure Surface Area (cm^2) 0.04 cm^2 08/25/20 0730   Wound Bed Granulation (%) 100 % 08/21/20 0800   Tunneling (cm) 0 cm 08/25/20 0730   Undermining (cm) 0 cm 08/25/20 0730   Wound Odor None 08/25/20 0730   Exposed Structures None 08/25/20 0730       Wound 08/11/20 LLE Circumferential (Active)   Wound Image    08/25/20 0730   Site Assessment Red 08/25/20 0730   Periwound Assessment Hemosiderin Staining;Edema;Hyperpigmented 08/25/20 0730   Margins Undefined edges 08/25/20 0730   Closure Secondary intention 08/25/20 0730   Drainage Amount Moderate 08/25/20 0730   Drainage Description Serosanguineous 08/25/20 0730   Treatments Cleansed;Site care 08/25/20 0730   Wound Cleansing Foam Cleanser/Washcloth 08/25/20 0730   Periwound Protectant Viscopaste 08/25/20 0730   Dressing Cleansing/Solutions Not Applicable 08/25/20 0730   Dressing Options Viscopaste;Compression Wrap Two Layer 08/25/20 0730   Dressing Changed Changed 08/25/20 0730   Dressing Status Clean;Dry;Intact 08/25/20 0730   Wound Length (cm) 25 cm  08/11/20 0730   Post-Procedure Length (cm) 2 cm 08/25/20 0730   Post-Procedure Width (cm) 7 cm 08/25/20 0730   Post-Procedure Surface Area (cm^2) 14 cm^2 08/25/20 0730   Wound Bed Granulation (%) 100 % 08/21/20 0800   Tunneling (cm) 0 cm 08/25/20 0730   Undermining (cm) 0 cm 08/25/20 0730   Wound Odor None 08/25/20 0730   Exposed Structures None 08/25/20 0730

## 2020-08-28 ENCOUNTER — ANTICOAGULATION VISIT (OUTPATIENT)
Dept: VASCULAR LAB | Facility: MEDICAL CENTER | Age: 30
End: 2020-08-28
Attending: INTERNAL MEDICINE
Payer: COMMERCIAL

## 2020-08-28 ENCOUNTER — OFFICE VISIT (OUTPATIENT)
Dept: WOUND CARE | Facility: MEDICAL CENTER | Age: 30
End: 2020-08-28
Attending: PHYSICIAN ASSISTANT
Payer: COMMERCIAL

## 2020-08-28 VITALS
DIASTOLIC BLOOD PRESSURE: 75 MMHG | SYSTOLIC BLOOD PRESSURE: 130 MMHG | TEMPERATURE: 97.2 F | RESPIRATION RATE: 18 BRPM | HEART RATE: 85 BPM | OXYGEN SATURATION: 93 %

## 2020-08-28 DIAGNOSIS — E66.01 MORBID OBESITY (HCC): ICD-10-CM

## 2020-08-28 DIAGNOSIS — R60.0 BILATERAL LOWER EXTREMITY EDEMA: ICD-10-CM

## 2020-08-28 DIAGNOSIS — I87.2 VENOUS INSUFFICIENCY: Primary | ICD-10-CM

## 2020-08-28 DIAGNOSIS — L72.0 MILIA: ICD-10-CM

## 2020-08-28 DIAGNOSIS — Z79.01 CHRONIC ANTICOAGULATION: ICD-10-CM

## 2020-08-28 LAB
INR BLD: 1.5 (ref 0.9–1.2)
INR PPP: 1.5 (ref 2–3.5)

## 2020-08-28 PROCEDURE — 85610 PROTHROMBIN TIME: CPT

## 2020-08-28 PROCEDURE — 99214 OFFICE O/P EST MOD 30 MIN: CPT | Performed by: NURSE PRACTITIONER

## 2020-08-28 PROCEDURE — 99212 OFFICE O/P EST SF 10 MIN: CPT | Performed by: PHARMACIST

## 2020-08-28 PROCEDURE — 99214 OFFICE O/P EST MOD 30 MIN: CPT

## 2020-08-28 ASSESSMENT — ENCOUNTER SYMPTOMS
NAUSEA: 0
SHORTNESS OF BREATH: 0
VOMITING: 0
DIARRHEA: 0
PALPITATIONS: 0
DIZZINESS: 0
COUGH: 0
FEVER: 0
HEADACHES: 0
WEAKNESS: 0
CHILLS: 0
WHEEZING: 0
CONSTIPATION: 0
MYALGIAS: 0

## 2020-08-28 ASSESSMENT — PAIN SCALES - GENERAL: PAINLEVEL: NO PAIN

## 2020-08-28 NOTE — PROGRESS NOTES
Provider Encounter- Lower Extremity Ulcer      HISTORY OF PRESENT ILLNESS  Wound History:    START OF CARE IN CLINIC: 6/3/2020    REFERRING PROVIDER: Shawnee Ontiveros P.A.-C     WOUND ETIOLOGY: venous   LOCATION: bilateral lower extremities   HISTORY: Patient presented to [facility] on 5/10/2020.  He presented to the ED due to bleeding from an ulceration to the posterior aspect of left lower extremity, which prior to arrival to the emergency department he could not stop.  Patient has been on anticoagulation secondary to pulmonary embolism.  At that time his INR was 2.8.  Patient does have a history significant for venous insufficiency consistent with bilateral lower extremity venous changes such as edema, varicose veins, ulcerations.    Pertinent Medical History: Morbid obesity,JONES, pulmonary hypertension, pulmonary embolism, hyperglycemia, type II MI             TOBACCO USE: Patient denies smoking or use of smokeless tobacco products    Patient's problem list, allergies, and current medications reviewed and updated in Epic    Interval History:  8/28/2020: Clinic visit with KERRI Toure. Patient states that they are feeling well today.  Patient denies fever, chills, nausea, vomiting, lightheadedness, dizziness, shortness of breath and chest pain.  Referral to weight management, dermatology and order for bilateral lower extremity ultrasound placed today in clinic.      REVIEW OF SYSTEMS:   Review of Systems   Constitutional: Negative for chills and fever.   Respiratory: Negative for cough, shortness of breath and wheezing.    Cardiovascular: Positive for leg swelling. Negative for chest pain and palpitations.   Gastrointestinal: Negative for constipation, diarrhea, nausea and vomiting.   Musculoskeletal: Negative for joint pain and myalgias.   Skin: Positive for itching.        Lower leg edema bilaterally  Small superficial scattered white rash   Neurological: Negative for dizziness,  weakness and headaches.         PHYSICAL EXAMINATION:   /75 (Patient Position: Sitting)   Pulse 85   Temp 36.2 °C (97.2 °F) (Temporal)   Resp 18   SpO2 93%     Physical Exam   Constitutional: He is oriented to person, place, and time.   Severely obese   HENT:   Head: Normocephalic and atraumatic.   Eyes: Pupils are equal, round, and reactive to light. Conjunctivae are normal.   Neck: Normal range of motion.   Cardiovascular: Intact distal pulses.   Pulmonary/Chest: Effort normal and breath sounds normal. No respiratory distress. He has no wheezes.   Musculoskeletal: Normal range of motion.         General: Edema present. No deformity.      Comments: Dependent edema bilateral lower extremities   Neurological: He is alert and oriented to person, place, and time.   Skin: There is erythema.   Bilateral lower extremity erythema  Scattered white milia to left upper extremity       WOUND ASSESSMENT  See doc flow sheets and photos    PROCEDURE:   Patient has small scattered scabbed over area superficial wounds from scratching to left upper extremity.  Thorough discussion with patient regarding the need for lifelong compression therapy.  Discussed with the patient the reasoning behind ordering a bilateral lower extremity ultrasound due to varicose veins, edema, painful legs after standing prior venous ulcers.  Patient will be referred to weight management as this would benefit him significantly to have weight reduction which would reduce venous hypertension.      Pertinent Labs and Diagnostics:    Labs:     IMAGING: N/A    VASCULAR STUDIES: US extremity bilateral venous performed on 8/22/2020   FINDINGS:   Bilateral lower extremities -.    No evidence of   deep venous thrombosis above knee bilaterally.    Calf veins were difficult to visualize bilaterally.    LAST  WOUND CULTURE:  DATE : N/A            ASSESSMENT AND PLAN:   1. Venous insufficiency  Comments: Wounds to bilateral lower extremities have nearly  resolved except for a small area of superficial scabbed wounds from scratching.  -Venous ultrasound of bilateral lower extremities ordered this clinic visit to establish a baseline regarding venous reflux, perforators and varicose veins contributing to this patient's open wounds.  -Patient has been instructed to get the compression 2 layer compression stockings we have ordered him.  In the meantime patient was given two Tubigrip's for compression    2. Bilateral lower extremity edema  -2 layer compression stockings ordered from [employer].  -Patient has not received these yet because he has not paid for his portion there is insurance.  Patient reports that once he is paid he will get the stockings  -2 Tubigrip E placed for compression until the patient receives his compression stockings.  -Patient instructed that he can go on to [employer] as well to  additional stockings but to make sure that the stockings are at least 30mmHg of compression.    3. Morbid obesity (HCC)  -Patient is a young 29-year-old male who is severely obese last recorded weight 401 pounds this is unfortunately contributing to severe venous hypertension venous insufficiency and bilateral lower extremity wounds.  -I have placed a referral into weight management.  I had a long discussion with the patient regarding the need to decrease his weight start an exercise program and to alter his diet to where he is not purchasing fast food and consuming high caloric fat foods.        There are no diagnoses linked to this encounter.          PATIENT EDUCATION  - Etiology of  venous stasis ulceration discussed with patient  - Importance of managing edema for healing of ulcer, and for prevention of new ulcer development  -Need for lifelong compression of lower legs   -Elevate legs above the level of the heart periodically throughout the day.  - Importance of adequate nutrition for wound healing  -Advised to go to ER for any increased redness, swelling, drainage  or odor, or if patient develops fever, chills, nausea or vomiting.    30 min spent face to face with patient, >50% of time spent counseling, coordinating care, reviewing records, discussing POC, educating patient regarding vascular disease, wound healing and progression. This time was spent in excess to procedure time.       Please note that this note may have been created using voice recognition software. I have worked with technical experts from [facility] to optimize the interface.  I have made every reasonable attempt to correct obvious errors, but there may be errors of grammar and possibly     N

## 2020-08-28 NOTE — PROGRESS NOTES
Anticoagulation Summary  As of 2020    INR goal:  2.0-3.0   TTR:  60.5 % (5.2 mo)   INR used for dosin.50 (2020)   Warfarin maintenance plan:  3.75 mg (7.5 mg x 0.5) every Wed; 7.5 mg (7.5 mg x 1) all other days   Weekly warfarin total:  48.75 mg   Plan last modified:  Ady WillD (4/10/2020)   Next INR check:  9/3/2020   Target end date:  2020    Indications    Pulmonary embolism with acute cor pulmonale (HCC) [I26.09]             Anticoagulation Episode Summary     INR check location:      Preferred lab:      Send INR reminders to:      Comments:        Anticoagulation Care Providers     Provider Role Specialty Phone number    [facility] Responsible  [phone]                Anticoagulation Patient Findings      HPI:  Michael Ontiveros seen in clinic today, on anticoagulation therapy with warfarin for PE  Changes to current medical/health status since last appt: Pt missed his warfarin dose on .  Denies signs/symptoms of bleeding and/or thrombosis since the last appt.    Denies any interval changes to diet  Denies any interval changes to medications since last appt.   Denies any complications or cost restrictions with current therapy.   Verified current warfarin dosing schedule.   BP declined.       A/P   INR  is sub-therapeutic.   Will have pt take a boost warfarin dose of 11.25mg today and then resume his normal doing. LOT scheduled for Thursday 9/3/20 - he's unable to come in on .     Follow up appointment in 1 week(s).    Malathi Mei, PharmD

## 2020-08-28 NOTE — PATIENT INSTRUCTIONS
Avoid prolonged standing or sitting without elevating your legs.  - Apply tubigrip to your legs ending 2 fingers below back of knee without wrinkles.   - Knee-high gradient compression to legs    Should you experience any significant changes in your wound(s), such as infection (redness, swelling, localized heat, increased pain, fever > 101 F, chills) or have any questions regarding your home care instructions, please contact the wound center at [phone]. If after hours, contact your primary care physician or go to the hospital emergency room.     Wear compression stockings daily. Order the two layer system in 30-40 mmHG in your size knee highs. Replace every 3 months.     Loose some weight.

## 2020-09-02 ENCOUNTER — TELEPHONE (OUTPATIENT)
Dept: VASCULAR LAB | Facility: MEDICAL CENTER | Age: 30
End: 2020-09-02

## 2020-09-02 NOTE — TELEPHONE ENCOUNTER
Left msg for pt.  Provider for 9/3/20 171x LOT appt is out of office.   Will place pt with LOT with Dr. Tejada at 11a, asked pt to call back if this wasn't going to work.     Malathi Mei, PharmD

## 2020-09-18 ENCOUNTER — APPOINTMENT (OUTPATIENT)
Dept: VASCULAR LAB | Facility: MEDICAL CENTER | Age: 30
End: 2020-09-18
Payer: COMMERCIAL

## 2020-09-21 ENCOUNTER — APPOINTMENT (OUTPATIENT)
Dept: VASCULAR LAB | Facility: MEDICAL CENTER | Age: 30
End: 2020-09-21
Payer: COMMERCIAL

## 2020-09-23 ENCOUNTER — APPOINTMENT (OUTPATIENT)
Dept: RADIOLOGY | Facility: MEDICAL CENTER | Age: 30
End: 2020-09-23
Attending: NURSE PRACTITIONER
Payer: COMMERCIAL

## 2020-09-23 DIAGNOSIS — R60.0 BILATERAL LOWER EXTREMITY EDEMA: ICD-10-CM

## 2020-09-23 DIAGNOSIS — I87.2 VENOUS INSUFFICIENCY: ICD-10-CM

## 2020-09-23 DIAGNOSIS — E66.01 MORBID OBESITY (HCC): ICD-10-CM

## 2020-09-23 PROCEDURE — 93970 EXTREMITY STUDY: CPT

## 2020-10-13 ENCOUNTER — TELEPHONE (OUTPATIENT)
Dept: VASCULAR LAB | Facility: MEDICAL CENTER | Age: 30
End: 2020-10-13

## 2020-10-16 ENCOUNTER — APPOINTMENT (OUTPATIENT)
Dept: VASCULAR LAB | Facility: MEDICAL CENTER | Age: 30
End: 2020-10-16
Payer: COMMERCIAL

## 2020-11-09 ENCOUNTER — TELEPHONE (OUTPATIENT)
Dept: VASCULAR LAB | Facility: MEDICAL CENTER | Age: 30
End: 2020-11-09

## 2020-11-09 NOTE — TELEPHONE ENCOUNTER
Patient referred for evaluation and management in the vascular care clinic.   Unfortunately patient missed appointment for initial visit in our office today.  We will be unable to take part in care until or unless patient makes an appointment for a face-to-face visit in our center  We will ask our  or medical assistant to call and reschedule     Pending further patient contact, we will defer all vascular care, including management of cardiovascular risk factors, to PCP and other members of the care team    Charles Tejada M.D.  [facility]

## 2020-11-11 ENCOUNTER — TELEPHONE (OUTPATIENT)
Dept: VASCULAR LAB | Facility: MEDICAL CENTER | Age: 30
End: 2020-11-11

## 2020-11-11 NOTE — TELEPHONE ENCOUNTER
[facility]    Pt missed LOT with Dr Tejada on 11/9.  Pt willing to make next INR appt, but unable to make appt with Dr Tejada at this time.  Requested pt to obtain INR ASAP, but pt declines.    Follow up in 2 weeks.    Charles Lazaro, PharmD

## 2020-11-25 ENCOUNTER — ANTICOAGULATION VISIT (OUTPATIENT)
Dept: VASCULAR LAB | Facility: MEDICAL CENTER | Age: 30
End: 2020-11-25
Attending: INTERNAL MEDICINE
Payer: COMMERCIAL

## 2020-11-25 DIAGNOSIS — Z79.01 CHRONIC ANTICOAGULATION: ICD-10-CM

## 2020-11-25 LAB — INR PPP: 2 (ref 2–3.5)

## 2020-11-25 PROCEDURE — 99211 OFF/OP EST MAY X REQ PHY/QHP: CPT

## 2020-11-25 PROCEDURE — 85610 PROTHROMBIN TIME: CPT

## 2020-11-25 RX ORDER — WARFARIN SODIUM 7.5 MG/1
3.75-7.5 TABLET ORAL DAILY
Qty: 90 TAB | Refills: 1 | Status: SHIPPED | OUTPATIENT
Start: 2020-11-25 | End: 2021-05-29 | Stop reason: SDUPTHER

## 2020-11-25 NOTE — PROGRESS NOTES
Anticoagulation Summary  As of 2020    INR goal:  2.0-3.0   TTR:  38.4 % (8.1 mo)   INR used for dosin.00 (2020)   Warfarin maintenance plan:  3.75 mg (7.5 mg x 0.5) every Wed; 7.5 mg (7.5 mg x 1) all other days   Weekly warfarin total:  48.75 mg   Plan last modified:  Ady WillD (4/10/2020)   Next INR check:  2021   Target end date:  2020    Indications    Pulmonary embolism with acute cor pulmonale (HCC) [I26.09]             Anticoagulation Episode Summary     INR check location:      Preferred lab:      Send INR reminders to:      Comments:        Anticoagulation Care Providers     Provider Role Specialty Phone number    [facility] Responsible  [phone]        Anticoagulation Patient Findings      HPI:  Michael Ontiveros seen in clinic today, on anticoagulation therapy with warfarin for PE   Changes to current medical/health status since last appt: none  Denies signs/symptoms of bleeding and/or thrombosis since the last appt.    Denies any interval changes to diet  Denies any interval changes to medications since last appt.   Denies any complications or cost restrictions with current therapy.   Pt declines vitals due to Covid transmission concerns.    Confirmed dosing regimen.     A/P   INR  therapeutic.   Pt is to continue with current warfarin dosing regimen.     Follow up appointment in 6 week(s).    Charles Lazaro, AdyD

## 2020-12-01 LAB — INR BLD: 2 (ref 0.9–1.2)

## 2020-12-02 ENCOUNTER — TELEPHONE (OUTPATIENT)
Dept: VASCULAR LAB | Facility: MEDICAL CENTER | Age: 30
End: 2020-12-02

## 2020-12-08 ENCOUNTER — TELEPHONE (OUTPATIENT)
Dept: VASCULAR LAB | Facility: MEDICAL CENTER | Age: 30
End: 2020-12-08

## 2020-12-08 NOTE — TELEPHONE ENCOUNTER
[facility]    Pt reports no longer taking Xarelto due to cost.  Pt doesn't have insurance.  Suggested pt to start warfarin today and we could monitor him via a lab venipuncture.  Pt prefers to try establishing with [facility] instead because he would prefer Xarelto.  Offered pt samples of Xarelto in the meantime to make sure he has anticoagulation.  Pt will return my call if he would be willing to  Xarelto samples.  Denies s/s of VTE or SOB.  Explained to pt the seriousness of his condition but pt will contact the clinic if he wants samples.  Strongly urged pt to restart anticoagulation, will continue to follow.     Charles Lazaro, PharmD

## 2020-12-14 ENCOUNTER — TELEPHONE (OUTPATIENT)
Dept: VASCULAR LAB | Facility: MEDICAL CENTER | Age: 30
End: 2020-12-14

## 2020-12-14 NOTE — TELEPHONE ENCOUNTER
Called pt for the third time to try and get him rescheduled from missed appointment and there has been no response.

## 2021-01-04 ENCOUNTER — TELEPHONE (OUTPATIENT)
Dept: VASCULAR LAB | Facility: MEDICAL CENTER | Age: 31
End: 2021-01-04

## 2021-02-05 ENCOUNTER — TELEPHONE (OUTPATIENT)
Dept: VASCULAR LAB | Facility: MEDICAL CENTER | Age: 31
End: 2021-02-05

## 2021-02-19 DIAGNOSIS — Z79.01 CHRONIC ANTICOAGULATION: ICD-10-CM

## 2021-03-17 ENCOUNTER — TELEPHONE (OUTPATIENT)
Dept: VASCULAR LAB | Facility: MEDICAL CENTER | Age: 31
End: 2021-03-17

## 2021-03-17 NOTE — LETTER
Michael Ontiveros  [address] [facility] [address]    03/17/21    Dear Michael Ontiveros ,    We have been unsuccessful in our attempts to contact you regarding your Anticoagulation Service appointments. Warfarin is a potent blood-thinning agent that requires monitoring to ensure that the dosage is correct for your body.  If it isn't, you could develop serious, sometimes life-threatening bleeding problems or life-threatening blood clots or stroke could result.    To monitor you effectively, we need to be able to communicate with you.  This is a requirement to be followed by our Service.       If you repeatedly fail to keep your lab appointments, you are at risk of being discharged from the Anticoagulation Service.    It is extremely important that you contact the clinic as soon as possible to arrange appropriate follow up.  We are open Monday-Friday 8 am until 5 pm.  You may reach our Service at [phone].           Sincerely,           Charles Lazaro PharmD, [facility]  Clinic Supervisor  [facility]  Outpatient Anticoagulation Service

## 2021-03-17 NOTE — TELEPHONE ENCOUNTER
Left message for pt to have INR checked  2nd call  Letter sent  Norah Valladares, Clinical Pharmacist, CDE, CACP

## 2021-04-19 NOTE — PROGRESS NOTES
Per Dr. Sparks, oral surgeon, new order for IV clindamycin, MD states ok to be changed to PO's when discharged, ok to discharge, pt to remain on antibiotic x7 days.    no

## 2021-04-23 ENCOUNTER — TELEPHONE (OUTPATIENT)
Dept: VASCULAR LAB | Facility: MEDICAL CENTER | Age: 31
End: 2021-04-23

## 2021-04-23 NOTE — LETTER
Michael Ontiveros  [address] [facility] [address]    04/23/21    Dear Michael Ontiveros ,    We have been unsuccessful in our attempts to contact you regarding your Anticoagulation Service appointments. Warfarin is a potent blood-thinning agent that requires monitoring to ensure that the dosage is correct for your body.  If it isn't, you could develop serious, sometimes life-threatening bleeding problems or life-threatening blood clots or stroke could result.    To monitor you effectively, we need to be able to communicate with you.  This is a requirement to be followed by our Service.       If you repeatedly fail to keep your lab appointments, you are at risk of being discharged from the Anticoagulation Service.    It is extremely important that you contact the clinic as soon as possible to arrange appropriate follow up.  We are open Monday-Friday 8 am until 5 pm.  You may reach our Service at [phone].           Sincerely,           Charles Lazaro PharmD, [facility]  Clinic Supervisor  [facility]  Outpatient Anticoagulation Service

## 2021-04-23 NOTE — TELEPHONE ENCOUNTER
Left message for pt to have INR checked  3rd call  2nd letter sent  Norah Valladares, Clinical Pharmacist, CDE, CACP

## 2021-04-30 ENCOUNTER — ANTICOAGULATION VISIT (OUTPATIENT)
Dept: VASCULAR LAB | Facility: MEDICAL CENTER | Age: 31
End: 2021-04-30
Attending: INTERNAL MEDICINE
Payer: COMMERCIAL

## 2021-04-30 DIAGNOSIS — Z79.01 CHRONIC ANTICOAGULATION: ICD-10-CM

## 2021-04-30 LAB — INR PPP: 2.1 (ref 2–3.5)

## 2021-04-30 PROCEDURE — 99211 OFF/OP EST MAY X REQ PHY/QHP: CPT

## 2021-04-30 PROCEDURE — 85610 PROTHROMBIN TIME: CPT

## 2021-04-30 NOTE — PROGRESS NOTES
Anticoagulation Summary  As of 2021    INR goal:  2.0-3.0   TTR:  62.4 % (1.1 y)   INR used for dosin.10 (2021)   Warfarin maintenance plan:  3.75 mg (7.5 mg x 0.5) every Wed; 7.5 mg (7.5 mg x 1) all other days   Weekly warfarin total:  48.75 mg   Plan last modified:  Lanette Mckeon PharmD (4/10/2020)   Next INR check:  2021   Target end date:  2020    Indications    Pulmonary embolism with acute cor pulmonale (HCC) [I26.09]             Anticoagulation Episode Summary     INR check location:      Preferred lab:      Send INR reminders to:      Comments:        Anticoagulation Care Providers     Provider Role Specialty Phone number    [facility] Responsible  [phone]                Anticoagulation Patient Findings      HPI:  Michael Bermudez Weston seen in clinic today, on anticoagulation therapy with warfarin for h/o PE  Changes to current medical/health status since last appt: None- pt had missed LOT appts in the past and has been overdue for INR  Denies signs/symptoms of bleeding and/or thrombosis since the last appt.    Denies any interval changes to diet  Denies any interval changes to medications since last appt.   Denies any complications or cost restrictions with current therapy.   Verified current warfarin dosing schedule.   Pt declines vitals due to Covid transmission concerns.   Medications reconciled   Pt NOT on antiplatelet therapy       A/P   INR therapeutic at 2.1  Pt is to continue with current warfarin dosing regimen.  LOT appt scheduled for 2 weeks per pt availability.      Follow up appointment in 2 week(s).    Vamsi Shah, AdyD

## 2021-05-03 LAB — INR BLD: 2.1 (ref 0.9–1.2)

## 2021-05-10 ENCOUNTER — TELEPHONE (OUTPATIENT)
Dept: VASCULAR LAB | Facility: MEDICAL CENTER | Age: 31
End: 2021-05-10

## 2021-05-10 NOTE — TELEPHONE ENCOUNTER
Left pt a vm letting him know I had to reschedule his LOT visit with me. He was scheduled 5/13/21 @ 7:45 am but due to scheduling concerns for another vascular visit at that same time, I rescheduled him for 5/20/21 @ 7:45 am. I asked pt to call me if he cannot come at that time/day.    Dianna MANNING

## 2021-05-20 ENCOUNTER — TELEPHONE (OUTPATIENT)
Dept: VASCULAR LAB | Facility: MEDICAL CENTER | Age: 31
End: 2021-05-20

## 2021-05-28 ENCOUNTER — APPOINTMENT (OUTPATIENT)
Dept: VASCULAR LAB | Facility: MEDICAL CENTER | Age: 31
End: 2021-05-28
Attending: INTERNAL MEDICINE
Payer: COMMERCIAL

## 2021-05-29 DIAGNOSIS — Z79.01 CHRONIC ANTICOAGULATION: ICD-10-CM

## 2021-06-01 RX ORDER — WARFARIN SODIUM 7.5 MG/1
TABLET ORAL
Qty: 90 TABLET | Refills: 0 | Status: SHIPPED | OUTPATIENT
Start: 2021-06-01 | End: 2021-11-27 | Stop reason: SDUPTHER

## 2021-06-11 ENCOUNTER — DOCUMENTATION (OUTPATIENT)
Dept: VASCULAR LAB | Facility: MEDICAL CENTER | Age: 31
End: 2021-06-11

## 2021-06-18 ENCOUNTER — DOCUMENTATION (OUTPATIENT)
Dept: VASCULAR LAB | Facility: MEDICAL CENTER | Age: 31
End: 2021-06-18

## 2021-06-18 NOTE — PROGRESS NOTES
Called and spoke with pt about missed routine appt with [facility]. Pt notified us that he got a new job and is unable to miss the first 60 days so he wont be able to come in till mid Aug. Pt said he will call back then to reschedule. Pt reported that he has just enough of of his blood thinner to make it that long. Pt was notified to give us a call if he has any questions or concerns in the meantime.

## 2021-07-11 ENCOUNTER — APPOINTMENT (OUTPATIENT)
Dept: RADIOLOGY | Facility: MEDICAL CENTER | Age: 31
End: 2021-07-11
Attending: INTERNAL MEDICINE

## 2021-07-11 ENCOUNTER — HOSPITAL ENCOUNTER (OUTPATIENT)
Facility: MEDICAL CENTER | Age: 31
End: 2021-07-12
Attending: EMERGENCY MEDICINE | Admitting: INTERNAL MEDICINE

## 2021-07-11 DIAGNOSIS — R09.02 HYPOXIA: ICD-10-CM

## 2021-07-11 DIAGNOSIS — M79.89 LEG SWELLING: ICD-10-CM

## 2021-07-11 DIAGNOSIS — I87.2 VENOUS STASIS DERMATITIS OF LEFT LOWER EXTREMITY: ICD-10-CM

## 2021-07-11 DIAGNOSIS — L03.116 LEFT LEG CELLULITIS: ICD-10-CM

## 2021-07-11 PROBLEM — L03.115 CELLULITIS OF RIGHT LOWER EXTREMITY: Status: ACTIVE | Noted: 2021-07-11

## 2021-07-11 PROBLEM — Z86.711 HISTORY OF PULMONARY EMBOLISM: Status: ACTIVE | Noted: 2021-07-11

## 2021-07-11 LAB
ANION GAP SERPL CALC-SCNC: 9 MMOL/L (ref 7–16)
ANISOCYTOSIS BLD QL SMEAR: ABNORMAL
APTT PPP: 28.4 SEC (ref 24.7–36)
APTT PPP: 46.8 SEC (ref 24.7–36)
BASOPHILS # BLD AUTO: 0.6 % (ref 0–1.8)
BASOPHILS # BLD: 0.04 K/UL (ref 0–0.12)
BUN SERPL-MCNC: 10 MG/DL (ref 8–22)
CALCIUM SERPL-MCNC: 8.1 MG/DL (ref 8.5–10.5)
CHLORIDE SERPL-SCNC: 102 MMOL/L (ref 96–112)
CO2 SERPL-SCNC: 27 MMOL/L (ref 20–33)
COMMENT 1642: NORMAL
CREAT SERPL-MCNC: 0.57 MG/DL (ref 0.5–1.4)
EOSINOPHIL # BLD AUTO: 0.05 K/UL (ref 0–0.51)
EOSINOPHIL NFR BLD: 0.7 % (ref 0–6.9)
ERYTHROCYTE [DISTWIDTH] IN BLOOD BY AUTOMATED COUNT: 67.1 FL (ref 35.9–50)
GLUCOSE SERPL-MCNC: 80 MG/DL (ref 65–99)
HCT VFR BLD AUTO: 55.1 % (ref 42–52)
HGB BLD-MCNC: 15.1 G/DL (ref 14–18)
IMM GRANULOCYTES # BLD AUTO: 0.03 K/UL (ref 0–0.11)
IMM GRANULOCYTES NFR BLD AUTO: 0.4 % (ref 0–0.9)
INR PPP: 1.16 (ref 0.87–1.13)
INR PPP: 3.74 (ref 0.87–1.13)
LYMPHOCYTES # BLD AUTO: 2.18 K/UL (ref 1–4.8)
LYMPHOCYTES NFR BLD: 31.8 % (ref 22–41)
MACROCYTES BLD QL SMEAR: ABNORMAL
MAGNESIUM SERPL-MCNC: 1.7 MG/DL (ref 1.5–2.5)
MCH RBC QN AUTO: 22.2 PG (ref 27–33)
MCHC RBC AUTO-ENTMCNC: 27.4 G/DL (ref 33.7–35.3)
MCV RBC AUTO: 81.1 FL (ref 81.4–97.8)
MICROCYTES BLD QL SMEAR: ABNORMAL
MONOCYTES # BLD AUTO: 0.54 K/UL (ref 0–0.85)
MONOCYTES NFR BLD AUTO: 7.9 % (ref 0–13.4)
MORPHOLOGY BLD-IMP: NORMAL
NEUTROPHILS # BLD AUTO: 4.02 K/UL (ref 1.82–7.42)
NEUTROPHILS NFR BLD: 58.6 % (ref 44–72)
NRBC # BLD AUTO: 0.04 K/UL
NRBC BLD-RTO: 0.6 /100 WBC
PHOSPHATE SERPL-MCNC: 2.3 MG/DL (ref 2.5–4.5)
PLATELET # BLD AUTO: 231 K/UL (ref 164–446)
PLATELET BLD QL SMEAR: NORMAL
POLYCHROMASIA BLD QL SMEAR: NORMAL
POTASSIUM SERPL-SCNC: 3.9 MMOL/L (ref 3.6–5.5)
PROCALCITONIN SERPL-MCNC: 0.07 NG/ML
PROTHROMBIN TIME: 14.5 SEC (ref 12–14.6)
PROTHROMBIN TIME: 35.9 SEC (ref 12–14.6)
RBC # BLD AUTO: 6.79 M/UL (ref 4.7–6.1)
RBC BLD AUTO: PRESENT
SODIUM SERPL-SCNC: 138 MMOL/L (ref 135–145)
UFH PPP CHRO-ACNC: <0.1 IU/ML
WBC # BLD AUTO: 6.9 K/UL (ref 4.8–10.8)

## 2021-07-11 PROCEDURE — 96375 TX/PRO/DX INJ NEW DRUG ADDON: CPT

## 2021-07-11 PROCEDURE — G0378 HOSPITAL OBSERVATION PER HR: HCPCS

## 2021-07-11 PROCEDURE — 85730 THROMBOPLASTIN TIME PARTIAL: CPT

## 2021-07-11 PROCEDURE — 84100 ASSAY OF PHOSPHORUS: CPT

## 2021-07-11 PROCEDURE — 96366 THER/PROPH/DIAG IV INF ADDON: CPT

## 2021-07-11 PROCEDURE — 93971 EXTREMITY STUDY: CPT | Mod: LT

## 2021-07-11 PROCEDURE — U0005 INFEC AGEN DETEC AMPLI PROBE: HCPCS

## 2021-07-11 PROCEDURE — 99220 PR INITIAL OBSERVATION CARE,LEVL III: CPT | Performed by: INTERNAL MEDICINE

## 2021-07-11 PROCEDURE — 83735 ASSAY OF MAGNESIUM: CPT

## 2021-07-11 PROCEDURE — 85520 HEPARIN ASSAY: CPT

## 2021-07-11 PROCEDURE — 36415 COLL VENOUS BLD VENIPUNCTURE: CPT

## 2021-07-11 PROCEDURE — 700111 HCHG RX REV CODE 636 W/ 250 OVERRIDE (IP): Performed by: INTERNAL MEDICINE

## 2021-07-11 PROCEDURE — 700111 HCHG RX REV CODE 636 W/ 250 OVERRIDE (IP): Performed by: EMERGENCY MEDICINE

## 2021-07-11 PROCEDURE — 84145 PROCALCITONIN (PCT): CPT

## 2021-07-11 PROCEDURE — 700105 HCHG RX REV CODE 258: Performed by: EMERGENCY MEDICINE

## 2021-07-11 PROCEDURE — 99285 EMERGENCY DEPT VISIT HI MDM: CPT

## 2021-07-11 PROCEDURE — U0003 INFECTIOUS AGENT DETECTION BY NUCLEIC ACID (DNA OR RNA); SEVERE ACUTE RESPIRATORY SYNDROME CORONAVIRUS 2 (SARS-COV-2) (CORONAVIRUS DISEASE [COVID-19]), AMPLIFIED PROBE TECHNIQUE, MAKING USE OF HIGH THROUGHPUT TECHNOLOGIES AS DESCRIBED BY CMS-2020-01-R: HCPCS

## 2021-07-11 PROCEDURE — 700105 HCHG RX REV CODE 258: Performed by: INTERNAL MEDICINE

## 2021-07-11 PROCEDURE — A9270 NON-COVERED ITEM OR SERVICE: HCPCS | Performed by: INTERNAL MEDICINE

## 2021-07-11 PROCEDURE — 85025 COMPLETE CBC W/AUTO DIFF WBC: CPT

## 2021-07-11 PROCEDURE — 80048 BASIC METABOLIC PNL TOTAL CA: CPT

## 2021-07-11 PROCEDURE — 96365 THER/PROPH/DIAG IV INF INIT: CPT

## 2021-07-11 PROCEDURE — 96367 TX/PROPH/DG ADDL SEQ IV INF: CPT

## 2021-07-11 PROCEDURE — 700102 HCHG RX REV CODE 250 W/ 637 OVERRIDE(OP): Performed by: INTERNAL MEDICINE

## 2021-07-11 PROCEDURE — 85610 PROTHROMBIN TIME: CPT

## 2021-07-11 PROCEDURE — 87040 BLOOD CULTURE FOR BACTERIA: CPT

## 2021-07-11 RX ORDER — OXYCODONE HYDROCHLORIDE 5 MG/1
2.5 TABLET ORAL
Status: DISCONTINUED | OUTPATIENT
Start: 2021-07-11 | End: 2021-07-12 | Stop reason: HOSPADM

## 2021-07-11 RX ORDER — BACITRACIN ZINC 500 [USP'U]/G
1 OINTMENT TOPICAL
Status: ON HOLD | COMMUNITY
End: 2021-07-12

## 2021-07-11 RX ORDER — ACETAMINOPHEN 325 MG/1
650 TABLET ORAL EVERY 6 HOURS PRN
Status: DISCONTINUED | OUTPATIENT
Start: 2021-07-11 | End: 2021-07-12 | Stop reason: HOSPADM

## 2021-07-11 RX ORDER — BISACODYL 10 MG
10 SUPPOSITORY, RECTAL RECTAL
Status: DISCONTINUED | OUTPATIENT
Start: 2021-07-11 | End: 2021-07-12 | Stop reason: HOSPADM

## 2021-07-11 RX ORDER — HEPARIN SODIUM 1000 [USP'U]/ML
40 INJECTION, SOLUTION INTRAVENOUS; SUBCUTANEOUS PRN
Status: DISCONTINUED | OUTPATIENT
Start: 2021-07-11 | End: 2021-07-12

## 2021-07-11 RX ORDER — OXYCODONE HYDROCHLORIDE 5 MG/1
5 TABLET ORAL
Status: DISCONTINUED | OUTPATIENT
Start: 2021-07-11 | End: 2021-07-12 | Stop reason: HOSPADM

## 2021-07-11 RX ORDER — HEPARIN SODIUM 5000 [USP'U]/100ML
0-30 INJECTION, SOLUTION INTRAVENOUS CONTINUOUS
Status: DISCONTINUED | OUTPATIENT
Start: 2021-07-11 | End: 2021-07-12

## 2021-07-11 RX ORDER — ALBUTEROL SULFATE 90 UG/1
2 AEROSOL, METERED RESPIRATORY (INHALATION) EVERY 6 HOURS PRN
Status: SHIPPED | COMMUNITY
End: 2023-03-12

## 2021-07-11 RX ORDER — POLYETHYLENE GLYCOL 3350 17 G/17G
1 POWDER, FOR SOLUTION ORAL
Status: DISCONTINUED | OUTPATIENT
Start: 2021-07-11 | End: 2021-07-12 | Stop reason: HOSPADM

## 2021-07-11 RX ORDER — AMOXICILLIN 250 MG
2 CAPSULE ORAL 2 TIMES DAILY
Status: DISCONTINUED | OUTPATIENT
Start: 2021-07-11 | End: 2021-07-12 | Stop reason: HOSPADM

## 2021-07-11 RX ORDER — HEPARIN SODIUM 1000 [USP'U]/ML
80 INJECTION, SOLUTION INTRAVENOUS; SUBCUTANEOUS ONCE
Status: COMPLETED | OUTPATIENT
Start: 2021-07-11 | End: 2021-07-11

## 2021-07-11 RX ORDER — HYDROMORPHONE HYDROCHLORIDE 1 MG/ML
0.25 INJECTION, SOLUTION INTRAMUSCULAR; INTRAVENOUS; SUBCUTANEOUS
Status: DISCONTINUED | OUTPATIENT
Start: 2021-07-11 | End: 2021-07-12 | Stop reason: HOSPADM

## 2021-07-11 RX ORDER — ACETAMINOPHEN 325 MG/1
650-975 TABLET ORAL EVERY 6 HOURS PRN
Status: SHIPPED | COMMUNITY
End: 2021-11-01

## 2021-07-11 RX ORDER — WARFARIN SODIUM 2.5 MG/1
5 TABLET ORAL ONCE
Status: DISCONTINUED | OUTPATIENT
Start: 2021-07-11 | End: 2021-07-12

## 2021-07-11 RX ADMIN — OXYCODONE 5 MG: 5 TABLET ORAL at 19:08

## 2021-07-11 RX ADMIN — HEPARIN SODIUM 18 UNITS/KG/HR: 5000 INJECTION, SOLUTION INTRAVENOUS at 23:00

## 2021-07-11 RX ADMIN — SODIUM CHLORIDE 3 G: 900 INJECTION INTRAVENOUS at 15:00

## 2021-07-11 RX ADMIN — HEPARIN SODIUM 9600 UNITS: 1000 INJECTION, SOLUTION INTRAVENOUS; SUBCUTANEOUS at 22:58

## 2021-07-11 RX ADMIN — SODIUM CHLORIDE 3 G: 900 INJECTION INTRAVENOUS at 21:12

## 2021-07-11 RX ADMIN — VANCOMYCIN HYDROCHLORIDE 3 G: 500 INJECTION, POWDER, LYOPHILIZED, FOR SOLUTION INTRAVENOUS at 15:39

## 2021-07-11 ASSESSMENT — ENCOUNTER SYMPTOMS
ABDOMINAL PAIN: 0
CONSTIPATION: 0
PALPITATIONS: 0
SORE THROAT: 0
DIARRHEA: 0
DEPRESSION: 0
DIZZINESS: 0
NAUSEA: 0
CHILLS: 0
WEAKNESS: 0
SHORTNESS OF BREATH: 0
HEADACHES: 0
BLURRED VISION: 0
NERVOUS/ANXIOUS: 0
COUGH: 0
VOMITING: 0
FEVER: 0
FALLS: 0

## 2021-07-11 ASSESSMENT — LIFESTYLE VARIABLES
ON A TYPICAL DAY WHEN YOU DRINK ALCOHOL HOW MANY DRINKS DO YOU HAVE: 0
EVER HAD A DRINK FIRST THING IN THE MORNING TO STEADY YOUR NERVES TO GET RID OF A HANGOVER: NO
TOTAL SCORE: 0
TOTAL SCORE: 0
HAVE PEOPLE ANNOYED YOU BY CRITICIZING YOUR DRINKING: NO
EVER FELT BAD OR GUILTY ABOUT YOUR DRINKING: NO
HAVE YOU EVER FELT YOU SHOULD CUT DOWN ON YOUR DRINKING: NO
TOTAL SCORE: 0
CONSUMPTION TOTAL: NEGATIVE
AVERAGE NUMBER OF DAYS PER WEEK YOU HAVE A DRINK CONTAINING ALCOHOL: 0
HOW MANY TIMES IN THE PAST YEAR HAVE YOU HAD 5 OR MORE DRINKS IN A DAY: 0
ALCOHOL_USE: NO

## 2021-07-11 ASSESSMENT — FIBROSIS 4 INDEX
FIB4 SCORE: 0.84
FIB4 SCORE: 0.6

## 2021-07-11 ASSESSMENT — PAIN DESCRIPTION - PAIN TYPE
TYPE: ACUTE PAIN
TYPE: ACUTE PAIN

## 2021-07-11 NOTE — PROGRESS NOTES
Pharmacy Vancomycin Kinetics Note for 7/12/2021     30 y.o. male on Vancomycin day # 1     Vancomycin Indication (AUC Dosing): Skin/skin structure infection    Provider specified end date: 07/17/21    Active Antibiotics (From admission, onward)    Ordered     Ordering Provider       Mon Jul 12, 2021  1:08 AM    07/12/21 0108  vancomycin (VANCOCIN) 2,000 mg in  mL IVPB  (vancomycin (VANCOCIN) IV (LD + Maintenance))  EVERY 12 HOURS      David Lomeli D.O.       [place] Jul 11, 2021  4:04 PM    07/11/21 1604  ampicillin/sulbactam (UNASYN) 3 g in  mL IVPB  EVERY 6 HOURS      David Lomeli D.O.    07/11/21 1604  MD Alert...Vancomycin per Pharmacy  PHARMACY TO DOSE     Question:  Indication(s) for vancomycin?  Answer:  Skin and soft tissue infection    David Lomeli D.O.          Dosing Weight: 191 kg (421 lb 1.3 oz)      Admission History: Admitted on 7/11/2021 for Cellulitis of leg, right [L03.115]  Pertinent history: Pt presents for swelling and erythema of bilateral lower extremities. Pt does have PMH of chronic venous stasis. Endorses exacerbation of eczema ~2 weeks ago, pt noticed wound after scratching legs. WBCs WNL, afebrile. Procal WNL. No history of vancomycin at this facility.    Allergies:     [place] (diagnostic), Pistachio, [place], and Tree nuts food allergy     Pertinent cultures to date:     Results     Procedure Component Value Units Date/Time    SARS-CoV-2 PCR (24 hour In-House): Collect NP swab in [facility] [[id number]] Collected: 07/11/21 1839    Order Status: Completed Specimen: Respirate Updated: 07/11/21 2018     SARS-CoV-2 Source NP Swab    Narrative:      Have you been in close contact with a person who is suspected  or known to be positive for COVID-19 within the last 30 days  (e.g. last seen that person < 30 days ago)->No    BLOOD CULTURE [[id number]] Collected: 07/11/21 1501    Order Status: Completed Specimen: Blood from Peripheral Updated: 07/11/21 1652    Narrative:      Per  "Hospital Policy: Only change Specimen Src: to \"Line\" if  specified by physician order.    MRSA By PCR (Amp) [[id number]]     Order Status: Sent Specimen: Respirate from Nares     BLOOD CULTURE [[id number]] Collected: 21 1406    Order Status: Completed Specimen: Blood from Peripheral Updated: 21 1516    Narrative:      Per Hospital Policy: Only change Specimen Src: to \"Line\" if  specified by physician order.          Labs:     Estimated Creatinine Clearance: 321.6 mL/min (by C-G formula based on SCr of 0.57 mg/dL).  Recent Labs     21  1406   WBC 6.9   NEUTSPOLYS 58.60     Recent Labs     21  1538   BUN 10   CREATININE 0.57       Intake/Output Summary (Last 24 hours) at 2021 0110  Last data filed at 2021 1530  Gross per 24 hour   Intake 100 ml   Output --   Net 100 ml      /62   Pulse 94   Temp 36.7 °C (98.1 °F) (Temporal)   Resp (!) 21   Ht 1.753 m (5' 9\")   Wt (!) 193 kg (424 lb 6.2 oz)   SpO2 90%  Temp (24hrs), Av.9 °C (98.4 °F), Min:36.2 °C (97.1 °F), Max:37.8 °C (100 °F)      List concerns for Vancomycin clearance:     Obesity    Pharmacokinetics:     AUC kinetics:   Ke (hr ^-1): 0.2692 hr^-1  Half life: 2.57 hr  Clearance: 9.316  Estimated TDD: 4658  Estimated Dose: 893  Estimated interval: 4.6      A/P:     -  Vancomycin dose: 2000 mg IV q12h (0100, 1300)    -  Next vancomycin level(s):    - Will be ordered by floor pharmacist    -  Predicted vancomycin AUC from initial AUC test calculator: 429 mg·hr/L    -  Comments: vancomycin and Unasyn initiated for SSTI of RLE. Proceeding with q12h dosing as concern for accumulation given severely elevated BMI. Pharmacy will continue to follow.    Anjel Redd, PharmD  "

## 2021-07-11 NOTE — ASSESSMENT & PLAN NOTE
Started on vancomycin, Unasyn  Wound care  Patient may benefit from diuresis for edema and chronic venous stasis.  Most recent echo last year showed normal ejection fraction  Check BNP

## 2021-07-11 NOTE — ED NOTES
Med rec completed per Pt at bedside.  Allergies reviewed with Pt. No known DRUG allergies.  No oral antibiotics in last 30 days. Pt states that he has been using over-the-counter bacitracin ointment for his leg wounds.  Pt takes WARFARIN. Pt's current warfarin dosing schedule: Take 1/2 tablet (3.75 mg) on Wednesdays. Take 1 tablet (7.5 mg) on all other days.  Pt's pharmacy: [facility] on [address]

## 2021-07-11 NOTE — ED TRIAGE NOTES
Chief Complaint   Patient presents with   • Leg Swelling     Pt ambulatory to triage for above complaint. Pt reports he is concerned with possible leg infection. Pt reports he has had a wound on his L calf for about 2 weeks and is concerned with possible cellulitis. Pt has intermittent redness, swelling, and warmth about 2 days ago around the area.

## 2021-07-12 ENCOUNTER — PHARMACY VISIT (OUTPATIENT)
Dept: PHARMACY | Facility: MEDICAL CENTER | Age: 31
End: 2021-07-12
Payer: COMMERCIAL

## 2021-07-12 ENCOUNTER — APPOINTMENT (OUTPATIENT)
Dept: CARDIOLOGY | Facility: MEDICAL CENTER | Age: 31
End: 2021-07-12
Attending: NURSE PRACTITIONER

## 2021-07-12 ENCOUNTER — APPOINTMENT (OUTPATIENT)
Dept: RADIOLOGY | Facility: MEDICAL CENTER | Age: 31
End: 2021-07-12
Attending: NURSE PRACTITIONER

## 2021-07-12 VITALS
TEMPERATURE: 98.3 F | OXYGEN SATURATION: 93 % | DIASTOLIC BLOOD PRESSURE: 77 MMHG | BODY MASS INDEX: 46.65 KG/M2 | HEIGHT: 69 IN | HEART RATE: 99 BPM | WEIGHT: 315 LBS | SYSTOLIC BLOOD PRESSURE: 136 MMHG | RESPIRATION RATE: 18 BRPM

## 2021-07-12 PROBLEM — J96.01 ACUTE RESPIRATORY FAILURE WITH HYPOXIA (HCC): Status: RESOLVED | Noted: 2020-03-05 | Resolved: 2021-07-12

## 2021-07-12 PROBLEM — E87.70 VOLUME OVERLOAD: Status: ACTIVE | Noted: 2021-07-12

## 2021-07-12 LAB
ALBUMIN SERPL BCP-MCNC: 3.8 G/DL (ref 3.2–4.9)
ALBUMIN/GLOB SERPL: 1.2 G/DL
ALP SERPL-CCNC: 68 U/L (ref 30–99)
ALT SERPL-CCNC: 12 U/L (ref 2–50)
ANION GAP SERPL CALC-SCNC: 6 MMOL/L (ref 7–16)
AST SERPL-CCNC: 21 U/L (ref 12–45)
BASOPHILS # BLD AUTO: 0.5 % (ref 0–1.8)
BASOPHILS # BLD: 0.04 K/UL (ref 0–0.12)
BILIRUB SERPL-MCNC: 0.7 MG/DL (ref 0.1–1.5)
BUN SERPL-MCNC: 8 MG/DL (ref 8–22)
CALCIUM SERPL-MCNC: 8.8 MG/DL (ref 8.5–10.5)
CHLORIDE SERPL-SCNC: 103 MMOL/L (ref 96–112)
CO2 SERPL-SCNC: 32 MMOL/L (ref 20–33)
CREAT SERPL-MCNC: 0.65 MG/DL (ref 0.5–1.4)
EOSINOPHIL # BLD AUTO: 0.12 K/UL (ref 0–0.51)
EOSINOPHIL NFR BLD: 1.5 % (ref 0–6.9)
ERYTHROCYTE [DISTWIDTH] IN BLOOD BY AUTOMATED COUNT: 68.3 FL (ref 35.9–50)
GLOBULIN SER CALC-MCNC: 3.1 G/DL (ref 1.9–3.5)
GLUCOSE SERPL-MCNC: 99 MG/DL (ref 65–99)
HCT VFR BLD AUTO: 54.7 % (ref 42–52)
HGB BLD-MCNC: 14.8 G/DL (ref 14–18)
IMM GRANULOCYTES # BLD AUTO: 0.04 K/UL (ref 0–0.11)
IMM GRANULOCYTES NFR BLD AUTO: 0.5 % (ref 0–0.9)
INR PPP: 3.6 (ref 0.87–1.13)
LYMPHOCYTES # BLD AUTO: 3.58 K/UL (ref 1–4.8)
LYMPHOCYTES NFR BLD: 43.4 % (ref 22–41)
MAGNESIUM SERPL-MCNC: 2 MG/DL (ref 1.5–2.5)
MCH RBC QN AUTO: 22.2 PG (ref 27–33)
MCHC RBC AUTO-ENTMCNC: 27.1 G/DL (ref 33.7–35.3)
MCV RBC AUTO: 82 FL (ref 81.4–97.8)
MONOCYTES # BLD AUTO: 0.51 K/UL (ref 0–0.85)
MONOCYTES NFR BLD AUTO: 6.2 % (ref 0–13.4)
NEUTROPHILS # BLD AUTO: 3.96 K/UL (ref 1.82–7.42)
NEUTROPHILS NFR BLD: 47.9 % (ref 44–72)
NRBC # BLD AUTO: 0.02 K/UL
NRBC BLD-RTO: 0.2 /100 WBC
NT-PROBNP SERPL IA-MCNC: 934 PG/ML (ref 0–125)
PHOSPHATE SERPL-MCNC: 4.1 MG/DL (ref 2.5–4.5)
PLATELET # BLD AUTO: 229 K/UL (ref 164–446)
PMV BLD AUTO: 10.1 FL (ref 9–12.9)
POTASSIUM SERPL-SCNC: 4.3 MMOL/L (ref 3.6–5.5)
PROT SERPL-MCNC: 6.9 G/DL (ref 6–8.2)
PROTHROMBIN TIME: 34.8 SEC (ref 12–14.6)
RBC # BLD AUTO: 6.67 M/UL (ref 4.7–6.1)
SARS-COV-2 RNA RESP QL NAA+PROBE: NOTDETECTED
SODIUM SERPL-SCNC: 141 MMOL/L (ref 135–145)
SPECIMEN SOURCE: NORMAL
WBC # BLD AUTO: 8.3 K/UL (ref 4.8–10.8)

## 2021-07-12 PROCEDURE — 93306 TTE W/DOPPLER COMPLETE: CPT | Mod: 26 | Performed by: INTERNAL MEDICINE

## 2021-07-12 PROCEDURE — 700105 HCHG RX REV CODE 258: Performed by: INTERNAL MEDICINE

## 2021-07-12 PROCEDURE — 96375 TX/PRO/DX INJ NEW DRUG ADDON: CPT

## 2021-07-12 PROCEDURE — 700117 HCHG RX CONTRAST REV CODE 255: Performed by: NURSE PRACTITIONER

## 2021-07-12 PROCEDURE — 700102 HCHG RX REV CODE 250 W/ 637 OVERRIDE(OP): Performed by: INTERNAL MEDICINE

## 2021-07-12 PROCEDURE — 84100 ASSAY OF PHOSPHORUS: CPT

## 2021-07-12 PROCEDURE — 99217 PR OBSERVATION CARE DISCHARGE: CPT | Performed by: NURSE PRACTITIONER

## 2021-07-12 PROCEDURE — 85610 PROTHROMBIN TIME: CPT

## 2021-07-12 PROCEDURE — 99217 PR OBSERVATION CARE DISCHARGE: CPT | Performed by: INTERNAL MEDICINE

## 2021-07-12 PROCEDURE — 700111 HCHG RX REV CODE 636 W/ 250 OVERRIDE (IP): Performed by: INTERNAL MEDICINE

## 2021-07-12 PROCEDURE — 700111 HCHG RX REV CODE 636 W/ 250 OVERRIDE (IP): Performed by: NURSE PRACTITIONER

## 2021-07-12 PROCEDURE — RXMED WILLOW AMBULATORY MEDICATION CHARGE: Performed by: NURSE PRACTITIONER

## 2021-07-12 PROCEDURE — 83735 ASSAY OF MAGNESIUM: CPT

## 2021-07-12 PROCEDURE — G0378 HOSPITAL OBSERVATION PER HR: HCPCS

## 2021-07-12 PROCEDURE — A9270 NON-COVERED ITEM OR SERVICE: HCPCS | Performed by: INTERNAL MEDICINE

## 2021-07-12 PROCEDURE — 83880 ASSAY OF NATRIURETIC PEPTIDE: CPT

## 2021-07-12 PROCEDURE — 80053 COMPREHEN METABOLIC PANEL: CPT

## 2021-07-12 PROCEDURE — 93306 TTE W/DOPPLER COMPLETE: CPT

## 2021-07-12 PROCEDURE — 71045 X-RAY EXAM CHEST 1 VIEW: CPT

## 2021-07-12 PROCEDURE — 96366 THER/PROPH/DIAG IV INF ADDON: CPT

## 2021-07-12 PROCEDURE — 85025 COMPLETE CBC W/AUTO DIFF WBC: CPT

## 2021-07-12 RX ORDER — DOXYCYCLINE 100 MG/1
200 TABLET ORAL EVERY 12 HOURS
Status: DISCONTINUED | OUTPATIENT
Start: 2021-07-12 | End: 2021-07-12 | Stop reason: HOSPADM

## 2021-07-12 RX ORDER — DOXYCYCLINE 100 MG/1
200 CAPSULE ORAL 2 TIMES DAILY
Qty: 40 CAPSULE | Refills: 0 | Status: SHIPPED | OUTPATIENT
Start: 2021-07-12 | End: 2021-07-22

## 2021-07-12 RX ORDER — POTASSIUM CHLORIDE 20 MEQ/1
20 TABLET, EXTENDED RELEASE ORAL DAILY
Qty: 3 TABLET | Refills: 0 | Status: SHIPPED | OUTPATIENT
Start: 2021-07-12 | End: 2021-07-15

## 2021-07-12 RX ORDER — FUROSEMIDE 20 MG/1
20 TABLET ORAL DAILY
Qty: 3 TABLET | Refills: 0 | Status: SHIPPED | OUTPATIENT
Start: 2021-07-12 | End: 2021-07-15

## 2021-07-12 RX ORDER — ALBUTEROL SULFATE 90 UG/1
2 AEROSOL, METERED RESPIRATORY (INHALATION)
Status: DISCONTINUED | OUTPATIENT
Start: 2021-07-12 | End: 2021-07-12 | Stop reason: HOSPADM

## 2021-07-12 RX ORDER — FUROSEMIDE 10 MG/ML
40 INJECTION INTRAMUSCULAR; INTRAVENOUS ONCE
Status: COMPLETED | OUTPATIENT
Start: 2021-07-12 | End: 2021-07-12

## 2021-07-12 RX ADMIN — OXYCODONE 2.5 MG: 5 TABLET ORAL at 03:42

## 2021-07-12 RX ADMIN — HUMAN ALBUMIN MICROSPHERES AND PERFLUTREN 3 ML: 10; .22 INJECTION, SOLUTION INTRAVENOUS at 09:19

## 2021-07-12 RX ADMIN — SODIUM CHLORIDE 3 G: 900 INJECTION INTRAVENOUS at 03:37

## 2021-07-12 RX ADMIN — VANCOMYCIN HYDROCHLORIDE 1500 MG: 500 INJECTION, POWDER, LYOPHILIZED, FOR SOLUTION INTRAVENOUS at 00:39

## 2021-07-12 RX ADMIN — FUROSEMIDE 40 MG: 10 INJECTION, SOLUTION INTRAMUSCULAR; INTRAVENOUS at 11:14

## 2021-07-12 RX ADMIN — SODIUM CHLORIDE 3 G: 900 INJECTION INTRAVENOUS at 09:13

## 2021-07-12 ASSESSMENT — COPD QUESTIONNAIRES
COPD SCREENING SCORE: 0
HAVE YOU SMOKED AT LEAST 100 CIGARETTES IN YOUR ENTIRE LIFE: NO/DON'T KNOW
DO YOU EVER COUGH UP ANY MUCUS OR PHLEGM?: NO/ONLY WITH OCCASIONAL COLDS OR INFECTIONS
DURING THE PAST 4 WEEKS HOW MUCH DID YOU FEEL SHORT OF BREATH: NONE/LITTLE OF THE TIME

## 2021-07-12 ASSESSMENT — PAIN DESCRIPTION - PAIN TYPE: TYPE: ACUTE PAIN

## 2021-07-12 NOTE — CARE PLAN
The patient is Stable - Low risk of patient condition declining or worsening    Shift Goals  Clinical Goals: maintain adequae oxygenation  Patient Goals: IV abx    Progress made toward(s) clinical / shift goals:    Problem: Hemodynamics  Goal: Patient's hemodynamics, fluid balance and neurologic status will be stable or improve  Outcome: Progressing     Problem: Infection - Standard  Goal: Patient will remain free from infection  Outcome: Progressing     Problem: Wound/ / Incision Healing  Goal: Patient's wound/surgical incision will decrease in size and heals properly  Outcome: Progressing       Patient is not progressing towards the following goals:

## 2021-07-12 NOTE — DISCHARGE PLANNING
Meds-to-Beds: Discharge prescription orders listed below delivered to patient's bedside. JOANNA Lopez notified. Patient counseled. Patient elected to have co-payment billed to patient account.        Michael Ontiveros   Home Medication Instructions FERNANDO:[id number]    Printed on:07/12/21 8700   Medication Information                      doxycycline (MONODOX) 100 MG capsule  Take 2 Capsules by mouth 2 times a day for 10 days.             furosemide (LASIX) 20 MG Tab  Take 1 tablet by mouth every day for 3 days.             mupirocin (BACTROBAN) 2 % Ointment  Apply sparingly to affected area 2 times a day for 10 days.             potassium chloride SA (KDUR) 20 MEQ Tab CR  Take 1 tablet by mouth every day for 3 days.                 Alma Harris, PharmD

## 2021-07-12 NOTE — PROGRESS NOTES
Pt INR increased from previous value. Called pharmacist to verify additional Warfarin does as well and Heparin gtt. Pharmacist advised to hold the Warfarin dose and continue with heparin gtt.

## 2021-07-12 NOTE — DISCHARGE SUMMARY
Discharge Summary    CHIEF COMPLAINT ON ADMISSION  Chief Complaint   Patient presents with   • Leg Swelling     Reason for Admission  Right lower extremity infection    Admission Date  7/11/2021    CODE STATUS  Full Code    HPI & HOSPITAL COURSE  Mr. Ontiveros is a morbidly obese 30-year-old male with a history of pulmonary embolism (maintained on warfarin) and chronic venous stasis in bilateral lower extremities who presented to the emergency department on 7/11/2021 with right lower extremity cellulitis.  He states approximately 2 weeks ago he had an exacerbation of his eczema and was scratching it, causing a wound on his right lower leg which has subsequently turned into profound erythema, warmth, and swelling in his right lower extremity.  He did not seek medical care because he recently started a new job and does not have insurance.  On arrival to the ED he was afebrile and just minimally tachycardic.  He was hypoxic and was placed on supplemental oxygen.  He was placed on IV Unasyn and vancomycin and admitted to the clinical decision unit for further evaluation and treatment where he was monitored overnight and was noted to significantly improve by the next day.  He was noted to have fluid volume overload (on clinical exam in addition to imaging) and was given a dose of IV lasix in addition to being placed on oral lasix for the next 3 days.  He was able to be weaned off oxygen.  Wound care also assessed the patient and provided recommendations for dressings in addition to placing leg wraps bilaterally.  An echocardiogram was also obtained and showed normal systolic and diastolic function.  At the recommendation of pharmacy, the patient has been placed on a 10-day course of double strength doxycycline given his weight in addition to mupirocin ointment for his leg.  On the day of discharge he is independently ambulatory around the unit, maintaining a normal oxygen saturation on room air, is feeling much better and  states his leg is much improved (appearance and pain).    Therefore, he is discharged in good and stable condition to home with close outpatient follow-up.    Discharge Date  7/12/2021    FOLLOW UP ITEMS POST DISCHARGE  PCP in 5 to 7 days.    DISCHARGE DIAGNOSES  Principal Problem:    Cellulitis of right lower extremity POA: Yes  Active Problems:    Volume overload POA: Yes    Asthma POA: Yes    Morbid obesity (HCC) (Chronic) POA: Yes    Lower extremity edema POA: Yes      Overview: LE doppler study on 11/3/2017 shows:      Bilateral lower extremities -       Complete color filling and compressibility with normal venous flow       dynamics        including spontaneous flow, response to augmentation maneuvers, and        respiratory phasicity. No evidence of superficial or deep venous       thrombosis. The peroneal and posterior tibial veins are difficult to       assess for        compressibility, but flow response to augmentation is demonstrated.  Wall       thickness visualized in the left small saphenous vein.      No priors available for comparison.    JONES (obstructive sleep apnea) (Chronic) POA: Yes    Pulmonary hypertension (HCC) (Chronic) POA: Yes    Chronic anticoagulation POA: Yes      Overview: 3/16/20 - No DOAC due to BMI and potential failure on Xarelto.     History of pulmonary embolism POA: Yes  Resolved Problems:    Acute respiratory failure with hypoxia (HCC) POA: Yes    FOLLOW UP  No future appointments.  No follow-up provider specified.    MEDICATIONS ON DISCHARGE     Medication List      Start taking these medications      Instructions   doxycycline 100 MG capsule  Commonly known as: MONODOX   Doctor's comments: Take with food  Take 2 Capsules by mouth 2 times a day for 10 days.  Dose: 200 mg     furosemide 20 MG Tabs  Commonly known as: LASIX   Doctor's comments: Take with potassium  Take 1 tablet by mouth every day for 3 days.  Dose: 20 mg     mupirocin 2 % Oint  Commonly known as: BACTROBAN    Apply sparingly to affected area 2 times a day for 10 days.  Dose: 1 Application     potassium chloride SA 20 MEQ Tbcr  Commonly known as: Kdur   Doctor's comments: Take with lasix  Take 1 tablet by mouth every day for 3 days.  Dose: 20 mEq        Continue taking these medications      Instructions   acetaminophen 325 MG Tabs  Commonly known as: Tylenol   Take 650-975 mg by mouth every 6 hours as needed for Mild Pain or Moderate Pain. 2 to 3 tablets = 650 to 975 mg.  Dose: 650-975 mg     albuterol 108 (90 Base) MCG/ACT Aers inhalation aerosol   Inhale 2 Puffs every 6 hours as needed for Shortness of Breath.  Dose: 2 Puff     Mens Multivitamin Tabs   Take 1 tablet by mouth every morning.  Dose: 1 tablet     warfarin 7.5 MG Tabs  Commonly known as: COUMADIN   Doctor's comments: Patient needs follow up INR to discuss length of therapy thank you  Take one-half to one tablet by mouth daily or as directed by anticoagulation clinic        Stop taking these medications    bacitracin 500 UNIT/GM Oint          Allergies  Allergies   Allergen Reactions   • [place] (Diagnostic) Anaphylaxis   • Pistachio Anaphylaxis   • [place] Anaphylaxis   • Tree Nuts Food Allergy Vomiting and Nausea     Other nuts aside from almond, walnut, pistachio     DIET  Orders Placed This Encounter   Procedures   • Diet Order Diet: Regular     Standing Status:   Standing     Number of Occurrences:   1     Order Specific Question:   Diet:     Answer:   Regular [1]     ACTIVITY  As tolerated.  Exercise encouraged.  Weight bearing as tolerated    CONSULTATIONS  None    PROCEDURES  None    LABORATORY  Lab Results   Component Value Date    SODIUM 141 07/12/2021    POTASSIUM 4.3 07/12/2021    CHLORIDE 103 07/12/2021    CO2 32 07/12/2021    GLUCOSE 99 07/12/2021    BUN 8 07/12/2021    CREATININE 0.65 07/12/2021     Lab Results   Component Value Date    WBC 8.3 07/12/2021    HEMOGLOBIN 14.8 07/12/2021    HEMATOCRIT 54.7 (H) 07/12/2021    PLATELETCT 229  07/12/2021

## 2021-07-12 NOTE — CARE PLAN
Problem: Pain - Standard  Goal: Alleviation of pain or a reduction in pain to the patient’s comfort goal  Outcome: Met     Problem: Knowledge Deficit - Standard  Goal: Patient and family/care givers will demonstrate understanding of plan of care, disease process/condition, diagnostic tests and medications  Outcome: Met

## 2021-07-12 NOTE — WOUND TEAM
[facility]  Inpatient Services  Initial Wound and Skin Care Evaluation    Admission Date: 7/11/2021     Last order of IP CONSULT TO WOUND CARE was found on 7/11/2021 from Hospital Encounter on 7/11/2021     HPI, PMH, SH: Reviewed    Past Surgical History:   Procedure Laterality Date   • DENTAL EXTRACTION(S)  11/3/2017    Procedure: DENTAL EXTRACTION(S);  Surgeon: Olman Sparks D.D.S.;  Location: SURGERY [facility];  Service: Oral Surgery     Social History     Tobacco Use   • Smoking status: Never Smoker   • Smokeless tobacco: Never Used   Substance Use Topics   • Alcohol use: Not Currently     Comment: rare     Chief Complaint   Patient presents with   • Leg Swelling     Diagnosis: Cellulitis of leg, right [L03.115]    Unit where seen by Wound Team: T218/00     WOUND CONSULT/FOLLOW UP RELATED TO:  Left posterior LE venous statis dermatitis     WOUND HISTORY:  Reoccurring of venous statis dermatitis to bilateral LEs.      WOUND ASSESSMENT/LDA  Wound 07/12/21 Venous Ulcer Leg Lower;Posterior Left (Active)   Wound Image   07/12/21 1000   Site Assessment Pink;Red 07/12/21 1000   Periwound Assessment Hemosiderin Staining 07/12/21 1000   Margins Undefined edges 07/12/21 1000   Closure Secondary intention 07/12/21 1000   Drainage Amount Small 07/12/21 1000   Drainage Description Serosanguineous;Serous 07/12/21 1000   Treatments Cleansed;Site care;Irrigation;Compression 07/12/21 1000   Wound Cleansing Normal Saline Irrigation 07/12/21 1000   Periwound Protectant Not Applicable 07/12/21 1000   Dressing Cleansing/Solutions Not Applicable 07/12/21 1000   Dressing Options Hydrofiber Silver;Absorbent Abdominal Pad;Dry Roll Gauze;Tubigrip 07/12/21 1000   Dressing Changed New 07/12/21 1000   Dressing Status Clean;Dry;Intact 07/12/21 1000   Dressing Change/Treatment Frequency Every 48 hrs, and As Needed 07/12/21 1000   NEXT Dressing Change/Treatment Date 07/14/21 07/12/21 1000   NEXT Weekly Photo  (Inpatient Only) 07/19/21 07/12/21 1000   Non-staged Wound Description Full thickness 07/12/21 1000   Wound Length (cm) 13 cm 07/12/21 1000   Wound Width (cm) 8 cm 07/12/21 1000   Wound Depth (cm) 0.2 cm 07/12/21 1000   Wound Surface Area (cm^2) 104 cm^2 07/12/21 1000   Wound Volume (cm^3) 20.8 cm^3 07/12/21 1000   Shape irregular scattered  07/12/21 1000   Wound Odor Mild 07/12/21 1000   Pulses Right;Left;2+;DP;PT 07/12/21 1000   Exposed Structures None 07/12/21 1000   WOUND NURSE ONLY - Time Spent with Patient (mins) 50 07/12/21 1000   Number of days: 0        Vascular:    LIZETH:   No results found.    Lab Values:    Lab Results   Component Value Date/Time    WBC 8.3 07/12/2021 02:00 AM    RBC 6.67 (H) 07/12/2021 02:00 AM    HEMOGLOBIN 14.8 07/12/2021 02:00 AM    HEMATOCRIT 54.7 (H) 07/12/2021 02:00 AM    CREACTPROT 6.99 (H) 08/24/2019 06:00 AM    HBA1C 6.1 (H) 08/23/2019 05:16 AM        Culture Results show:  No results found for this or any previous visit (from the past 720 hour(s)).    Pain Level/Medicated:  0/10       INTERVENTIONS BY WOUND TEAM:  Chart and images reviewed. Discussed with bedside RN. All areas of concern (based on picture review, LDA review and discussion with bedside RN) have been thoroughly assessed. Documentation of areas based on significant findings. This RN in to assess patient. Performed standard wound care which includes appropriate positioning, dressing removal and non-selective debridement. Pictures and measurements obtained weekly if/when required.  Preparation for Dressing removal: no dressing in place.  Cleansed with:  NS and gauze.  Sharp debridement: NA  Steph wound: Cleansed with NS and gauze  Primary Dressing: Aquacel Ag hydrofiber  Secondary (Outer) Dressing: ABD, kerlix and Tubigrib size F bilaterally    Interdisciplinary consultation: Patient, Bedside RN    EVALUATION / RATIONALE FOR TREATMENT:  Most Recent Date:  07/12/21: Venous statis dermatitis due to stagnancy and body  habitus causing venous statis in bilateral LEs, hemosiderin staining throughout lower extremities. Wound bed has serosanguinous drainage with moderate amount of non-viable slough to wound bed that was easily cleansed off with NS and gauze.  Patient will need to keep compression on daily to create venous return from the lower legs.  Outpatient referral placed for patient to follow-up.  Patient just started a new job in June, insurance starts on August 1st for patient.       Goals: Steady decrease in wound area and depth weekly.    WOUND TEAM PLAN OF CARE ([X] for frequency of wound follow up,):   Nursing to follow orders written for wound care. Contact wound team if area fails to progress, deteriorates or with any questions/concerns  Dressing changes by wound team:                   Follow up 3 times weekly:                NPWT change 3 times weekly:     Follow up 1-2 times weekly:      Follow up Bi-Monthly:                   Follow up as needed:   x  Other (explain):     NURSING PLAN OF CARE ORDERS (X):  Dressing changes: See Dressing Care orders: x  Skin care: See Skin Care orders:   RN Prevention Protocol:   Rectal tube care: See Rectal Tube Care orders:   Other orders:    RSKIN:   CURRENTLY IN PLACE (X), APPLIED THIS VISIT (A), ORDERED (O):   Q shift Chuck:  X  Q shift pressure point assessments:  X    Surface/Positioning   Pressure redistribution mattress            Low Airloss          Bariatric foam      Bariatric YOSELIN   x  Waffle cushion        Waffle Overlay          Reposition q 2 hours      TAPs Turning system     Z Juan Miguel Pillow     Offloading/Redistribution NA  Sacral Mepilex (Silicone dressing)     Heel Mepilex (Silicone dressing)         Heel float boots (Prevalon boot)             Float Heels off Bed with Pillows           Respiratory NA  Silicone O2 tubing         Gray Foam Ear protectors     Cannula fixation Device (Tender )          High flow offloading Clip    Elastic head band offloading device       Anchorfast                                                         Trach with Optifoam split foam             Containment/Moisture Prevention NA    Rectal tube or BMS    Purwick/Condom Cath        Matthew Catheter    Barrier wipes           Barrier paste       Antifungal tx      Interdry        Mobilization       Up to chair    x    Ambulate    x  PT/OT      Nutrition       Dietician        Diabetes Education      PO  x   TF     TPN     NPO   # days     Other        Anticipated discharge plans:   LTACH:        SNF/Rehab:                  Home Health Care:           Outpatient Wound Center:     x       Self/Family Care:        Other:

## 2021-07-12 NOTE — DISCHARGE INSTRUCTIONS
Discharge Instructions    Discharged to home by car with relative. Discharged via wheelchair, hospital escort: Yes.  Special equipment needed: Not Applicable    Be sure to schedule a follow-up appointment with your primary care doctor or any specialists as instructed.     Discharge Plan:   Diet Plan: Discussed  Activity Level: Discussed  Confirmed Follow up Appointment: Patient to Call and Schedule Appointment  Confirmed Symptoms Management: Discussed  Medication Reconciliation Updated: Yes    I understand that a diet low in cholesterol, fat, and sodium is recommended for good health. Unless I have been given specific instructions below for another diet, I accept this instruction as my diet prescription.   Other diet: heart healthy diet    Special Instructions: None    · Is patient discharged on Warfarin / Coumadin?   Yes    You are receiving the drug warfarin. Please understand the importance of monitoring warfarin with scheduled PT/INR blood draws.  Follow-up with the [facility] in one week for INR lab..    IMPORTANT: HOW TO USE THIS INFORMATION:  This is a summary and does NOT have all possible information about this product. This information does not assure that this product is safe, effective, or appropriate for you. This information is not individual medical advice and does not substitute for the advice of your health care professional. Always ask your health care professional for complete information about this product and your specific health needs.      WARFARIN - ORAL (WARF-uh-rin)      COMMON BRAND NAME(S): Coumadin      WARNING:  Warfarin can cause very serious (possibly fatal) bleeding. This is more likely to occur when you first start taking this medication or if you take too much warfarin. To decrease your risk for bleeding, your doctor or other health care provider will monitor you closely and check your lab results (INR test) to make sure you are not taking too much warfarin. Keep all medical  "and laboratory appointments. Tell your doctor right away if you notice any signs of serious bleeding. See also Side Effects section.      USES:  This medication is used to treat blood clots (such as in deep vein thrombosis-DVT or pulmonary embolus-PE) and/or to prevent new clots from forming in your body. Preventing harmful blood clots helps to reduce the risk of a stroke or heart attack. Conditions that increase your risk of developing blood clots include a certain type of irregular heart rhythm (atrial fibrillation), heart valve replacement, recent heart attack, and certain surgeries (such as hip/knee replacement). Warfarin is commonly called a \"blood thinner,\" but the more correct term is \"anticoagulant.\" It helps to keep blood flowing smoothly in your body by decreasing the amount of certain substances (clotting proteins) in your blood.      HOW TO USE:  Read the Medication Guide provided by your pharmacist before you start taking warfarin and each time you get a refill. If you have any questions, ask your doctor or pharmacist. Take this medication by mouth with or without food as directed by your doctor or other health care professional, usually once a day. It is very important to take it exactly as directed. Do not increase the dose, take it more frequently, or stop using it unless directed by your doctor. Dosage is based on your medical condition, laboratory tests (such as INR), and response to treatment. Your doctor or other health care provider will monitor you closely while you are taking this medication to determine the right dose for you. Use this medication regularly to get the most benefit from it. To help you remember, take it at the same time each day. It is important to eat a balanced, consistent diet while taking warfarin. Some foods can affect how warfarin works in your body and may affect your treatment and dose. Avoid sudden large increases or decreases in your intake of foods high in vitamin K " (such as broccoli, cauliflower, cabbage, brussels sprouts, kale, spinach, and other green leafy vegetables, liver, green tea, certain vitamin supplements). If you are trying to lose weight, check with your doctor before you try to go on a diet. Cranberry products may also affect how your warfarin works. Limit the amount of cranberry juice (16 ounces/480 milliliters a day) or other cranberry products you may drink or eat.      SIDE EFFECTS:  Nausea, loss of appetite, or stomach/abdominal pain may occur. If any of these effects persist or worsen, tell your doctor or pharmacist promptly. Remember that your doctor has prescribed this medication because he or she has judged that the benefit to you is greater than the risk of side effects. Many people using this medication do not have serious side effects. This medication can cause serious bleeding if it affects your blood clotting proteins too much (shown by unusually high INR lab results). Even if your doctor stops your medication, this risk of bleeding can continue for up to a week. Tell your doctor right away if you have any signs of serious bleeding, including: unusual pain/swelling/discomfort, unusual/easy bruising, prolonged bleeding from cuts or gums, persistent/frequent nosebleeds, unusually heavy/prolonged menstrual flow, pink/dark urine, coughing up blood, vomit that is bloody or looks like coffee grounds, severe headache, dizziness/fainting, unusual or persistent tiredness/weakness, bloody/black/tarry stools, chest pain, shortness of breath, difficulty swallowing. Tell your doctor right away if any of these unlikely but serious side effects occur: persistent nausea/vomiting, severe stomach/abdominal pain, yellowing eyes/skin. This drug rarely has caused very serious (possibly fatal) problems if its effects lead to small blood clots (usually at the beginning of treatment). This can lead to severe skin/tissue damage that may require surgery or amputation if left  untreated. Patients with certain blood conditions (protein C or S deficiency) may be at greater risk. Get medical help right away if any of these rare but serious side effects occur: painful/red/purplish patches on the skin (such as on the toe, breast, abdomen), change in the amount of urine, vision changes, confusion, slurred speech, weakness on one side of the body. A very serious allergic reaction to this drug is rare. However, get medical help right away if you notice any symptoms of a serious allergic reaction, including: rash, itching/swelling (especially of the face/tongue/throat), severe dizziness, trouble breathing. This is not a complete list of possible side effects. If you notice other effects not listed above, contact your doctor or pharmacist. In the [place] - Call your doctor for medical advice about side effects. You may report side effects to FDA at 4-975-MWT-1766. In [place] - Call your doctor for medical advice about side effects. You may report side effects to [facility] [place] at [phone].      PRECAUTIONS:  Before taking warfarin, tell your doctor or pharmacist if you are allergic to it; or if you have any other allergies. This product may contain inactive ingredients, which can cause allergic reactions or other problems. Talk to your pharmacist for more details. Before using this medication, tell your doctor or pharmacist your medical history, especially of: blood disorders (such as anemia, hemophilia), bleeding problems (such as bleeding of the stomach/intestines, bleeding in the brain), blood vessel disorders (such as aneurysms), recent major injury/surgery, liver disease, alcohol use, mental/mood disorders (including memory problems), frequent falls/injuries. It is important that all your doctors and dentists know that you take warfarin. Before having surgery or any medical/dental procedures, tell your doctor or dentist that you are taking this medication and about all the products you use  (including prescription drugs, nonprescription drugs, and herbal products). Avoid getting injections into the muscles. If you must have an injection into a muscle (for example, a flu shot), it should be given in the arm. This way, it will be easier to check for bleeding and/or apply pressure bandages. This medication may cause stomach bleeding. Daily use of alcohol while using this medicine will increase your risk for stomach bleeding and may also affect how this medication works. Limit or avoid alcoholic beverages. If you have not been eating well, if you have an illness or infection that causes fever, vomiting, or diarrhea for more than 2 days, or if you start using any antibiotic medications, contact your doctor or pharmacist immediately because these conditions can affect how warfarin works. This medication can cause heavy bleeding. To lower the chance of getting cut, bruised, or injured, use great caution with sharp objects like safety razors and nail cutters. Use an electric razor when shaving and a soft toothbrush when brushing your teeth. Avoid activities such as contact sports. If you fall or injure yourself, especially if you hit your head, call your doctor immediately. Your doctor may need to check you. The Food & Drug Administration has stated that generic warfarin products are interchangeable. However, consult your doctor or pharmacist before switching warfarin products. Be careful not to take more than one medication that contains warfarin unless specifically directed by the doctor or health care provider who is monitoring your warfarin treatment. Older adults may be at greater risk for bleeding while using this drug. This medication is not recommended for use during pregnancy because of serious (possibly fatal) harm to an unborn baby. Discuss the use of reliable forms of birth control with your doctor. If you become pregnant or think you may be pregnant, tell your doctor immediately. If you are  "planning pregnancy, discuss a plan for managing your condition with your doctor before you become pregnant. Your doctor may switch the type of medication you use during pregnancy. Very small amounts of this medication may pass into breast milk but is unlikely to harm a nursing infant. Consult your doctor before breast-feeding.      DRUG INTERACTIONS:  Drug interactions may change how your medications work or increase your risk for serious side effects. This document does not contain all possible drug interactions. Keep a list of all the products you use (including prescription/nonprescription drugs and herbal products) and share it with your doctor and pharmacist. Do not start, stop, or change the dosage of any medicines without your doctor's approval. Warfarin interacts with many prescription, nonprescription, vitamin, and herbal products. This includes medications that are applied to the skin or inside the vagina or rectum. The interactions with warfarin usually result in an increase or decrease in the \"blood-thinning\" (anticoagulant) effect. Your doctor or other health care professional should closely monitor you to prevent serious bleeding or clotting problems. While taking warfarin, it is very important to tell your doctor or pharmacist of any changes in medications, vitamins, or herbal products that you are taking. Some products that may interact with this drug include: capecitabine, imatinib, mifepristone. Aspirin, aspirin-like drugs (salicylates), and nonsteroidal anti-inflammatory drugs (NSAIDs such as ibuprofen, naproxen, celecoxib) may have effects similar to warfarin. These drugs may increase the risk of bleeding problems if taken during treatment with warfarin. Carefully check all prescription/nonprescription product labels (including drugs applied to the skin such as pain-relieving creams) since the products may contain NSAIDs or salicylates. Talk to your doctor about using a different medication (such " as acetaminophen) to treat pain/fever. Low-dose aspirin and related drugs (such as clopidogrel, ticlopidine) should be continued if prescribed by your doctor for specific medical reasons such as heart attack or stroke prevention. Consult your doctor or pharmacist for more details. Many herbal products interact with warfarin. Tell your doctor before taking any herbal products, especially bromelains, coenzyme Q10, cranberry, danshen, dong quai, fenugreek, garlic, ginkgo biloba, ginseng, and Maximus's wort, among others. This medication may interfere with a certain laboratory test to measure theophylline levels, possibly causing false test results. Make sure laboratory personnel and all your doctors know you use this drug.      OVERDOSE:  If overdose is suspected, contact a poison control center or emergency room immediately. [place] residents can call the [place] National Poison Hotline at 1-702.657.6677. [place] residents can call a provincial poison control center. Symptoms of overdose may include: bloody/black/tarry stools, pink/dark urine, unusual/prolonged bleeding.      NOTES:  Do not share this medication with others. Laboratory and/or medical tests (such as INR, complete blood count) must be performed periodically to monitor your progress or check for side effects. Consult your doctor for more details.      MISSED DOSE:  For the best possible benefit, do not miss any doses. If you do miss a dose and remember on the same day, take it as soon as you remember. If you remember on the next day, skip the missed dose and resume your usual dosing schedule. Do not double the dose to catch up because this could increase your risk for bleeding. Keep a record of missed doses to give to your doctor or pharmacist. Contact your doctor or pharmacist if you miss 2 or more doses in a row.      STORAGE:  Store at room temperature away from light and moisture. Do not store in the bathroom. Keep all medications away from children and pets.  Do not flush medications down the toilet or pour them into a drain unless instructed to do so. Properly discard this product when it is  or no longer needed. Consult your pharmacist or local waste disposal company for more details about how to safely discard your product.      MEDICAL ALERT:  Your condition and medication can cause complications in a medical emergency. For information about enrolling in MedicAlert, call 1-937.332.9002 ([place]) or 1-822.517.2523 ([place]).      Information last revised 2010 Copyright(c)  [employer]             Depression / Suicide Risk    As you are discharged from this [facility] facility, it is important to learn how to keep safe from harming yourself.    Recognize the warning signs:  · Abrupt changes in personality, positive or negative- including increase in energy   · Giving away possessions  · Change in eating patterns- significant weight changes-  positive or negative  · Change in sleeping patterns- unable to sleep or sleeping all the time   · Unwillingness or inability to communicate  · Depression  · Unusual sadness, discouragement and loneliness  · Talk of wanting to die  · Neglect of personal appearance   · Rebelliousness- reckless behavior  · Withdrawal from people/activities they love  · Confusion- inability to concentrate     If you or a loved one observes any of these behaviors or has concerns about self-harm, here's what you can do:  · Talk about it- your feelings and reasons for harming yourself  · Remove any means that you might use to hurt yourself (examples: pills, rope, extension cords, firearm)  · Get professional help from the community (Mental Health, Substance Abuse, psychological counseling)  · Do not be alone:Call your Safe Contact- someone whom you trust who will be there for you.  · Call your local CRISIS HOTLINE 388-9372 or 587-303-4113  · Call your local Children's Mobile Crisis Response Team [place] (438) 495-2219 or  www.[employer]  · Call the toll free National Suicide Prevention Hotlines   · National Suicide Prevention Lifeline 121-800-ZZFD (7047)  · National Hope Line Network 800-SUICIDE (088-3083)

## 2021-07-12 NOTE — PROGRESS NOTES
On CDU floor, got big boy bed, ambulates well. Was able to take a picture of BLE on clinical media. Continue Vanco IV dose.Update plan of care.

## 2021-07-12 NOTE — PROGRESS NOTES
Inpatient Anticoagulation Service Note    Date: 7/12/2021    Reason for Anticoagulation: Pulmonary Embolism   Target INR: 2.0 to 3.0     Hemoglobin Value: 15.1  Hematocrit Value: (!) 55.1  Lab Platelet Value: 231    INR from last 7 days     Date/Time INR Value    07/11/21 2210  (!) 3.74    07/11/21 14:06:01  (!) 1.16        Dose from last 7 days     Date/Time Dose (mg)    07/11/21 2115  0        Significant Interactions: Antibiotics  Bridge Therapy: originally started heparin gtt d/t subtherapeutic INR, cancelled after repeat INR came back supratherapeutic.    Reversal Agent Administered: Not Applicable  Comments: Warfarin continued for history of PE. H/H and plts WNL. INR supratherapeutic.    Plan:  Holding warfarin tonight as pt took dose AM of 7/11 and repeat INR came back at 3.74.  Education Material Provided?: No (chronic therapy)  Pharmacist suggested discharge dosing: Continue home dose of 3.75 mg Wednesdays and 7.5 mg all other days.     Anjel Redd, PharmD

## 2021-07-12 NOTE — HOSPITAL COURSE
Mr. Ontiveros is a morbidly obese 30-year-old male with a history of pulmonary embolism (maintained on warfarin) and chronic venous stasis in bilateral lower extremities who presented to the emergency department on 7/11/2021 with right lower extremity cellulitis.  He states approximately 2 weeks ago he had an exacerbation of his eczema and was scratching it, causing a wound on his right lower leg which has subsequently turned into profound erythema, warmth, and swelling in his right lower extremity.  He did not seek medical care because he recently started a new job and does not have insurance.  On arrival to the ED he was afebrile and just minimally tachycardic.  He was hypoxic and was placed on supplemental oxygen.  He was placed on IV Unasyn and vancomycin and admitted to the clinical decision unit for further evaluation and treatment where he was monitored overnight and was noted to significantly improve by the next day.  He was noted to have fluid volume overload (on clinical exam in addition to imaging) and was given a dose of IV lasix in addition to being placed on oral lasix for the next 3 days.  He was able to be weaned off oxygen.  Wound care also assessed the patient and provided recommendations for dressings in addition to placing leg wraps bilaterally.  An echocardiogram was also obtained and showed normal systolic and diastolic function.  At the recommendation of pharmacy, the patient has been placed on a 10-day course of double strength doxycycline given his weight in addition to mupirocin ointment for his leg.  On the day of discharge he is independently ambulatory around the unit, maintaining a normal oxygen saturation on room air, is feeling much better and states his leg is much improved (appearance and pain).

## 2021-07-12 NOTE — CARE PLAN
Problem: Hemodynamics  Goal: Patient's hemodynamics, fluid balance and neurologic status will be stable or improve  Outcome: Progressing     Problem: Mobility  Goal: Patient's capacity to carry out activities will improve  Outcome: Progressing     Problem: Infection - Standard  Goal: Patient will remain free from infection  Outcome: Progressing     Problem: Wound/ / Incision Healing  Goal: Patient's wound/surgical incision will decrease in size and heals properly  Outcome: Progressing    The patient is Stable - Low risk of patient condition declining or worsening    Shift Goals  Clinical Goals: decrease signs of infection, maintain adequate oxygenation  Patient Goals: rest, IV abx    Progress made toward(s) clinical / shift goals: pt denies pain; pt is able to perform ADLs independently; no new or worsening s/s of infection; wound care in place; pt understands POC and safety precautions    Patient is not progressing towards the following goals: n/a

## 2021-08-12 ENCOUNTER — TELEPHONE (OUTPATIENT)
Dept: MEDICAL GROUP | Facility: MEDICAL CENTER | Age: 31
End: 2021-08-12

## 2021-08-12 NOTE — TELEPHONE ENCOUNTER
Spoke with pt on the phone regarding overdue INR, he no showed last appt d/t work schedule, he reports he will call us back when he gets new schedule for next week. He understands the importance of INR monitoring.     Marilin Brand, AdyD

## 2021-09-02 ENCOUNTER — TELEPHONE (OUTPATIENT)
Dept: VASCULAR LAB | Facility: MEDICAL CENTER | Age: 31
End: 2021-09-02

## 2021-09-02 NOTE — LETTER
Michael Ontiveros  [address] [address]    09/02/21    Dear Michael Ontiveros ,    We have been unsuccessful in our attempts to contact you regarding your Anticoagulation Service appointments. Warfarin is a potent blood-thinning agent that requires monitoring to ensure that the dosage is correct for your body.  If it isn't, you could develop serious, sometimes life-threatening bleeding problems or life-threatening blood clots or stroke could result.    To monitor you effectively, we need to be able to communicate with you.  This is a requirement to be followed by our Service.       If you repeatedly fail to keep your lab appointments, you are at risk of being discharged from the Anticoagulation Service.    It is extremely important that you contact the clinic as soon as possible to arrange appropriate follow up.  We are open Monday-Friday 8 am until 5 pm.  You may reach our Service at [phone].           Sincerely,           Charles Lazaro PharmD, BCPS  Clinic Supervisor  [facility]  Outpatient Anticoagulation Service

## 2021-09-07 ENCOUNTER — ANTICOAGULATION VISIT (OUTPATIENT)
Dept: VASCULAR LAB | Facility: MEDICAL CENTER | Age: 31
End: 2021-09-07
Attending: INTERNAL MEDICINE
Payer: COMMERCIAL

## 2021-09-07 ENCOUNTER — APPOINTMENT (OUTPATIENT)
Dept: URGENT CARE | Facility: CLINIC | Age: 31
End: 2021-09-07
Payer: COMMERCIAL

## 2021-09-07 ENCOUNTER — APPOINTMENT (OUTPATIENT)
Dept: RADIOLOGY | Facility: MEDICAL CENTER | Age: 31
End: 2021-09-07
Attending: EMERGENCY MEDICINE
Payer: COMMERCIAL

## 2021-09-07 DIAGNOSIS — Z79.01 CHRONIC ANTICOAGULATION: ICD-10-CM

## 2021-09-07 LAB
INR BLD: 2.7 (ref 0.9–1.2)
INR PPP: 2.7 (ref 2–3.5)

## 2021-09-07 PROCEDURE — 99283 EMERGENCY DEPT VISIT LOW MDM: CPT

## 2021-09-07 PROCEDURE — 76870 US EXAM SCROTUM: CPT

## 2021-09-07 PROCEDURE — 99211 OFF/OP EST MAY X REQ PHY/QHP: CPT

## 2021-09-07 PROCEDURE — 85610 PROTHROMBIN TIME: CPT

## 2021-09-07 ASSESSMENT — FIBROSIS 4 INDEX: FIB4 SCORE: 0.79

## 2021-09-07 NOTE — PROGRESS NOTES
Anticoagulation Summary  As of 2021    INR goal:  2.0-3.0   TTR:  52.2 % (1.5 y)   INR used for dosin.70 (2021)   Warfarin maintenance plan:  3.75 mg (7.5 mg x 0.5) every Wed; 7.5 mg (7.5 mg x 1) all other days   Weekly warfarin total:  48.75 mg   Plan last modified:  Ady WillD (4/10/2020)   Next INR check:  2021   Target end date:  2020    Indications    Pulmonary embolism with acute cor pulmonale (HCC) [I26.09]             Anticoagulation Episode Summary     INR check location:      Preferred lab:      Send INR reminders to:      Comments:        Anticoagulation Care Providers     Provider Role Specialty Phone number    [facility] Responsible  [phone]                Refer to Patient Findings for HPI:  Patient Findings     Negatives:  Signs/symptoms of thrombosis, Signs/symptoms of bleeding, Laboratory test error suspected, Change in health, Change in alcohol use, Change in activity, Upcoming invasive procedure, Emergency department visit, Upcoming dental procedure, Missed doses, Extra doses, Change in medications, Change in diet/appetite, Hospital admission, Bruising, Other complaints         pt declined vitals    Verified current warfarin dosing schedule.    Medications reconciled  Pt is not on antiplatelet therapy      A/P   INR  2.7-therapeutic.     Warfarin dosing recommendation: Instructed pt to continue on with current regimen.      Pt educated to contact our clinic with any changes in medications or s/s of bleeding or thrombosis. Pt is aware to seek immediate medical attention for falls, head injury or deep cuts.    Follow up appointment in 2 week(s).    Windy Tabor, Pharmacy  Andrew Funez, AdyD

## 2021-09-08 ENCOUNTER — HOSPITAL ENCOUNTER (EMERGENCY)
Facility: MEDICAL CENTER | Age: 31
End: 2021-09-08
Attending: EMERGENCY MEDICINE
Payer: COMMERCIAL

## 2021-09-08 VITALS
HEIGHT: 68 IN | BODY MASS INDEX: 47.74 KG/M2 | RESPIRATION RATE: 18 BRPM | OXYGEN SATURATION: 93 % | WEIGHT: 315 LBS | TEMPERATURE: 98.1 F | DIASTOLIC BLOOD PRESSURE: 86 MMHG | HEART RATE: 94 BPM | SYSTOLIC BLOOD PRESSURE: 132 MMHG

## 2021-09-08 DIAGNOSIS — L03.314 CELLULITIS OF GROIN: ICD-10-CM

## 2021-09-08 DIAGNOSIS — N50.89 SCROTAL EDEMA: ICD-10-CM

## 2021-09-08 PROCEDURE — 700102 HCHG RX REV CODE 250 W/ 637 OVERRIDE(OP): Performed by: EMERGENCY MEDICINE

## 2021-09-08 PROCEDURE — A9270 NON-COVERED ITEM OR SERVICE: HCPCS | Performed by: EMERGENCY MEDICINE

## 2021-09-08 RX ORDER — CEPHALEXIN 500 MG/1
500 CAPSULE ORAL 4 TIMES DAILY
Qty: 40 CAPSULE | Refills: 0 | Status: SHIPPED | OUTPATIENT
Start: 2021-09-08 | End: 2021-09-18

## 2021-09-08 RX ORDER — CEPHALEXIN 500 MG/1
500 CAPSULE ORAL ONCE
Status: COMPLETED | OUTPATIENT
Start: 2021-09-08 | End: 2021-09-08

## 2021-09-08 RX ADMIN — CEPHALEXIN 500 MG: 500 CAPSULE ORAL at 02:00

## 2021-09-08 NOTE — ED NOTES
Pt vitals taken at 2120 were done by [employer] Gui - and recorded by this tech.  Upon 2hr revital pt sating at 85% on RA. Pt placed on 4L via nasal cannula pts O2 now 93%

## 2021-09-08 NOTE — ED TRIAGE NOTES
Chief Complaint   Patient presents with   • Testicle Swelling     increased swelling since Friday; not painful; no drainage, no trauma

## 2021-09-08 NOTE — ED PROVIDER NOTES
ED Provider Note    ER Provider Note         CHIEF COMPLAINT  Chief Complaint   Patient presents with   • Testicle Swelling     increased swelling since Friday; not painful; no drainage, no trauma       HPI  Michael Dillan Ontiveros is a 30 y.o. male who presents to the Emergency Department with swelling scrotum that has been going on since Friday.  The patient says he has had issues with edema in the past.  He denies any drainage from his scrotum or any penile drainage or pain with urination.  He denies any trauma to the area.  He describes it as very large and difficult to maneuver.  There is no chest pain or shortness of breath.  He denies any difficulty with urinating or any difficulty with defecating.    REVIEW OF SYSTEMS  See HPI for further details. All other systems are negative.     PAST MEDICAL HISTORY   has a past medical history of Asthma, Blood clotting disorder (HCC), Chickenpox, Eczema, Hypertension, Influenza, and Tonsillitis.    SURGICAL HISTORY   has a past surgical history that includes dental extraction(s) (11/3/2017).    SOCIAL HISTORY  Social History     Tobacco Use   • Smoking status: Never Smoker   • Smokeless tobacco: Never Used   Vaping Use   • Vaping Use: Never used   Substance Use Topics   • Alcohol use: Not Currently     Comment: rare   • Drug use: No      Social History     Substance and Sexual Activity   Drug Use No       FAMILY HISTORY  Family History   Problem Relation Age of Onset   • Respiratory Disease Mother    • Diabetes Paternal Aunt        CURRENT MEDICATIONS  Home Medications    **Home medications have not yet been reviewed for this encounter**         ALLERGIES  Allergies   Allergen Reactions   • [place] (Diagnostic) Anaphylaxis   • Pistachio Anaphylaxis   • [place] Anaphylaxis   • Tree Nuts Food Allergy Vomiting and Nausea     Other nuts aside from almond, walnut, pistachio       PHYSICAL EXAM  VITAL SIGNS: /86   Pulse 94   Temp 36.4 °C (97.5 °F) (Temporal)   Resp 18  "  Ht 1.727 m (5' 8\")   Wt (!) 192 kg (423 lb 8.1 oz)   SpO2 93%   BMI 64.39 kg/m²      Constitutional: Alert in no apparent distress.  HENT: No signs of trauma, Bilateral external ears normal, Nose normal.   Eyes: Pupils are equal and reactive, Conjunctiva normal, Non-icteric.   Neck: Normal range of motion, No tenderness, Supple, No stridor.   Lymphatic: No lymphadenopathy noted.   Cardiovascular: Regular rate and rhythm, nondisplaced PMI  Thorax & Lungs: No respiratory distress,  No chest tenderness.   Abdomen: Bowel sounds normal, Soft, No tenderness, very large pannus with edema noted throughout the patient's lower abdomen and pannus area.  Skin: Warm, Dry, No erythema, No rash.   Back: No bony tenderness, No CVA tenderness.   Extremities: Intact distal pulses, No edema, No tenderness, No cyanosis.  Musculoskeletal: Good range of motion in all major joints. No tenderness to palpation or major deformities noted.   Neurologic: Alert , Normal motor function, Normal sensory function, No focal deficits noted.   Psychiatric: Affect normal, Judgment normal, Mood normal.   Gu: Significant swelling noted of the patient's testicles with mild redness and pitting edema.  There is no significant tenderness upon palpation.  The glans penis is not visualized given the foreskin is swollen and goes over the penis.  There is no pain over the penis.      DIAGNOSTIC STUDIES / PROCEDURES               RADIOLOGY  [id number]   Final Result         1.  Bilateral small hydroceles.   2.  Diffuse scrotal wall thickening with interstitial fluid, consider cellulitis or edema.           The radiologist's interpretation of all radiological studies have been reviewed by me.    COURSE & MEDICAL DECISION MAKING  Pertinent Labs & Imaging studies reviewed. (See chart for details)    This is a 30 y.o. male that presents with significant scrotal swelling.  We will get an ultrasound to evaluate the contents of the scrotum for torsion as " well as to characterize the edema.  We will reassess after this performed..     1:51 AM - Patient seen and examined at bedside    There is significant edema noted on the ultrasound.  There is some mild redness.  I will treat the patient with antibiotics.  I will suggest tight underwear to help with compression.  Also the patient follow-up with urology.  There is no torsion.  He has no dysuria and therefore I do not believe the urine is necessary.  There is no evidence of orchitis or epididymitis on ultrasound.  There is no crepitance.  There is no history of diabetes.  Do not believe the patient has Artur's gangrene and did not believe that he is at any risk for necrotizing infection in this area.          FINAL IMPRESSION  1. Scrotal edema    2. Cellulitis of groin              Electronically signed by: David Larson M.D., 9/8/2021

## 2021-09-08 NOTE — ED NOTES
Reviewed discharge instructions, pt verbalized understanding of instructions and medication. States he will schedule follow-up appointment. Denies further questions at this time. Pt ambulatory out of ER with steady gait.

## 2021-09-15 ENCOUNTER — TELEMEDICINE (OUTPATIENT)
Dept: MEDICAL GROUP | Facility: MEDICAL CENTER | Age: 31
End: 2021-09-15
Payer: COMMERCIAL

## 2021-09-15 VITALS — HEIGHT: 68 IN | BODY MASS INDEX: 47.74 KG/M2 | WEIGHT: 315 LBS

## 2021-09-15 DIAGNOSIS — E66.01 MORBID OBESITY (HCC): ICD-10-CM

## 2021-09-15 DIAGNOSIS — N50.89 SCROTAL SWELLING: ICD-10-CM

## 2021-09-15 DIAGNOSIS — G47.33 OSA (OBSTRUCTIVE SLEEP APNEA): ICD-10-CM

## 2021-09-15 PROCEDURE — 99214 OFFICE O/P EST MOD 30 MIN: CPT | Mod: 95 | Performed by: PHYSICIAN ASSISTANT

## 2021-09-15 ASSESSMENT — PATIENT HEALTH QUESTIONNAIRE - PHQ9: CLINICAL INTERPRETATION OF PHQ2 SCORE: 0

## 2021-09-15 ASSESSMENT — FIBROSIS 4 INDEX: FIB4 SCORE: 0.79

## 2021-09-15 NOTE — ASSESSMENT & PLAN NOTE
Last weekend, testicles/scrotum swelling to size of 2 softballs, ER ultrasound showed cellulitis and edema, treated with antibiotic. F/u urology yesterday, they said that since the swelling is going down to just continue the antibiotic and then when it has finished he will start a different antibiotic. Unclear why he got the infection. Hasn't had this problem before. No open wound. Not hot to touch.

## 2021-09-16 NOTE — PROGRESS NOTES
Virtual Visit: Established Patient   This visit was conducted via Zoom using secure and encrypted videoconferencing technology.   The patient was in a private location in the state of [place].    The patient's identity was confirmed and verbal consent was obtained for this virtual visit.    Subjective:   CC:   Chief Complaint   Patient presents with   • Referral Needed     Sleep medicine     Michael Dillan Ontiveros is a 30 y.o. male presenting for evaluation and management of:    1. Scrotal swelling  Scrotal swelling  Last weekend, testicles/scrotum swelling to size of 2 softballs, ER ultrasound showed cellulitis and edema, treated with antibiotic. F/u urology yesterday, they said that since the swelling is going down to just continue the antibiotic and then when it has finished he will start a different antibiotic. Unclear why he got the infection. Hasn't had this problem before. No open wound. Not hot to touch.    2. Sleep study referral  Witnessed sleep apnea events during hospitalization in 2019. Never f/u with actual sleep study d/t timing/insurance/money concerns. Ready now to get this done.     ROS   No fever/chills. No weight change. No chest pain, SOB or difficulty breathing. No rash or skin lesion.    Current medicines (including changes today)  Current Outpatient Medications   Medication Sig Dispense Refill   • cephALEXin (KEFLEX) 500 MG Cap Take 1 Capsule by mouth 4 times a day for 10 days. 40 Capsule 0   • albuterol 108 (90 Base) MCG/ACT Aero Soln inhalation aerosol Inhale 2 Puffs every 6 hours as needed for Shortness of Breath.     • acetaminophen (TYLENOL) 325 MG Tab Take 650-975 mg by mouth every 6 hours as needed for Mild Pain or Moderate Pain. 2 to 3 tablets = 650 to 975 mg.     • Multiple Vitamins-Minerals (MENS MULTIVITAMIN) Tab Take 1 tablet by mouth every morning.     • warfarin (COUMADIN) 7.5 MG Tab Take one-half to one tablet by mouth daily or as directed by anticoagulation clinic 90 tablet 0  "    No current facility-administered medications for this visit.       Patient Active Problem List    Diagnosis Date Noted   • Scrotal swelling 09/15/2021   • Volume overload 07/12/2021   • History of pulmonary embolism 07/11/2021   • Cellulitis of right lower extremity 07/11/2021   • Chronic anticoagulation 03/16/2020   • Multiple subsegmental pulmonary emboli without acute cor pulmonale (HCC) 03/05/2020   • Pulmonary hypertension (HCC) 08/23/2019   • Pulmonary embolism with acute cor pulmonale (HCC) 08/23/2019   • Asthma with exacerbation 08/22/2019   • Hyperglycemia 08/22/2019   • JONES (obstructive sleep apnea) 08/22/2019   • Type 2 myocardial infarction (HCC) 08/22/2019   • Asthma 11/03/2017   • Morbid obesity (HCC) 11/03/2017   • Lower extremity edema 11/03/2017        Objective:   Ht 1.727 m (5' 8\")   Wt (!) 172 kg (380 lb) Comment: Pt reported  BMI 57.78 kg/m²     Physical Exam:  Constitutional: Alert, no distress, well-groomed.  Skin: No rashes in visible areas.  Eye: Round. Conjunctiva clear, lids normal. No icterus.   ENMT: Lips pink without lesions, good dentition, moist mucous membranes. Phonation normal.  Neck: No masses, no thyromegaly. Moves freely without pain.  Respiratory: Unlabored respiratory effort, no cough or audible wheeze  Psych: Alert and oriented x3, normal affect and mood.     Assessment and Plan:   The following treatment plan was discussed:     1. Scrotal swelling    2. Morbid obesity (HCC)  - REFERRAL TO PULMONARY AND SLEEP MEDICINE    3. JONES (obstructive sleep apnea)  - REFERRAL TO PULMONARY AND SLEEP MEDICINE      Follow-up: with PCP         "

## 2021-09-20 ENCOUNTER — ANTICOAGULATION VISIT (OUTPATIENT)
Dept: VASCULAR LAB | Facility: MEDICAL CENTER | Age: 31
End: 2021-09-20
Attending: INTERNAL MEDICINE
Payer: COMMERCIAL

## 2021-09-20 DIAGNOSIS — I26.09 PULMONARY EMBOLISM WITH ACUTE COR PULMONALE, UNSPECIFIED CHRONICITY, UNSPECIFIED PULMONARY EMBOLISM TYPE (HCC): ICD-10-CM

## 2021-09-20 LAB
INR BLD: 1.5 (ref 0.9–1.2)
INR PPP: 1.5 (ref 2–3.5)

## 2021-09-20 PROCEDURE — 85610 PROTHROMBIN TIME: CPT

## 2021-09-20 PROCEDURE — 99214 OFFICE O/P EST MOD 30 MIN: CPT | Performed by: NURSE PRACTITIONER

## 2021-09-20 PROCEDURE — 99212 OFFICE O/P EST SF 10 MIN: CPT | Performed by: NURSE PRACTITIONER

## 2021-09-20 RX ORDER — CEPHALEXIN 500 MG/1
500 CAPSULE ORAL 2 TIMES DAILY
COMMUNITY
Start: 2021-09-13 | End: 2021-09-27

## 2021-09-20 NOTE — PROGRESS NOTES
VASCULAR MEDICINE CLINIC - INITIAL VISIT (ANTICOAGULATION)  09/20/21     Michael Ontiveros is a 30 y.o. male who presents today for evaluation of length of therapy in regards to his anticoagulation.     HPI:  Patient referred for evaluation and management of anticoagulation therapy.  PMH: asthma, chronic venous insufficiency, morbid obesity.  Hospitalized with submassive neelam PE requiring EKOS catheter for TPA w/ rt heart strain on 8/21/19. Noted to have sepsis, pneumonia, and acute resp failure requiring mech ventilation.   He reports having a chest cold/pneumonia for 1-2 weeks prior. Flew to [place] and back same month to visit his mom. Reports sitting on plane for extended periods of time. Felt worsening SOB upon returning to [place], leading him to seek medical attention.  He was started on a heparin gtt and transitioned to Xarelto 15 mg x 21 days prior to discharge.  Transitioned to Xarelto 20 mg 9/15/19 and took Xarelto for ~3 months then states a medical provider told him he could stop taking.  Upon reviewing his chart I do not see record of this.  Pulm note on 9/6/19 suggested 3-6 months of therapy, possibly indefinitely.  Cards note 11/1/19 states to stay on Xarelto and repeat ECHO 1 month.     On 3/5/20 he presented to the ER with SOB, CP x 3 days. Chest CT showed filling defects in the right lower lobe segmental coronary arteries consistent with pulmonary emboli.  Unclear if this was a new event or residual of previous PE.  No prior surgeries, hospitalizations or extended travel.  He was started on Lovenox + warfarin.  Hypercoag testing done - protein S and ATIII mildly low during hospitalization. ACLA, factor v leiden, and ptgm all unremarkable..  Denies any FH for VTE.  Has never used tobacco or hormones.  Multiple unsuccessful attempts to schedule for vascular f/u for LOT evaluation and INR follow up in the past.  Has had several ER visits for neelam LE edema, cellulitis, venous stasis ulcerations.  One ER visit r/t bleeding from LE wound and another from a bleeding laceration to lt ankle requiring a pressure dressing.  No other problems with bleeding.  No current SOB or CP.  Has neelam LE edema.  Denies leg/calf pain.  Currently taking warfarin with INR goal 2-3.  TTR 52%.  Wants to discuss stopping OAC.        Past Medical History:   Diagnosis Date   • Asthma    • Blood clotting disorder (HCC)    • Chickenpox    • Eczema    • Hypertension    • Influenza    • Tonsillitis         Past Surgical History:   Procedure Laterality Date   • DENTAL EXTRACTION(S)  11/3/2017    Procedure: DENTAL EXTRACTION(S);  Surgeon: Olman Sparks D.D.S.;  Location: SURGERY [facility];  Service: Oral Surgery        Family History   Problem Relation Age of Onset   • Respiratory Disease Mother    • Diabetes Paternal Aunt         Social History     Tobacco Use   • Smoking status: Never Smoker   • Smokeless tobacco: Never Used   Vaping Use   • Vaping Use: Never used   Substance Use Topics   • Alcohol use: Not Currently     Comment: rare   • Drug use: No        Current Outpatient Medications on File Prior to Visit   Medication Sig Dispense Refill   • cephALEXin (KEFLEX) 500 MG Cap Take 500 mg by mouth 2 times a day.     • albuterol 108 (90 Base) MCG/ACT Aero Soln inhalation aerosol Inhale 2 Puffs every 6 hours as needed for Shortness of Breath.     • acetaminophen (TYLENOL) 325 MG Tab Take 650-975 mg by mouth every 6 hours as needed for Mild Pain or Moderate Pain. 2 to 3 tablets = 650 to 975 mg.     • Multiple Vitamins-Minerals (MENS MULTIVITAMIN) Tab Take 1 tablet by mouth every morning.     • warfarin (COUMADIN) 7.5 MG Tab Take one-half to one tablet by mouth daily or as directed by anticoagulation clinic 90 tablet 0     No current facility-administered medications on file prior to visit.        [place] (diagnostic), Pistachio, [place], and Tree nuts food allergy     DIET AND EXERCISE:  Weight Change:***  Diet: {DIET  "TYPES:55182}  Exercise: {EXERCISE PATTERN:21}     ROS      Objective:     There were no vitals filed for this visit.     Physical Exam     DATA REVIEW    Lab Results   Component Value Date/Time    CHOLSTRLTOT 110 11/04/2017 02:59 AM    LDL 60 11/04/2017 02:59 AM    HDL 39 (A) 11/04/2017 02:59 AM    TRIGLYCERIDE 228 (H) 08/24/2019 06:00 AM       Lab Results   Component Value Date/Time    SODIUM 141 07/12/2021 02:00 AM    POTASSIUM 4.3 07/12/2021 02:00 AM    CHLORIDE 103 07/12/2021 02:00 AM    CO2 32 07/12/2021 02:00 AM    GLUCOSE 99 07/12/2021 02:00 AM    BUN 8 07/12/2021 02:00 AM    CREATININE 0.65 07/12/2021 02:00 AM     Lab Results   Component Value Date/Time    ALKPHOSPHAT 68 07/12/2021 02:00 AM    ASTSGOT 21 07/12/2021 02:00 AM    ALTSGPT 12 07/12/2021 02:00 AM    TBILIRUBIN 0.7 07/12/2021 02:00 AM       INR   Date Value Ref Range Status   09/20/2021 1.50 (A) 2 - 3.5 Final     POC INR   Date Value Ref Range Status   09/07/2021 2.7 (H) 0.9 - 1.2 Final     Comment:     INR - Non-therapeutic Reference Range: 0.9-1.2  INR - Therapeutic Reference Range: 2.0-4.0          Medical Decision Making:  Today's Assessment / Status / Plan:     1. Pulmonary embolism with acute cor pulmonale, unspecified chronicity, unspecified pulmonary embolism type (HCC)  POCT Protime        Indication for anticoagulation: ***    Anti-Platelet/Anticoagulant Discussion:  ***    Anti-Coagulation Plan:  ***    Smoking:{TOBACCO QUIT METHODS:69059}     Physical Activity: {EXERCISE INTERVENTION:60079}    Weight Management and Nutrition:{BMI PLAN DOCUMENTED:55342255::\"exercise counseling\",\"nutrition counseling\"}    Instructed to follow-up with PCP for remainder of adult medical needs: {YES/NO:63}  We will partner with other provider in the management of established vascular disease and cardiometabolic risk factors    Studies to Be Obtained: none  Labs to Be Obtained: lupus anticoagulant, beta 2 microglobulin antibodies, protein s functional, ATIII, " d dimer.    Follow up in: {FOLLOWUP:72570}    [facility] EXAM 4     Cc:

## 2021-09-21 NOTE — PROGRESS NOTES
VASCULAR MEDICINE CLINIC - INITIAL VISIT (ANTICOAGULATION)  09/20/21     Michael Ontiveros is a 30 y.o. male who presents today for evaluation of length of therapy in regards to his anticoagulation.     HPI:  Patient referred for evaluation and management of anticoagulation therapy.  PMH: asthma, chronic venous insufficiency, morbid obesity.  Hospitalized with submassive neelam PE requiring EKOS catheter for TPA w/ rt heart strain on 8/21/19. Noted to have sepsis, pneumonia, MI and acute resp failure requiring mech ventilation.   He reports having a chest cold/pneumonia for 1-2 weeks prior. Flew to [place] and back same month to visit his mom. Reports sitting on plane for extended periods of time. Felt worsening SOB upon returning to [place], leading him to seek medical attention.  He was started on a heparin gtt and transitioned to Xarelto 15 mg x 21 days prior to discharge.  Transitioned to Xarelto 20 mg 9/15/19 and took Xarelto for ~3 months then states a medical provider told him he could stop taking.  Upon reviewing his chart I do not see record of this in Epic.  Pulm note on 9/6/19 suggested 3-6 months of therapy, possibly indefinitely.  Cards note 11/1/19 states to stay on Xarelto and repeat ECHO 1 month but no follow up noted.    On 3/5/20 he presented to the ER with SOB, CP x 3 days. Chest CT showed filling defects in the right lower lobe segmental coronary arteries consistent with pulmonary emboli.  Unclear if this was a new event or residual of previous PE.  No prior surgeries, hospitalizations or extended travel.  He was started on Lovenox + warfarin.  Hypercoag testing done - protein S and ATIII mildly low during hospitalization. ACLA, factor v leiden, and ptgm all unremarkable..  Denies any FH for VTE.  Has never used tobacco or hormones.  No known COVID exposures. He received his vaccines.  Multiple unsuccessful attempts to schedule for vascular f/u for LOT evaluation and INR follow up in the  past.    Has had several ER visits for neelam LE edema, cellulitis, venous stasis ulcerations. One ER visit r/t bleeding from LE wound and another from a bleeding laceration to lt ankle requiring a pressure dressing.  No other problems with bleeding.  No current SOB or CP.  Has neelam LE edema.  Denies leg/calf pain.  Works as an . Is sedentary.  Currently taking warfarin with INR goal 2-3.  TTR 52%.  Wants to discuss stopping OAC.    Past Medical History:   Diagnosis Date   • Asthma    • Blood clotting disorder (HCC)    • Chickenpox    • Eczema    • Hypertension    • Influenza    • Tonsillitis         Past Surgical History:   Procedure Laterality Date   • DENTAL EXTRACTION(S)  11/3/2017    Procedure: DENTAL EXTRACTION(S);  Surgeon: Olman Sparks D.D.S.;  Location: SURGERY [facility];  Service: Oral Surgery        Family History   Problem Relation Age of Onset   • Respiratory Disease Mother    • Diabetes Paternal Aunt         Social History     Tobacco Use   • Smoking status: Never Smoker   • Smokeless tobacco: Never Used   Vaping Use   • Vaping Use: Never used   Substance Use Topics   • Alcohol use: Not Currently     Comment: rare   • Drug use: No        Current Outpatient Medications on File Prior to Visit   Medication Sig Dispense Refill   • cephALEXin (KEFLEX) 500 MG Cap Take 500 mg by mouth 2 times a day.     • albuterol 108 (90 Base) MCG/ACT Aero Soln inhalation aerosol Inhale 2 Puffs every 6 hours as needed for Shortness of Breath.     • acetaminophen (TYLENOL) 325 MG Tab Take 650-975 mg by mouth every 6 hours as needed for Mild Pain or Moderate Pain. 2 to 3 tablets = 650 to 975 mg.     • Multiple Vitamins-Minerals (MENS MULTIVITAMIN) Tab Take 1 tablet by mouth every morning.     • warfarin (COUMADIN) 7.5 MG Tab Take one-half to one tablet by mouth daily or as directed by anticoagulation clinic 90 tablet 0     No current facility-administered medications on file prior to visit.         [place] (diagnostic), Pistachio, [place], and Tree nuts food allergy     DIET AND EXERCISE:  Weight Change: none  Diet: common adult; trying to incorporate more protein  Exercise: currently minimal but would like to start a regular exercise routine at the gym.     Review of Systems   Constitutional: Negative for chills and fever.   HENT: Negative for nosebleeds.    Respiratory: Negative for hemoptysis and shortness of breath.    Cardiovascular: Positive for leg swelling. Negative for chest pain and claudication.   Gastrointestinal: Negative for blood in stool and melena.   Genitourinary: Negative for hematuria.   Musculoskeletal: Negative for falls, joint pain and myalgias.   Neurological: Negative for dizziness, seizures, loss of consciousness and headaches.   Endo/Heme/Allergies: Does not bruise/bleed easily.         Objective:     There were no vitals filed for this visit.     Physical Exam  Constitutional:       Appearance: Normal appearance. He is obese.   Cardiovascular:      Rate and Rhythm: Normal rate and regular rhythm.      Heart sounds: Normal heart sounds.   Pulmonary:      Effort: Pulmonary effort is normal.      Breath sounds: Normal breath sounds.   Abdominal:      General: Bowel sounds are normal.      Palpations: Abdomen is soft.   Musculoskeletal:         General: No tenderness.      Right lower leg: Edema present.      Left lower leg: Edema present.   Skin:     General: Skin is warm and dry.      Comments: Chronic venous stasis of bilateral lower extremities, purplish discoloration noted skin leathery to touch, no drainage or redness.  Neurological:      General: No focal deficit present.      Mental Status: He is alert and oriented to person, place, and time.   Psychiatric:         Mood and Affect: Mood normal.         Behavior: Behavior normal.         Thought Content: Thought content normal.         Judgment: Judgment normal.          DATA REVIEW    Lab Results   Component Value Date/Time     CHOLSTRLTOT 110 11/04/2017 02:59 AM    LDL 60 11/04/2017 02:59 AM    HDL 39 (A) 11/04/2017 02:59 AM    TRIGLYCERIDE 228 (H) 08/24/2019 06:00 AM       Lab Results   Component Value Date/Time    SODIUM 141 07/12/2021 02:00 AM    POTASSIUM 4.3 07/12/2021 02:00 AM    CHLORIDE 103 07/12/2021 02:00 AM    CO2 32 07/12/2021 02:00 AM    GLUCOSE 99 07/12/2021 02:00 AM    BUN 8 07/12/2021 02:00 AM    CREATININE 0.65 07/12/2021 02:00 AM     Lab Results   Component Value Date/Time    ALKPHOSPHAT 68 07/12/2021 02:00 AM    ASTSGOT 21 07/12/2021 02:00 AM    ALTSGPT 12 07/12/2021 02:00 AM    TBILIRUBIN 0.7 07/12/2021 02:00 AM       INR   Date Value Ref Range Status   09/20/2021 1.50 (A) 2 - 3.5 Final     POC INR   Date Value Ref Range Status   09/20/2021 1.5 (H) 0.9 - 1.2 Final     Comment:     INR - Non-therapeutic Reference Range: 0.9-1.2  INR - Therapeutic Reference Range: 2.0-4.0         8//22/19 CTPA  IMPRESSION:     1.  Right-sided lobar, segmental and subsegmental pulmonary emboli. Suspected subsegmental left-sided pulmonary emboli. Associated right heart strain.  2.  Scattered pulmonary infarcts. Dependent opacities in the lower lobes may represent a combination of atelectasis and infarcts, however, superimposed pneumonia is also possible.  3.  Cardiomegaly.  4.  Large right thyroid nodule measuring 2.9 cm. It can be followed on an outpatient basis.  5.  Splenomegaly.    3/5/20 CTPA  IMPRESSION:     1.  Filling defects within right lower lobe segmental coronary arteries consistent with pulmonary emboli. No evidence of right heart strain.  2.  Consolidation within the right lung base.  3.  Patchy bilateral groundglass opacities.  4.  Small right pleural effusion.    8/22/19 LE US  CONCLUSIONS   No acute thrombosis is identified.     9/23/20 LE US   CONCLUSIONS   1. No DVT or SVT bilaterally.   2. No deep venous reflux bilaterally.   3. Superficial venous reflux bilaterally, as detailed above.    11/3/17 LE US  CONCLUSIONS    no DVT or SVT in either leg    7/11/21 LE US   CONCLUSIONS   1) Technically limited exam limites evaluaiton for DVT as described, no DVT    readily appreciated.    8/22/19 echo  CONCLUSIONS  No prior study is available for comparison.   Technically difficult study - adequate information is obtained.   Sinus rhythm.  Reduced right ventricular systolic function.  Akinetic right   ventricular free wall.  Estimated right ventricular systolic pressure is >85 mmHg.  Left ventricular ejection fraction is visually estimated to be 70%.    Abnormal septal motion consistent with right ventricular (RV) volume   overload and/or elevated RV end-diastolic pressure.  Mild tricuspid regurgitation.  Pulmonary team notified at time of reading.    2/28/20 echo  CONCLUSIONS  Compared to the images of the study done 08-22-19 there has been a   reduction in RV systolic pressure.  Left ventricular ejection fraction is visually estimated to be 70%.  Abnormal septal motion consistent with right ventricular (RV) volume   overload and/or elevated RV end-diastolic pressure.  Estimated right ventricular systolic pressure  is 67 mmHg.  Right atrial pressure is estimated to be 15 mmHg.  Severely dilated right ventricle.    7/12/21 echo  CONCLUSIONS  Normal left ventricular systolic function.  Left ventricular ejection fraction is visually estimated to be 65%.  Normal diastolic function.  Dilated inferior vena cava without inspiratory collapse.      Medical Decision Making:  Today's Assessment / Status / Plan:     1. Pulmonary embolism with acute cor pulmonale, unspecified chronicity, unspecified pulmonary embolism type (HCC)  POCT Protime    PROTEIN S FUNCTIONAL    ANTITHROMBIN PANEL    D-DIMER        Indication for anticoagulation: Submassive PE in August 2019, possible recurrent/residual PE March 2020    Anti-Platelet/Anticoagulant Discussion:  Long discussion with patient regarding the risks and benefits of continuing or stopping  anticoagulation therapy in this setting. Provoking risk factors for 1st VTE are extended travel, sedentary lifestyle and morbid obesity. Difficult to know if pt had a reoccurring PE or if it was residual from prior PE. He completed the minimum amount of therapy which is 3 months after 1st occurrence but may have needed 6 months of therapy. His risk of bleeding is 0.9% annually while on an anticoagulant. We discussed the risks if we stop OAC and the possibility of recurrent VTE which can be fatal. Discussed repeating a protein s functional level and ATIII. Can consider checking a lupus anticoagulant level, beta 2 microglobulin antibodies and repeat d-dimer level. After much discussion, patient would like to pursue options aside from indefinitely therapy. He understands that a positive result on these labs could indicate resuming OAC which he is willing to do. Instructed to began taking  mg daily and to have labs drawn after 14 days off warfarin. Stressed the importance of close surveillance of s/sx of recurrence and when to seek emergent medical attention. I emphasized need for weight loss and regular physical activity. Instructed to elevate his legs when sitting and to avoiding sitting or standing for long periods of time, to use compression stockings and keep LEs clean and dry.    Anti-Coagulation Plan:  - stop taking warfarin  - began taking aspirin 162 mg by mouth daily  - on or after October 5th, go the lab to get blood drawn  - go to the ER for shortness of breath, chest pain, pain with deep inhalation, worsening leg swelling and/or pain in calf or leg   - avoid sedentary periods  - continue complete avoidance of tobacco products  - avoid hormonal therapies including estrogen or testosterone-containing meds, or raloxifene or tamoxifene (commonly used for osteoporosis)  - elevate legs as much as possible, use compression stockings/socks if directed by your provider  - if having any invasive procedure,  please make sure the doctor knows of your history of blood clots   - in the future, keep up with age-appropriate cancer screenings as a small % of blood clots may be caused by an underlying malignancy    Smoking: continued complete abstinence    Physical Activity: goal is 3-4 times per week, advised to start slow and gradually increase his activity.    Weight Management and Nutrition: exercise counseling, nutrition counseling    Instructed to follow-up with PCP for remainder of adult medical needs: yes  We will partner with other provider in the management of established vascular disease and cardiometabolic risk factors    Studies to Be Obtained: none  Labs to Be Obtained: lupus anticoagulant, beta 2 microglobulin antibodies, protein s functional, ATIII, d dimer.    Follow up in: 4 weeks    Dianna MANNING      [facility] EXAM 4     Cc:   Dr Bloch S. Arya-Sande

## 2021-10-05 ENCOUNTER — HOSPITAL ENCOUNTER (OUTPATIENT)
Dept: LAB | Facility: MEDICAL CENTER | Age: 31
End: 2021-10-05
Attending: NURSE PRACTITIONER
Payer: COMMERCIAL

## 2021-10-05 DIAGNOSIS — I26.09 PULMONARY EMBOLISM WITH ACUTE COR PULMONALE, UNSPECIFIED CHRONICITY, UNSPECIFIED PULMONARY EMBOLISM TYPE (HCC): ICD-10-CM

## 2021-10-05 LAB — D DIMER PPP IA.FEU-MCNC: 2.89 UG/ML (FEU) (ref 0–0.5)

## 2021-10-05 PROCEDURE — 85301 ANTITHROMBIN III ANTIGEN: CPT

## 2021-10-05 PROCEDURE — 85306 CLOT INHIBIT PROT S FREE: CPT

## 2021-10-05 PROCEDURE — 85379 FIBRIN DEGRADATION QUANT: CPT

## 2021-10-05 PROCEDURE — 85300 ANTITHROMBIN III ACTIVITY: CPT

## 2021-10-05 PROCEDURE — 36415 COLL VENOUS BLD VENIPUNCTURE: CPT

## 2021-10-08 LAB
AT III ACT/NOR PPP CHRO: 83 % (ref 76–128)
AT III AG ACT/NOR PPP IA: 69 % (ref 82–136)
PROT S ACT/NOR PPP: 65 % (ref 66–143)

## 2021-10-11 ENCOUNTER — HOSPITAL ENCOUNTER (EMERGENCY)
Facility: MEDICAL CENTER | Age: 31
End: 2021-10-11
Attending: EMERGENCY MEDICINE
Payer: COMMERCIAL

## 2021-10-11 ENCOUNTER — APPOINTMENT (OUTPATIENT)
Dept: RADIOLOGY | Facility: MEDICAL CENTER | Age: 31
End: 2021-10-11
Attending: EMERGENCY MEDICINE
Payer: COMMERCIAL

## 2021-10-11 ENCOUNTER — TELEPHONE (OUTPATIENT)
Dept: VASCULAR LAB | Facility: MEDICAL CENTER | Age: 31
End: 2021-10-11

## 2021-10-11 VITALS
DIASTOLIC BLOOD PRESSURE: 87 MMHG | TEMPERATURE: 97 F | OXYGEN SATURATION: 94 % | HEART RATE: 88 BPM | SYSTOLIC BLOOD PRESSURE: 129 MMHG | BODY MASS INDEX: 47.74 KG/M2 | RESPIRATION RATE: 15 BRPM | HEIGHT: 68 IN | WEIGHT: 315 LBS

## 2021-10-11 DIAGNOSIS — Z86.2 HISTORY OF HYPERCOAGULABLE STATE: Primary | ICD-10-CM

## 2021-10-11 DIAGNOSIS — M79.662 PAIN IN LEFT LOWER LEG: ICD-10-CM

## 2021-10-11 LAB
ANION GAP SERPL CALC-SCNC: 10 MMOL/L (ref 7–16)
BUN SERPL-MCNC: 14 MG/DL (ref 8–22)
CALCIUM SERPL-MCNC: 9 MG/DL (ref 8.5–10.5)
CHLORIDE SERPL-SCNC: 103 MMOL/L (ref 96–112)
CO2 SERPL-SCNC: 29 MMOL/L (ref 20–33)
CREAT SERPL-MCNC: 0.59 MG/DL (ref 0.5–1.4)
ERYTHROCYTE [DISTWIDTH] IN BLOOD BY AUTOMATED COUNT: 73 FL (ref 35.9–50)
GLUCOSE SERPL-MCNC: 93 MG/DL (ref 65–99)
HCT VFR BLD AUTO: 53.7 % (ref 42–52)
HGB BLD-MCNC: 14.3 G/DL (ref 14–18)
INR PPP: 1.32 (ref 0.87–1.13)
MCH RBC QN AUTO: 22.3 PG (ref 27–33)
MCHC RBC AUTO-ENTMCNC: 26.6 G/DL (ref 33.7–35.3)
MCV RBC AUTO: 83.8 FL (ref 81.4–97.8)
PLATELET # BLD AUTO: 244 K/UL (ref 164–446)
POTASSIUM SERPL-SCNC: 4.5 MMOL/L (ref 3.6–5.5)
PROTHROMBIN TIME: 16 SEC (ref 12–14.6)
RBC # BLD AUTO: 6.41 M/UL (ref 4.7–6.1)
SODIUM SERPL-SCNC: 142 MMOL/L (ref 135–145)
WBC # BLD AUTO: 8 K/UL (ref 4.8–10.8)

## 2021-10-11 PROCEDURE — 85610 PROTHROMBIN TIME: CPT

## 2021-10-11 PROCEDURE — 700111 HCHG RX REV CODE 636 W/ 250 OVERRIDE (IP): Performed by: EMERGENCY MEDICINE

## 2021-10-11 PROCEDURE — 93970 EXTREMITY STUDY: CPT

## 2021-10-11 PROCEDURE — 85027 COMPLETE CBC AUTOMATED: CPT

## 2021-10-11 PROCEDURE — 80048 BASIC METABOLIC PNL TOTAL CA: CPT

## 2021-10-11 PROCEDURE — 96372 THER/PROPH/DIAG INJ SC/IM: CPT

## 2021-10-11 PROCEDURE — 99283 EMERGENCY DEPT VISIT LOW MDM: CPT

## 2021-10-11 RX ADMIN — ENOXAPARIN SODIUM 150 MG: 150 INJECTION SUBCUTANEOUS at 23:02

## 2021-10-11 ASSESSMENT — FIBROSIS 4 INDEX: FIB4 SCORE: 0.79

## 2021-10-11 ASSESSMENT — ENCOUNTER SYMPTOMS: COUGH: 0

## 2021-10-12 NOTE — TELEPHONE ENCOUNTER
Reviewed recent labs. Protein S level low at 65 (59 prior in March 2020). ATIII immunoglobulin level low, functional level normal (functional level low in March 2020). Also, d dimer elevated at 2.89.    Spoke with pt by phone. Confirmed he was off warfarin at least 2 weeks prior to having these labs done. Currently taking  mg. Denies any s/sx of recurrent VTE. Instructed to stop ASA and restart warfarin 7.5 mg daily x 3 doses and follow up on Thursday. Instructed to go to the ER if he has symptoms of recurrent VTE which we discussed. He verbalizes understanding.    Dianna MANNING

## 2021-10-12 NOTE — ED PROVIDER NOTES
"ED Provider Note    Scribed for Song Cope II, SANCHEZ* by Joseph Carnes. 10/11/2021  8:11 PM    Means of Arrival: Walk-in  History obtained by: Patient  Limitations: None    CHIEF COMPLAINT  Chief Complaint   Patient presents with   • Ankle Pain     Pt complains of left ankle pain, redness, and swelling. Hx PE, reccently taken off anticoagulation and put on asa. Called his MD and resumed his warfarin who recommended he come to the ER for \"shots\"'        [facility]  Michael Dillan Ontiveros is a 30 y.o. male who presents to the Emergency Department complaining of ankle pain and a possible DVT in the left foot onset 1.5 days ago. He has a history of PE and has been on warfarin for two years. Two and a half weeks ago he stopped taking warfarin and began taking baby Asprin. He went in for a blood test one week ago to see if he had any underlying clotting disorders, following this he received a call from his doctor who informed him he needed to begin taking warfarin again based on the results of his blood test. He began experiencing ankle pain recently prompting him to present to the ED. He has associated symptoms of tenderness on the back of his left ankle.  He says his provider told him to come to the emergency department because he will likely need Lovenox injections.    He also notes that recently he had a consistent cough, which has since resolved.  Recently treated for cellulitis on his scrotum.  He was seen by urology.  He was prescribed antibiotics which resolved the infection.  He says edema has improved at the scrotum but there is edema now in the suprapubic region.    REVIEW OF SYSTEMS  Review of Systems   Respiratory: Negative for cough.    Cardiovascular: Positive for leg swelling.   Genitourinary:        Positive for scrotal edema.    Musculoskeletal:        Positive for back of left ankle pain.   All other systems reviewed and are negative.    See HPI for further details.    PAST MEDICAL HISTORY   has a past " "medical history of Asthma, Blood clotting disorder (HCC), Chickenpox, Eczema, Hypertension, Influenza, and Tonsillitis.    SOCIAL HISTORY  Social History     Tobacco Use   • Smoking status: Never Smoker   • Smokeless tobacco: Never Used   Vaping Use   • Vaping Use: Never used   Substance and Sexual Activity   • Alcohol use: Not Currently     Comment: rare   • Drug use: No   • Sexual activity: Yes     Partners: Female     Comment: no kids       SURGICAL HISTORY   has a past surgical history that includes dental extraction(s) (11/3/2017).    CURRENT MEDICATIONS  Current Outpatient Medications   Medication Instructions   • acetaminophen (TYLENOL) 650-975 mg, Oral, EVERY 6 HOURS PRN, 2 to 3 tablets = 650 to 975 mg.   • albuterol 108 (90 Base) MCG/ACT Aero Soln inhalation aerosol 2 Puffs, Inhalation, EVERY 6 HOURS PRN   • Multiple Vitamins-Minerals (MENS MULTIVITAMIN) Tab 1 Tablet, Oral, EVERY MORNING   • warfarin (COUMADIN) 7.5 MG Tab Take one-half to one tablet by mouth daily or as directed by anticoagulation clinic         ALLERGIES  Allergies   Allergen Reactions   • [place] (Diagnostic) Anaphylaxis   • [place] Anaphylaxis   • [place] Anaphylaxis   • Tree Nuts Food Allergy Vomiting and Nausea     Other nuts aside from almond, walnut, pistachio       PHYSICAL EXAM  VITAL SIGNS: BP (!) 187/107   Pulse 91   Temp 36.1 °C (97 °F) (Temporal)   Resp 16   Ht 1.727 m (5' 8\")   Wt (!) 198 kg (437 lb 6.3 oz)   SpO2 90%   BMI 66.51 kg/m²     Pulse ox interpretation: I interpret this pulse ox as normal.  Constitutional: Alert in no apparent distress. High BMI body habitus.   HENT: No signs of trauma, Bilateral external ears normal, Nose normal.   Eyes: Pupils are equal, Conjunctiva normal, Non-icteric.   Neck: Normal range of motion, No tenderness, Supple, No stridor.   Cardiovascular: Regular rate and rhythm, no murmurs. Symmetric distal pulses. No cyanosis of extremities. No peripheral edema of extremities.  Thorax & " "Lungs: Normal breath sounds, No respiratory distress, No wheezing, No chest tenderness.   Abdomen: Bowel sounds normal, Soft, No tenderness, No masses, No pulsatile masses. No peritoneal signs.  Skin: Bilateral lower extremities with edema (greater on the left compared to the right), redness that blanches (he and significant other say this is chronic, lower legs typically look \"gray purple\"), No warmth.  Palpable DP pulses bilaterally. Dry  Musculoskeletal: Tenderness at posterior left calf, Good range of motion in all major joints. No  major deformities noted.   Neurologic: Alert , Normal motor function, Normal sensory function, No focal deficits noted.   Psychiatric: Affect normal, Judgment normal, Mood normal.   : Scrotum without any erythema, mild edema, pannus above genitals with mild induration and resolving erythema, No warmth.       DIAGNOSTIC STUDIES / PROCEDURES      LABS  Pertinent Labs & Imaging studies reviewed. (See chart for details)    RADIOLOGY  US-EXTREMITY VENOUS LOWER BILAT   Final Result        Pertinent Labs & Imaging studies reviewed. (See chart for details)    COURSE & MEDICAL DECISION MAKING  Pertinent Labs & Imaging studies reviewed. (See chart for details)    8:11 PM This is a 30 y.o. male who presents with left posterior ankle tenderness and the differential diagnosis includes but is not limited to DVT vs. Chronic edema. Ordered for US-Extremity venous lower bilateral, BMP, CBC with differential, and PT/INR to evaluate.     11:11 PM  CBC within acceptable limits.  Chemistry panel normal.  INR slight increased 1.32.  The ultrasound did not show evidence of DVT but it was limited at the affected leg.  Since he has already started warfarin and has significant history of thromboembolic disease he will be bridged with Lovenox.  He will receive 150 mg Lovenox subcutaneous.  I have also sent a prescription for Lovenox twice daily for the next 5 days.  I have asked him to follow-up with his " primary provider for any further concerns.  He and his significant other had questions about swelling in legs and abdomen.  I did look at past echo and no signs of CHF.  His possible edema is dependent.  He says he has been mostly immobile the past few weeks due to the scrotal swelling and cellulitis.    He has been more active the past few days and noticed that edema has abdomen and legs have improved.     The patient will return for worsening symptoms and is stable at the time of discharge. The patient verbalizes understanding and will comply. Guidance provided on appropriate use of medications.      FINAL IMPRESSION  1. History of hypercoagulable state Active   2. Pain in left lower leg          Joseph SILVA (Scribesme), am scribing for, and in the presence of, LISSETT Broussard II.    Electronically signed by: Joseph Carnes (Otf), 10/11/2021    Song SILVA II, M* personally performed the services described in this documentation, as scribed by Joseph Carnes in my presence, and it is both accurate and complete. C.    The note accurately reflects work and decisions made by me.  Song Cope II, M.D.  10/11/2021  11:18 PM

## 2021-10-12 NOTE — ED TRIAGE NOTES
"Chief Complaint   Patient presents with   • Ankle Pain     Pt complains of left ankle pain, redness, and swelling. Hx PE, reccently taken off anticoagulation and put on asa. Called his MD and resumed his warfarin who recommended he come to the ER for \"shots\"'        Pt amb to triage with steady gait for above complaint.   Pt is alert and oriented, speaking in full sentences, follows commands and responds appropriately to questions. Resp are even and unlabored. No obvious acute distress.    Pt placed in lobby. Pt educated on triage process. Pt encouraged to alert staff for any changes.      Vitals:    10/11/21 1859   BP: (!) 187/107   Pulse: 91   Resp: 16   Temp: 36.1 °C (97 °F)   SpO2: 90%       "

## 2021-10-14 ENCOUNTER — ANTICOAGULATION VISIT (OUTPATIENT)
Dept: VASCULAR LAB | Facility: MEDICAL CENTER | Age: 31
End: 2021-10-14
Attending: INTERNAL MEDICINE
Payer: COMMERCIAL

## 2021-10-14 DIAGNOSIS — I26.09 PULMONARY EMBOLISM WITH ACUTE COR PULMONALE, UNSPECIFIED CHRONICITY, UNSPECIFIED PULMONARY EMBOLISM TYPE (HCC): ICD-10-CM

## 2021-10-14 LAB
INR BLD: 1.5 (ref 0.9–1.2)
INR PPP: 1.5 (ref 2–3.5)

## 2021-10-14 PROCEDURE — 85610 PROTHROMBIN TIME: CPT

## 2021-10-14 PROCEDURE — 99214 OFFICE O/P EST MOD 30 MIN: CPT | Performed by: NURSE PRACTITIONER

## 2021-10-14 PROCEDURE — 99212 OFFICE O/P EST SF 10 MIN: CPT | Performed by: NURSE PRACTITIONER

## 2021-10-15 ASSESSMENT — ENCOUNTER SYMPTOMS
BLOOD IN STOOL: 0
DIZZINESS: 0
SHORTNESS OF BREATH: 0
MYALGIAS: 1
BRUISES/BLEEDS EASILY: 0
CLAUDICATION: 0
SEIZURES: 0
HEMOPTYSIS: 0
HEADACHES: 0

## 2021-10-16 NOTE — PROGRESS NOTES
VASCULAR MEDICINE CLINIC -  Follow up VISIT (ANTICOAGULATION)  10/15/21     Michael Ontiveros is a 31 y.o. male who presents today for vascular f/u.    HPI:  Hx of submassive PE requiring TPA, sepsis, pna, resp failure requiring intubation in Aug 2019.  Provoking factors includde prior travel, morbid obesity and sedentary lifestyle.  In March 2020 he presented to ER with SOB and CP. CT showed RLL segmental filling defect consistent with PEs.  Last visit pt wanted to explore other options aside from indefinite therapy. With shared decision-making, decision made to repeat hypercoag testing which initially showed slightly low functional protein s (59) and functional antithrombin III levels (71).  Warfarin therapy temporarily stopped 2 weeks to repeat labs.   Repeat labs showed still low functional protein s (65) and low immuno antithrombin III of 69 (normal functional 83).  Most recent d dimer elevated at 2.89.   Instructed to resume taking warfarin Oct 11, 2021. That evening he presented to the ER with left ankle pain, redness and swelling, concerned he had a DVT.   US was neg for DVT to RLE, but limited evaluation to LLE due to pain. No gross DVT identified.   Denies any SOB or CP.  INR 1.32 in ER. He was started on Lovenox 150 mg injections BID and warfarin was continued.  Today, he continues to have left ankle pain and swelling.   Is elevating as much as he can.  Since his last visit he incorporating more physical activity into his weekly routine. Also reducing his calorie intake with the goal of reducing his weight.  Works as an .   Sits for long periods.      Past Medical History:   Diagnosis Date   • Asthma    • Blood clotting disorder (HCC)    • Chickenpox    • Eczema    • Hypertension    • Influenza    • Tonsillitis         Past Surgical History:   Procedure Laterality Date   • DENTAL EXTRACTION(S)  11/3/2017    Procedure: DENTAL EXTRACTION(S);  Surgeon: Olman Sparks D.D.S.;   Location: SURGERY [facility];  Service: Oral Surgery        Family History   Problem Relation Age of Onset   • Respiratory Disease Mother    • Diabetes Paternal Aunt         Social History     Tobacco Use   • Smoking status: Never Smoker   • Smokeless tobacco: Never Used   Vaping Use   • Vaping Use: Never used   Substance Use Topics   • Alcohol use: Not Currently     Comment: rare   • Drug use: No        Current Outpatient Medications on File Prior to Visit   Medication Sig Dispense Refill   • enoxaparin (LOVENOX) 150 MG/ML Solution Inject 150 mg under the skin every 12 hours for 5 days. 10 Each 0   • albuterol 108 (90 Base) MCG/ACT Aero Soln inhalation aerosol Inhale 2 Puffs every 6 hours as needed for Shortness of Breath.     • acetaminophen (TYLENOL) 325 MG Tab Take 650-975 mg by mouth every 6 hours as needed for Mild Pain or Moderate Pain. 2 to 3 tablets = 650 to 975 mg.     • Multiple Vitamins-Minerals (MENS MULTIVITAMIN) Tab Take 1 tablet by mouth every morning.     • warfarin (COUMADIN) 7.5 MG Tab Take one-half to one tablet by mouth daily or as directed by anticoagulation clinic 90 tablet 0     No current facility-administered medications on file prior to visit.      Allergies: [place] (diagnostic), Pistachio, [place], and Tree nuts food allergy     DIET AND EXERCISE:  Diet: low calorie  Exercise: increased since last visit    Review of Systems   Respiratory: Negative for hemoptysis and shortness of breath.    Cardiovascular: Positive for leg swelling. Negative for chest pain and claudication.   Gastrointestinal: Negative for blood in stool and melena.   Genitourinary: Negative for hematuria.   Musculoskeletal: Positive for joint pain and myalgias.   Neurological: Negative for dizziness, seizures and headaches.   Endo/Heme/Allergies: Does not bruise/bleed easily.         Objective:     There were no vitals filed for this visit.     Physical Exam  Constitutional:       Appearance: Normal appearance.    Cardiovascular:      Rate and Rhythm: Normal rate and regular rhythm.      Heart sounds: Normal heart sounds.   Pulmonary:      Effort: Pulmonary effort is normal.      Breath sounds: Normal breath sounds.   Abdominal:      General: Bowel sounds are normal.      Palpations: Abdomen is soft.   Musculoskeletal:         General: Tenderness present.      Right lower leg: Edema present.      Left lower leg: Edema present.      Comments: Tenderness with palpation to left posterior ankle region. Severe chronic venous stasis of bilateral lower extremities, bright red discoloration noted. Skin leathery to touch, with areas of peeling skin. No open areas or drainage noted.   Neurological:      General: No focal deficit present.      Mental Status: He is alert and oriented to person, place, and time.   Psychiatric:         Behavior: Behavior normal.         Thought Content: Thought content normal.         Judgment: Judgment normal.          DATA REVIEW    Lab Results   Component Value Date/Time    CHOLSTRLTOT 110 11/04/2017 02:59 AM    LDL 60 11/04/2017 02:59 AM    HDL 39 (A) 11/04/2017 02:59 AM    TRIGLYCERIDE 228 (H) 08/24/2019 06:00 AM       Lab Results   Component Value Date/Time    SODIUM 142 10/11/2021 09:38 PM    POTASSIUM 4.5 10/11/2021 09:38 PM    CHLORIDE 103 10/11/2021 09:38 PM    CO2 29 10/11/2021 09:38 PM    GLUCOSE 93 10/11/2021 09:38 PM    BUN 14 10/11/2021 09:38 PM    CREATININE 0.59 10/11/2021 09:38 PM     Lab Results   Component Value Date/Time    ALKPHOSPHAT 68 07/12/2021 02:00 AM    ASTSGOT 21 07/12/2021 02:00 AM    ALTSGPT 12 07/12/2021 02:00 AM    TBILIRUBIN 0.7 07/12/2021 02:00 AM       INR   Date Value Ref Range Status   10/14/2021 1.50 (A) 2 - 3.5 Final     POC INR   Date Value Ref Range Status   10/14/2021 1.5 (H) 0.9 - 1.2 Final     Comment:     INR - Non-therapeutic Reference Range: 0.9-1.2  INR - Therapeutic Reference Range: 2.0-4.0          Medical Decision Making:  Today's Assessment / Status /  Plan:     1. Pulmonary embolism with acute cor pulmonale, unspecified chronicity, unspecified pulmonary embolism type (HCC)  POCT Protime        Indication for anticoagulation: Submassive PE in August 2019, possible recurrent/residual PE March 2020    Anti-Platelet/Anticoagulant Discussion:  Again discussed the risks of stopping anticoagulation therapy. Provoking risk factors for 1st VTE were extended travel, sedentary lifestyle and morbid obesity. Now noted to have low protein S and ATIII levels. Given extent of initial VTE, indefinite therapy recommended. Pt in agreement with this plan. He is willing to stay on OAC lifelong and understands the risks if he stops taking. We discussed importance of 100% adherence to his regimen and the need for regular INR follow up. He is willing to try as much as possible to follow up when instructed and to not miss doses. DOAC is an option but pt prefers warfarin for now. Let him know we can assist him with getting a home monitor if covered by his insurance. Also discussed option of a heme referral to discuss his hypercoag test results in more depth. He will hold off on both but consider for the future. Praised his recent increased physical activity and dietary changes (reminded to keep vit k consistent). Emphasized the continued need for weight loss and ongoing, regular physical activity. Instructed to elevate his legs when sitting and to avoiding sitting or standing for long periods of time.    INR today 1.5. Goal is 2.0-3.0.    Anti-Coagulation Plan:  - continue Lovenox 150 mg injections to abdomen twice daily  - take warfarin 7.5 mg daily until next INR (scheduled Monday, Oct 18th).  - go to the ER for shortness of breath, chest pain, pain with deep inhalation, worsening leg swelling and/or pain in calf or leg   - avoid sedentary periods  - let all your providers know you take this medication  - don't stop this medication without permission from your doctor or our clinic  -   refills before you run out  - continue complete avoidance of tobacco products  - avoid hormonal therapies including estrogen or testosterone-containing meds, or raloxifene or tamoxifene (commonly used for osteoporosis)  - Avoid Aspirin and anti-inflammatories (eg. Advil, ibuprofen, Aleve, naproxen, etc) while anticoagulated   - Avoid skiing or other dangerous activities to reduce risk of head injury and brain bleeds  - elevate legs as much as possible, use compression stockings/socks if directed by your provider  - if any bleeding lasting 30min without stopping, please seek care with your PCP, urgent care, or ED  - if having any invasive procedure, please make sure the doctor knows of your history of blood clots and current anticoagulation status  - let us know of any medication changes  - recommended to see your PCP to discuss if you need age-appropriate cancer screenings as a small % of blood clots may be caused by an underlying malignancy  - reversal agents for most blood thinners are now available and used if you have major bleeding    Smoking: continued complete abstinence     Physical Activity: goal is 3-4 times per week, advised to start slow and gradually increase his activity.     Weight Management and Nutrition: exercise counseling, nutrition counseling. To discuss further with his PCP. Can place referral for dietary if pt willing. Will discuss more next visit.     Instructed to follow-up with PCP for remainder of adult medical needs: yes  We will partner with other provider in the management of established vascular disease and cardiometabolic risk factors    Studies to Be Obtained: none  Labs to Be Obtained: none    Follow up in [facility] on Monday.    Dianna MANNING    [facility] EXAM 5     Cc:   Dr Bloch

## 2021-10-18 ENCOUNTER — APPOINTMENT (OUTPATIENT)
Dept: VASCULAR LAB | Facility: MEDICAL CENTER | Age: 31
End: 2021-10-18
Attending: INTERNAL MEDICINE
Payer: COMMERCIAL

## 2021-10-20 ENCOUNTER — TELEPHONE (OUTPATIENT)
Dept: VASCULAR LAB | Facility: MEDICAL CENTER | Age: 31
End: 2021-10-20

## 2021-10-20 NOTE — TELEPHONE ENCOUNTER
Pt cancelled his INR appt on Monday. Spoke with pt. He was unable to come due to work conflicts. He finished injecting 5 days of Lovenox. Is on warfarin as instructed. Let him know he should stay on Lovenox until we verify his INR is 2.0 or higher. He wants to just stay on warfarin for now. He assures me he will come Friday (unable to come sooner). Explained the risks of not assessing INR sooner. He is aware. Will f/u with pt Friday.    Dianna MANNING

## 2021-10-22 ENCOUNTER — ANTICOAGULATION VISIT (OUTPATIENT)
Dept: VASCULAR LAB | Facility: MEDICAL CENTER | Age: 31
End: 2021-10-22
Attending: INTERNAL MEDICINE
Payer: COMMERCIAL

## 2021-10-22 DIAGNOSIS — M79.662 PAIN IN LEFT LOWER LEG: ICD-10-CM

## 2021-10-22 DIAGNOSIS — I26.09 PULMONARY EMBOLISM WITH ACUTE COR PULMONALE, UNSPECIFIED CHRONICITY, UNSPECIFIED PULMONARY EMBOLISM TYPE (HCC): ICD-10-CM

## 2021-10-22 DIAGNOSIS — Z86.2 HISTORY OF HYPERCOAGULABLE STATE: ICD-10-CM

## 2021-10-22 LAB — INR PPP: 1.5 (ref 2–3.5)

## 2021-10-22 PROCEDURE — 99212 OFFICE O/P EST SF 10 MIN: CPT

## 2021-10-22 PROCEDURE — 85610 PROTHROMBIN TIME: CPT

## 2021-10-22 NOTE — PROGRESS NOTES
Anticoagulation Summary  As of 10/22/2021    INR goal:  2.0-3.0   TTR:  49.4 % (1.6 y)   INR used for dosin.50 (10/22/2021)   Warfarin maintenance plan:  7.5 mg (7.5 mg x 1) every day   Weekly warfarin total:  52.5 mg   Plan last modified:  Krupa Alfred (10/22/2021)   Next INR check:  10/25/2021   Target end date:  Indefinite    Indications    Pulmonary embolism with acute cor pulmonale (HCC) [I26.09]             Anticoagulation Episode Summary     INR check location:      Preferred lab:      Send INR reminders to:      Comments:        Anticoagulation Care Providers     Provider Role Specialty Phone number    [facility] Responsible  [phone]        Refer to Patient Findings for HPI:  Patient Findings     Negatives:  Signs/symptoms of thrombosis, Signs/symptoms of bleeding, Laboratory test error suspected, Change in health, Change in alcohol use, Change in activity, Upcoming invasive procedure, Emergency department visit, Upcoming dental procedure, Missed doses, Extra doses, Change in medications, Change in diet/appetite, Hospital admission, Bruising, Other complaints        There were no vitals filed for this visit.  The pt was seen curbside and vitals were not possible     Verified current warfarin dosing schedule.  Patient denies any missed doses of warfarin. He stopped Lovenox after the 5 days he was discharged with.     Medications reconciled   Pt is not on antiplatelet therapy      A/P   INR is SUB-therapeutic today at 1.5.     Warfarin dosing recommendation: Patient instructed to begin Lovenox injections again until INR >2.0.    Patient will bolus with 11.25mg TONIGHT, then begin increased weekly regimen of 7.5mg QD.   Patient will retest again on Monday.     Pt educated to contact our clinic with any changes in medications or s/s of bleeding or thrombosis. Pt is aware to seek immediate medical attention for falls, head injury or deep cuts.    Follow up appointment in  3 days.    Next appt: Monday, Oct 25 @ 7:30am @ kailyn Alfred PharmD

## 2021-10-25 ENCOUNTER — ANTICOAGULATION VISIT (OUTPATIENT)
Dept: VASCULAR LAB | Facility: MEDICAL CENTER | Age: 31
End: 2021-10-25
Attending: INTERNAL MEDICINE
Payer: COMMERCIAL

## 2021-10-25 DIAGNOSIS — I26.09 PULMONARY EMBOLISM WITH ACUTE COR PULMONALE, UNSPECIFIED CHRONICITY, UNSPECIFIED PULMONARY EMBOLISM TYPE (HCC): ICD-10-CM

## 2021-10-25 LAB — INR PPP: 2.9 (ref 2–3.5)

## 2021-10-25 PROCEDURE — 99212 OFFICE O/P EST SF 10 MIN: CPT | Performed by: STUDENT IN AN ORGANIZED HEALTH CARE EDUCATION/TRAINING PROGRAM

## 2021-10-25 PROCEDURE — 85610 PROTHROMBIN TIME: CPT

## 2021-10-25 NOTE — PROGRESS NOTES
Anticoagulation Summary  As of 10/25/2021    INR goal:  2.0-3.0   TTR:  49.5 % (1.6 y)   INR used for dosin.90 (10/25/2021)   Warfarin maintenance plan:  7.5 mg (7.5 mg x 1) every day   Weekly warfarin total:  52.5 mg   Plan last modified:  Krupa Alfred (10/22/2021)   Next INR check:     Target end date:  Indefinite    Indications    Pulmonary embolism with acute cor pulmonale (HCC) [I26.09]             Anticoagulation Episode Summary     INR check location:      Preferred lab:      Send INR reminders to:      Comments:        Anticoagulation Care Providers     Provider Role Specialty Phone number    [facility] Responsible  [phone]                Refer to Patient Findings for HPI:      There were no vitals filed for this visit.  Pt seen at [facility]. Vitals not attainable.    Verified current warfarin dosing schedule.    Medications reconciled  Pt is not on antiplatelet therapy      A/P   INR  therapeutic. Pt was not able to  Lovenox from pharmacy over the weekend and has not been using any Lovenox.     Warfarin dosing recommendation: Pt has already taken dose for today. Pt instructed to reduce dose to half a tablet tomorrow then resume regular regimen as INR jumped quickly over the weekend and dose from yesterday is likely not reflected in todays INR. As pt is not able to be seen till next Monday encouraged pt to call clinic with any signs/symptoms of bleeding.    Pt educated to contact our clinic with any changes in medications or s/s of bleeding or thrombosis. Pt is aware to seek immediate medical attention for falls, head injury or deep cuts.    Follow up appointment in 1 week(s) per pt preference.     Jesus Rae, PharmD

## 2021-10-25 NOTE — Clinical Note
Pt is recently restarted on warfarin. INR therapuetic today but jumped significantly over the weekend. Hermelindo notes from Friday indicate pt was only to bolus on Friday, but he bolused Friday Saturday and Sunday.

## 2021-11-01 ENCOUNTER — APPOINTMENT (OUTPATIENT)
Dept: RADIOLOGY | Facility: MEDICAL CENTER | Age: 31
DRG: 314 | End: 2021-11-01
Attending: EMERGENCY MEDICINE
Payer: COMMERCIAL

## 2021-11-01 ENCOUNTER — ANTICOAGULATION VISIT (OUTPATIENT)
Dept: VASCULAR LAB | Facility: MEDICAL CENTER | Age: 31
DRG: 314 | End: 2021-11-01
Attending: INTERNAL MEDICINE
Payer: COMMERCIAL

## 2021-11-01 ENCOUNTER — HOSPITAL ENCOUNTER (INPATIENT)
Facility: MEDICAL CENTER | Age: 31
LOS: 23 days | DRG: 314 | End: 2021-11-24
Attending: EMERGENCY MEDICINE | Admitting: HOSPITALIST
Payer: COMMERCIAL

## 2021-11-01 DIAGNOSIS — I27.20 PULMONARY HYPERTENSION (HCC): Chronic | ICD-10-CM

## 2021-11-01 DIAGNOSIS — G47.33 OSA (OBSTRUCTIVE SLEEP APNEA): Chronic | ICD-10-CM

## 2021-11-01 DIAGNOSIS — I50.9 ACUTE CONGESTIVE HEART FAILURE, UNSPECIFIED HEART FAILURE TYPE (HCC): ICD-10-CM

## 2021-11-01 DIAGNOSIS — J96.21 ACUTE ON CHRONIC RESPIRATORY FAILURE WITH HYPOXIA AND HYPERCAPNIA (HCC): Primary | ICD-10-CM

## 2021-11-01 DIAGNOSIS — J96.01 ACUTE RESPIRATORY FAILURE WITH HYPOXIA (HCC): ICD-10-CM

## 2021-11-01 DIAGNOSIS — I26.09 PULMONARY EMBOLISM WITH ACUTE COR PULMONALE, UNSPECIFIED CHRONICITY, UNSPECIFIED PULMONARY EMBOLISM TYPE (HCC): ICD-10-CM

## 2021-11-01 DIAGNOSIS — R09.02 HYPOXIA: ICD-10-CM

## 2021-11-01 DIAGNOSIS — J96.22 ACUTE ON CHRONIC RESPIRATORY FAILURE WITH HYPOXIA AND HYPERCAPNIA (HCC): Primary | ICD-10-CM

## 2021-11-01 DIAGNOSIS — E66.01 MORBID OBESITY (HCC): Chronic | ICD-10-CM

## 2021-11-01 PROBLEM — I51.7 CARDIOMEGALY: Chronic | Status: ACTIVE | Noted: 2021-11-01

## 2021-11-01 LAB
ALBUMIN SERPL BCP-MCNC: 4 G/DL (ref 3.2–4.9)
ALBUMIN/GLOB SERPL: 1.2 G/DL
ALP SERPL-CCNC: 75 U/L (ref 30–99)
ALT SERPL-CCNC: 80 U/L (ref 2–50)
ANION GAP SERPL CALC-SCNC: 10 MMOL/L (ref 7–16)
ANISOCYTOSIS BLD QL SMEAR: ABNORMAL
APTT PPP: 42.8 SEC (ref 24.7–36)
AST SERPL-CCNC: 97 U/L (ref 12–45)
BASOPHILS # BLD AUTO: 1 % (ref 0–1.8)
BASOPHILS # BLD: 0.07 K/UL (ref 0–0.12)
BILIRUB SERPL-MCNC: 1.3 MG/DL (ref 0.1–1.5)
BLOOD CULTURE HOLD CXBCH: NORMAL
BUN SERPL-MCNC: 20 MG/DL (ref 8–22)
CALCIUM SERPL-MCNC: 9.3 MG/DL (ref 8.5–10.5)
CHLORIDE SERPL-SCNC: 101 MMOL/L (ref 96–112)
CO2 SERPL-SCNC: 28 MMOL/L (ref 20–33)
COMMENT 1642: NORMAL
CREAT SERPL-MCNC: 0.54 MG/DL (ref 0.5–1.4)
EKG IMPRESSION: NORMAL
EOSINOPHIL # BLD AUTO: 0.01 K/UL (ref 0–0.51)
EOSINOPHIL NFR BLD: 0.1 % (ref 0–6.9)
ERYTHROCYTE [DISTWIDTH] IN BLOOD BY AUTOMATED COUNT: 68.8 FL (ref 35.9–50)
FLUAV RNA SPEC QL NAA+PROBE: NEGATIVE
FLUBV RNA SPEC QL NAA+PROBE: NEGATIVE
GLOBULIN SER CALC-MCNC: 3.3 G/DL (ref 1.9–3.5)
GLUCOSE SERPL-MCNC: 102 MG/DL (ref 65–99)
HCT VFR BLD AUTO: 55 % (ref 42–52)
HGB BLD-MCNC: 14.5 G/DL (ref 14–18)
IMM GRANULOCYTES # BLD AUTO: 0.05 K/UL (ref 0–0.11)
IMM GRANULOCYTES NFR BLD AUTO: 0.7 % (ref 0–0.9)
INR PPP: 3.86 (ref 0.87–1.13)
INR PPP: 4.3 (ref 2–3.5)
LYMPHOCYTES # BLD AUTO: 1.65 K/UL (ref 1–4.8)
LYMPHOCYTES NFR BLD: 23.1 % (ref 22–41)
MCH RBC QN AUTO: 22 PG (ref 27–33)
MCHC RBC AUTO-ENTMCNC: 26.4 G/DL (ref 33.7–35.3)
MCV RBC AUTO: 83.6 FL (ref 81.4–97.8)
MICROCYTES BLD QL SMEAR: ABNORMAL
MONOCYTES # BLD AUTO: 0.64 K/UL (ref 0–0.85)
MONOCYTES NFR BLD AUTO: 9 % (ref 0–13.4)
MORPHOLOGY BLD-IMP: NORMAL
NEUTROPHILS # BLD AUTO: 4.73 K/UL (ref 1.82–7.42)
NEUTROPHILS NFR BLD: 66.1 % (ref 44–72)
NRBC # BLD AUTO: 0.11 K/UL
NRBC BLD-RTO: 1.5 /100 WBC
NT-PROBNP SERPL IA-MCNC: 3524 PG/ML (ref 0–125)
PLATELET # BLD AUTO: 231 K/UL (ref 164–446)
PLATELET BLD QL SMEAR: NORMAL
PMV BLD AUTO: 10.1 FL (ref 9–12.9)
POLYCHROMASIA BLD QL SMEAR: NORMAL
POTASSIUM SERPL-SCNC: 4.8 MMOL/L (ref 3.6–5.5)
PROT SERPL-MCNC: 7.3 G/DL (ref 6–8.2)
PROTHROMBIN TIME: 36.7 SEC (ref 12–14.6)
RBC # BLD AUTO: 6.58 M/UL (ref 4.7–6.1)
RBC BLD AUTO: PRESENT
RSV RNA SPEC QL NAA+PROBE: NEGATIVE
SARS-COV-2 RNA RESP QL NAA+PROBE: NOTDETECTED
SODIUM SERPL-SCNC: 139 MMOL/L (ref 135–145)
SPECIMEN SOURCE: NORMAL
TROPONIN T SERPL-MCNC: 19 NG/L (ref 6–19)
WBC # BLD AUTO: 7.2 K/UL (ref 4.8–10.8)

## 2021-11-01 PROCEDURE — 700102 HCHG RX REV CODE 250 W/ 637 OVERRIDE(OP): Performed by: HOSPITALIST

## 2021-11-01 PROCEDURE — 700111 HCHG RX REV CODE 636 W/ 250 OVERRIDE (IP): Performed by: EMERGENCY MEDICINE

## 2021-11-01 PROCEDURE — 99285 EMERGENCY DEPT VISIT HI MDM: CPT

## 2021-11-01 PROCEDURE — A9270 NON-COVERED ITEM OR SERVICE: HCPCS | Performed by: HOSPITALIST

## 2021-11-01 PROCEDURE — 85610 PROTHROMBIN TIME: CPT

## 2021-11-01 PROCEDURE — 770020 HCHG ROOM/CARE - TELE (206)

## 2021-11-01 PROCEDURE — 80053 COMPREHEN METABOLIC PANEL: CPT

## 2021-11-01 PROCEDURE — 0241U HCHG SARS-COV-2 COVID-19 NFCT DS RESP RNA 4 TRGT MIC: CPT

## 2021-11-01 PROCEDURE — 85730 THROMBOPLASTIN TIME PARTIAL: CPT

## 2021-11-01 PROCEDURE — 700111 HCHG RX REV CODE 636 W/ 250 OVERRIDE (IP): Performed by: HOSPITALIST

## 2021-11-01 PROCEDURE — 83880 ASSAY OF NATRIURETIC PEPTIDE: CPT

## 2021-11-01 PROCEDURE — 99223 1ST HOSP IP/OBS HIGH 75: CPT | Performed by: HOSPITALIST

## 2021-11-01 PROCEDURE — 99212 OFFICE O/P EST SF 10 MIN: CPT

## 2021-11-01 PROCEDURE — 71045 X-RAY EXAM CHEST 1 VIEW: CPT

## 2021-11-01 PROCEDURE — 85025 COMPLETE CBC W/AUTO DIFF WBC: CPT

## 2021-11-01 PROCEDURE — 700117 HCHG RX CONTRAST REV CODE 255: Performed by: EMERGENCY MEDICINE

## 2021-11-01 PROCEDURE — 71275 CT ANGIOGRAPHY CHEST: CPT

## 2021-11-01 PROCEDURE — 84484 ASSAY OF TROPONIN QUANT: CPT

## 2021-11-01 PROCEDURE — 96374 THER/PROPH/DIAG INJ IV PUSH: CPT

## 2021-11-01 PROCEDURE — 74177 CT ABD & PELVIS W/CONTRAST: CPT

## 2021-11-01 PROCEDURE — 96376 TX/PRO/DX INJ SAME DRUG ADON: CPT

## 2021-11-01 PROCEDURE — 93005 ELECTROCARDIOGRAM TRACING: CPT | Performed by: EMERGENCY MEDICINE

## 2021-11-01 RX ORDER — ACETAMINOPHEN 500 MG
TABLET ORAL EVERY 6 HOURS PRN
Status: ON HOLD | COMMUNITY
End: 2021-11-19

## 2021-11-01 RX ORDER — ONDANSETRON 4 MG/1
4 TABLET, ORALLY DISINTEGRATING ORAL EVERY 4 HOURS PRN
Status: DISCONTINUED | OUTPATIENT
Start: 2021-11-01 | End: 2021-11-03

## 2021-11-01 RX ORDER — OXYCODONE HYDROCHLORIDE 5 MG/1
5 TABLET ORAL
Status: DISCONTINUED | OUTPATIENT
Start: 2021-11-01 | End: 2021-11-03

## 2021-11-01 RX ORDER — POLYETHYLENE GLYCOL 3350 17 G/17G
1 POWDER, FOR SOLUTION ORAL
Status: DISCONTINUED | OUTPATIENT
Start: 2021-11-01 | End: 2021-11-02

## 2021-11-01 RX ORDER — ACETAMINOPHEN 325 MG/1
650 TABLET ORAL EVERY 6 HOURS PRN
Status: DISCONTINUED | OUTPATIENT
Start: 2021-11-01 | End: 2021-11-03

## 2021-11-01 RX ORDER — AMOXICILLIN 250 MG
2 CAPSULE ORAL 2 TIMES DAILY
Status: DISCONTINUED | OUTPATIENT
Start: 2021-11-01 | End: 2021-11-02

## 2021-11-01 RX ORDER — ALBUTEROL SULFATE 90 UG/1
2 AEROSOL, METERED RESPIRATORY (INHALATION) EVERY 6 HOURS PRN
Status: DISCONTINUED | OUTPATIENT
Start: 2021-11-01 | End: 2021-11-24 | Stop reason: HOSPADM

## 2021-11-01 RX ORDER — OXYCODONE HYDROCHLORIDE 5 MG/1
2.5 TABLET ORAL
Status: DISCONTINUED | OUTPATIENT
Start: 2021-11-01 | End: 2021-11-03

## 2021-11-01 RX ORDER — POTASSIUM CHLORIDE 20 MEQ/1
20 TABLET, EXTENDED RELEASE ORAL DAILY
Status: DISCONTINUED | OUTPATIENT
Start: 2021-11-02 | End: 2021-11-02

## 2021-11-01 RX ORDER — LISINOPRIL 10 MG/1
10 TABLET ORAL
Status: DISCONTINUED | OUTPATIENT
Start: 2021-11-01 | End: 2021-11-02

## 2021-11-01 RX ORDER — PROCHLORPERAZINE EDISYLATE 5 MG/ML
5-10 INJECTION INTRAMUSCULAR; INTRAVENOUS EVERY 4 HOURS PRN
Status: DISCONTINUED | OUTPATIENT
Start: 2021-11-01 | End: 2021-11-03

## 2021-11-01 RX ORDER — CARVEDILOL 6.25 MG/1
6.25 TABLET ORAL 2 TIMES DAILY WITH MEALS
Status: DISCONTINUED | OUTPATIENT
Start: 2021-11-01 | End: 2021-11-02

## 2021-11-01 RX ORDER — CLONIDINE HYDROCHLORIDE 0.1 MG/1
0.1 TABLET ORAL EVERY 6 HOURS PRN
Status: DISCONTINUED | OUTPATIENT
Start: 2021-11-01 | End: 2021-11-03

## 2021-11-01 RX ORDER — FUROSEMIDE 10 MG/ML
40 INJECTION INTRAMUSCULAR; INTRAVENOUS ONCE
Status: COMPLETED | OUTPATIENT
Start: 2021-11-01 | End: 2021-11-01

## 2021-11-01 RX ORDER — ONDANSETRON 2 MG/ML
4 INJECTION INTRAMUSCULAR; INTRAVENOUS EVERY 4 HOURS PRN
Status: DISCONTINUED | OUTPATIENT
Start: 2021-11-01 | End: 2021-11-24 | Stop reason: HOSPADM

## 2021-11-01 RX ORDER — ENALAPRILAT 1.25 MG/ML
1.25 INJECTION INTRAVENOUS EVERY 6 HOURS PRN
Status: DISCONTINUED | OUTPATIENT
Start: 2021-11-01 | End: 2021-11-24 | Stop reason: HOSPADM

## 2021-11-01 RX ORDER — FUROSEMIDE 10 MG/ML
40 INJECTION INTRAMUSCULAR; INTRAVENOUS
Status: DISCONTINUED | OUTPATIENT
Start: 2021-11-01 | End: 2021-11-02

## 2021-11-01 RX ORDER — BISACODYL 10 MG
10 SUPPOSITORY, RECTAL RECTAL
Status: DISCONTINUED | OUTPATIENT
Start: 2021-11-01 | End: 2021-11-02

## 2021-11-01 RX ORDER — M-VIT,TX,IRON,MINS/CALC/FOLIC 27MG-0.4MG
1 TABLET ORAL EVERY MORNING
Status: DISCONTINUED | OUTPATIENT
Start: 2021-11-01 | End: 2021-11-03

## 2021-11-01 RX ORDER — MORPHINE SULFATE 4 MG/ML
2 INJECTION, SOLUTION INTRAMUSCULAR; INTRAVENOUS
Status: DISCONTINUED | OUTPATIENT
Start: 2021-11-01 | End: 2021-11-03

## 2021-11-01 RX ORDER — PROMETHAZINE HYDROCHLORIDE 25 MG/1
12.5-25 TABLET ORAL EVERY 4 HOURS PRN
Status: DISCONTINUED | OUTPATIENT
Start: 2021-11-01 | End: 2021-11-03

## 2021-11-01 RX ORDER — LABETALOL HYDROCHLORIDE 5 MG/ML
10 INJECTION, SOLUTION INTRAVENOUS EVERY 4 HOURS PRN
Status: DISCONTINUED | OUTPATIENT
Start: 2021-11-01 | End: 2021-11-24 | Stop reason: HOSPADM

## 2021-11-01 RX ORDER — PROMETHAZINE HYDROCHLORIDE 25 MG/1
12.5-25 SUPPOSITORY RECTAL EVERY 4 HOURS PRN
Status: DISCONTINUED | OUTPATIENT
Start: 2021-11-01 | End: 2021-11-03

## 2021-11-01 RX ADMIN — CARVEDILOL 6.25 MG: 6.25 TABLET, FILM COATED ORAL at 17:49

## 2021-11-01 RX ADMIN — FUROSEMIDE 40 MG: 10 INJECTION, SOLUTION INTRAMUSCULAR; INTRAVENOUS at 17:49

## 2021-11-01 RX ADMIN — IOHEXOL 100 ML: 350 INJECTION, SOLUTION INTRAVENOUS at 11:32

## 2021-11-01 RX ADMIN — LISINOPRIL 10 MG: 10 TABLET ORAL at 12:46

## 2021-11-01 RX ADMIN — MULTIPLE VITAMINS W/ MINERALS TAB 1 TABLET: TAB at 12:46

## 2021-11-01 RX ADMIN — FUROSEMIDE 40 MG: 10 INJECTION, SOLUTION INTRAMUSCULAR; INTRAVENOUS at 10:46

## 2021-11-01 ASSESSMENT — LIFESTYLE VARIABLES
DOES PATIENT WANT TO STOP DRINKING: NO
HAVE YOU EVER FELT YOU SHOULD CUT DOWN ON YOUR DRINKING: NO
TOTAL SCORE: 0
DO YOU DRINK ALCOHOL: YES
HAVE PEOPLE ANNOYED YOU BY CRITICIZING YOUR DRINKING: NO
TOTAL SCORE: 0
CONSUMPTION TOTAL: INCOMPLETE
EVER HAD A DRINK FIRST THING IN THE MORNING TO STEADY YOUR NERVES TO GET RID OF A HANGOVER: NO
TOTAL SCORE: 0
EVER FELT BAD OR GUILTY ABOUT YOUR DRINKING: NO

## 2021-11-01 ASSESSMENT — PAIN DESCRIPTION - PAIN TYPE
TYPE: ACUTE PAIN
TYPE: ACUTE PAIN

## 2021-11-01 ASSESSMENT — ENCOUNTER SYMPTOMS
HEADACHES: 0
CONSTIPATION: 0
COUGH: 1
NAUSEA: 0
FEVER: 0
WHEEZING: 0
DIARRHEA: 0
CHILLS: 1
SHORTNESS OF BREATH: 1
VOMITING: 0

## 2021-11-01 ASSESSMENT — FIBROSIS 4 INDEX: FIB4 SCORE: 0.77

## 2021-11-01 NOTE — H&P
Hospital Medicine History & Physical Note    Date of Service  11/1/2021    Primary Care Physician  Shawnee Junior P.A.-C.    Consultants  None    Code Status  Full code per patient with Liliam (decision maker but not significant other) present    Chief Complaint  Chief Complaint   Patient presents with   • Abdominal Swelling     increasing. pt not on any diuretics   • Groin Swelling   • Testicle Swelling        History of Presenting Illness  31 y.o. male who presented 11/1/2021 with abdominal and suprapubic swelling.  Patient was previously admitted 7/11-7/12/2021 for edema and cellulitis.  Patient was treated with antibiotics as well as Lasix and was able to be weaned off of oxygen.  Echo at that time was normal and he was able to be sent home with wound care instructions.  Over that time.  In the past 6 weeks, he has had intermittently worsening and improving swelling.  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  However, over the past week he started having 5 pounds of weight gain as well as abdominal and leg swelling.  He does not feel that his legs are infected as before but rather more swollen.  He also has been having 1 pillow orthopnea.  Thus he came to the hospital.    I discussed the plan of care with patient and family. MANOJ Boswell    Review of Systems  Review of Systems   Constitutional: Positive for chills. Negative for fever.   Respiratory: Positive for cough (random) and shortness of breath (LUND). Negative for wheezing.    Cardiovascular: Positive for leg swelling. Negative for chest pain.   Gastrointestinal: Negative for constipation, diarrhea, nausea and vomiting.   Genitourinary: Negative for dysuria.   Neurological: Negative for headaches.     all other systems reviewed and are negative    Past Medical History   has a past medical history of Asthma, Blood clotting disorder (HCC), Chickenpox, Eczema, Hypertension, Influenza, and  "Tonsillitis.    Surgical History   has a past surgical history that includes dental extraction(s) (11/3/2017).    Family History  family history includes Diabetes in his paternal aunt; Respiratory Disease in his mother.    Social History   reports that he has never smoked. He has never used smokeless tobacco. He reports previous alcohol use. He reports that he does not use drugs.    Allergies  Allergies   Allergen Reactions   • [place] (Diagnostic) Anaphylaxis   • Pistachio Anaphylaxis   • [place] Anaphylaxis   • Tree Nuts Food Allergy Vomiting and Nausea     Other nuts aside from almond, walnut, pistachio       Medications  No current facility-administered medications on file prior to encounter.     Current Outpatient Medications on File Prior to Encounter   Medication Sig Dispense Refill   • acetaminophen (TYLENOL) 500 MG Tab Take 1,000-1,500 mg by mouth every 6 hours as needed for Mild Pain or Moderate Pain. 2 to 3 tablets = 1,000 to 1,500 mg. NOT TO EXCEED 4,000 MG / 24 HOURS.     • enoxaparin (LOVENOX) 150 MG/ML Solution Inject one syringe (150mg)  Sub-q every 12 hours as directed 10 Each 1   • albuterol 108 (90 Base) MCG/ACT Aero Soln inhalation aerosol Inhale 2 Puffs every 6 hours as needed for Shortness of Breath.     • Multiple Vitamins-Minerals (MENS MULTIVITAMIN) Tab Take 1 tablet by mouth every morning.     • warfarin (COUMADIN) 7.5 MG Tab Take one-half to one tablet by mouth daily or as directed by anticoagulation clinic 90 tablet 0       Physical Exam  Weight/BMI: Body mass index is 69.25 kg/m².  /66   Pulse 91   Temp 36.1 °C (96.9 °F) (Temporal)   Resp (!) 26   Ht 1.727 m (5' 8\")   Wt (!) 207 kg (455 lb 7.5 oz)   SpO2 98%    Vitals:    11/01/21 0823 11/01/21 0829 11/01/21 0911 11/01/21 1008   BP:   144/78 125/66   Pulse:   94 91   Resp:  20 (!) 22 (!) 26   Temp:       TempSrc:       SpO2: 92%  89% 98%   Weight:  (!) 207 kg (455 lb 7.5 oz)     Height:  1.727 m (5' 8\")      Oxygen Therapy:  " Pulse Oximetry: 98 %, O2 (LPM): 4, O2 Delivery Device: Nasal Cannula  Physical Exam  Constitutional:       General: He is not in acute distress.     Appearance: He is well-developed. He is obese. He is ill-appearing. He is not diaphoretic.   HENT:      Head: Normocephalic and atraumatic.      Right Ear: External ear normal.      Left Ear: External ear normal.      Mouth/Throat:      Pharynx: No oropharyngeal exudate or posterior oropharyngeal erythema.   Eyes:      General:         Right eye: No discharge.         Left eye: No discharge.      Extraocular Movements: Extraocular movements intact.   Cardiovascular:      Rate and Rhythm: Normal rate.      Heart sounds: No gallop.    Pulmonary:      Effort: No respiratory distress.      Breath sounds: Rales present. No wheezing.   Abdominal:      General: There is no distension.      Tenderness: There is no abdominal tenderness. There is no guarding.      Comments: Abdominal edema with striae   Musculoskeletal:      Right lower leg: Edema present.      Left lower leg: Edema present.   Skin:     General: Skin is warm.   Neurological:      Mental Status: He is alert and oriented to person, place, and time. Mental status is at baseline.      Motor: No weakness.   Psychiatric:         Mood and Affect: Mood normal.         Behavior: Behavior normal.         Laboratory:   Objective   Recent Results (from the past 24 hour(s))   POCT Protime    Collection Time: 11/01/21 12:00 AM   Result Value Ref Range    INR 4.30 (A) 2 - 3.5   CBC WITH DIFFERENTIAL    Collection Time: 11/01/21  9:00 AM   Result Value Ref Range    WBC 7.2 4.8 - 10.8 K/uL    RBC 6.58 (H) 4.70 - 6.10 M/uL    Hemoglobin 14.5 14.0 - 18.0 g/dL    Hematocrit 55.0 (H) 42.0 - 52.0 %    MCV 83.6 81.4 - 97.8 fL    MCH 22.0 (L) 27.0 - 33.0 pg    MCHC 26.4 (L) 33.7 - 35.3 g/dL    RDW 68.8 (H) 35.9 - 50.0 fL    Platelet Count 231 164 - 446 K/uL    MPV 10.1 9.0 - 12.9 fL    Neutrophils-Polys 66.10 44.00 - 72.00 %     Lymphocytes 23.10 22.00 - 41.00 %    Monocytes 9.00 0.00 - 13.40 %    Eosinophils 0.10 0.00 - 6.90 %    Basophils 1.00 0.00 - 1.80 %    Immature Granulocytes 0.70 0.00 - 0.90 %    Nucleated RBC 1.50 /100 WBC    Neutrophils (Absolute) 4.73 1.82 - 7.42 K/uL    Lymphs (Absolute) 1.65 1.00 - 4.80 K/uL    Monos (Absolute) 0.64 0.00 - 0.85 K/uL    Eos (Absolute) 0.01 0.00 - 0.51 K/uL    Baso (Absolute) 0.07 0.00 - 0.12 K/uL    Immature Granulocytes (abs) 0.05 0.00 - 0.11 K/uL    NRBC (Absolute) 0.11 K/uL    Anisocytosis 3+ (A)     Microcytosis 3+ (A)    COMP METABOLIC PANEL    Collection Time: 11/01/21  9:00 AM   Result Value Ref Range    Sodium 139 135 - 145 mmol/L    Potassium 4.8 3.6 - 5.5 mmol/L    Chloride 101 96 - 112 mmol/L    Co2 28 20 - 33 mmol/L    Anion Gap 10.0 7.0 - 16.0    Glucose 102 (H) 65 - 99 mg/dL    Bun 20 8 - 22 mg/dL    Creatinine 0.54 0.50 - 1.40 mg/dL    Calcium 9.3 8.5 - 10.5 mg/dL    AST(SGOT) 97 (H) 12 - 45 U/L    ALT(SGPT) 80 (H) 2 - 50 U/L    Alkaline Phosphatase 75 30 - 99 U/L    Total Bilirubin 1.3 0.1 - 1.5 mg/dL    Albumin 4.0 3.2 - 4.9 g/dL    Total Protein 7.3 6.0 - 8.2 g/dL    Globulin 3.3 1.9 - 3.5 g/dL    A-G Ratio 1.2 g/dL   proBrain Natriuretic Peptide, NT    Collection Time: 11/01/21  9:00 AM   Result Value Ref Range    NT-proBNP 3524 (H) 0 - 125 pg/mL   TROPONIN    Collection Time: 11/01/21  9:00 AM   Result Value Ref Range    Troponin T 19 6 - 19 ng/L   PT/INR    Collection Time: 11/01/21  9:00 AM   Result Value Ref Range    PT 36.7 (H) 12.0 - 14.6 sec    INR 3.86 (H) 0.87 - 1.13   PTT    Collection Time: 11/01/21  9:00 AM   Result Value Ref Range    APTT 42.8 (H) 24.7 - 36.0 sec   COV-2, FLU A/B, AND RSV BY PCR (2-4 HOURS CEPHEID): Collect NP swab in VTM    Collection Time: 11/01/21  9:00 AM    Specimen: Nasopharyngeal; Respirate   Result Value Ref Range    Influenza virus A RNA Negative Negative    Influenza virus B, PCR Negative Negative    RSV, PCR Negative Negative     SARS-CoV-2 by PCR NotDetected     SARS-CoV-2 Source NP Swab    ESTIMATED GFR    Collection Time: 21  9:00 AM   Result Value Ref Range    GFR If African American >60 >60 mL/min/1.73 m 2    GFR If Non African American >60 >60 mL/min/1.73 m 2   PERIPHERAL SMEAR REVIEW    Collection Time: 21  9:00 AM   Result Value Ref Range    Peripheral Smear Review see below    PLATELET ESTIMATE    Collection Time: 21  9:00 AM   Result Value Ref Range    Plt Estimation Normal    MORPHOLOGY    Collection Time: 21  9:00 AM   Result Value Ref Range    RBC Morphology Present     Polychromia 1+    DIFFERENTIAL COMMENT    Collection Time: 21  9:00 AM   Result Value Ref Range    Comments-Diff see below    Blood Culture,Hold    Collection Time: 21  9:00 AM   Result Value Ref Range    Blood Culture Hold Collected    EKG (NOW)    Collection Time: 21  9:36 AM   Result Value Ref Range    Report       [facility]    Test Date:  2021  Pt Name:    VERN STARR                Department: ER  MRN:        [id number]                      Room:       [facility]  Gender:     Male                         Technician: [id number]  :        1990                   Requested By:ALEJANDRO BAIG  Order #:    [id number]                    Reading MD: ALEJANDRO BAIG MD    Measurements  Intervals                                Axis  Rate:       92                           P:          76  NM:         162                          QRS:        129  QRSD:       85                           T:          27  QT:         368  QTc:        456    Interpretive Statements  Sinus rhythm  Right axis deviation  Low voltage, precordial leads  Consider anterior infarct  Compared to ECG 2020 15:31:18  Right-axis deviation now present  Low QRS voltage now present  Myocardial infarct finding now present  Left posterior fascicular block no longer present  Electronically Signed O n 2021 10:27:53  PDT by ALEJANDRO BAIG MD         (click the triangle to expand results)    Imaging:  CT-ABDOMEN-PELVIS WITH   Final Result         Limited exam due to body habitus.      1. There is skin thickening and subcutaneous edema in the low anterior abdominal and pelvic wall panus. This could relate to fluid overload but infection and cellulitis can have similar appearance. Correlate clinically.      2. Mild perihepatic ascites.      3. Diffuse scrotal edema and moderate hydrocele.         CT-CTA CHEST PULMONARY ARTERY W/ RECONS   Final Result         1. No CT evidence of pulmonary embolism.      2. Mild hazy groundglass opacity throughout both lungs could relate to mild fluid overload.      3. Cardiomegaly.      4. No airspace consolidation. Linear right midlung atelectasis.         DX-CHEST-PORTABLE (1 VIEW)   Final Result         Stable cardiomegaly.      Mild interstitial prominence could relate to mild fluid overload.      EC-ECHOCARDIOGRAM COMPLETE W/O CONT    (Results Pending)       EKG:   per my independant read:  QTc: 456, HR: 92, Normal Sinus Rhythm, no ST/T changes    Assessment/Plan:  I anticipate this patient will require at least two midnights for appropriate medical management, necessitating inpatient admission.    * Volume overload- (present on admission)  Assessment & Plan  Elevated BNP 3524 on admission  This is greater than his previous values in the past  Suspicion for new onset heart failure  Heart failure protocol  Echo pending   diuresing with IV Lasix  Started on carvedilol and lisinopril      Cardiomegaly- (present on admission)  Assessment & Plan  Chronic, noted on chest x-rays in the past as well  Echo pending    Scrotal swelling- (present on admission)  Assessment & Plan  CT shows edema, could not rule out cellulitis -although cellulitis is low on the differential at this time    History of pulmonary embolism- (present on admission)  Assessment & Plan  Continue warfarin    Chronic  anticoagulation- (present on admission)  Assessment & Plan  Has hypercoagulable disease  History of PE  Continue warfarin  With DOAC due to BMI potential failure on Xarelto    Pulmonary hypertension (HCC)- (present on admission)  Assessment & Plan  Noted in the past  Will reassess on echo    JONES (obstructive sleep apnea)- (present on admission)  Assessment & Plan  Likely contributor to pulmonary hypertension    Lower extremity edema- (present on admission)  Assessment & Plan  Due to volume overload  Bilateral symmetrical, on warfarin  Low suspicion for DVT or cellulitis    Morbid obesity (HCC)- (present on admission)  Assessment & Plan  Body mass index is 69.25 kg/m².    Asthma- (present on admission)  Assessment & Plan  Not in exacerbation  Continue inhalers      VTE prophylaxis:  warfarin

## 2021-11-01 NOTE — ASSESSMENT & PLAN NOTE
History of PE and DVT, had a trial on Xarelto developed DVT. Continue anticoagulation with warfarin and monitor INR

## 2021-11-01 NOTE — ASSESSMENT & PLAN NOTE
CT shows edema, could not rule out cellulitis -although cellulitis is low on the differential at this time

## 2021-11-01 NOTE — ASSESSMENT & PLAN NOTE
Pulmonology were consulted.  Patient will need a trilogy CPAP machine, which has been difficult to obtain inpatient. Lack of nocturnal BiPAP/CPAP results and repeated hospitalizations and worsening pulmonary hypertension.

## 2021-11-01 NOTE — ED NOTES
Med rec completed per Pt and family at bedside.  Allergies reviewed with Pt. No known DRUG allergies.  Pt is on WARFARIN. Pt receives anticoagulation monitoring with [facility]. Last anticoagulation visit TODAY 11/1/2021. Pt's current warfarin dosing schedule: HOLD warfarin 11/1/2021, then take 1/2 tablet (3.75 mg) on 11/2, then resume 1 tablet (7.5 mg) daily until next INR check (scheduled 11/8/2021).  On 10/13/2021 Pt was prescribed a 5 day course of LOVENOX, 150 mg every 12 hours, which he reports that he finished. On 10/22/2021 Pt was prescribed another 5 day course of Lovenox, same strength and directions, however per Pt he never received this medication from the pharmacy and HAS NOT started this course at home.  No oral antibiotics in last 30 days.  Pt's pharmacy: [facility] on [address]

## 2021-11-01 NOTE — ASSESSMENT & PLAN NOTE
Body mass index is 69.25 kg/m².  Patient will need aggressive outpatient weight management follow-up

## 2021-11-01 NOTE — PROGRESS NOTES
Inpatient Anticoagulation Service Note    Date: 11/1/2021    Reason for Anticoagulation: Pulmonary Embolism   Target INR: 2.0 to 3.0         Hemoglobin Value: 14.5  Hematocrit Value: (!) 55  Lab Platelet Value: 231    INR from last 7 days     Date/Time INR Value    11/01/21 0900 3.86        Dose from last 7 days     Date/Time Dose (mg)    11/01/21 1212 0        Average Dose (mg): 7.5  Bridge Therapy: No   Reversal Agent Administered: Not Applicable    Comments: Pt followed by anticoagulation clinic for warfarin due to hx of PE. INR today is supratherapeutic. No new DDIs. A diet has been ordered.     Plan:  Will hold warfarin this evening and obtain an INR with AM labs. Pharmacy will continue to follow.    Education Material Provided?: Yes  Pharmacist suggested discharge dosing: TBD based on subsequent INR results. May be able to resume home dose of 7.5 mg PO daily. Obtain a follow up INR within one week of discharge.        Becki Frazier, PharmD, BCPS

## 2021-11-01 NOTE — ASSESSMENT & PLAN NOTE
Secondary to obstructive sleep apnea.  RVSP was 45 mmHg. patient has been unable to acquire a CPAP machine, resulting in worsening symptoms and recurrent hospitalizations.

## 2021-11-01 NOTE — PROGRESS NOTES
Anticoagulation Summary  As of 2021    INR goal:  2.0-3.0   TTR:  49.0 % (1.6 y)   INR used for dosin.30 (2021)   Warfarin maintenance plan:  7.5 mg (7.5 mg x 1) every day   Weekly warfarin total:  52.5 mg   Plan last modified:  Krupa Alfred (10/22/2021)   Next INR check:  2021   Target end date:  Indefinite    Indications    Pulmonary embolism with acute cor pulmonale (HCC) [I26.09]             Anticoagulation Episode Summary     INR check location:      Preferred lab:      Send INR reminders to:      Comments:        Anticoagulation Care Providers     Provider Role Specialty Phone number    [facility] Responsible  [phone]              Refer to Patient Findings for HPI:  Patient Findings     Positives:  Change in health (Patient having some swelling in the groin region- on going problem.  treated 2x in the past few months for this. Patient will follow up with PCP or go to ED), Change in diet/appetite (less greens)    Negatives:  Signs/symptoms of thrombosis, Signs/symptoms of bleeding, Laboratory test error suspected, Change in alcohol use, Change in activity, Upcoming invasive procedure, Emergency department visit, Upcoming dental procedure, Missed doses, Extra doses, Change in medications, Hospital admission, Bruising, Other complaints        There were no vitals filed for this visit.  The pt was seen at [facility] today.     Patient seen in clinic today for follow up on anticoagulation therapy with warfarin (a high risk medication) for hx of PE.  Verified current warfarin dosing schedule.    Medications reconciled   Pt is not on antiplatelet therapy      A/P   INR is SUPRA-therapeutic today at 4.3.     Warfarin dosing recommendation: Patient will HOLD warfarin TONIGHT, then reduce dose to 3.75mg tomorrow, then resume 7.5mg until follow up next week.     Pt educated to contact our clinic with any changes in medications or s/s of bleeding or thrombosis. Pt is  aware to seek immediate medical attention for falls, head injury or deep cuts.    Follow up appointment in 1 week(s).    Next appt: Mon, Nov 8 @ 7:30am    Yoon Alfred PharmD

## 2021-11-01 NOTE — ED TRIAGE NOTES
Pt ambulated to triage with   Chief Complaint   Patient presents with   • Abdominal Swelling     increasing. pt not on any diuretics   • Groin Swelling   • Testicle Swelling     Pt reports that he has had a 6 lb wt gain.  Pt placed on 6 liters NC - not normally on O2 at home.  Denies SOB.  Reports possible sleep apnea.  Pt dx with blood clotting disorder, on coumadin.  Pt report chaffing and pain under abd and groin. Pt Informed regarding triage process and verbalized understanding to inform triage tech or RN for any changes in condition. Placed in lobby.

## 2021-11-01 NOTE — ED NOTES
Pt ambulated steadily to restroom. Reports having urinated twice. Unable to collect for measurement.

## 2021-11-01 NOTE — ASSESSMENT & PLAN NOTE
Chronic, noted on chest x-rays in the past as well  Echo noted pulmonary hypertension and RV overload

## 2021-11-01 NOTE — ED PROVIDER NOTES
ED Provider Note    CHIEF COMPLAINT  Chief Complaint   Patient presents with   • Abdominal Swelling     increasing. pt not on any diuretics   • Groin Swelling   • Testicle Swelling       HPI  Michael Ontiveros is a 31 y.o. male who presents to the emergency department with abdominal swelling testicle swelling testicle discomfort and shortness of breath.  Patient has a history of hypercoagulability.  His anticoagulation was discontinued in September.  He subsequently developed a DVT.  He was restarted on his warfarin after Lasix.  He has a history of scrotal cellulitis diagnosed in September as well.  He followed up with urology who concurred this was a scrotal cellulitis.  Reports that the swelling did seem to get better for short period of time, but recently worsened.  His abdomen feels somewhat swollen and feels like there is more fluid.  He has minimal shortness of breath.  He does not have any chest pain.  No pleuritic symptoms.  Denies abdominal pain, nausea vomiting.  No new leg swelling.  Patient and  report that his legs look better than they normally do including the skin color.    REVIEW OF SYSTEMS  As per HPI, otherwise a 10 point review of systems is negative    PAST MEDICAL HISTORY  Past Medical History:   Diagnosis Date   • Asthma    • Blood clotting disorder (HCC)    • Chickenpox    • Eczema    • Hypertension    • Influenza    • Tonsillitis        Family history  No pertinent family medical history    SOCIAL HISTORY  Social History     Tobacco Use   • Smoking status: Never Smoker   • Smokeless tobacco: Never Used   Vaping Use   • Vaping Use: Never used   Substance Use Topics   • Alcohol use: Not Currently     Comment: rare   • Drug use: No       SURGICAL HISTORY  Past Surgical History:   Procedure Laterality Date   • DENTAL EXTRACTION(S)  11/3/2017    Procedure: DENTAL EXTRACTION(S);  Surgeon: Olman Sparks D.D.S.;  Location: SURGERY [facility];  Service: Oral Surgery  "      CURRENT MEDICATIONS  Home Medications     Reviewed by Elisa Arguello R.N. (Registered Nurse) on 11/01/21 at 0835  Med List Status: Partial   Medication Last Dose Status   acetaminophen (TYLENOL) 325 MG Tab  Active   albuterol 108 (90 Base) MCG/ACT Aero Soln inhalation aerosol  Active   enoxaparin (LOVENOX) 150 MG/ML Solution  Active   Multiple Vitamins-Minerals (MENS MULTIVITAMIN) Tab  Active   warfarin (COUMADIN) 7.5 MG Tab  Active                ALLERGIES  Allergies   Allergen Reactions   • [place] (Diagnostic) Anaphylaxis   • Pistachio Anaphylaxis   • [place] Anaphylaxis   • Tree Nuts Food Allergy Vomiting and Nausea     Other nuts aside from almond, walnut, pistachio       PHYSICAL EXAM  VITAL SIGNS: /66   Pulse 91   Temp 36.1 °C (96.9 °F) (Temporal)   Resp (!) 26   Ht 1.727 m (5' 8\")   Wt (!) 207 kg (455 lb 7.5 oz)   SpO2 98%   BMI 69.25 kg/m²    Constitutional: Awake and alert.  Morbidly obese  HENT: Normal inspection  Eyes: Normal inspection  Neck: Grossly normal range of motion.  Cardiovascular: Alley elevated heart rate, Normal rhythm.  Symmetric peripheral pulses.   Thorax & Lungs: Mild tachypnea.  Decreased breath sounds throughout  Abdomen: Skin erythema without warmth over the mid abdomen and down.  This includes the pannus.  There is no significant tenderness of the abdomen.  There is edema in the suprapubic region above his penis and surrounding his penis this is remarkable without fluctuance or crepitus.  His genitals are swollen.  Skin: Ythema of the abdomen as noted above.  Erythema of the lower extremities.  Extremities: Erythema bilateral lower extremities.  No asymmetric swelling  Neurologic: Grossly normal   Psychiatric: Normal for situation    RADIOLOGY/PROCEDURES  CT-ABDOMEN-PELVIS WITH   Final Result         Limited exam due to body habitus.      1. There is skin thickening and subcutaneous edema in the low anterior abdominal and pelvic wall panus. This could relate to " fluid overload but infection and cellulitis can have similar appearance. Correlate clinically.      2. Mild perihepatic ascites.      3. Diffuse scrotal edema and moderate hydrocele.         CT-CTA CHEST PULMONARY ARTERY W/ RECONS   Final Result         1. No CT evidence of pulmonary embolism.      2. Mild hazy groundglass opacity throughout both lungs could relate to mild fluid overload.      3. Cardiomegaly.      4. No airspace consolidation. Linear right midlung atelectasis.         DX-CHEST-PORTABLE (1 VIEW)   Final Result         Stable cardiomegaly.      Mild interstitial prominence could relate to mild fluid overload.           Imaging is interpreted by radiologist    Labs:  Results for orders placed or performed during the hospital encounter of 11/01/21   CBC WITH DIFFERENTIAL   Result Value Ref Range    WBC 7.2 4.8 - 10.8 K/uL    RBC 6.58 (H) 4.70 - 6.10 M/uL    Hemoglobin 14.5 14.0 - 18.0 g/dL    Hematocrit 55.0 (H) 42.0 - 52.0 %    MCV 83.6 81.4 - 97.8 fL    MCH 22.0 (L) 27.0 - 33.0 pg    MCHC 26.4 (L) 33.7 - 35.3 g/dL    RDW 68.8 (H) 35.9 - 50.0 fL    Platelet Count 231 164 - 446 K/uL    MPV 10.1 9.0 - 12.9 fL    Neutrophils-Polys 66.10 44.00 - 72.00 %    Lymphocytes 23.10 22.00 - 41.00 %    Monocytes 9.00 0.00 - 13.40 %    Eosinophils 0.10 0.00 - 6.90 %    Basophils 1.00 0.00 - 1.80 %    Immature Granulocytes 0.70 0.00 - 0.90 %    Nucleated RBC 1.50 /100 WBC    Neutrophils (Absolute) 4.73 1.82 - 7.42 K/uL    Lymphs (Absolute) 1.65 1.00 - 4.80 K/uL    Monos (Absolute) 0.64 0.00 - 0.85 K/uL    Eos (Absolute) 0.01 0.00 - 0.51 K/uL    Baso (Absolute) 0.07 0.00 - 0.12 K/uL    Immature Granulocytes (abs) 0.05 0.00 - 0.11 K/uL    NRBC (Absolute) 0.11 K/uL    Anisocytosis 3+ (A)     Microcytosis 3+ (A)    COMP METABOLIC PANEL   Result Value Ref Range    Sodium 139 135 - 145 mmol/L    Potassium 4.8 3.6 - 5.5 mmol/L    Chloride 101 96 - 112 mmol/L    Co2 28 20 - 33 mmol/L    Anion Gap 10.0 7.0 - 16.0    Glucose  102 (H) 65 - 99 mg/dL    Bun 20 8 - 22 mg/dL    Creatinine 0.54 0.50 - 1.40 mg/dL    Calcium 9.3 8.5 - 10.5 mg/dL    AST(SGOT) 97 (H) 12 - 45 U/L    ALT(SGPT) 80 (H) 2 - 50 U/L    Alkaline Phosphatase 75 30 - 99 U/L    Total Bilirubin 1.3 0.1 - 1.5 mg/dL    Albumin 4.0 3.2 - 4.9 g/dL    Total Protein 7.3 6.0 - 8.2 g/dL    Globulin 3.3 1.9 - 3.5 g/dL    A-G Ratio 1.2 g/dL   proBrain Natriuretic Peptide, NT   Result Value Ref Range    NT-proBNP 3524 (H) 0 - 125 pg/mL   TROPONIN   Result Value Ref Range    Troponin T 19 6 - 19 ng/L   PT/INR   Result Value Ref Range    PT 36.7 (H) 12.0 - 14.6 sec    INR 3.86 (H) 0.87 - 1.13   PTT   Result Value Ref Range    APTT 42.8 (H) 24.7 - 36.0 sec   COV-2, FLU A/B, AND RSV BY PCR (2-4 HOURS CEPHEID): Collect NP swab in VTM    Specimen: Nasopharyngeal; Respirate   Result Value Ref Range    Influenza virus A RNA Negative Negative    Influenza virus B, PCR Negative Negative    RSV, PCR Negative Negative    SARS-CoV-2 by PCR NotDetected     SARS-CoV-2 Source NP Swab    ESTIMATED GFR   Result Value Ref Range    GFR If African American >60 >60 mL/min/1.73 m 2    GFR If Non African American >60 >60 mL/min/1.73 m 2   PERIPHERAL SMEAR REVIEW   Result Value Ref Range    Peripheral Smear Review see below    PLATELET ESTIMATE   Result Value Ref Range    Plt Estimation Normal    MORPHOLOGY   Result Value Ref Range    RBC Morphology Present     Polychromia 1+    DIFFERENTIAL COMMENT   Result Value Ref Range    Comments-Diff see below    Blood Culture,Hold   Result Value Ref Range    Blood Culture Hold Collected    EKG (NOW)   Result Value Ref Range    Report       [facility] Emergency Dept.    Test Date:  2021  Pt Name:    VREN STARR                Department: ER  MRN:        [id number]                      Room:       [facility]  Gender:     Male                         Technician: [id number]  :        1990                   Requested By:ALEJANDRO BAIG  Order #:     [id number]                    Reading MD: ALEJANDRO BAIG MD    Measurements  Intervals                                Axis  Rate:       92                           P:          76  AK:         162                          QRS:        129  QRSD:       85                           T:          27  QT:         368  QTc:        456    Interpretive Statements  Sinus rhythm  Right axis deviation  Low voltage, precordial leads  Consider anterior infarct  Compared to ECG 03/05/2020 15:31:18  Right-axis deviation now present  Low QRS voltage now present  Myocardial infarct finding now present  Left posterior fascicular block no longer present  Electronically Signed O n 11-1-2021 10:27:53 PDT by ALEJANDRO BAIG MD         Medications   furosemide (LASIX) injection 40 mg (40 mg Intravenous Given 11/1/21 1046)   iohexol (OMNIPAQUE) 350 mg/mL (100 mL Intravenous Given 11/1/21 1132)         COURSE & MEDICAL DECISION MAKING  Patient presents with significant pannus edema and suprapubic edema as well as scrotal edema.  He was hypoxic on arrival.  He has a history of coagulopathy with thromboembolism.  It does look like currently he is supratherapeutic, but anticoagulants were discontinued fairly recently.    Pulmonary embolism as well as hepatic embolism or other thoracoabdominal thrombosis is a possibility.  CHF is felt to be likely given clinical appearance.  The skin is red, but he is not febrile.  It is not excessively warm.  I think most likely this is related to edema.  I ordered evaluation occluding being CTA of the chest and CT abdomen and pelvis.    Laboratory data returns as above.  Significantly elevated BNP.  Mild elevation of the AST and ALT.  This could be from passive congestion.  He does have pulmonary edema on his chest x-ray.  I ordered for a milligrams of Lasix.    CT imaging is negative aside from edema.  He does not have any leukocytosis.  Doubt infection.  He will need to be diuresed.  He needs evaluation of  acute onset congestive heart failure as well as additional diuresis because of active hypoxia.  I consulted Dr. Tabor.    FINAL IMPRESSION  1.  Congestive heart failure with volume overload  2.  Hypoxic respiratory failure      This dictation was created using voice recognition software. The accuracy of the dictation is limited to the abilities of the software.  The nursing notes were reviewed and certain aspects of this information were incorporated into this note.      Electronically signed by: Jersey Boswell M.D., 11/1/2021 10:44 AM

## 2021-11-01 NOTE — ED NOTES
Pt ambulated steadily to room from lobby using own cane. Family accompanies. Pt reports abd and scrotal edema x two weeks, worsening; now with 'chaffing' which brought him to ED today. Hx of similar which was diagnosed as cellulitis and improved with short coarse of diuretics. Denies orthopnea/fever/chest pain/shortness of breath/decreased exercise tolerance. HX bilat PE and DVT, compliant w warfarin.    BP/spo2/ecg monitoring in place. Sinus tach on monitor. RA 80-84%, improved with 5L supplemental O2. No respiratory distress noted; pt speaking in full sentences wo difficulty. Skin pink/warm/dry.     IV established, labs drawn. Awaiting ERP eval.

## 2021-11-02 ENCOUNTER — APPOINTMENT (OUTPATIENT)
Dept: CARDIOLOGY | Facility: MEDICAL CENTER | Age: 31
DRG: 314 | End: 2021-11-02
Attending: HOSPITALIST
Payer: COMMERCIAL

## 2021-11-02 LAB
ALBUMIN SERPL BCP-MCNC: 3.8 G/DL (ref 3.2–4.9)
ANION GAP SERPL CALC-SCNC: 7 MMOL/L (ref 7–16)
BASOPHILS # BLD AUTO: 0.8 % (ref 0–1.8)
BASOPHILS # BLD: 0.06 K/UL (ref 0–0.12)
BUN SERPL-MCNC: 17 MG/DL (ref 8–22)
CALCIUM SERPL-MCNC: 8.2 MG/DL (ref 8.5–10.5)
CHLORIDE SERPL-SCNC: 99 MMOL/L (ref 96–112)
CO2 SERPL-SCNC: 33 MMOL/L (ref 20–33)
CREAT SERPL-MCNC: 0.76 MG/DL (ref 0.5–1.4)
EOSINOPHIL # BLD AUTO: 0.08 K/UL (ref 0–0.51)
EOSINOPHIL NFR BLD: 1 % (ref 0–6.9)
ERYTHROCYTE [DISTWIDTH] IN BLOOD BY AUTOMATED COUNT: 70 FL (ref 35.9–50)
GLUCOSE SERPL-MCNC: 105 MG/DL (ref 65–99)
HCT VFR BLD AUTO: 55.3 % (ref 42–52)
HGB BLD-MCNC: 14.4 G/DL (ref 14–18)
IMM GRANULOCYTES # BLD AUTO: 0.06 K/UL (ref 0–0.11)
IMM GRANULOCYTES NFR BLD AUTO: 0.8 % (ref 0–0.9)
INR PPP: 3.34 (ref 0.87–1.13)
LV EJECT FRACT  99904: 60
LV EJECT FRACT MOD 4C 99902: 58.39
LYMPHOCYTES # BLD AUTO: 1.89 K/UL (ref 1–4.8)
LYMPHOCYTES NFR BLD: 24.1 % (ref 22–41)
MCH RBC QN AUTO: 22.5 PG (ref 27–33)
MCHC RBC AUTO-ENTMCNC: 26 G/DL (ref 33.7–35.3)
MCV RBC AUTO: 86.3 FL (ref 81.4–97.8)
MONOCYTES # BLD AUTO: 0.75 K/UL (ref 0–0.85)
MONOCYTES NFR BLD AUTO: 9.6 % (ref 0–13.4)
NEUTROPHILS # BLD AUTO: 4.99 K/UL (ref 1.82–7.42)
NEUTROPHILS NFR BLD: 63.7 % (ref 44–72)
NRBC # BLD AUTO: 0.17 K/UL
NRBC BLD-RTO: 2.2 /100 WBC
NT-PROBNP SERPL IA-MCNC: 2071 PG/ML (ref 0–125)
PHOSPHATE SERPL-MCNC: 5 MG/DL (ref 2.5–4.5)
PLATELET # BLD AUTO: 265 K/UL (ref 164–446)
PMV BLD AUTO: 11.3 FL (ref 9–12.9)
POTASSIUM SERPL-SCNC: 5 MMOL/L (ref 3.6–5.5)
PROTHROMBIN TIME: 32.9 SEC (ref 12–14.6)
RBC # BLD AUTO: 6.41 M/UL (ref 4.7–6.1)
SODIUM SERPL-SCNC: 139 MMOL/L (ref 135–145)
WBC # BLD AUTO: 7.8 K/UL (ref 4.8–10.8)

## 2021-11-02 PROCEDURE — 93306 TTE W/DOPPLER COMPLETE: CPT | Mod: 26 | Performed by: INTERNAL MEDICINE

## 2021-11-02 PROCEDURE — 700102 HCHG RX REV CODE 250 W/ 637 OVERRIDE(OP): Performed by: HOSPITALIST

## 2021-11-02 PROCEDURE — 700102 HCHG RX REV CODE 250 W/ 637 OVERRIDE(OP): Performed by: GENERAL PRACTICE

## 2021-11-02 PROCEDURE — 93306 TTE W/DOPPLER COMPLETE: CPT

## 2021-11-02 PROCEDURE — 85025 COMPLETE CBC W/AUTO DIFF WBC: CPT

## 2021-11-02 PROCEDURE — 770020 HCHG ROOM/CARE - TELE (206)

## 2021-11-02 PROCEDURE — A9270 NON-COVERED ITEM OR SERVICE: HCPCS | Performed by: HOSPITALIST

## 2021-11-02 PROCEDURE — A9270 NON-COVERED ITEM OR SERVICE: HCPCS | Performed by: GENERAL PRACTICE

## 2021-11-02 PROCEDURE — 85610 PROTHROMBIN TIME: CPT

## 2021-11-02 PROCEDURE — 99233 SBSQ HOSP IP/OBS HIGH 50: CPT | Performed by: GENERAL PRACTICE

## 2021-11-02 PROCEDURE — 94660 CPAP INITIATION&MGMT: CPT

## 2021-11-02 PROCEDURE — 83880 ASSAY OF NATRIURETIC PEPTIDE: CPT

## 2021-11-02 PROCEDURE — 80069 RENAL FUNCTION PANEL: CPT

## 2021-11-02 PROCEDURE — 700117 HCHG RX CONTRAST REV CODE 255: Performed by: HOSPITALIST

## 2021-11-02 PROCEDURE — 700111 HCHG RX REV CODE 636 W/ 250 OVERRIDE (IP): Performed by: GENERAL PRACTICE

## 2021-11-02 PROCEDURE — 700111 HCHG RX REV CODE 636 W/ 250 OVERRIDE (IP): Performed by: HOSPITALIST

## 2021-11-02 RX ORDER — AMOXICILLIN 250 MG
2 CAPSULE ORAL 2 TIMES DAILY PRN
Status: DISCONTINUED | OUTPATIENT
Start: 2021-11-02 | End: 2021-11-03

## 2021-11-02 RX ORDER — POLYETHYLENE GLYCOL 3350 17 G/17G
1 POWDER, FOR SOLUTION ORAL
Status: DISCONTINUED | OUTPATIENT
Start: 2021-11-02 | End: 2021-11-03

## 2021-11-02 RX ORDER — FUROSEMIDE 10 MG/ML
40 INJECTION INTRAMUSCULAR; INTRAVENOUS 3 TIMES DAILY
Status: DISCONTINUED | OUTPATIENT
Start: 2021-11-02 | End: 2021-11-03

## 2021-11-02 RX ORDER — WARFARIN SODIUM 7.5 MG/1
3.75 TABLET ORAL
Status: COMPLETED | OUTPATIENT
Start: 2021-11-02 | End: 2021-11-02

## 2021-11-02 RX ORDER — LISINOPRIL 5 MG/1
5 TABLET ORAL
Status: DISCONTINUED | OUTPATIENT
Start: 2021-11-03 | End: 2021-11-03

## 2021-11-02 RX ORDER — CARVEDILOL 3.12 MG/1
3.12 TABLET ORAL 2 TIMES DAILY WITH MEALS
Status: DISCONTINUED | OUTPATIENT
Start: 2021-11-02 | End: 2021-11-03

## 2021-11-02 RX ORDER — BISACODYL 10 MG
10 SUPPOSITORY, RECTAL RECTAL
Status: DISCONTINUED | OUTPATIENT
Start: 2021-11-02 | End: 2021-11-03

## 2021-11-02 RX ADMIN — HUMAN ALBUMIN MICROSPHERES AND PERFLUTREN 3 ML: 10; .22 INJECTION, SOLUTION INTRAVENOUS at 09:03

## 2021-11-02 RX ADMIN — CARVEDILOL 3.12 MG: 3.12 TABLET, FILM COATED ORAL at 17:29

## 2021-11-02 RX ADMIN — FUROSEMIDE 40 MG: 10 INJECTION, SOLUTION INTRAMUSCULAR; INTRAVENOUS at 17:28

## 2021-11-02 RX ADMIN — FUROSEMIDE 40 MG: 10 INJECTION, SOLUTION INTRAMUSCULAR; INTRAVENOUS at 06:19

## 2021-11-02 RX ADMIN — WARFARIN SODIUM 3.75 MG: 7.5 TABLET ORAL at 17:29

## 2021-11-02 RX ADMIN — POTASSIUM CHLORIDE 20 MEQ: 1500 TABLET, EXTENDED RELEASE ORAL at 06:18

## 2021-11-02 RX ADMIN — MULTIPLE VITAMINS W/ MINERALS TAB 1 TABLET: TAB at 06:18

## 2021-11-02 RX ADMIN — CARVEDILOL 6.25 MG: 6.25 TABLET, FILM COATED ORAL at 08:42

## 2021-11-02 RX ADMIN — LISINOPRIL 10 MG: 10 TABLET ORAL at 06:18

## 2021-11-02 ASSESSMENT — COGNITIVE AND FUNCTIONAL STATUS - GENERAL
MOBILITY SCORE: 18
EATING MEALS: A LITTLE
WALKING IN HOSPITAL ROOM: A LITTLE
SUGGESTED CMS G CODE MODIFIER MOBILITY: CK
TURNING FROM BACK TO SIDE WHILE IN FLAT BAD: A LITTLE
DAILY ACTIVITIY SCORE: 18
STANDING UP FROM CHAIR USING ARMS: A LITTLE
SUGGESTED CMS G CODE MODIFIER DAILY ACTIVITY: CK
DRESSING REGULAR UPPER BODY CLOTHING: A LITTLE
MOVING TO AND FROM BED TO CHAIR: A LITTLE
DRESSING REGULAR LOWER BODY CLOTHING: A LITTLE
PERSONAL GROOMING: A LITTLE
TOILETING: A LITTLE
MOVING FROM LYING ON BACK TO SITTING ON SIDE OF FLAT BED: A LITTLE
CLIMB 3 TO 5 STEPS WITH RAILING: A LITTLE
HELP NEEDED FOR BATHING: A LITTLE

## 2021-11-02 ASSESSMENT — LIFESTYLE VARIABLES
ALCOHOL_USE: NO
HAVE PEOPLE ANNOYED YOU BY CRITICIZING YOUR DRINKING: NO
TOTAL SCORE: 0
TOTAL SCORE: 0
HAVE YOU EVER FELT YOU SHOULD CUT DOWN ON YOUR DRINKING: NO
DOES PATIENT WANT TO STOP DRINKING: NO
TOTAL SCORE: 0
AVERAGE NUMBER OF DAYS PER WEEK YOU HAVE A DRINK CONTAINING ALCOHOL: 0
EVER FELT BAD OR GUILTY ABOUT YOUR DRINKING: NO
EVER HAD A DRINK FIRST THING IN THE MORNING TO STEADY YOUR NERVES TO GET RID OF A HANGOVER: NO
CONSUMPTION TOTAL: NEGATIVE
HOW MANY TIMES IN THE PAST YEAR HAVE YOU HAD 5 OR MORE DRINKS IN A DAY: 0
ON A TYPICAL DAY WHEN YOU DRINK ALCOHOL HOW MANY DRINKS DO YOU HAVE: 0

## 2021-11-02 ASSESSMENT — PAIN DESCRIPTION - PAIN TYPE
TYPE: ACUTE PAIN
TYPE: ACUTE PAIN;CHRONIC PAIN

## 2021-11-02 ASSESSMENT — PATIENT HEALTH QUESTIONNAIRE - PHQ9
SUM OF ALL RESPONSES TO PHQ9 QUESTIONS 1 AND 2: 0
2. FEELING DOWN, DEPRESSED, IRRITABLE, OR HOPELESS: NOT AT ALL
1. LITTLE INTEREST OR PLEASURE IN DOING THINGS: NOT AT ALL

## 2021-11-02 ASSESSMENT — ENCOUNTER SYMPTOMS: SHORTNESS OF BREATH: 1

## 2021-11-02 NOTE — CARE PLAN
Problem: Respiratory  Goal: Patient will achieve/maintain optimum respiratory ventilation and gas exchange  Outcome: Not Progressing     Problem: Pain - Standard  Goal: Alleviation of pain or a reduction in pain to the patient’s comfort goal  Outcome: Progressing     Problem: Care Map:  Admission Optimal Outcome for the Heart Failure Patient  Goal: Admission:  Optimal Care of the heart failure patient  Outcome: Progressing     Problem: Knowledge Deficit - Standard  Goal: Patient and family/care givers will demonstrate understanding of plan of care, disease process/condition, diagnostic tests and medications  Outcome: Progressing   The patient is Watcher - Medium risk of patient condition declining or worsening    Shift Goals  Clinical Goals: decrease swelling  Patient Goals: decrease swelling    Progress made toward(s) clinical / shift goals:      Patient is not progressing towards the following goals:      Problem: Respiratory  Goal: Patient will achieve/maintain optimum respiratory ventilation and gas exchange  Outcome: Not Progressing

## 2021-11-02 NOTE — PROGRESS NOTES
Bedside report received from day shift RN. Assumed care at 1900. Pt is A&Ox4. Pt is in bed. Patient is on 10L [place]. Girlfriend at bedside.  Pt was updated on plan of care. Pt has call light, personal belongings, and bedside table within reach. Bed locked and in lowest position.

## 2021-11-02 NOTE — DISCHARGE PLANNING
Care Transition Team Assessment    LSW met with pt at bedside to complete assessment. Pt A&Ox4 and able to verify the information on the face sheet. Pt reported he lives alone in a single story house that has one small step into it. Pt is independent with all ADLs and IADLs. Pt manages his own medications and drives himself. Pt reported that he occasionally uses a cane.    Pt has a good support system which includes his friend Duy, friend Liliam, grandpa, uncle, and other family members. Pt is employed full time and denies any financial, SA, or MH concerns. Pt reported his friend Liliam will be able to provide transportation home upon DC.    Information Source  Orientation Level: Oriented X4  Information Given By: Patient  Informant's Name: Michael Ontiveros  Who is responsible for making decisions for patient? : Patient    Readmission Evaluation  Is this a readmission?: Yes - unplanned readmission    Elopement Risk  Legal Hold: No    Interdisciplinary Discharge Planning  Lives with - Patient's Self Care Capacity: (P) Unrelated Adult  Support Systems: (P) Friends / Neighbors, Parent  Housing / Facility: (P) 1 Story House  Name of Care Facility: (P) N/a  Patient Prefers to be Discharged to:: (P) Home  Durable Medical Equipment: Other - Specify (cane)    Discharge Preparedness  What is your plan after discharge?: Home with help  What are your discharge supports?: Parent, Other (comment) (friend, grandpa, uncle)  Prior Functional Level: Ambulatory, Drives Self, Independent with Activities of Daily Living, Independent with Medication Management, Uses Cane  Difficulity with ADLs: None  Difficulity with IADLs: None    Functional Assesment  Prior Functional Level: Ambulatory, Drives Self, Independent with Activities of Daily Living, Independent with Medication Management, Uses Cane    Finances  Financial Barriers to Discharge: No  Prescription Coverage: Yes    Advance Directive  Advance Directive?: None    Domestic  Abuse  Have you ever been the victim of abuse or violence?: No    Psychological Assessment  History of Substance Abuse: None  History of Psychiatric Problems: No  Non-compliant with Treatment: No  Newly Diagnosed Illness: No    Discharge Risks or Barriers  Discharge risks or barriers?: No    Anticipated Discharge Information  Discharge Disposition: Discharged to home/self care (01)

## 2021-11-02 NOTE — CARE PLAN
The patient is Watcher - Medium risk of patient condition declining or worsening    Shift Goals  Clinical Goals: Maintain O2 above 90%  Patient Goals: sleep     Progress made toward(s) clinical / shift goals:  progressing      Problem: Pain - Standard  Goal: Alleviation of pain or a reduction in pain to the patient’s comfort goal  Outcome: Progressing  Note: Pain interventions will allow patient to sleep comfortably.       Problem: Respiratory  Goal: Patient will achieve/maintain optimum respiratory ventilation and gas exchange  Outcome: Progressing  Note: With Cpap patient is able to maintain above 90% while sleeping.

## 2021-11-02 NOTE — PROGRESS NOTES
Pharmacy Warfarin Monitoring     Date: 11/2/2021  Reason for Anticoagulation: Pulmonary Embolism   Target INR: 2.0 to 3.0      Hemoglobin Value: 14.4  Hematocrit Value: (!) 55.3  Lab Platelet Value: 265    INR from last 7 days     Date/Time INR Value    11/02/21 02:24:01 3.34    11/01/21 0900 3.86        Dose from last 7 days     Date/Time Dose (mg)    11/02/21 1522 3.75    11/01/21 1212 0        Home dose: 7.5 mg daily  Significant Interactions: Not Applicable  Bridge Therapy: Not indicated - INR supratherapeutic    Reversal Agent Administered: Not Applicable    Comments: INR slightly supratherapeutic but decreasing after held dose last night.  Plan to give half of normal dose tonight and follow INR trends for tomorrow.    Education Material Provided?: Yes  Pharmacist suggested discharge dosing: Anticipate ability to discharge on home regimen of 7.5 mg daily with INR within 1 week of discharge.     Thank you!  Maeve Sherman, PharmD, BCCCP

## 2021-11-03 ENCOUNTER — APPOINTMENT (OUTPATIENT)
Dept: RADIOLOGY | Facility: MEDICAL CENTER | Age: 31
DRG: 314 | End: 2021-11-03
Attending: INTERNAL MEDICINE
Payer: COMMERCIAL

## 2021-11-03 ENCOUNTER — APPOINTMENT (OUTPATIENT)
Dept: RADIOLOGY | Facility: MEDICAL CENTER | Age: 31
DRG: 314 | End: 2021-11-03
Attending: STUDENT IN AN ORGANIZED HEALTH CARE EDUCATION/TRAINING PROGRAM
Payer: COMMERCIAL

## 2021-11-03 LAB
AMMONIA PLAS-SCNC: 49 UMOL/L (ref 11–45)
ANION GAP SERPL CALC-SCNC: 8 MMOL/L (ref 7–16)
BASE EXCESS BLDA CALC-SCNC: 3 MMOL/L (ref -4–3)
BASE EXCESS BLDA CALC-SCNC: 6 MMOL/L (ref -4–3)
BASE EXCESS BLDA CALC-SCNC: 8 MMOL/L (ref -4–3)
BASOPHILS # BLD AUTO: 0.4 % (ref 0–1.8)
BASOPHILS # BLD: 0.03 K/UL (ref 0–0.12)
BODY TEMPERATURE: ABNORMAL CENTIGRADE
BODY TEMPERATURE: ABNORMAL DEGREES
BODY TEMPERATURE: ABNORMAL DEGREES
BREATHS SETTING VENT: 30
BUN SERPL-MCNC: 18 MG/DL (ref 8–22)
CALCIUM SERPL-MCNC: 9.1 MG/DL (ref 8.5–10.5)
CHLORIDE SERPL-SCNC: 97 MMOL/L (ref 96–112)
CHOLEST SERPL-MCNC: 90 MG/DL (ref 100–199)
CO2 BLDA-SCNC: 36 MMOL/L (ref 20–33)
CO2 BLDA-SCNC: 40 MMOL/L (ref 20–33)
CO2 SERPL-SCNC: 32 MMOL/L (ref 20–33)
CREAT SERPL-MCNC: 0.59 MG/DL (ref 0.5–1.4)
DELSYS IDSYS: ABNORMAL
DELSYS IDSYS: ABNORMAL
EKG IMPRESSION: NORMAL
END TIDAL CARBON DIOXIDE IECO2: 42 MMHG
EOSINOPHIL # BLD AUTO: 0.05 K/UL (ref 0–0.51)
EOSINOPHIL NFR BLD: 0.7 % (ref 0–6.9)
ERYTHROCYTE [DISTWIDTH] IN BLOOD BY AUTOMATED COUNT: 70.5 FL (ref 35.9–50)
EST. AVERAGE GLUCOSE BLD GHB EST-MCNC: 123 MG/DL
GLUCOSE SERPL-MCNC: 104 MG/DL (ref 65–99)
HBA1C MFR BLD: 5.9 % (ref 4–5.6)
HCO3 BLDA-SCNC: 34.4 MMOL/L (ref 17–25)
HCO3 BLDA-SCNC: 37.2 MMOL/L (ref 17–25)
HCO3 BLDA-SCNC: 40 MMOL/L (ref 17–25)
HCT VFR BLD AUTO: 54.7 % (ref 42–52)
HDLC SERPL-MCNC: 16 MG/DL
HGB BLD-MCNC: 14.2 G/DL (ref 14–18)
HOROWITZ INDEX BLDA+IHG-RTO: 183 MM[HG]
HOROWITZ INDEX BLDA+IHG-RTO: 95 MM[HG]
IMM GRANULOCYTES # BLD AUTO: 0.09 K/UL (ref 0–0.11)
IMM GRANULOCYTES NFR BLD AUTO: 1.3 % (ref 0–0.9)
INR PPP: 2.9 (ref 0.87–1.13)
LACTATE BLD-SCNC: 1.8 MMOL/L (ref 0.5–2)
LDLC SERPL CALC-MCNC: 55 MG/DL
LYMPHOCYTES # BLD AUTO: 1.32 K/UL (ref 1–4.8)
LYMPHOCYTES NFR BLD: 18.6 % (ref 22–41)
MAGNESIUM SERPL-MCNC: 1.8 MG/DL (ref 1.5–2.5)
MCH RBC QN AUTO: 22.5 PG (ref 27–33)
MCHC RBC AUTO-ENTMCNC: 26 G/DL (ref 33.7–35.3)
MCV RBC AUTO: 86.6 FL (ref 81.4–97.8)
MODE IMODE: ABNORMAL
MONOCYTES # BLD AUTO: 0.73 K/UL (ref 0–0.85)
MONOCYTES NFR BLD AUTO: 10.3 % (ref 0–13.4)
NEUTROPHILS # BLD AUTO: 4.87 K/UL (ref 1.82–7.42)
NEUTROPHILS NFR BLD: 68.7 % (ref 44–72)
NRBC # BLD AUTO: 0.21 K/UL
NRBC BLD-RTO: 3 /100 WBC
NT-PROBNP SERPL IA-MCNC: 1914 PG/ML (ref 0–125)
O2/TOTAL GAS SETTING VFR VENT: 30 %
O2/TOTAL GAS SETTING VFR VENT: 60 %
PCO2 BLDA: 131 MMHG (ref 26–37)
PCO2 BLDA: 54.1 MMHG (ref 26–37)
PCO2 BLDA: >100 MMHG (ref 26–37)
PCO2 TEMP ADJ BLDA: 53.3 MMHG (ref 26–37)
PCO2 TEMP ADJ BLDA: >100 MMHG (ref 26–37)
PEEP END EXPIRATORY PRESSURE IPEEP: 12 CMH20
PH BLDA: 7.11 [PH] (ref 7.4–7.5)
PH BLDA: 7.16 [PH] (ref 7.4–7.5)
PH BLDA: 7.41 [PH] (ref 7.4–7.5)
PH TEMP ADJ BLDA: 7.16 [PH] (ref 7.4–7.5)
PH TEMP ADJ BLDA: 7.42 [PH] (ref 7.4–7.5)
PLATELET # BLD AUTO: 217 K/UL (ref 164–446)
PMV BLD AUTO: 9.8 FL (ref 9–12.9)
PO2 BLDA: 55 MMHG (ref 64–87)
PO2 BLDA: 57 MMHG (ref 64–87)
PO2 BLDA: 68.7 MMHG (ref 64–87)
PO2 TEMP ADJ BLDA: 53 MMHG (ref 64–87)
PO2 TEMP ADJ BLDA: 55 MMHG (ref 64–87)
POTASSIUM SERPL-SCNC: 5.3 MMOL/L (ref 3.6–5.5)
PROTHROMBIN TIME: 29.5 SEC (ref 12–14.6)
RBC # BLD AUTO: 6.32 M/UL (ref 4.7–6.1)
SAO2 % BLDA: 77 % (ref 93–99)
SAO2 % BLDA: 88 % (ref 93–99)
SAO2 % BLDA: 88.3 % (ref 93–99)
SODIUM SERPL-SCNC: 137 MMOL/L (ref 135–145)
SPECIMEN DRAWN FROM PATIENT: ABNORMAL
SPECIMEN DRAWN FROM PATIENT: ABNORMAL
TIDAL VOLUME IVT: 540 ML
TRIGL SERPL-MCNC: 95 MG/DL (ref 0–149)
WBC # BLD AUTO: 7.1 K/UL (ref 4.8–10.8)

## 2021-11-03 PROCEDURE — 93005 ELECTROCARDIOGRAM TRACING: CPT | Performed by: STUDENT IN AN ORGANIZED HEALTH CARE EDUCATION/TRAINING PROGRAM

## 2021-11-03 PROCEDURE — 94660 CPAP INITIATION&MGMT: CPT

## 2021-11-03 PROCEDURE — 80048 BASIC METABOLIC PNL TOTAL CA: CPT

## 2021-11-03 PROCEDURE — 83735 ASSAY OF MAGNESIUM: CPT

## 2021-11-03 PROCEDURE — 83036 HEMOGLOBIN GLYCOSYLATED A1C: CPT

## 2021-11-03 PROCEDURE — 700101 HCHG RX REV CODE 250: Performed by: INTERNAL MEDICINE

## 2021-11-03 PROCEDURE — 83605 ASSAY OF LACTIC ACID: CPT

## 2021-11-03 PROCEDURE — 302140 GU CART: Performed by: INTERNAL MEDICINE

## 2021-11-03 PROCEDURE — 93010 ELECTROCARDIOGRAM REPORT: CPT | Performed by: INTERNAL MEDICINE

## 2021-11-03 PROCEDURE — 31500 INSERT EMERGENCY AIRWAY: CPT

## 2021-11-03 PROCEDURE — 02HV33Z INSERTION OF INFUSION DEVICE INTO SUPERIOR VENA CAVA, PERCUTANEOUS APPROACH: ICD-10-PCS | Performed by: INTERNAL MEDICINE

## 2021-11-03 PROCEDURE — 700105 HCHG RX REV CODE 258: Performed by: INTERNAL MEDICINE

## 2021-11-03 PROCEDURE — 36600 WITHDRAWAL OF ARTERIAL BLOOD: CPT

## 2021-11-03 PROCEDURE — 99292 CRITICAL CARE ADDL 30 MIN: CPT | Mod: 25 | Performed by: INTERNAL MEDICINE

## 2021-11-03 PROCEDURE — A9270 NON-COVERED ITEM OR SERVICE: HCPCS | Performed by: HOSPITALIST

## 2021-11-03 PROCEDURE — 71045 X-RAY EXAM CHEST 1 VIEW: CPT

## 2021-11-03 PROCEDURE — A9270 NON-COVERED ITEM OR SERVICE: HCPCS | Performed by: GENERAL PRACTICE

## 2021-11-03 PROCEDURE — 700111 HCHG RX REV CODE 636 W/ 250 OVERRIDE (IP)

## 2021-11-03 PROCEDURE — 0BH18EZ INSERTION OF ENDOTRACHEAL AIRWAY INTO TRACHEA, VIA NATURAL OR ARTIFICIAL OPENING ENDOSCOPIC: ICD-10-PCS | Performed by: INTERNAL MEDICINE

## 2021-11-03 PROCEDURE — 700102 HCHG RX REV CODE 250 W/ 637 OVERRIDE(OP): Performed by: STUDENT IN AN ORGANIZED HEALTH CARE EDUCATION/TRAINING PROGRAM

## 2021-11-03 PROCEDURE — 700102 HCHG RX REV CODE 250 W/ 637 OVERRIDE(OP): Performed by: GENERAL PRACTICE

## 2021-11-03 PROCEDURE — 94003 VENT MGMT INPAT SUBQ DAY: CPT

## 2021-11-03 PROCEDURE — 83880 ASSAY OF NATRIURETIC PEPTIDE: CPT

## 2021-11-03 PROCEDURE — 0T9B80Z DRAINAGE OF BLADDER WITH DRAINAGE DEVICE, VIA NATURAL OR ARTIFICIAL OPENING ENDOSCOPIC: ICD-10-PCS | Performed by: UROLOGY

## 2021-11-03 PROCEDURE — 700111 HCHG RX REV CODE 636 W/ 250 OVERRIDE (IP): Performed by: STUDENT IN AN ORGANIZED HEALTH CARE EDUCATION/TRAINING PROGRAM

## 2021-11-03 PROCEDURE — 700111 HCHG RX REV CODE 636 W/ 250 OVERRIDE (IP): Performed by: INTERNAL MEDICINE

## 2021-11-03 PROCEDURE — 99233 SBSQ HOSP IP/OBS HIGH 50: CPT | Performed by: GENERAL PRACTICE

## 2021-11-03 PROCEDURE — 85025 COMPLETE CBC W/AUTO DIFF WBC: CPT

## 2021-11-03 PROCEDURE — 80061 LIPID PANEL: CPT

## 2021-11-03 PROCEDURE — 700102 HCHG RX REV CODE 250 W/ 637 OVERRIDE(OP): Performed by: HOSPITALIST

## 2021-11-03 PROCEDURE — 82803 BLOOD GASES ANY COMBINATION: CPT

## 2021-11-03 PROCEDURE — 700111 HCHG RX REV CODE 636 W/ 250 OVERRIDE (IP): Performed by: GENERAL PRACTICE

## 2021-11-03 PROCEDURE — 99291 CRITICAL CARE FIRST HOUR: CPT | Mod: 25 | Performed by: INTERNAL MEDICINE

## 2021-11-03 PROCEDURE — 94799 UNLISTED PULMONARY SVC/PX: CPT

## 2021-11-03 PROCEDURE — 92950 HEART/LUNG RESUSCITATION CPR: CPT

## 2021-11-03 PROCEDURE — 31500 INSERT EMERGENCY AIRWAY: CPT | Performed by: INTERNAL MEDICINE

## 2021-11-03 PROCEDURE — C1751 CATH, INF, PER/CENT/MIDLINE: HCPCS

## 2021-11-03 PROCEDURE — A9270 NON-COVERED ITEM OR SERVICE: HCPCS | Performed by: STUDENT IN AN ORGANIZED HEALTH CARE EDUCATION/TRAINING PROGRAM

## 2021-11-03 PROCEDURE — 94002 VENT MGMT INPAT INIT DAY: CPT

## 2021-11-03 PROCEDURE — 5A1945Z RESPIRATORY VENTILATION, 24-96 CONSECUTIVE HOURS: ICD-10-PCS | Performed by: INTERNAL MEDICINE

## 2021-11-03 PROCEDURE — 99152 MOD SED SAME PHYS/QHP 5/>YRS: CPT

## 2021-11-03 PROCEDURE — 770022 HCHG ROOM/CARE - ICU (200)

## 2021-11-03 PROCEDURE — 82140 ASSAY OF AMMONIA: CPT

## 2021-11-03 PROCEDURE — A9270 NON-COVERED ITEM OR SERVICE: HCPCS | Performed by: INTERNAL MEDICINE

## 2021-11-03 PROCEDURE — 700102 HCHG RX REV CODE 250 W/ 637 OVERRIDE(OP): Performed by: INTERNAL MEDICINE

## 2021-11-03 PROCEDURE — B548ZZA ULTRASONOGRAPHY OF SUPERIOR VENA CAVA, GUIDANCE: ICD-10-PCS | Performed by: INTERNAL MEDICINE

## 2021-11-03 PROCEDURE — 85610 PROTHROMBIN TIME: CPT

## 2021-11-03 RX ORDER — FUROSEMIDE 10 MG/ML
80 INJECTION INTRAMUSCULAR; INTRAVENOUS 3 TIMES DAILY
Status: DISCONTINUED | OUTPATIENT
Start: 2021-11-03 | End: 2021-11-03

## 2021-11-03 RX ORDER — DEXMEDETOMIDINE HYDROCHLORIDE 4 UG/ML
.1-1.2 INJECTION, SOLUTION INTRAVENOUS CONTINUOUS
Status: DISCONTINUED | OUTPATIENT
Start: 2021-11-03 | End: 2021-11-06

## 2021-11-03 RX ORDER — FUROSEMIDE 10 MG/ML
80 INJECTION INTRAMUSCULAR; INTRAVENOUS ONCE
Status: COMPLETED | OUTPATIENT
Start: 2021-11-03 | End: 2021-11-03

## 2021-11-03 RX ORDER — MIDAZOLAM HYDROCHLORIDE 1 MG/ML
INJECTION INTRAMUSCULAR; INTRAVENOUS
Status: DISCONTINUED
Start: 2021-11-03 | End: 2021-11-03

## 2021-11-03 RX ORDER — FAMOTIDINE 20 MG/1
20 TABLET, FILM COATED ORAL EVERY 12 HOURS
Status: DISCONTINUED | OUTPATIENT
Start: 2021-11-03 | End: 2021-11-05

## 2021-11-03 RX ORDER — FUROSEMIDE 40 MG/1
80 TABLET ORAL ONCE
Status: DISCONTINUED | OUTPATIENT
Start: 2021-11-03 | End: 2021-11-03 | Stop reason: CLARIF

## 2021-11-03 RX ORDER — M-VIT,TX,IRON,MINS/CALC/FOLIC 27MG-0.4MG
1 TABLET ORAL EVERY MORNING
Status: DISCONTINUED | OUTPATIENT
Start: 2021-11-04 | End: 2021-11-06

## 2021-11-03 RX ORDER — WARFARIN SODIUM 7.5 MG/1
7.5 TABLET ORAL DAILY
Status: DISCONTINUED | OUTPATIENT
Start: 2021-11-03 | End: 2021-11-04

## 2021-11-03 RX ORDER — AMOXICILLIN 250 MG
2 CAPSULE ORAL 2 TIMES DAILY
Status: DISCONTINUED | OUTPATIENT
Start: 2021-11-03 | End: 2021-11-06

## 2021-11-03 RX ORDER — MIDAZOLAM HYDROCHLORIDE 1 MG/ML
2 INJECTION INTRAMUSCULAR; INTRAVENOUS ONCE
Status: ACTIVE | OUTPATIENT
Start: 2021-11-03 | End: 2021-11-04

## 2021-11-03 RX ORDER — POLYETHYLENE GLYCOL 3350 17 G/17G
1 POWDER, FOR SOLUTION ORAL
Status: DISCONTINUED | OUTPATIENT
Start: 2021-11-03 | End: 2021-11-06

## 2021-11-03 RX ORDER — CLONIDINE HYDROCHLORIDE 0.1 MG/1
0.1 TABLET ORAL EVERY 6 HOURS PRN
Status: DISCONTINUED | OUTPATIENT
Start: 2021-11-03 | End: 2021-11-06

## 2021-11-03 RX ORDER — NYSTATIN 100000 [USP'U]/G
POWDER TOPICAL 2 TIMES DAILY
Status: DISPENSED | OUTPATIENT
Start: 2021-11-03 | End: 2021-11-10

## 2021-11-03 RX ORDER — BISACODYL 10 MG
10 SUPPOSITORY, RECTAL RECTAL
Status: DISCONTINUED | OUTPATIENT
Start: 2021-11-03 | End: 2021-11-06

## 2021-11-03 RX ORDER — ACETAMINOPHEN 325 MG/1
650 TABLET ORAL EVERY 6 HOURS PRN
Status: DISCONTINUED | OUTPATIENT
Start: 2021-11-03 | End: 2021-11-06

## 2021-11-03 RX ORDER — ROCURONIUM BROMIDE 10 MG/ML
200 INJECTION, SOLUTION INTRAVENOUS ONCE
Status: COMPLETED | OUTPATIENT
Start: 2021-11-03 | End: 2021-11-03

## 2021-11-03 RX ORDER — FUROSEMIDE 10 MG/ML
60 INJECTION INTRAMUSCULAR; INTRAVENOUS
Status: DISCONTINUED | OUTPATIENT
Start: 2021-11-03 | End: 2021-11-06

## 2021-11-03 RX ORDER — ETOMIDATE 2 MG/ML
20 INJECTION INTRAVENOUS ONCE
Status: COMPLETED | OUTPATIENT
Start: 2021-11-03 | End: 2021-11-03

## 2021-11-03 RX ORDER — ONDANSETRON 4 MG/1
4 TABLET, ORALLY DISINTEGRATING ORAL EVERY 4 HOURS PRN
Status: DISCONTINUED | OUTPATIENT
Start: 2021-11-03 | End: 2021-11-06

## 2021-11-03 RX ORDER — MAGNESIUM SULFATE HEPTAHYDRATE 40 MG/ML
4 INJECTION, SOLUTION INTRAVENOUS ONCE
Status: COMPLETED | OUTPATIENT
Start: 2021-11-03 | End: 2021-11-03

## 2021-11-03 RX ADMIN — ETOMIDATE 20 MG: 2 INJECTION INTRAVENOUS at 09:06

## 2021-11-03 RX ADMIN — FENTANYL CITRATE 100 MCG: 50 INJECTION, SOLUTION INTRAMUSCULAR; INTRAVENOUS at 12:34

## 2021-11-03 RX ADMIN — MULTIPLE VITAMINS W/ MINERALS TAB 1 TABLET: TAB at 05:08

## 2021-11-03 RX ADMIN — FENTANYL CITRATE 100 MCG: 50 INJECTION, SOLUTION INTRAMUSCULAR; INTRAVENOUS at 17:19

## 2021-11-03 RX ADMIN — DEXMEDETOMIDINE 0.8 MCG/KG/HR: 200 INJECTION, SOLUTION INTRAVENOUS at 23:49

## 2021-11-03 RX ADMIN — FUROSEMIDE 80 MG: 10 INJECTION, SOLUTION INTRAMUSCULAR; INTRAVENOUS at 06:22

## 2021-11-03 RX ADMIN — LISINOPRIL 5 MG: 5 TABLET ORAL at 05:08

## 2021-11-03 RX ADMIN — FENTANYL CITRATE 100 MCG: 50 INJECTION, SOLUTION INTRAMUSCULAR; INTRAVENOUS at 15:54

## 2021-11-03 RX ADMIN — SENNOSIDES AND DOCUSATE SODIUM 2 TABLET: 50; 8.6 TABLET ORAL at 17:19

## 2021-11-03 RX ADMIN — WARFARIN SODIUM 7.5 MG: 7.5 TABLET ORAL at 17:20

## 2021-11-03 RX ADMIN — FENTANYL CITRATE 100 MCG: 50 INJECTION, SOLUTION INTRAMUSCULAR; INTRAVENOUS at 14:46

## 2021-11-03 RX ADMIN — ROCURONIUM BROMIDE 200 MG: 10 INJECTION, SOLUTION INTRAVENOUS at 09:06

## 2021-11-03 RX ADMIN — DEXMEDETOMIDINE 0.8 MCG/KG/HR: 200 INJECTION, SOLUTION INTRAVENOUS at 18:46

## 2021-11-03 RX ADMIN — FUROSEMIDE 60 MG: 10 INJECTION, SOLUTION INTRAMUSCULAR; INTRAVENOUS at 13:00

## 2021-11-03 RX ADMIN — FENTANYL CITRATE 100 MCG: 50 INJECTION, SOLUTION INTRAMUSCULAR; INTRAVENOUS at 22:46

## 2021-11-03 RX ADMIN — NYSTATIN: 100000 POWDER TOPICAL at 18:15

## 2021-11-03 RX ADMIN — FUROSEMIDE 60 MG: 10 INJECTION, SOLUTION INTRAMUSCULAR; INTRAVENOUS at 18:16

## 2021-11-03 RX ADMIN — FENTANYL CITRATE 100 MCG: 50 INJECTION, SOLUTION INTRAMUSCULAR; INTRAVENOUS at 10:39

## 2021-11-03 RX ADMIN — DEXMEDETOMIDINE 0.8 MCG/KG/HR: 200 INJECTION, SOLUTION INTRAVENOUS at 22:21

## 2021-11-03 RX ADMIN — DEXMEDETOMIDINE 0.2 MCG/KG/HR: 200 INJECTION, SOLUTION INTRAVENOUS at 09:40

## 2021-11-03 RX ADMIN — MIDAZOLAM HYDROCHLORIDE 2 MG: 1 INJECTION, SOLUTION INTRAMUSCULAR; INTRAVENOUS at 16:20

## 2021-11-03 RX ADMIN — DEXMEDETOMIDINE 0.8 MCG/KG/HR: 200 INJECTION, SOLUTION INTRAVENOUS at 14:45

## 2021-11-03 RX ADMIN — MAGNESIUM SULFATE HEPTAHYDRATE 4 G: 40 INJECTION, SOLUTION INTRAVENOUS at 13:01

## 2021-11-03 RX ADMIN — FUROSEMIDE 40 MG: 10 INJECTION, SOLUTION INTRAMUSCULAR; INTRAVENOUS at 05:08

## 2021-11-03 RX ADMIN — FAMOTIDINE 20 MG: 20 TABLET ORAL at 17:19

## 2021-11-03 RX ADMIN — NYSTATIN: 100000 POWDER TOPICAL at 13:00

## 2021-11-03 ASSESSMENT — PAIN DESCRIPTION - PAIN TYPE
TYPE: ACUTE PAIN

## 2021-11-03 ASSESSMENT — ENCOUNTER SYMPTOMS: SHORTNESS OF BREATH: 1

## 2021-11-03 ASSESSMENT — PULMONARY FUNCTION TESTS: EPAP_CMH2O: 8

## 2021-11-03 ASSESSMENT — FIBROSIS 4 INDEX: FIB4 SCORE: 1.55

## 2021-11-03 NOTE — PROGRESS NOTES
4 Eyes Skin Assessment Completed by JOANNA Roberts and JOANNA Broderick.    Head WDL  Ears WDL  Nose WDL  Mouth Redness  Neck WDL  Breast/Chest Redness, Excoriation and Edema  Shoulder Blades Redness  Spine Redness  (R) Arm/Elbow/Hand Redness and Blanching  (L) Arm/Elbow/Hand Redness and Blanching  Abdomen Redness  Groin Redness, Excoriation and Swelling, moisture fissure, malodorous  Scrotum/Coccyx/Buttocks Redness, Excoriation and Moisture Fissure  (R) Leg Swelling and Edema, peeling, redness, calloused  (L) Leg Swelling and Edema, peeling, redness, calloused  (R) Heel/Foot/Toe Redness and Boggy, peeling calloused  (L) Heel/Foot/Toe Redness and Boggy, peeling, calloused          Devices In Places Tele Box, Pulse Ox and Oxy Mask      Interventions In Place Pillows    Possible Skin Injury No    Pictures Uploaded Into Epic Yes  Wound Consult Placed Yes  RN Wound Prevention Protocol Ordered Yes

## 2021-11-03 NOTE — PROCEDURES
Vascular Access Team     Date of Insertion: 11/3/2021  Arm Circumference: 45  Internal length: 55  External Length: 2  Vein Occupancy %: 45   Reason for PICC: critical care, access  Labs: WBC 7.1, , BUN 18, Cr 0.59, GFR >60, INR 2.90     Consents confirmed, vessel patency confirmed with ultrasound. Risks and benefits of procedure explained to friend verbally designated by patient to make decisions and education regarding central line associated bloodstream infections provided. Questions answered.      PICC placed in LUE per licensed provider order with ultrasound guidance.  5 Fr, 2 lumen PICC placed in basilic vein after 1 attempt(s). 2 mL of 1% lidocaine injected intradermally at the insertion site. A 21 gauge microintroducer needle and modified Seldinger technique was used to obtain access to the vein. 55 cm catheter inserted and brisk blood return was observed from each lumen upon aspiration. Line secured at the 2 cm marker. TCS stylet removed and observed to be fully intact. Each lumen flushed using pulsatile method without resistance with 10 mL 0.9% normal saline. PICC line secured with Biopatch and Tegaderm.     PICC tip placement location is confirmed by nurse to be in the Superior Vena Cava (SVC) utilizing 3CG technology. PICC line is appropriate for use at this time. Patient tolerated procedure well, without complications.  Patient condition relayed to primary RN or ordering physician via this post procedure note in the EMR.      Ultrasound images uploaded to PACS and viewable in the EMR - yes  Ultrasound imaged printed and placed in paper chart - no     "[employer]" Power PICC ref # [id number], Lot # [id number], Expiration Date 9/30/2022

## 2021-11-03 NOTE — PROGRESS NOTES
Inpatient Anticoagulation Service Note    Date: 11/3/2021    Reason for Anticoagulation: Pulmonary Embolism   Target INR: 2.0 to 3.0         Hemoglobin Value: 14.2  Hematocrit Value: (!) 54.7  Lab Platelet Value: 217    INR from last 7 days     Date/Time INR Value    21 0211 2.9    21 02:24:01 3.34    21 0900 3.86        Dose from last 7 days     Date/Time Dose (mg)    21 1330 7.5    21 1522 3.75    21 1212 0        Average Dose (mg):  (7.5 mg daily)  Significant Interactions: Not Applicable  Bridge Therapy: No     Reversal Agent Administered: Not Applicable  Comments: INR therapeutic, resume home dosing. Anticipate INR to trend down for another day or two. Patient intubated for airway protection with hypoventilation resulting in encephalopathy, will be on tube feeds which may alter warfarin effect. No bleeding noted. Daily INR.    Plan:  7.5mg  Education Material Provided?: Yes  Pharmacist suggested discharge dosin.5mg daily. INR within 72 hours of discharge.      Hardeep Loaiza, PharmD

## 2021-11-03 NOTE — CODE DOCUMENTATION
0543 RN on phone with MD DANIELA stated on wy 0546 NRB placed  Tempeture axiliary at 98.6  Rn asking Ao questions, patient very lethargic, AoX3

## 2021-11-03 NOTE — CONSULTS
Critical Care Medicine Faculty Consult Note    Brief HPI/Problem list:  31 Y m with morbid obesity consulted for hypercarbic respiratory failure. He is morbid obese BMI 69, hx of pe on chronic anticoagulation, untreated JONES, chronic venous stasis, eczema, lower exxt cellutlitis. Which was admitted 11/1 for abdominal swelling. He had a negative CTA this admit for pe, CT of his abdomen with some ascites and skin edema, his echo shows some pulmonary hypertension with RSVP 45, mild MR, LV ef 60%. This am 11/3 I was called for ams with a ph 7.1/130/90 on NRB. Patient would barely respond with sternal rub so history was limited. He was placed on Bipap for over 1hour with me consistently at bedside with still no improvement so was intubated.  Will d/c coreg and lisinopril.    Daily pertinent exam: severely obese male chronic ill appearing, dimished air movement, heart rrr, abdomen obese with pitting edema above belly button, ext with chronic stasis, no mottling, neuro deep sternal rub with minimal movement or groaning, later would awake look at me then fall back to sleep was spontaneosly moving all extremities but not follow formal testing.     A/P:  Hypercarbic mild hypoxic respiratory failure: failed one hour of bedside adjustment of bipap, ventilator bundle limit sedative peep 12, goal sat 85-88%, repeat ABG in 1 hour, SBT trials, diuresis lasix and diamox, mobilize    JONES;   Will need formal testing and Cpap/bipap this admission, limit all sedative, upright position at all time, ambulate    Encephalopathy: as above    Pulmonary hypertension: likely JONES and prior vte, force diuresis when able    Prior PE; continue warfarin per protocol    Ascites/ansarca: likely chronic pulmonary hypertension and right sided disease    Obesity: limit sedative will stack in tissues, will need outpatient follow up and palliative care consultation, nutrition consultation. Long term outcome poor.     Patient remains in critical condition  from bedside titration of Bipap for > 1 hour for prevention of his respiratory failure. Critical care time provided was 90 minutes. This excludes all separate billable procedures.     Please see [employer]/NP notes for additional documentation    Maurice Slaughter MD  Critical Care Medicine

## 2021-11-03 NOTE — PROGRESS NOTES
Received report from day shift RNAlejandra. Assumed care of pt. Pt reports no needs at this time. Updated pt on plan of care. Pt resting comfortably in bed. Educated on use of call light. Hourly rounding and continuous monitoring in place.

## 2021-11-03 NOTE — PROCEDURES
Intubation    Date/Time: 11/3/2021 9:26 AM  Performed by: Maurice Slaughter M.D.  Authorized by: Maurice Slaughter M.D.     Consent:     Consent obtained:  Emergent situation    Risks discussed:  Aspiration, bleeding, death and hypoxia    Alternatives discussed:  No treatment  Pre-procedure details:     Patient status:  Unresponsive    Mallampati score:  I    Pretreatment meds: etomidate.    Paralytics:  Rocuronium  Procedure details:     Preoxygenation:  BiPAP    CPR in progress: no      Intubation method:  Oral    Oral intubation technique:  Video-assisted    Laryngoscope type:  GlideScope    Laryngoscope blade:  Mac 4    Cormack-Lehane Classification:  Grade 1    Tube size (mm):  8.0    Tube type:  Cuffed    Number of attempts:  1    Ventilation between attempts: no      Cricoid pressure: no      Tube visualized through cords: yes    Placement assessment:     ETT to teeth:  25    Tube secured with:  ETT wick    Breath sounds:  Equal    Placement verification: chest rise, condensation and direct visualization      Chest x-ray findings: pending stat CXR.  Post-procedure details:     Patient tolerance of procedure:  Tolerated well, no immediate complications  Comments:      After failing a trial of Bipap for 70 minutes was intubated

## 2021-11-03 NOTE — PROGRESS NOTES
Hospital Medicine Daily Progress Note    Date of Service  11/3/2021    Chief Complaint  Michael Ontiveros is a 31 y.o. male admitted 11/1/2021 with bilateral lower extremity swelling    Hospital Course  This is a 31-year-old male with past medical history of pulmonary embolism and DVT on Coumadin, pulmonary hypertension, JONES, mild intermittent asthma, and morbid obesity with a BMI of 69 who was admitted on 11/1/2021 with bilateral lower extremity swelling.    Of note patient had a previous admission on 07/11-07/12 due to bilateral lower extremity edema.    CTA was performed negative for PE. CTAP, noted edema/questionable cellulitis, scrotal edema and a moderate hydrocele noted.    ECHO noted LVEF 60%, pulmonary hypertension with RV pressures of 45 mmHg with D-shaped left ventricle.  Patient's primary care physician has established him with an outpatient pulmonologist, first appointment is set for December 8th.  Patient started on aggressive IV diuresis.    Interval Problem Update  Patient was started on Lasix 40 mg 3 times daily yesterday bumped up from 40 mg twice daily.  Unfortunately patient did not improve. Notified by nursing staff and nocturnist, patient in respiratory distress and lethargy. Repeat chest x-ray noted worsening pleural effusions.  ABG noted pH of 7.11 and hypercapnia at 120.     Patient has no prior history of intubations or need for BiPAP in the past.    Patient will be transferred to the ICU for BiPAP and possibly intubation.    Call placed out to patient's significant other, voicemail left will update with plan when she returns phone call.    I have personally seen and examined the patient at bedside. I discussed the plan of care with patient and bedside RN.    Consultants/Specialty  critical care    Code Status  Full Code    Disposition  Patient is not medically cleared.   Anticipate discharge to to home with close outpatient follow-up.  I have placed the appropriate orders for  post-discharge needs.    Review of Systems  Review of Systems   Unable to perform ROS: Mental acuity   Respiratory: Positive for shortness of breath.    All other systems reviewed and are negative.       Physical Exam  Temp:  [36.2 °C (97.2 °F)-37.1 °C (98.7 °F)] 36.2 °C (97.2 °F)  Pulse:  [] 102  Resp:  [20-26] 22  BP: ()/(53-97) 136/97  SpO2:  [91 %-100 %] 96 %    Physical Exam  Vitals and nursing note reviewed.   Constitutional:       General: He is not in acute distress.     Appearance: Normal appearance.   HENT:      Head: Normocephalic and atraumatic.   Eyes:      Extraocular Movements: Extraocular movements intact.      Conjunctiva/sclera: Conjunctivae normal.      Pupils: Pupils are equal, round, and reactive to light.   Cardiovascular:      Rate and Rhythm: Normal rate and regular rhythm.      Pulses: Normal pulses.      Heart sounds: No murmur heard.   No friction rub. No gallop.    Pulmonary:      Effort: Pulmonary effort is normal. No respiratory distress.      Breath sounds: Normal breath sounds. No wheezing, rhonchi or rales.   Abdominal:      General: Bowel sounds are normal. There is no distension.      Palpations: Abdomen is soft.      Tenderness: There is no abdominal tenderness.   Genitourinary:     Comments: Scrotal swelling noted  Musculoskeletal:         General: No swelling or tenderness. Normal range of motion.      Cervical back: Normal range of motion and neck supple. No muscular tenderness.      Right lower leg: Edema (+3) present.      Left lower leg: Edema (+3) present.   Skin:     General: Skin is warm and dry.      Capillary Refill: Capillary refill takes less than 2 seconds.      Findings: No bruising, erythema or rash.   Neurological:      General: No focal deficit present.      Mental Status: He is alert and oriented to person, place, and time.         Fluids    Intake/Output Summary (Last 24 hours) at 11/3/2021 6903  Last data filed at 11/2/2021 1900  Gross per 24 hour    Intake --   Output 600 ml   Net -600 ml       Laboratory  Recent Labs     11/01/21  0900 11/02/21 0224 11/03/21  0559   WBC 7.2 7.8 7.1   RBC 6.58* 6.41* 6.32*   HEMOGLOBIN 14.5 14.4 14.2   HEMATOCRIT 55.0* 55.3* 54.7*   MCV 83.6 86.3 86.6   MCH 22.0* 22.5* 22.5*   MCHC 26.4* 26.0* 26.0*   RDW 68.8* 70.0* 70.5*   PLATELETCT 231 265 217   MPV 10.1 11.3 9.8     Recent Labs     11/01/21 0900 11/02/21 0224 11/03/21 0211   SODIUM 139 139 137   POTASSIUM 4.8 5.0 5.3   CHLORIDE 101 99 97   CO2 28 33 32   GLUCOSE 102* 105* 104*   BUN 20 17 18   CREATININE 0.54 0.76 0.59   CALCIUM 9.3 8.2* 9.1     Recent Labs     11/01/21  0900 11/02/21 0224 11/03/21 0211   APTT 42.8*  --   --    INR 3.86* 3.34* 2.90*         Recent Labs     11/03/21 0211   TRIGLYCERIDE 95   HDL 16*   LDL 55       Imaging  DX-CHEST-LIMITED (1 VIEW)   Final Result      Worsening diffuse hazy opacity compatible with edema or infection      EC-ECHOCARDIOGRAM COMPLETE W/ CONT   Final Result      CT-ABDOMEN-PELVIS WITH   Final Result         Limited exam due to body habitus.      1. There is skin thickening and subcutaneous edema in the low anterior abdominal and pelvic wall panus. This could relate to fluid overload but infection and cellulitis can have similar appearance. Correlate clinically.      2. Mild perihepatic ascites.      3. Diffuse scrotal edema and moderate hydrocele.         CT-CTA CHEST PULMONARY ARTERY W/ RECONS   Final Result         1. No CT evidence of pulmonary embolism.      2. Mild hazy groundglass opacity throughout both lungs could relate to mild fluid overload.      3. Cardiomegaly.      4. No airspace consolidation. Linear right midlung atelectasis.         DX-CHEST-PORTABLE (1 VIEW)   Final Result         Stable cardiomegaly.      Mild interstitial prominence could relate to mild fluid overload.           Assessment/Plan  * Pulmonary hypertension (HCC)- (present on admission)  Assessment & Plan  Noted in the past  Noted on  repeat echo, RV pressure of 45mmHg with D shaped ventricle.  Patient's primary care physician has established with an outpatient pulmonologist patient has a initial appointment on December 8    Volume overload- (present on admission)  Assessment & Plan  Elevated BNP 3524 on admission  Echo noted LVEF 60%, pulmonary hypertension and RV overload  Diuresing with IV Lasix  Started on carvedilol and lisinopril      Acute respiratory failure with hypoxia (HCC)  Assessment & Plan  Secondary to pulmonary hypertension and RV overload    Cardiomegaly- (present on admission)  Assessment & Plan  Chronic, noted on chest x-rays in the past as well  Echo noted pulmonary hypertension and RV overload    Scrotal swelling- (present on admission)  Assessment & Plan  CT shows edema, could not rule out cellulitis -although cellulitis is low on the differential at this time    History of pulmonary embolism- (present on admission)  Assessment & Plan  Continue warfarin    Chronic anticoagulation- (present on admission)  Assessment & Plan  Has hypercoagulable disease  History of PE and DVT, had a trial on Xarelto developed DVT, Eliquis is too costly  Continue warfarin    JONES (obstructive sleep apnea)- (present on admission)  Assessment & Plan  Likely contributor to pulmonary hypertension    Lower extremity edema- (present on admission)  Assessment & Plan  Due to volume overload  Bilateral symmetrical, on warfarin  Low suspicion for DVT or cellulitis    Morbid obesity (HCC)- (present on admission)  Assessment & Plan  Body mass index is 69.25 kg/m².    Asthma- (present on admission)  Assessment & Plan  Not in exacerbation  Continue inhalers       VTE prophylaxis: SCDs/TEDs and therapeutic anticoagulation with Coumadin    I have performed a physical exam and reviewed and updated ROS and Plan today (11/3/2021). In review of yesterday's note (11/2/2021), there are no changes except as documented above.

## 2021-11-03 NOTE — PROGRESS NOTES
Cortrak Placement    Tube Team verified patient name and medical record number prior to tube placement.  Cortrak tube (43 inches, 10 [place]) placed at 70 cm in right nare.  Per Cortrak picture, tube appears to be in the stomach.  Nursing Instructions: Awaiting KUB to confirm placement before use for medications or feeding. Once placement confirmed, flush tube with 30 ml of water, and then remove and save stylet, in patient medication drawer.

## 2021-11-03 NOTE — PROGRESS NOTES
Hospital Medicine Daily Progress Note    Date of Service  11/2/2021    Chief Complaint  Michael Ontiveros is a 31 y.o. male admitted 11/1/2021 with bilateral lower extremity swelling    Hospital Course  This is a 31-year-old male with past medical history of pulmonary embolism and DVT on Coumadin, pulmonary hypertension, JONES, mild intermittent asthma, and morbid obesity with a BMI of 69 who was admitted on 11/1/2021 with bilateral lower extremity swelling.    Of note patient had a previous admission on 07/11-07/12 due to bilateral lower extremity edema.    CTA was performed negative for PE. CTAP, noted edema/questionable cellulitis, scrotal edema and a moderate hydrocele noted.    ECHO noted LVEF 60%, pulmonary hypertension with RV pressures of 45 mmHg with D-shaped left ventricle.  Patient's primary care physician has established him with an outpatient pulmonologist, first appointment is set for December 8th.  Patient started on aggressive IV diuresis.    Interval Problem Update  ECHO noted LVEF 60%, pulmonary hypertension with RV pressures of 45 mmHg with D-shaped left ventricle.  Patient's primary care physician has established him with an outpatient pulmonologist, first appointment is set for December 8th.  Patient started on aggressive IV diuresis.    I have personally seen and examined the patient at bedside. I discussed the plan of care with patient and bedside RN.    Consultants/Specialty  None    Code Status  Full Code    Disposition  Patient is not medically cleared.   Anticipate discharge to to home with close outpatient follow-up.  I have placed the appropriate orders for post-discharge needs.    Review of Systems  Review of Systems   Respiratory: Positive for shortness of breath.    All other systems reviewed and are negative.       Physical Exam  Temp:  [36.3 °C (97.3 °F)-37.1 °C (98.7 °F)] 36.3 °C (97.3 °F)  Pulse:  [76-94] 90  Resp:  [18-20] 20  BP: (109-146)/(59-85) 114/74  SpO2:  [92 %-96 %]  93 %    Physical Exam  Vitals and nursing note reviewed.   Constitutional:       General: He is not in acute distress.     Appearance: Normal appearance.   HENT:      Head: Normocephalic and atraumatic.   Eyes:      Extraocular Movements: Extraocular movements intact.      Conjunctiva/sclera: Conjunctivae normal.      Pupils: Pupils are equal, round, and reactive to light.   Cardiovascular:      Rate and Rhythm: Normal rate and regular rhythm.      Pulses: Normal pulses.      Heart sounds: No murmur heard.   No friction rub. No gallop.    Pulmonary:      Effort: Pulmonary effort is normal. No respiratory distress.      Breath sounds: Normal breath sounds. No wheezing, rhonchi or rales.   Abdominal:      General: Bowel sounds are normal. There is no distension.      Palpations: Abdomen is soft.      Tenderness: There is no abdominal tenderness.   Genitourinary:     Comments: Scrotal swelling noted  Musculoskeletal:         General: No swelling or tenderness. Normal range of motion.      Cervical back: Normal range of motion and neck supple. No muscular tenderness.      Right lower leg: Edema (+3) present.      Left lower leg: Edema (+3) present.   Skin:     General: Skin is warm and dry.      Capillary Refill: Capillary refill takes less than 2 seconds.      Findings: No bruising, erythema or rash.   Neurological:      General: No focal deficit present.      Mental Status: He is alert and oriented to person, place, and time.         Fluids  No intake or output data in the 24 hours ending 11/02/21 1713    Laboratory  Recent Labs     11/01/21  0900 11/02/21  0224   WBC 7.2 7.8   RBC 6.58* 6.41*   HEMOGLOBIN 14.5 14.4   HEMATOCRIT 55.0* 55.3*   MCV 83.6 86.3   MCH 22.0* 22.5*   MCHC 26.4* 26.0*   RDW 68.8* 70.0*   PLATELETCT 231 265   MPV 10.1 11.3     Recent Labs     11/01/21  0900 11/02/21  0224   SODIUM 139 139   POTASSIUM 4.8 5.0   CHLORIDE 101 99   CO2 28 33   GLUCOSE 102* 105*   BUN 20 17   CREATININE 0.54 0.76    CALCIUM 9.3 8.2*     Recent Labs     11/01/21  0000 11/01/21  0900 11/02/21  0224   APTT  --  42.8*  --    INR 4.30* 3.86* 3.34*               Imaging  EC-ECHOCARDIOGRAM COMPLETE W/ CONT   Final Result      CT-ABDOMEN-PELVIS WITH   Final Result         Limited exam due to body habitus.      1. There is skin thickening and subcutaneous edema in the low anterior abdominal and pelvic wall panus. This could relate to fluid overload but infection and cellulitis can have similar appearance. Correlate clinically.      2. Mild perihepatic ascites.      3. Diffuse scrotal edema and moderate hydrocele.         CT-CTA CHEST PULMONARY ARTERY W/ RECONS   Final Result         1. No CT evidence of pulmonary embolism.      2. Mild hazy groundglass opacity throughout both lungs could relate to mild fluid overload.      3. Cardiomegaly.      4. No airspace consolidation. Linear right midlung atelectasis.         DX-CHEST-PORTABLE (1 VIEW)   Final Result         Stable cardiomegaly.      Mild interstitial prominence could relate to mild fluid overload.           Assessment/Plan  * Pulmonary hypertension (HCC)- (present on admission)  Assessment & Plan  Noted in the past  Noted on repeat echo, RV pressure of 45mmHg with D shaped ventricle.  Patient's primary care physician has established with an outpatient pulmonologist patient has a initial appointment on December 8    Volume overload- (present on admission)  Assessment & Plan  Elevated BNP 3524 on admission  Echo noted LVEF 60%, pulmonary hypertension and RV overload  Diuresing with IV Lasix  Started on carvedilol and lisinopril      Acute respiratory failure with hypoxia (HCC)  Assessment & Plan  Secondary to pulmonary hypertension and RV overload    Cardiomegaly- (present on admission)  Assessment & Plan  Chronic, noted on chest x-rays in the past as well  Echo noted pulmonary hypertension and RV overload    Scrotal swelling- (present on admission)  Assessment & Plan  CT shows  edema, could not rule out cellulitis -although cellulitis is low on the differential at this time    History of pulmonary embolism- (present on admission)  Assessment & Plan  Continue warfarin    Chronic anticoagulation- (present on admission)  Assessment & Plan  Has hypercoagulable disease  History of PE and DVT, had a trial on Xarelto developed DVT, Eliquis is too costly  Continue warfarin    JONES (obstructive sleep apnea)- (present on admission)  Assessment & Plan  Likely contributor to pulmonary hypertension    Lower extremity edema- (present on admission)  Assessment & Plan  Due to volume overload  Bilateral symmetrical, on warfarin  Low suspicion for DVT or cellulitis    Morbid obesity (HCC)- (present on admission)  Assessment & Plan  Body mass index is 69.25 kg/m².    Asthma- (present on admission)  Assessment & Plan  Not in exacerbation  Continue inhalers       VTE prophylaxis: SCDs/TEDs and therapeutic anticoagulation with Coumadin    I have performed a physical exam and reviewed and updated ROS and Plan today (11/2/2021). In review of yesterday's note (11/1/2021), there are no changes except as documented above.

## 2021-11-03 NOTE — PROGRESS NOTES
Strong yeast-like odor and pt presenting with multiple areas of red, rash and excoriation to pannus/groin. Day team to be updated regarding wound consult needs and nystatin.

## 2021-11-03 NOTE — PROGRESS NOTES
[facility] progress note:    I was called to come to bedside for change in patient condition, patient was more hypoxic and lethargic than prior.  Patient admitted with history of PE and DVT on Coumadin, PAH, JONES, mild intermittent asthma, morbid obesity BMI 69, admitted 11/1/2021 with volume overload.  Patient has been receiving diuresis 40 mg IV Lasix 3 times daily, patient only had 500 cc output throughout the night.  He was not wearing a CPAP, patient reports it has been recommended to him in the past.  At the time of my arrival patient's oxygen saturations were improved, 96% on 15 L oxygen mask, heart rate 97 blood pressure 136/97, no increased work of breathing or distress.  EKG was being performed did not show any evidence of ST elevations or depressions, sinus rhythm with a lot of artifact.  Patient's mental status was already beginning to improve with repositioning as well as supplemental oxygen.  Chest x-ray shows worsening diffuse hazy opacity compatible with edema or infection.  Due to chest x-ray findings and only 500 cc output throughout the night I have doubled the patient's Lasix dose to 80 mg 3 times daily with a dose to be included now.  ABG pending. Additionally bedside RN will talk with RT to trial CPAP for a little while since he did not have it on throughout the night and did have a change of mental status.  Discussed with bedside RN and RN rapid team.    Bartolo Rosa M.D.

## 2021-11-03 NOTE — PROGRESS NOTES
808 PT Arrived to CICU with rapid team. Obtunded on arrival, BiPAP placed SpO2 < 90. SBP 90s. Neurologically opens eyes to sternal rub, does not follow commands.   IV started. Dr. Slaughter at bedside. Nasal airway placed.   1030 Difficulty placing russo cath. MD notifed, urology consult requested  Russo placement attempted X3 with physician at bedside for attempts after speaking with urology, not able to be placed.  1637 Urology PA at bedside, requested Dr. Ivey to come to bedside and place catheter.  1748 Catheter placed by Dr. Ivey.  Skin cleansed and dried, interdry placed in moisture damaged folds  Multiple family members updated over the phone.

## 2021-11-03 NOTE — CONSULTS
Critical Care Consultation    Date of consult: 11/3/2021    Referring Physician  Patricia Rogel M.D.    Reason for Consultation  Hypercarbic respiratory failure    History of Presenting Illness  Unfortunate 31M w/ hx of morbid obesity (BMI 69.25), untreated JONES, and pulmonary embolism on chronic anticoagulation was seen in consultation for acute hypercarbic respiratory failure. Patient was admitted to the inpatient telemetry [facility] on 11/1/2021 for abdominal swelling. Computed tomography of his abdomen depicted ascites. CTA chest was negative for pulmonary emboli. TTE demonstrated a LVEF of 60% w/ an RVSP of 45. His hospitalization was complicated this morning when he became increasingly lethargic and unresponsive. ABG depicted a markedly elevated pCO2 of 130 w/ pH of 7.1 and pO2 of 90 on NRB. The patient at the time of our evaluation was not following commands and barely withdrawing to pain. Transfer orders to ICU were entered. BiPAP was applied for over an hour on arrival to the unit under our direct supervision, however, the patient's mentation did not improve. Bedside echo revealed a marginally dilated RV. Repeat ABG revealed a modest improvement in pCO2 to 105. He was therefore intubated and placed on mechanical ventilation.    Code Status  Full Code    Review of Systems  Review of Systems   Unable to perform ROS: Acuity of condition       Past Medical History   has a past medical history of Asthma, Blood clotting disorder (HCC), Chickenpox, Eczema, Hypertension, Influenza, and Tonsillitis.    Surgical History   has a past surgical history that includes dental extraction(s) (11/3/2017).    Family History  family history includes Diabetes in his paternal aunt; Respiratory Disease in his mother.    Social History   reports that he has never smoked. He has never used smokeless tobacco. He reports previous alcohol use. He reports that he does not use drugs.    Medications  Home Medications     Reviewed by Ar SWENSON  Kim Hartman (Pharmacy Tech) on 11/01/21 at 1059  Med List Status: Complete   Medication Last Dose Status   acetaminophen (TYLENOL) 500 MG Tab 11/1/2021 Active   albuterol 108 (90 Base) MCG/ACT Aero Soln inhalation aerosol 11/1/2021 Active   enoxaparin (LOVENOX) 150 MG/ML Solution New RX Active   Multiple Vitamins-Minerals (MENS MULTIVITAMIN) Tab 2~3 days ago Active   warfarin (COUMADIN) 7.5 MG Tab 10/31/2021 Active              Current Facility-Administered Medications   Medication Dose Route Frequency Provider Last Rate Last Admin   • nystatin (MYCOSTATIN) powder   Topical BID Bartolo Rosa M.D.       • senna-docusate (PERICOLACE or SENOKOT S) 8.6-50 MG per tablet 2 Tablet  2 Tablet Oral BID PRN Patricia Rogel D.O.        And   • polyethylene glycol/lytes (MIRALAX) PACKET 1 Packet  1 Packet Oral QDAY PRN Patricia Rogel D.O.        And   • magnesium hydroxide (MILK OF MAGNESIA) suspension 30 mL  30 mL Oral QDAY PRN Patricia Rogel D.O.        And   • bisacodyl (DULCOLAX) suppository 10 mg  10 mg Rectal QDAY PRN Patricia Rogel D.O.       • MD Alert...Warfarin per Pharmacy   Other PHARMACY TO DOSE Barry Tabor M.D.       • therapeutic multivitamin-minerals (THERAGRAN-M) tablet 1 Tablet  1 Tablet Oral QAM Barry Tabor M.D.   1 Tablet at 11/03/21 0508   • albuterol inhaler 2 Puff  2 Puff Inhalation Q6HRS PRN Barry Tabor M.D.       • acetaminophen (Tylenol) tablet 650 mg  650 mg Oral Q6HRS PRN Barry Tabor M.D.       • Pharmacy Consult Request ...Pain Management Review 1 Each  1 Each Other PHARMACY TO DOSE Barry Tabor M.D.       • oxyCODONE immediate-release (ROXICODONE) tablet 2.5 mg  2.5 mg Oral Q3HRS PRN Barry Tabor M.D.        Or   • oxyCODONE immediate-release (ROXICODONE) tablet 5 mg  5 mg Oral Q3HRS PRN Barry Tabor M.D.        Or   • morphine (pf) 4 mg/mL injection 2 mg  2 mg Intravenous Q3HRS PRN Barry Tabor M.D.       • cloNIDine (CATAPRES) tablet 0.1 mg  0.1 mg Oral Q6HRS PRN Barry Tabor M.D.       •  enalaprilat (Vasotec) injection 1.25 mg 1 mL  1.25 mg Intravenous Q6HRS PRN Barry Tabor M.D.       • labetalol (NORMODYNE/TRANDATE) injection 10 mg  10 mg Intravenous Q4HRS PRN Barry Tabor M.D.       • ondansetron (ZOFRAN) syringe/vial injection 4 mg  4 mg Intravenous Q4HRS PRVANESSA Tabor M.D.       • ondansetron (ZOFRAN ODT) dispertab 4 mg  4 mg Oral Q4HRS PRN Barry Tabor M.D.       • promethazine (PHENERGAN) tablet 12.5-25 mg  12.5-25 mg Oral Q4HRS PRVANESSA Tabor M.D.       • promethazine (PHENERGAN) suppository 12.5-25 mg  12.5-25 mg Rectal Q4HRS PRVANESSA Tabor M.D.       • prochlorperazine (COMPAZINE) injection 5-10 mg  5-10 mg Intravenous Q4HRS PRVANESSA Tabor M.D.           Allergies  Allergies   Allergen Reactions   • [place] (Diagnostic) Anaphylaxis   • P[place] Anaphylaxis   • [place] Anaphylaxis   • Tree Nuts Food Allergy Vomiting and Nausea     Other nuts aside from almond, walnut, pistachio       Vital Signs last 24 hours  Temp:  [36.2 °C (97.2 °F)-37.1 °C (98.7 °F)] 36.2 °C (97.2 °F)  Pulse:  [] 102  Resp:  [20-26] 22  BP: ()/(53-97) 136/97  SpO2:  [91 %-100 %] 96 %    Physical Exam  Physical Exam  Vitals and nursing note reviewed.   Constitutional:       Appearance: He is obese. He is ill-appearing and diaphoretic.   HENT:      Head: Normocephalic and atraumatic.      Mouth/Throat:      Mouth: Mucous membranes are moist.      Pharynx: Oropharynx is clear.   Eyes:      Extraocular Movements: Extraocular movements intact.      Conjunctiva/sclera: Conjunctivae normal.      Pupils: Pupils are equal, round, and reactive to light.   Cardiovascular:      Rate and Rhythm: Normal rate and regular rhythm.      Pulses: Normal pulses.      Heart sounds: Normal heart sounds.   Pulmonary:      Effort: Pulmonary effort is normal.      Breath sounds: Normal breath sounds.   Abdominal:      Comments: Very large pannus, morbidly obese body habitus, not tender or distended   Musculoskeletal:      Comments:  Chronic venous stasis changes B/L lower extremities   Skin:     General: Skin is warm.      Capillary Refill: Capillary refill takes less than 2 seconds.   Neurological:      Comments: Unresponsive, not following commands         Fluids    Intake/Output Summary (Last 24 hours) at 11/3/2021 0746  Last data filed at 11/2/2021 1900  Gross per 24 hour   Intake --   Output 600 ml   Net -600 ml       Laboratory  Recent Results (from the past 48 hour(s))   CBC WITH DIFFERENTIAL    Collection Time: 11/01/21  9:00 AM   Result Value Ref Range    WBC 7.2 4.8 - 10.8 K/uL    RBC 6.58 (H) 4.70 - 6.10 M/uL    Hemoglobin 14.5 14.0 - 18.0 g/dL    Hematocrit 55.0 (H) 42.0 - 52.0 %    MCV 83.6 81.4 - 97.8 fL    MCH 22.0 (L) 27.0 - 33.0 pg    MCHC 26.4 (L) 33.7 - 35.3 g/dL    RDW 68.8 (H) 35.9 - 50.0 fL    Platelet Count 231 164 - 446 K/uL    MPV 10.1 9.0 - 12.9 fL    Neutrophils-Polys 66.10 44.00 - 72.00 %    Lymphocytes 23.10 22.00 - 41.00 %    Monocytes 9.00 0.00 - 13.40 %    Eosinophils 0.10 0.00 - 6.90 %    Basophils 1.00 0.00 - 1.80 %    Immature Granulocytes 0.70 0.00 - 0.90 %    Nucleated RBC 1.50 /100 WBC    Neutrophils (Absolute) 4.73 1.82 - 7.42 K/uL    Lymphs (Absolute) 1.65 1.00 - 4.80 K/uL    Monos (Absolute) 0.64 0.00 - 0.85 K/uL    Eos (Absolute) 0.01 0.00 - 0.51 K/uL    Baso (Absolute) 0.07 0.00 - 0.12 K/uL    Immature Granulocytes (abs) 0.05 0.00 - 0.11 K/uL    NRBC (Absolute) 0.11 K/uL    Anisocytosis 3+ (A)     Microcytosis 3+ (A)    COMP METABOLIC PANEL    Collection Time: 11/01/21  9:00 AM   Result Value Ref Range    Sodium 139 135 - 145 mmol/L    Potassium 4.8 3.6 - 5.5 mmol/L    Chloride 101 96 - 112 mmol/L    Co2 28 20 - 33 mmol/L    Anion Gap 10.0 7.0 - 16.0    Glucose 102 (H) 65 - 99 mg/dL    Bun 20 8 - 22 mg/dL    Creatinine 0.54 0.50 - 1.40 mg/dL    Calcium 9.3 8.5 - 10.5 mg/dL    AST(SGOT) 97 (H) 12 - 45 U/L    ALT(SGPT) 80 (H) 2 - 50 U/L    Alkaline Phosphatase 75 30 - 99 U/L    Total Bilirubin 1.3 0.1 -  1.5 mg/dL    Albumin 4.0 3.2 - 4.9 g/dL    Total Protein 7.3 6.0 - 8.2 g/dL    Globulin 3.3 1.9 - 3.5 g/dL    A-G Ratio 1.2 g/dL   proBrain Natriuretic Peptide, NT    Collection Time: 11/01/21  9:00 AM   Result Value Ref Range    NT-proBNP 3524 (H) 0 - 125 pg/mL   TROPONIN    Collection Time: 11/01/21  9:00 AM   Result Value Ref Range    Troponin T 19 6 - 19 ng/L   PT/INR    Collection Time: 11/01/21  9:00 AM   Result Value Ref Range    PT 36.7 (H) 12.0 - 14.6 sec    INR 3.86 (H) 0.87 - 1.13   PTT    Collection Time: 11/01/21  9:00 AM   Result Value Ref Range    APTT 42.8 (H) 24.7 - 36.0 sec   COV-2, FLU A/B, AND RSV BY PCR (2-4 HOURS CEPHEID): Collect NP swab in VTM    Collection Time: 11/01/21  9:00 AM    Specimen: Nasopharyngeal; Respirate   Result Value Ref Range    Influenza virus A RNA Negative Negative    Influenza virus B, PCR Negative Negative    RSV, PCR Negative Negative    SARS-CoV-2 by PCR NotDetected     SARS-CoV-2 Source NP Swab    ESTIMATED GFR    Collection Time: 11/01/21  9:00 AM   Result Value Ref Range    GFR If African American >60 >60 mL/min/1.73 m 2    GFR If Non African American >60 >60 mL/min/1.73 m 2   PERIPHERAL SMEAR REVIEW    Collection Time: 11/01/21  9:00 AM   Result Value Ref Range    Peripheral Smear Review see below    PLATELET ESTIMATE    Collection Time: 11/01/21  9:00 AM   Result Value Ref Range    Plt Estimation Normal    MORPHOLOGY    Collection Time: 11/01/21  9:00 AM   Result Value Ref Range    RBC Morphology Present     Polychromia 1+    DIFFERENTIAL COMMENT    Collection Time: 11/01/21  9:00 AM   Result Value Ref Range    Comments-Diff see below    Blood Culture,Hold    Collection Time: 11/01/21  9:00 AM   Result Value Ref Range    Blood Culture Hold Collected    EKG (NOW)    Collection Time: 11/01/21  9:36 AM   Result Value Ref Range    Report       [facility] Emergency Dept.    Test Date:  2021-11-01  Pt Name:    VERN STARR                 Department: ER  MRN:        [id number]                      Room:       [facility]  Gender:     Male                         Technician: [id number]  :        1990                   Requested By:ALEJANDRO BAIG  Order #:    [id number]                    Reading MD: ALEJANDRO BAIG MD    Measurements  Intervals                                Axis  Rate:       92                           P:          76  AR:         162                          QRS:        129  QRSD:       85                           T:          27  QT:         368  QTc:        456    Interpretive Statements  Sinus rhythm  Right axis deviation  Low voltage, precordial leads  Consider anterior infarct  Compared to ECG 2020 15:31:18  Right-axis deviation now present  Low QRS voltage now present  Myocardial infarct finding now present  Left posterior fascicular block no longer present  Electronically Signed O n 2021 10:27:53 PDT by ALEJANDRO BAIG MD     Renal Function Panel    Collection Time: 21  2:24 AM   Result Value Ref Range    Sodium 139 135 - 145 mmol/L    Potassium 5.0 3.6 - 5.5 mmol/L    Chloride 99 96 - 112 mmol/L    Co2 33 20 - 33 mmol/L    Glucose 105 (H) 65 - 99 mg/dL    Creatinine 0.76 0.50 - 1.40 mg/dL    Bun 17 8 - 22 mg/dL    Calcium 8.2 (L) 8.5 - 10.5 mg/dL    Phosphorus 5.0 (H) 2.5 - 4.5 mg/dL    Albumin 3.8 3.2 - 4.9 g/dL   Basic Metabolic Panel    Collection Time: 21  2:24 AM   Result Value Ref Range    Anion Gap 7.0 7.0 - 16.0   CBC With Differential    Collection Time: 21  2:24 AM   Result Value Ref Range    WBC 7.8 4.8 - 10.8 K/uL    RBC 6.41 (H) 4.70 - 6.10 M/uL    Hemoglobin 14.4 14.0 - 18.0 g/dL    Hematocrit 55.3 (H) 42.0 - 52.0 %    MCV 86.3 81.4 - 97.8 fL    MCH 22.5 (L) 27.0 - 33.0 pg    MCHC 26.0 (L) 33.7 - 35.3 g/dL    RDW 70.0 (H) 35.9 - 50.0 fL    Platelet Count 265 164 - 446 K/uL    MPV 11.3 9.0 - 12.9 fL    Neutrophils-Polys 63.70 44.00 - 72.00 %    Lymphocytes 24.10 22.00 - 41.00 %     Monocytes 9.60 0.00 - 13.40 %    Eosinophils 1.00 0.00 - 6.90 %    Basophils 0.80 0.00 - 1.80 %    Immature Granulocytes 0.80 0.00 - 0.90 %    Nucleated RBC 2.20 /100 WBC    Neutrophils (Absolute) 4.99 1.82 - 7.42 K/uL    Lymphs (Absolute) 1.89 1.00 - 4.80 K/uL    Monos (Absolute) 0.75 0.00 - 0.85 K/uL    Eos (Absolute) 0.08 0.00 - 0.51 K/uL    Baso (Absolute) 0.06 0.00 - 0.12 K/uL    Immature Granulocytes (abs) 0.06 0.00 - 0.11 K/uL    NRBC (Absolute) 0.17 K/uL   proBrain Natriuretic Peptide, NT    Collection Time: 11/02/21  2:24 AM   Result Value Ref Range    NT-proBNP 2071 (H) 0 - 125 pg/mL   PROTHROMBIN TIME    Collection Time: 11/02/21  2:24 AM   Result Value Ref Range    PT 32.9 (H) 12.0 - 14.6 sec    INR 3.34 (H) 0.87 - 1.13   ESTIMATED GFR    Collection Time: 11/02/21  2:24 AM   Result Value Ref Range    GFR If African American >60 >60 mL/min/1.73 m 2    GFR If Non African American >60 >60 mL/min/1.73 m 2   EC-ECHOCARDIOGRAM COMPLETE W/ CONT    Collection Time: 11/02/21  9:00 AM   Result Value Ref Range    Eject.Frac. MOD 4C 58.39     Left Ventrical Ejection Fraction 60    PROTHROMBIN TIME    Collection Time: 11/03/21  2:11 AM   Result Value Ref Range    PT 29.5 (H) 12.0 - 14.6 sec    INR 2.90 (H) 0.87 - 1.13   Basic Metabolic Panel    Collection Time: 11/03/21  2:11 AM   Result Value Ref Range    Sodium 137 135 - 145 mmol/L    Potassium 5.3 3.6 - 5.5 mmol/L    Chloride 97 96 - 112 mmol/L    Co2 32 20 - 33 mmol/L    Glucose 104 (H) 65 - 99 mg/dL    Bun 18 8 - 22 mg/dL    Creatinine 0.59 0.50 - 1.40 mg/dL    Calcium 9.1 8.5 - 10.5 mg/dL    Anion Gap 8.0 7.0 - 16.0   MAGNESIUM    Collection Time: 11/03/21  2:11 AM   Result Value Ref Range    Magnesium 1.8 1.5 - 2.5 mg/dL   HEMOGLOBIN A1C    Collection Time: 11/03/21  2:11 AM   Result Value Ref Range    Glycohemoglobin 5.9 (H) 4.0 - 5.6 %    Est Avg Glucose 123 mg/dL   Lipid Profile    Collection Time: 11/03/21  2:11 AM   Result Value Ref Range     Cholesterol,Tot 90 (L) 100 - 199 mg/dL    Triglycerides 95 0 - 149 mg/dL    HDL 16 (A) >=40 mg/dL    LDL 55 <100 mg/dL   ESTIMATED GFR    Collection Time: 21  2:11 AM   Result Value Ref Range    GFR If African American >60 >60 mL/min/1.73 m 2    GFR If Non African American >60 >60 mL/min/1.73 m 2   EKG    Collection Time: 21  5:57 AM   Result Value Ref Range    Report       [facility]    Test Date:  2021  Pt Name:    VERN STARR                Department: 183  MRN:        [id number]                      Room:       [facility]  Gender:     Male                         Technician: BLANCO  :        1990                   Requested By:JANIS CLAUDIO  Order #:    [id number]                    Reading MD: Zachery Smith MD    Measurements  Intervals                                Axis  Rate:       96                           P:          69  NV:         168                          QRS:        150  QRSD:       76                           T:          8  QT:         356  QTc:        450    Interpretive Statements  SINUS RHYTHM  Electronically Signed On 11-3-2021 7:30:28 PDT by Zachery Smith MD     CBC WITH DIFFERENTIAL    Collection Time: 21  5:59 AM   Result Value Ref Range    WBC 7.1 4.8 - 10.8 K/uL    RBC 6.32 (H) 4.70 - 6.10 M/uL    Hemoglobin 14.2 14.0 - 18.0 g/dL    Hematocrit 54.7 (H) 42.0 - 52.0 %    MCV 86.6 81.4 - 97.8 fL    MCH 22.5 (L) 27.0 - 33.0 pg    MCHC 26.0 (L) 33.7 - 35.3 g/dL    RDW 70.5 (H) 35.9 - 50.0 fL    Platelet Count 217 164 - 446 K/uL    MPV 9.8 9.0 - 12.9 fL    Neutrophils-Polys 68.70 44.00 - 72.00 %    Lymphocytes 18.60 (L) 22.00 - 41.00 %    Monocytes 10.30 0.00 - 13.40 %    Eosinophils 0.70 0.00 - 6.90 %    Basophils 0.40 0.00 - 1.80 %    Immature Granulocytes 1.30 (H) 0.00 - 0.90 %    Nucleated RBC 3.00 /100 WBC    Neutrophils (Absolute) 4.87 1.82 - 7.42 K/uL    Lymphs (Absolute) 1.32 1.00 - 4.80 K/uL    Monos (Absolute) 0.73 0.00 - 0.85 K/uL    Eos (Absolute)  0.05 0.00 - 0.51 K/uL    Baso (Absolute) 0.03 0.00 - 0.12 K/uL    Immature Granulocytes (abs) 0.09 0.00 - 0.11 K/uL    NRBC (Absolute) 0.21 K/uL   LACTIC ACID    Collection Time: 11/03/21  5:59 AM   Result Value Ref Range    Lactic Acid 1.8 0.5 - 2.0 mmol/L   proBrain Natriuretic Peptide, NT    Collection Time: 11/03/21  5:59 AM   Result Value Ref Range    NT-proBNP 1914 (H) 0 - 125 pg/mL   ABG - LAB    Collection Time: 11/03/21  6:00 AM   Result Value Ref Range    Ph 7.11 (LL) 7.40 - 7.50    Pco2 131.0 (HH) 26.0 - 37.0 mmHg    Po2 68.7 64.0 - 87.0 mmHg    O2 Saturation 88.3 (L) 93.0 - 99.0 %    Hco3 40 (H) 17 - 25 mmol/L    Base Excess 6 (H) -4 - 3 mmol/L    Body Temp see below Centigrade       Imaging  DX-CHEST-PORTABLE (1 VIEW)   Final Result      1.  Interval intubation   2.  BILATERAL atelectasis with possible superimposed pulmonary edema or pneumonia, unchanged      DX-CHEST-LIMITED (1 VIEW)   Final Result      Worsening diffuse hazy opacity compatible with edema or infection      EC-ECHOCARDIOGRAM COMPLETE W/ CONT   Final Result      CT-ABDOMEN-PELVIS WITH   Final Result         Limited exam due to body habitus.      1. There is skin thickening and subcutaneous edema in the low anterior abdominal and pelvic wall panus. This could relate to fluid overload but infection and cellulitis can have similar appearance. Correlate clinically.      2. Mild perihepatic ascites.      3. Diffuse scrotal edema and moderate hydrocele.         CT-CTA CHEST PULMONARY ARTERY W/ RECONS   Final Result         1. No CT evidence of pulmonary embolism.      2. Mild hazy groundglass opacity throughout both lungs could relate to mild fluid overload.      3. Cardiomegaly.      4. No airspace consolidation. Linear right midlung atelectasis.         DX-CHEST-PORTABLE (1 VIEW)   Final Result         Stable cardiomegaly.      Mild interstitial prominence could relate to mild fluid overload.          Assessment/Plan  # Acute hypercarbic  respiratory failure secondary to underlying untreated JONES and morbid obesity  # Acute encephalopathy  # Pulmonary HTN  # Hx of pulmonary embolism  # Morbid obesity  Failed one hour of BiPAP  - Continue mechanical ventilation, goal O2 sats 85-88%  - Repeat ABG in 1 hr  - SBT trials  - Diurese w/ Lasix and Diamox  - Limit sedation  - Will need outpatient formal JONES testing  - Continue anticoagulation for chronic PE

## 2021-11-03 NOTE — PROGRESS NOTES
This RN entered pt room and found pt obtunded and poorly responsive. Oxygen sats down to 70's. Pt on 15 L oxymask. Placed on non-rebreather and rapid response called and physician called to bedside. Pt had been poorly compliant with cpap and had not worn overnight.     Additional IV lasix ordered and given with previously scheduled morning dose. Chest x ray, EKG, and lab rainbow drawn.  Pt arousable to pain and holding minimal conversation, oriented to 3 with poor attention. Pt output 500 and pt gradually awoke and became more lucid allowing Rapid to end. Pt back on 15 L oxymask satting 88-90%. On-call hospitalist to update day team. Matthew to be placed following urology consult due to obstruction/hydrocele. RT to place pt on CPAP for the next few hours to allow for pt to regain oxygen reserves.       **0616      Pt detriorating, satting low 80's on CPAP. Critical pH of 7.11 and CO2 of 131 called in by lab. Placed back non-rebreather and day team hospitalist contacted regarding labs and pt status. At bedside for assessment. Pt poorly responsive and showing poor attention/conversation holding. RT called to place pt on Bipap and transfer to ICU pending. Day RN and Charge updated to situation and at bedside.

## 2021-11-04 PROBLEM — L03.90 CELLULITIS: Status: ACTIVE | Noted: 2021-11-04

## 2021-11-04 LAB
ANION GAP SERPL CALC-SCNC: 12 MMOL/L (ref 7–16)
BASE EXCESS BLDA CALC-SCNC: 13 MMOL/L (ref -4–3)
BASOPHILS # BLD AUTO: 0.3 % (ref 0–1.8)
BASOPHILS # BLD: 0.02 K/UL (ref 0–0.12)
BODY TEMPERATURE: ABNORMAL DEGREES
BREATHS SETTING VENT: 22
BUN SERPL-MCNC: 19 MG/DL (ref 8–22)
CALCIUM SERPL-MCNC: 8.5 MG/DL (ref 8.5–10.5)
CHLORIDE SERPL-SCNC: 101 MMOL/L (ref 96–112)
CO2 BLDA-SCNC: 38 MMOL/L (ref 20–33)
CO2 SERPL-SCNC: 31 MMOL/L (ref 20–33)
CREAT SERPL-MCNC: 0.56 MG/DL (ref 0.5–1.4)
CRP SERPL HS-MCNC: 6.53 MG/DL (ref 0–0.75)
DELSYS IDSYS: ABNORMAL
END TIDAL CARBON DIOXIDE IECO2: 39 MMHG
EOSINOPHIL # BLD AUTO: 0.07 K/UL (ref 0–0.51)
EOSINOPHIL NFR BLD: 1 % (ref 0–6.9)
ERYTHROCYTE [DISTWIDTH] IN BLOOD BY AUTOMATED COUNT: 64.3 FL (ref 35.9–50)
GLUCOSE SERPL-MCNC: 100 MG/DL (ref 65–99)
HCO3 BLDA-SCNC: 37 MMOL/L (ref 17–25)
HCT VFR BLD AUTO: 49.3 % (ref 42–52)
HGB BLD-MCNC: 13.8 G/DL (ref 14–18)
HOROWITZ INDEX BLDA+IHG-RTO: 64 MM[HG]
IMM GRANULOCYTES # BLD AUTO: 0.04 K/UL (ref 0–0.11)
IMM GRANULOCYTES NFR BLD AUTO: 0.5 % (ref 0–0.9)
INR PPP: 5.22 (ref 0.87–1.13)
LYMPHOCYTES # BLD AUTO: 0.89 K/UL (ref 1–4.8)
LYMPHOCYTES NFR BLD: 12.1 % (ref 22–41)
MAGNESIUM SERPL-MCNC: 1.6 MG/DL (ref 1.5–2.5)
MCH RBC QN AUTO: 22.5 PG (ref 27–33)
MCHC RBC AUTO-ENTMCNC: 28 G/DL (ref 33.7–35.3)
MCV RBC AUTO: 80.3 FL (ref 81.4–97.8)
MODE IMODE: ABNORMAL
MONOCYTES # BLD AUTO: 0.56 K/UL (ref 0–0.85)
MONOCYTES NFR BLD AUTO: 7.6 % (ref 0–13.4)
MORPHOLOGY BLD-IMP: NORMAL
NEUTROPHILS # BLD AUTO: 5.75 K/UL (ref 1.82–7.42)
NEUTROPHILS NFR BLD: 78.5 % (ref 44–72)
NRBC # BLD AUTO: 0.07 K/UL
NRBC BLD-RTO: 1 /100 WBC
O2/TOTAL GAS SETTING VFR VENT: 100 %
PCO2 BLDA: 43.4 MMHG (ref 26–37)
PEEP END EXPIRATORY PRESSURE IPEEP: 12 CMH20
PH BLDA: 7.54 [PH] (ref 7.4–7.5)
PHOSPHATE SERPL-MCNC: 1.9 MG/DL (ref 2.5–4.5)
PLATELET # BLD AUTO: 185 K/UL (ref 164–446)
PO2 BLDA: 64 MMHG (ref 64–87)
POTASSIUM SERPL-SCNC: 3.8 MMOL/L (ref 3.6–5.5)
PREALB SERPL-MCNC: 6.7 MG/DL (ref 18–38)
PROTHROMBIN TIME: 46.5 SEC (ref 12–14.6)
RBC # BLD AUTO: 6.14 M/UL (ref 4.7–6.1)
SAO2 % BLDA: 94 % (ref 93–99)
SODIUM SERPL-SCNC: 144 MMOL/L (ref 135–145)
SPECIMEN DRAWN FROM PATIENT: ABNORMAL
TIDAL VOLUME IVT: 540 ML
WBC # BLD AUTO: 7.3 K/UL (ref 4.8–10.8)

## 2021-11-04 PROCEDURE — 94799 UNLISTED PULMONARY SVC/PX: CPT

## 2021-11-04 PROCEDURE — 82803 BLOOD GASES ANY COMBINATION: CPT

## 2021-11-04 PROCEDURE — 94003 VENT MGMT INPAT SUBQ DAY: CPT

## 2021-11-04 PROCEDURE — 99291 CRITICAL CARE FIRST HOUR: CPT | Performed by: INTERNAL MEDICINE

## 2021-11-04 PROCEDURE — 700105 HCHG RX REV CODE 258: Performed by: INTERNAL MEDICINE

## 2021-11-04 PROCEDURE — 85025 COMPLETE CBC W/AUTO DIFF WBC: CPT

## 2021-11-04 PROCEDURE — 84134 ASSAY OF PREALBUMIN: CPT

## 2021-11-04 PROCEDURE — 700101 HCHG RX REV CODE 250: Performed by: INTERNAL MEDICINE

## 2021-11-04 PROCEDURE — 700102 HCHG RX REV CODE 250 W/ 637 OVERRIDE(OP): Performed by: INTERNAL MEDICINE

## 2021-11-04 PROCEDURE — 700111 HCHG RX REV CODE 636 W/ 250 OVERRIDE (IP): Performed by: INTERNAL MEDICINE

## 2021-11-04 PROCEDURE — 36600 WITHDRAWAL OF ARTERIAL BLOOD: CPT

## 2021-11-04 PROCEDURE — A9270 NON-COVERED ITEM OR SERVICE: HCPCS | Performed by: INTERNAL MEDICINE

## 2021-11-04 PROCEDURE — 84100 ASSAY OF PHOSPHORUS: CPT

## 2021-11-04 PROCEDURE — 770022 HCHG ROOM/CARE - ICU (200)

## 2021-11-04 PROCEDURE — 85610 PROTHROMBIN TIME: CPT

## 2021-11-04 PROCEDURE — 71045 X-RAY EXAM CHEST 1 VIEW: CPT

## 2021-11-04 PROCEDURE — 80048 BASIC METABOLIC PNL TOTAL CA: CPT

## 2021-11-04 PROCEDURE — 83735 ASSAY OF MAGNESIUM: CPT

## 2021-11-04 PROCEDURE — 86140 C-REACTIVE PROTEIN: CPT

## 2021-11-04 PROCEDURE — 97602 WOUND(S) CARE NON-SELECTIVE: CPT

## 2021-11-04 RX ORDER — MAGNESIUM SULFATE HEPTAHYDRATE 40 MG/ML
2 INJECTION, SOLUTION INTRAVENOUS ONCE
Status: COMPLETED | OUTPATIENT
Start: 2021-11-04 | End: 2021-11-04

## 2021-11-04 RX ORDER — CEFAZOLIN SODIUM 1 G/50ML
1 INJECTION, SOLUTION INTRAVENOUS EVERY 8 HOURS
Status: DISCONTINUED | OUTPATIENT
Start: 2021-11-04 | End: 2021-11-06

## 2021-11-04 RX ADMIN — MAGNESIUM SULFATE HEPTAHYDRATE 2 G: 40 INJECTION, SOLUTION INTRAVENOUS at 12:09

## 2021-11-04 RX ADMIN — NYSTATIN: 100000 POWDER TOPICAL at 18:10

## 2021-11-04 RX ADMIN — SENNOSIDES AND DOCUSATE SODIUM 2 TABLET: 50; 8.6 TABLET ORAL at 18:10

## 2021-11-04 RX ADMIN — FAMOTIDINE 20 MG: 20 TABLET ORAL at 05:22

## 2021-11-04 RX ADMIN — FENTANYL CITRATE 100 MCG: 50 INJECTION, SOLUTION INTRAMUSCULAR; INTRAVENOUS at 01:39

## 2021-11-04 RX ADMIN — CEFAZOLIN SODIUM 1 G: 1 INJECTION, SOLUTION INTRAVENOUS at 22:09

## 2021-11-04 RX ADMIN — SENNOSIDES AND DOCUSATE SODIUM 2 TABLET: 50; 8.6 TABLET ORAL at 05:22

## 2021-11-04 RX ADMIN — DEXMEDETOMIDINE 0.8 MCG/KG/HR: 200 INJECTION, SOLUTION INTRAVENOUS at 02:31

## 2021-11-04 RX ADMIN — FUROSEMIDE 60 MG: 10 INJECTION, SOLUTION INTRAMUSCULAR; INTRAVENOUS at 05:22

## 2021-11-04 RX ADMIN — CEFAZOLIN SODIUM 1 G: 1 INJECTION, SOLUTION INTRAVENOUS at 14:52

## 2021-11-04 RX ADMIN — POTASSIUM PHOSPHATE, MONOBASIC AND POTASSIUM PHOSPHATE, DIBASIC 30 MMOL: 224; 236 INJECTION, SOLUTION, CONCENTRATE INTRAVENOUS at 12:09

## 2021-11-04 RX ADMIN — NYSTATIN: 100000 POWDER TOPICAL at 05:15

## 2021-11-04 RX ADMIN — DEXMEDETOMIDINE 0.8 MCG/KG/HR: 200 INJECTION, SOLUTION INTRAVENOUS at 04:47

## 2021-11-04 RX ADMIN — MULTIPLE VITAMINS W/ MINERALS TAB 1 TABLET: TAB at 05:22

## 2021-11-04 RX ADMIN — FUROSEMIDE 60 MG: 10 INJECTION, SOLUTION INTRAMUSCULAR; INTRAVENOUS at 15:03

## 2021-11-04 RX ADMIN — FAMOTIDINE 20 MG: 20 TABLET ORAL at 18:09

## 2021-11-04 ASSESSMENT — ENCOUNTER SYMPTOMS
SEIZURES: 0
HEADACHES: 0
FALLS: 1
COUGH: 0
NAUSEA: 0
ABDOMINAL PAIN: 1
ORTHOPNEA: 0
WEAKNESS: 0
VOMITING: 0
FEVER: 0
DIARRHEA: 0
PALPITATIONS: 0
BACK PAIN: 0
SHORTNESS OF BREATH: 0

## 2021-11-04 ASSESSMENT — PAIN DESCRIPTION - PAIN TYPE
TYPE: ACUTE PAIN

## 2021-11-04 ASSESSMENT — FIBROSIS 4 INDEX: FIB4 SCORE: 1.55

## 2021-11-04 NOTE — CARE PLAN
Problem: Knowledge Deficit - Standard  Goal: Patient and family/care givers will demonstrate understanding of plan of care, disease process/condition, diagnostic tests and medications  Outcome: Progressing     Problem: Safety - Medical Restraint  Goal: Remains free of injury from restraints (Restraint for Interference with Medical Device)  Outcome: Progressing   The patient is Stable - Low risk of patient condition declining or worsening    Shift Goals  Clinical Goals: improve oxygenation status  Patient Goals: na  Family Goals: na    Progress made toward(s) clinical / shift goals:  progressing

## 2021-11-04 NOTE — ASSESSMENT & PLAN NOTE
Intubated 11/3-11/6  RT/O2 protocol  Continue to titrate down HHFNC to keep SPO2 greater than 88%, nocturnal BiPAP given significant desaturation episodes  Continue forced diuresis  Limit sedatives  Encourage incentive spirometry, out of bed to chair

## 2021-11-04 NOTE — CARE PLAN
Problem: Ventilation  Goal: Ability to achieve and maintain unassisted ventilation or tolerate decreased levels of ventilator support  Description: Document on Vent flowsheet    1.  Support and monitor invasive and noninvasive mechanical ventilation  2.  Monitor ventilator weaning response  3.  Perform ventilator associated pneumonia prevention interventions  4.  Manage ventilation therapy by monitoring diagnostic test results  Outcome: Progressing      Ventilator Daily Summary    Vent Day # 2  8 @ 25    APVCMV: 22/540/+10/60%    Plan: Continue current ventilator settings and wean mechanical ventilation as tolerated per physician orders.

## 2021-11-04 NOTE — PROGRESS NOTES
Inpatient Anticoagulation Service Note    Date: 2021    Reason for Anticoagulation: Pulmonary Embolism   Target INR: 2.0 to 3.0         Hemoglobin Value: (!) 13.8  Hematocrit Value: 49.3  Lab Platelet Value: 185    INR from last 7 days     Date/Time INR Value    21 0458 5.22    21 0211 2.9    21 02:24:01 3.34    21 0900 3.86        Dose from last 7 days     Date/Time Dose (mg)    21 1140 0    21 1330 7.5    21 1522 3.75    21 1212 0        Average Dose (mg):  (7.5 mg daily)  Significant Interactions: Not Applicable  Bridge Therapy: No     Reversal Agent Administered: Not Applicable  Comments: INR supratherapeutic with a large increase. Hold dose today, continue daily INR. Patient may be more sensitive with severity of acute illness and decreased oral intake. Enteral tube w/ tube feeding in place, possible extubation in the next day or so. No bleeding noted.    Plan:  Hold warfarin  Education Material Provided?: Yes  Pharmacist suggested discharge dosin.5mg daily. INR within 72 hours of discharge.      Hardeep Loaiza, PharmD

## 2021-11-04 NOTE — PROGRESS NOTES
Received report from day RN, POC discussed. Pt responds to pain stimuli, RASS of -4 to -1, pt vented and bilaterally restrained.

## 2021-11-04 NOTE — PROGRESS NOTES
Critical Care Progress Note    Date of admission  11/1/2021    Chief Complaint  31 Y m with morbid obesity consulted for hypercarbic respiratory failure. He is morbid obese BMI 69, hx of pe on chronic anticoagulation, untreated JONES, chronic venous stasis, eczema, lower exxt cellutlitis. Which was admitted 11/1 for abdominal swelling. He had a negative CTA this admit for pe, CT of his abdomen with some ascites and skin edema, his echo shows some pulmonary hypertension with RSVP 45, mild MR, LV ef 60%. This am 11/3 I was called for ams with a ph 7.1/130/90 on NRB.     Hospital Course  11/3 failed trial of BIPAP, subsequently, intubated       Interval Problem Update  Reviewed last 24 hour events:  Off precedex  -125s  Matthew had to be placed by Urology   Diuresing well with lasix  >3L UOP overnight.   On tube feed at goal  No BM  On warfarin for hx of PE  No antibiotics    Review of Systems  Review of Systems   Constitutional: Negative for fever and malaise/fatigue.   Respiratory: Negative for cough and shortness of breath.    Cardiovascular: Negative for chest pain, palpitations, orthopnea and leg swelling.   Gastrointestinal: Positive for abdominal pain (improving). Negative for diarrhea, nausea and vomiting.   Genitourinary: Negative for dysuria and urgency.   Musculoskeletal: Positive for falls. Negative for back pain.   Neurological: Negative for seizures, weakness and headaches.   All other systems reviewed and are negative.       Vital Signs for last 24 hours   Temp:  [35.7 °C (96.3 °F)-37.2 °C (98.9 °F)] 37.2 °C (98.9 °F)  Pulse:  [] 88  Resp:  [17-35] 22  BP: ()/() 129/66  SpO2:  [81 %-97 %] 95 %    Hemodynamic parameters for last 24 hours       Respiratory Information for the last 24 hours  Vent Mode: APVCMV  Rate (breaths/min): 22  Vt Target (mL): 540  PEEP/CPAP: 12  MAP: 22  Control VTE (exp VT): 545    Physical Exam   Physical Exam  Vitals and nursing note reviewed.    Constitutional:       General: He is not in acute distress.     Appearance: He is ill-appearing. He is not toxic-appearing or diaphoretic.      Comments: Alert, oriented, RASS 0     HENT:      Head: Normocephalic.      Mouth/Throat:      Mouth: Mucous membranes are moist.      Comments: ET tube in place  Cardiovascular:      Rate and Rhythm: Normal rate.      Pulses: Normal pulses.      Heart sounds: Normal heart sounds. No murmur heard.     Pulmonary:      Effort: No respiratory distress.      Breath sounds: Normal breath sounds. No wheezing, rhonchi or rales.      Comments: Diminished at bases  Abdominal:      General: There is distension.      Palpations: Abdomen is soft.      Tenderness: There is no abdominal tenderness. There is no guarding.      Comments: Erythema noted in lower abdomen skin/pannus area   Warm to touch, non tender  obese   Musculoskeletal:         General: No swelling or tenderness.      Right lower leg: Edema present.      Left lower leg: Edema present.   Skin:     General: Skin is warm and dry.      Capillary Refill: Capillary refill takes 2 to 3 seconds.      Coloration: Skin is not jaundiced.   Neurological:      General: No focal deficit present.      Mental Status: He is alert.      Cranial Nerves: No cranial nerve deficit.      Comments: Moving all extremiiteis   Psychiatric:         Mood and Affect: Mood normal.         Medications  Current Facility-Administered Medications   Medication Dose Route Frequency Provider Last Rate Last Admin   • nystatin (MYCOSTATIN) powder   Topical BID Bartolo Rosa M.D.   Given at 11/04/21 0515   • dexmedetomidine (PRECEDEX) 400 mcg/100mL NS premix infusion  0.1-1.2 mcg/kg/hr Intravenous Continuous Maurice Slaughter M.D.   Paused at 11/04/21 0510   • fentaNYL (SUBLIMAZE) injection 100 mcg  100 mcg Intravenous Q HOUR PRN Maurice Slaughter M.D.   100 mcg at 11/04/21 0139   • Pharmacy Consult: Enteral tube insertion - review meds/change route/product  selection  1 Each Other PHARMACY TO DOSE Maurice Slaughter M.D.       • Respiratory Therapy Consult   Nebulization Continuous RT Maurice Slaughter M.D.       • famotidine (PEPCID) tablet 20 mg  20 mg Enteral Tube Q12HRS Maurice Slaughter M.D.   20 mg at 11/04/21 0522    Or   • famotidine (PEPCID) injection 20 mg  20 mg Intravenous Q12HRS Maurice Slaughter M.D.       • senna-docusate (PERICOLACE or SENOKOT S) 8.6-50 MG per tablet 2 Tablet  2 Tablet Enteral Tube BID Maurice Slaughter M.D.   2 Tablet at 11/04/21 0522    And   • polyethylene glycol/lytes (MIRALAX) PACKET 1 Packet  1 Packet Enteral Tube QDAY PRN Maurice Slaughter M.D.        And   • magnesium hydroxide (MILK OF MAGNESIA) suspension 30 mL  30 mL Enteral Tube QDAY PRN Maurice Slaughter M.D.        And   • bisacodyl (DULCOLAX) suppository 10 mg  10 mg Rectal QDAY PRN Maurice Slaughter M.D.       • MD Alert...ICU Electrolyte Replacement per Pharmacy   Other PHARMACY TO DOSE Maurice Slaughter M.D.       • lidocaine (XYLOCAINE) 1 % injection 2 mL  2 mL Tracheal Tube Q30 MIN PRN Maurice Slaughter M.D.       • furosemide (LASIX) injection 60 mg  60 mg Intravenous BID DIURETIC Maurice Slaughter M.D.   60 mg at 11/04/21 0522   • acetaminophen (Tylenol) tablet 650 mg  650 mg Enteral Tube Q6HRS PRN Maurice Slaughter M.D.       • cloNIDine (CATAPRES) tablet 0.1 mg  0.1 mg Enteral Tube Q6HRS PRN Maurice Slaughter M.D.       • ondansetron (ZOFRAN ODT) dispertab 4 mg  4 mg Enteral Tube Q4HRS PRN Maurice Slaughter M.D.       • therapeutic multivitamin-minerals (THERAGRAN-M) tablet 1 Tablet  1 Tablet Enteral Tube QAM Maurice Slaughter M.D.   1 Tablet at 11/04/21 0522   • warfarin (COUMADIN) tablet 7.5 mg  7.5 mg Enteral Tube DAILY AT 1800 Maurice Slaughter M.D.   7.5 mg at 11/03/21 1720   • midazolam (Versed) 2 MG/2ML injection 2 mg  2 mg Intravenous Once Darwin Freedman D.O.       • MD Alert...Warfarin per Pharmacy   Other PHARMACY TO DOSE Barry Tabor M.D.       • albuterol  inhaler 2 Puff  2 Puff Inhalation Q6HRS PRN Barry Tabor M.D.       • Pharmacy Consult Request ...Pain Management Review 1 Each  1 Each Other PHARMACY TO DOSE Barry Tabor M.D.       • enalaprilat (Vasotec) injection 1.25 mg 1 mL  1.25 mg Intravenous Q6HRS PRN Barry Tabor M.D.       • labetalol (NORMODYNE/TRANDATE) injection 10 mg  10 mg Intravenous Q4HRS PRN Barry Tabor M.D.       • ondansetron (ZOFRAN) syringe/vial injection 4 mg  4 mg Intravenous Q4HRS PRN Barry Tabor M.D.           Fluids    Intake/Output Summary (Last 24 hours) at 11/4/2021 0741  Last data filed at 11/4/2021 0707  Gross per 24 hour   Intake 790.29 ml   Output 5520 ml   Net -4729.71 ml       Laboratory  Recent Labs     11/03/21  0600 11/03/21  0853 11/03/21  0952 11/04/21  0539   VQPEM42Q 7.11*  --   --   --    TDWLXC443T 131.0*  --   --   --    MIPFO548C 68.7  --   --   --    JMGY5XMN 88.3*  --   --   --    ARTHCO3 40*  --   --   --    ARTBE 6*  --   --   --    ISTATAPH  --  7.157* 7.412 7.539*   ISTATAPCO2  --  >100.0* 54.1* 43.4*   ISTATAPO2  --  57* 55* 64   ISTATATCO2  --  40* 36* 38*   ENKFNAM1CZY  --  77* 88* 94   ISTATARTHCO3  --  37.2* 34.4* 37.0*   ISTATARTBE  --  3 8* 13*   ISTATTEMP  --  98.0 F 98.0 F see below   ISTATFIO2  --  60 30 100   ISTATSPEC  --  Arterial Arterial Arterial   ISTATAPHTC  --  7.162* 7.417  --    CCHOFMLI9IB  --  55* 53*  --          Recent Labs     11/02/21  0224 11/03/21  0211 11/04/21  0458   SODIUM 139 137 144   POTASSIUM 5.0 5.3 3.8   CHLORIDE 99 97 101   CO2 33 32 31   BUN 17 18 19   CREATININE 0.76 0.59 0.56   MAGNESIUM  --  1.8 1.6   PHOSPHORUS 5.0*  --  1.9*   CALCIUM 8.2* 9.1 8.5     Recent Labs     11/01/21  0900 11/01/21  0900 11/02/21 0224 11/03/21 0211 11/04/21  0458   ALTSGPT 80*  --   --   --   --    ASTSGOT 97*  --   --   --   --    ALKPHOSPHAT 75  --   --   --   --    TBILIRUBIN 1.3  --   --   --   --    PREALBUMIN  --   --   --   --  6.7*   GLUCOSE 102*   < > 105* 104* 100*    < > = values in  this interval not displayed.     Recent Labs     11/01/21  0900 11/01/21  0900 11/02/21 0224 11/03/21 0559 11/04/21 0458   WBC 7.2   < > 7.8 7.1 7.3   NEUTSPOLYS 66.10   < > 63.70 68.70 78.50*   LYMPHOCYTES 23.10   < > 24.10 18.60* 12.10*   MONOCYTES 9.00   < > 9.60 10.30 7.60   EOSINOPHILS 0.10   < > 1.00 0.70 1.00   BASOPHILS 1.00   < > 0.80 0.40 0.30   ASTSGOT 97*  --   --   --   --    ALTSGPT 80*  --   --   --   --    ALKPHOSPHAT 75  --   --   --   --    TBILIRUBIN 1.3  --   --   --   --     < > = values in this interval not displayed.     Recent Labs     11/01/21  0900 11/01/21 0900 11/02/21 0224 11/03/21 0211 11/03/21 0559 11/04/21 0458   RBC 6.58*   < > 6.41*  --  6.32* 6.14*   HEMOGLOBIN 14.5   < > 14.4  --  14.2 13.8*   HEMATOCRIT 55.0*   < > 55.3*  --  54.7* 49.3   PLATELETCT 231   < > 265  --  217 185   PROTHROMBTM 36.7*   < > 32.9* 29.5*  --  46.5*   APTT 42.8*  --   --   --   --   --    INR 3.86*   < > 3.34* 2.90*  --  5.22*    < > = values in this interval not displayed.       Imaging  X-Ray:  I have personally reviewed the images and compared with prior images.    Assessment/Plan  * Acute respiratory failure with hypoxia and hypercapnia (HCC)- (present on admission)  Assessment & Plan  Intubated for worsening acute hypercapnic resp failure  Failed BIPAP and subsequently intubated 11/3  On full vent support, FiO2 100% PEEP 12 today.   Will wean down FiO2 and PEEP  Hopeful to continue to wean and possible extubation tomorrow  Will likely need to be extubated to BIPAP        Cellulitis  Assessment & Plan  Cellulitis in lower extremities bilaterally and lower abdomen  Warm to touch, but not tender  Start cefazolin 1gr IV Q8H x 7 days.   Moisturize dry skin in LE with vaseline products    Pulmonary embolism with acute cor pulmonale (HCC)- (present on admission)  Assessment & Plan  Hx of PE, on coumadin  INR goal 2-3  Dosing per pharmacy    Pulmonary hypertension (HCC)- (present on  admission)  Assessment & Plan  Due to morbid obesity  Last TTE with RVSP 45  RV function ok    Morbid obesity (HCC)- (present on admission)  Assessment & Plan  BMI 66       VTE:  Heparin  Ulcer: H2 Antagonist  Lines: Central Line  Ongoing indication addressed and Matthew Catheter  Ongoing indication addressed    I have performed a physical exam and reviewed and updated ROS and Plan today (11/4/2021). In review of yesterday's note (11/3/2021), there are no changes except as documented above.     Discussed patient condition and risk of morbidity and/or mortality with Family, RN, RT, Pharmacy, Charge nurse / hot rounds and Patient  The patient remains critically ill.  Critical care time = 45 minutes in directly providing and coordinating critical care and extensive data review.  No time overlap and excludes procedures.

## 2021-11-04 NOTE — CONSULTS
[facility] Consult/H&P Note      Attending: Maurice Slaughter M.D.  Patient's Name/MRN: Michael Ontiveros, [id number]    Admit Date:11/1/2021  Today's Date: 11/3/2021   Length of stay:  LOS: 2 days   Room #: T628/00      Reason for consult/chief complaint: difficult catheter placement  ID/HPI: Michael Ontiveros is a 31 y.o. male patient with morbid obesity admitted for hypercarbic respiratory failure and abdominal swelling. He is morbidly obese BMI 69, hx of pe on chronic anticoagulation, untreated JONES, chronic venous stasis, eczema, lower exxt cellutlitis. He is currently intubated and cannot answer questions. Per nurse, he has been voiding via urethra with underlying guy pads. On exam, he was noted to have significant skin breakdown and suprapubic cellulitis. 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 We were therefore consulted to place catheter after several failed attempted by nursing.      Past Medical History:   Past Medical History:   Diagnosis Date   • Asthma    • Blood clotting disorder (HCC)    • Chickenpox    • Eczema    • Hypertension    • Influenza    • Tonsillitis         Past Surgical History:   Past Surgical History:   Procedure Laterality Date   • DENTAL EXTRACTION(S)  11/3/2017    Procedure: DENTAL EXTRACTION(S);  Surgeon: Olman Sparks D.D.S.;  Location: SURGERY [facility];  Service: Oral Surgery        Family History:   Family History   Problem Relation Age of Onset   • Respiratory Disease Mother    • Diabetes Paternal Aunt          Social History:   Social History     Tobacco Use   • Smoking status: Never Smoker   • Smokeless tobacco: Never Used   Vaping Use   • Vaping Use: Never used   Substance Use Topics   • Alcohol use: Not Currently     Comment: rare   • Drug use: No      Social History     Social History Narrative   • Not on file        Allergies: he Lewis (diagnostic), Pistachio, [place], and Tree  "nuts food allergy    Medications:   Medications Prior to Admission   Medication Sig Dispense Refill Last Dose   • acetaminophen (TYLENOL) 500 MG Tab Take 1,000-1,500 mg by mouth every 6 hours as needed for Mild Pain or Moderate Pain. 2 to 3 tablets = 1,000 to 1,500 mg. NOT TO EXCEED 4,000 MG / 24 HOURS.   11/1/2021 at 0300   • enoxaparin (LOVENOX) 150 MG/ML Solution Inject one syringe (150mg)  Sub-q every 12 hours as directed 10 Each 1 New RX at NOT STARTED   • albuterol 108 (90 Base) MCG/ACT Aero Soln inhalation aerosol Inhale 2 Puffs every 6 hours as needed for Shortness of Breath.   11/1/2021 at 0830   • Multiple Vitamins-Minerals (MENS MULTIVITAMIN) Tab Take 1 tablet by mouth every morning.   2~3 days ago at [facility]   • warfarin (COUMADIN) 7.5 MG Tab Take one-half to one tablet by mouth daily or as directed by anticoagulation clinic 90 tablet 0 10/31/2021 at 2000         Review of Systems  Review of Systems   Unable to perform ROS: Intubated        Physical Exam  VITAL SIGNS: /69   Pulse 82   Temp (!) 35.8 °C (96.4 °F) (Tympanic)   Resp (!) 22   Ht 1.727 m (5' 8\")   Wt (!) 199 kg (439 lb 13.1 oz)   SpO2 94%   BMI 66.87 kg/m²   Physical Exam  Vitals and nursing note reviewed.   Constitutional:       Appearance: He is obese. He is ill-appearing.   HENT:      Head: Normocephalic and atraumatic.   Pulmonary:      Comments: intubated  Abdominal:      General: There is distension.   Genitourinary:     Comments: Severely buried penis with suprapubic cellulitis           Labs:  Recent Labs     11/01/21  0900 11/02/21  0224 11/03/21  0559   WBC 7.2 7.8 7.1   RBC 6.58* 6.41* 6.32*   HEMOGLOBIN 14.5 14.4 14.2   HEMATOCRIT 55.0* 55.3* 54.7*   MCV 83.6 86.3 86.6   MCH 22.0* 22.5* 22.5*   MCHC 26.4* 26.0* 26.0*   RDW 68.8* 70.0* 70.5*   PLATELETCT 231 265 217   MPV 10.1 11.3 9.8     Recent Labs     11/01/21  0900 11/02/21  0224 11/03/21  0211   SODIUM 139 139 137   POTASSIUM 4.8 5.0 5.3   CHLORIDE 101 99 97   CO2 28 " 33 32   GLUCOSE 102* 105* 104*   BUN 20 17 18   CREATININE 0.54 0.76 0.59   CALCIUM 9.3 8.2* 9.1     Recent Labs     11/01/21  0900 11/02/21  0224 11/03/21 0211   APTT 42.8*  --   --    INR 3.86* 3.34* 2.90*     Glucose:  Recent Labs     11/01/21  0900 11/02/21  0224 11/03/21 0211   GLUCOSE 102* 105* 104*     Coags:  Recent Labs     11/03/21 0211   INR 2.90*         Urinalysis:   No results for input(s): COLORURINE, CLARITY, SPECGRAVITY, PHURINE, GLUCOSEUR, KETONES, NITRITE, OCCULTBLOOD, RBCURINE, BACTERIA, EPITHELCELL in the last 72 hours.    Invalid input(s): BILRUBINUR, LEUESTERASE    Imaging:  DX-ABDOMEN FOR TUBE PLACEMENT   Final Result      Enteric tube tip projects over the stomach      IR-PICC LINE PLACEMENT W/ GUIDANCE > AGE 5   Final Result                  Ultrasound-guided PICC placement performed by qualified nursing staff as    above.          DX-CHEST-PORTABLE (1 VIEW)   Final Result      1.  Interval intubation   2.  BILATERAL atelectasis with possible superimposed pulmonary edema or pneumonia, unchanged      DX-CHEST-LIMITED (1 VIEW)   Final Result      Worsening diffuse hazy opacity compatible with edema or infection      EC-ECHOCARDIOGRAM COMPLETE W/ CONT   Final Result      CT-ABDOMEN-PELVIS WITH   Final Result         Limited exam due to body habitus.      1. There is skin thickening and subcutaneous edema in the low anterior abdominal and pelvic wall panus. This could relate to fluid overload but infection and cellulitis can have similar appearance. Correlate clinically.      2. Mild perihepatic ascites.      3. Diffuse scrotal edema and moderate hydrocele.         CT-CTA CHEST PULMONARY ARTERY W/ RECONS   Final Result         1. No CT evidence of pulmonary embolism.      2. Mild hazy groundglass opacity throughout both lungs could relate to mild fluid overload.      3. Cardiomegaly.      4. No airspace consolidation. Linear right midlung atelectasis.         DX-CHEST-PORTABLE (1 VIEW)    Final Result         Stable cardiomegaly.      Mild interstitial prominence could relate to mild fluid overload.             Assessment/Recommendation   31 y.o. M with buried penis secondary to morbid obesity s/p difficult russo placement at bedside.     Procedure in detail performed by Dr. Ivey:  The patient was prepped in a sterile fashion using betadine. A 16 fr russo cathter was first attempted without direct visualization of the glans, but was ultimately unsuccessful. It was then decided that we would attempt catheter placement over wire. A sterile cystoscope was presented into the urethra and passed into the bladder. A wire was passed along the scope. The scope was then removed. A 16fr [place] tip catheter was guided into the bladder via the wire until clear yellow urine return was acheieved. 10cc of sterile saline was used to inflate the russo balloon. Cathter was placed to down drain.      Plan:  - Russo cath to remain as long as it is deemed clinically necessary.   -Please only remove catheter in early morning with void trial regularly throughout day to avoid potential urologic consult in late evening for replacement.  - Urology signing off.     Thank you for this consult. Plan of care directed by Dr. Ivey. Case discussed with nursing and urology. Please contact us with questions.        YING Kramer.-C.   [address]  [place] [address]   [phone]

## 2021-11-04 NOTE — ASSESSMENT & PLAN NOTE
Due to morbid obesity  Last TTE with RVSP 45  RV function ok. Volume overload -->continue diuresis

## 2021-11-04 NOTE — HOSPITAL COURSE
"\"31 Y m with morbid obesity consulted for hypercarbic respiratory failure. He is morbid obese BMI 69, hx of pe on chronic anticoagulation, untreated JONES, chronic venous stasis, eczema, lower exxt cellutlitis. Which was admitted 11/1 for abdominal swelling. He had a negative CTA this admit for pe, CT of his abdomen with some ascites and skin edema, his echo shows some pulmonary hypertension with RSVP 45, mild MR, LV ef 60%. This am 11/3 I was called for ams with a ph 7.1/130/90 on NRB.\" - Dr. Slaughter 11/3    11/3 failed trial of BIPAP, subsequently, intubated   11/5 extubated to BIPAP, doing well. Didn't tolerate off BIPAP  11/6 tolerated HFNC more. Tolerated mobility, sat in chair  "

## 2021-11-04 NOTE — CARE PLAN
The patient is Stable - Low risk of patient condition declining or worsening    Shift Goals  Clinical Goals: SAT, SBT  Patient Goals: extubation  Family Goals: na    Progress made toward(s) clinical / shift goals:  Pt remained of sedation.    Patient is not progressing towards the following goals: unable to SBT due to FiO2.

## 2021-11-04 NOTE — CONSULTS
Reason for PC Consult: Advance Care Planning    Consulted by:   Dr. Slaughter    Assessment:  General:   31 year old male admitted for volume overload on 11/1/21. Pt has a history of morbid obesity (BMI 65.84), PE, DVT, chronic anticoagulation, pulmonary HTN, and JONES. Pt presented to [facility] ED with abdominal swelling, testicle swelling and discomfort and shortness of breath.     Prior PC Consult:   None on File    Social:   Pt lives alone and reports being independent with all ADLs. Pt has family close by for support which includes, mother, grandfather, and uncle.     Dyspnea: Yes, 90% on 70% FiO2 via ETT  Last BM:  (PTA)   Pain: No    Depression: No    Dementia: No      Spiritual:  Is Orthodoxy or spirituality important for coping with this illness? No, Pt declined visit from   Has a  or spiritual provider visit been requested? No    Palliative Performance Scale: 50%    Advance Directive: None on File    DPOA: None on File, NOK Mother Maryuri Ontiveros  POLST: None on File    To locate the AD/POLST, please hover the cursor over the patient's code status to find all linked ACP documents.    Code Status: Full      Outcome:  PC RN visited pt at bedside, introduced self and role of PC. Pt is currently awake and alert, he is intubated but engaged in discussion. Pt uses his phone to write responses back.     Discussed awaiting full consult to discuss GOC when pt is extubated. Pt reports the plan is possibly extubate tomorrow, pt nodded head when PC RN offered to return tomorrow to have full GOC discussion.    Active listening, education, validation, normalization, therapeutic touch, and emotional support provided throughout encounter.    Updated:   Dr. Diaz    Plan:   Continue to discuss GOC with pt once extubated.     Recommendations: I do not recommend an ethics or hospice consult at this time because GOC discussion is on going.    *Recommendation does not provide clinical appropriateness for hospice nor does  it provide that the patient would or would not qualify for hospice services.*     Thank you for allowing Palliative Care to participate in this patient's care. Please feel free to call [phone] with any questions or concerns.

## 2021-11-05 ENCOUNTER — APPOINTMENT (OUTPATIENT)
Dept: RADIOLOGY | Facility: MEDICAL CENTER | Age: 31
DRG: 314 | End: 2021-11-05
Attending: INTERNAL MEDICINE
Payer: COMMERCIAL

## 2021-11-05 LAB
ANION GAP SERPL CALC-SCNC: 10 MMOL/L (ref 7–16)
BASE EXCESS BLDA CALC-SCNC: 18 MMOL/L (ref -4–3)
BASOPHILS # BLD AUTO: 0.3 % (ref 0–1.8)
BASOPHILS # BLD: 0.02 K/UL (ref 0–0.12)
BODY TEMPERATURE: ABNORMAL DEGREES
BREATHS SETTING VENT: 22
BUN SERPL-MCNC: 17 MG/DL (ref 8–22)
CALCIUM SERPL-MCNC: 9 MG/DL (ref 8.5–10.5)
CHLORIDE SERPL-SCNC: 97 MMOL/L (ref 96–112)
CO2 BLDA-SCNC: 43 MMOL/L (ref 20–33)
CO2 SERPL-SCNC: 35 MMOL/L (ref 20–33)
CREAT SERPL-MCNC: 0.54 MG/DL (ref 0.5–1.4)
DELSYS IDSYS: ABNORMAL
EOSINOPHIL # BLD AUTO: 0.17 K/UL (ref 0–0.51)
EOSINOPHIL NFR BLD: 2.2 % (ref 0–6.9)
ERYTHROCYTE [DISTWIDTH] IN BLOOD BY AUTOMATED COUNT: 64.9 FL (ref 35.9–50)
GLUCOSE SERPL-MCNC: 111 MG/DL (ref 65–99)
HCO3 BLDA-SCNC: 42.1 MMOL/L (ref 17–25)
HCT VFR BLD AUTO: 50.5 % (ref 42–52)
HGB BLD-MCNC: 14.2 G/DL (ref 14–18)
HOROWITZ INDEX BLDA+IHG-RTO: 112 MM[HG]
IMM GRANULOCYTES # BLD AUTO: 0.04 K/UL (ref 0–0.11)
IMM GRANULOCYTES NFR BLD AUTO: 0.5 % (ref 0–0.9)
INR PPP: 2.79 (ref 0.87–1.13)
LYMPHOCYTES # BLD AUTO: 1.46 K/UL (ref 1–4.8)
LYMPHOCYTES NFR BLD: 18.6 % (ref 22–41)
MAGNESIUM SERPL-MCNC: 1.8 MG/DL (ref 1.5–2.5)
MCH RBC QN AUTO: 22.6 PG (ref 27–33)
MCHC RBC AUTO-ENTMCNC: 28.1 G/DL (ref 33.7–35.3)
MCV RBC AUTO: 80.4 FL (ref 81.4–97.8)
MODE IMODE: ABNORMAL
MONOCYTES # BLD AUTO: 0.6 K/UL (ref 0–0.85)
MONOCYTES NFR BLD AUTO: 7.6 % (ref 0–13.4)
MORPHOLOGY BLD-IMP: NORMAL
NEUTROPHILS # BLD AUTO: 5.58 K/UL (ref 1.82–7.42)
NEUTROPHILS NFR BLD: 70.8 % (ref 44–72)
NRBC # BLD AUTO: 0 K/UL
NRBC BLD-RTO: 0 /100 WBC
O2/TOTAL GAS SETTING VFR VENT: 60 %
PCO2 BLDA: 42 MMHG (ref 26–37)
PCO2 TEMP ADJ BLDA: 42 MMHG (ref 26–37)
PEEP END EXPIRATORY PRESSURE IPEEP: 10 CMH20
PH BLDA: 7.61 [PH] (ref 7.4–7.5)
PH TEMP ADJ BLDA: 7.61 [PH] (ref 7.4–7.5)
PHOSPHATE SERPL-MCNC: 1.9 MG/DL (ref 2.5–4.5)
PLATELET # BLD AUTO: 190 K/UL (ref 164–446)
PMV BLD AUTO: 9.3 FL (ref 9–12.9)
PO2 BLDA: 67 MMHG (ref 64–87)
PO2 TEMP ADJ BLDA: 67 MMHG (ref 64–87)
POTASSIUM SERPL-SCNC: 3.1 MMOL/L (ref 3.6–5.5)
PROTHROMBIN TIME: 28.6 SEC (ref 12–14.6)
RBC # BLD AUTO: 6.28 M/UL (ref 4.7–6.1)
SAO2 % BLDA: 96 % (ref 93–99)
SODIUM SERPL-SCNC: 142 MMOL/L (ref 135–145)
SPECIMEN DRAWN FROM PATIENT: ABNORMAL
TIDAL VOLUME IVT: 540 ML
WBC # BLD AUTO: 7.9 K/UL (ref 4.8–10.8)

## 2021-11-05 PROCEDURE — 36600 WITHDRAWAL OF ARTERIAL BLOOD: CPT

## 2021-11-05 PROCEDURE — 770022 HCHG ROOM/CARE - ICU (200)

## 2021-11-05 PROCEDURE — 83735 ASSAY OF MAGNESIUM: CPT

## 2021-11-05 PROCEDURE — 80048 BASIC METABOLIC PNL TOTAL CA: CPT

## 2021-11-05 PROCEDURE — 94150 VITAL CAPACITY TEST: CPT

## 2021-11-05 PROCEDURE — 700101 HCHG RX REV CODE 250: Performed by: INTERNAL MEDICINE

## 2021-11-05 PROCEDURE — 700102 HCHG RX REV CODE 250 W/ 637 OVERRIDE(OP): Performed by: STUDENT IN AN ORGANIZED HEALTH CARE EDUCATION/TRAINING PROGRAM

## 2021-11-05 PROCEDURE — A9270 NON-COVERED ITEM OR SERVICE: HCPCS | Performed by: STUDENT IN AN ORGANIZED HEALTH CARE EDUCATION/TRAINING PROGRAM

## 2021-11-05 PROCEDURE — 99291 CRITICAL CARE FIRST HOUR: CPT | Performed by: INTERNAL MEDICINE

## 2021-11-05 PROCEDURE — 94640 AIRWAY INHALATION TREATMENT: CPT

## 2021-11-05 PROCEDURE — 94799 UNLISTED PULMONARY SVC/PX: CPT

## 2021-11-05 PROCEDURE — 700102 HCHG RX REV CODE 250 W/ 637 OVERRIDE(OP): Performed by: INTERNAL MEDICINE

## 2021-11-05 PROCEDURE — A9270 NON-COVERED ITEM OR SERVICE: HCPCS | Performed by: INTERNAL MEDICINE

## 2021-11-05 PROCEDURE — 94660 CPAP INITIATION&MGMT: CPT

## 2021-11-05 PROCEDURE — 85025 COMPLETE CBC W/AUTO DIFF WBC: CPT

## 2021-11-05 PROCEDURE — 700111 HCHG RX REV CODE 636 W/ 250 OVERRIDE (IP): Performed by: INTERNAL MEDICINE

## 2021-11-05 PROCEDURE — 85610 PROTHROMBIN TIME: CPT

## 2021-11-05 PROCEDURE — 700105 HCHG RX REV CODE 258: Performed by: INTERNAL MEDICINE

## 2021-11-05 PROCEDURE — 82803 BLOOD GASES ANY COMBINATION: CPT

## 2021-11-05 PROCEDURE — 71045 X-RAY EXAM CHEST 1 VIEW: CPT

## 2021-11-05 PROCEDURE — 94003 VENT MGMT INPAT SUBQ DAY: CPT

## 2021-11-05 PROCEDURE — 84100 ASSAY OF PHOSPHORUS: CPT

## 2021-11-05 RX ORDER — FAMOTIDINE 20 MG/1
20 TABLET, FILM COATED ORAL 2 TIMES DAILY
Status: DISCONTINUED | OUTPATIENT
Start: 2021-11-05 | End: 2021-11-06

## 2021-11-05 RX ORDER — MAGNESIUM SULFATE HEPTAHYDRATE 40 MG/ML
2 INJECTION, SOLUTION INTRAVENOUS ONCE
Status: COMPLETED | OUTPATIENT
Start: 2021-11-05 | End: 2021-11-05

## 2021-11-05 RX ORDER — WARFARIN SODIUM 7.5 MG/1
3.75 TABLET ORAL
Status: COMPLETED | OUTPATIENT
Start: 2021-11-05 | End: 2021-11-05

## 2021-11-05 RX ADMIN — FUROSEMIDE 60 MG: 10 INJECTION, SOLUTION INTRAMUSCULAR; INTRAVENOUS at 05:36

## 2021-11-05 RX ADMIN — CEFAZOLIN SODIUM 1 G: 1 INJECTION, SOLUTION INTRAVENOUS at 22:21

## 2021-11-05 RX ADMIN — MULTIPLE VITAMINS W/ MINERALS TAB 1 TABLET: TAB at 05:36

## 2021-11-05 RX ADMIN — ALBUTEROL SULFATE 2 PUFF: 90 AEROSOL, METERED RESPIRATORY (INHALATION) at 14:59

## 2021-11-05 RX ADMIN — WARFARIN SODIUM 3.75 MG: 7.5 TABLET ORAL at 17:51

## 2021-11-05 RX ADMIN — CEFAZOLIN SODIUM 1 G: 1 INJECTION, SOLUTION INTRAVENOUS at 05:36

## 2021-11-05 RX ADMIN — FUROSEMIDE 60 MG: 10 INJECTION, SOLUTION INTRAMUSCULAR; INTRAVENOUS at 14:46

## 2021-11-05 RX ADMIN — FAMOTIDINE 20 MG: 10 INJECTION INTRAVENOUS at 05:37

## 2021-11-05 RX ADMIN — MAGNESIUM SULFATE HEPTAHYDRATE 2 G: 40 INJECTION, SOLUTION INTRAVENOUS at 08:42

## 2021-11-05 RX ADMIN — NYSTATIN: 100000 POWDER TOPICAL at 05:36

## 2021-11-05 RX ADMIN — POTASSIUM PHOSPHATE, MONOBASIC AND POTASSIUM PHOSPHATE, DIBASIC 30 MMOL: 224; 236 INJECTION, SOLUTION, CONCENTRATE INTRAVENOUS at 08:42

## 2021-11-05 RX ADMIN — FAMOTIDINE 20 MG: 20 TABLET ORAL at 17:51

## 2021-11-05 RX ADMIN — NYSTATIN: 100000 POWDER TOPICAL at 17:51

## 2021-11-05 RX ADMIN — CEFAZOLIN SODIUM 1 G: 1 INJECTION, SOLUTION INTRAVENOUS at 14:45

## 2021-11-05 ASSESSMENT — ENCOUNTER SYMPTOMS
VOMITING: 0
PALPITATIONS: 0
FALLS: 1
FEVER: 0
HEADACHES: 0
NAUSEA: 0
COUGH: 0
SHORTNESS OF BREATH: 0
SEIZURES: 0
WEAKNESS: 0
DIARRHEA: 0
ABDOMINAL PAIN: 1
ORTHOPNEA: 0
BACK PAIN: 0

## 2021-11-05 ASSESSMENT — PULMONARY FUNCTION TESTS
EPAP_CMH2O: 6
FVC: 1.1
EPAP_CMH2O: 6
EPAP_CMH2O: 6

## 2021-11-05 NOTE — PALLIATIVE CARE
Palliative Care follow-up  PC RN met pt at bedside, pt verbalized good understanding of clinical picture. Educated pt on pulmonary hypertension, sleep apnea, cardiac function and how this all is connected and overtime, if untreated, could result in heart failure. Long discussion about quality vs quantity of life, benefits vs burdens of treatment and explored pt's thoughts and feelings about clinical picture.    Pt reports that he had just recently (September) got insurance through his employer that he started in June of this year. Pt reports he has been without insurance and was unable to receive healthcare services. Since this started, he established an appointment with pulmonology and is waiting to schedule a sleep study as he knows he has severe sleep apnea. Discussed how pt can make healthy choices daily to promote quality AND quantity of life. Pt verbalized understanding, he reports having a supportive social network that he could get help with being more active. His roommate Liliam has helped a lot with diet changes and appears to be supportive of him in general to live healthier. Encouraged pt to start making life choices that support his health and long term goals.    Discussed code status, AD and POLST form. Discussed code status, explored pt's thoughts about intubation. Pt stated that he doesn't want to be intubated again, discussed that if pt's health continues to worsen then this could be a reality. Pt verbalized understanding. Reviewed AD, provided AD packet for pt to review. Pt will look at this and possibly complete during this admission as his preference is to have his roommate Liliam be his DPOA then this mother.     Provided business card, encouraged pt to call with any questions or concerns.     Active listening, education, validation, normalization, therapeutic touch, and emotional support provided.     Updated:   PC Team    Plan:   Continue GOC discussion as clinical picture unfolds.      Recommendations: I do not recommend an ethics or hospice consult at this time because pt declined at this time.    *Recommendation does not provide clinical appropriateness for hospice nor does it provide that the patient would or would not qualify for hospice services.*     Thank you for allowing Palliative Care to participate in this patient's care. Please feel free to call [phone] with any questions or concerns.

## 2021-11-05 NOTE — PROGRESS NOTES
Critical Care Progress Note    Date of admission  11/1/2021    Chief Complaint  31 Y m with morbid obesity consulted for hypercarbic respiratory failure. He is morbid obese BMI 69, hx of pe on chronic anticoagulation, untreated JONES, chronic venous stasis, eczema, lower exxt cellutlitis. Which was admitted 11/1 for abdominal swelling. He had a negative CTA this admit for pe, CT of his abdomen with some ascites and skin edema, his echo shows some pulmonary hypertension with RSVP 45, mild MR, LV ef 60%. This am 11/3 I was called for ams with a ph 7.1/130/90 on NRB.     Hospital Course  11/3 failed trial of BIPAP, subsequently, intubated       Interval Problem Update  Reviewed last 24 hour events:  No acute events overnight.   Off precedex, RASS 0  Weaning down vent settings.   On warfarin for hx of PE  No antibiotics    Review of Systems  Review of Systems   Constitutional: Negative for fever and malaise/fatigue.   Respiratory: Negative for cough and shortness of breath.    Cardiovascular: Negative for chest pain, palpitations, orthopnea and leg swelling.   Gastrointestinal: Positive for abdominal pain (improving). Negative for diarrhea, nausea and vomiting.   Genitourinary: Negative for dysuria and urgency.   Musculoskeletal: Positive for falls. Negative for back pain.   Neurological: Negative for seizures, weakness and headaches.   All other systems reviewed and are negative.       Vital Signs for last 24 hours   Temp:  [36.6 °C (97.8 °F)-36.7 °C (98 °F)] 36.6 °C (97.8 °F)  Pulse:  [] 101  Resp:  [15-41] 20  BP: ()/(40-91) 134/73  SpO2:  [87 %-98 %] 97 %    Hemodynamic parameters for last 24 hours       Respiratory Information for the last 24 hours  Vent Mode: Spont  Rate (breaths/min): 22  Vt Target (mL): 540  PEEP/CPAP: 10  P Support: 10  MAP: 13  Control VTE (exp VT): 398    Physical Exam   Physical Exam  Vitals and nursing note reviewed.   Constitutional:       General: He is not in acute distress.      Appearance: He is ill-appearing. He is not toxic-appearing or diaphoretic.      Comments: Alert, oriented, RASS 0     HENT:      Head: Normocephalic.      Mouth/Throat:      Mouth: Mucous membranes are moist.      Comments: ET tube in place  Cardiovascular:      Rate and Rhythm: Normal rate.      Pulses: Normal pulses.      Heart sounds: Normal heart sounds. No murmur heard.     Pulmonary:      Effort: No respiratory distress.      Breath sounds: Normal breath sounds. No wheezing, rhonchi or rales.      Comments: Diminished at bases  Abdominal:      General: There is distension.      Palpations: Abdomen is soft.      Tenderness: There is no abdominal tenderness. There is no guarding.      Comments: Erythema noted in lower abdomen skin/pannus area   Warm to touch, non tender  obese   Musculoskeletal:         General: No swelling or tenderness.      Right lower leg: Edema present.      Left lower leg: Edema present.   Skin:     General: Skin is warm and dry.      Capillary Refill: Capillary refill takes 2 to 3 seconds.      Coloration: Skin is not jaundiced.   Neurological:      General: No focal deficit present.      Mental Status: He is alert.      Cranial Nerves: No cranial nerve deficit.      Comments: Moving all extremiiteis   Psychiatric:         Mood and Affect: Mood normal.         Medications  Current Facility-Administered Medications   Medication Dose Route Frequency Provider Last Rate Last Admin   • potassium phosphate IVPB 30 mmol in 500 mL D5W (premix)  30 mmol Intravenous Once Darwin Freedman D.O. 83.3 mL/hr at 11/05/21 0842 30 mmol at 11/05/21 0842   • warfarin (COUMADIN) tablet 3.75 mg  3.75 mg Enteral Tube ONCE AT 1800 Darwin Freedman D.O.       • MD Alert...Warfarin per Pharmacy   Other PHARMACY TO DOSE Darwin Freedman D.O.       • famotidine (PEPCID) tablet 20 mg  20 mg Enteral Tube BID Darwin Freedman D.O.       • ceFAZolin in dextrose (ANCEF) IVPB premix 1 g  1 g Intravenous Q8HRS Darwin Freedman D.O.   Stopped at  11/05/21 0606   • nystatin (MYCOSTATIN) powder   Topical BID Bartolo Rosa M.D.   Given at 11/05/21 0536   • dexmedetomidine (PRECEDEX) 400 mcg/100mL NS premix infusion  0.1-1.2 mcg/kg/hr Intravenous Continuous Maurice Slaughter M.D.   Paused at 11/04/21 0510   • fentaNYL (SUBLIMAZE) injection 100 mcg  100 mcg Intravenous Q HOUR PRN Maurice Slaughter M.D.   100 mcg at 11/04/21 0139   • Pharmacy Consult: Enteral tube insertion - review meds/change route/product selection  1 Each Other PHARMACY TO DOSE Maurice Slaughter M.D.       • Respiratory Therapy Consult   Nebulization Continuous RT Maurice Slaughter M.D.       • senna-docusate (PERICOLACE or SENOKOT S) 8.6-50 MG per tablet 2 Tablet  2 Tablet Enteral Tube BID Maurice Slaughter M.D.   2 Tablet at 11/04/21 1810    And   • polyethylene glycol/lytes (MIRALAX) PACKET 1 Packet  1 Packet Enteral Tube QDAY PRN Maurice Slaughter M.D.        And   • magnesium hydroxide (MILK OF MAGNESIA) suspension 30 mL  30 mL Enteral Tube QDAY PRN Maurice Slaughter M.D.        And   • bisacodyl (DULCOLAX) suppository 10 mg  10 mg Rectal QDAY PRN Maurice Slaughter M.D.       • MD Alert...ICU Electrolyte Replacement per Pharmacy   Other PHARMACY TO DOSE Maurice Slaughter M.D.       • lidocaine (XYLOCAINE) 1 % injection 2 mL  2 mL Tracheal Tube Q30 MIN PRN Maurice Slaughter M.D.       • furosemide (LASIX) injection 60 mg  60 mg Intravenous BID DIURETIC Maurice Slaughter M.D.   60 mg at 11/05/21 0536   • acetaminophen (Tylenol) tablet 650 mg  650 mg Enteral Tube Q6HRS PRN Maurice Slaughter M.D.       • cloNIDine (CATAPRES) tablet 0.1 mg  0.1 mg Enteral Tube Q6HRS PRN Maurice Slaughter M.D.       • ondansetron (ZOFRAN ODT) dispertab 4 mg  4 mg Enteral Tube Q4HRS PRN Maurice Slaughter M.D.       • therapeutic multivitamin-minerals (THERAGRAN-M) tablet 1 Tablet  1 Tablet Enteral Tube QAM Maurice Slaughter M.D.   1 Tablet at 11/05/21 0536   • albuterol inhaler 2 Puff  2 Puff Inhalation  Q6HRS PRN Barry Tabor M.D.       • Pharmacy Consult Request ...Pain Management Review 1 Each  1 Each Other PHARMACY TO DOSE Barry Tabor M.D.       • enalaprilat (Vasotec) injection 1.25 mg 1 mL  1.25 mg Intravenous Q6HRS PRN Barry Tabor M.D.       • labetalol (NORMODYNE/TRANDATE) injection 10 mg  10 mg Intravenous Q4HRS PRN Barry Tabor M.D.       • ondansetron (ZOFRAN) syringe/vial injection 4 mg  4 mg Intravenous Q4HRS PRN Barry Tabor M.D.           Fluids    Intake/Output Summary (Last 24 hours) at 11/5/2021 1437  Last data filed at 11/5/2021 1200  Gross per 24 hour   Intake 2775.49 ml   Output 6575 ml   Net -3799.51 ml       Laboratory  Recent Labs     11/03/21  0600 11/03/21  0853 11/03/21  0853 11/03/21  0952 11/04/21  0539 11/05/21  0510   MHVRS54Y 7.11*  --   --   --   --   --    KRACJM718R 131.0*  --   --   --   --   --    HBPVJ567F 68.7  --   --   --   --   --    CIIJ3AFP 88.3*  --   --   --   --   --    ARTHCO3 40*  --   --   --   --   --    ARTBE 6*  --   --   --   --   --    ISTATAPH  --  7.157*   < > 7.412 7.539* 7.608*   ISTATAPCO2  --  >100.0*   < > 54.1* 43.4* 42.0*   ISTATAPO2  --  57*   < > 55* 64 67   ISTATATCO2  --  40*   < > 36* 38* 43*   VRJDLEY0DWD  --  77*   < > 88* 94 96   ISTATARTHCO3  --  37.2*   < > 34.4* 37.0* 42.1*   ISTATARTBE  --  3   < > 8* 13* 18*   ISTATTEMP  --  98.0 F   < > 98.0 F see below 37.0 C   ISTATFIO2  --  60   < > 30 100 60   ISTATSPEC  --  Arterial   < > Arterial Arterial Arterial   ISTATAPHTC  --  7.162*  --  7.417  --  7.608*   VROTMSPT8WN  --  55*  --  53*  --  67    < > = values in this interval not displayed.         Recent Labs     11/03/21 0211 11/04/21 0458 11/05/21  0350   SODIUM 137 144 142   POTASSIUM 5.3 3.8 3.1*   CHLORIDE 97 101 97   CO2 32 31 35*   BUN 18 19 17   CREATININE 0.59 0.56 0.54   MAGNESIUM 1.8 1.6 1.8   PHOSPHORUS  --  1.9* 1.9*   CALCIUM 9.1 8.5 9.0     Recent Labs     11/03/21 0211 11/04/21 0458 11/05/21  0350   PREALBUMIN  --  6.7*  --     GLUCOSE 104* 100* 111*     Recent Labs     11/03/21  0559 11/04/21  0458 11/05/21  0350   WBC 7.1 7.3 7.9   NEUTSPOLYS 68.70 78.50* 70.80   LYMPHOCYTES 18.60* 12.10* 18.60*   MONOCYTES 10.30 7.60 7.60   EOSINOPHILS 0.70 1.00 2.20   BASOPHILS 0.40 0.30 0.30     Recent Labs     11/03/21  0211 11/03/21  0559 11/04/21  0458 11/05/21  0350   RBC  --  6.32* 6.14* 6.28*   HEMOGLOBIN  --  14.2 13.8* 14.2   HEMATOCRIT  --  54.7* 49.3 50.5   PLATELETCT  --  217 185 190   PROTHROMBTM 29.5*  --  46.5* 28.6*   INR 2.90*  --  5.22* 2.79*       Imaging  X-Ray:  I have personally reviewed the images and compared with prior images.    Assessment/Plan  * Acute respiratory failure with hypoxia and hypercapnia (HCC)- (present on admission)  Assessment & Plan  Intubated for worsening acute hypercapnic resp failure  Failed BIPAP and subsequently intubated 11/3    Extubated today to BIPAP doing well  I came back to visit post extubation. Pt appears comfortable with BIPAP on   Will continue BIPAP today/tonight  Continue lasix, goal negative fluid balance        Cellulitis  Assessment & Plan  Cellulitis in lower extremities bilaterally and lower abdomen  Warm to touch, but not tender  Continue cefazolin 1gr IV Q8H x 7 days.   Moisturize dry skin in LE with vaseline products    Pulmonary embolism with acute cor pulmonale (HCC)- (present on admission)  Assessment & Plan  Hx of PE, on coumadin  INR goal 2-3  Dosing per pharmacy    Pulmonary hypertension (HCC)- (present on admission)  Assessment & Plan  Due to morbid obesity  Last TTE with RVSP 45  RV function ok    Morbid obesity (HCC)- (present on admission)  Assessment & Plan  BMI 66       VTE:  Heparin  Ulcer: H2 Antagonist  Lines: Central Line  Ongoing indication addressed and Matthew Catheter  Ongoing indication addressed    I have performed a physical exam and reviewed and updated ROS and Plan today (11/5/2021). In review of yesterday's note (11/4/2021), there are no changes except as  documented above.     Discussed patient condition and risk of morbidity and/or mortality with Family, RN, RT, Pharmacy, Charge nurse / hot rounds and Patient  The patient remains critically ill.  Critical care time = 60 minutes in directly providing and coordinating critical care and extensive data review.  No time overlap and excludes procedures.

## 2021-11-05 NOTE — WOUND TEAM
[facility]  Inpatient Services  Wound and Skin Care Brief Evaluation    Admission Date: 11/1/2021     Last order of IP CONSULT TO WOUND CARE was found on 11/2/2021 from Hospital Encounter on 11/1/2021     HPI, PMH, SH: Reviewed    Chief Complaint   Patient presents with   • Abdominal Swelling     increasing. pt not on any diuretics   • Groin Swelling   • Testicle Swelling     Diagnosis: Volume overload [E87.70]  Hypercapnic respiratory failure (HCC) [J96.92]    Unit where seen by Wound Team: T628/00     Wound consult placed regarding pannus/BLE/scrotum. Chart and images reviewed. This RN in to assess patient. Pt pleasant and agreeable. . Non-selectively debrided with NS and gauze.  Patient has severe cellultiis but no open wounds noted.  Generalized weeping.  Nystatin powder and interdry in use.  No pressure injuries or advanced wound care needs identified. Wound consult completed. No further follow up unless indicated and consulted.                      RSKIN:   CURRENTLY IN PLACE (X), APPLIED THIS VISIT (A), ORDERED (O):   Q shift Chuck:  X  Q shift pressure point assessments:  X    Surface/Positioning   Pressure redistribution mattress            Low Airloss        ICU bed   Bariatric foam      Bariatric YOSELIN     Waffle cushion        Waffle Overlay          Reposition q 2 hours      TAPs Turning system     Z Juan Miguel Pillow     Offloading/Redistribution   Sacral Mepilex (Silicone dressing)     Heel Mepilex (Silicone dressing)         Heel float boots (Prevalon boot)             Float Heels off Bed with Pillows           Respiratory   Silicone O2 tubing         Gray Foam Ear protectors     Cannula fixation Device (Tender )          High flow offloading Clip    Elastic head band offloading device      Anchorfast                                                         Trach with Optifoam split foam             Containment/Moisture Prevention     Rectal tube or BMS    Purwick/Condom Cath        Matthew  Catheter    Barrier wipes           Barrier paste       Antifungal tx      Interdry        Mobilization       Up to chair        Ambulate      PT/OT      Nutrition       Dietician        Diabetes Education      PO     TF     TPN     NPO   # days     Other

## 2021-11-05 NOTE — CARE PLAN
Problem: Ventilation  Goal: Ability to achieve and maintain unassisted ventilation or tolerate decreased levels of ventilator support  Description: Document on Vent flowsheet    1.  Support and monitor invasive and noninvasive mechanical ventilation  2.  Monitor ventilator weaning response  3.  Perform ventilator associated pneumonia prevention interventions  4.  Manage ventilation therapy by monitoring diagnostic test results  Outcome: Progressing       Respiratory Update    Treatment modality: BiPAP  Frequency: Q4    Pt tolerating current treatments well with no adverse reactions.

## 2021-11-05 NOTE — CARE PLAN
The patient is Watcher - Medium risk of patient condition declining or worsening    Shift Goals  Clinical Goals: Wean oxygenation  Patient Goals: extubation  Family Goals: na    Problem: Skin Integrity  Goal: Skin integrity is maintained or improved  Outcome: Not Met  Note: All skin interventions in place       Problem: Respiratory  Goal: Patient will achieve/maintain optimum respiratory ventilation and gas exchange  Outcome: Progressing  Note: Pt currently intubated. Focusing on aggressive diuresis in order to promote adequate oxygenation. Will collaborate w/ RT to provide optimal oxygenation

## 2021-11-06 LAB
ANION GAP SERPL CALC-SCNC: 9 MMOL/L (ref 7–16)
ANISOCYTOSIS BLD QL SMEAR: ABNORMAL
BASOPHILS # BLD AUTO: 0 % (ref 0–1.8)
BASOPHILS # BLD: 0 K/UL (ref 0–0.12)
BUN SERPL-MCNC: 11 MG/DL (ref 8–22)
CALCIUM SERPL-MCNC: 8.1 MG/DL (ref 8.5–10.5)
CHLORIDE SERPL-SCNC: 96 MMOL/L (ref 96–112)
CO2 SERPL-SCNC: 38 MMOL/L (ref 20–33)
CREAT SERPL-MCNC: 0.42 MG/DL (ref 0.5–1.4)
EOSINOPHIL # BLD AUTO: 0.62 K/UL (ref 0–0.51)
EOSINOPHIL NFR BLD: 8.8 % (ref 0–6.9)
ERYTHROCYTE [DISTWIDTH] IN BLOOD BY AUTOMATED COUNT: 65.7 FL (ref 35.9–50)
GLUCOSE SERPL-MCNC: 93 MG/DL (ref 65–99)
HCT VFR BLD AUTO: 50.3 % (ref 42–52)
HGB BLD-MCNC: 14 G/DL (ref 14–18)
INR PPP: 2.22 (ref 0.87–1.13)
LYMPHOCYTES # BLD AUTO: 0.56 K/UL (ref 1–4.8)
LYMPHOCYTES NFR BLD: 8 % (ref 22–41)
MAGNESIUM SERPL-MCNC: 1.7 MG/DL (ref 1.5–2.5)
MANUAL DIFF BLD: NORMAL
MCH RBC QN AUTO: 22.9 PG (ref 27–33)
MCHC RBC AUTO-ENTMCNC: 27.8 G/DL (ref 33.7–35.3)
MCV RBC AUTO: 82.3 FL (ref 81.4–97.8)
MICROCYTES BLD QL SMEAR: ABNORMAL
MONOCYTES # BLD AUTO: 0.13 K/UL (ref 0–0.85)
MONOCYTES NFR BLD AUTO: 1.8 % (ref 0–13.4)
MORPHOLOGY BLD-IMP: NORMAL
MYELOCYTES NFR BLD MANUAL: 0.9 %
NEUTROPHILS # BLD AUTO: 5.64 K/UL (ref 1.82–7.42)
NEUTROPHILS NFR BLD: 75.2 % (ref 44–72)
NEUTS BAND NFR BLD MANUAL: 5.3 % (ref 0–10)
NRBC # BLD AUTO: 0 K/UL
NRBC BLD-RTO: 0 /100 WBC
PHOSPHATE SERPL-MCNC: 4.2 MG/DL (ref 2.5–4.5)
PLATELET # BLD AUTO: 187 K/UL (ref 164–446)
PLATELET BLD QL SMEAR: NORMAL
PMV BLD AUTO: 10.1 FL (ref 9–12.9)
POTASSIUM SERPL-SCNC: 3.2 MMOL/L (ref 3.6–5.5)
PROTHROMBIN TIME: 23.9 SEC (ref 12–14.6)
RBC # BLD AUTO: 6.11 M/UL (ref 4.7–6.1)
RBC BLD AUTO: PRESENT
SODIUM SERPL-SCNC: 143 MMOL/L (ref 135–145)
WBC # BLD AUTO: 7 K/UL (ref 4.8–10.8)

## 2021-11-06 PROCEDURE — 84100 ASSAY OF PHOSPHORUS: CPT

## 2021-11-06 PROCEDURE — 85027 COMPLETE CBC AUTOMATED: CPT

## 2021-11-06 PROCEDURE — A9270 NON-COVERED ITEM OR SERVICE: HCPCS | Performed by: INTERNAL MEDICINE

## 2021-11-06 PROCEDURE — 85007 BL SMEAR W/DIFF WBC COUNT: CPT

## 2021-11-06 PROCEDURE — 94660 CPAP INITIATION&MGMT: CPT

## 2021-11-06 PROCEDURE — 700111 HCHG RX REV CODE 636 W/ 250 OVERRIDE (IP): Performed by: INTERNAL MEDICINE

## 2021-11-06 PROCEDURE — 700102 HCHG RX REV CODE 250 W/ 637 OVERRIDE(OP): Performed by: INTERNAL MEDICINE

## 2021-11-06 PROCEDURE — A9270 NON-COVERED ITEM OR SERVICE: HCPCS | Performed by: STUDENT IN AN ORGANIZED HEALTH CARE EDUCATION/TRAINING PROGRAM

## 2021-11-06 PROCEDURE — 80048 BASIC METABOLIC PNL TOTAL CA: CPT

## 2021-11-06 PROCEDURE — 94640 AIRWAY INHALATION TREATMENT: CPT

## 2021-11-06 PROCEDURE — 83735 ASSAY OF MAGNESIUM: CPT

## 2021-11-06 PROCEDURE — 770022 HCHG ROOM/CARE - ICU (200)

## 2021-11-06 PROCEDURE — 85610 PROTHROMBIN TIME: CPT

## 2021-11-06 PROCEDURE — 700102 HCHG RX REV CODE 250 W/ 637 OVERRIDE(OP): Performed by: STUDENT IN AN ORGANIZED HEALTH CARE EDUCATION/TRAINING PROGRAM

## 2021-11-06 PROCEDURE — 99291 CRITICAL CARE FIRST HOUR: CPT | Performed by: INTERNAL MEDICINE

## 2021-11-06 RX ORDER — POTASSIUM CHLORIDE 20 MEQ/1
40 TABLET, EXTENDED RELEASE ORAL EVERY 6 HOURS
Status: COMPLETED | OUTPATIENT
Start: 2021-11-06 | End: 2021-11-06

## 2021-11-06 RX ORDER — FUROSEMIDE 10 MG/ML
60 INJECTION INTRAMUSCULAR; INTRAVENOUS
Status: DISCONTINUED | OUTPATIENT
Start: 2021-11-07 | End: 2021-11-18

## 2021-11-06 RX ORDER — CLONIDINE HYDROCHLORIDE 0.1 MG/1
0.1 TABLET ORAL EVERY 6 HOURS PRN
Status: DISCONTINUED | OUTPATIENT
Start: 2021-11-06 | End: 2021-11-24 | Stop reason: HOSPADM

## 2021-11-06 RX ORDER — ACETAMINOPHEN 325 MG/1
650 TABLET ORAL EVERY 6 HOURS PRN
Status: DISCONTINUED | OUTPATIENT
Start: 2021-11-06 | End: 2021-11-24 | Stop reason: HOSPADM

## 2021-11-06 RX ORDER — FAMOTIDINE 20 MG/1
20 TABLET, FILM COATED ORAL 2 TIMES DAILY
Status: DISCONTINUED | OUTPATIENT
Start: 2021-11-06 | End: 2021-11-06

## 2021-11-06 RX ORDER — AMOXICILLIN 250 MG
2 CAPSULE ORAL 2 TIMES DAILY
Status: DISCONTINUED | OUTPATIENT
Start: 2021-11-06 | End: 2021-11-24 | Stop reason: HOSPADM

## 2021-11-06 RX ORDER — WARFARIN SODIUM 7.5 MG/1
3.75 TABLET ORAL
Status: COMPLETED | OUTPATIENT
Start: 2021-11-06 | End: 2021-11-06

## 2021-11-06 RX ORDER — MAGNESIUM SULFATE HEPTAHYDRATE 40 MG/ML
2 INJECTION, SOLUTION INTRAVENOUS ONCE
Status: COMPLETED | OUTPATIENT
Start: 2021-11-06 | End: 2021-11-06

## 2021-11-06 RX ORDER — BISACODYL 10 MG
10 SUPPOSITORY, RECTAL RECTAL
Status: DISCONTINUED | OUTPATIENT
Start: 2021-11-06 | End: 2021-11-24 | Stop reason: HOSPADM

## 2021-11-06 RX ORDER — M-VIT,TX,IRON,MINS/CALC/FOLIC 27MG-0.4MG
1 TABLET ORAL EVERY MORNING
Status: DISCONTINUED | OUTPATIENT
Start: 2021-11-06 | End: 2021-11-24 | Stop reason: HOSPADM

## 2021-11-06 RX ORDER — CEPHALEXIN 500 MG/1
500 CAPSULE ORAL 4 TIMES DAILY
Status: COMPLETED | OUTPATIENT
Start: 2021-11-06 | End: 2021-11-11

## 2021-11-06 RX ORDER — POLYETHYLENE GLYCOL 3350 17 G/17G
1 POWDER, FOR SOLUTION ORAL
Status: DISCONTINUED | OUTPATIENT
Start: 2021-11-06 | End: 2021-11-24 | Stop reason: HOSPADM

## 2021-11-06 RX ORDER — ONDANSETRON 4 MG/1
4 TABLET, ORALLY DISINTEGRATING ORAL EVERY 4 HOURS PRN
Status: DISCONTINUED | OUTPATIENT
Start: 2021-11-06 | End: 2021-11-24 | Stop reason: HOSPADM

## 2021-11-06 RX ADMIN — MULTIPLE VITAMINS W/ MINERALS TAB 1 TABLET: TAB at 05:09

## 2021-11-06 RX ADMIN — SENNOSIDES AND DOCUSATE SODIUM 2 TABLET: 50; 8.6 TABLET ORAL at 05:09

## 2021-11-06 RX ADMIN — FUROSEMIDE 60 MG: 10 INJECTION, SOLUTION INTRAMUSCULAR; INTRAVENOUS at 05:09

## 2021-11-06 RX ADMIN — POTASSIUM CHLORIDE 40 MEQ: 1500 TABLET, EXTENDED RELEASE ORAL at 13:37

## 2021-11-06 RX ADMIN — CEFAZOLIN SODIUM 1 G: 1 INJECTION, SOLUTION INTRAVENOUS at 05:09

## 2021-11-06 RX ADMIN — SENNOSIDES AND DOCUSATE SODIUM 2 TABLET: 50; 8.6 TABLET ORAL at 17:36

## 2021-11-06 RX ADMIN — MAGNESIUM SULFATE HEPTAHYDRATE 2 G: 40 INJECTION, SOLUTION INTRAVENOUS at 09:18

## 2021-11-06 RX ADMIN — CEPHALEXIN 500 MG: 500 CAPSULE ORAL at 17:37

## 2021-11-06 RX ADMIN — NYSTATIN: 100000 POWDER TOPICAL at 17:38

## 2021-11-06 RX ADMIN — WARFARIN SODIUM 3.75 MG: 7.5 TABLET ORAL at 17:37

## 2021-11-06 RX ADMIN — CEPHALEXIN 500 MG: 500 CAPSULE ORAL at 13:37

## 2021-11-06 RX ADMIN — POTASSIUM CHLORIDE 40 MEQ: 1500 TABLET, EXTENDED RELEASE ORAL at 09:19

## 2021-11-06 RX ADMIN — FAMOTIDINE 20 MG: 20 TABLET ORAL at 05:09

## 2021-11-06 RX ADMIN — CEPHALEXIN 500 MG: 500 CAPSULE ORAL at 21:24

## 2021-11-06 RX ADMIN — NYSTATIN: 100000 POWDER TOPICAL at 05:10

## 2021-11-06 ASSESSMENT — ENCOUNTER SYMPTOMS
BACK PAIN: 0
SEIZURES: 0
DIARRHEA: 0
COUGH: 0
HEADACHES: 0
FEVER: 0
VOMITING: 0
ABDOMINAL PAIN: 1
NAUSEA: 0
FALLS: 1
ORTHOPNEA: 0
WEAKNESS: 0
PALPITATIONS: 0
SHORTNESS OF BREATH: 0

## 2021-11-06 ASSESSMENT — PAIN DESCRIPTION - PAIN TYPE
TYPE: ACUTE PAIN

## 2021-11-06 ASSESSMENT — PULMONARY FUNCTION TESTS
EPAP_CMH2O: 8

## 2021-11-06 ASSESSMENT — FIBROSIS 4 INDEX: FIB4 SCORE: 1.8

## 2021-11-06 NOTE — PROGRESS NOTES
Inpatient Anticoagulation Service Note    Date: 11/6/2021  Reason for Anticoagulation: Pulmonary Embolism  Hemoglobin Value: 14  Hematocrit Value: 50.3  Lab Platelet Value: 187  Target INR: 2.0 to 3.0  INR from last 7 days     Date/Time INR Value    11/06/21 0512 (Abnormal) 2.22    11/05/21 03:50:01 (Abnormal) 2.79    11/04/21 0458 (Abnormal) 5.22    11/03/21 0211 (Abnormal) 2.9    11/02/21 02:24:01 (Abnormal) 3.34    11/01/21 0900 (Abnormal) 3.86        Dose from last 7 days     Date/Time Dose (mg)    11/06/21 0820 3.75    11/05/21 1100 3.75    11/04/21 1140 0    11/03/21 1330 7.5    11/02/21 1522 3.75    11/01/21 1212 0        Average Dose (mg):  (7.5 mg daily)  Significant Interactions: Antibiotics  Bridge Therapy: No  Reversal Agent Administered: Not Applicable    A/P  Therapeutic INR - with small decrease from yesterday.  Continues to be at risk for increased sensitivity to warfarin dosing in setting of acute illness and reduced oral intake.  Will continue with 50% of home dose tonight and give warfarin 3.75 mg.  INR tomorrow.    Education Material Provided?: Yes  Pharmacist suggested discharge dosing: TBD, may be able to return to prior home warfarin regimen once acute illness resolved (warfarin 7.5 mg PO daily).  Repeat INR in 72 hours of discharge.      Penelope Kaba, PharmD, BCPS, BCCCP

## 2021-11-06 NOTE — CARE PLAN
Problem: Pain - Standard  Goal: Alleviation of pain or a reduction in pain to the patient’s comfort goal  Outcome: Met     Problem: Respiratory  Goal: Patient will achieve/maintain optimum respiratory ventilation and gas exchange  Outcome: Progressing  Flowsheets (Taken 11/5/2021 1800)  O2 Delivery Device: BIPAP  Deep Breathe and Cough: Performs Correctly     Problem: Fall Risk  Goal: Patient will remain free from falls  Outcome: Met   The patient is Watcher - Medium risk of patient condition declining or worsening    Shift Goals  Clinical Goals: Wean vent settings, stable vital signs  Patient Goals: Extubate tomorrow  Family Goals: N/A    Progress made toward(s) clinical / shift goals:  Patient extubated to BiPAP 60% FiO2, tolerating well. No complaints of pain this shift    Patient is not progressing towards the following goals:

## 2021-11-06 NOTE — CARE PLAN
Problem: Knowledge Deficit - Standard  Goal: Patient and family/care givers will demonstrate understanding of plan of care, disease process/condition, diagnostic tests and medications  Outcome: Progressing     Problem: Respiratory  Goal: Patient will achieve/maintain optimum respiratory ventilation and gas exchange  Outcome: Progressing   The patient is Stable - Low risk of patient condition declining or worsening    Shift Goals  Clinical Goals: improve oxygenation  Patient Goals: Extubate tomorrow  Family Goals: N/A    Progress made toward(s) clinical / shift goals:  pt verbalizing POC

## 2021-11-06 NOTE — PROGRESS NOTES
Critical Care Progress Note    Date of admission  11/1/2021    Chief Complaint  31 Y m with morbid obesity consulted for hypercarbic respiratory failure. He is morbid obese BMI 69, hx of pe on chronic anticoagulation, untreated JONES, chronic venous stasis, eczema, lower exxt cellutlitis. Which was admitted 11/1 for abdominal swelling. He had a negative CTA this admit for pe, CT of his abdomen with some ascites and skin edema, his echo shows some pulmonary hypertension with RSVP 45, mild MR, LV ef 60%. This am 11/3 I was called for ams with a ph 7.1/130/90 on NRB.     Hospital Course  11/3 failed trial of BIPAP, subsequently, intubated   11/5 extubated to BIPAP, doing well    Interval Problem Update  Reviewed last 24 hour events:  Extubated to BIPAP, doing well  Not tolerating when taking off BIPAP  On BIPAP all night  Making good UOP. On lasix  Creatinine 0.42  INR 2.2, on coumadin for hx of PE  On cefazolin for cellulitis in lower abdomen and lower extremities bilat    Review of Systems  Review of Systems   Constitutional: Negative for fever and malaise/fatigue.   Respiratory: Negative for cough and shortness of breath.    Cardiovascular: Negative for chest pain, palpitations, orthopnea and leg swelling.   Gastrointestinal: Positive for abdominal pain (improving). Negative for diarrhea, nausea and vomiting.   Genitourinary: Negative for dysuria and urgency.   Musculoskeletal: Positive for falls. Negative for back pain.   Neurological: Negative for seizures, weakness and headaches.   All other systems reviewed and are negative.       Vital Signs for last 24 hours   Temp:  [35.7 °C (96.3 °F)-36.6 °C (97.9 °F)] 35.8 °C (96.5 °F)  Pulse:  [] 83  Resp:  [15-41] 24  BP: (102-145)/(49-97) 123/71  SpO2:  [85 %-98 %] 95 %    Hemodynamic parameters for last 24 hours       Respiratory Information for the last 24 hours       Physical Exam   Physical Exam  Vitals and nursing note reviewed.   Constitutional:       General:  He is not in acute distress.     Appearance: He is ill-appearing. He is not toxic-appearing or diaphoretic.      Comments: Alert, oriented, RASS 0  Comfortable while on BIPAP  Also reevaluated when pt is on HFNC and appears comfortable   HENT:      Head: Normocephalic.      Mouth/Throat:      Mouth: Mucous membranes are moist.   Cardiovascular:      Rate and Rhythm: Normal rate.      Pulses: Normal pulses.      Heart sounds: Normal heart sounds. No murmur heard.     Pulmonary:      Effort: No respiratory distress.      Breath sounds: Normal breath sounds. No wheezing, rhonchi or rales.      Comments: Diminished at bases  Abdominal:      General: There is distension.      Palpations: Abdomen is soft.      Tenderness: There is no abdominal tenderness. There is no guarding.      Comments: Erythema noted in lower abdomen skin/pannus area   Warm to touch, non tender  Obese  Ertyhema improving   Musculoskeletal:         General: No swelling or tenderness.      Right lower leg: Edema present.      Left lower leg: Edema present.      Comments: LE cellultiis appear improving   Skin:     General: Skin is warm and dry.      Capillary Refill: Capillary refill takes 2 to 3 seconds.      Coloration: Skin is not jaundiced.   Neurological:      General: No focal deficit present.      Mental Status: He is alert.      Cranial Nerves: No cranial nerve deficit.      Comments: Moving all extremiiteis   Psychiatric:         Mood and Affect: Mood normal.         Medications  Current Facility-Administered Medications   Medication Dose Route Frequency Provider Last Rate Last Admin   • acetaminophen (Tylenol) tablet 650 mg  650 mg Oral Q6HRS PRN Kell Nguyen M.D.       • cloNIDine (CATAPRES) tablet 0.1 mg  0.1 mg Oral Q6HRS PRN Kell Nguyen M.D.       • ondansetron (ZOFRAN ODT) dispertab 4 mg  4 mg Oral Q4HRS PRN Kell Nguyen M.D.       • senna-docusate (PERICOLACE or SENOKOT S) 8.6-50 MG per tablet 2 Tablet  2 Tablet Oral BID  Kell Nguyen M.D.   2 Tablet at 11/06/21 0509    And   • polyethylene glycol/lytes (MIRALAX) PACKET 1 Packet  1 Packet Oral QDAY PRN Kell Nguyen M.D.        And   • magnesium hydroxide (MILK OF MAGNESIA) suspension 30 mL  30 mL Oral QDAY PRN Kell Nguyen M.D.        And   • bisacodyl (DULCOLAX) suppository 10 mg  10 mg Rectal QDAY PRN Kell Nguyen M.D.       • famotidine (PEPCID) tablet 20 mg  20 mg Oral BID Kell Nguyen M.D.   20 mg at 11/06/21 0509   • therapeutic multivitamin-minerals (THERAGRAN-M) tablet 1 Tablet  1 Tablet Oral QAM Kell Nguyen M.D.   1 Tablet at 11/06/21 0509   • MD Alert...Warfarin per Pharmacy   Other PHARMACY TO DOSE Darwin Freedman D.O.       • ceFAZolin in dextrose (ANCEF) IVPB premix 1 g  1 g Intravenous Q8HRS SALINAS WhitlockOTyson 100 mL/hr at 11/06/21 0509 1 g at 11/06/21 0509   • nystatin (MYCOSTATIN) powder   Topical BID Bartolo Rosa M.D.   Given at 11/06/21 0510   • dexmedetomidine (PRECEDEX) 400 mcg/100mL NS premix infusion  0.1-1.2 mcg/kg/hr Intravenous Continuous Maurice Slaughter M.D.   Paused at 11/04/21 0510   • fentaNYL (SUBLIMAZE) injection 100 mcg  100 mcg Intravenous Q HOUR PRN Maurice Slaughter M.D.   100 mcg at 11/04/21 0139   • Respiratory Therapy Consult   Nebulization Continuous RT Maurice Slaughter M.D.       • MD Alert...ICU Electrolyte Replacement per Pharmacy   Other PHARMACY TO DOSE Maurice Slaughter M.D.       • lidocaine (XYLOCAINE) 1 % injection 2 mL  2 mL Tracheal Tube Q30 MIN PRN Maurice Slaughter M.D.       • furosemide (LASIX) injection 60 mg  60 mg Intravenous BID DIURETIC Maurice Slaughter M.D.   60 mg at 11/06/21 0509   • albuterol inhaler 2 Puff  2 Puff Inhalation Q6HRS PRN Barry Tabor M.D.   2 Puff at 11/05/21 1458   • Pharmacy Consult Request ...Pain Management Review 1 Each  1 Each Other PHARMACY TO DOSE Barry Tabor M.D.       • enalaprilat (Vasotec) injection 1.25 mg 1 mL  1.25 mg Intravenous Q6HRS PRN Barry Tabor M.D.        • labetalol (NORMODYNE/TRANDATE) injection 10 mg  10 mg Intravenous Q4HRS PRN Barry Tabor M.D.       • ondansetron (ZOFRAN) syringe/vial injection 4 mg  4 mg Intravenous Q4HRS PRN Barry Tabor M.D.           Fluids    Intake/Output Summary (Last 24 hours) at 11/6/2021 0723  Last data filed at 11/6/2021 0547  Gross per 24 hour   Intake 1429.8 ml   Output 6600 ml   Net -5170.2 ml       Laboratory  Recent Labs     11/03/21  0853 11/03/21  0853 11/03/21  0952 11/04/21  0539 11/05/21  0510   ISTATAPH 7.157*   < > 7.412 7.539* 7.608*   ISTATAPCO2 >100.0*   < > 54.1* 43.4* 42.0*   ISTATAPO2 57*   < > 55* 64 67   ISTATATCO2 40*   < > 36* 38* 43*   KUIZXLT6FSH 77*   < > 88* 94 96   ISTATARTHCO3 37.2*   < > 34.4* 37.0* 42.1*   ISTATARTBE 3   < > 8* 13* 18*   ISTATTEMP 98.0 F   < > 98.0 F see below 37.0 C   ISTATFIO2 60   < > 30 100 60   ISTATSPEC Arterial   < > Arterial Arterial Arterial   ISTATAPHTC 7.162*  --  7.417  --  7.608*   [id number] 55*  --  53*  --  67    < > = values in this interval not displayed.         Recent Labs     11/04/21 0458 11/05/21 0350 11/06/21  0340   SODIUM 144 142 143   POTASSIUM 3.8 3.1* 3.2*   CHLORIDE 101 97 96   CO2 31 35* 38*   BUN 19 17 11   CREATININE 0.56 0.54 0.42*   MAGNESIUM 1.6 1.8 1.7   PHOSPHORUS 1.9* 1.9* 4.2   CALCIUM 8.5 9.0 8.1*     Recent Labs     11/04/21 0458 11/05/21  0350 11/06/21  0340   PREALBUMIN 6.7*  --   --    GLUCOSE 100* 111* 93     Recent Labs     11/04/21  0458 11/05/21  0350   WBC 7.3 7.9   NEUTSPOLYS 78.50* 70.80   LYMPHOCYTES 12.10* 18.60*   MONOCYTES 7.60 7.60   EOSINOPHILS 1.00 2.20   BASOPHILS 0.30 0.30     Recent Labs     11/04/21  0458 11/05/21  0350 11/06/21  0512   RBC 6.14* 6.28*  --    HEMOGLOBIN 13.8* 14.2  --    HEMATOCRIT 49.3 50.5  --    PLATELETCT 185 190  --    PROTHROMBTM 46.5* 28.6* 23.9*   INR 5.22* 2.79* 2.22*       Imaging  X-Ray:  I have personally reviewed the images and compared with prior images.    Assessment/Plan  * Acute  respiratory failure with hypoxia and hypercapnia (HCC)- (present on admission)  Assessment & Plan  Intubated for worsening acute hypercapnic resp failure  11/3 Failed BIPAP and subsequently intubated   11/5 Extubated to BIPAP. doing well    Not tolerating when off BIPAP. Was on BIPAP all night.   Diuresing well    Plan:   Will give another trial off BIPAP. On HFNC  Continue lasix, goal negative fluid balance  Make lots of urine, will cautiously not over diurese  Will decrease lasix to 60 daily and monitor. Titrate prn        Cellulitis  Assessment & Plan  Cellulitis in lower extremities bilaterally and lower abdomen  Warm to touch, but not tender  Continue cefazolin 1gr IV Q8H x 7 days.   Moisturize dry skin in LE with vaseline products    Pulmonary embolism with acute cor pulmonale (HCC)- (present on admission)  Assessment & Plan  Hx of PE, on coumadin  INR goal 2-3  Dosing per pharmacy    Pulmonary hypertension (HCC)- (present on admission)  Assessment & Plan  Due to morbid obesity  Last TTE with RVSP 45  RV function ok    Morbid obesity (HCC)- (present on admission)  Assessment & Plan  BMI 66  May have undiagnosed OHS/JONES - will need BIPAP overnight       VTE:  Heparin  Ulcer: H2 Antagonist  Lines: Central Line  Ongoing indication addressed and Matthew Catheter  Ongoing indication addressed    I have performed a physical exam and reviewed and updated ROS and Plan today (11/6/2021). In review of yesterday's note (11/5/2021), there are no changes except as documented above.     Discussed patient condition and risk of morbidity and/or mortality with Family, RN, RT, Pharmacy, Charge nurse / hot rounds and Patient  The patient remains critically ill.  Critical care time = 35 minutes in directly providing and coordinating critical care and extensive data review.  No time overlap and excludes procedures.

## 2021-11-06 NOTE — PROGRESS NOTES
Received report from night RN, BERENICE discussed. Pt A&Ox4. Pt on BiPap and demonstrated how to remove it.

## 2021-11-07 ENCOUNTER — APPOINTMENT (OUTPATIENT)
Dept: RADIOLOGY | Facility: MEDICAL CENTER | Age: 31
DRG: 314 | End: 2021-11-07
Attending: INTERNAL MEDICINE
Payer: COMMERCIAL

## 2021-11-07 LAB
ANION GAP SERPL CALC-SCNC: 8 MMOL/L (ref 7–16)
ANISOCYTOSIS BLD QL SMEAR: ABNORMAL
BASOPHILS # BLD AUTO: 0.9 % (ref 0–1.8)
BASOPHILS # BLD: 0.05 K/UL (ref 0–0.12)
BUN SERPL-MCNC: 11 MG/DL (ref 8–22)
CALCIUM SERPL-MCNC: 9 MG/DL (ref 8.5–10.5)
CHLORIDE SERPL-SCNC: 96 MMOL/L (ref 96–112)
CO2 SERPL-SCNC: 37 MMOL/L (ref 20–33)
CREAT SERPL-MCNC: 0.45 MG/DL (ref 0.5–1.4)
EOSINOPHIL # BLD AUTO: 0.64 K/UL (ref 0–0.51)
EOSINOPHIL NFR BLD: 10.6 % (ref 0–6.9)
ERYTHROCYTE [DISTWIDTH] IN BLOOD BY AUTOMATED COUNT: 67.7 FL (ref 35.9–50)
GLUCOSE SERPL-MCNC: 92 MG/DL (ref 65–99)
HCT VFR BLD AUTO: 52.3 % (ref 42–52)
HGB BLD-MCNC: 14.1 G/DL (ref 14–18)
INR PPP: 1.75 (ref 0.87–1.13)
LYMPHOCYTES # BLD AUTO: 0.8 K/UL (ref 1–4.8)
LYMPHOCYTES NFR BLD: 13.3 % (ref 22–41)
MACROCYTES BLD QL SMEAR: ABNORMAL
MAGNESIUM SERPL-MCNC: 2 MG/DL (ref 1.5–2.5)
MANUAL DIFF BLD: NORMAL
MCH RBC QN AUTO: 22.7 PG (ref 27–33)
MCHC RBC AUTO-ENTMCNC: 27 G/DL (ref 33.7–35.3)
MCV RBC AUTO: 84.2 FL (ref 81.4–97.8)
MICROCYTES BLD QL SMEAR: ABNORMAL
MONOCYTES # BLD AUTO: 0.48 K/UL (ref 0–0.85)
MONOCYTES NFR BLD AUTO: 8 % (ref 0–13.4)
MORPHOLOGY BLD-IMP: NORMAL
NEUTROPHILS # BLD AUTO: 4.03 K/UL (ref 1.82–7.42)
NEUTROPHILS NFR BLD: 67.2 % (ref 44–72)
NRBC # BLD AUTO: 0 K/UL
NRBC BLD-RTO: 0 /100 WBC
PLATELET # BLD AUTO: 172 K/UL (ref 164–446)
PLATELET BLD QL SMEAR: NORMAL
POIKILOCYTOSIS BLD QL SMEAR: NORMAL
POTASSIUM SERPL-SCNC: 4 MMOL/L (ref 3.6–5.5)
PROTHROMBIN TIME: 19.9 SEC (ref 12–14.6)
RBC # BLD AUTO: 6.21 M/UL (ref 4.7–6.1)
RBC BLD AUTO: PRESENT
SODIUM SERPL-SCNC: 141 MMOL/L (ref 135–145)
STOMATOCYTES BLD QL SMEAR: NORMAL
WBC # BLD AUTO: 6 K/UL (ref 4.8–10.8)

## 2021-11-07 PROCEDURE — 700111 HCHG RX REV CODE 636 W/ 250 OVERRIDE (IP): Performed by: INTERNAL MEDICINE

## 2021-11-07 PROCEDURE — 700102 HCHG RX REV CODE 250 W/ 637 OVERRIDE(OP): Performed by: STUDENT IN AN ORGANIZED HEALTH CARE EDUCATION/TRAINING PROGRAM

## 2021-11-07 PROCEDURE — 94640 AIRWAY INHALATION TREATMENT: CPT

## 2021-11-07 PROCEDURE — 85610 PROTHROMBIN TIME: CPT

## 2021-11-07 PROCEDURE — 71045 X-RAY EXAM CHEST 1 VIEW: CPT

## 2021-11-07 PROCEDURE — 80048 BASIC METABOLIC PNL TOTAL CA: CPT

## 2021-11-07 PROCEDURE — 770022 HCHG ROOM/CARE - ICU (200)

## 2021-11-07 PROCEDURE — 700102 HCHG RX REV CODE 250 W/ 637 OVERRIDE(OP): Performed by: INTERNAL MEDICINE

## 2021-11-07 PROCEDURE — 85027 COMPLETE CBC AUTOMATED: CPT

## 2021-11-07 PROCEDURE — A9270 NON-COVERED ITEM OR SERVICE: HCPCS | Performed by: INTERNAL MEDICINE

## 2021-11-07 PROCEDURE — 83735 ASSAY OF MAGNESIUM: CPT

## 2021-11-07 PROCEDURE — A9270 NON-COVERED ITEM OR SERVICE: HCPCS | Performed by: STUDENT IN AN ORGANIZED HEALTH CARE EDUCATION/TRAINING PROGRAM

## 2021-11-07 PROCEDURE — 99291 CRITICAL CARE FIRST HOUR: CPT | Performed by: INTERNAL MEDICINE

## 2021-11-07 PROCEDURE — 94660 CPAP INITIATION&MGMT: CPT

## 2021-11-07 PROCEDURE — 85007 BL SMEAR W/DIFF WBC COUNT: CPT

## 2021-11-07 RX ORDER — WARFARIN SODIUM 7.5 MG/1
7.5 TABLET ORAL
Status: COMPLETED | OUTPATIENT
Start: 2021-11-07 | End: 2021-11-07

## 2021-11-07 RX ADMIN — SENNOSIDES AND DOCUSATE SODIUM 2 TABLET: 50; 8.6 TABLET ORAL at 18:20

## 2021-11-07 RX ADMIN — FUROSEMIDE 60 MG: 10 INJECTION, SOLUTION INTRAMUSCULAR; INTRAVENOUS at 05:13

## 2021-11-07 RX ADMIN — CEPHALEXIN 500 MG: 500 CAPSULE ORAL at 14:00

## 2021-11-07 RX ADMIN — CEPHALEXIN 500 MG: 500 CAPSULE ORAL at 18:20

## 2021-11-07 RX ADMIN — NYSTATIN: 100000 POWDER TOPICAL at 05:14

## 2021-11-07 RX ADMIN — SENNOSIDES AND DOCUSATE SODIUM 2 TABLET: 50; 8.6 TABLET ORAL at 05:11

## 2021-11-07 RX ADMIN — CEPHALEXIN 500 MG: 500 CAPSULE ORAL at 10:30

## 2021-11-07 RX ADMIN — NYSTATIN: 100000 POWDER TOPICAL at 18:21

## 2021-11-07 RX ADMIN — WARFARIN SODIUM 7.5 MG: 7.5 TABLET ORAL at 18:21

## 2021-11-07 RX ADMIN — MULTIPLE VITAMINS W/ MINERALS TAB 1 TABLET: TAB at 05:11

## 2021-11-07 RX ADMIN — CEPHALEXIN 500 MG: 500 CAPSULE ORAL at 20:24

## 2021-11-07 ASSESSMENT — ENCOUNTER SYMPTOMS
FALLS: 0
SHORTNESS OF BREATH: 0
HEADACHES: 0
VOMITING: 0
ABDOMINAL PAIN: 1
NAUSEA: 0
DIARRHEA: 0
FEVER: 0

## 2021-11-07 ASSESSMENT — PULMONARY FUNCTION TESTS
EPAP_CMH2O: 8

## 2021-11-07 NOTE — PROGRESS NOTES
Inpatient Anticoagulation Service Note    Date: 11/7/2021  Reason for Anticoagulation: Pulmonary Embolism  Hemoglobin Value: 14.1  Hematocrit Value: (Abnormal) 52.3  Lab Platelet Value: 172  Target INR: 2.0 to 3.0  INR from last 7 days     Date/Time INR Value    11/07/21 0230 (Abnormal) 1.75    11/06/21 0512 (Abnormal) 2.22    11/05/21 03:50:01 (Abnormal) 2.79    11/04/21 0458 (Abnormal) 5.22    11/03/21 0211 (Abnormal) 2.9    11/02/21 02:24:01 (Abnormal) 3.34    11/01/21 0900 (Abnormal) 3.86        Dose from last 7 days     Date/Time Dose (mg)    11/07/21 1407 7.5    11/06/21 0820 3.75    11/05/21 1100 3.75    11/04/21 1140 0    11/03/21 1330 7.5    11/02/21 1522 3.75    11/01/21 1212 0        Average Dose (mg):  (7.5 mg daily)  Significant Interactions: Antibiotics  Bridge Therapy: No  Reversal Agent Administered: Not Applicable    A/P  Sub-therapeutic INR - suspect this is secondary to held dose 3 days ago and reduced dosing given critical illness.  Patient is now extubated and consuimg % of meals.  Give regular home dose of warfarin 7.5 mg PO x 1 tonight.  INR tomorrow.    Education Material Provided?: Yes  Pharmacist suggested discharge dosing: TBD, may be able to return to prior home warfarin regimen once acute illness resolved (warfarin 7.5 mg PO daily).  Repeat INR in 72 hours of discharge.      Penelope Kaba, PharmD, BCPS, BCCCP

## 2021-11-07 NOTE — PROGRESS NOTES
Critical Care Progress Note    Date of admission  11/1/2021    Chief Complaint  31 Y m with morbid obesity consulted for hypercarbic respiratory failure. He is morbid obese BMI 69, hx of pe on chronic anticoagulation, untreated JONES, chronic venous stasis, eczema, lower exxt cellutlitis. Which was admitted 11/1 for abdominal swelling. He had a negative CTA this admit for pe, CT of his abdomen with some ascites and skin edema, his echo shows some pulmonary hypertension with RSVP 45, mild MR, LV ef 60%. This am 11/3 I was called for ams with a ph 7.1/130/90 on NRB.     Hospital Course  11/3 failed trial of BIPAP, subsequently, intubated   11/5 extubated to BIPAP, doing well. Didn't tolerate off BIPAP  11/6 tolerated HFNC more. Tolerated mobility, sat in chair    Interval Problem Update  Reviewed last 24 hour events:  On HFNC 60%/45L.   Tolerated HFNC most of day  Sat in chair and stood up.   On BIPAP while sleeping.   This am, denies any complaints  Afebrile  HR in 80-90s  SBP in 110-120s  Making good UOP. On lasix. Negative 4.7L in the past 24 hours.   Creatinine 0.45  INR 2.2, on coumadin for hx of PE  On keflex for cellulitis for 7 days    Review of Systems  Review of Systems   Constitutional: Negative for fever.   Respiratory: Negative for shortness of breath.    Cardiovascular: Negative for chest pain and leg swelling.   Gastrointestinal: Positive for abdominal pain (improving). Negative for diarrhea, nausea and vomiting.   Musculoskeletal: Negative for falls.   Neurological: Negative for headaches.   All other systems reviewed and are negative.       Vital Signs for last 24 hours   Temp:  [35.5 °C (95.9 °F)-36.6 °C (97.8 °F)] 35.5 °C (95.9 °F)  Pulse:  [] 90  Resp:  [17-35] 29  BP: ()/(54-82) 108/60  SpO2:  [92 %-99 %] 92 %    Hemodynamic parameters for last 24 hours       Respiratory Information for the last 24 hours       Physical Exam   Physical Exam  Vitals and nursing note reviewed.    Constitutional:       General: He is not in acute distress.     Appearance: He is ill-appearing. He is not toxic-appearing or diaphoretic.      Comments: Alert, oriented, RASS 0  Conversational  Comfortable with HFNC     HENT:      Head: Normocephalic.      Mouth/Throat:      Mouth: Mucous membranes are moist.   Cardiovascular:      Rate and Rhythm: Normal rate.      Pulses: Normal pulses.      Heart sounds: Normal heart sounds. No murmur heard.      Pulmonary:      Effort: No respiratory distress.      Breath sounds: Normal breath sounds. No wheezing, rhonchi or rales.      Comments: Diminished at bases  Abdominal:      General: There is distension.      Palpations: Abdomen is soft.      Tenderness: There is no abdominal tenderness. There is no guarding.      Comments: Erythema in lower abdomen resolved  Some noted in lower legs, but improving.   Not warm to touch  Obese  Under pannus - moist areas. Powder given   Musculoskeletal:         General: No swelling or tenderness.      Right lower leg: Edema present.      Left lower leg: Edema present.      Comments: LE cellultiis appear improving  Underlyling stasis dermatitis   Skin:     General: Skin is warm.      Capillary Refill: Capillary refill takes less than 2 seconds.      Coloration: Skin is not jaundiced.   Neurological:      General: No focal deficit present.      Mental Status: He is alert and oriented to person, place, and time.      Cranial Nerves: No cranial nerve deficit.      Comments: Moving all extremiiteis   Psychiatric:         Mood and Affect: Mood normal.         Behavior: Behavior normal.         Medications  Current Facility-Administered Medications   Medication Dose Route Frequency Provider Last Rate Last Admin   • acetaminophen (Tylenol) tablet 650 mg  650 mg Oral Q6HRS PRN Kell Nguyen M.D.       • cloNIDine (CATAPRES) tablet 0.1 mg  0.1 mg Oral Q6HRS PRN Kell Nguyen M.D.       • ondansetron (ZOFRAN ODT) dispertab 4 mg  4 mg Oral  Q4HRS PRN Kell Nguyen M.D.       • senna-docusate (PERICOLACE or SENOKOT S) 8.6-50 MG per tablet 2 Tablet  2 Tablet Oral BID Kell Nguyen M.D.   2 Tablet at 11/07/21 0511    And   • polyethylene glycol/lytes (MIRALAX) PACKET 1 Packet  1 Packet Oral QDAY PRN Kell Nguyen M.D.        And   • magnesium hydroxide (MILK OF MAGNESIA) suspension 30 mL  30 mL Oral QDAY PRN Kell Nguyen M.D.        And   • bisacodyl (DULCOLAX) suppository 10 mg  10 mg Rectal QDAY PRN Kell Nguyen M.D.       • therapeutic multivitamin-minerals (THERAGRAN-M) tablet 1 Tablet  1 Tablet Oral QAM Kell Nguyen M.D.   1 Tablet at 11/07/21 0511   • cephALEXin (KEFLEX) capsule 500 mg  500 mg Oral 4X/DAY Darwin Freedman D.O.   500 mg at 11/06/21 2124   • furosemide (LASIX) injection 60 mg  60 mg Intravenous Q DAY Darwin Freedman D.O.   60 mg at 11/07/21 0513   • MD Alert...Warfarin per Pharmacy   Other PHARMACY TO DOSE BARBARA Whitlock.O.       • nystatin (MYCOSTATIN) powder   Topical BID Bartolo Rosa M.D.   Given at 11/07/21 0514   • fentaNYL (SUBLIMAZE) injection 100 mcg  100 mcg Intravenous Q HOUR PRN Maurice Slaughter M.D.   100 mcg at 11/04/21 0139   • Respiratory Therapy Consult   Nebulization Continuous RT Maurice Slaughter M.D.       • MD Alert...ICU Electrolyte Replacement per Pharmacy   Other PHARMACY TO DOSE Maurice Slaughter M.D.       • lidocaine (XYLOCAINE) 1 % injection 2 mL  2 mL Tracheal Tube Q30 MIN PRN Maurice Slaughter M.D.       • albuterol inhaler 2 Puff  2 Puff Inhalation Q6HRS PRN Barry Tabor M.D.   2 Puff at 11/05/21 1459   • Pharmacy Consult Request ...Pain Management Review 1 Each  1 Each Other PHARMACY TO DOSE Barry Leander, M.D.       • enalaprilat (Vasotec) injection 1.25 mg 1 mL  1.25 mg Intravenous Q6HRS PRN Barry Tabor M.D.       • labetalol (NORMODYNE/TRANDATE) injection 10 mg  10 mg Intravenous Q4HRS PRN Barry Tabor M.D.       • ondansetron (ZOFRAN) syringe/vial injection 4 mg  4 mg Intravenous  Q4HRS TYRON Tabor M.D.           Fluids    Intake/Output Summary (Last 24 hours) at 11/7/2021 0731  Last data filed at 11/7/2021 0600  Gross per 24 hour   Intake 530 ml   Output 4900 ml   Net -4370 ml       Laboratory  Recent Labs     11/05/21  0510   ISTATAPH 7.608*   ISTATAPCO2 42.0*   ISTATAPO2 67   ISTATATCO2 43*   [id number] 96   ISTATARTHCO3 42.1*   ISTATARTBE 18*   ISTATTEMP 37.0 C   ISTATFIO2 60   ISTATSPEC Arterial   ISTATAPHTC 7.608*   [id number] 67         Recent Labs     11/05/21  0350 11/06/21  0340 11/07/21  0230   SODIUM 142 143 141   POTASSIUM 3.1* 3.2* 4.0   CHLORIDE 97 96 96   CO2 35* 38* 37*   BUN 17 11 11   CREATININE 0.54 0.42* 0.45*   MAGNESIUM 1.8 1.7 2.0   PHOSPHORUS 1.9* 4.2  --    CALCIUM 9.0 8.1* 9.0     Recent Labs     11/05/21  0350 11/06/21  0340 11/07/21  0230   GLUCOSE 111* 93 92     Recent Labs     11/05/21  0350 11/06/21  0340 11/07/21  0230   WBC 7.9 7.0 6.0   NEUTSPOLYS 70.80 75.20* 67.20   LYMPHOCYTES 18.60* 8.00* 13.30*   MONOCYTES 7.60 1.80 8.00   EOSINOPHILS 2.20 8.80* 10.60*   BASOPHILS 0.30 0.00 0.90     Recent Labs     11/05/21  0350 11/06/21  0340 11/06/21  0512 11/07/21  0230   RBC 6.28* 6.11*  --  6.21*   HEMOGLOBIN 14.2 14.0  --  14.1   HEMATOCRIT 50.5 50.3  --  52.3*   PLATELETCT 190 187  --  172   PROTHROMBTM 28.6*  --  23.9* 19.9*   INR 2.79*  --  2.22* 1.75*       Imaging  X-Ray:  I have personally reviewed the images and compared with prior images.    Assessment/Plan  * Acute respiratory failure with hypoxia and hypercapnia (HCC)- (present on admission)  Assessment & Plan  Intubated for worsening acute hypercapnic resp failure  11/3 Failed BIPAP and subsequently intubated   11/5 Extubated to BIPAP. doing well on BIPAP but not tolerating when off BIPAP. Was on BIPAP all night.   11/6 Tolerated more HFNC during the day. On nocturnal BIPAP  Diuresing well with lasix    Plan:   Keep HFNC while awake as much as he tolerates.   Goal sat >88%  Continue mobility,  goal level 4. He tolerates sitting and standing.   In high spirit and would like to walk  Continue lasix 60mg IV daily, goal negative fluid balance. Goal at least 2-3L fluid balance  Cautiously watching for volume status, making sure not to overdiurese    Cellulitis  Assessment & Plan  Cellulitis in lower extremities bilaterally and lower abdomen  Warm to touch, but not tender. Overall improving  Switched to keflex yesterday to complete 7 day course (end date 11/11)   Moisturize dry skin in LE with vaseline products  Keep intertriginous areas dry and clean    Pulmonary embolism with acute cor pulmonale (HCC)- (present on admission)  Assessment & Plan  Hx of PE, on coumadin  INR goal 2-3  Dosing per pharmacy    Pulmonary hypertension (HCC)- (present on admission)  Assessment & Plan  Due to morbid obesity  Last TTE with RVSP 45  RV function ok. Volume overload    Morbid obesity (HCC)- (present on admission)  Assessment & Plan  BMI 66  May have undiagnosed OHS/JONES -  need nocturnal BIPAP with EPAP at least 8cmH2O    Asthma- (present on admission)  Assessment & Plan  Controlled prior to admission       VTE:  Heparin  Ulcer: Not Indicated  Lines: Central Line  Ongoing indication addressed and Russo Catheter  Ongoing indication addressed  Russo was placed by Urology due to his anatomy and obesity. Will not remove russo until his volume status and resp status are optimized.     I have performed a physical exam and reviewed and updated ROS and Plan today (11/7/2021). In review of yesterday's note (11/6/2021), there are no changes except as documented above.     Discussed patient condition and risk of morbidity and/or mortality with Family, RN, RT, Pharmacy, Charge nurse / hot rounds and Patient  The patient remains critically ill.  Critical care time = 35 minutes in directly providing and coordinating critical care and extensive data review.  No time overlap and excludes procedures.

## 2021-11-07 NOTE — CARE PLAN
The patient is Stable - Low risk of patient condition declining or worsening    Shift Goals  Clinical Goals: Maintain O2 sats > 90% on BiPAP  Patient Goals: Rest, sleep.    Progress made toward(s) clinical / shift goals:  Pt maintained O2 sats above 90% with adjustments to BiPAP settings. Pt was able to rest/ sleep.     Patient is progressing towards the following goals:      Problem: Knowledge Deficit - Standard  Goal: Patient and family/care givers will demonstrate understanding of plan of care, disease process/condition, diagnostic tests and medications  Outcome: Progressing  Note: Pt is compliant with current POC. Pt is motivated to participate.      Problem: Respiratory  Goal: Patient will achieve/maintain optimum respiratory ventilation and gas exchange  Outcome: Progressing  Note: Pt was able to tolerate BiPAP throughout the night.      Problem: Skin Integrity  Goal: Skin integrity is maintained or improved  Outcome: Progressing  Note: BLE lotion applied. Interdry in place under pannis.

## 2021-11-07 NOTE — CARE PLAN
Problem: Humidified High Flow Nasal Cannula  Goal: Maintain adequate oxygenation dependent on patient condition  Description: 1.  Implement humidified high flow oxygen therapy  2.  Titrate high flow oxygen to maintain appropriate SpO2  Outcome: Progressing  Flowsheets  Taken 11/7/2021 1515  Indication: All other patients: SpO2 less than 90% despite conventional supplemental oxygen devices  Outcome: Normoxia  Taken 11/7/2021 1514  O2 (LPM): 45  FiO2%: 40 %       Respiratory Update    Treatment modality: HHFNC  Frequency: Q4    Pt tolerating current treatments well with no adverse reactions.

## 2021-11-08 PROBLEM — D69.6 THROMBOCYTOPENIA (HCC): Status: ACTIVE | Noted: 2021-11-08

## 2021-11-08 PROBLEM — J96.21 ACUTE ON CHRONIC RESPIRATORY FAILURE WITH HYPOXIA AND HYPERCAPNIA (HCC): Status: ACTIVE | Noted: 2019-08-22

## 2021-11-08 PROBLEM — J96.22 ACUTE ON CHRONIC RESPIRATORY FAILURE WITH HYPOXIA AND HYPERCAPNIA (HCC): Status: ACTIVE | Noted: 2019-08-22

## 2021-11-08 PROBLEM — J96.01 ACUTE RESPIRATORY FAILURE WITH HYPOXIA (HCC): Status: RESOLVED | Noted: 2020-03-05 | Resolved: 2021-11-08

## 2021-11-08 LAB
ALBUMIN SERPL BCP-MCNC: 3.2 G/DL (ref 3.2–4.9)
ALBUMIN/GLOB SERPL: 1 G/DL
ALP SERPL-CCNC: 64 U/L (ref 30–99)
ALT SERPL-CCNC: 29 U/L (ref 2–50)
ANION GAP SERPL CALC-SCNC: 6 MMOL/L (ref 7–16)
ANISOCYTOSIS BLD QL SMEAR: ABNORMAL
AST SERPL-CCNC: 30 U/L (ref 12–45)
BASOPHILS # BLD AUTO: 0.6 % (ref 0–1.8)
BASOPHILS # BLD: 0.03 K/UL (ref 0–0.12)
BILIRUB SERPL-MCNC: 1.4 MG/DL (ref 0.1–1.5)
BUN SERPL-MCNC: 12 MG/DL (ref 8–22)
CALCIUM SERPL-MCNC: 8.9 MG/DL (ref 8.5–10.5)
CHLORIDE SERPL-SCNC: 95 MMOL/L (ref 96–112)
CO2 SERPL-SCNC: 35 MMOL/L (ref 20–33)
COMMENT 1642: NORMAL
CREAT SERPL-MCNC: 0.45 MG/DL (ref 0.5–1.4)
CRP SERPL HS-MCNC: 5.33 MG/DL (ref 0–0.75)
EOSINOPHIL # BLD AUTO: 0.35 K/UL (ref 0–0.51)
EOSINOPHIL NFR BLD: 7.2 % (ref 0–6.9)
ERYTHROCYTE [DISTWIDTH] IN BLOOD BY AUTOMATED COUNT: 68.5 FL (ref 35.9–50)
GLOBULIN SER CALC-MCNC: 3.3 G/DL (ref 1.9–3.5)
GLUCOSE SERPL-MCNC: 93 MG/DL (ref 65–99)
HCT VFR BLD AUTO: 51.9 % (ref 42–52)
HGB BLD-MCNC: 13.9 G/DL (ref 14–18)
IMM GRANULOCYTES # BLD AUTO: 0.03 K/UL (ref 0–0.11)
IMM GRANULOCYTES NFR BLD AUTO: 0.6 % (ref 0–0.9)
INR PPP: 1.69 (ref 0.87–1.13)
LYMPHOCYTES # BLD AUTO: 1.41 K/UL (ref 1–4.8)
LYMPHOCYTES NFR BLD: 29 % (ref 22–41)
MAGNESIUM SERPL-MCNC: 2.1 MG/DL (ref 1.5–2.5)
MCH RBC QN AUTO: 22.5 PG (ref 27–33)
MCHC RBC AUTO-ENTMCNC: 26.8 G/DL (ref 33.7–35.3)
MCV RBC AUTO: 84.1 FL (ref 81.4–97.8)
MICROCYTES BLD QL SMEAR: ABNORMAL
MONOCYTES # BLD AUTO: 0.65 K/UL (ref 0–0.85)
MONOCYTES NFR BLD AUTO: 13.4 % (ref 0–13.4)
MORPHOLOGY BLD-IMP: NORMAL
NEUTROPHILS # BLD AUTO: 2.39 K/UL (ref 1.82–7.42)
NEUTROPHILS NFR BLD: 49.2 % (ref 44–72)
NRBC # BLD AUTO: 0 K/UL
NRBC BLD-RTO: 0 /100 WBC
PLATELET # BLD AUTO: 112 K/UL (ref 164–446)
PLATELET BLD QL SMEAR: NORMAL
POIKILOCYTOSIS BLD QL SMEAR: NORMAL
POTASSIUM SERPL-SCNC: 4.3 MMOL/L (ref 3.6–5.5)
PREALB SERPL-MCNC: 9.2 MG/DL (ref 18–38)
PROT SERPL-MCNC: 6.5 G/DL (ref 6–8.2)
PROTHROMBIN TIME: 19.4 SEC (ref 12–14.6)
RBC # BLD AUTO: 6.17 M/UL (ref 4.7–6.1)
RBC BLD AUTO: PRESENT
SODIUM SERPL-SCNC: 136 MMOL/L (ref 135–145)
STOMATOCYTES BLD QL SMEAR: NORMAL
WBC # BLD AUTO: 4.9 K/UL (ref 4.8–10.8)

## 2021-11-08 PROCEDURE — 83735 ASSAY OF MAGNESIUM: CPT

## 2021-11-08 PROCEDURE — 85610 PROTHROMBIN TIME: CPT

## 2021-11-08 PROCEDURE — 84134 ASSAY OF PREALBUMIN: CPT

## 2021-11-08 PROCEDURE — 700102 HCHG RX REV CODE 250 W/ 637 OVERRIDE(OP): Performed by: INTERNAL MEDICINE

## 2021-11-08 PROCEDURE — A9270 NON-COVERED ITEM OR SERVICE: HCPCS | Performed by: INTERNAL MEDICINE

## 2021-11-08 PROCEDURE — 99291 CRITICAL CARE FIRST HOUR: CPT | Performed by: INTERNAL MEDICINE

## 2021-11-08 PROCEDURE — 86140 C-REACTIVE PROTEIN: CPT

## 2021-11-08 PROCEDURE — 94660 CPAP INITIATION&MGMT: CPT

## 2021-11-08 PROCEDURE — 700111 HCHG RX REV CODE 636 W/ 250 OVERRIDE (IP): Performed by: INTERNAL MEDICINE

## 2021-11-08 PROCEDURE — 770022 HCHG ROOM/CARE - ICU (200)

## 2021-11-08 PROCEDURE — 80053 COMPREHEN METABOLIC PANEL: CPT

## 2021-11-08 PROCEDURE — A9270 NON-COVERED ITEM OR SERVICE: HCPCS | Performed by: STUDENT IN AN ORGANIZED HEALTH CARE EDUCATION/TRAINING PROGRAM

## 2021-11-08 PROCEDURE — 94640 AIRWAY INHALATION TREATMENT: CPT

## 2021-11-08 PROCEDURE — 700102 HCHG RX REV CODE 250 W/ 637 OVERRIDE(OP): Performed by: STUDENT IN AN ORGANIZED HEALTH CARE EDUCATION/TRAINING PROGRAM

## 2021-11-08 PROCEDURE — 85025 COMPLETE CBC W/AUTO DIFF WBC: CPT

## 2021-11-08 RX ORDER — WARFARIN SODIUM 7.5 MG/1
7.5 TABLET ORAL DAILY
Status: DISCONTINUED | OUTPATIENT
Start: 2021-11-08 | End: 2021-11-09

## 2021-11-08 RX ADMIN — SENNOSIDES AND DOCUSATE SODIUM 2 TABLET: 50; 8.6 TABLET ORAL at 05:28

## 2021-11-08 RX ADMIN — MULTIPLE VITAMINS W/ MINERALS TAB 1 TABLET: TAB at 05:28

## 2021-11-08 RX ADMIN — FUROSEMIDE 60 MG: 10 INJECTION, SOLUTION INTRAMUSCULAR; INTRAVENOUS at 05:27

## 2021-11-08 RX ADMIN — NYSTATIN: 100000 POWDER TOPICAL at 05:28

## 2021-11-08 RX ADMIN — CEPHALEXIN 500 MG: 500 CAPSULE ORAL at 21:58

## 2021-11-08 RX ADMIN — WARFARIN SODIUM 7.5 MG: 7.5 TABLET ORAL at 17:41

## 2021-11-08 RX ADMIN — CEPHALEXIN 500 MG: 500 CAPSULE ORAL at 13:28

## 2021-11-08 RX ADMIN — SENNOSIDES AND DOCUSATE SODIUM 2 TABLET: 50; 8.6 TABLET ORAL at 17:42

## 2021-11-08 RX ADMIN — CEPHALEXIN 500 MG: 500 CAPSULE ORAL at 09:18

## 2021-11-08 RX ADMIN — CEPHALEXIN 500 MG: 500 CAPSULE ORAL at 17:42

## 2021-11-08 ASSESSMENT — ENCOUNTER SYMPTOMS
FEVER: 0
SORE THROAT: 0
BLURRED VISION: 0
COUGH: 0
SPUTUM PRODUCTION: 0
SENSORY CHANGE: 0
NERVOUS/ANXIOUS: 0
PALPITATIONS: 0
SPEECH CHANGE: 0
DIZZINESS: 0
SHORTNESS OF BREATH: 0
DEPRESSION: 0
BRUISES/BLEEDS EASILY: 0
MYALGIAS: 0
VOMITING: 0
CHILLS: 0
NAUSEA: 0
BACK PAIN: 1
HEADACHES: 0
ABDOMINAL PAIN: 0

## 2021-11-08 ASSESSMENT — FIBROSIS 4 INDEX: FIB4 SCORE: 1.54

## 2021-11-08 ASSESSMENT — PULMONARY FUNCTION TESTS
EPAP_CMH2O: 8

## 2021-11-08 NOTE — PROGRESS NOTES
NURSING NOTE    Neuro: alert/oriented x4, PREM OSHEA    Cardiac: SR, ST, Normotensive    Resp: HFNC, Bipap @ NOC Sats drop while asleep. Diminished BS              GI: Obese, Abdomen soft,nontender. BM x1 formed.    : Matthew Catheter present.    LTD: L PICC Double Lumen, Matthew Catheter.    Skin: Scattered Redness, Bilateral Redness / Cellulitis on lower extremities     Shift Summary    No acute issues overnight, Bipap at night. Needed 80% fio2 to keep his sats >90

## 2021-11-08 NOTE — PROGRESS NOTES
"Critical Care Progress Note    Date of admission  11/1/2021    Chief Complaint  Acute on chronic hypoxic respiratory failure    Hospital Course  \"31 Y m with morbid obesity consulted for hypercarbic respiratory failure. He is morbid obese BMI 69, hx of pe on chronic anticoagulation, untreated JONES, chronic venous stasis, eczema, lower exxt cellutlitis. Which was admitted 11/1 for abdominal swelling. He had a negative CTA this admit for pe, CT of his abdomen with some ascites and skin edema, his echo shows some pulmonary hypertension with RSVP 45, mild MR, LV ef 60%. This am 11/3 I was called for ams with a ph 7.1/130/90 on NRB.\" - Dr. Slaughter 11/3    11/3 failed trial of BIPAP, subsequently, intubated   11/5 extubated to BIPAP, doing well. Didn't tolerate off BIPAP  11/6 tolerated HFNC more. Tolerated mobility, sat in chair    Interval Problem Update  Reviewed last 24 hour events:   - on BiPAP 80%   - AAOx4   - AF, nl WBC   - NSR, SBP    - tl diet and having BMs   - good UOP on lasix   - plts down to 112   - up to chair   - russo per urology   - ongoing bad JONES   - INR 1.69 on coumadin   - day 5/7 keflex    Review of Systems  Review of Systems   Constitutional: Negative for chills, fever and malaise/fatigue.   HENT: Negative for congestion and sore throat.    Eyes: Negative for blurred vision.   Respiratory: Negative for cough, sputum production and shortness of breath.    Cardiovascular: Positive for leg swelling. Negative for chest pain and palpitations.   Gastrointestinal: Negative for abdominal pain, nausea and vomiting.   Genitourinary: Negative for dysuria.   Musculoskeletal: Positive for back pain. Negative for myalgias.   Skin: Negative for rash.   Neurological: Negative for dizziness, sensory change, speech change and headaches.   Endo/Heme/Allergies: Does not bruise/bleed easily.   Psychiatric/Behavioral: Negative for depression. The patient is not nervous/anxious.    All other systems reviewed and are " negative.       Vital Signs for last 24 hours   Temp:  [35.3 °C (95.6 °F)-36.2 °C (97.1 °F)] 35.7 °C (96.2 °F)  Pulse:  [] 90  Resp:  [16-44] 27  BP: (101-139)/(54-82) 114/55  SpO2:  [80 %-100 %] 93 %    Respiratory Information for the last 24 hours   BiPAP 14/10, 80% nightly <--> HFNC 45 lpm, 45%    Physical Exam   Physical Exam  Vitals and nursing note reviewed.   Constitutional:       General: He is awake. He is not in acute distress.     Appearance: He is well-developed. He is morbidly obese. He is not toxic-appearing.      Interventions: Nasal cannula in place.      Comments: Sitting up in chair, eating breakfast   HENT:      Head: Normocephalic.      Nose: Nose normal. No congestion.      Comments: High flow nasal cannula in place     Mouth/Throat:      Mouth: Mucous membranes are moist.      Pharynx: Oropharynx is clear. No oropharyngeal exudate.   Eyes:      General: No scleral icterus.     Conjunctiva/sclera: Conjunctivae normal.      Pupils: Pupils are equal, round, and reactive to light.   Neck:      Vascular: No JVD.      Comments: Enlarged neck circumference  Cardiovascular:      Rate and Rhythm: Normal rate and regular rhythm.      Chest Wall: PMI is displaced.      Pulses: Normal pulses.      Heart sounds: Normal heart sounds. No murmur heard.      Pulmonary:      Effort: Pulmonary effort is normal. No accessory muscle usage or respiratory distress.      Breath sounds: No stridor. Examination of the right-lower field reveals rales. Examination of the left-lower field reveals rales. Rales present. No wheezing.      Comments: Speaking in full sentences, desaturates when sleeping  Abdominal:      General: Bowel sounds are normal. There is no distension.      Palpations: Abdomen is soft.      Tenderness: There is no abdominal tenderness. There is no guarding.   Musculoskeletal:         General: No tenderness.      Cervical back: Neck supple. No rigidity.      Right lower le+ Pitting Edema  present.      Left lower le+ Pitting Edema present.   Skin:     General: Skin is warm and dry.      Capillary Refill: Capillary refill takes less than 2 seconds.      Coloration: Skin is not pale.   Neurological:      General: No focal deficit present.      Mental Status: He is alert and oriented to person, place, and time.      Cranial Nerves: No cranial nerve deficit.      Sensory: No sensory deficit.   Psychiatric:         Mood and Affect: Mood normal.         Behavior: Behavior normal. Behavior is cooperative.         Thought Content: Thought content normal.         Medications  Current Facility-Administered Medications   Medication Dose Route Frequency Provider Last Rate Last Admin   • acetaminophen (Tylenol) tablet 650 mg  650 mg Oral Q6HRS PRN Kell Nguyen M.D.       • cloNIDine (CATAPRES) tablet 0.1 mg  0.1 mg Oral Q6HRS PRN Kell Nguyen M.D.       • ondansetron (ZOFRAN ODT) dispertab 4 mg  4 mg Oral Q4HRS PRN Kell Nguyen M.D.       • senna-docusate (PERICOLACE or SENOKOT S) 8.6-50 MG per tablet 2 Tablet  2 Tablet Oral BID Kell Nguyen M.D.   2 Tablet at 21    And   • polyethylene glycol/lytes (MIRALAX) PACKET 1 Packet  1 Packet Oral QDAY PRN Kell Nguyen M.D.        And   • magnesium hydroxide (MILK OF MAGNESIA) suspension 30 mL  30 mL Oral QDAY PRVANESSA Nguyen M.D.        And   • bisacodyl (DULCOLAX) suppository 10 mg  10 mg Rectal QDAY PRVANESSA Nguyen M.D.       • therapeutic multivitamin-minerals (THERAGRAN-M) tablet 1 Tablet  1 Tablet Oral QAM Kell Nguyen M.D.   1 Tablet at 21   • cephALEXin (KEFLEX) capsule 500 mg  500 mg Oral 4X/DAY BARBARA [email or web address].   500 mg at 21   • furosemide (LASIX) injection 60 mg  60 mg Intravenous Q DAY BARBARA [email or web address].   60 mg at 21   • MD Alert...Warfarin per Pharmacy   Other PHARMACY TO DOSE BARBARA [email or web address]       • nystatin (MYCOSTATIN) powder   Topical BID Bartolo Rosa M.D.    Given at 11/08/21 0528   • fentaNYL (SUBLIMAZE) injection 100 mcg  100 mcg Intravenous Q HOUR PRN Maurice Slaughter M.D.   100 mcg at 11/04/21 0139   • Respiratory Therapy Consult   Nebulization Continuous RT Maurice Slaughter M.D.       • MD Alert...ICU Electrolyte Replacement per Pharmacy   Other PHARMACY TO DOSE Maurice Slaughter M.D.       • lidocaine (XYLOCAINE) 1 % injection 2 mL  2 mL Tracheal Tube Q30 MIN PRN Maurice Slaughter M.D.       • albuterol inhaler 2 Puff  2 Puff Inhalation Q6HRS PRN Barry Tabor M.D.   2 Puff at 11/05/21 1459   • Pharmacy Consult Request ...Pain Management Review 1 Each  1 Each Other PHARMACY TO DOSE Barry Tabor M.D.       • enalaprilat (Vasotec) injection 1.25 mg 1 mL  1.25 mg Intravenous Q6HRS PRN Barry Tabor M.D.       • labetalol (NORMODYNE/TRANDATE) injection 10 mg  10 mg Intravenous Q4HRS PRN Barry Tabor M.D.       • ondansetron (ZOFRAN) syringe/vial injection 4 mg  4 mg Intravenous Q4HRS PRN Barry Tabor M.D.           Fluids    Intake/Output Summary (Last 24 hours) at 11/8/2021 0709  Last data filed at 11/8/2021 0625  Gross per 24 hour   Intake 1320 ml   Output 4650 ml   Net -3330 ml       Laboratory          Recent Labs     11/06/21  0340 11/07/21  0230 11/08/21  0430   SODIUM 143 141 136   POTASSIUM 3.2* 4.0 4.3   CHLORIDE 96 96 95*   CO2 38* 37* 35*   BUN 11 11 12   CREATININE 0.42* 0.45* 0.45*   MAGNESIUM 1.7 2.0 2.1   PHOSPHORUS 4.2  --   --    CALCIUM 8.1* 9.0 8.9     Recent Labs     11/06/21  0340 11/07/21  0230 11/08/21  0430   ALTSGPT  --   --  29   ASTSGOT  --   --  30   ALKPHOSPHAT  --   --  64   TBILIRUBIN  --   --  1.4   PREALBUMIN  --   --  9.2*   GLUCOSE 93 92 93     Recent Labs     11/06/21  0340 11/07/21  0230 11/08/21  0430   WBC 7.0 6.0 4.9   NEUTSPOLYS 75.20* 67.20 49.20   LYMPHOCYTES 8.00* 13.30* 29.00   MONOCYTES 1.80 8.00 13.40   EOSINOPHILS 8.80* 10.60* 7.20*   BASOPHILS 0.00 0.90 0.60   ASTSGOT  --   --  30   ALTSGPT  --   --  29   ALKPHOSPHAT  --    --  64   TBILIRUBIN  --   --  1.4     Recent Labs     11/06/21  0340 11/06/21  0512 11/07/21  0230 11/08/21  0430   RBC 6.11*  --  6.21* 6.17*   HEMOGLOBIN 14.0  --  14.1 13.9*   HEMATOCRIT 50.3  --  52.3* 51.9   PLATELETCT 187  --  172 112*   PROTHROMBTM  --  23.9* 19.9* 19.4*   INR  --  2.22* 1.75* 1.69*       Imaging  X-Ray:  I have personally reviewed the images and compared with prior images. and My impression is: Improving pulmonary edema with enlarged cardiac silhouette, low lung volumes, left PICC in place  Echo:   Reviewed    Assessment/Plan  * Acute on chronic respiratory failure with hypoxia and hypercapnia (HCC)- (present on admission)  Assessment & Plan  Intubated 11/3-11/6  RT/O2 protocol  Titrate high flow nasal cannula during the day to keep SPO2 greater than 88%, nocturnal BiPAP given significant desaturation episodes  Continue forced diuresis  Limit sedatives  Encourage incentive spirometry, out of bed to chair    JONES (obstructive sleep apnea)- (present on admission)  Assessment & Plan  Most definitely has formally undiagnosed OHS/JONES -  need nocturnal BIPAP with EPAP at least 8cmH2O  Consultation with case management to discuss outpatient options  Formal sleep consultation December 8 as outpatient    Morbid obesity (HCC)- (present on admission)  Assessment & Plan  BMI 66  Encourage behavioral and dietary modification for weight loss  RD consultation    Cellulitis  Assessment & Plan  Improving cellulitis in lower extremities bilaterally and lower abdomen -   Continue Keflex through 11/11 (7-day course)  Moisturize dry skin in LE with vaseline products  Keep intertriginous areas dry and clean    Cardiomegaly- (present on admission)  Assessment & Plan  With preserved ejection fraction/diastolic function, RVSP 45 mmHg  Continue forced diuresis  Blood pressure management  Encourage weight loss    History of pulmonary embolism- (present on admission)  Assessment & Plan  Continue Coumadin    Chronic  anticoagulation- (present on admission)  Assessment & Plan  Continue Coumadin per pharmacy    Pulmonary hypertension (HCC)- (present on admission)  Assessment & Plan  Due to morbid obesity  Last TTE with RVSP 45  RV function ok. Volume overload --continue diuresis>     Lower extremity edema- (present on admission)  Assessment & Plan  Continue forced diuresis, elevation, leg compression    Asthma- (present on admission)  Assessment & Plan  Without acute exacerbation  As needed bronchodilators    Thrombocytopenia (HCC)  Assessment & Plan  Daily CBC and monitor       VTE:  Coumadin  Ulcer: Not Indicated  Lines: Central Line  Ongoing indication addressed and Russo Catheter  Ongoing indication addressed  Russo was placed by Urology due to his anatomy and obesity. Will not remove russo until his volume status and resp status are optimized.     I have performed a physical exam and reviewed and updated ROS and Plan today (11/8/2021). In review of yesterday's note (11/7/2021), there are no changes except as documented above.     Patient is critically ill today requiring active management of his persistent respiratory failure including titration of high flow nasal cannula with as needed BiPAP. High risk of deterioration and worsening vital organ dysfunction and death without the above critical care interventions.    Discussed patient condition and risk of morbidity and/or mortality with Family, RN, RT, Pharmacy, , Charge nurse / hot rounds and Patient.Critical care time = 31 minutes in directly providing and coordinating critical care and extensive data review.  No time overlap and excludes procedures.

## 2021-11-08 NOTE — PROGRESS NOTES
Inpatient Anticoagulation Service Note    Date: 11/8/2021  Reason for Anticoagulation: Pulmonary Embolism  Hemoglobin Value: (Abnormal) 13.9  Hematocrit Value: 51.9  Lab Platelet Value: (Abnormal) 112  Target INR: 2.0 to 3.0  INR from last 7 days     Date/Time INR Value    11/08/21 0430 (Abnormal) 1.69    11/07/21 0230 (Abnormal) 1.75    11/06/21 0512 (Abnormal) 2.22    11/05/21 03:50:01 (Abnormal) 2.79    11/04/21 0458 (Abnormal) 5.22    11/03/21 0211 (Abnormal) 2.9    11/02/21 02:24:01 (Abnormal) 3.34        Dose from last 7 days     Date/Time Dose (mg)    11/08/21 1304 7.5    11/07/21 1407 7.5    11/06/21 0820 3.75    11/05/21 1100 3.75    11/04/21 1140 0    11/03/21 1330 7.5    11/02/21 1522 3.75        Average Dose (mg):  (7.5 mg daily)  Significant Interactions: Antibiotics  Bridge Therapy: No  Reversal Agent Administered: Not Applicable    A/P  Sub-therapeutic INR - suspect this is secondary to held dose a few days ago and reduced dosing given critical illness.  RN confirms patient with good intake of diet.  Continue with home dosing of warfarin 7.5 mg daily.  INR tomorrow.    Education Material Provided?: Yes  Pharmacist suggested discharge dosing: TBD likely can continue home dosing of warfarin 7.5 mg PO daily.  Recommend a follow-up PT/INR within 48-72 hours of discharge.    Penelope Kaba, PharmD, BCPS, BCCCP

## 2021-11-08 NOTE — ASSESSMENT & PLAN NOTE
Most definitely has formally undiagnosed OHS/JONES -  need nocturnal BIPAP with EPAP at least 8cmH2O  Consultation with case management to discuss outpatient options  Formal sleep consultation December 8 as outpatient

## 2021-11-08 NOTE — ASSESSMENT & PLAN NOTE
With preserved ejection fraction/diastolic function, RVSP 45 mmHg  Continue forced diuresis  Blood pressure management  Encourage weight loss

## 2021-11-08 NOTE — DISCHARGE PLANNING
Care Transition Team Discharge Planning    Anticipated Discharge Disposition: d/c home w/ bipap needs, and nocturnal study scheduled for December this year    Action: Lsw received text from bedside RN explaining as above, and asking for support to get pt home w/ bipap and no study.    Lsw called [facility] JOE metz, RN. She explained a few options for d/c.    Lsw sent text to bedside RN w/ the updates from JOANNA Carlton.    Lsw met w/ Dr Gonda and explained JOANNA Carlton supports. Lsw requesting MD please f/u w/ HCM manager to discuss the d/c plan options and DME orders, etc.        Barriers to Discharge: requires bipap at night and not safe w/o, but no noc study until appointment in December     Plan: LSw will wait for medical interaction and decision between [facility] manager, RN, and Dr Gonda, pulmonologist/critical care on CIC

## 2021-11-08 NOTE — DIETARY
Nutrition Services: Update   RD previously following for nutrition support regimen however pt was extubated and able to initiate a PO diet where PO intake has been 50-75% or %.    RD to monitor per department policy.

## 2021-11-09 PROBLEM — R33.9 URINARY RETENTION: Status: ACTIVE | Noted: 2021-11-09

## 2021-11-09 LAB
ANION GAP SERPL CALC-SCNC: 8 MMOL/L (ref 7–16)
BASOPHILS # BLD AUTO: 0.5 % (ref 0–1.8)
BASOPHILS # BLD: 0.03 K/UL (ref 0–0.12)
BUN SERPL-MCNC: 11 MG/DL (ref 8–22)
CALCIUM SERPL-MCNC: 9.1 MG/DL (ref 8.5–10.5)
CHLORIDE SERPL-SCNC: 96 MMOL/L (ref 96–112)
CO2 SERPL-SCNC: 34 MMOL/L (ref 20–33)
CREAT SERPL-MCNC: 0.55 MG/DL (ref 0.5–1.4)
EOSINOPHIL # BLD AUTO: 0.34 K/UL (ref 0–0.51)
EOSINOPHIL NFR BLD: 5.9 % (ref 0–6.9)
ERYTHROCYTE [DISTWIDTH] IN BLOOD BY AUTOMATED COUNT: 67.7 FL (ref 35.9–50)
GLUCOSE SERPL-MCNC: 91 MG/DL (ref 65–99)
HCT VFR BLD AUTO: 52.6 % (ref 42–52)
HGB BLD-MCNC: 14 G/DL (ref 14–18)
IMM GRANULOCYTES # BLD AUTO: 0.02 K/UL (ref 0–0.11)
IMM GRANULOCYTES NFR BLD AUTO: 0.3 % (ref 0–0.9)
INR PPP: 2.08 (ref 0.87–1.13)
LYMPHOCYTES # BLD AUTO: 1.85 K/UL (ref 1–4.8)
LYMPHOCYTES NFR BLD: 31.9 % (ref 22–41)
MCH RBC QN AUTO: 22.6 PG (ref 27–33)
MCHC RBC AUTO-ENTMCNC: 26.6 G/DL (ref 33.7–35.3)
MCV RBC AUTO: 84.8 FL (ref 81.4–97.8)
MONOCYTES # BLD AUTO: 0.77 K/UL (ref 0–0.85)
MONOCYTES NFR BLD AUTO: 13.3 % (ref 0–13.4)
NEUTROPHILS # BLD AUTO: 2.79 K/UL (ref 1.82–7.42)
NEUTROPHILS NFR BLD: 48.1 % (ref 44–72)
NRBC # BLD AUTO: 0 K/UL
NRBC BLD-RTO: 0 /100 WBC
PLATELET # BLD AUTO: 195 K/UL (ref 164–446)
PMV BLD AUTO: 10.1 FL (ref 9–12.9)
POTASSIUM SERPL-SCNC: 4.2 MMOL/L (ref 3.6–5.5)
PROTHROMBIN TIME: 22.8 SEC (ref 12–14.6)
RBC # BLD AUTO: 6.2 M/UL (ref 4.7–6.1)
SODIUM SERPL-SCNC: 138 MMOL/L (ref 135–145)
WBC # BLD AUTO: 5.8 K/UL (ref 4.8–10.8)

## 2021-11-09 PROCEDURE — 770020 HCHG ROOM/CARE - TELE (206)

## 2021-11-09 PROCEDURE — 80048 BASIC METABOLIC PNL TOTAL CA: CPT

## 2021-11-09 PROCEDURE — A9270 NON-COVERED ITEM OR SERVICE: HCPCS | Performed by: STUDENT IN AN ORGANIZED HEALTH CARE EDUCATION/TRAINING PROGRAM

## 2021-11-09 PROCEDURE — A9270 NON-COVERED ITEM OR SERVICE: HCPCS | Performed by: INTERNAL MEDICINE

## 2021-11-09 PROCEDURE — 94660 CPAP INITIATION&MGMT: CPT

## 2021-11-09 PROCEDURE — A9270 NON-COVERED ITEM OR SERVICE: HCPCS | Performed by: HOSPITALIST

## 2021-11-09 PROCEDURE — 94640 AIRWAY INHALATION TREATMENT: CPT

## 2021-11-09 PROCEDURE — 99233 SBSQ HOSP IP/OBS HIGH 50: CPT | Performed by: INTERNAL MEDICINE

## 2021-11-09 PROCEDURE — 700111 HCHG RX REV CODE 636 W/ 250 OVERRIDE (IP): Performed by: INTERNAL MEDICINE

## 2021-11-09 PROCEDURE — 700102 HCHG RX REV CODE 250 W/ 637 OVERRIDE(OP): Performed by: INTERNAL MEDICINE

## 2021-11-09 PROCEDURE — 700102 HCHG RX REV CODE 250 W/ 637 OVERRIDE(OP): Performed by: HOSPITALIST

## 2021-11-09 PROCEDURE — 700102 HCHG RX REV CODE 250 W/ 637 OVERRIDE(OP): Performed by: STUDENT IN AN ORGANIZED HEALTH CARE EDUCATION/TRAINING PROGRAM

## 2021-11-09 PROCEDURE — 85025 COMPLETE CBC W/AUTO DIFF WBC: CPT

## 2021-11-09 PROCEDURE — 85610 PROTHROMBIN TIME: CPT

## 2021-11-09 PROCEDURE — 99233 SBSQ HOSP IP/OBS HIGH 50: CPT | Performed by: HOSPITALIST

## 2021-11-09 RX ORDER — WARFARIN SODIUM 7.5 MG/1
3.75 TABLET ORAL
Status: COMPLETED | OUTPATIENT
Start: 2021-11-09 | End: 2021-11-09

## 2021-11-09 RX ORDER — TAMSULOSIN HYDROCHLORIDE 0.4 MG/1
0.4 CAPSULE ORAL
Status: DISCONTINUED | OUTPATIENT
Start: 2021-11-09 | End: 2021-11-24 | Stop reason: HOSPADM

## 2021-11-09 RX ADMIN — CEPHALEXIN 500 MG: 500 CAPSULE ORAL at 17:32

## 2021-11-09 RX ADMIN — WARFARIN SODIUM 3.75 MG: 7.5 TABLET ORAL at 17:32

## 2021-11-09 RX ADMIN — CEPHALEXIN 500 MG: 500 CAPSULE ORAL at 09:19

## 2021-11-09 RX ADMIN — MULTIPLE VITAMINS W/ MINERALS TAB 1 TABLET: TAB at 05:25

## 2021-11-09 RX ADMIN — NYSTATIN: 100000 POWDER TOPICAL at 05:25

## 2021-11-09 RX ADMIN — FUROSEMIDE 60 MG: 10 INJECTION, SOLUTION INTRAMUSCULAR; INTRAVENOUS at 05:25

## 2021-11-09 RX ADMIN — SENNOSIDES AND DOCUSATE SODIUM 2 TABLET: 50; 8.6 TABLET ORAL at 05:25

## 2021-11-09 RX ADMIN — NYSTATIN: 100000 POWDER TOPICAL at 18:00

## 2021-11-09 RX ADMIN — CEPHALEXIN 500 MG: 500 CAPSULE ORAL at 12:51

## 2021-11-09 RX ADMIN — TAMSULOSIN HYDROCHLORIDE 0.4 MG: 0.4 CAPSULE ORAL at 12:00

## 2021-11-09 RX ADMIN — CEPHALEXIN 500 MG: 500 CAPSULE ORAL at 20:35

## 2021-11-09 RX ADMIN — SENNOSIDES AND DOCUSATE SODIUM 2 TABLET: 50; 8.6 TABLET ORAL at 17:32

## 2021-11-09 ASSESSMENT — PULMONARY FUNCTION TESTS
EPAP_CMH2O: 8
EPAP_CMH2O: 8

## 2021-11-09 ASSESSMENT — COGNITIVE AND FUNCTIONAL STATUS - GENERAL
DRESSING REGULAR LOWER BODY CLOTHING: A LITTLE
TURNING FROM BACK TO SIDE WHILE IN FLAT BAD: A LITTLE
DAILY ACTIVITIY SCORE: 19
MOVING TO AND FROM BED TO CHAIR: A LITTLE
DRESSING REGULAR UPPER BODY CLOTHING: A LITTLE
WALKING IN HOSPITAL ROOM: A LITTLE
SUGGESTED CMS G CODE MODIFIER MOBILITY: CK
MOVING FROM LYING ON BACK TO SITTING ON SIDE OF FLAT BED: A LITTLE
CLIMB 3 TO 5 STEPS WITH RAILING: A LOT
SUGGESTED CMS G CODE MODIFIER DAILY ACTIVITY: CK
MOBILITY SCORE: 16
PERSONAL GROOMING: A LITTLE
HELP NEEDED FOR BATHING: A LITTLE
TOILETING: A LITTLE
STANDING UP FROM CHAIR USING ARMS: A LOT

## 2021-11-09 ASSESSMENT — FIBROSIS 4 INDEX: FIB4 SCORE: 0.89

## 2021-11-09 ASSESSMENT — ENCOUNTER SYMPTOMS
DIZZINESS: 0
NERVOUS/ANXIOUS: 0
SORE THROAT: 0
COUGH: 0
MYALGIAS: 0
ABDOMINAL PAIN: 0
HEADACHES: 0
SPEECH CHANGE: 0
HEARTBURN: 0
PALPITATIONS: 0
VOMITING: 0
NAUSEA: 0
BACK PAIN: 0
DEPRESSION: 0
SPUTUM PRODUCTION: 0
SHORTNESS OF BREATH: 0
CHILLS: 0
SENSORY CHANGE: 0
BRUISES/BLEEDS EASILY: 0

## 2021-11-09 ASSESSMENT — PAIN DESCRIPTION - PAIN TYPE
TYPE: ACUTE PAIN
TYPE: ACUTE PAIN

## 2021-11-09 NOTE — PROGRESS NOTES
Inpatient Anticoagulation Service Note    Date: 11/9/2021  Reason for Anticoagulation: Pulmonary Embolism  Hemoglobin Value: 14  Hematocrit Value: (Abnormal) 52.6  Lab Platelet Value: 195  Target INR: 2.0 to 3.0  INR from last 7 days     Date/Time INR Value    11/09/21 02:20:01 (Abnormal) 2.08    11/08/21 0430 (Abnormal) 1.69    11/07/21 0230 (Abnormal) 1.75    11/06/21 0512 (Abnormal) 2.22    11/05/21 03:50:01 (Abnormal) 2.79    11/04/21 0458 (Abnormal) 5.22    11/03/21 0211 (Abnormal) 2.9        Dose from last 7 days     Date/Time Dose (mg)    11/09/21 1128 3.75    11/08/21 1304 7.5    11/07/21 1407 7.5    11/06/21 0820 3.75    11/05/21 1100 3.75    11/04/21 1140 0    11/03/21 1330 7.5    11/02/21 1522 3.75        Average Dose (mg):  (7.5 mg daily)  Significant Interactions: Antibiotics  Bridge Therapy: No   Reversal Agent Administered: Not Applicable    A/P  Therapeutic INR with increase by ~0.4 in the past 24 hours.  Continues to larger than anticipated fluctuations to INR based on dosing this admission compared to outpatient dosing.  Will give 3.75 mg (50% of home dose) given rise to INR overnight.  INR tomorrow.    Education Material Provided?: Yes  Pharmacist suggested discharge dosing: TBD, likely can continue home dosing of warfarin 7.5 mg PO daily.  Recommend a follow-up PT/INR within 48-72 hours of discharge.     Penelope Kaba, PharmD, BCPS, BCCCP

## 2021-11-09 NOTE — PROGRESS NOTES
NURSING NOTE    Neuro: Alert/Oriented x4, PREM OSHEA. Very focused on his electronics not very involved in his own care.    Cardiac: SR, ST, Normotensive      Resp: HFNC, Bipap @ NOC Sats drop while asleep. Diminished BS              GI: Obese, Abdomen soft,nontender. BM x1 formed.    : Matthew Catheter present.    LTD: L PICC Double Lumen, Matthew Catheter.    Skin: Scattered Redness, Moisture Associated Skin Breakdown under pannus and groin area. Nystatin Powder Applied. Bilateral Redness / Cellulitis on lower extremities            Shift Summary:    Fresh water provided, CHG bath completed. No acute issues overnight. Fell Asleep on HFNC 40L 45% Desaturated to 63% Good Waveform on Monitor. The patient was transitioned back to bipap. Saturations returned to WNL.

## 2021-11-09 NOTE — PROGRESS NOTES
Bedside report received and patient care assumed. Pt is resting in bed, A&Ox4, with no complaints of pain, and is on 80% BIPAP. Cardiac monitoring in place.  All fall precautions are in place, belongings at bedside table.  Pt was updated on POC, no questions or concerns. Pt educated on use of call light for assistance.     Crisis Charting: COVID-19 surge in effect

## 2021-11-09 NOTE — PROGRESS NOTES
"Critical Care Progress Note    Date of admission  11/1/2021    Chief Complaint  Acute on chronic hypoxic respiratory failure    Hospital Course  \"31 Y m with morbid obesity consulted for hypercarbic respiratory failure. He is morbid obese BMI 69, hx of pe on chronic anticoagulation, untreated JONES, chronic venous stasis, eczema, lower exxt cellutlitis. Which was admitted 11/1 for abdominal swelling. He had a negative CTA this admit for pe, CT of his abdomen with some ascites and skin edema, his echo shows some pulmonary hypertension with RSVP 45, mild MR, LV ef 60%. This am 11/3 I was called for ams with a ph 7.1/130/90 on NRB.\" - Dr. Slaughter 11/3    11/3 failed trial of BIPAP, subsequently, intubated   11/5 extubated to BIPAP, doing well. Didn't tolerate off BIPAP  11/6 tolerated HFNC more. Tolerated mobility, sat in chair    Interval Problem Update  Reviewed last 24 hour events:   - HFNC during day yesterday, BiPAP overnight   - case management working on obtaining BiPAP for home use at discharge   - AAOx4   - NSR, normotensive   - AF, nl WBC   - joey diet   - ongoing good UOP   - Hgb up to 14   - INR 2.1 on coumadin   - day 6/7 keflex for cellulitis    Review of Systems  Review of Systems   Constitutional: Negative for chills and malaise/fatigue.   HENT: Negative for sore throat.    Respiratory: Negative for cough, sputum production and shortness of breath.    Cardiovascular: Positive for leg swelling. Negative for chest pain and palpitations.   Gastrointestinal: Negative for heartburn, nausea and vomiting.   Musculoskeletal: Negative for back pain and myalgias.   Neurological: Negative for dizziness, sensory change, speech change and headaches.   Endo/Heme/Allergies: Does not bruise/bleed easily.   Psychiatric/Behavioral: Negative for depression. The patient is not nervous/anxious.    All other systems reviewed and are negative.       Vital Signs for last 24 hours   Temp:  [35.7 °C (96.3 °F)-36.6 °C (97.9 °F)] 36 " °C (96.8 °F)  Pulse:  [] 92  Resp:  [16-36] 20  BP: ()/(49-94) 120/63  SpO2:  [63 %-99 %] 95 %    Respiratory Information for the last 24 hours   BiPAP 14/8, 40% nightly <--> HFNC 45 lpm, 45%    Physical Exam   Physical Exam  Vitals and nursing note reviewed.   Constitutional:       General: He is awake. He is not in acute distress.     Appearance: He is well-developed. He is morbidly obese. He is not toxic-appearing.      Interventions: Nasal cannula in place.   HENT:      Head: Normocephalic.      Nose: Nose normal. No congestion.      Comments: High flow nasal cannula in place     Mouth/Throat:      Mouth: Mucous membranes are moist.      Pharynx: Oropharynx is clear.   Eyes:      General: No scleral icterus.     Conjunctiva/sclera: Conjunctivae normal.      Pupils: Pupils are equal, round, and reactive to light.   Neck:      Vascular: No JVD.      Comments: Enlarged neck circumference  Cardiovascular:      Rate and Rhythm: Normal rate and regular rhythm.      Chest Wall: PMI is displaced.      Pulses: Normal pulses.      Heart sounds: Normal heart sounds. No murmur heard.      Pulmonary:      Effort: Pulmonary effort is normal. No accessory muscle usage or respiratory distress.      Breath sounds: No stridor. Examination of the right-lower field reveals rales. Examination of the left-lower field reveals rales. Rales present. No wheezing.      Comments: Speaking in full sentences, desaturates when sleeping  Abdominal:      General: Bowel sounds are normal. There is no distension.      Palpations: Abdomen is soft.      Tenderness: There is no abdominal tenderness. There is no guarding.   Musculoskeletal:         General: No tenderness.      Cervical back: Neck supple.      Right lower le+ Pitting Edema present.      Left lower le+ Pitting Edema present.   Skin:     General: Skin is warm and dry.      Capillary Refill: Capillary refill takes less than 2 seconds.      Coloration: Skin is not  pale.   Neurological:      General: No focal deficit present.      Mental Status: He is alert and oriented to person, place, and time.      Cranial Nerves: No cranial nerve deficit.      Sensory: No sensory deficit.   Psychiatric:         Mood and Affect: Mood normal.         Behavior: Behavior normal. Behavior is cooperative.         Thought Content: Thought content normal.         Medications  Current Facility-Administered Medications   Medication Dose Route Frequency Provider Last Rate Last Admin   • warfarin (COUMADIN) tablet 7.5 mg  7.5 mg Oral DAILY AT 1800 Jeremy M Gonda, M.D.   7.5 mg at 11/08/21 1741   • acetaminophen (Tylenol) tablet 650 mg  650 mg Oral Q6HRS PRN Kell Nguyen M.D.       • cloNIDine (CATAPRES) tablet 0.1 mg  0.1 mg Oral Q6HRS PRN Kell Nguyen M.D.       • ondansetron (ZOFRAN ODT) dispertab 4 mg  4 mg Oral Q4HRS PRN Kell Nguyen M.D.       • senna-docusate (PERICOLACE or SENOKOT S) 8.6-50 MG per tablet 2 Tablet  2 Tablet Oral BID Kell Nguyen M.D.   2 Tablet at 11/09/21 0525    And   • polyethylene glycol/lytes (MIRALAX) PACKET 1 Packet  1 Packet Oral QDAY PRN Kell Nguyen M.D.        And   • magnesium hydroxide (MILK OF MAGNESIA) suspension 30 mL  30 mL Oral QDAY PRVANESSA Nguyen M.D.        And   • bisacodyl (DULCOLAX) suppository 10 mg  10 mg Rectal QDAY PRN Kell Nguyen M.D.       • therapeutic multivitamin-minerals (THERAGRAN-M) tablet 1 Tablet  1 Tablet Oral QAM Kell Nguyen M.D.   1 Tablet at 11/09/21 0525   • cephALEXin (KEFLEX) capsule 500 mg  500 mg Oral 4X/DAY BARBARA Whitlock.O.   500 mg at 11/08/21 2158   • furosemide (LASIX) injection 60 mg  60 mg Intravenous Q DAY BARBARA Whitlock.O.   60 mg at 11/09/21 0525   • MD Alert...Warfarin per Pharmacy   Other PHARMACY TO DOSE BARBARA Whitlock.O.       • nystatin (MYCOSTATIN) powder   Topical BID Bartolo Rosa M.D.   Given at 11/09/21 0525   • fentaNYL (SUBLIMAZE) injection 100 mcg  100 mcg  Intravenous Q HOUR PRN Maurice Slaughter M.D.   100 mcg at 11/04/21 0139   • Respiratory Therapy Consult   Nebulization Continuous RT Maurice Slaughter M.D.       • MD Alert...ICU Electrolyte Replacement per Pharmacy   Other PHARMACY TO DOSE Maurice Slaughter M.D.       • lidocaine (XYLOCAINE) 1 % injection 2 mL  2 mL Tracheal Tube Q30 MIN PRN Maurice Slaughter M.D.       • albuterol inhaler 2 Puff  2 Puff Inhalation Q6HRS PRN Barry Tabor M.D.   2 Puff at 11/05/21 1459   • Pharmacy Consult Request ...Pain Management Review 1 Each  1 Each Other PHARMACY TO DOSE Barry Tabor M.D.       • enalaprilat (Vasotec) injection 1.25 mg 1 mL  1.25 mg Intravenous Q6HRS PRN Barry Tabor M.D.       • labetalol (NORMODYNE/TRANDATE) injection 10 mg  10 mg Intravenous Q4HRS PRN Barry Tabor M.D.       • ondansetron (ZOFRAN) syringe/vial injection 4 mg  4 mg Intravenous Q4HRS PRN Barry Tabor M.D.           Fluids    Intake/Output Summary (Last 24 hours) at 11/9/2021 0713  Last data filed at 11/9/2021 0549  Gross per 24 hour   Intake --   Output 2735 ml   Net -2735 ml       Laboratory          Recent Labs     11/07/21 0230 11/08/21 0430 11/09/21 0220   SODIUM 141 136 138   POTASSIUM 4.0 4.3 4.2   CHLORIDE 96 95* 96   CO2 37* 35* 34*   BUN 11 12 11   CREATININE 0.45* 0.45* 0.55   MAGNESIUM 2.0 2.1  --    CALCIUM 9.0 8.9 9.1     Recent Labs     11/07/21 0230 11/08/21 0430 11/09/21 0220   ALTSGPT  --  29  --    ASTSGOT  --  30  --    ALKPHOSPHAT  --  64  --    TBILIRUBIN  --  1.4  --    PREALBUMIN  --  9.2*  --    GLUCOSE 92 93 91     Recent Labs     11/07/21 0230 11/08/21 0430 11/09/21 0220   WBC 6.0 4.9 5.8   NEUTSPOLYS 67.20 49.20 48.10   LYMPHOCYTES 13.30* 29.00 31.90   MONOCYTES 8.00 13.40 13.30   EOSINOPHILS 10.60* 7.20* 5.90   BASOPHILS 0.90 0.60 0.50   ASTSGOT  --  30  --    ALTSGPT  --  29  --    ALKPHOSPHAT  --  64  --    TBILIRUBIN  --  1.4  --      Recent Labs     11/07/21  0230 11/08/21  0430 11/09/21  0220   RBC 6.21*  6.17* 6.20*   HEMOGLOBIN 14.1 13.9* 14.0   HEMATOCRIT 52.3* 51.9 52.6*   PLATELETCT 172 112* 195   PROTHROMBTM 19.9* 19.4* 22.8*   INR 1.75* 1.69* 2.08*       Imaging  X-Ray:  I have personally reviewed the images and compared with prior images. and No film today    Assessment/Plan  * Acute on chronic respiratory failure with hypoxia and hypercapnia (HCC)- (present on admission)  Assessment & Plan  Intubated 11/3-11/6  RT/O2 protocol  Continue to titrate down HHFNC to keep SPO2 greater than 88%, nocturnal BiPAP given significant desaturation episodes  Continue forced diuresis  Limit sedatives  Encourage incentive spirometry, out of bed to chair    JONES (obstructive sleep apnea)- (present on admission)  Assessment & Plan  Most definitely has formally undiagnosed OHS/JONES -  need nocturnal BIPAP with EPAP at least 8cmH2O  Consultation with case management to discuss outpatient options  Formal sleep consultation December 8 as outpatient    Morbid obesity (HCC)- (present on admission)  Assessment & Plan  BMI 66  Encourage behavioral and dietary modification for weight loss  RD consulted    Cellulitis  Assessment & Plan  Improving cellulitis in lower extremities bilaterally and lower abdomen    Continue Keflex through 11/11 (7-day course)  Moisturize dry skin in LE with vaseline products  Keep intertriginous areas dry and clean    Cardiomegaly- (present on admission)  Assessment & Plan  With preserved ejection fraction/diastolic function, RVSP 45 mmHg  Continue forced diuresis  Blood pressure management  Encourage weight loss    History of pulmonary embolism- (present on admission)  Assessment & Plan  Continue Coumadin    Chronic anticoagulation- (present on admission)  Assessment & Plan  Continue Coumadin per pharmacy    Pulmonary embolism with acute cor pulmonale (HCC)- (present on admission)  Assessment & Plan  Coumadin    Pulmonary hypertension (HCC)- (present on admission)  Assessment & Plan  Due to morbid  obesity  Last TTE with RVSP 45  RV function ok. Volume overload -->continue diuresis    Lower extremity edema- (present on admission)  Assessment & Plan  Continue forced diuresis, elevation, leg compression    Asthma- (present on admission)  Assessment & Plan  Without acute exacerbation  As needed bronchodilators    Urinary retention  Assessment & Plan  Russo catheter placed by urology, defer to hospitalist and urology on timing of removal    Thrombocytopenia (HCC)  Assessment & Plan  Daily CBC and monitor       VTE:  Coumadin  Ulcer: Not Indicated  Lines: Russo Catheter  Ongoing indication addressed  Russo was placed by Urology due to his anatomy and obesity. Will not remove russo until his volume status and resp status are optimized.     I have performed a physical exam and reviewed and updated ROS and Plan today (11/9/2021). In review of yesterday's note (11/8/2021), there are no changes except as documented above.     Discussed patient condition and risk of morbidity and/or mortality with Hospitalist, RN, RT, Pharmacy, , Charge nurse / hot rounds and Patient.  [facility] critical care will sign off.  Please call with questions.  Patient would benefit from pulmonology consultation.

## 2021-11-09 NOTE — PROGRESS NOTES
Receieved report from SERGIO Shepherd RN. Reviewed assessment, plan of care and answered all questions. RN stated she would follow up on need for russo catheter. Will await patient arrival.

## 2021-11-09 NOTE — CARE PLAN
The patient is Stable - Low risk of patient condition declining or worsening    Shift Goals  Clinical Goals: Decrease O2 requirements, mobilize  Patient Goals: Decrease O 2 needs  Family Goals: erlin    Progress made toward(s) clinical / shift goals:    Problem: Care Map:  Admission Optimal Outcome for the Heart Failure Patient  Goal: Admission:  Optimal Care of the heart failure patient  Outcome: Progressing     Problem: Respiratory  Goal: Patient will achieve/maintain optimum respiratory ventilation and gas exchange  Outcome: Progressing     Problem: Skin Integrity  Goal: Skin integrity is maintained or improved  Outcome: Progressing       Patient is not progressing towards the following goals:

## 2021-11-09 NOTE — ASSESSMENT & PLAN NOTE
Likely chronic venous stasis dermatitis.  Completed course of Keflex.  Continue wound care and diuretics.

## 2021-11-09 NOTE — PROGRESS NOTES
Hospital Medicine Daily Progress Note    Date of Service  11/9/2021    Chief Complaint  Michael Ontiveros is a 31 y.o. male admitted 11/1/2021 with edema    Hospital Course  This is a 31-year-old male with past medical history of pulmonary embolism and DVT on Coumadin, pulmonary hypertension, JONES, mild intermittent asthma, and morbid obesity with a BMI of 69 who was admitted on 11/1/2021 with bilateral lower extremity swelling.  Patient was transferred to the intensive care unit for respiratory failure failing BiPAP trial and was intubated on 11/3/2021.  He was extubated to BiPAP on 11/5/2021.  He had difficult Matthew placement requiring urology consultation with cystoscopy for placement    He is set up to have a sleep study on 11/8/2021 for evaluation for BiPAP therapy      Interval Problem Update    On high flow nasal cannula during the day and BiPAP at night  Overall feels better  Afebrile  Remains on Keflex for lower extremity cellulitis  INR 2.08    I have personally seen and examined the patient at bedside. I discussed the plan of care with patient, bedside RN, pharmacy and critical care.    Consultants/Specialty  critical care    Code Status  Full Code    Disposition  Patient is not medically cleared.   Anticipate discharge to to home with close outpatient follow-up.  I have placed the appropriate orders for post-discharge needs.    Review of Systems  Review of Systems   Constitutional: Positive for malaise/fatigue.   Cardiovascular: Positive for leg swelling.   Gastrointestinal: Negative for abdominal pain, nausea and vomiting.   All other systems reviewed and are negative.       Physical Exam  Temp:  [35.7 °C (96.3 °F)-36.6 °C (97.9 °F)] 36 °C (96.8 °F)  Pulse:  [] 92  Resp:  [16-36] 20  BP: (102-138)/(52-94) 120/63  SpO2:  [63 %-99 %] 95 %    Physical Exam  Vitals and nursing note reviewed.   Constitutional:       Appearance: He is well-developed. He is obese. He is not diaphoretic.   HENT:       Head: Normocephalic and atraumatic.      Mouth/Throat:      Pharynx: No oropharyngeal exudate.   Eyes:      General: No scleral icterus.        Right eye: No discharge.         Left eye: No discharge.      Conjunctiva/sclera: Conjunctivae normal.      Pupils: Pupils are equal, round, and reactive to light.   Neck:      Vascular: No JVD.      Trachea: No tracheal deviation.   Cardiovascular:      Rate and Rhythm: Normal rate and regular rhythm.      Heart sounds: No murmur heard.  No friction rub. No gallop.    Pulmonary:      Effort: Pulmonary effort is normal. No respiratory distress.      Breath sounds: No stridor. Decreased breath sounds present. No wheezing.   Chest:      Chest wall: No tenderness.   Abdominal:      General: Bowel sounds are normal. There is no distension.      Palpations: Abdomen is soft.      Tenderness: There is no abdominal tenderness. There is no rebound.   Musculoskeletal:         General: Swelling present. No tenderness.      Cervical back: Neck supple.   Skin:     General: Skin is warm and dry.      Findings: Erythema present.      Nails: There is no clubbing.   Neurological:      Mental Status: He is alert and oriented to person, place, and time.      Cranial Nerves: No cranial nerve deficit.      Motor: No abnormal muscle tone.   Psychiatric:         Behavior: Behavior normal.         Fluids    Intake/Output Summary (Last 24 hours) at 11/9/2021 0808  Last data filed at 11/9/2021 0549  Gross per 24 hour   Intake --   Output 2285 ml   Net -2285 ml       Laboratory  Recent Labs     11/07/21 0230 11/08/21  0430 11/09/21  0220   WBC 6.0 4.9 5.8   RBC 6.21* 6.17* 6.20*   HEMOGLOBIN 14.1 13.9* 14.0   HEMATOCRIT 52.3* 51.9 52.6*   MCV 84.2 84.1 84.8   MCH 22.7* 22.5* 22.6*   MCHC 27.0* 26.8* 26.6*   RDW 67.7* 68.5* 67.7*   PLATELETCT 172 112* 195   MPV  --   --  10.1     Recent Labs     11/07/21  0230 11/08/21  0430 11/09/21  0220   SODIUM 141 136 138   POTASSIUM 4.0 4.3 4.2   CHLORIDE 96  95* 96   CO2 37* 35* 34*   GLUCOSE 92 93 91   BUN 11 12 11   CREATININE 0.45* 0.45* 0.55   CALCIUM 9.0 8.9 9.1     Recent Labs     11/07/21  0230 11/08/21  0430 11/09/21  0220   INR 1.75* 1.69* 2.08*               Imaging  DX-CHEST-LIMITED (1 VIEW)   Final Result      Improving basilar aeration with increase in lung volumes following extubation      Continued moderate bilateral consolidation could be from edema and/or infection      DX-CHEST-PORTABLE (1 VIEW)   Final Result      1.  No significant interval change.         DX-CHEST-PORTABLE (1 VIEW)   Final Result      1.  Probable mild worsening pulmonary edema.   2.  Increased bibasilar atelectasis.   3.  Cannot exclude trace pleural effusions.      DX-ABDOMEN FOR TUBE PLACEMENT   Final Result      Enteric tube tip projects over the stomach      IR-PICC LINE PLACEMENT W/ GUIDANCE > AGE 5   Final Result                  Ultrasound-guided PICC placement performed by qualified nursing staff as    above.          DX-CHEST-PORTABLE (1 VIEW)   Final Result      1.  Interval intubation   2.  BILATERAL atelectasis with possible superimposed pulmonary edema or pneumonia, unchanged      DX-CHEST-LIMITED (1 VIEW)   Final Result      Worsening diffuse hazy opacity compatible with edema or infection      EC-ECHOCARDIOGRAM COMPLETE W/ CONT   Final Result      CT-ABDOMEN-PELVIS WITH   Final Result         Limited exam due to body habitus.      1. There is skin thickening and subcutaneous edema in the low anterior abdominal and pelvic wall panus. This could relate to fluid overload but infection and cellulitis can have similar appearance. Correlate clinically.      2. Mild perihepatic ascites.      3. Diffuse scrotal edema and moderate hydrocele.         CT-CTA CHEST PULMONARY ARTERY W/ RECONS   Final Result         1. No CT evidence of pulmonary embolism.      2. Mild hazy groundglass opacity throughout both lungs could relate to mild fluid overload.      3. Cardiomegaly.      4.  No airspace consolidation. Linear right midlung atelectasis.         DX-CHEST-PORTABLE (1 VIEW)   Final Result         Stable cardiomegaly.      Mild interstitial prominence could relate to mild fluid overload.           Assessment/Plan  * Acute on chronic respiratory failure with hypoxia and hypercapnia (HCC)- (present on admission)  Assessment & Plan  Wean off high flow nasal cannula as tolerated  Continue diuresis  Continue nocturnal BiPAP    Urinary retention  Assessment & Plan  With difficult Matthew catheter placement by urology    Started on Flomax  Early a.m. voiding trial later this week prior to discharge    Thrombocytopenia (HCC)  Assessment & Plan  Resolved monitor CBC    Cellulitis  Assessment & Plan  Versus chronic stasis dermatitis    Patient started on Keflex completing 7-day course tomorrow  Continue skin care and clinical monitoring    Volume overload- (present on admission)  Assessment & Plan        History of pulmonary embolism- (present on admission)  Assessment & Plan  Continue warfarin    Chronic anticoagulation- (present on admission)  Assessment & Plan  History of PE and DVT, had a trial on Xarelto developed DVT  Continue anticoagulation with warfarin and monitor INR    Pulmonary embolism with acute cor pulmonale (HCC)- (present on admission)  Assessment & Plan  History of PE    Continue anticoagulation with warfarin and monitor INR    Pulmonary hypertension (HCC)- (present on admission)  Assessment & Plan  RVSP 45 mmHg  On anticoagulation for prior history of PE  CTA on 11/1/2021 - for acute PE  Likely underlying severe sleep apnea  Continue BiPAP therapy at bedtime  Continue diuresis monitor intake and output and renal function electrolytes with diuresis  Patient -26 L since admit    JONES (obstructive sleep apnea)- (present on admission)  Assessment & Plan  Continue nocturnal BiPAP discussed with case management regarding obtaining home BiPAP prior to discharge    Lower extremity edema-  (present on admission)  Assessment & Plan  Continue diuresis    Morbid obesity (HCC)- (present on admission)  Assessment & Plan  Body mass index is 69.25 kg/m².    Asthma- (present on admission)  Assessment & Plan  No acute exacerbation   bronchodilators as needed per RT protocol           VTE prophylaxis: therapeutic anticoagulation with Warfarin    I have performed a physical exam and reviewed and updated ROS and Plan today (11/9/2021). In review of yesterday's note (11/8/2021), there are no changes except as documented above.

## 2021-11-09 NOTE — DISCHARGE PLANNING
Care Transition Team Discharge Planning    Anticipated Discharge Disposition: d/c home w/ Bipap      Action: Lsw spoke to Dr Gonda yesterday. He spoke to RN Breanna. She will call [facility] company to f/u.    Lsw spoke to assigned hospitalist today. He is aware of notes from yesterday and RN manager w/ HCM aware.    Lsw noted pt has now transferred to another floor.    Lsw called T7 d/c planner, JOANNA Antony, to report the pt's d/c planning barriers.     Barriers to Discharge: see notes from yesterday    Plan: HCM will continue to follow, and assist w/ d/c planning

## 2021-11-10 LAB
ANION GAP SERPL CALC-SCNC: 6 MMOL/L (ref 7–16)
BASOPHILS # BLD AUTO: 0.8 % (ref 0–1.8)
BASOPHILS # BLD: 0.04 K/UL (ref 0–0.12)
BUN SERPL-MCNC: 13 MG/DL (ref 8–22)
CALCIUM SERPL-MCNC: 8.6 MG/DL (ref 8.5–10.5)
CHLORIDE SERPL-SCNC: 98 MMOL/L (ref 96–112)
CO2 SERPL-SCNC: 34 MMOL/L (ref 20–33)
CREAT SERPL-MCNC: 0.41 MG/DL (ref 0.5–1.4)
EOSINOPHIL # BLD AUTO: 0.31 K/UL (ref 0–0.51)
EOSINOPHIL NFR BLD: 6 % (ref 0–6.9)
ERYTHROCYTE [DISTWIDTH] IN BLOOD BY AUTOMATED COUNT: 69 FL (ref 35.9–50)
GLUCOSE SERPL-MCNC: 101 MG/DL (ref 65–99)
HCT VFR BLD AUTO: 51.2 % (ref 42–52)
HGB BLD-MCNC: 13.8 G/DL (ref 14–18)
IMM GRANULOCYTES # BLD AUTO: 0.01 K/UL (ref 0–0.11)
IMM GRANULOCYTES NFR BLD AUTO: 0.2 % (ref 0–0.9)
INR PPP: 1.84 (ref 0.87–1.13)
LYMPHOCYTES # BLD AUTO: 1.43 K/UL (ref 1–4.8)
LYMPHOCYTES NFR BLD: 27.8 % (ref 22–41)
MCH RBC QN AUTO: 22.7 PG (ref 27–33)
MCHC RBC AUTO-ENTMCNC: 27 G/DL (ref 33.7–35.3)
MCV RBC AUTO: 84.2 FL (ref 81.4–97.8)
MONOCYTES # BLD AUTO: 0.69 K/UL (ref 0–0.85)
MONOCYTES NFR BLD AUTO: 13.4 % (ref 0–13.4)
NEUTROPHILS # BLD AUTO: 2.66 K/UL (ref 1.82–7.42)
NEUTROPHILS NFR BLD: 51.8 % (ref 44–72)
NRBC # BLD AUTO: 0 K/UL
NRBC BLD-RTO: 0 /100 WBC
PLATELET # BLD AUTO: 206 K/UL (ref 164–446)
POTASSIUM SERPL-SCNC: 4.1 MMOL/L (ref 3.6–5.5)
PROTHROMBIN TIME: 20.7 SEC (ref 12–14.6)
RBC # BLD AUTO: 6.08 M/UL (ref 4.7–6.1)
SODIUM SERPL-SCNC: 138 MMOL/L (ref 135–145)
WBC # BLD AUTO: 5.1 K/UL (ref 4.8–10.8)

## 2021-11-10 PROCEDURE — 770020 HCHG ROOM/CARE - TELE (206)

## 2021-11-10 PROCEDURE — 94640 AIRWAY INHALATION TREATMENT: CPT

## 2021-11-10 PROCEDURE — 700102 HCHG RX REV CODE 250 W/ 637 OVERRIDE(OP): Performed by: HOSPITALIST

## 2021-11-10 PROCEDURE — 85610 PROTHROMBIN TIME: CPT

## 2021-11-10 PROCEDURE — A9270 NON-COVERED ITEM OR SERVICE: HCPCS | Performed by: HOSPITALIST

## 2021-11-10 PROCEDURE — 99233 SBSQ HOSP IP/OBS HIGH 50: CPT | Mod: GC | Performed by: INTERNAL MEDICINE

## 2021-11-10 PROCEDURE — 94660 CPAP INITIATION&MGMT: CPT

## 2021-11-10 PROCEDURE — 700102 HCHG RX REV CODE 250 W/ 637 OVERRIDE(OP): Performed by: STUDENT IN AN ORGANIZED HEALTH CARE EDUCATION/TRAINING PROGRAM

## 2021-11-10 PROCEDURE — A9270 NON-COVERED ITEM OR SERVICE: HCPCS | Performed by: INTERNAL MEDICINE

## 2021-11-10 PROCEDURE — 85025 COMPLETE CBC W/AUTO DIFF WBC: CPT

## 2021-11-10 PROCEDURE — A9270 NON-COVERED ITEM OR SERVICE: HCPCS | Performed by: STUDENT IN AN ORGANIZED HEALTH CARE EDUCATION/TRAINING PROGRAM

## 2021-11-10 PROCEDURE — 80048 BASIC METABOLIC PNL TOTAL CA: CPT

## 2021-11-10 PROCEDURE — 700102 HCHG RX REV CODE 250 W/ 637 OVERRIDE(OP): Performed by: INTERNAL MEDICINE

## 2021-11-10 PROCEDURE — 99232 SBSQ HOSP IP/OBS MODERATE 35: CPT | Performed by: INTERNAL MEDICINE

## 2021-11-10 PROCEDURE — 700111 HCHG RX REV CODE 636 W/ 250 OVERRIDE (IP): Performed by: INTERNAL MEDICINE

## 2021-11-10 RX ORDER — WARFARIN SODIUM 5 MG/1
5 TABLET ORAL DAILY
Status: DISCONTINUED | OUTPATIENT
Start: 2021-11-10 | End: 2021-11-14

## 2021-11-10 RX ADMIN — WARFARIN SODIUM 5 MG: 5 TABLET ORAL at 17:28

## 2021-11-10 RX ADMIN — MULTIPLE VITAMINS W/ MINERALS TAB 1 TABLET: TAB at 05:30

## 2021-11-10 RX ADMIN — SENNOSIDES AND DOCUSATE SODIUM 2 TABLET: 50; 8.6 TABLET ORAL at 17:28

## 2021-11-10 RX ADMIN — CEPHALEXIN 500 MG: 500 CAPSULE ORAL at 20:13

## 2021-11-10 RX ADMIN — CEPHALEXIN 500 MG: 500 CAPSULE ORAL at 14:17

## 2021-11-10 RX ADMIN — CEPHALEXIN 500 MG: 500 CAPSULE ORAL at 17:28

## 2021-11-10 RX ADMIN — TAMSULOSIN HYDROCHLORIDE 0.4 MG: 0.4 CAPSULE ORAL at 08:58

## 2021-11-10 RX ADMIN — CEPHALEXIN 500 MG: 500 CAPSULE ORAL at 08:58

## 2021-11-10 ASSESSMENT — ENCOUNTER SYMPTOMS
ABDOMINAL PAIN: 0
NECK PAIN: 0
FEVER: 0
VOMITING: 0
COUGH: 0
CHILLS: 0
SHORTNESS OF BREATH: 0
HEMOPTYSIS: 0
SHORTNESS OF BREATH: 1
PALPITATIONS: 0
MYALGIAS: 0
NAUSEA: 0
EYE DISCHARGE: 0
EYE PAIN: 0

## 2021-11-10 ASSESSMENT — PAIN DESCRIPTION - PAIN TYPE
TYPE: ACUTE PAIN
TYPE: ACUTE PAIN

## 2021-11-10 NOTE — CARE PLAN
The patient is Watcher - Medium risk of patient condition declining or worsening    Shift Goals  Clinical Goals: Decrease O2 demand, mobilize pt  Patient Goals: Rest, comfort  Family Goals: None present    Progress made toward(s) clinical / shift goals:    Problem: Knowledge Deficit - Standard  Goal: Patient and family/care givers will demonstrate understanding of plan of care, disease process/condition, diagnostic tests and medications  Outcome: Progressing     Problem: Respiratory  Goal: Patient will achieve/maintain optimum respiratory ventilation and gas exchange  Outcome: Progressing   Weaned from 50L at 70% to 40L at 60% on HFNC.    Patient is not progressing towards the following goals:  Pt still requiring large amount of oxygen, needs to be weaned in order to discharge. CM looking into possible LTAC options.

## 2021-11-10 NOTE — PROGRESS NOTES
Pt continued to desaturate into low 60s on Bipap, switched pt back over to high flow nasal cannula and notified RT.

## 2021-11-10 NOTE — CARE PLAN
The patient is Watcher - Medium risk of patient condition declining or worsening    Shift Goals  Clinical Goals: Decrease O2 requirements, mobilize  Patient Goals: Decrease O 2 needs  Family Goals: erlin    Progress made toward(s) clinical / shift goals:    Problem: Knowledge Deficit - Standard  Goal: Patient and family/care givers will demonstrate understanding of plan of care, disease process/condition, diagnostic tests and medications  Outcome: Progressing     Problem: Respiratory  Goal: Patient will achieve/maintain optimum respiratory ventilation and gas exchange  Outcome: Progressing       Patient is not progressing towards the following goals:

## 2021-11-10 NOTE — PROGRESS NOTES
Report received from night shift RN, assumed care of pt. Pt A&Ox4. Plan of care discussed with pt, labs and chart reviewed. All needs met at this time. Tele box on. Pt on HFNC 50L at 70%. Call light within reach, bed locked and in lowest position. All fall precautions and hourly rounding in place. Will continue to monitor.

## 2021-11-10 NOTE — DISCHARGE PLANNING
Anticipated Discharge Disposition: Home with DME    Action: LSW spoke to RN CM Manager Kell MELO who reports that patient needs a non invasive ventilator for home while he gets OP sleep study. [employer] is the only DME company that has it in stock. RN CM Manager to email LSW order form. LSW to have MD place order.     Barriers to Discharge: order needed    Plan: LSW to have MD fill out order when received    1519: LSW received order for ventilator.  signed order. LSW to await Dr. Ross's note to send along with order.

## 2021-11-10 NOTE — PROGRESS NOTES
Hospital Medicine Daily Progress Note    Date of Service  11/10/2021    Chief Complaint  Michael Ontiveros is a 31 y.o. male admitted 11/1/2021 with edema    Hospital Course  This is a 31-year-old male with past medical history of pulmonary embolism and DVT on Coumadin, pulmonary hypertension, JONES, mild intermittent asthma, and morbid obesity with a BMI of 69 who was admitted on 11/1/2021 with bilateral lower extremity swelling.  Patient was transferred to the intensive care unit for respiratory failure failing BiPAP trial and was intubated on 11/3/2021.  He was extubated to BiPAP on 11/5/2021.  He had difficult Matthew placement requiring urology consultation with cystoscopy for placement    He is set up to have a sleep study on 11/8/2021 for evaluation for BiPAP therapy      Interval Problem Update  Continues to be tachycardic, requiring HFNC 70% FiO2. Patient reports improving dyspnea and erythema of his legs. Consulted pulmonology, appreciate recs. BP soft this morning, patient not given lasix today.     I have personally seen and examined the patient at bedside. I discussed the plan of care with patient, bedside RN, pharmacy and pulmonology .    Consultants/Specialty  critical care and pulmonary    Code Status  Full Code    Disposition  Patient is not medically cleared.   Anticipate discharge to to home with close outpatient follow-up.  I have placed the appropriate orders for post-discharge needs.    Review of Systems  Review of Systems   Constitutional: Positive for malaise/fatigue.   Respiratory: Negative for cough and shortness of breath.    Cardiovascular: Positive for leg swelling.   Gastrointestinal: Negative for abdominal pain, nausea and vomiting.   Musculoskeletal: Negative for myalgias.   All other systems reviewed and are negative.       Physical Exam  Temp:  [35.9 °C (96.6 °F)-37.4 °C (99.3 °F)] 36.5 °C (97.7 °F)  Pulse:  [] 94  Resp:  [16-21] 20  BP: (101-124)/(64-76) 114/65  SpO2:  [89  %-97 %] 94 %    Physical Exam  Vitals and nursing note reviewed.   Constitutional:       General: He is not in acute distress.     Appearance: He is well-developed. He is obese. He is not diaphoretic.   HENT:      Head: Normocephalic and atraumatic.      Mouth/Throat:      Pharynx: Oropharynx is clear.   Eyes:      Conjunctiva/sclera: Conjunctivae normal.   Neck:      Vascular: No JVD.      Trachea: No tracheal deviation.   Cardiovascular:      Rate and Rhythm: Normal rate and regular rhythm.      Heart sounds: No murmur heard.      Pulmonary:      Effort: Pulmonary effort is normal. No respiratory distress.      Breath sounds: No wheezing.      Comments: Decreased breath sounds in bases   On HFNC   Abdominal:      General: Bowel sounds are normal. There is no distension.      Palpations: Abdomen is soft.      Tenderness: There is no abdominal tenderness.   Musculoskeletal:         General: Swelling present. No tenderness.      Cervical back: Neck supple.   Skin:     General: Skin is warm and dry.      Findings: Erythema (b/l LE ) present.      Nails: There is no clubbing.   Neurological:      Mental Status: He is alert and oriented to person, place, and time.      Cranial Nerves: No cranial nerve deficit.      Motor: No abnormal muscle tone.   Psychiatric:         Behavior: Behavior normal.         Fluids    Intake/Output Summary (Last 24 hours) at 11/10/2021 1304  Last data filed at 11/10/2021 1139  Gross per 24 hour   Intake 240 ml   Output 1900 ml   Net -1660 ml       Laboratory  Recent Labs     11/08/21  0430 11/09/21 0220 11/10/21  0406   WBC 4.9 5.8 5.1   RBC 6.17* 6.20* 6.08   HEMOGLOBIN 13.9* 14.0 13.8*   HEMATOCRIT 51.9 52.6* 51.2   MCV 84.1 84.8 84.2   MCH 22.5* 22.6* 22.7*   MCHC 26.8* 26.6* 27.0*   RDW 68.5* 67.7* 69.0*   PLATELETCT 112* 195 206   MPV  --  10.1  --      Recent Labs     11/08/21  0430 11/09/21  0220 11/10/21  0256   SODIUM 136 138 138   POTASSIUM 4.3 4.2 4.1   CHLORIDE 95* 96 98   CO2  35* 34* 34*   GLUCOSE 93 91 101*   BUN 12 11 13   CREATININE 0.45* 0.55 0.41*   CALCIUM 8.9 9.1 8.6     Recent Labs     11/08/21  0430 11/09/21  0220 11/10/21  0256   INR 1.69* 2.08* 1.84*               Imaging  DX-CHEST-LIMITED (1 VIEW)   Final Result      Improving basilar aeration with increase in lung volumes following extubation      Continued moderate bilateral consolidation could be from edema and/or infection      DX-CHEST-PORTABLE (1 VIEW)   Final Result      1.  No significant interval change.         DX-CHEST-PORTABLE (1 VIEW)   Final Result      1.  Probable mild worsening pulmonary edema.   2.  Increased bibasilar atelectasis.   3.  Cannot exclude trace pleural effusions.      DX-ABDOMEN FOR TUBE PLACEMENT   Final Result      Enteric tube tip projects over the stomach      IR-PICC LINE PLACEMENT W/ GUIDANCE > AGE 5   Final Result                  Ultrasound-guided PICC placement performed by qualified nursing staff as    above.          DX-CHEST-PORTABLE (1 VIEW)   Final Result      1.  Interval intubation   2.  BILATERAL atelectasis with possible superimposed pulmonary edema or pneumonia, unchanged      DX-CHEST-LIMITED (1 VIEW)   Final Result      Worsening diffuse hazy opacity compatible with edema or infection      EC-ECHOCARDIOGRAM COMPLETE W/ CONT   Final Result      CT-ABDOMEN-PELVIS WITH   Final Result         Limited exam due to body habitus.      1. There is skin thickening and subcutaneous edema in the low anterior abdominal and pelvic wall panus. This could relate to fluid overload but infection and cellulitis can have similar appearance. Correlate clinically.      2. Mild perihepatic ascites.      3. Diffuse scrotal edema and moderate hydrocele.         CT-CTA CHEST PULMONARY ARTERY W/ RECONS   Final Result         1. No CT evidence of pulmonary embolism.      2. Mild hazy groundglass opacity throughout both lungs could relate to mild fluid overload.      3. Cardiomegaly.      4. No  airspace consolidation. Linear right midlung atelectasis.         DX-CHEST-PORTABLE (1 VIEW)   Final Result         Stable cardiomegaly.      Mild interstitial prominence could relate to mild fluid overload.           Assessment/Plan  * Acute on chronic respiratory failure with hypoxia and hypercapnia (HCC)- (present on admission)  Assessment & Plan  Wean off high flow nasal cannula as tolerated  Continue diuresis  Continue nocturnal BiPAP    Urinary retention  Assessment & Plan  With difficult Matthew catheter placement by urology    Started on Flomax  Early a.m. voiding trial later this week prior to discharge    Thrombocytopenia (HCC)  Assessment & Plan  Resolved monitor CBC    Cellulitis  Assessment & Plan  Versus chronic stasis dermatitis    Patient started on Keflex completing 7-day course tomorrow  Continue skin care and clinical monitoring    Volume overload- (present on admission)  Assessment & Plan        History of pulmonary embolism- (present on admission)  Assessment & Plan  Continue warfarin    Chronic anticoagulation- (present on admission)  Assessment & Plan  History of PE and DVT, had a trial on Xarelto developed DVT  Continue anticoagulation with warfarin and monitor INR    Pulmonary embolism with acute cor pulmonale (HCC)- (present on admission)  Assessment & Plan  History of PE    Continue anticoagulation with warfarin and monitor INR    Pulmonary hypertension (HCC)- (present on admission)  Assessment & Plan  RVSP 45 mmHg  On anticoagulation for prior history of PE  CTA on 11/1/2021 - for acute PE  Likely underlying severe sleep apnea  Continue BiPAP therapy at bedtime  Continue diuresis monitor intake and output and renal function electrolytes with diuresis  Patient -26 L since admit    JONES (obstructive sleep apnea)- (present on admission)  Assessment & Plan  Continue nocturnal BiPAP  Pulmonology consulted, appreciate recs   Discussion about a noninvasive ventilator for home until patient can have  outpatient sleep study    Lower extremity edema- (present on admission)  Assessment & Plan  Continue diuresis    Morbid obesity (HCC)- (present on admission)  Assessment & Plan  Body mass index is 69.25 kg/m².    Asthma- (present on admission)  Assessment & Plan  No acute exacerbation   bronchodilators as needed per RT protocol           VTE prophylaxis: therapeutic anticoagulation with Warfarin    I have performed a physical exam and reviewed and updated ROS and Plan today (11/10/2021). In review of yesterday's note (11/9/2021), there are no changes except as documented above.

## 2021-11-10 NOTE — PROGRESS NOTES
Assumed care of PT A&O 4. Pt resting in bed with no signs of labored breathing. On 4L/40% HFNC. Tele monitor in place, cardiac rhythm being monitored. Call light within reach, bed in lowest position, upper bed rails up. Pt was updated on plan of care for the day . Will continue to monitor.

## 2021-11-10 NOTE — PROGRESS NOTES
Inpatient Anticoagulation Service Note    Date: 11/10/2021    Reason for Anticoagulation: Pulmonary Embolism   Target INR: 2.0 to 3.0  Hemoglobin Value: (!) 13.8  Hematocrit Value: 51.2  Lab Platelet Value: 206    INR from last 7 days     Date/Time INR Value    11/10/21 0256 1.84    11/09/21 02:20:01 2.08    11/08/21 0430 1.69    11/07/21 0230 1.75    11/06/21 0512 2.22    11/05/21 03:50:01 2.79    11/04/21 0458 5.22        Dose from last 7 days     Date/Time Dose (mg)    11/09/21 1128 3.75    11/08/21 1304 7.5    11/07/21 1407 7.5    11/06/21 0820 3.75    11/05/21 1100 3.75    11/04/21 1140 0        Average Dose (mg): 5  Significant Interactions: Antibiotics  Bridge Therapy: No  Reversal Agent Administered: Not Applicable  Comments: Redose to 5 mg today given dip in INR  Education Material Provided?: Yes  Pharmacist suggested discharge dosing: warfarin 5 mg daily and INR check within 48 hours of discharge.      Yuliet Gonzalez, AdyD  [phone]

## 2021-11-11 PROBLEM — D69.6 THROMBOCYTOPENIA (HCC): Status: RESOLVED | Noted: 2021-11-08 | Resolved: 2021-11-11

## 2021-11-11 LAB
INR PPP: 2.1 (ref 0.87–1.13)
PROTHROMBIN TIME: 22.9 SEC (ref 12–14.6)

## 2021-11-11 PROCEDURE — 94640 AIRWAY INHALATION TREATMENT: CPT

## 2021-11-11 PROCEDURE — 700102 HCHG RX REV CODE 250 W/ 637 OVERRIDE(OP): Performed by: HOSPITALIST

## 2021-11-11 PROCEDURE — 85610 PROTHROMBIN TIME: CPT

## 2021-11-11 PROCEDURE — 700111 HCHG RX REV CODE 636 W/ 250 OVERRIDE (IP): Performed by: INTERNAL MEDICINE

## 2021-11-11 PROCEDURE — A9270 NON-COVERED ITEM OR SERVICE: HCPCS | Performed by: HOSPITALIST

## 2021-11-11 PROCEDURE — 700102 HCHG RX REV CODE 250 W/ 637 OVERRIDE(OP): Performed by: STUDENT IN AN ORGANIZED HEALTH CARE EDUCATION/TRAINING PROGRAM

## 2021-11-11 PROCEDURE — 700102 HCHG RX REV CODE 250 W/ 637 OVERRIDE(OP): Performed by: INTERNAL MEDICINE

## 2021-11-11 PROCEDURE — A9270 NON-COVERED ITEM OR SERVICE: HCPCS | Performed by: INTERNAL MEDICINE

## 2021-11-11 PROCEDURE — 99232 SBSQ HOSP IP/OBS MODERATE 35: CPT | Performed by: INTERNAL MEDICINE

## 2021-11-11 PROCEDURE — A9270 NON-COVERED ITEM OR SERVICE: HCPCS | Performed by: STUDENT IN AN ORGANIZED HEALTH CARE EDUCATION/TRAINING PROGRAM

## 2021-11-11 PROCEDURE — 36415 COLL VENOUS BLD VENIPUNCTURE: CPT

## 2021-11-11 PROCEDURE — 770001 HCHG ROOM/CARE - MED/SURG/GYN PRIV*

## 2021-11-11 RX ADMIN — TAMSULOSIN HYDROCHLORIDE 0.4 MG: 0.4 CAPSULE ORAL at 08:35

## 2021-11-11 RX ADMIN — FUROSEMIDE 60 MG: 10 INJECTION, SOLUTION INTRAMUSCULAR; INTRAVENOUS at 05:52

## 2021-11-11 RX ADMIN — CEPHALEXIN 500 MG: 500 CAPSULE ORAL at 08:35

## 2021-11-11 RX ADMIN — WARFARIN SODIUM 5 MG: 5 TABLET ORAL at 16:42

## 2021-11-11 RX ADMIN — SENNOSIDES AND DOCUSATE SODIUM 2 TABLET: 50; 8.6 TABLET ORAL at 16:42

## 2021-11-11 RX ADMIN — MULTIPLE VITAMINS W/ MINERALS TAB 1 TABLET: TAB at 05:52

## 2021-11-11 ASSESSMENT — ENCOUNTER SYMPTOMS
SHORTNESS OF BREATH: 0
ABDOMINAL PAIN: 0
VOMITING: 0
COUGH: 0
MYALGIAS: 0
NAUSEA: 0

## 2021-11-11 ASSESSMENT — PAIN DESCRIPTION - PAIN TYPE
TYPE: ACUTE PAIN
TYPE: ACUTE PAIN

## 2021-11-11 ASSESSMENT — FIBROSIS 4 INDEX: FIB4 SCORE: 0.84

## 2021-11-11 NOTE — PROGRESS NOTES
Inpatient Anticoagulation Service Note    Date: 11/11/2021    Reason for Anticoagulation: Pulmonary Embolism   Target INR: 2.0 to 3.0  Hemoglobin Value: (!) 13.8  Hematocrit Value: 51.2  Lab Platelet Value: 206    INR from last 7 days     Date/Time INR Value    11/11/21 0345 2.1    11/10/21 0256 1.84    11/09/21 02:20:01 2.08    11/08/21 0430 1.69    11/07/21 0230 1.75    11/06/21 0512 2.22    11/05/21 03:50:01 2.79        Dose from last 7 days     Date/Time Dose (mg)    11/11/21 1314 5    11/09/21 1128 3.75    11/08/21 1304 7.5    11/07/21 1407 7.5    11/06/21 0820 3.75    11/05/21 1100 3.75        Average Dose (mg): 5  Significant Interactions: Antibiotics  Bridge Therapy: No  Reversal Agent Administered: Not Applicable  Comments: Continue with 5 mg, INR is therapeutic  Education Material Provided?: Yes  Pharmacist suggested discharge dosing: Warfarin 5 mg      Yuliet Gonzalez, AdyD  [phone]

## 2021-11-11 NOTE — PROGRESS NOTES
Hospital Medicine Daily Progress Note    Date of Service  11/11/2021    Chief Complaint  Michael Ontiveros is a 31 y.o. male admitted 11/1/2021 with edema    Hospital Course  This is a 31-year-old male with past medical history of pulmonary embolism and DVT on Coumadin, pulmonary hypertension, JONES, mild intermittent asthma, and morbid obesity with a BMI of 69 who was admitted on 11/1/2021 with bilateral lower extremity swelling.  Patient was transferred to the intensive care unit for respiratory failure failing BiPAP trial and was intubated on 11/3/2021.  He was extubated to BiPAP on 11/5/2021.  He had difficult Matthew placement requiring urology consultation with cystoscopy for placement    He is set up to have a sleep study on 11/8/2021 for evaluation for BiPAP therapy      Interval Problem Update  Continues on HFNC. Per nursing patient continues to desat when on BIPAP overnight. Was able to wean fio2 yesterday, back to 70% today. Pulmonology filled out patient's NIV prescription yesterday. Patient has no new complaints.    I have personally seen and examined the patient at bedside. I discussed the plan of care with patient, bedside RN, charge RN, , pharmacy and pulmonology .    Consultants/Specialty  critical care and pulmonary    Code Status  Full Code    Disposition  Patient is not medically cleared.   Anticipate discharge to to home with close outpatient follow-up.  I have placed the appropriate orders for post-discharge needs.    Review of Systems  Review of Systems   Constitutional: Positive for malaise/fatigue.   Respiratory: Negative for cough and shortness of breath.    Cardiovascular: Positive for leg swelling.   Gastrointestinal: Negative for abdominal pain, nausea and vomiting.   Musculoskeletal: Negative for myalgias.   All other systems reviewed and are negative.       Physical Exam  Temp:  [36.1 °C (96.9 °F)-36.5 °C (97.7 °F)] 36.3 °C (97.3 °F)  Pulse:  [87-98] 87  Resp:  [16-48]  18  BP: ()/(55-79) 115/71  SpO2:  [89 %-96 %] 92 %    Physical Exam  Vitals and nursing note reviewed.   Constitutional:       General: He is not in acute distress.     Appearance: He is well-developed. He is obese. He is not diaphoretic.      Comments: Up in a chair at bedside    HENT:      Head: Normocephalic and atraumatic.      Mouth/Throat:      Pharynx: Oropharynx is clear.   Eyes:      Conjunctiva/sclera: Conjunctivae normal.   Neck:      Vascular: No JVD.      Trachea: No tracheal deviation.   Cardiovascular:      Rate and Rhythm: Normal rate and regular rhythm.      Heart sounds: No murmur heard.      Pulmonary:      Effort: Pulmonary effort is normal. No respiratory distress.      Breath sounds: No wheezing.      Comments: Decreased breath sounds in bases   On HFNC   Speaking in full sentences   Abdominal:      General: Bowel sounds are normal. There is no distension.      Palpations: Abdomen is soft.      Tenderness: There is no abdominal tenderness.   Musculoskeletal:         General: Swelling present. No tenderness.      Cervical back: Neck supple.   Skin:     General: Skin is warm and dry.      Findings: Erythema (b/l LE ) present.      Nails: There is no clubbing.   Neurological:      Mental Status: He is alert and oriented to person, place, and time.      Cranial Nerves: No cranial nerve deficit.      Motor: No abnormal muscle tone.   Psychiatric:         Behavior: Behavior normal.         Fluids    Intake/Output Summary (Last 24 hours) at 11/11/2021 1246  Last data filed at 11/11/2021 1028  Gross per 24 hour   Intake 592 ml   Output 3225 ml   Net -2633 ml       Laboratory  Recent Labs     11/09/21  0220 11/10/21  0406   WBC 5.8 5.1   RBC 6.20* 6.08   HEMOGLOBIN 14.0 13.8*   HEMATOCRIT 52.6* 51.2   MCV 84.8 84.2   MCH 22.6* 22.7*   MCHC 26.6* 27.0*   RDW 67.7* 69.0*   PLATELETCT 195 206   MPV 10.1  --      Recent Labs     11/09/21  0220 11/10/21  0256   SODIUM 138 138   POTASSIUM 4.2 4.1    CHLORIDE 96 98   CO2 34* 34*   GLUCOSE 91 101*   BUN 11 13   CREATININE 0.55 0.41*   CALCIUM 9.1 8.6     Recent Labs     11/09/21  0220 11/10/21  0256 11/11/21  0345   INR 2.08* 1.84* 2.10*               Imaging  DX-CHEST-LIMITED (1 VIEW)   Final Result      Improving basilar aeration with increase in lung volumes following extubation      Continued moderate bilateral consolidation could be from edema and/or infection      DX-CHEST-PORTABLE (1 VIEW)   Final Result      1.  No significant interval change.         DX-CHEST-PORTABLE (1 VIEW)   Final Result      1.  Probable mild worsening pulmonary edema.   2.  Increased bibasilar atelectasis.   3.  Cannot exclude trace pleural effusions.      DX-ABDOMEN FOR TUBE PLACEMENT   Final Result      Enteric tube tip projects over the stomach      IR-PICC LINE PLACEMENT W/ GUIDANCE > AGE 5   Final Result                  Ultrasound-guided PICC placement performed by qualified nursing staff as    above.          DX-CHEST-PORTABLE (1 VIEW)   Final Result      1.  Interval intubation   2.  BILATERAL atelectasis with possible superimposed pulmonary edema or pneumonia, unchanged      DX-CHEST-LIMITED (1 VIEW)   Final Result      Worsening diffuse hazy opacity compatible with edema or infection      EC-ECHOCARDIOGRAM COMPLETE W/ CONT   Final Result      CT-ABDOMEN-PELVIS WITH   Final Result         Limited exam due to body habitus.      1. There is skin thickening and subcutaneous edema in the low anterior abdominal and pelvic wall panus. This could relate to fluid overload but infection and cellulitis can have similar appearance. Correlate clinically.      2. Mild perihepatic ascites.      3. Diffuse scrotal edema and moderate hydrocele.         CT-CTA CHEST PULMONARY ARTERY W/ RECONS   Final Result         1. No CT evidence of pulmonary embolism.      2. Mild hazy groundglass opacity throughout both lungs could relate to mild fluid overload.      3. Cardiomegaly.      4. No  airspace consolidation. Linear right midlung atelectasis.         DX-CHEST-PORTABLE (1 VIEW)   Final Result         Stable cardiomegaly.      Mild interstitial prominence could relate to mild fluid overload.           Assessment/Plan  * Acute on chronic respiratory failure with hypoxia and hypercapnia (HCC)- (present on admission)  Assessment & Plan  Wean off high flow nasal cannula as tolerated  Continue diuresis  Continue nocturnal BiPAP    Urinary retention  Assessment & Plan  With difficult Matthew catheter placement by urology    Started on Flomax  Early a.m. voiding trial later this week prior to discharge    Cellulitis  Assessment & Plan  Versus chronic stasis dermatitis    Patient started on Keflex completing 7-day course tomorrow  Continue skin care and clinical monitoring    Volume overload- (present on admission)  Assessment & Plan        History of pulmonary embolism- (present on admission)  Assessment & Plan  Continue warfarin    Chronic anticoagulation- (present on admission)  Assessment & Plan  History of PE and DVT, had a trial on Xarelto developed DVT  Continue anticoagulation with warfarin and monitor INR    Pulmonary embolism with acute cor pulmonale (HCC)- (present on admission)  Assessment & Plan  History of PE    Continue anticoagulation with warfarin and monitor INR    Pulmonary hypertension (HCC)- (present on admission)  Assessment & Plan  RVSP 45 mmHg  On anticoagulation for prior history of PE  CTA on 11/1/2021 - for acute PE  Likely underlying severe sleep apnea  Continue BiPAP therapy at bedtime  Continue diuresis monitor intake and output and renal function electrolytes with diuresis    JONES (obstructive sleep apnea)- (present on admission)  Assessment & Plan  Continue nocturnal BiPAP  Pulmonology consulted, appreciate recs   ordered noninvasive ventilator for home until patient can have outpatient sleep study    Lower extremity edema- (present on admission)  Assessment & Plan  Continue  diuresis    Morbid obesity (HCC)- (present on admission)  Assessment & Plan  Body mass index is 69.25 kg/m².    Asthma- (present on admission)  Assessment & Plan  No acute exacerbation   bronchodilators as needed per RT protocol           VTE prophylaxis: therapeutic anticoagulation with Warfarin    I have performed a physical exam and reviewed and updated ROS and Plan today (11/11/2021). In review of yesterday's note (11/10/2021), there are no changes except as documented above.

## 2021-11-11 NOTE — PALLIATIVE CARE
Palliative Care follow-up  Visited pt at bedside, inquired if he reviewed ACP documents, pt states he hadn't. Offered to complete documents while in the hospital, pt declined, states he will review it and complete documents when he gets home.     Pt denied any further needs at this time.     Active listening, education, validation, normalization, therapeutic touch, and emotional support provided.       Plan:   Continue to discuss GOC as clinical picture unfolds.     Thank you for allowing Palliative Care to participate in this patient's care. Please feel free to call [phone] with any questions or concerns.

## 2021-11-11 NOTE — DISCHARGE PLANNING
Anticipated Discharge Disposition: Home with DME    Action: Lsw received hand-off note indicating that pt will need non-invasive ventilator from [facility] [place]. Lsw faxed facesheet, signed order, DME order, and Dr. Ross's note to [facility] [place] fax.     Barriers to Discharge: DME acceptance and delivery     Plan: f/u with [facility] [place]

## 2021-11-11 NOTE — DISCHARGE PLANNING
Anticipated Discharge Disposition: Home with DME    Action: Lsw asked DPA to f/u with [facility] regarding fax confirmation. Per DPA, Faizan at [facility] confirmed receiving order and has submitted for insurance auth    Barriers to Discharge: insurance auth    Plan: continue to follow

## 2021-11-11 NOTE — PROGRESS NOTES
Pt had BM today, this RN noted scant amount of bright red blood on toilet paper. Pt denies hx of hemorrhoids in the past. No open wounds observed in perianal region. No visible blood present in stool. Will continue to monitor.

## 2021-11-11 NOTE — CONSULTS
Pulmonary Consult H&P      Referring Physician: Maureen Mccurdy D.O.    Chief Complaint   Patient presents with   • Abdominal Swelling     increasing. pt not on any diuretics   • Groin Swelling   • Testicle Swelling       HPI: Michael Ontiveros is a 31 y.o. male with PMH of pulmonary presenting for bilateral lower extremity swelling for whom we have been consulted for noninvasive respirator requirement.    Review of systems:     Review of Systems   Constitutional: Positive for malaise/fatigue. Negative for chills and fever.   HENT: Negative for congestion.    Eyes: Negative for pain and discharge.   Respiratory: Positive for shortness of breath. Negative for cough and hemoptysis.    Cardiovascular: Positive for leg swelling. Negative for chest pain and palpitations.   Gastrointestinal: Negative for abdominal pain, nausea and vomiting.   Musculoskeletal: Negative for neck pain.       Past Medical History:    has a past medical history of Asthma, Blood clotting disorder (HCC), Chickenpox, Eczema, Hypertension, Influenza, and Tonsillitis.    FHx:  family history includes Diabetes in his paternal aunt; Respiratory Disease in his mother.    SHx:   reports that he has never smoked. He has never used smokeless tobacco. He reports previous alcohol use. He reports that he does not use drugs.    Allergies:  Allergies   Allergen Reactions   • [place] (Diagnostic) Anaphylaxis   • [place] Anaphylaxis   • [place] Anaphylaxis   • Tree Nuts Food Allergy Vomiting and Nausea     Other nuts aside from almond, walnut, pistachio       Medications:    Current Facility-Administered Medications:   •  warfarin (COUMADIN) tablet 5 mg, 5 mg, Oral, DAILY AT 1800, Maureen Mccurdy D.O.  •  tamsulosin (FLOMAX) capsule 0.4 mg, 0.4 mg, Oral, AFTER BREAKFAST, Osmin Penaloza M.D., 0.4 mg at 11/10/21 0858  •  acetaminophen (Tylenol) tablet 650 mg, 650 mg, Oral, Q6HRS PRN, Kell Nguyen M.D.  •  cloNIDine (CATAPRES) tablet 0.1 mg,  0.1 mg, Oral, Q6HRS PRN, Kell Nguyen M.D.  •  ondansetron (ZOFRAN ODT) dispertab 4 mg, 4 mg, Oral, Q4HRS PRN, Kell Nguyen M.D.  •  senna-docusate (PERICOLACE or SENOKOT S) 8.6-50 MG per tablet 2 Tablet, 2 Tablet, Oral, BID, 2 Tablet at 11/09/21 1732 **AND** polyethylene glycol/lytes (MIRALAX) PACKET 1 Packet, 1 Packet, Oral, QDAY PRN **AND** magnesium hydroxide (MILK OF MAGNESIA) suspension 30 mL, 30 mL, Oral, QDAY PRN **AND** bisacodyl (DULCOLAX) suppository 10 mg, 10 mg, Rectal, QDAY PRN, Kell Nguyen M.D.  •  therapeutic multivitamin-minerals (THERAGRAN-M) tablet 1 Tablet, 1 Tablet, Oral, QAM, Kell Nguyen M.D., 1 Tablet at 11/10/21 0530  •  cephALEXin (KEFLEX) capsule 500 mg, 500 mg, Oral, 4X/DAY, BARBARA Whitlock.O., 500 mg at 11/10/21 1417  •  furosemide (LASIX) injection 60 mg, 60 mg, Intravenous, Q DAY, Darwin Freedman D.O., 60 mg at 11/09/21 0525  •  MD Alert...Warfarin per Pharmacy, , Other, PHARMACY TO DOSE, Darwin Freedman D.O.  •  fentaNYL (SUBLIMAZE) injection 100 mcg, 100 mcg, Intravenous, Q HOUR PRN, Maurice Slaughter M.D., 100 mcg at 11/04/21 0139  •  Respiratory Therapy Consult, , Nebulization, Continuous RT, Maurice Slaughter M.D.  •  lidocaine (XYLOCAINE) 1 % injection 2 mL, 2 mL, Tracheal Tube, Q30 MIN PRN, Maurice Slaughter M.D.  •  albuterol inhaler 2 Puff, 2 Puff, Inhalation, Q6HRS PRN, Barry Tabor M.D., 2 Puff at 11/05/21 1459  •  Notify provider if pain remains uncontrolled, , , CONTINUOUS **AND** Use the Numeric Rating Scale (NRS), Arredondo-Baker Faces (WBF), or FLACC on regular floors and Critical-Care Pain Observation Tool (CPOT) on ICUs/Trauma to assess pain, , , CONTINUOUS **AND** Pulse Ox, , , CONTINUOUS **AND** Pharmacy Consult Request ...Pain Management Review 1 Each, 1 Each, Other, PHARMACY TO DOSE **AND** If patient difficult to arouse and/or has respiratory depression (respiratory rate of 10 or less), stop any opiates that are currently infusing and call a Rapid Response.,  , , CONTINUOUS, Barry Tabor M.D.  •  enalaprilat (Vasotec) injection 1.25 mg 1 mL, 1.25 mg, Intravenous, Q6HRS PRN, Barry Tabor M.D.  •  labetalol (NORMODYNE/TRANDATE) injection 10 mg, 10 mg, Intravenous, Q4HRS PRN, Barry Tabor M.D.  •  ondansetron (ZOFRAN) syringe/vial injection 4 mg, 4 mg, Intravenous, Q4HRS PRN, Barry Tabor M.D.    Physical Examination:     Vitals:    11/10/21 1032 11/10/21 1139 11/10/21 1448 11/10/21 1549   BP:  114/65  120/79   Pulse: 95 94 94 98   Resp: 20 20 20 20   Temp:  36.5 °C (97.7 °F)  36.1 °C (97 °F)   TempSrc:  Temporal  Temporal   SpO2: 97% 94% 96% 92%   Weight:       Height:           Physical Exam  Constitutional:       Appearance: Normal appearance. He is obese. He is not diaphoretic.   HENT:      Head: Normocephalic and atraumatic.      Nose: Nose normal.   Eyes:      Conjunctiva/sclera: Conjunctivae normal.   Cardiovascular:      Rate and Rhythm: Normal rate and regular rhythm.      Heart sounds: No murmur heard.  No friction rub. No gallop.    Pulmonary:      Effort: No respiratory distress.      Breath sounds: No stridor. No wheezing, rhonchi or rales.      Comments: On HFNC  Abdominal:      General: Abdomen is flat. There is no distension.      Palpations: Abdomen is soft. There is no mass.      Tenderness: There is no abdominal tenderness. There is no guarding.      Hernia: No hernia is present.   Musculoskeletal:         General: No swelling.      Cervical back: No rigidity or tenderness.      Right lower leg: Edema present.      Left lower leg: Edema present.   Skin:     Coloration: Skin is not jaundiced.   Neurological:      Mental Status: He is alert and oriented to person, place, and time. Mental status is at baseline.         Objective Data:    Labs:  Lab Results   Component Value Date/Time    PROTHROMBTM 20.7 (H) 11/10/2021 02:56 AM    INR 1.84 (H) 11/10/2021 02:56 AM      Lab Results   Component Value Date/Time    WBC 5.1 11/10/2021 04:06 AM    RBC 6.08 11/10/2021  04:06 AM    HEMOGLOBIN 13.8 (L) 11/10/2021 04:06 AM    HEMATOCRIT 51.2 11/10/2021 04:06 AM    MCV 84.2 11/10/2021 04:06 AM    MCH 22.7 (L) 11/10/2021 04:06 AM    MCHC 27.0 (L) 11/10/2021 04:06 AM    MPV 10.1 11/09/2021 02:20 AM    NEUTSPOLYS 51.80 11/10/2021 04:06 AM    LYMPHOCYTES 27.80 11/10/2021 04:06 AM    MONOCYTES 13.40 11/10/2021 04:06 AM    EOSINOPHILS 6.00 11/10/2021 04:06 AM    BASOPHILS 0.80 11/10/2021 04:06 AM    HYPOCHROMIA 1+ 05/10/2020 04:14 AM    ANISOCYTOSIS 3+ (A) 11/08/2021 04:30 AM      Lab Results   Component Value Date/Time    SODIUM 138 11/10/2021 02:56 AM    POTASSIUM 4.1 11/10/2021 02:56 AM    CHLORIDE 98 11/10/2021 02:56 AM    CO2 34 (H) 11/10/2021 02:56 AM    GLUCOSE 101 (H) 11/10/2021 02:56 AM    BUN 13 11/10/2021 02:56 AM    CREATININE 0.41 (L) 11/10/2021 02:56 AM      Lab Results   Component Value Date/Time    CHOLSTRLTOT 90 (L) 11/03/2021 02:11 AM    LDL 55 11/03/2021 02:11 AM    HDL 16 (A) 11/03/2021 02:11 AM    TRIGLYCERIDE 95 11/03/2021 02:11 AM       Lab Results   Component Value Date/Time    ALKPHOSPHAT 64 11/08/2021 04:30 AM    ASTSGOT 30 11/08/2021 04:30 AM    ALTSGPT 29 11/08/2021 04:30 AM    TBILIRUBIN 1.4 11/08/2021 04:30 AM        Imaging/Testing:    I interpreted and/or reviewed the patient's imaging    DX-CHEST-LIMITED (1 VIEW)   Final Result      Improving basilar aeration with increase in lung volumes following extubation      Continued moderate bilateral consolidation could be from edema and/or infection      DX-CHEST-PORTABLE (1 VIEW)   Final Result      1.  No significant interval change.         DX-CHEST-PORTABLE (1 VIEW)   Final Result      1.  Probable mild worsening pulmonary edema.   2.  Increased bibasilar atelectasis.   3.  Cannot exclude trace pleural effusions.      DX-ABDOMEN FOR TUBE PLACEMENT   Final Result      Enteric tube tip projects over the stomach      IR-PICC LINE PLACEMENT W/ GUIDANCE > AGE 5   Final Result                  Ultrasound-guided PICC  placement performed by qualified nursing staff as    above.          DX-CHEST-PORTABLE (1 VIEW)   Final Result      1.  Interval intubation   2.  BILATERAL atelectasis with possible superimposed pulmonary edema or pneumonia, unchanged      DX-CHEST-LIMITED (1 VIEW)   Final Result      Worsening diffuse hazy opacity compatible with edema or infection      EC-ECHOCARDIOGRAM COMPLETE W/ CONT   Final Result      CT-ABDOMEN-PELVIS WITH   Final Result         Limited exam due to body habitus.      1. There is skin thickening and subcutaneous edema in the low anterior abdominal and pelvic wall panus. This could relate to fluid overload but infection and cellulitis can have similar appearance. Correlate clinically.      2. Mild perihepatic ascites.      3. Diffuse scrotal edema and moderate hydrocele.         CT-CTA CHEST PULMONARY ARTERY W/ RECONS   Final Result         1. No CT evidence of pulmonary embolism.      2. Mild hazy groundglass opacity throughout both lungs could relate to mild fluid overload.      3. Cardiomegaly.      4. No airspace consolidation. Linear right midlung atelectasis.         DX-CHEST-PORTABLE (1 VIEW)   Final Result         Stable cardiomegaly.      Mild interstitial prominence could relate to mild fluid overload.          Assessment and Plan:    Michael Ontiveros is a 31 y.o. male with PMH of pulmonary presenting for bilateral lower extremity swelling for whom we have been consulted for noninvasive respirator requirement.     # Acute on chronic respiratory failure with hypoxemia and hypercapnia  Likely due restrictive lung disease.  - Hypercapnia, PCO2 > 100 on 11/3/21  - Risk of death is high.  - Needs noninvasive ventilator - IVAP on AE on 5L.  - RT consulted on oxymizer pendant for mobilization.  -  on weight loss.  - Follow up as outpatient with pulmonology clinic.        Romel Slade DO  PGY1 Internal Medicine

## 2021-11-11 NOTE — PROGRESS NOTES
Assumed care of PT A&O 4. Pt resting in bed with no signs of labored breathing. On 40L/50% HHFNC. Tele monitor in place, cardiac rhythm being monitored. Call light within reach, bed in lowest position, upper bed rails up. Pt was updated on plan of care for the day . Will continue to monitor.

## 2021-11-11 NOTE — CARE PLAN
The patient is Watcher - Medium risk of patient condition declining or worsening    Shift Goals  Clinical Goals: Decrease O2 demand, mobilize pt  Patient Goals: Rest, comfort  Family Goals: None present    Progress made toward(s) clinical / shift goals:    Problem: Knowledge Deficit - Standard  Goal: Patient and family/care givers will demonstrate understanding of plan of care, disease process/condition, diagnostic tests and medications  Outcome: Progressing     Problem: Respiratory  Goal: Patient will achieve/maintain optimum respiratory ventilation and gas exchange  Outcome: Progressing       Patient is not progressing towards the following goals:

## 2021-11-11 NOTE — PROGRESS NOTES
Report received from night shift RN, assumed care of pt. Pt A&Ox4. Plan of care discussed with pt, labs and chart reviewed. All needs met at this time. Tele box on. On 50L 70% fio2 HFNC, satting 91%. Call light within reach, bed locked and in lowest position. All fall precautions and hourly rounding in place. Will continue to monitor.

## 2021-11-12 LAB
ANION GAP SERPL CALC-SCNC: 8 MMOL/L (ref 7–16)
ANISOCYTOSIS BLD QL SMEAR: ABNORMAL
BASOPHILS # BLD AUTO: 1.1 % (ref 0–1.8)
BASOPHILS # BLD: 0.05 K/UL (ref 0–0.12)
BUN SERPL-MCNC: 14 MG/DL (ref 8–22)
CALCIUM SERPL-MCNC: 9.6 MG/DL (ref 8.5–10.5)
CHLORIDE SERPL-SCNC: 97 MMOL/L (ref 96–112)
CO2 SERPL-SCNC: 35 MMOL/L (ref 20–33)
COMMENT 1642: NORMAL
CREAT SERPL-MCNC: 0.49 MG/DL (ref 0.5–1.4)
EOSINOPHIL # BLD AUTO: 0.22 K/UL (ref 0–0.51)
EOSINOPHIL NFR BLD: 4.8 % (ref 0–6.9)
ERYTHROCYTE [DISTWIDTH] IN BLOOD BY AUTOMATED COUNT: 71.4 FL (ref 35.9–50)
GLUCOSE SERPL-MCNC: 112 MG/DL (ref 65–99)
HCT VFR BLD AUTO: 53.2 % (ref 42–52)
HGB BLD-MCNC: 14 G/DL (ref 14–18)
HYPOCHROMIA BLD QL SMEAR: ABNORMAL
IMM GRANULOCYTES # BLD AUTO: 0.01 K/UL (ref 0–0.11)
IMM GRANULOCYTES NFR BLD AUTO: 0.2 % (ref 0–0.9)
INR PPP: 2.1 (ref 0.87–1.13)
LG PLATELETS BLD QL SMEAR: NORMAL
LYMPHOCYTES # BLD AUTO: 1.4 K/UL (ref 1–4.8)
LYMPHOCYTES NFR BLD: 30.8 % (ref 22–41)
MACROCYTES BLD QL SMEAR: ABNORMAL
MCH RBC QN AUTO: 22.8 PG (ref 27–33)
MCHC RBC AUTO-ENTMCNC: 26.3 G/DL (ref 33.7–35.3)
MCV RBC AUTO: 86.5 FL (ref 81.4–97.8)
MICROCYTES BLD QL SMEAR: ABNORMAL
MONOCYTES # BLD AUTO: 0.64 K/UL (ref 0–0.85)
MONOCYTES NFR BLD AUTO: 14.1 % (ref 0–13.4)
MORPHOLOGY BLD-IMP: NORMAL
NEUTROPHILS # BLD AUTO: 2.22 K/UL (ref 1.82–7.42)
NEUTROPHILS NFR BLD: 49 % (ref 44–72)
NRBC # BLD AUTO: 0 K/UL
NRBC BLD-RTO: 0 /100 WBC
PLATELET # BLD AUTO: 253 K/UL (ref 164–446)
PLATELET BLD QL SMEAR: NORMAL
POTASSIUM SERPL-SCNC: 4.4 MMOL/L (ref 3.6–5.5)
PROTHROMBIN TIME: 22.9 SEC (ref 12–14.6)
RBC # BLD AUTO: 6.15 M/UL (ref 4.7–6.1)
RBC BLD AUTO: PRESENT
SODIUM SERPL-SCNC: 140 MMOL/L (ref 135–145)
SPHEROCYTES BLD QL SMEAR: NORMAL
WBC # BLD AUTO: 4.5 K/UL (ref 4.8–10.8)

## 2021-11-12 PROCEDURE — 36415 COLL VENOUS BLD VENIPUNCTURE: CPT

## 2021-11-12 PROCEDURE — 85610 PROTHROMBIN TIME: CPT

## 2021-11-12 PROCEDURE — 94640 AIRWAY INHALATION TREATMENT: CPT

## 2021-11-12 PROCEDURE — A9270 NON-COVERED ITEM OR SERVICE: HCPCS | Performed by: HOSPITALIST

## 2021-11-12 PROCEDURE — A9270 NON-COVERED ITEM OR SERVICE: HCPCS | Performed by: STUDENT IN AN ORGANIZED HEALTH CARE EDUCATION/TRAINING PROGRAM

## 2021-11-12 PROCEDURE — A9270 NON-COVERED ITEM OR SERVICE: HCPCS | Performed by: INTERNAL MEDICINE

## 2021-11-12 PROCEDURE — 700102 HCHG RX REV CODE 250 W/ 637 OVERRIDE(OP): Performed by: STUDENT IN AN ORGANIZED HEALTH CARE EDUCATION/TRAINING PROGRAM

## 2021-11-12 PROCEDURE — 94660 CPAP INITIATION&MGMT: CPT

## 2021-11-12 PROCEDURE — 85025 COMPLETE CBC W/AUTO DIFF WBC: CPT

## 2021-11-12 PROCEDURE — 700102 HCHG RX REV CODE 250 W/ 637 OVERRIDE(OP): Performed by: INTERNAL MEDICINE

## 2021-11-12 PROCEDURE — 80048 BASIC METABOLIC PNL TOTAL CA: CPT

## 2021-11-12 PROCEDURE — 700102 HCHG RX REV CODE 250 W/ 637 OVERRIDE(OP): Performed by: HOSPITALIST

## 2021-11-12 PROCEDURE — 99232 SBSQ HOSP IP/OBS MODERATE 35: CPT | Performed by: INTERNAL MEDICINE

## 2021-11-12 PROCEDURE — 770001 HCHG ROOM/CARE - MED/SURG/GYN PRIV*

## 2021-11-12 RX ADMIN — WARFARIN SODIUM 5 MG: 5 TABLET ORAL at 17:06

## 2021-11-12 RX ADMIN — SENNOSIDES AND DOCUSATE SODIUM 2 TABLET: 50; 8.6 TABLET ORAL at 17:06

## 2021-11-12 RX ADMIN — TAMSULOSIN HYDROCHLORIDE 0.4 MG: 0.4 CAPSULE ORAL at 08:55

## 2021-11-12 RX ADMIN — MULTIPLE VITAMINS W/ MINERALS TAB 1 TABLET: TAB at 05:33

## 2021-11-12 ASSESSMENT — ENCOUNTER SYMPTOMS
MYALGIAS: 0
ABDOMINAL PAIN: 0
SHORTNESS OF BREATH: 0
COUGH: 0
NAUSEA: 0
VOMITING: 0

## 2021-11-12 NOTE — CARE PLAN
The patient is Watcher - Medium risk of patient condition declining or worsening    Shift Goals  Clinical Goals: Decreased O2 demands, incentive spirorometer use  Patient Goals: Rest, comfort  Family Goals: None present    Progress made toward(s) clinical / shift goals:    Problem: Knowledge Deficit - Standard  Goal: Patient and family/care givers will demonstrate understanding of plan of care, disease process/condition, diagnostic tests and medications  11/11/2021 2044 by Mary Blackwell R.N.  Outcome: Progressing  11/11/2021 0654 by Mary Blackwell R.N.  Outcome: Progressing     Problem: Respiratory  Goal: Patient will achieve/maintain optimum respiratory ventilation and gas exchange  11/11/2021 2044 by JAMES LuNTyson  Outcome: Progressing  11/11/2021 0654 by Mary Blackwell R.N.  Outcome: Progressing       Patient is not progressing towards the following goals:

## 2021-11-12 NOTE — DISCHARGE PLANNING
Received Choice form at 6230  Agency/Facility Name: [facility]  Referral sent per Choice form @ [phone]

## 2021-11-12 NOTE — DISCHARGE PLANNING
Anticipated Discharge Disposition: TBD LTAC     Action: Pt was discussed during IDDR yesterday. Due to pt's high O2 levels, LTAC referral placed. For pt's continuity of care, referral sent to [facility].     Barriers to Discharge: O2 levels    Plan: continue to follow

## 2021-11-12 NOTE — PROGRESS NOTES
Hospital Medicine Daily Progress Note    Date of Service  11/12/2021    Chief Complaint  Michael Ontiveros is a 31 y.o. male admitted 11/1/2021 with edema    Hospital Course  This is a 31-year-old male with past medical history of pulmonary embolism and DVT on Coumadin, pulmonary hypertension, JONES, mild intermittent asthma, and morbid obesity with a BMI of 69 who was admitted on 11/1/2021 with bilateral lower extremity swelling.  Patient was transferred to the intensive care unit for respiratory failure failing BiPAP trial and was intubated on 11/3/2021.  He was extubated to BiPAP on 11/5/2021.  He had difficult Matthew placement requiring urology consultation with cystoscopy for placement    He is set up to have a sleep study on 11/8/2021 for evaluation for BiPAP therapy      Interval Problem Update  This morning patient up to 90% HFNC, he is also tachycardic and tachypneic.  No acute events noted overnight.  Will check labs, Na 151, placed sodium restriction on diet, will repeat. Patient has no new complaints    I have personally seen and examined the patient at bedside. I discussed the plan of care with patient, bedside RN, charge RN, , pharmacy and pulmonology .    Consultants/Specialty  critical care and pulmonary    Code Status  Full Code    Disposition  Patient is not medically cleared.   Anticipate discharge to to home with close outpatient follow-up.  I have placed the appropriate orders for post-discharge needs.    Review of Systems  Review of Systems   Constitutional: Positive for malaise/fatigue.   Respiratory: Negative for cough and shortness of breath.    Cardiovascular: Positive for leg swelling.   Gastrointestinal: Negative for abdominal pain, nausea and vomiting.   Musculoskeletal: Negative for myalgias.   All other systems reviewed and are negative.       Physical Exam  Temp:  [36.6 °C (97.8 °F)-36.9 °C (98.5 °F)] 36.6 °C (97.8 °F)  Pulse:  [] 95  Resp:  [18-22] 20  BP:  (102-134)/(53-75) 117/67  SpO2:  [89 %-97 %] 97 %    Physical Exam  Vitals and nursing note reviewed.   Constitutional:       General: He is not in acute distress.     Appearance: He is well-developed. He is obese. He is not diaphoretic.   HENT:      Head: Normocephalic and atraumatic.      Mouth/Throat:      Pharynx: Oropharynx is clear.   Eyes:      Conjunctiva/sclera: Conjunctivae normal.   Neck:      Vascular: No JVD.      Trachea: No tracheal deviation.   Cardiovascular:      Rate and Rhythm: Normal rate and regular rhythm.      Heart sounds: No murmur heard.      Pulmonary:      Effort: Pulmonary effort is normal. No respiratory distress.      Breath sounds: No wheezing.      Comments: Decreased breath sounds in bases   On HFNC   Speaking in full sentences   Abdominal:      General: Bowel sounds are normal. There is no distension.      Palpations: Abdomen is soft.      Tenderness: There is no abdominal tenderness.   Musculoskeletal:         General: Swelling present. No tenderness.      Cervical back: Neck supple.   Skin:     General: Skin is warm and dry.      Findings: Erythema (b/l LE, chronic ) present.      Nails: There is no clubbing.   Neurological:      Mental Status: He is alert and oriented to person, place, and time.      Cranial Nerves: No cranial nerve deficit.      Motor: No abnormal muscle tone.   Psychiatric:         Behavior: Behavior normal.         Fluids    Intake/Output Summary (Last 24 hours) at 11/12/2021 1207  Last data filed at 11/12/2021 1000  Gross per 24 hour   Intake 360 ml   Output 2100 ml   Net -1740 ml       Laboratory  Recent Labs     11/10/21  0406   WBC 5.1   RBC 6.08   HEMOGLOBIN 13.8*   HEMATOCRIT 51.2   MCV 84.2   MCH 22.7*   MCHC 27.0*   RDW 69.0*   PLATELETCT 206     Recent Labs     11/10/21  0256 11/12/21  0323   SODIUM 138 151*   POTASSIUM 4.1 4.8   CHLORIDE 98 107   CO2 34* 35*   GLUCOSE 101* 112*   BUN 13 14   CREATININE 0.41* 0.49*   CALCIUM 8.6 9.6     Recent Labs      11/10/21  0256 11/11/21  0345 11/12/21  0323   INR 1.84* 2.10* 2.10*               Imaging  DX-CHEST-LIMITED (1 VIEW)   Final Result      Improving basilar aeration with increase in lung volumes following extubation      Continued moderate bilateral consolidation could be from edema and/or infection      DX-CHEST-PORTABLE (1 VIEW)   Final Result      1.  No significant interval change.         DX-CHEST-PORTABLE (1 VIEW)   Final Result      1.  Probable mild worsening pulmonary edema.   2.  Increased bibasilar atelectasis.   3.  Cannot exclude trace pleural effusions.      DX-ABDOMEN FOR TUBE PLACEMENT   Final Result      Enteric tube tip projects over the stomach      IR-PICC LINE PLACEMENT W/ GUIDANCE > AGE 5   Final Result                  Ultrasound-guided PICC placement performed by qualified nursing staff as    above.          DX-CHEST-PORTABLE (1 VIEW)   Final Result      1.  Interval intubation   2.  BILATERAL atelectasis with possible superimposed pulmonary edema or pneumonia, unchanged      DX-CHEST-LIMITED (1 VIEW)   Final Result      Worsening diffuse hazy opacity compatible with edema or infection      EC-ECHOCARDIOGRAM COMPLETE W/ CONT   Final Result      CT-ABDOMEN-PELVIS WITH   Final Result         Limited exam due to body habitus.      1. There is skin thickening and subcutaneous edema in the low anterior abdominal and pelvic wall panus. This could relate to fluid overload but infection and cellulitis can have similar appearance. Correlate clinically.      2. Mild perihepatic ascites.      3. Diffuse scrotal edema and moderate hydrocele.         CT-CTA CHEST PULMONARY ARTERY W/ RECONS   Final Result         1. No CT evidence of pulmonary embolism.      2. Mild hazy groundglass opacity throughout both lungs could relate to mild fluid overload.      3. Cardiomegaly.      4. No airspace consolidation. Linear right midlung atelectasis.         DX-CHEST-PORTABLE (1 VIEW)   Final Result         Stable  cardiomegaly.      Mild interstitial prominence could relate to mild fluid overload.           Assessment/Plan  * Acute on chronic respiratory failure with hypoxia and hypercapnia (HCC)- (present on admission)  Assessment & Plan  Wean off high flow nasal cannula as tolerated  Continue diuresis  Continue nocturnal BiPAP    Urinary retention  Assessment & Plan  With difficult Matthew catheter placement by urology    Started on Flomax  Early a.m. voiding trial later this week prior to discharge    Cellulitis  Assessment & Plan  Versus chronic stasis dermatitis    Finished course of keflex   Continue skin care and clinical monitoring    Volume overload- (present on admission)  Assessment & Plan        History of pulmonary embolism- (present on admission)  Assessment & Plan  Continue warfarin    Chronic anticoagulation- (present on admission)  Assessment & Plan  History of PE and DVT, had a trial on Xarelto developed DVT  Continue anticoagulation with warfarin and monitor INR    Pulmonary embolism with acute cor pulmonale (HCC)- (present on admission)  Assessment & Plan  History of PE    Continue anticoagulation with warfarin and monitor INR    Pulmonary hypertension (HCC)- (present on admission)  Assessment & Plan  RVSP 45 mmHg  On anticoagulation for prior history of PE  CTA on 11/1/2021 - for acute PE  Likely underlying severe sleep apnea  Continue BiPAP therapy at bedtime  Continue diuresis monitor intake and output and renal function electrolytes with diuresis    JONES (obstructive sleep apnea)- (present on admission)  Assessment & Plan  Continue nocturnal BiPAP  Pulmonology consulted, appreciate recs   ordered noninvasive ventilator for home until patient can have outpatient sleep study    Lower extremity edema- (present on admission)  Assessment & Plan  Continue diuresis    Morbid obesity (HCC)- (present on admission)  Assessment & Plan  Body mass index is 69.25 kg/m².    Asthma- (present on admission)  Assessment &  Plan  No acute exacerbation   bronchodilators as needed per RT protocol           VTE prophylaxis: therapeutic anticoagulation with Warfarin    I have performed a physical exam and reviewed and updated ROS and Plan today (11/12/2021). In review of yesterday's note (11/11/2021), there are no changes except as documented above.

## 2021-11-12 NOTE — CARE PLAN
The patient is Stable - Low risk of patient condition declining or worsening    Shift Goals  Clinical Goals: IS use, Maintain SpO2 >90%  Patient Goals: Rest  Family Goals: None present    Progress made toward(s) clinical / shift goals:        Problem: Care Map:  Day of Discharge Optimal Outcome for the Heart Failure Patient  Goal: Day of Discharge:  Optimal Care of the heart failure patient  Outcome: Progressing     Problem: Knowledge Deficit - Standard  Goal: Patient and family/care givers will demonstrate understanding of plan of care, disease process/condition, diagnostic tests and medications  Outcome: Progressing     Problem: Respiratory  Goal: Patient will achieve/maintain optimum respiratory ventilation and gas exchange  Outcome: Progressing

## 2021-11-12 NOTE — PROGRESS NOTES
Received bedside report from JOANNA Hernandez, pt care assumed. VSS, pt assessment complete. Pt AAOx4,  c/o no pain at this time. POC discussed with pt and verbalizes no questions. Pt denies any additional needs at this time. Bed locked and in lowest position. Pt educated on fall risk and verbalized understanding, call light within reach, hourly rounding initiated.

## 2021-11-12 NOTE — PROGRESS NOTES
Assumed care of PT A&O 4. Pt resting in bed with no signs of labored breathing. On 35L/70% HFNC. Pt is medical. Call light within reach, bed in lowest position, upper bed rails up. Pt was updated on plan of care for the day . Will continue to monitor.

## 2021-11-12 NOTE — CARE PLAN
The patient is Watcher - Medium risk of patient condition declining or worsening    Shift Goals  Clinical Goals: Decreased O2 demands, incentive spirorometer use  Patient Goals: Rest, comfort  Family Goals: None present    Progress made toward(s) clinical / shift goals:    Problem: Knowledge Deficit - Standard  Goal: Patient and family/care givers will demonstrate understanding of plan of care, disease process/condition, diagnostic tests and medications  Outcome: Progressing     Problem: Respiratory  Goal: Patient will achieve/maintain optimum respiratory ventilation and gas exchange  Outcome: Progressing   Weaning HFNC O2 during day, pt tolerating well.     Patient is not progressing towards the following goals:  Pt desats with Bipap at night

## 2021-11-12 NOTE — PROGRESS NOTES
Inpatient Anticoagulation Service Note    Date: 11/12/2021    Reason for Anticoagulation: Pulmonary Embolism   Target INR: 2.0 to 3.0         Hemoglobin Value: (!) 13.8  Hematocrit Value: 51.2  Lab Platelet Value: 206    INR from last 7 days     Date/Time INR Value    11/12/21 0323 2.1    11/11/21 0345 2.1    11/10/21 0256 1.84    11/09/21 02:20:01 2.08    11/08/21 0430 1.69    11/07/21 0230 1.75    11/06/21 0512 2.22        Dose from last 7 days     Date/Time Dose (mg)    11/12/21 0916 5    11/11/21 1314 5    11/09/21 1128 3.75    11/08/21 1304 7.5    11/07/21 1407 7.5    11/06/21 0820 3.75    11/05/21 1100 3.75        Average Dose (mg): 5  Significant Interactions: Antibiotics  Bridge Therapy: No   Reversal Agent Administered: Not Applicable  Comments: Continue with 5 mg, INR is therapeutic  Education Material Provided?: Yes  Pharmacist suggested discharge dosing: warfarin 5 mg daily     Ady Da SilvaD  [id number]

## 2021-11-13 LAB
INR PPP: 2.08 (ref 0.87–1.13)
PROTHROMBIN TIME: 22.7 SEC (ref 12–14.6)

## 2021-11-13 PROCEDURE — 36415 COLL VENOUS BLD VENIPUNCTURE: CPT

## 2021-11-13 PROCEDURE — 94760 N-INVAS EAR/PLS OXIMETRY 1: CPT

## 2021-11-13 PROCEDURE — 94640 AIRWAY INHALATION TREATMENT: CPT

## 2021-11-13 PROCEDURE — 700102 HCHG RX REV CODE 250 W/ 637 OVERRIDE(OP): Performed by: STUDENT IN AN ORGANIZED HEALTH CARE EDUCATION/TRAINING PROGRAM

## 2021-11-13 PROCEDURE — A9270 NON-COVERED ITEM OR SERVICE: HCPCS | Performed by: HOSPITALIST

## 2021-11-13 PROCEDURE — 700111 HCHG RX REV CODE 636 W/ 250 OVERRIDE (IP): Performed by: INTERNAL MEDICINE

## 2021-11-13 PROCEDURE — A9270 NON-COVERED ITEM OR SERVICE: HCPCS | Performed by: INTERNAL MEDICINE

## 2021-11-13 PROCEDURE — A9270 NON-COVERED ITEM OR SERVICE: HCPCS | Performed by: STUDENT IN AN ORGANIZED HEALTH CARE EDUCATION/TRAINING PROGRAM

## 2021-11-13 PROCEDURE — 85610 PROTHROMBIN TIME: CPT

## 2021-11-13 PROCEDURE — 700102 HCHG RX REV CODE 250 W/ 637 OVERRIDE(OP): Performed by: HOSPITALIST

## 2021-11-13 PROCEDURE — 770001 HCHG ROOM/CARE - MED/SURG/GYN PRIV*

## 2021-11-13 PROCEDURE — 94660 CPAP INITIATION&MGMT: CPT

## 2021-11-13 PROCEDURE — 99232 SBSQ HOSP IP/OBS MODERATE 35: CPT | Performed by: INTERNAL MEDICINE

## 2021-11-13 PROCEDURE — 700102 HCHG RX REV CODE 250 W/ 637 OVERRIDE(OP): Performed by: INTERNAL MEDICINE

## 2021-11-13 RX ORDER — BENZOCAINE/MENTHOL 6 MG-10 MG
LOZENGE MUCOUS MEMBRANE 2 TIMES DAILY
Status: DISCONTINUED | OUTPATIENT
Start: 2021-11-13 | End: 2021-11-24 | Stop reason: HOSPADM

## 2021-11-13 RX ORDER — DIPHENHYDRAMINE HCL 25 MG
25 TABLET ORAL EVERY 6 HOURS PRN
Status: DISCONTINUED | OUTPATIENT
Start: 2021-11-13 | End: 2021-11-16

## 2021-11-13 RX ADMIN — HYDROCORTISONE: 10 CREAM TOPICAL at 17:03

## 2021-11-13 RX ADMIN — WARFARIN SODIUM 5 MG: 5 TABLET ORAL at 17:03

## 2021-11-13 RX ADMIN — SENNOSIDES AND DOCUSATE SODIUM 2 TABLET: 50; 8.6 TABLET ORAL at 07:40

## 2021-11-13 RX ADMIN — SENNOSIDES AND DOCUSATE SODIUM 2 TABLET: 50; 8.6 TABLET ORAL at 17:03

## 2021-11-13 RX ADMIN — MULTIPLE VITAMINS W/ MINERALS TAB 1 TABLET: TAB at 07:40

## 2021-11-13 RX ADMIN — FUROSEMIDE 60 MG: 10 INJECTION, SOLUTION INTRAMUSCULAR; INTRAVENOUS at 07:40

## 2021-11-13 RX ADMIN — TAMSULOSIN HYDROCHLORIDE 0.4 MG: 0.4 CAPSULE ORAL at 07:40

## 2021-11-13 ASSESSMENT — ENCOUNTER SYMPTOMS
NAUSEA: 0
SHORTNESS OF BREATH: 0
VOMITING: 0
COUGH: 0
ABDOMINAL PAIN: 0
MYALGIAS: 0

## 2021-11-13 ASSESSMENT — PAIN DESCRIPTION - PAIN TYPE: TYPE: ACUTE PAIN

## 2021-11-13 NOTE — PROGRESS NOTES
Hospital Medicine Daily Progress Note    Date of Service  11/13/2021    Chief Complaint  Michael Ontiveros is a 31 y.o. male admitted 11/1/2021 with edema    Hospital Course  This is a 31-year-old male with past medical history of pulmonary embolism and DVT on Coumadin, pulmonary hypertension, JONES, mild intermittent asthma, and morbid obesity with a BMI of 69 who was admitted on 11/1/2021 with bilateral lower extremity swelling.  Patient was transferred to the intensive care unit for respiratory failure failing BiPAP trial and was intubated on 11/3/2021.  He was extubated to BiPAP on 11/5/2021.  He had difficult Matthew placement requiring urology consultation with cystoscopy for placement    He is set up to have a sleep study on 11/8/2021 for evaluation for BiPAP therapy.     Interval Problem Update  Labs were repeated yesterday and sodium actually 140.  Vital stable, currently on HFNC 40 LPM and 70% FiO2.  LTAC referral placed.  Patient has no complaints.  Nursing noticed worsening of a generalized rash possibly due to the wipes for the midline, as needed Benadryl ordered.    I have personally seen and examined the patient at bedside. I discussed the plan of care with patient, bedside RN, charge RN, , pharmacy and pulmonology .    Consultants/Specialty  critical care and pulmonary    Code Status  Full Code    Disposition  Patient is not medically cleared.   Anticipate discharge to to home with close outpatient follow-up vs LTAC  I have placed the appropriate orders for post-discharge needs.    Review of Systems  Review of Systems   Constitutional: Positive for malaise/fatigue.   Respiratory: Negative for cough and shortness of breath.    Cardiovascular: Positive for leg swelling.   Gastrointestinal: Negative for abdominal pain, nausea and vomiting.   Musculoskeletal: Negative for myalgias.   All other systems reviewed and are negative.       Physical Exam  Temp:  [36.4 °C (97.5 °F)-36.9 °C (98.4  °F)] 36.4 °C (97.6 °F)  Pulse:  [90-98] 90  Resp:  [18-22] 20  BP: (115-127)/(75-83) 127/83  SpO2:  [92 %-96 %] 92 %    Physical Exam  Vitals and nursing note reviewed.   Constitutional:       General: He is not in acute distress.     Appearance: He is well-developed. He is obese.   HENT:      Head: Normocephalic and atraumatic.   Eyes:      Conjunctiva/sclera: Conjunctivae normal.   Neck:      Vascular: No JVD.      Trachea: No tracheal deviation.   Cardiovascular:      Rate and Rhythm: Normal rate and regular rhythm.      Heart sounds: No murmur heard.      Pulmonary:      Effort: Pulmonary effort is normal. No respiratory distress.      Breath sounds: No wheezing.      Comments: Decreased breath sounds in bases   On HFNC   Abdominal:      General: Bowel sounds are normal. There is no distension.      Palpations: Abdomen is soft.      Tenderness: There is no abdominal tenderness.   Musculoskeletal:         General: Swelling present. No tenderness.      Cervical back: Neck supple.   Skin:     General: Skin is warm and dry.      Findings: Erythema (b/l LE, chronic ) present.      Nails: There is no clubbing.   Neurological:      Mental Status: He is alert and oriented to person, place, and time.      Cranial Nerves: No cranial nerve deficit.      Motor: No abnormal muscle tone.   Psychiatric:         Mood and Affect: Mood normal.         Behavior: Behavior normal.         Fluids    Intake/Output Summary (Last 24 hours) at 11/13/2021 1152  Last data filed at 11/13/2021 0900  Gross per 24 hour   Intake 480 ml   Output 1100 ml   Net -620 ml       Laboratory  Recent Labs     11/12/21 0323   WBC 4.5*   RBC 6.15*   HEMOGLOBIN 14.0   HEMATOCRIT 53.2*   MCV 86.5   MCH 22.8*   MCHC 26.3*   RDW 71.4*   PLATELETCT 253     Recent Labs     11/12/21 0323   SODIUM 140   POTASSIUM 4.4   CHLORIDE 97   CO2 35*   GLUCOSE 112*   BUN 14   CREATININE 0.49*   CALCIUM 9.6     Recent Labs     11/11/21  0345 11/12/21 0323 11/13/21 0311    INR 2.10* 2.10* 2.08*               Imaging  DX-CHEST-LIMITED (1 VIEW)   Final Result      Improving basilar aeration with increase in lung volumes following extubation      Continued moderate bilateral consolidation could be from edema and/or infection      DX-CHEST-PORTABLE (1 VIEW)   Final Result      1.  No significant interval change.         DX-CHEST-PORTABLE (1 VIEW)   Final Result      1.  Probable mild worsening pulmonary edema.   2.  Increased bibasilar atelectasis.   3.  Cannot exclude trace pleural effusions.      DX-ABDOMEN FOR TUBE PLACEMENT   Final Result      Enteric tube tip projects over the stomach      IR-PICC LINE PLACEMENT W/ GUIDANCE > AGE 5   Final Result                  Ultrasound-guided PICC placement performed by qualified nursing staff as    above.          DX-CHEST-PORTABLE (1 VIEW)   Final Result      1.  Interval intubation   2.  BILATERAL atelectasis with possible superimposed pulmonary edema or pneumonia, unchanged      DX-CHEST-LIMITED (1 VIEW)   Final Result      Worsening diffuse hazy opacity compatible with edema or infection      EC-ECHOCARDIOGRAM COMPLETE W/ CONT   Final Result      CT-ABDOMEN-PELVIS WITH   Final Result         Limited exam due to body habitus.      1. There is skin thickening and subcutaneous edema in the low anterior abdominal and pelvic wall panus. This could relate to fluid overload but infection and cellulitis can have similar appearance. Correlate clinically.      2. Mild perihepatic ascites.      3. Diffuse scrotal edema and moderate hydrocele.         CT-CTA CHEST PULMONARY ARTERY W/ RECONS   Final Result         1. No CT evidence of pulmonary embolism.      2. Mild hazy groundglass opacity throughout both lungs could relate to mild fluid overload.      3. Cardiomegaly.      4. No airspace consolidation. Linear right midlung atelectasis.         DX-CHEST-PORTABLE (1 VIEW)   Final Result         Stable cardiomegaly.      Mild interstitial prominence  could relate to mild fluid overload.           Assessment/Plan  * Acute on chronic respiratory failure with hypoxia and hypercapnia (HCC)- (present on admission)  Assessment & Plan  Wean off high flow nasal cannula as tolerated  Continue diuresis  Continue nocturnal BiPAP    Urinary retention  Assessment & Plan  With difficult Matthew catheter placement by urology    Started on Flomax  Early a.m. voiding trial later this week prior to discharge    Cellulitis  Assessment & Plan  Versus chronic stasis dermatitis    Finished course of keflex   Continue skin care and clinical monitoring    Volume overload- (present on admission)  Assessment & Plan        History of pulmonary embolism- (present on admission)  Assessment & Plan  Continue warfarin    Chronic anticoagulation- (present on admission)  Assessment & Plan  History of PE and DVT, had a trial on Xarelto developed DVT  Continue anticoagulation with warfarin and monitor INR    Pulmonary embolism with acute cor pulmonale (HCC)- (present on admission)  Assessment & Plan  History of PE    Continue anticoagulation with warfarin and monitor INR    Pulmonary hypertension (HCC)- (present on admission)  Assessment & Plan  RVSP 45 mmHg  On anticoagulation for prior history of PE  CTA on 11/1/2021 - for acute PE  Likely underlying severe sleep apnea  Continue BiPAP therapy at bedtime  Continue diuresis monitor intake and output and renal function electrolytes with diuresis    JONES (obstructive sleep apnea)- (present on admission)  Assessment & Plan  Continue nocturnal BiPAP  Pulmonology consulted, appreciate recs   ordered noninvasive ventilator for home until patient can have outpatient sleep study    Lower extremity edema- (present on admission)  Assessment & Plan  Continue diuresis    Morbid obesity (HCC)- (present on admission)  Assessment & Plan  Body mass index is 69.25 kg/m².    Asthma- (present on admission)  Assessment & Plan  No acute exacerbation   bronchodilators as  needed per RT protocol           VTE prophylaxis: therapeutic anticoagulation with Warfarin    I have performed a physical exam and reviewed and updated ROS and Plan today (11/13/2021). In review of yesterday's note (11/12/2021), there are no changes except as documented above.

## 2021-11-13 NOTE — PROGRESS NOTES
Assumed care this pm from day shift RN. Patient sitting up in the chair with no signs of stress. Patient AxO 4, O2 @ 40 L 70% thru high flow, VSS. Call bell and personal items within reach, bed locked in low position. Bed exit alarm on. Hourly rounding in place. Tele box in place, monitor room notified.

## 2021-11-13 NOTE — CARE PLAN
The patient is Watcher - Medium risk of patient condition declining or worsening    Shift Goals  Clinical Goals: monitor O2 sat/ demand, use bipap at night  Patient Goals: rest  Family Goals: None present    Progress made toward(s) clinical / shift goals:      Patient is not progressing towards the following goals:      Problem: Respiratory  Goal: Patient will achieve/maintain optimum respiratory ventilation and gas exchange  11/13/2021 0741 by Sofia Naranjo R.N.  Outcome: Not Progressing  Note: Patient needing high flow and nocturnal bipap. Patient was able to tolerate bipap throughout the night  11/13/2021 0728 by Sofia Naranjo, R.N.  Outcome: Not Progressing  Note: Patient on high flow and nocturnal bipap. Patient was able to

## 2021-11-13 NOTE — CARE PLAN
The patient is Stable - Low risk of patient condition declining or worsening    Shift Goals  Clinical Goals: Monitor O2 sats, use IS  Patient Goals: Rest  Family Goals: None present    Progress made toward(s) clinical / shift goals:        Problem: Knowledge Deficit - Standard  Goal: Patient and family/care givers will demonstrate understanding of plan of care, disease process/condition, diagnostic tests and medications  Outcome: Progressing     Problem: Respiratory  Goal: Patient will achieve/maintain optimum respiratory ventilation and gas exchange  Outcome: Progressing     Problem: Skin Integrity  Goal: Skin integrity is maintained or improved  Outcome: Progressing

## 2021-11-13 NOTE — PROGRESS NOTES
Received bedside report from RN Sofia, pt care assumed. VSS, pt assessment complete. Pt AAOx4,  c/o no pain at this time. POC discussed with pt and verbalizes no questions. Pt denies any additional needs at this time. Bed locked and in lowest position. Pt educated on fall risk and verbalized understanding, call light within reach, hourly rounding initiated.

## 2021-11-13 NOTE — PROGRESS NOTES
Inpatient Anticoagulation Service Note    Date: 11/13/2021    Reason for Anticoagulation: Pulmonary Embolism,Deep Vein Thrombosis   Target INR: 2.0 to 3.0     Hemoglobin Value: 14  Hematocrit Value: (!) 53.2  Lab Platelet Value: 253    INR from last 7 days     Date/Time INR Value    11/13/21 0311 2.08    11/12/21 0323 2.1    11/11/21 0345 2.1    11/10/21 0256 1.84    11/09/21 02:20:01 2.08    11/08/21 0430 1.69    11/07/21 0230 1.75        Dose from last 7 days     Date/Time Dose (mg)    11/13/21 1407 5    11/12/21 0916 5    11/11/21 1314 5    11/09/21 1128 3.75    11/08/21 1304 7.5    11/07/21 1407 7.5        Average Dose (mg): 5  Significant Interactions: Not Applicable  Bridge Therapy: No     Reversal Agent Administered: Not Applicable  Comments: Patient's INR remains therapeutic this morning. Will continue with 5mg po daily and repeat INR tomorrow. No new labs this morning and no s/sx of bleeding. Continue therapy.    Plan:  Continue warfarin po 5mg daily. Repeat INR tomorrow.  Education Material Provided?: Yes  Pharmacist suggested discharge dosing: Recommend Warfarin 5mg po daily. Patient to follow with outpatient AC clinic within 48hrs of discharge for repeat INR.      Mary Tang, PharmD

## 2021-11-14 LAB
INR PPP: 1.9 (ref 0.87–1.13)
PROTHROMBIN TIME: 21.2 SEC (ref 12–14.6)

## 2021-11-14 PROCEDURE — 700102 HCHG RX REV CODE 250 W/ 637 OVERRIDE(OP): Performed by: STUDENT IN AN ORGANIZED HEALTH CARE EDUCATION/TRAINING PROGRAM

## 2021-11-14 PROCEDURE — 700111 HCHG RX REV CODE 636 W/ 250 OVERRIDE (IP): Performed by: INTERNAL MEDICINE

## 2021-11-14 PROCEDURE — 94660 CPAP INITIATION&MGMT: CPT

## 2021-11-14 PROCEDURE — 36415 COLL VENOUS BLD VENIPUNCTURE: CPT

## 2021-11-14 PROCEDURE — 85610 PROTHROMBIN TIME: CPT

## 2021-11-14 PROCEDURE — 770001 HCHG ROOM/CARE - MED/SURG/GYN PRIV*

## 2021-11-14 PROCEDURE — 99232 SBSQ HOSP IP/OBS MODERATE 35: CPT | Performed by: INTERNAL MEDICINE

## 2021-11-14 PROCEDURE — A9270 NON-COVERED ITEM OR SERVICE: HCPCS | Performed by: STUDENT IN AN ORGANIZED HEALTH CARE EDUCATION/TRAINING PROGRAM

## 2021-11-14 PROCEDURE — 94760 N-INVAS EAR/PLS OXIMETRY 1: CPT

## 2021-11-14 PROCEDURE — 700102 HCHG RX REV CODE 250 W/ 637 OVERRIDE(OP): Performed by: INTERNAL MEDICINE

## 2021-11-14 PROCEDURE — A9270 NON-COVERED ITEM OR SERVICE: HCPCS | Performed by: INTERNAL MEDICINE

## 2021-11-14 PROCEDURE — 700102 HCHG RX REV CODE 250 W/ 637 OVERRIDE(OP): Performed by: HOSPITALIST

## 2021-11-14 PROCEDURE — A9270 NON-COVERED ITEM OR SERVICE: HCPCS | Performed by: HOSPITALIST

## 2021-11-14 RX ORDER — WARFARIN SODIUM 5 MG/1
5 TABLET ORAL
Status: DISCONTINUED | OUTPATIENT
Start: 2021-11-15 | End: 2021-11-15

## 2021-11-14 RX ORDER — WARFARIN SODIUM 7.5 MG/1
7.5 TABLET ORAL
Status: DISCONTINUED | OUTPATIENT
Start: 2021-11-14 | End: 2021-11-15

## 2021-11-14 RX ADMIN — SENNOSIDES AND DOCUSATE SODIUM 2 TABLET: 50; 8.6 TABLET ORAL at 05:29

## 2021-11-14 RX ADMIN — MULTIPLE VITAMINS W/ MINERALS TAB 1 TABLET: TAB at 05:29

## 2021-11-14 RX ADMIN — ALTEPLASE 2 MG: 2.2 INJECTION, POWDER, LYOPHILIZED, FOR SOLUTION INTRAVENOUS at 10:08

## 2021-11-14 RX ADMIN — TAMSULOSIN HYDROCHLORIDE 0.4 MG: 0.4 CAPSULE ORAL at 08:19

## 2021-11-14 RX ADMIN — HYDROCORTISONE: 10 CREAM TOPICAL at 17:23

## 2021-11-14 RX ADMIN — WARFARIN SODIUM 7.5 MG: 7.5 TABLET ORAL at 17:23

## 2021-11-14 RX ADMIN — FUROSEMIDE 60 MG: 10 INJECTION, SOLUTION INTRAMUSCULAR; INTRAVENOUS at 05:29

## 2021-11-14 RX ADMIN — HYDROCORTISONE: 10 CREAM TOPICAL at 05:29

## 2021-11-14 ASSESSMENT — ENCOUNTER SYMPTOMS
ABDOMINAL PAIN: 0
FEVER: 0
COUGH: 0
VOMITING: 0
NAUSEA: 0
CHILLS: 0
MYALGIAS: 0
SHORTNESS OF BREATH: 0

## 2021-11-14 ASSESSMENT — PAIN DESCRIPTION - PAIN TYPE
TYPE: ACUTE PAIN
TYPE: ACUTE PAIN

## 2021-11-14 NOTE — PROGRESS NOTES
Hospital Medicine Daily Progress Note    Date of Service  11/14/2021    Chief Complaint  Michael Ontiveros is a 31 y.o. male admitted 11/1/2021 with edema    Hospital Course  This is a 31-year-old male with past medical history of pulmonary embolism and DVT on Coumadin, pulmonary hypertension, JONES, mild intermittent asthma, and morbid obesity with a BMI of 69 who was admitted on 11/1/2021 with bilateral lower extremity swelling.  Patient was transferred to the intensive care unit for respiratory failure failing BiPAP trial and was intubated on 11/3/2021.  He was extubated to BiPAP on 11/5/2021.  He had difficult Matthew placement requiring urology consultation with cystoscopy for placement    He is set up to have a sleep study on 11/8/2021 for evaluation for BiPAP therapy.     Interval Problem Update  No acute events overnight. Patient able to be weaned to 13 L oxymask.  Reports that his breathing has improved.  Rash that was noted yesterday on patient's right arm has improved with hydrocortisone.     I have personally seen and examined the patient at bedside. I discussed the plan of care with patient, bedside RN, charge RN,  and pharmacy.    Consultants/Specialty  critical care and pulmonary    Code Status  Full Code    Disposition  Patient is not medically cleared.   Anticipate discharge to to home with close outpatient follow-up vs LTAC  I have placed the appropriate orders for post-discharge needs.    Review of Systems  Review of Systems   Constitutional: Negative for chills, fever and malaise/fatigue.   Respiratory: Negative for cough and shortness of breath.    Cardiovascular: Positive for leg swelling (chronic).   Gastrointestinal: Negative for abdominal pain, nausea and vomiting.   Musculoskeletal: Negative for myalgias.   Skin: Positive for rash.   All other systems reviewed and are negative.       Physical Exam  Temp:  [36.6 °C (97.8 °F)-37.2 °C (99 °F)] 36.6 °C (97.8 °F)  Pulse:  []  98  Resp:  [16-20] 18  BP: (101-137)/(59-79) 101/59  SpO2:  [90 %-93 %] 90 %    Physical Exam  Vitals and nursing note reviewed.   Constitutional:       General: He is not in acute distress.     Appearance: He is well-developed. He is obese.   HENT:      Head: Normocephalic and atraumatic.   Eyes:      Conjunctiva/sclera: Conjunctivae normal.   Neck:      Vascular: No JVD.      Trachea: No tracheal deviation.   Cardiovascular:      Rate and Rhythm: Normal rate and regular rhythm.      Heart sounds: No murmur heard.      Pulmonary:      Effort: Pulmonary effort is normal. No respiratory distress.      Breath sounds: No wheezing.      Comments: On oxymask   Abdominal:      General: Bowel sounds are normal. There is no distension.      Palpations: Abdomen is soft.      Tenderness: There is no abdominal tenderness.   Musculoskeletal:         General: Swelling present. No tenderness.      Cervical back: Neck supple.   Skin:     General: Skin is warm and dry.      Findings: Erythema (b/l LE, chronic ) and rash (right arm, improving) present.      Nails: There is no clubbing.   Neurological:      Mental Status: He is alert and oriented to person, place, and time.      Cranial Nerves: No cranial nerve deficit.      Motor: No abnormal muscle tone.   Psychiatric:         Mood and Affect: Mood normal.         Behavior: Behavior normal.         Fluids    Intake/Output Summary (Last 24 hours) at 11/14/2021 1040  Last data filed at 11/14/2021 1000  Gross per 24 hour   Intake 600 ml   Output 3150 ml   Net -2550 ml       Laboratory  Recent Labs     11/12/21  0323   WBC 4.5*   RBC 6.15*   HEMOGLOBIN 14.0   HEMATOCRIT 53.2*   MCV 86.5   MCH 22.8*   MCHC 26.3*   RDW 71.4*   PLATELETCT 253     Recent Labs     11/12/21  0323   SODIUM 140   POTASSIUM 4.4   CHLORIDE 97   CO2 35*   GLUCOSE 112*   BUN 14   CREATININE 0.49*   CALCIUM 9.6     Recent Labs     11/12/21  0323 11/13/21  0311 11/14/21  0354   INR 2.10* 2.08* 1.90*                Imaging  DX-CHEST-LIMITED (1 VIEW)   Final Result      Improving basilar aeration with increase in lung volumes following extubation      Continued moderate bilateral consolidation could be from edema and/or infection      DX-CHEST-PORTABLE (1 VIEW)   Final Result      1.  No significant interval change.         DX-CHEST-PORTABLE (1 VIEW)   Final Result      1.  Probable mild worsening pulmonary edema.   2.  Increased bibasilar atelectasis.   3.  Cannot exclude trace pleural effusions.      DX-ABDOMEN FOR TUBE PLACEMENT   Final Result      Enteric tube tip projects over the stomach      IR-PICC LINE PLACEMENT W/ GUIDANCE > AGE 5   Final Result                  Ultrasound-guided PICC placement performed by qualified nursing staff as    above.          DX-CHEST-PORTABLE (1 VIEW)   Final Result      1.  Interval intubation   2.  BILATERAL atelectasis with possible superimposed pulmonary edema or pneumonia, unchanged      DX-CHEST-LIMITED (1 VIEW)   Final Result      Worsening diffuse hazy opacity compatible with edema or infection      EC-ECHOCARDIOGRAM COMPLETE W/ CONT   Final Result      CT-ABDOMEN-PELVIS WITH   Final Result         Limited exam due to body habitus.      1. There is skin thickening and subcutaneous edema in the low anterior abdominal and pelvic wall panus. This could relate to fluid overload but infection and cellulitis can have similar appearance. Correlate clinically.      2. Mild perihepatic ascites.      3. Diffuse scrotal edema and moderate hydrocele.         CT-CTA CHEST PULMONARY ARTERY W/ RECONS   Final Result         1. No CT evidence of pulmonary embolism.      2. Mild hazy groundglass opacity throughout both lungs could relate to mild fluid overload.      3. Cardiomegaly.      4. No airspace consolidation. Linear right midlung atelectasis.         DX-CHEST-PORTABLE (1 VIEW)   Final Result         Stable cardiomegaly.      Mild interstitial prominence could relate to mild fluid  overload.           Assessment/Plan  * Acute on chronic respiratory failure with hypoxia and hypercapnia (HCC)- (present on admission)  Assessment & Plan  Now on oxymask, continue to wean, home O2 eval when appropriate   Continue diuresis  Continue nocturnal BiPAP    Urinary retention  Assessment & Plan  With difficult Matthew catheter placement by urology    Started on Flomax  Early a.m. voiding trial later this week prior to discharge    Cellulitis  Assessment & Plan  Versus chronic stasis dermatitis    Finished course of keflex   Continue skin care and clinical monitoring    Volume overload- (present on admission)  Assessment & Plan        History of pulmonary embolism- (present on admission)  Assessment & Plan  Continue warfarin    Chronic anticoagulation- (present on admission)  Assessment & Plan  History of PE and DVT, had a trial on Xarelto developed DVT  Continue anticoagulation with warfarin and monitor INR    Pulmonary embolism with acute cor pulmonale (HCC)- (present on admission)  Assessment & Plan  History of PE    Continue anticoagulation with warfarin and monitor INR    Pulmonary hypertension (HCC)- (present on admission)  Assessment & Plan  RVSP 45 mmHg  On anticoagulation for prior history of PE  CTA on 11/1/2021 - for acute PE  Likely underlying severe sleep apnea  Continue BiPAP therapy at bedtime  Continue diuresis monitor intake and output and renal function electrolytes with diuresis    JONES (obstructive sleep apnea)- (present on admission)  Assessment & Plan  Continue nocturnal BiPAP  Pulmonology consulted, ordered noninvasive ventilator for home until patient can have outpatient sleep study    Lower extremity edema- (present on admission)  Assessment & Plan  Continue diuresis    Morbid obesity (HCC)- (present on admission)  Assessment & Plan  Body mass index is 69.25 kg/m².    Asthma- (present on admission)  Assessment & Plan  No acute exacerbation   bronchodilators as needed per RT  protocol           VTE prophylaxis: therapeutic anticoagulation with Warfarin    I have performed a physical exam and reviewed and updated ROS and Plan today (11/14/2021). In review of yesterday's note (11/13/2021), there are no changes except as documented above.

## 2021-11-14 NOTE — PROGRESS NOTES
Received report and assumed care of patient. Patient is A & O x 4 and awake in bed. Patient is in no signs of distress, VSS on 30L HFNC at 70% and denies pain at this time. Patient was updated on the plan of care for the day. Call light within reach, bed in low position, 2 side rails up. All fall precautions in place.PAtient is medical. Will continue to monitor

## 2021-11-14 NOTE — CARE PLAN
The patient is Watcher - Medium risk of patient condition declining or worsening    Shift Goals  Clinical Goals: monitor O2 levels  Patient Goals: rest and comfort  Family Goals: n/a    Progress made toward(s) clinical / shift goals:    Problem: Knowledge Deficit - Standard  Goal: Patient and family/care givers will demonstrate understanding of plan of care, disease process/condition, diagnostic tests and medications  Outcome: Progressing     Problem: Respiratory  Goal: Patient will achieve/maintain optimum respiratory ventilation and gas exchange  Outcome: Progressing     Problem: Skin Integrity  Goal: Skin integrity is maintained or improved  Outcome: Progressing

## 2021-11-14 NOTE — PROGRESS NOTES
Bedside report received from nurse. Assumed care of pt. Pt resting comfortably in bed. A/Ox4, VSS,  All needs met. POC reviewed and white board updated. Tele box on. Call light in reach. Bed locked in lowest position with 2 upper bed rails up. No pain or complaints pt on 12L oxymask    covid-19 surge in effect

## 2021-11-14 NOTE — PROGRESS NOTES
Inpatient Anticoagulation Service Note    Date: 11/14/2021    Reason for Anticoagulation: Pulmonary Embolism,Deep Vein Thrombosis   Target INR: 2.0 to 3.0         Hemoglobin Value: 14  Hematocrit Value: (!) 53.2  Lab Platelet Value: 253    INR from last 7 days     Date/Time INR Value    11/14/21 0354 1.9    11/13/21 0311 2.08    11/12/21 0323 2.1    11/11/21 0345 2.1    11/10/21 0256 1.84    11/09/21 02:20:01 2.08    11/08/21 0430 1.69        Dose from last 7 days     Date/Time Dose (mg)    11/14/21 1106 7.5    11/13/21 1407 5    11/12/21 0916 5    11/11/21 1314 5    11/09/21 1128 3.75    11/08/21 1304 7.5    11/07/21 1407 7.5        Average Dose (mg):  (7.5 SunTuesThurs, 5mg AOD)  Significant Interactions: Not Applicable  Bridge Therapy: No     Reversal Agent Administered: Not Applicable  Comments: INR dropped slightly to 1.9 per labs this morning. Based on past warfarin administrations, will trial Warfarin 7.5mg Sun, Tues, and Thursday and 5mg all other days. No new CBC this morning. No s/sx of bleeding. Repeat INR tomorrow to assess dose appropriateness. Pharmacy will continue to monitor.    Plan:  Warfarin 7.5mg today. Repeat INR tomorrow.   Education Material Provided?: Yes  Pharmacist suggested discharge dosing: Suggest 7.5mg SunTuesThurs and 5mg all other days. Patient has already established with the outpatient AC clinic. Recommend patient follow up with clinic within 48hrs of discharge for repeat INR.     Mary Tang, PharmD

## 2021-11-15 LAB
INR PPP: 1.76 (ref 0.87–1.13)
PROTHROMBIN TIME: 20 SEC (ref 12–14.6)

## 2021-11-15 PROCEDURE — A9270 NON-COVERED ITEM OR SERVICE: HCPCS | Performed by: HOSPITALIST

## 2021-11-15 PROCEDURE — 94760 N-INVAS EAR/PLS OXIMETRY 1: CPT

## 2021-11-15 PROCEDURE — 700102 HCHG RX REV CODE 250 W/ 637 OVERRIDE(OP): Performed by: HOSPITALIST

## 2021-11-15 PROCEDURE — 770001 HCHG ROOM/CARE - MED/SURG/GYN PRIV*

## 2021-11-15 PROCEDURE — 700102 HCHG RX REV CODE 250 W/ 637 OVERRIDE(OP): Performed by: STUDENT IN AN ORGANIZED HEALTH CARE EDUCATION/TRAINING PROGRAM

## 2021-11-15 PROCEDURE — 99232 SBSQ HOSP IP/OBS MODERATE 35: CPT | Performed by: INTERNAL MEDICINE

## 2021-11-15 PROCEDURE — 85610 PROTHROMBIN TIME: CPT

## 2021-11-15 PROCEDURE — A9270 NON-COVERED ITEM OR SERVICE: HCPCS | Performed by: STUDENT IN AN ORGANIZED HEALTH CARE EDUCATION/TRAINING PROGRAM

## 2021-11-15 PROCEDURE — 700102 HCHG RX REV CODE 250 W/ 637 OVERRIDE(OP): Performed by: INTERNAL MEDICINE

## 2021-11-15 PROCEDURE — A9270 NON-COVERED ITEM OR SERVICE: HCPCS | Performed by: INTERNAL MEDICINE

## 2021-11-15 PROCEDURE — 700111 HCHG RX REV CODE 636 W/ 250 OVERRIDE (IP): Performed by: INTERNAL MEDICINE

## 2021-11-15 PROCEDURE — 36415 COLL VENOUS BLD VENIPUNCTURE: CPT

## 2021-11-15 PROCEDURE — 99232 SBSQ HOSP IP/OBS MODERATE 35: CPT | Mod: GC | Performed by: INTERNAL MEDICINE

## 2021-11-15 PROCEDURE — 94660 CPAP INITIATION&MGMT: CPT

## 2021-11-15 RX ORDER — WARFARIN SODIUM 10 MG/1
10 TABLET ORAL DAILY
Status: COMPLETED | OUTPATIENT
Start: 2021-11-15 | End: 2021-11-15

## 2021-11-15 RX ADMIN — SENNOSIDES AND DOCUSATE SODIUM 2 TABLET: 50; 8.6 TABLET ORAL at 17:01

## 2021-11-15 RX ADMIN — MULTIPLE VITAMINS W/ MINERALS TAB 1 TABLET: TAB at 06:11

## 2021-11-15 RX ADMIN — SENNOSIDES AND DOCUSATE SODIUM 2 TABLET: 50; 8.6 TABLET ORAL at 06:11

## 2021-11-15 RX ADMIN — FUROSEMIDE 60 MG: 10 INJECTION, SOLUTION INTRAMUSCULAR; INTRAVENOUS at 06:11

## 2021-11-15 RX ADMIN — TAMSULOSIN HYDROCHLORIDE 0.4 MG: 0.4 CAPSULE ORAL at 08:07

## 2021-11-15 RX ADMIN — WARFARIN SODIUM 10 MG: 10 TABLET ORAL at 18:09

## 2021-11-15 RX ADMIN — HYDROCORTISONE: 10 CREAM TOPICAL at 06:11

## 2021-11-15 RX ADMIN — HYDROCORTISONE: 10 CREAM TOPICAL at 17:01

## 2021-11-15 ASSESSMENT — ENCOUNTER SYMPTOMS
BLURRED VISION: 0
HEMOPTYSIS: 0
CHILLS: 0
DIZZINESS: 0
NAUSEA: 0
TINGLING: 0
ABDOMINAL PAIN: 0
ORTHOPNEA: 0
SPUTUM PRODUCTION: 0
PHOTOPHOBIA: 0
COUGH: 0
DOUBLE VISION: 0
DEPRESSION: 0
HEADACHES: 0
VOMITING: 0
MYALGIAS: 0
SHORTNESS OF BREATH: 0
WEIGHT LOSS: 0
BACK PAIN: 0
FEVER: 0
PALPITATIONS: 0
NECK PAIN: 0

## 2021-11-15 ASSESSMENT — PAIN DESCRIPTION - PAIN TYPE
TYPE: ACUTE PAIN

## 2021-11-15 ASSESSMENT — FIBROSIS 4 INDEX: FIB4 SCORE: 0.68

## 2021-11-15 NOTE — PROGRESS NOTES
Hospital Medicine Daily Progress Note    Date of Service  11/15/2021    Chief Complaint  Michael Ontiveros is a 31 y.o. male admitted 11/1/2021 with edema    Hospital Course  This is a 31-year-old male with past medical history of pulmonary embolism and DVT on Coumadin, pulmonary hypertension, JONES, mild intermittent asthma, and morbid obesity with a BMI of 69 who was admitted on 11/1/2021 with bilateral lower extremity swelling.  Patient was transferred to the intensive care unit for respiratory failure failing BiPAP trial and was intubated on 11/3/2021.  He was extubated to BiPAP on 11/5/2021.  He had difficult Matthew placement requiring urology consultation with cystoscopy for placement    He is set up to have a sleep study on 11/8/2021 for evaluation for BiPAP therapy.     Interval Problem Update  Patient weaned down to 6 L briefly yesterday back on 8 L this morning. Continue to wean, goal is to send home with home O2. CM to follow up on noninvasive ventilation for home to be used at night, last check still pending insurance auth.     I have personally seen and examined the patient at bedside. I discussed the plan of care with patient, bedside RN, charge RN,  and pharmacy.    Consultants/Specialty  critical care and pulmonary    Code Status  Full Code    Disposition  Patient is not medically cleared.   Anticipate discharge to to home with close outpatient follow-up vs LTAC  I have placed the appropriate orders for post-discharge needs.    Review of Systems  Review of Systems   Constitutional: Negative for chills, fever and malaise/fatigue.   Respiratory: Negative for cough and shortness of breath.    Cardiovascular: Positive for leg swelling (chronic).   Gastrointestinal: Negative for abdominal pain, nausea and vomiting.   Musculoskeletal: Negative for myalgias.   Skin: Positive for rash.   All other systems reviewed and are negative.       Physical Exam  Temp:  [36.3 °C (97.3 °F)-36.4 °C (97.6  °F)] 36.3 °C (97.4 °F)  Pulse:  [86-99] 89  Resp:  [18] 18  BP: (108-129)/(55-78) 116/71  SpO2:  [87 %-96 %] 87 %    Physical Exam  Vitals and nursing note reviewed.   Constitutional:       General: He is not in acute distress.     Appearance: He is well-developed. He is obese. He is not ill-appearing.   HENT:      Head: Normocephalic and atraumatic.   Eyes:      Conjunctiva/sclera: Conjunctivae normal.   Neck:      Vascular: No JVD.      Trachea: No tracheal deviation.   Cardiovascular:      Rate and Rhythm: Normal rate and regular rhythm.      Heart sounds: No murmur heard.      Pulmonary:      Effort: Pulmonary effort is normal. No respiratory distress.      Breath sounds: No wheezing.      Comments: On oxymask   Abdominal:      General: Bowel sounds are normal. There is no distension.      Palpations: Abdomen is soft.      Tenderness: There is no abdominal tenderness.   Musculoskeletal:         General: Swelling present. No tenderness.      Cervical back: Neck supple.   Skin:     General: Skin is warm and dry.      Findings: Erythema (b/l LE, chronic ) and rash (right arm, improving) present.      Nails: There is no clubbing.   Neurological:      General: No focal deficit present.      Mental Status: He is alert and oriented to person, place, and time. Mental status is at baseline.      Cranial Nerves: No cranial nerve deficit.      Motor: No abnormal muscle tone.   Psychiatric:         Mood and Affect: Mood normal.         Behavior: Behavior normal.         Fluids    Intake/Output Summary (Last 24 hours) at 11/15/2021 1135  Last data filed at 11/15/2021 0949  Gross per 24 hour   Intake --   Output 3900 ml   Net -3900 ml       Laboratory          Recent Labs     11/13/21  0311 11/14/21  0354 11/15/21  0343   INR 2.08* 1.90* 1.76*               Imaging  DX-CHEST-LIMITED (1 VIEW)   Final Result      Improving basilar aeration with increase in lung volumes following extubation      Continued moderate bilateral  consolidation could be from edema and/or infection      DX-CHEST-PORTABLE (1 VIEW)   Final Result      1.  No significant interval change.         DX-CHEST-PORTABLE (1 VIEW)   Final Result      1.  Probable mild worsening pulmonary edema.   2.  Increased bibasilar atelectasis.   3.  Cannot exclude trace pleural effusions.      DX-ABDOMEN FOR TUBE PLACEMENT   Final Result      Enteric tube tip projects over the stomach      IR-PICC LINE PLACEMENT W/ GUIDANCE > AGE 5   Final Result                  Ultrasound-guided PICC placement performed by qualified nursing staff as    above.          DX-CHEST-PORTABLE (1 VIEW)   Final Result      1.  Interval intubation   2.  BILATERAL atelectasis with possible superimposed pulmonary edema or pneumonia, unchanged      DX-CHEST-LIMITED (1 VIEW)   Final Result      Worsening diffuse hazy opacity compatible with edema or infection      EC-ECHOCARDIOGRAM COMPLETE W/ CONT   Final Result      CT-ABDOMEN-PELVIS WITH   Final Result         Limited exam due to body habitus.      1. There is skin thickening and subcutaneous edema in the low anterior abdominal and pelvic wall panus. This could relate to fluid overload but infection and cellulitis can have similar appearance. Correlate clinically.      2. Mild perihepatic ascites.      3. Diffuse scrotal edema and moderate hydrocele.         CT-CTA CHEST PULMONARY ARTERY W/ RECONS   Final Result         1. No CT evidence of pulmonary embolism.      2. Mild hazy groundglass opacity throughout both lungs could relate to mild fluid overload.      3. Cardiomegaly.      4. No airspace consolidation. Linear right midlung atelectasis.         DX-CHEST-PORTABLE (1 VIEW)   Final Result         Stable cardiomegaly.      Mild interstitial prominence could relate to mild fluid overload.           Assessment/Plan  * Acute on chronic respiratory failure with hypoxia and hypercapnia (HCC)- (present on admission)  Assessment & Plan  Now on oxymask, continue  to wean, home O2 eval when appropriate   Continue diuresis  Continue nocturnal BiPAP    Urinary retention  Assessment & Plan  With difficult Matthew catheter placement by urology    Started on Flomax  Early a.m. voiding trial later this week prior to discharge    Cellulitis  Assessment & Plan  Versus chronic stasis dermatitis    Finished course of keflex   Continue skin care and clinical monitoring    Volume overload- (present on admission)  Assessment & Plan        History of pulmonary embolism- (present on admission)  Assessment & Plan  Continue warfarin    Chronic anticoagulation- (present on admission)  Assessment & Plan  History of PE and DVT, had a trial on Xarelto developed DVT  Continue anticoagulation with warfarin and monitor INR    Pulmonary embolism with acute cor pulmonale (HCC)- (present on admission)  Assessment & Plan  History of PE    Continue anticoagulation with warfarin and monitor INR    Pulmonary hypertension (HCC)- (present on admission)  Assessment & Plan  RVSP 45 mmHg  On anticoagulation for prior history of PE  CTA on 11/1/2021 - for acute PE  Likely underlying severe sleep apnea  Continue BiPAP therapy at bedtime  Continue diuresis monitor intake and output and renal function electrolytes with diuresis    JONES (obstructive sleep apnea)- (present on admission)  Assessment & Plan  Continue nocturnal BiPAP  Pulmonology consulted, ordered noninvasive ventilator for home until patient can have outpatient sleep study    Lower extremity edema- (present on admission)  Assessment & Plan  Continue diuresis    Morbid obesity (HCC)- (present on admission)  Assessment & Plan  Body mass index is 69.25 kg/m².    Asthma- (present on admission)  Assessment & Plan  No acute exacerbation   bronchodilators as needed per RT protocol           VTE prophylaxis: therapeutic anticoagulation with Warfarin    I have performed a physical exam and reviewed and updated ROS and Plan today (11/15/2021). In review of  yesterday's note (11/14/2021), there are no changes except as documented above.

## 2021-11-15 NOTE — PROGRESS NOTES
Bedside report received from nurse. Assumed care of pt. Pt resting comfortably in bed. A/Ox4, VSS,  All needs met. POC reviewed and white board updated. Pt is medical. Call light in reach. Bed locked in lowest position with 2 upper bed rails up. No pain or complaints. Pt on NC    covid-19 surge in effect

## 2021-11-15 NOTE — PROGRESS NOTES
Received report and assumed care of patient. Patient is A & O x 4 and awake in bed. Patient is in no signs of distress, VSS on BiPAP and denies pain at this time. Patient was updated on the plan of care for the day. Call light within reach, bed in low position, 2 side rails up. All fall precautions in place. Patient is medical. Will continue to monitor

## 2021-11-15 NOTE — CARE PLAN
The patient is Watcher - Medium risk of patient condition declining or worsening    Shift Goals  Clinical Goals: monitor O2 levels, improving rash, comfort  Patient Goals: rest, comfort, less itching  Family Goals: N/A    Progress made toward(s) clinical / shift goals:    Problem: Knowledge Deficit - Standard  Goal: Patient and family/care givers will demonstrate understanding of plan of care, disease process/condition, diagnostic tests and medications  Outcome: Progressing     Problem: Respiratory  Goal: Patient will achieve/maintain optimum respiratory ventilation and gas exchange  Outcome: Progressing     Problem: Skin Integrity  Goal: Skin integrity is maintained or improved  Outcome: Progressing   Pt says rash if feeling better after cream, states that he is allergic to the CHG wipes    Patient is not progressing towards the following goals:

## 2021-11-16 LAB
ANION GAP SERPL CALC-SCNC: 9 MMOL/L (ref 7–16)
BASOPHILS # BLD AUTO: 1.4 % (ref 0–1.8)
BASOPHILS # BLD: 0.07 K/UL (ref 0–0.12)
BUN SERPL-MCNC: 14 MG/DL (ref 8–22)
CALCIUM SERPL-MCNC: 9.4 MG/DL (ref 8.5–10.5)
CHLORIDE SERPL-SCNC: 96 MMOL/L (ref 96–112)
CO2 SERPL-SCNC: 31 MMOL/L (ref 20–33)
CREAT SERPL-MCNC: 0.47 MG/DL (ref 0.5–1.4)
EOSINOPHIL # BLD AUTO: 0.17 K/UL (ref 0–0.51)
EOSINOPHIL NFR BLD: 3.3 % (ref 0–6.9)
ERYTHROCYTE [DISTWIDTH] IN BLOOD BY AUTOMATED COUNT: 69.6 FL (ref 35.9–50)
GLUCOSE SERPL-MCNC: 99 MG/DL (ref 65–99)
HCT VFR BLD AUTO: 50.2 % (ref 42–52)
HGB BLD-MCNC: 14 G/DL (ref 14–18)
IMM GRANULOCYTES # BLD AUTO: 0.01 K/UL (ref 0–0.11)
IMM GRANULOCYTES NFR BLD AUTO: 0.2 % (ref 0–0.9)
INR PPP: 1.91 (ref 0.87–1.13)
LYMPHOCYTES # BLD AUTO: 1.43 K/UL (ref 1–4.8)
LYMPHOCYTES NFR BLD: 28.1 % (ref 22–41)
MAGNESIUM SERPL-MCNC: 1.7 MG/DL (ref 1.5–2.5)
MCH RBC QN AUTO: 23.4 PG (ref 27–33)
MCHC RBC AUTO-ENTMCNC: 27.9 G/DL (ref 33.7–35.3)
MCV RBC AUTO: 83.8 FL (ref 81.4–97.8)
MONOCYTES # BLD AUTO: 0.87 K/UL (ref 0–0.85)
MONOCYTES NFR BLD AUTO: 17.1 % (ref 0–13.4)
NEUTROPHILS # BLD AUTO: 2.54 K/UL (ref 1.82–7.42)
NEUTROPHILS NFR BLD: 49.9 % (ref 44–72)
NRBC # BLD AUTO: 0 K/UL
NRBC BLD-RTO: 0 /100 WBC
PLATELET # BLD AUTO: 244 K/UL (ref 164–446)
PMV BLD AUTO: 11 FL (ref 9–12.9)
POTASSIUM SERPL-SCNC: 4.2 MMOL/L (ref 3.6–5.5)
PROTHROMBIN TIME: 21.3 SEC (ref 12–14.6)
RBC # BLD AUTO: 5.99 M/UL (ref 4.7–6.1)
SODIUM SERPL-SCNC: 136 MMOL/L (ref 135–145)
WBC # BLD AUTO: 5.1 K/UL (ref 4.8–10.8)

## 2021-11-16 PROCEDURE — 85610 PROTHROMBIN TIME: CPT

## 2021-11-16 PROCEDURE — 36415 COLL VENOUS BLD VENIPUNCTURE: CPT

## 2021-11-16 PROCEDURE — 94660 CPAP INITIATION&MGMT: CPT

## 2021-11-16 PROCEDURE — 83735 ASSAY OF MAGNESIUM: CPT

## 2021-11-16 PROCEDURE — 700111 HCHG RX REV CODE 636 W/ 250 OVERRIDE (IP): Performed by: INTERNAL MEDICINE

## 2021-11-16 PROCEDURE — 770001 HCHG ROOM/CARE - MED/SURG/GYN PRIV*

## 2021-11-16 PROCEDURE — 700102 HCHG RX REV CODE 250 W/ 637 OVERRIDE(OP): Performed by: INTERNAL MEDICINE

## 2021-11-16 PROCEDURE — 700102 HCHG RX REV CODE 250 W/ 637 OVERRIDE(OP): Performed by: STUDENT IN AN ORGANIZED HEALTH CARE EDUCATION/TRAINING PROGRAM

## 2021-11-16 PROCEDURE — 80048 BASIC METABOLIC PNL TOTAL CA: CPT

## 2021-11-16 PROCEDURE — 85025 COMPLETE CBC W/AUTO DIFF WBC: CPT

## 2021-11-16 PROCEDURE — A9270 NON-COVERED ITEM OR SERVICE: HCPCS | Performed by: STUDENT IN AN ORGANIZED HEALTH CARE EDUCATION/TRAINING PROGRAM

## 2021-11-16 PROCEDURE — A9270 NON-COVERED ITEM OR SERVICE: HCPCS | Performed by: INTERNAL MEDICINE

## 2021-11-16 PROCEDURE — 700102 HCHG RX REV CODE 250 W/ 637 OVERRIDE(OP): Performed by: HOSPITALIST

## 2021-11-16 PROCEDURE — A9270 NON-COVERED ITEM OR SERVICE: HCPCS | Performed by: HOSPITALIST

## 2021-11-16 PROCEDURE — 94760 N-INVAS EAR/PLS OXIMETRY 1: CPT

## 2021-11-16 PROCEDURE — 99233 SBSQ HOSP IP/OBS HIGH 50: CPT | Performed by: INTERNAL MEDICINE

## 2021-11-16 RX ORDER — WARFARIN SODIUM 7.5 MG/1
7.5 TABLET ORAL
Status: DISCONTINUED | OUTPATIENT
Start: 2021-11-16 | End: 2021-11-23

## 2021-11-16 RX ORDER — WARFARIN SODIUM 5 MG/1
5 TABLET ORAL
Status: DISCONTINUED | OUTPATIENT
Start: 2021-11-17 | End: 2021-11-23

## 2021-11-16 RX ADMIN — FUROSEMIDE 60 MG: 10 INJECTION, SOLUTION INTRAMUSCULAR; INTRAVENOUS at 05:49

## 2021-11-16 RX ADMIN — SENNOSIDES AND DOCUSATE SODIUM 2 TABLET: 50; 8.6 TABLET ORAL at 05:49

## 2021-11-16 RX ADMIN — TAMSULOSIN HYDROCHLORIDE 0.4 MG: 0.4 CAPSULE ORAL at 07:54

## 2021-11-16 RX ADMIN — SENNOSIDES AND DOCUSATE SODIUM 2 TABLET: 50; 8.6 TABLET ORAL at 17:32

## 2021-11-16 RX ADMIN — HYDROCORTISONE: 10 CREAM TOPICAL at 06:22

## 2021-11-16 RX ADMIN — WARFARIN SODIUM 7.5 MG: 7.5 TABLET ORAL at 17:32

## 2021-11-16 RX ADMIN — MULTIPLE VITAMINS W/ MINERALS TAB 1 TABLET: TAB at 05:49

## 2021-11-16 RX ADMIN — HYDROCORTISONE: 10 CREAM TOPICAL at 17:32

## 2021-11-16 ASSESSMENT — ENCOUNTER SYMPTOMS
NAUSEA: 0
FEVER: 0
ABDOMINAL PAIN: 0
COUGH: 0
MYALGIAS: 0
VOMITING: 0
CHILLS: 0
SHORTNESS OF BREATH: 0

## 2021-11-16 ASSESSMENT — PAIN DESCRIPTION - PAIN TYPE: TYPE: ACUTE PAIN

## 2021-11-16 NOTE — CARE PLAN
The patient is Stable - Low risk of patient condition declining or worsening    Shift Goals  Clinical Goals: monitor o2 levels  Patient Goals: rest and comfort  Family Goals: n/a    Progress made toward(s) clinical / shift goals:    Problem: Knowledge Deficit - Standard  Goal: Patient and family/care givers will demonstrate understanding of plan of care, disease process/condition, diagnostic tests and medications  Outcome: Progressing     Problem: Respiratory  Goal: Patient will achieve/maintain optimum respiratory ventilation and gas exchange  Outcome: Progressing     Problem: Skin Integrity  Goal: Skin integrity is maintained or improved  Outcome: Progressing

## 2021-11-16 NOTE — DISCHARGE PLANNING
Anticipated Discharge Disposition: Home with H.H., oxygen, and DME: ventilator     Action: Received call from Rep at [facility]; oxygen delivered but the insurance auth (Ref# [id number]) is still pending regarding MD Ross's order from 11/10/21. [facility] gave this RN CM the number to [facility] ([phone]) who gave [facility] CM, Lu [phone], to follow-up: detailed voicemail left for Lu and requesting callback.      Barriers to Discharge: DME Insurance approval and delivery     Plan: Follow up with Lu at [facility].

## 2021-11-16 NOTE — DISCHARGE PLANNING
Received Choice form at [phone]  Agency/Facility Name: [facility]   Referral sent per Choice form @ [phone]

## 2021-11-16 NOTE — PROGRESS NOTES
Hospital Medicine Daily Progress Note    Date of Service  11/16/2021    Chief Complaint  Michael Ontiveros is a 31 y.o. male admitted 11/1/2021 with edema    Hospital Course:    31-year-old male with past medical history of pulmonary embolism and DVT on Coumadin, pulmonary hypertension, JONES, mild intermittent asthma, and morbid obesity with a BMI of 69 who was admitted on 11/1/2021 with bilateral lower extremity swelling, patient had same admission in 7/2021, patient was transferred to ICU for BiPAP however he ended intubated on 11/3 and extubated on 11/5, CTA on admission did not show any PE, echo showed normal ejection fraction of 60% with pulmonary hypertension and right ventricle pressure around 45 mmHg, patient also have hypercapnia, Lasix was started, patient was evaluated by pulmonologist and recommended CPAP.    Home oxygen eval showed the patient needs 24/7 oxygen therapy around 6 L.    He had difficult Matthew placement requiring urology consultation with cystoscopy for placement, voiding trial was done on 11/16    He is set up to have a sleep study on 11/8/2021 for evaluation for BiPAP therapy.     Interval Problem Update  -Evaluate and examined the patient at bedside, patient feels better  -Patient is on 6 L nasal cannula, patient's been using CPAP during the night  -Patient received home oxygen however was not able to get CPAP before sleep study, the case was discussed with pulmonologist Dr. Ross who recommended CPAP machine before discharge.  -Voiding trial today follow-up with bladder scan    I have personally seen and examined the patient at bedside. I discussed the plan of care with patient, bedside RN, charge RN,  and pharmacy.    Consultants/Specialty  critical care and pulmonary    Code Status  Full Code    Disposition  Patient is not medically cleared.  Patient needs CPAP machine before discharge  Voiding trial before discharge  Anticipate discharge to home.  I have placed the  appropriate orders for post-discharge needs.    Review of Systems  Review of Systems   Constitutional: Negative for chills, fever and malaise/fatigue.   Respiratory: Negative for cough and shortness of breath.    Cardiovascular: Positive for leg swelling (chronic).   Gastrointestinal: Negative for abdominal pain, nausea and vomiting.   Musculoskeletal: Negative for myalgias.   Skin: Positive for rash.   All other systems reviewed and are negative.       Physical Exam  Temp:  [35.8 °C (96.5 °F)-37.1 °C (98.8 °F)] 36.4 °C (97.5 °F)  Pulse:  [87-98] 95  Resp:  [16-18] 16  BP: (116-125)/(65-85) 116/65  SpO2:  [90 %-93 %] 91 %    Physical Exam  Vitals and nursing note reviewed.   Constitutional:       General: He is not in acute distress.     Appearance: He is well-developed. He is obese. He is not ill-appearing.   HENT:      Head: Normocephalic and atraumatic.   Eyes:      Conjunctiva/sclera: Conjunctivae normal.   Neck:      Vascular: No JVD.      Trachea: No tracheal deviation.   Cardiovascular:      Rate and Rhythm: Normal rate and regular rhythm.      Heart sounds: No murmur heard.      Pulmonary:      Effort: Pulmonary effort is normal. No respiratory distress.      Breath sounds: No wheezing.      Comments: On oxymask   Abdominal:      General: Bowel sounds are normal. There is no distension.      Palpations: Abdomen is soft.      Tenderness: There is no abdominal tenderness.   Musculoskeletal:         General: Swelling present. No tenderness.      Cervical back: Neck supple.   Skin:     General: Skin is warm and dry.      Findings: Erythema (b/l LE, chronic ) and rash (right arm, improving) present.      Nails: There is no clubbing.   Neurological:      General: No focal deficit present.      Mental Status: He is alert and oriented to person, place, and time. Mental status is at baseline.      Cranial Nerves: No cranial nerve deficit.      Motor: No abnormal muscle tone.   Psychiatric:         Mood and Affect:  Mood normal.         Behavior: Behavior normal.         Fluids    Intake/Output Summary (Last 24 hours) at 11/16/2021 1338  Last data filed at 11/16/2021 1000  Gross per 24 hour   Intake 480 ml   Output 1300 ml   Net -820 ml       Laboratory  Recent Labs     11/16/21  1239   WBC 5.1   RBC 5.99   HEMOGLOBIN 14.0   HEMATOCRIT 50.2   MCV 83.8   MCH 23.4*   MCHC 27.9*   RDW 69.6*   PLATELETCT 244   MPV 11.0     Recent Labs     11/16/21  1239   SODIUM 136   POTASSIUM 4.2   CHLORIDE 96   CO2 31   GLUCOSE 99   BUN 14   CREATININE 0.47*   CALCIUM 9.4     Recent Labs     11/14/21  0354 11/15/21  0343 11/16/21  0409   INR 1.90* 1.76* 1.91*               Imaging  DX-CHEST-LIMITED (1 VIEW)   Final Result      Improving basilar aeration with increase in lung volumes following extubation      Continued moderate bilateral consolidation could be from edema and/or infection      DX-CHEST-PORTABLE (1 VIEW)   Final Result      1.  No significant interval change.         DX-CHEST-PORTABLE (1 VIEW)   Final Result      1.  Probable mild worsening pulmonary edema.   2.  Increased bibasilar atelectasis.   3.  Cannot exclude trace pleural effusions.      DX-ABDOMEN FOR TUBE PLACEMENT   Final Result      Enteric tube tip projects over the stomach      IR-PICC LINE PLACEMENT W/ GUIDANCE > AGE 5   Final Result                  Ultrasound-guided PICC placement performed by qualified nursing staff as    above.          DX-CHEST-PORTABLE (1 VIEW)   Final Result      1.  Interval intubation   2.  BILATERAL atelectasis with possible superimposed pulmonary edema or pneumonia, unchanged      DX-CHEST-LIMITED (1 VIEW)   Final Result      Worsening diffuse hazy opacity compatible with edema or infection      EC-ECHOCARDIOGRAM COMPLETE W/ CONT   Final Result      CT-ABDOMEN-PELVIS WITH   Final Result         Limited exam due to body habitus.      1. There is skin thickening and subcutaneous edema in the low anterior abdominal and pelvic wall panus. This  could relate to fluid overload but infection and cellulitis can have similar appearance. Correlate clinically.      2. Mild perihepatic ascites.      3. Diffuse scrotal edema and moderate hydrocele.         CT-CTA CHEST PULMONARY ARTERY W/ RECONS   Final Result         1. No CT evidence of pulmonary embolism.      2. Mild hazy groundglass opacity throughout both lungs could relate to mild fluid overload.      3. Cardiomegaly.      4. No airspace consolidation. Linear right midlung atelectasis.         DX-CHEST-PORTABLE (1 VIEW)   Final Result         Stable cardiomegaly.      Mild interstitial prominence could relate to mild fluid overload.           Assessment/Plan  * Pulmonary hypertension (HCC)- (present on admission)  Assessment & Plan  Likely related to obstructive sleep apnea   RVSP 45 mmHg  On anticoagulation for prior history of PE  CTA on 11/1/2021 - for acute PE  Continue BiPAP therapy at bedtime  Continue diuresis monitor intake and output and renal function electrolytes with diuresis    JONES (obstructive sleep apnea)- (present on admission)  Assessment & Plan  Continue nocturnal BiPAP  Pulmonology consulted, ordered noninvasive ventilator for home until patient can have outpatient sleep study    Cellulitis  Assessment & Plan  Versus chronic stasis dermatitis  Finished course of keflex   Continue skin care and clinical monitoring    Volume overload- (present on admission)  Assessment & Plan        History of pulmonary embolism- (present on admission)  Assessment & Plan  Continue warfarin    Chronic anticoagulation- (present on admission)  Assessment & Plan  History of PE and DVT, had a trial on Xarelto developed DVT  Continue anticoagulation with warfarin and monitor INR    Acute on chronic respiratory failure with hypoxia and hypercapnia (HCC)- (present on admission)  Assessment & Plan  Now on oxymask, continue to wean, home O2 eval when appropriate   Continue diuresis  Continue nocturnal BiPAP    Lower  extremity edema- (present on admission)  Assessment & Plan  Continue diuresis    Morbid obesity (HCC)- (present on admission)  Assessment & Plan  Body mass index is 69.25 kg/m².    Asthma- (present on admission)  Assessment & Plan  No acute exacerbation   bronchodilators as needed per RT protocol        Urinary retention  Assessment & Plan  With difficult Matthew catheter placement by urology  Started on Flomax  Voiding trial on 11/16 follow-up with bladder scan    Pulmonary embolism with acute cor pulmonale (HCC)- (present on admission)  Assessment & Plan  History of PE  Continue anticoagulation with warfarin and monitor INR       VTE prophylaxis: therapeutic anticoagulation with Warfarin

## 2021-11-16 NOTE — DISCHARGE PLANNING
Anticipated Discharge Disposition: Home with HH and O2    Action: Lsw received call from Rashida with [facility] who stated that pt's O2 will be delivered today, but ventilator is still pending insurance auth. Rashida asked if pt would be able to dc home with O2 and ventilator to be delivered once accepted. Lsw stated they will clarify with MD and RNCM. Lsw updated RNCM.    Barriers to Discharge: O2 and HH    Plan: continue to follow

## 2021-11-16 NOTE — PROGRESS NOTES
Inpatient Anticoagulation Service Note    Date: 11/15/2021    Reason for Anticoagulation: Pulmonary Embolism,Deep Vein Thrombosis   Target INR: 2.0 to 3.0         Hemoglobin Value: 14  Hematocrit Value: (!) 53.2  Lab Platelet Value: 253    INR from last 7 days     Date/Time INR Value    11/15/21 0343 1.76    11/14/21 0354 1.9    11/13/21 0311 2.08    11/12/21 0323 2.1    11/11/21 0345 2.1    11/10/21 0256 1.84    11/09/21 02:20:01 2.08        Dose from last 7 days     Date/Time Dose (mg)    11/15/21 1654 10    11/14/21 1106 7.5    11/13/21 1407 5    11/12/21 0916 5    11/11/21 1314 5    11/09/21 1128 3.75        Average Dose (mg):  (7.5 SunTuesThurs, 5mg AOD)  Significant Interactions: Not Applicable  Bridge Therapy: No     Reversal Agent Administered: Not Applicable  Comments: INR continues to drop despite increasing dose to warfarin 7.5mg. Will increase to warfarin 10mg tonight and recheck INR tomorrow. No new h/h and no s/sx of bleeding per chart review. No DDI noted. Pharmacy will contine to monitor.    Plan:  Warfarin 10mg po once. Repeat INR tomorrow   Education Material Provided?: Yes  Pharmacist suggested discharge dosing: Suggest 7.5mg SunTuesThurs and 5mg all other days. Patient has already established with the outpatient AC clinic. Recommend patient follow up with clinic within 48hrs of discharge for repeat INR.     Mary Tang, PharmD

## 2021-11-16 NOTE — FACE TO FACE
"Face to Face Note  -  Durable Medical Equipment    Cisco Watkins M.D. - NPI: [id number]  I certify that this patient is under my care and that they had a durable medical equipment(DME)face to face encounter by myself that meets the physician DME face-to-face encounter requirements with this patient on:    Date of encounter:   Patient:                    MRN:                       YOB: 2021  Michael Ontiveros  [id number]  1990     The encounter with the patient was in whole, or in part, for the following medical condition, which is the primary reason for durable medical equipment:  JONES    I certify that, based on my findings, the following durable medical equipment is medically necessary:  Oxygen.    HOME O2 Saturation Measurements:(Values must be present for Home Oxygen orders)  Room air sat at rest: 89  Room air sat with amb: 82  With liters of O2: 6, O2 sat at rest with O2: 93  With Liters of O2: 6, O2 sat with amb with O2 : 90  Is the patient mobile?: Yes    My Clinical findings support the need for the above equipment due to:  Hypoxia    Supporting Symptoms: The patient requires supplemental oxygen, as the following interventions have been tried with limited or no improvement: \"Bronchodilators and/or steroid inhalers    If patient feels more short of breath, they can go up to 6 liters per minute and contact healthcare provider.  "

## 2021-11-16 NOTE — FACE TO FACE
Face to Face Supporting Documentation - Home Health    The encounter with this patient was in whole or in part the primary reason for home health admission.    Date of encounter:   Patient:                    MRN:                       YOB: 2021  Michael Ontiveros  [id number]  1990     Home health to see patient for:  Skilled Nursing care for assessment, interventions & education, Physical Therapy evaluation and treatment and Occupational therapy evaluation and treatment    Skilled need for:  Exacerbation of Chronic Disease State JONES    Skilled nursing interventions to include:  Line/Drain/Airway education and care    Homebound status evidenced by:  Need the aid of supportive devices such as crutches, canes, wheelchairs or walkers. Leaving home requires a considerable and taxing effort. There is a normal inability to leave the home.    Community Physician to provide follow up care: Shawnee Junior P.A.-C.     Optional Interventions? No      I certify the face to face encounter for this home health care referral meets the CMS requirements and the encounter/clinical assessment with the patient was, in whole, or in part, for the medical condition(s) listed above, which is the primary reason for home health care. Based on my clinical findings: the service(s) are medically necessary, support the need for home health care, and the homebound criteria are met.  I certify that this patient has had a face to face encounter by myself.  Cisco Watkins M.D. - NPI: [id number]

## 2021-11-16 NOTE — CARE PLAN
The patient is Stable - Low risk of patient condition declining or worsening    Shift Goals  Clinical Goals: wean oxygen  Patient Goals: Remove catheter  Family Goals: n/a    Progress made toward(s) clinical / shift goals:  Weaned to 6 liters of oxygen. Voiding trial this shift. Home oxygen eval done.     Patient is not progressing towards the following goals:      Problem: Knowledge Deficit - Standard  Goal: Patient and family/care givers will demonstrate understanding of plan of care, disease process/condition, diagnostic tests and medications  Outcome: Progressing     Problem: Respiratory  Goal: Patient will achieve/maintain optimum respiratory ventilation and gas exchange  Outcome: Progressing  Note: Weaned patient to 6 liters nasal cannula     Problem: Skin Integrity  Goal: Skin integrity is maintained or improved  Outcome: Progressing

## 2021-11-16 NOTE — DISCHARGE PLANNING
Received Choice form at 8035  Agency/Facility Name: [facility]   Referral sent per Choice form @ [phone]

## 2021-11-16 NOTE — PROGRESS NOTES
Pulmonary Progress Note    Date of Service: 11/15/2021    Date of Admission:  11/1/2021  8:44 AM    Chief Complaint:  Abdominal Swelling (increasing. pt not on any diuretics), Groin Swelling, and Testicle Swelling      History of Present Illness/Hospital Course: Michael Ontiveros is a 31 y.o. 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 He was subsequently extubated to Bipap. He was consulted by Pulmonology for nocturnal non invasive ventilation requirement.     Interval update:   Patient is currently on 9L of oxygen. He is feeling much better than before and is sitting in the couch. He is net negative of 29L since admission.     Review of Systems   Constitutional: Negative for chills, fever and weight loss.   HENT: Negative for ear pain, hearing loss and tinnitus.    Eyes: Negative for blurred vision, double vision and photophobia.   Respiratory: Negative for cough, hemoptysis and sputum production.    Cardiovascular: Negative for chest pain, palpitations and orthopnea.   Gastrointestinal: Negative for abdominal pain, nausea and vomiting.   Genitourinary: Negative for dysuria, frequency and urgency.   Musculoskeletal: Negative for back pain, myalgias and neck pain.   Skin: Negative for itching and rash.   Neurological: Negative for dizziness, tingling and headaches.   Psychiatric/Behavioral: Negative for depression and suicidal ideas.       Home Medications     Reviewed by Kim Guardado (Pharmacy Tech) on 11/01/21 at 1059  Med List Status: Complete   Medication Last Dose Status   acetaminophen (TYLENOL) 500 MG Tab 11/1/2021 Active   albuterol 108 (90 Base) MCG/ACT Aero Soln inhalation aerosol 11/1/2021 Active   enoxaparin (LOVENOX) 150 MG/ML Solution New RX Active   Multiple Vitamins-Minerals (MENS MULTIVITAMIN) Tab  "2~3 days ago Active   warfarin (COUMADIN) 7.5 MG Tab 10/31/2021 Active                Social History     Tobacco Use   • Smoking status: Never Smoker   • Smokeless tobacco: Never Used   Vaping Use   • Vaping Use: Never used   Substance Use Topics   • Alcohol use: Not Currently     Comment: rare   • Drug use: No        Past Medical History:   Diagnosis Date   • Asthma    • Blood clotting disorder (HCC)    • Chickenpox    • Eczema    • Hypertension    • Influenza    • Tonsillitis        Past Surgical History:   Procedure Laterality Date   • DENTAL EXTRACTION(S)  11/3/2017    Procedure: DENTAL EXTRACTION(S);  Surgeon: Olman Sparks D.D.S.;  Location: SURGERY [facility];  Service: Oral Surgery       Allergies: [place] (diagnostic), Pistachio, [place], and Tree nuts food allergy    Family History   Problem Relation Age of Onset   • Respiratory Disease Mother    • Diabetes Paternal Aunt        Pulmonary-Specific Vital Signs  Vitals  Blood Pressure: 125/85  Temperature: 37.1 °C (98.8 °F)  Temp src: Temporal  Pulse: 97  Respiration: 16  Pulse Oximetry: 91 %  Height: 172.7 cm (5' 8\")  Weight: (!) 171 kg (376 lb 1.7 oz)    Physical Examination  Physical Exam  Constitutional:       Appearance: Normal appearance.   HENT:      Head: Normocephalic and atraumatic.      Nose: Nose normal.      Mouth/Throat:      Mouth: Mucous membranes are moist.   Eyes:      Extraocular Movements: Extraocular movements intact.      Pupils: Pupils are equal, round, and reactive to light.   Cardiovascular:      Rate and Rhythm: Normal rate and regular rhythm.      Pulses: Normal pulses.      Heart sounds: Normal heart sounds.   Pulmonary:      Effort: Pulmonary effort is normal.      Breath sounds: Normal breath sounds.   Abdominal:      Palpations: Abdomen is soft.   Musculoskeletal:         General: Normal range of motion.      Cervical back: Normal range of motion.   Skin:     General: Skin is warm.   Neurological:      General: No " focal deficit present.      Mental Status: He is alert and oriented to person, place, and time.             Intake/Output Summary (Last 24 hours) at 11/15/2021 1707  Last data filed at 11/15/2021 1438  Gross per 24 hour   Intake 240 ml   Output 3200 ml   Net -2960 ml           No results for input(s): SODIUM, POTASSIUM, CHLORIDE, CO2, BUN, CREATININE, MAGNESIUM, PHOSPHORUS, CALCIUM in the last 72 hours.  No results for input(s): ALTSGPT, ASTSGOT, ALKPHOSPHAT, TBILIRUBIN, DBILIRUBIN, GAMMAGT, AMYLASE, LIPASE, ALB, PREALBUMIN, GLUCOSE in the last 72 hours.      DX-CHEST-LIMITED (1 VIEW)   Final Result      Improving basilar aeration with increase in lung volumes following extubation      Continued moderate bilateral consolidation could be from edema and/or infection      DX-CHEST-PORTABLE (1 VIEW)   Final Result      1.  No significant interval change.         DX-CHEST-PORTABLE (1 VIEW)   Final Result      1.  Probable mild worsening pulmonary edema.   2.  Increased bibasilar atelectasis.   3.  Cannot exclude trace pleural effusions.      DX-ABDOMEN FOR TUBE PLACEMENT   Final Result      Enteric tube tip projects over the stomach      IR-PICC LINE PLACEMENT W/ GUIDANCE > AGE 5   Final Result                  Ultrasound-guided PICC placement performed by qualified nursing staff as    above.          DX-CHEST-PORTABLE (1 VIEW)   Final Result      1.  Interval intubation   2.  BILATERAL atelectasis with possible superimposed pulmonary edema or pneumonia, unchanged      DX-CHEST-LIMITED (1 VIEW)   Final Result      Worsening diffuse hazy opacity compatible with edema or infection      EC-ECHOCARDIOGRAM COMPLETE W/ CONT   Final Result      CT-ABDOMEN-PELVIS WITH   Final Result         Limited exam due to body habitus.      1. There is skin thickening and subcutaneous edema in the low anterior abdominal and pelvic wall panus. This could relate to fluid overload but infection and cellulitis can have similar appearance.  Correlate clinically.      2. Mild perihepatic ascites.      3. Diffuse scrotal edema and moderate hydrocele.         CT-CTA CHEST PULMONARY ARTERY W/ RECONS   Final Result         1. No CT evidence of pulmonary embolism.      2. Mild hazy groundglass opacity throughout both lungs could relate to mild fluid overload.      3. Cardiomegaly.      4. No airspace consolidation. Linear right midlung atelectasis.         DX-CHEST-PORTABLE (1 VIEW)   Final Result         Stable cardiomegaly.      Mild interstitial prominence could relate to mild fluid overload.          Patient Active Problem List   Diagnosis   • Asthma   • Morbid obesity (HCC)   • Lower extremity edema   • Asthma with exacerbation   • Acute on chronic respiratory failure with hypoxia and hypercapnia (HCC)   • Hyperglycemia   • JONES (obstructive sleep apnea)   • Type 2 myocardial infarction (HCC)   • Pulmonary hypertension (HCC)   • Pulmonary embolism with acute cor pulmonale (HCC)   • Multiple subsegmental pulmonary emboli without acute cor pulmonale (HCC)   • Chronic anticoagulation   • History of pulmonary embolism   • Cellulitis of right lower extremity   • Volume overload   • Scrotal swelling   • Cardiomegaly   • Cellulitis   • Urinary retention       Assessment and Recommendations:  1. Acute hypoxic hypercapnic respiratory failure   2. Extra thoracic obstruction from restrictive lung disease   3. PE/DVT  4. Obesity     -Patient is improved from before and is on 9L of oxygen currently. He is using Bipap at night.   -Prescription for nocturnal non invasive ventilation was given last week. Will follow up with case management in regards to that.   -Titrate oxygen levels based on clinical status  -Further disposition as per primary team

## 2021-11-16 NOTE — PROGRESS NOTES
Bedside report received from nurse. Assumed care of pt. Pt resting comfortably in bed. A/Ox4, VSS,  All needs met. POC reviewed and white board updated.Pt is medical. Call light in reach. Bed locked in lowest position with 2 upper bed rails up. No pain or complaints. Pt on 9L    covid-19 surge in effect

## 2021-11-17 LAB
INR PPP: 2.24 (ref 0.87–1.13)
PROTHROMBIN TIME: 24.1 SEC (ref 12–14.6)

## 2021-11-17 PROCEDURE — 94660 CPAP INITIATION&MGMT: CPT

## 2021-11-17 PROCEDURE — A9270 NON-COVERED ITEM OR SERVICE: HCPCS | Performed by: HOSPITALIST

## 2021-11-17 PROCEDURE — 700102 HCHG RX REV CODE 250 W/ 637 OVERRIDE(OP): Performed by: HOSPITALIST

## 2021-11-17 PROCEDURE — 36415 COLL VENOUS BLD VENIPUNCTURE: CPT

## 2021-11-17 PROCEDURE — 700111 HCHG RX REV CODE 636 W/ 250 OVERRIDE (IP): Performed by: INTERNAL MEDICINE

## 2021-11-17 PROCEDURE — 700102 HCHG RX REV CODE 250 W/ 637 OVERRIDE(OP): Performed by: STUDENT IN AN ORGANIZED HEALTH CARE EDUCATION/TRAINING PROGRAM

## 2021-11-17 PROCEDURE — A9270 NON-COVERED ITEM OR SERVICE: HCPCS | Performed by: STUDENT IN AN ORGANIZED HEALTH CARE EDUCATION/TRAINING PROGRAM

## 2021-11-17 PROCEDURE — A9270 NON-COVERED ITEM OR SERVICE: HCPCS | Performed by: INTERNAL MEDICINE

## 2021-11-17 PROCEDURE — 770001 HCHG ROOM/CARE - MED/SURG/GYN PRIV*

## 2021-11-17 PROCEDURE — 99232 SBSQ HOSP IP/OBS MODERATE 35: CPT | Performed by: INTERNAL MEDICINE

## 2021-11-17 PROCEDURE — 85610 PROTHROMBIN TIME: CPT

## 2021-11-17 PROCEDURE — 700102 HCHG RX REV CODE 250 W/ 637 OVERRIDE(OP): Performed by: INTERNAL MEDICINE

## 2021-11-17 RX ADMIN — WARFARIN SODIUM 5 MG: 5 TABLET ORAL at 17:36

## 2021-11-17 RX ADMIN — TAMSULOSIN HYDROCHLORIDE 0.4 MG: 0.4 CAPSULE ORAL at 09:11

## 2021-11-17 RX ADMIN — HYDROCORTISONE: 10 CREAM TOPICAL at 06:00

## 2021-11-17 RX ADMIN — HYDROCORTISONE: 10 CREAM TOPICAL at 17:36

## 2021-11-17 RX ADMIN — MULTIPLE VITAMINS W/ MINERALS TAB 1 TABLET: TAB at 05:34

## 2021-11-17 RX ADMIN — SENNOSIDES AND DOCUSATE SODIUM 2 TABLET: 50; 8.6 TABLET ORAL at 17:36

## 2021-11-17 RX ADMIN — FUROSEMIDE 60 MG: 10 INJECTION, SOLUTION INTRAMUSCULAR; INTRAVENOUS at 05:34

## 2021-11-17 RX ADMIN — SENNOSIDES AND DOCUSATE SODIUM 2 TABLET: 50; 8.6 TABLET ORAL at 05:34

## 2021-11-17 ASSESSMENT — PAIN DESCRIPTION - PAIN TYPE: TYPE: ACUTE PAIN

## 2021-11-17 ASSESSMENT — ENCOUNTER SYMPTOMS
VOMITING: 0
CHILLS: 0
FEVER: 0
ABDOMINAL PAIN: 0
COUGH: 0
NAUSEA: 0
SHORTNESS OF BREATH: 0
MYALGIAS: 0

## 2021-11-17 NOTE — CARE PLAN
The patient is Watcher - Medium risk of patient condition declining or worsening    Shift Goals  Clinical Goals: wean oxygen  Patient Goals: Remove catheter  Family Goals: n/a    Progress made toward(s) clinical / shift goals:    Problem: Knowledge Deficit - Standard  Goal: Patient and family/care givers will demonstrate understanding of plan of care, disease process/condition, diagnostic tests and medications  Outcome: Progressing Patient is educated of disease process and condition. Treatment team has included patient in plan of care. All medications indications and side effects are explained. Patient is encouraged to ask questions. Patient indicates understanding.     Problem: Respiratory  Goal: Patient will achieve/maintain optimum respiratory ventilation and gas exchange  Outcome: Progressing - Pt using CPAP during the night to sleep, maintaining O2 saturation above 90% throughout the night on 10L        Patient is not progressing towards the following goals:

## 2021-11-17 NOTE — DISCHARGE PLANNING
Anticipated Discharge Disposition: Home with H.H., oxygen, and DME: ventilator/CPAP     Action: RN CM placed second call to [facility] MARGARET Noely [phone], left detailed voicemail for Lu and requested callback as soon as possible. (Insurance Auth Ref# [id number])      Barriers to Discharge: DME Insurance approval and delivery     Plan: Follow up with Lu at [facility].     Update 1435: Spoke with Rashida Bravo who stated [employer] insurance needed more documentation supplemented from original order written by MD Ross on 11/10 and that [facility] was able to provide said needed documentation. Rashida stated [facility] should have a determination within 24 hours regarding auth. I inquired about cash pay options with [facility] if Insurance denies authRashida stated it is possible to pay for unit at roughly ~$1,000 outright only if denied by [facility]. RN CM to update leadership.

## 2021-11-17 NOTE — PROGRESS NOTES
12 hour Chart Check completed.   COVID SURGE CRISIS CHARTING IN EFFECT    Patient is medical.

## 2021-11-17 NOTE — PROGRESS NOTES
Assumed care of PT A&O 4. Pt resting in bed with no signs of labored breathing. On 6L NC.Pt is medical. Call light within reach, bed in lowest position, upper bed rails up. Pt was updated on plan of care for the day . Will continue to monitor.

## 2021-11-17 NOTE — CARE PLAN
The patient is Watcher - Medium risk of patient condition declining or worsening    Shift Goals  Clinical Goals: wean oxygen  Patient Goals: Remove catheter  Family Goals: n/a    Progress made toward(s) clinical / shift goals:  Continuing to wean oxygen    Patient is not progressing towards the following goals:        Problem: Knowledge Deficit - Standard  Goal: Patient and family/care givers will demonstrate understanding of plan of care, disease process/condition, diagnostic tests and medications  Outcome: Progressing     Problem: Respiratory  Goal: Patient will achieve/maintain optimum respiratory ventilation and gas exchange  Outcome: Progressing

## 2021-11-17 NOTE — PROGRESS NOTES
Inpatient Anticoagulation Service Note    Date: 11/17/2021    Reason for Anticoagulation: Pulmonary Embolism,Deep Vein Thrombosis   Target INR: 2.0 to 3.0         Hemoglobin Value: 14  Hematocrit Value: 50.2  Lab Platelet Value: 244    INR from last 7 days     Date/Time INR Value    11/17/21 0231 2.24    11/16/21 0409 1.91    11/15/21 0343 1.76    11/14/21 0354 1.9    11/13/21 0311 2.08    11/12/21 0323 2.1    11/11/21 0345 2.1        Dose from last 7 days     Date/Time Dose (mg)    11/17/21 1420 5    11/16/21 1616 7.5    11/15/21 1654 10    11/14/21 1106 7.5    11/13/21 1407 5    11/12/21 0916 5    11/11/21 1314 5        Average Dose (mg):  (7.5 SunTuesThurs, 5mg AOD)  Significant Interactions: Not Applicable  Bridge Therapy: No  Reversal Agent Administered: Not Applicable  Comments: Continue with home regimen of alternating days of 5/7.5  Education Material Provided?: Yes  Pharmacist suggested discharge dosing: Resume alternating days of 5 mg and 7.5 mg.  INR check within 48 hours of discharge.     Yuliet Gonzalez, PharmD  [id number]

## 2021-11-17 NOTE — PROGRESS NOTES
Inpatient Anticoagulation Service Note    Date: 11/16/2021    Reason for Anticoagulation: Pulmonary Embolism,Deep Vein Thrombosis   Target INR: 2.0 to 3.0     Hemoglobin Value: 14  Hematocrit Value: 50.2  Lab Platelet Value: 244    INR from last 7 days     Date/Time INR Value    11/16/21 0409 1.91    11/15/21 0343 1.76    11/14/21 0354 1.9    11/13/21 0311 2.08    11/12/21 0323 2.1    11/11/21 0345 2.1    11/10/21 0256 1.84        Dose from last 7 days     Date/Time Dose (mg)    11/16/21 1616 7.5    11/15/21 1654 10    11/14/21 1106 7.5    11/13/21 1407 5    11/12/21 0916 5    11/11/21 1314 5        Average Dose (mg):  (7.5 SunTuesThurs, 5mg AOD)  Significant Interactions: Not Applicable  Bridge Therapy: No     Reversal Agent Administered: Not Applicable  Comments: INR increased and close to goal therapeutic range. Will continue with warfarin 7.5mg and recheck INR tomorrow. H/h and plt stable with no s/sx of bleeding. No DDI noted. Pharmacy will monitor.    Plan:  Warfarin 7.5mg today.   Education Material Provided?: Yes  Pharmacist suggested discharge dosing: Suggest 7.5mg SunTuesThurs and 5mg all other days. Patient has already established with the outpatient [facility] clinic. Recommend patient follow up with clinic within 48hrs of discharge for repeat INR.     Mary Tang, PharmD

## 2021-11-18 LAB
INR PPP: 2.2 (ref 0.87–1.13)
PROTHROMBIN TIME: 23.8 SEC (ref 12–14.6)

## 2021-11-18 PROCEDURE — A9270 NON-COVERED ITEM OR SERVICE: HCPCS | Performed by: HOSPITALIST

## 2021-11-18 PROCEDURE — 700102 HCHG RX REV CODE 250 W/ 637 OVERRIDE(OP): Performed by: INTERNAL MEDICINE

## 2021-11-18 PROCEDURE — A9270 NON-COVERED ITEM OR SERVICE: HCPCS | Performed by: INTERNAL MEDICINE

## 2021-11-18 PROCEDURE — 770001 HCHG ROOM/CARE - MED/SURG/GYN PRIV*

## 2021-11-18 PROCEDURE — 700102 HCHG RX REV CODE 250 W/ 637 OVERRIDE(OP): Performed by: STUDENT IN AN ORGANIZED HEALTH CARE EDUCATION/TRAINING PROGRAM

## 2021-11-18 PROCEDURE — 85610 PROTHROMBIN TIME: CPT

## 2021-11-18 PROCEDURE — 700111 HCHG RX REV CODE 636 W/ 250 OVERRIDE (IP): Performed by: INTERNAL MEDICINE

## 2021-11-18 PROCEDURE — 94660 CPAP INITIATION&MGMT: CPT

## 2021-11-18 PROCEDURE — A9270 NON-COVERED ITEM OR SERVICE: HCPCS | Performed by: STUDENT IN AN ORGANIZED HEALTH CARE EDUCATION/TRAINING PROGRAM

## 2021-11-18 PROCEDURE — 700102 HCHG RX REV CODE 250 W/ 637 OVERRIDE(OP): Performed by: HOSPITALIST

## 2021-11-18 PROCEDURE — 99232 SBSQ HOSP IP/OBS MODERATE 35: CPT | Performed by: INTERNAL MEDICINE

## 2021-11-18 RX ORDER — FUROSEMIDE 40 MG/1
40 TABLET ORAL
Status: DISCONTINUED | OUTPATIENT
Start: 2021-11-18 | End: 2021-11-24 | Stop reason: HOSPADM

## 2021-11-18 RX ADMIN — HYDROCORTISONE: 10 CREAM TOPICAL at 07:11

## 2021-11-18 RX ADMIN — FUROSEMIDE 60 MG: 10 INJECTION, SOLUTION INTRAMUSCULAR; INTRAVENOUS at 05:51

## 2021-11-18 RX ADMIN — FUROSEMIDE 40 MG: 40 TABLET ORAL at 17:22

## 2021-11-18 RX ADMIN — WARFARIN SODIUM 7.5 MG: 7.5 TABLET ORAL at 17:21

## 2021-11-18 RX ADMIN — SENNOSIDES AND DOCUSATE SODIUM 2 TABLET: 50; 8.6 TABLET ORAL at 05:51

## 2021-11-18 RX ADMIN — MULTIPLE VITAMINS W/ MINERALS TAB 1 TABLET: TAB at 05:51

## 2021-11-18 RX ADMIN — TAMSULOSIN HYDROCHLORIDE 0.4 MG: 0.4 CAPSULE ORAL at 07:39

## 2021-11-18 RX ADMIN — HYDROCORTISONE: 10 CREAM TOPICAL at 17:21

## 2021-11-18 RX ADMIN — SENNOSIDES AND DOCUSATE SODIUM 2 TABLET: 50; 8.6 TABLET ORAL at 17:22

## 2021-11-18 ASSESSMENT — ENCOUNTER SYMPTOMS
CHILLS: 0
MYALGIAS: 0
VOMITING: 0
FEVER: 0
COUGH: 0
NAUSEA: 0
ABDOMINAL PAIN: 0
SHORTNESS OF BREATH: 0

## 2021-11-18 NOTE — CARE PLAN
Problem: Knowledge Deficit - Standard  Goal: Patient and family/care givers will demonstrate understanding of plan of care, disease process/condition, diagnostic tests and medications  Outcome: Progressing  Note: All pts questions addressed and answered throughout shift      Problem: Respiratory  Goal: Patient will achieve/maintain optimum respiratory ventilation and gas exchange  Outcome: Progressing  Note: Pt remains stable on 6 L via NC when awake and on 10 L O2 via cpap at rest      Problem: Skin Integrity  Goal: Skin integrity is maintained or improved  Outcome: Progressing  Note: Pt turns self from side to side. Pts rash is decreased per pt. Legs are washed and moisturized to prevent injury    The patient is Stable - Low risk of patient condition declining or worsening    Shift Goals  Clinical Goals: Monitor O2 demand through the night  Patient Goals: rest  Family Goals: n/a

## 2021-11-18 NOTE — PROGRESS NOTES
Inpatient Anticoagulation Service Note    Date: 11/18/2021    Reason for Anticoagulation: Pulmonary Embolism,Deep Vein Thrombosis   Target INR: 2.0 to 3.0         Hemoglobin Value: 14  Hematocrit Value: 50.2  Lab Platelet Value: 244    INR from last 7 days     Date/Time INR Value    11/18/21 0330 2.2    11/17/21 0231 2.24    11/16/21 0409 1.91    11/15/21 0343 1.76    11/14/21 0354 1.9    11/13/21 0311 2.08    11/12/21 0323 2.1        Dose from last 7 days     Date/Time Dose (mg)    11/18/21 1443 7.5    11/17/21 1420 5    11/16/21 1616 7.5    11/15/21 1654 10    11/14/21 1106 7.5    11/13/21 1407 5    11/12/21 0916 5        Average Dose (mg):  (7.5 SunTuesThurs, 5mg AOD)  Significant Interactions: Not Applicable  Bridge Therapy: No   Reversal Agent Administered: Not Applicable  Comments: Continue with home regimen of alternating days of 5/7.5  Education Material Provided?: Yes  Pharmacist suggested discharge dosing: PTA regimen and INR check within 48 hours of discharge.     Yuliet Gonzalez, PharmD  [id number]

## 2021-11-18 NOTE — PROGRESS NOTES
Hospital Medicine Daily Progress Note    Date of Service  11/17/2021    Chief Complaint  Michael Ontiveros is a 31 y.o. male admitted 11/1/2021 with edema    Hospital Course:    31-year-old male with past medical history of pulmonary embolism and DVT on Coumadin, pulmonary hypertension, JONES, mild intermittent asthma, and morbid obesity with a BMI of 69 who was admitted on 11/1/2021 with bilateral lower extremity swelling, patient had same admission in 7/2021, patient was transferred to ICU for BiPAP however he ended intubated on 11/3 and extubated on 11/5, CTA on admission did not show any PE, echo showed normal ejection fraction of 60% with pulmonary hypertension and right ventricle pressure around 45 mmHg, patient also have hypercapnia, Lasix was started, patient was evaluated by pulmonologist and recommended CPAP.    Home oxygen eval showed the patient needs 24/7 oxygen therapy around 6 L.    He had difficult Matthew placement requiring urology consultation with cystoscopy for placement, voiding trial was done on 11/16    He is set up to have a sleep study on 11/8/2021 for evaluation for BiPAP therapy.     Interval Problem Update  -Evaluate and examined the patient at bedside,   -No events last night  -Continue Lasix  -Patient is on 6 L nasal cannula, patient's been using CPAP during the night  -Patient received home oxygen however was not able to get CPAP before sleep study, the case was discussed with pulmonologist Dr. Ross who recommended CPAP machine before discharge.  -Patient has significant hypoxia during sleeping and needed 10 L nasal cannula  -Patient did not need straight catheter and no retaining  -Continue IV Lasix    I have personally seen and examined the patient at bedside. I discussed the plan of care with patient, bedside RN, charge RN,  and pharmacy.    Consultants/Specialty  critical care and pulmonary    Code Status  Full Code    Disposition  Patient is not medically  cleared.  Patient needs CPAP machine before discharge, still working with companies to get CPAP  Anticipate discharge to home after receiving CPAP machine.  I have placed the appropriate orders for post-discharge needs.    Review of Systems  Review of Systems   Constitutional: Negative for chills, fever and malaise/fatigue.   Respiratory: Negative for cough and shortness of breath.    Cardiovascular: Positive for leg swelling (chronic).   Gastrointestinal: Negative for abdominal pain, nausea and vomiting.   Musculoskeletal: Negative for myalgias.   Skin: Positive for rash.   All other systems reviewed and are negative.       Physical Exam  Temp:  [36.1 °C (97 °F)-36.7 °C (98.1 °F)] 36.2 °C (97.2 °F)  Pulse:  [82-97] 88  Resp:  [18-20] 18  BP: ()/(56-83) 99/56  SpO2:  [89 %-95 %] 94 %    Physical Exam  Vitals and nursing note reviewed.   Constitutional:       General: He is not in acute distress.     Appearance: He is well-developed. He is obese. He is not ill-appearing.   HENT:      Head: Normocephalic and atraumatic.   Eyes:      Conjunctiva/sclera: Conjunctivae normal.   Neck:      Vascular: No JVD.      Trachea: No tracheal deviation.   Cardiovascular:      Rate and Rhythm: Normal rate and regular rhythm.      Heart sounds: No murmur heard.      Pulmonary:      Effort: Pulmonary effort is normal. No respiratory distress.      Breath sounds: No wheezing or rales.      Comments: On oxymask   Abdominal:      General: Bowel sounds are normal. There is no distension.      Palpations: Abdomen is soft.      Tenderness: There is no abdominal tenderness.   Musculoskeletal:         General: Swelling present. No tenderness.      Cervical back: Neck supple.      Right lower leg: Edema present.      Left lower leg: Edema present.   Skin:     General: Skin is warm and dry.      Findings: Erythema (b/l LE, chronic ) and rash (right arm, improving) present.      Nails: There is no clubbing.   Neurological:      General: No  focal deficit present.      Mental Status: He is alert and oriented to person, place, and time. Mental status is at baseline.      Cranial Nerves: No cranial nerve deficit.      Motor: No abnormal muscle tone.   Psychiatric:         Mood and Affect: Mood normal.         Behavior: Behavior normal.         Fluids    Intake/Output Summary (Last 24 hours) at 11/17/2021 1615  Last data filed at 11/17/2021 0900  Gross per 24 hour   Intake --   Output 1550 ml   Net -1550 ml       Laboratory  Recent Labs     11/16/21  1239   WBC 5.1   RBC 5.99   HEMOGLOBIN 14.0   HEMATOCRIT 50.2   MCV 83.8   MCH 23.4*   MCHC 27.9*   RDW 69.6*   PLATELETCT 244   MPV 11.0     Recent Labs     11/16/21  1239   SODIUM 136   POTASSIUM 4.2   CHLORIDE 96   CO2 31   GLUCOSE 99   BUN 14   CREATININE 0.47*   CALCIUM 9.4     Recent Labs     11/15/21  0343 11/16/21  0409 11/17/21  0231   INR 1.76* 1.91* 2.24*               Imaging  DX-CHEST-LIMITED (1 VIEW)   Final Result      Improving basilar aeration with increase in lung volumes following extubation      Continued moderate bilateral consolidation could be from edema and/or infection      DX-CHEST-PORTABLE (1 VIEW)   Final Result      1.  No significant interval change.         DX-CHEST-PORTABLE (1 VIEW)   Final Result      1.  Probable mild worsening pulmonary edema.   2.  Increased bibasilar atelectasis.   3.  Cannot exclude trace pleural effusions.      DX-ABDOMEN FOR TUBE PLACEMENT   Final Result      Enteric tube tip projects over the stomach      IR-PICC LINE PLACEMENT W/ GUIDANCE > AGE 5   Final Result                  Ultrasound-guided PICC placement performed by qualified nursing staff as    above.          DX-CHEST-PORTABLE (1 VIEW)   Final Result      1.  Interval intubation   2.  BILATERAL atelectasis with possible superimposed pulmonary edema or pneumonia, unchanged      DX-CHEST-LIMITED (1 VIEW)   Final Result      Worsening diffuse hazy opacity compatible with edema or infection       EC-ECHOCARDIOGRAM COMPLETE W/ CONT   Final Result      CT-ABDOMEN-PELVIS WITH   Final Result         Limited exam due to body habitus.      1. There is skin thickening and subcutaneous edema in the low anterior abdominal and pelvic wall panus. This could relate to fluid overload but infection and cellulitis can have similar appearance. Correlate clinically.      2. Mild perihepatic ascites.      3. Diffuse scrotal edema and moderate hydrocele.         CT-CTA CHEST PULMONARY ARTERY W/ RECONS   Final Result         1. No CT evidence of pulmonary embolism.      2. Mild hazy groundglass opacity throughout both lungs could relate to mild fluid overload.      3. Cardiomegaly.      4. No airspace consolidation. Linear right midlung atelectasis.         DX-CHEST-PORTABLE (1 VIEW)   Final Result         Stable cardiomegaly.      Mild interstitial prominence could relate to mild fluid overload.           Assessment/Plan  * Pulmonary hypertension (HCC)- (present on admission)  Assessment & Plan  Likely related to obstructive sleep apnea   RVSP 45 mmHg  On anticoagulation for prior history of PE  CTA on 11/1/2021 - for acute PE  Continue BiPAP therapy at bedtime  Continue diuresis monitor intake and output and renal function electrolytes with diuresis    JONES (obstructive sleep apnea)- (present on admission)  Assessment & Plan  Continue nocturnal BiPAP  Pulmonology consulted, ordered noninvasive ventilator for home until patient can have outpatient sleep study    Cellulitis  Assessment & Plan  Versus chronic stasis dermatitis  Finished course of keflex   Continue skin care and clinical monitoring    Volume overload- (present on admission)  Assessment & Plan        History of pulmonary embolism- (present on admission)  Assessment & Plan  Continue warfarin    Chronic anticoagulation- (present on admission)  Assessment & Plan  History of PE and DVT, had a trial on Xarelto developed DVT  Continue anticoagulation with warfarin and  monitor INR    Acute on chronic respiratory failure with hypoxia and hypercapnia (HCC)- (present on admission)  Assessment & Plan  Now on oxymask, continue to wean, home O2 eval when appropriate   Continue diuresis  Continue nocturnal BiPAP    Lower extremity edema- (present on admission)  Assessment & Plan  Continue diuresis    Morbid obesity (HCC)- (present on admission)  Assessment & Plan  Body mass index is 69.25 kg/m².    Asthma- (present on admission)  Assessment & Plan  No acute exacerbation   bronchodilators as needed per RT protocol        Urinary retention  Assessment & Plan  With difficult Matthew catheter placement by urology  Started on Flomax  Voiding trial on 11/16 follow-up with bladder scan    Pulmonary embolism with acute cor pulmonale (HCC)- (present on admission)  Assessment & Plan  History of PE  Continue anticoagulation with warfarin and monitor INR       VTE prophylaxis: therapeutic anticoagulation with Warfarin    I have performed a physical exam and reviewed and updated ROS and Plan today (11/17/2021). In review of yesterday's note (11/16/2021), there are no changes except as documented above.

## 2021-11-18 NOTE — PROGRESS NOTES
Hospital Medicine Daily Progress Note    Date of Service  11/18/2021    Chief Complaint  Michael Ontiveros is a 31 y.o. male admitted 11/1/2021 with edema    Hospital Course:    31-year-old male with past medical history of pulmonary embolism and DVT on Coumadin, pulmonary hypertension, JONES, mild intermittent asthma, and morbid obesity with a BMI of 69 who was admitted on 11/1/2021 with bilateral lower extremity swelling, patient had same admission in 7/2021, patient was transferred to ICU for BiPAP however he ended intubated on 11/3 and extubated on 11/5, CTA on admission did not show any PE, echo showed normal ejection fraction of 60% with pulmonary hypertension and right ventricle pressure around 45 mmHg, patient also have hypercapnia, Lasix was started, patient was evaluated by pulmonologist and recommended CPAP.    Home oxygen eval showed the patient needs 24/7 oxygen therapy around 6 L.    He had difficult Matthew placement requiring urology consultation with cystoscopy for placement, voiding trial was done on 11/16    He is set up to have a sleep study on 11/8/2021 for evaluation for BiPAP therapy.     Interval Problem Update  -Evaluate and examined the patient at bedside,   -No events last night  -Switch IV Lasix to oral  -Patient has significant hypoxia during sleeping and needed 10-15 L nasal cannula, discussed with Dr. Ross pulmonologist patient needs CPAP and oxygen therapy before discharge    I have personally seen and examined the patient at bedside. I discussed the plan of care with patient, bedside RN, charge RN,  and pharmacy.    Consultants/Specialty  critical care and pulmonary    Code Status  Full Code    Disposition  Patient is not medically cleared.  Patient needs CPAP machine before discharge, still working with companies to get CPAP  Anticipate discharge to home after receiving CPAP machine.  I have placed the appropriate orders for post-discharge needs.    Review of  Systems  Review of Systems   Constitutional: Negative for chills, fever and malaise/fatigue.   Respiratory: Negative for cough and shortness of breath.    Cardiovascular: Positive for leg swelling (chronic).   Gastrointestinal: Negative for abdominal pain, nausea and vomiting.   Musculoskeletal: Negative for myalgias.   Skin: Positive for rash.   All other systems reviewed and are negative.       Physical Exam  Temp:  [36.2 °C (97.1 °F)-36.8 °C (98.2 °F)] 36.2 °C (97.1 °F)  Pulse:  [82-95] 83  Resp:  [17-18] 17  BP: ()/(56-78) 106/72  SpO2:  [91 %-94 %] 91 %    Physical Exam  Vitals and nursing note reviewed.   Constitutional:       General: He is not in acute distress.     Appearance: He is well-developed. He is obese. He is not ill-appearing.   HENT:      Head: Normocephalic and atraumatic.   Eyes:      Conjunctiva/sclera: Conjunctivae normal.   Neck:      Vascular: No JVD.      Trachea: No tracheal deviation.   Cardiovascular:      Rate and Rhythm: Normal rate and regular rhythm.      Heart sounds: No murmur heard.      Pulmonary:      Effort: Pulmonary effort is normal. No respiratory distress.      Breath sounds: No wheezing or rales.      Comments: On oxymask   Abdominal:      General: Bowel sounds are normal. There is no distension.      Palpations: Abdomen is soft.      Tenderness: There is no abdominal tenderness.   Musculoskeletal:         General: Swelling present. No tenderness.      Cervical back: Neck supple.      Right lower leg: Edema present.      Left lower leg: Edema present.   Skin:     General: Skin is warm and dry.      Findings: Erythema (b/l LE, chronic ) and rash (right arm, improving) present.      Nails: There is no clubbing.   Neurological:      General: No focal deficit present.      Mental Status: He is alert and oriented to person, place, and time. Mental status is at baseline.      Cranial Nerves: No cranial nerve deficit.      Motor: No abnormal muscle tone.   Psychiatric:          Mood and Affect: Mood normal.         Behavior: Behavior normal.         Fluids  No intake or output data in the 24 hours ending 11/18/21 1550    Laboratory  Recent Labs     11/16/21  1239   WBC 5.1   RBC 5.99   HEMOGLOBIN 14.0   HEMATOCRIT 50.2   MCV 83.8   MCH 23.4*   MCHC 27.9*   RDW 69.6*   PLATELETCT 244   MPV 11.0     Recent Labs     11/16/21  1239   SODIUM 136   POTASSIUM 4.2   CHLORIDE 96   CO2 31   GLUCOSE 99   BUN 14   CREATININE 0.47*   CALCIUM 9.4     Recent Labs     11/16/21  0409 11/17/21  0231 11/18/21  0330   INR 1.91* 2.24* 2.20*               Imaging  DX-CHEST-LIMITED (1 VIEW)   Final Result      Improving basilar aeration with increase in lung volumes following extubation      Continued moderate bilateral consolidation could be from edema and/or infection      DX-CHEST-PORTABLE (1 VIEW)   Final Result      1.  No significant interval change.         DX-CHEST-PORTABLE (1 VIEW)   Final Result      1.  Probable mild worsening pulmonary edema.   2.  Increased bibasilar atelectasis.   3.  Cannot exclude trace pleural effusions.      DX-ABDOMEN FOR TUBE PLACEMENT   Final Result      Enteric tube tip projects over the stomach      IR-PICC LINE PLACEMENT W/ GUIDANCE > AGE 5   Final Result                  Ultrasound-guided PICC placement performed by qualified nursing staff as    above.          DX-CHEST-PORTABLE (1 VIEW)   Final Result      1.  Interval intubation   2.  BILATERAL atelectasis with possible superimposed pulmonary edema or pneumonia, unchanged      DX-CHEST-LIMITED (1 VIEW)   Final Result      Worsening diffuse hazy opacity compatible with edema or infection      EC-ECHOCARDIOGRAM COMPLETE W/ CONT   Final Result      CT-ABDOMEN-PELVIS WITH   Final Result         Limited exam due to body habitus.      1. There is skin thickening and subcutaneous edema in the low anterior abdominal and pelvic wall panus. This could relate to fluid overload but infection and cellulitis can have similar  appearance. Correlate clinically.      2. Mild perihepatic ascites.      3. Diffuse scrotal edema and moderate hydrocele.         CT-CTA CHEST PULMONARY ARTERY W/ RECONS   Final Result         1. No CT evidence of pulmonary embolism.      2. Mild hazy groundglass opacity throughout both lungs could relate to mild fluid overload.      3. Cardiomegaly.      4. No airspace consolidation. Linear right midlung atelectasis.         DX-CHEST-PORTABLE (1 VIEW)   Final Result         Stable cardiomegaly.      Mild interstitial prominence could relate to mild fluid overload.           Assessment/Plan  * Pulmonary hypertension (HCC)- (present on admission)  Assessment & Plan  Likely related to obstructive sleep apnea   RVSP 45 mmHg  On anticoagulation for prior history of PE  CTA on 11/1/2021 - for acute PE  Continue BiPAP therapy at bedtime  Continue diuresis monitor intake and output and renal function electrolytes with diuresis    JONES (obstructive sleep apnea)- (present on admission)  Assessment & Plan  Continue nocturnal BiPAP  Pulmonology consulted, ordered noninvasive ventilator for home until patient can have outpatient sleep study    Cellulitis  Assessment & Plan  Versus chronic stasis dermatitis  Finished course of keflex   Continue skin care and clinical monitoring    Volume overload- (present on admission)  Assessment & Plan        History of pulmonary embolism- (present on admission)  Assessment & Plan  Continue warfarin    Chronic anticoagulation- (present on admission)  Assessment & Plan  History of PE and DVT, had a trial on Xarelto developed DVT  Continue anticoagulation with warfarin and monitor INR    Acute on chronic respiratory failure with hypoxia and hypercapnia (HCC)- (present on admission)  Assessment & Plan  Now on oxymask, continue to wean, home O2 eval when appropriate   Continue diuresis  Continue nocturnal BiPAP    Lower extremity edema- (present on admission)  Assessment & Plan  Continue  diuresis    Morbid obesity (HCC)- (present on admission)  Assessment & Plan  Body mass index is 69.25 kg/m².    Asthma- (present on admission)  Assessment & Plan  No acute exacerbation   bronchodilators as needed per RT protocol        Urinary retention  Assessment & Plan  With difficult Matthew catheter placement by urology  Started on Flomax  Voiding trial on 11/16 follow-up with bladder scan    Pulmonary embolism with acute cor pulmonale (HCC)- (present on admission)  Assessment & Plan  History of PE  Continue anticoagulation with warfarin and monitor INR       VTE prophylaxis: therapeutic anticoagulation with Warfarin    I have performed a physical exam and reviewed and updated ROS and Plan today (11/18/2021). In review of yesterday's note (11/17/2021), there are no changes except as documented above.

## 2021-11-18 NOTE — PROGRESS NOTES
"Chief Complaint   Patient presents with   • Bradycardia     F/V: 3 MO       Subjective:   Isai Fuentes is a 77 y.o. male who presents today as a follow-up for his heart failure.  Since he was last seen he continues to do well.  His LDL runs elevated.  He has had numerous intolerances to statins in the past.  His EF is stable in the 30s.  He has had no palpitations PND orthopnea or syncope.  His kidney function is been stable.    Past Medical History:   Diagnosis Date   • Atrial fibrillation ([facility]) 9/20/2011   • Biventricular implantable cardioverter-defibrillator in situ 8/7/2015   • Breath shortness    • CAD (coronary artery disease) 9/20/2011   • Cancer ([facility]) 2012    prostate   • CARDIOMYOPATHY 9/20/2011   • Congestive heart failure ([facility])    • COPD (chronic obstructive pulmonary disease) ([facility])    • Fatigue 10/28/2010   • Heartburn 10/23/2008   • History of cardiac catheterization 9/20/2011   • History of myocardial infarction     \"many\"   • HTN (hypertension) 9/20/2011   • Indigestion    • Insomnia 7/8/2010   • Ischemic cardiomyopathy 9/20/2011   • Long term (current) use of anticoagulants 5/13/2011   • Myocardial infarct ([facility]) 2007   • Orthostatic hypotension 5/6/2009   • WILLA (obstructive sleep apnea)     CPAP   • Other drug allergy(995.27) 10/16/2008   • Other specified disorder of intestines 07-28-15    constipation/diarrhea/ reports some bleeding from rectum w/blood clots. Seeing GI   • Pacemaker     [employer]   • Pain 07-28-15    \"chronic\", 0/10   • Pleural effusion 5/18/2009   • Presence of permanent cardiac pacemaker 9/20/2011   • Prostate cancer ([facility]) 5/13/2011   • Second degree AV block, Mobitz type II 11/10/2010   • Snoring    • Stroke ([facility]) 2/3/2009     Past Surgical History:   Procedure Laterality Date   • RECOVERY  10/16/2015    Procedure: CATH LAB LEAD REVISION [facility];  Surgeon: Recoveryonly Surgery;  Location: SURGERY PRE-POST PROC UNIT [facility];  Service:    • RECOVERY  8/14/2015    " Report received, pt care assumed, pt is medical. VSS and pt is on 6 L O2 via NC. Pt aaox4, no signs of distress noted at this time. POC discussed with pt and verbalizes no questions. Pt c/o of no pain at this time. Pt denies any additional needs at this time. Bed in lowest position, bed alarm on, pt educated on fall risk and verbalized understanding, call light within reach, will continue with plan of care.    Covid-19 surge in effect    "Procedure:  CATH LAB LEAD EXTRACTION AWILDA [facility] PAUL[facility];  Surgeon: Recoveryonly Surgery;  Location: SURGERY PRE-POST PROC [facility] [place] [facility];  Service:    • RECOVERY  7/30/2015    Procedure: CATH LAB  LEAD EXTRACTION, UPGRADE TO BIV PM [facility] MAGGIE AQUINO ;  Surgeon: Recoveryonly Surgery;  Location: SURGERY PRE-POST PROC [facility] [place] [facility];  Service:    • RECOVERY  7/29/2015    Procedure: CATH LAB NEW PM INSERTION DUAL ATRIAL & VENTRICULAR-LV LEAD W/ NEW GENERATOR AQUINO ICD9: 414.8;  Surgeon: Recoveryonly Surgery;  Location: SURGERY PRE[facility] PROC [facility] [place] [facility];  Service:    • PACEMAKER INSERTION  11/24/08    [facility]   • MULTIPLE CORONARY ARTERY BYPASS  2007    2 vessel   • CARDIAC CATH      has 2 stents     Family History   Problem Relation Age of Onset   • Thyroid Sister      Social History     Social History   • Marital status:      Spouse name: N/A   • Number of children: N/A   • Years of education: N/A     Occupational History   • Not on file.     Social History Main Topics   • Smoking status: Former Smoker     Packs/day: 1.50     Years: 3.00     Types: Cigarettes     Quit date: 1/1/1970   • Smokeless tobacco: Former User     Types: Chew     Quit date: 1/1/1972   • Alcohol use No      Comment: 10-14 per week; scotch & wine   • Drug use: No   • Sexual activity: Not on file     Other Topics Concern   • Not on file     Social History Narrative   • No narrative on file     Allergies   Allergen Reactions   • Plavix [Clopidogrel Bisulfate] Anaphylaxis   • Statins [Hmg-Coa-R Inhibitors] Unspecified     Muscles aches     • Ticlid [Ticlopidine Hydrochloride] Unspecified     Pt states \"I'm not sure what happens\".       Outpatient Encounter Prescriptions as of 8/28/2018   Medication Sig Dispense Refill   • HYDROcodone-acetaminophen (NORCO) 5-325 MG Tab per tablet TAKE 1 TAB BY MOUTH EVERY 4 HOURS AS NEEDED  0   • potassium chloride (KLOR-CON) 20 MEQ Pack Take 40 mEq by mouth 2 times a day.     • furosemide (LASIX) 40 MG Tab Take 40 " mg by mouth every day.     • metoprolol SR (TOPROL XL) 50 MG TABLET SR 24 HR Take 1 Tab by mouth every day. 90 Tab 3   • vitamin D (CHOLECALCIFEROL) 1000 UNIT Tab Take 1,000 Units by mouth every day.     • sacubitril-valsartan (ENTRESTO)  MG Tab tablet Take 1 Tab by mouth 2 Times a Day. 60 Tab 11   • metOLazone (ZAROXOLYN) 5 MG Tab Take 1 Tab by mouth 1 time daily as needed. 30 Tab 3   • beclomethasone (QVAR) 80 MCG/ACT inhaler Inhale 2 Puffs by mouth 2 times a day. Use spacer. Rinse mouth after each use. 2 Inhaler 0   • spironolactone (ALDACTONE) 25 MG Tab Take 1 Tab by mouth every day. 90 Tab 3   • Methotrexate, PF, 10 MG/0.2ML Solution Auto-injector Inject 10 mg as instructed every 7 days. On Mon   Indications: Psoriasis     • Omega-3 Fatty Acids (FISH OIL) 1000 MG Cap capsule Take 1,000 mg by mouth every day.     • Multiple Vitamins-Minerals (OCUVITE) Tab Take 1 Tab by mouth every day.     • aspirin 81 MG tablet Take 81 mg by mouth every day.     • Coenzyme Q10 (CO Q-10 PO) Take 1 Cap by mouth every day.     • [DISCONTINUED] potassium chloride SA (KDUR) 20 MEQ Tab CR TAKE 2 TABS BY MOUTH EVERY 48 HOURS.  3     No facility-administered encounter medications on file as of 8/28/2018.      Review of Systems   Constitutional: Negative.  Negative for chills, fever and malaise/fatigue.   HENT: Negative.  Negative for sore throat.    Eyes: Negative.    Respiratory: Negative.  Negative for cough, hemoptysis, sputum production, shortness of breath, wheezing and stridor.    Cardiovascular: Negative.  Negative for chest pain, palpitations, orthopnea, claudication, leg swelling and PND.   Gastrointestinal: Negative.    Genitourinary: Negative.    Musculoskeletal: Negative.    Skin: Negative.    Neurological: Negative.  Negative for dizziness, loss of consciousness and weakness.   Endo/Heme/Allergies: Negative.  Does not bruise/bleed easily.   All other systems reviewed and are negative.       Objective:   /70    "Pulse 92   Ht 1.803 m (5' 11\")   Wt 91.6 kg (202 lb)   SpO2 94%   BMI 28.17 kg/m²     Physical Exam   Constitutional: He appears well-developed and well-nourished. No distress.   HENT:   Head: Normocephalic and atraumatic.   Right Ear: External ear normal.   Left Ear: External ear normal.   Nose: Nose normal.   Mouth/Throat: No oropharyngeal exudate.   Eyes: Pupils are equal, round, and reactive to light. Conjunctivae and EOM are normal. Right eye exhibits no discharge. Left eye exhibits no discharge. No scleral icterus.   Neck: Neck supple. No JVD present.   Cardiovascular: Normal rate, regular rhythm and intact distal pulses.  Exam reveals no gallop and no friction rub.    No murmur heard.  Pulmonary/Chest: Effort normal. No stridor. No respiratory distress. He has no wheezes. He has no rales. He exhibits no tenderness.   Abdominal: Soft. He exhibits no distension. There is no guarding.   Musculoskeletal: Normal range of motion. He exhibits no edema, tenderness or deformity.   Neurological: He is alert. He has normal reflexes. He displays normal reflexes. No cranial nerve deficit. He exhibits normal muscle tone. Coordination normal.   Skin: Skin is warm and dry. No rash noted. He is not diaphoretic. No erythema. No pallor.   Psychiatric: He has a normal mood and affect. His behavior is normal. Judgment and thought content normal.   Nursing note and vitals reviewed.      Assessment:     1. Alcohol abuse     2. Paroxysmal atrial fibrillation (HCC)     3. Coronary artery disease involving native coronary artery of native heart without angina pectoris     4. Biventricular implantable cardioverter-defibrillator in situ     5. High risk medication use     6. History of myocardial infarction     7. Essential hypertension     8. Hypercholesteremia     9. Ischemic cardiomyopathy     10. Left ventricular systolic dysfunction, NYHA class 3     11. NYHA class 3 acute on chronic systolic heart failure (HCC)     12. " Orthostatic hypotension     13. S/P ablation of atrial fibrillation     14. Second degree AV block, Mobitz type II     15. Obstructive sleep apnea syndrome     16. Stage 3 chronic kidney disease     17. Systolic heart failure, ACC/AHA stage C (HCC)         Medical Decision Making:  Today's Assessment / Status / Plan:     77-year-old male with CAD status post infarct with chronic kidney disease and heart failure with reduced ejection fraction.  Because of his numerous intolerances to statins I will start him on a PCSK-9 inhibitor.  Otherwise no changes to his medical therapy.  We will see him back in 3-6 months.    Thank for you allowing me to take part in your patient's care, please call should you have any questions or would like to discuss this patient.

## 2021-11-18 NOTE — DISCHARGE PLANNING
Anticipated Discharge Disposition: Home with H.H., oxygen, and DME: ventilator/CPAP     Action: RNCM received call from [facility] that [facility] denied  claim. (Insurance Auth Ref# [id number]) RNCM requested info regarding cash pay for a unit. Faizan with [facility] stated they do not have approved services set up currently (though it is in the works) and therefore could not accept approved services at this time. Faizan stated he does have a call out to his  to obtain a one-time agreement.  RNCM placed call to [facility] to inquire if they accept approved services, left voicemail, awaiting callback.     Barriers to Discharge: DME Insurance approval and delivery     Plan: Follow-up with [facility], MD's, leadership    Update: [facility] (Bridger [phone]) said he believes they can do Approved Services. Bridger gave me their billing line ([phone]) who transferred me to his supervisor, Sintia; left voicemail for callback.      UPDATE 2843: [facility] can accept, Sintia johnson MD order for completion to [phone]. Awaiting arrival of fax. Leadership, Zaria, made aware.

## 2021-11-18 NOTE — PROGRESS NOTES
12 hour Chart Check completed.   COVID SURGE CRISIS CHARTING IN EFFECT    Medical. No tele monitoring.

## 2021-11-19 ENCOUNTER — PATIENT OUTREACH (OUTPATIENT)
Dept: HEALTH INFORMATION MANAGEMENT | Facility: OTHER | Age: 31
End: 2021-11-19

## 2021-11-19 PROBLEM — E87.70 VOLUME OVERLOAD: Status: RESOLVED | Noted: 2021-07-12 | Resolved: 2021-11-19

## 2021-11-19 LAB
ANION GAP SERPL CALC-SCNC: 9 MMOL/L (ref 7–16)
ANISOCYTOSIS BLD QL SMEAR: ABNORMAL
BASOPHILS # BLD AUTO: 1.4 % (ref 0–1.8)
BASOPHILS # BLD: 0.07 K/UL (ref 0–0.12)
BUN SERPL-MCNC: 18 MG/DL (ref 8–22)
CALCIUM SERPL-MCNC: 10 MG/DL (ref 8.5–10.5)
CHLORIDE SERPL-SCNC: 94 MMOL/L (ref 96–112)
CO2 SERPL-SCNC: 30 MMOL/L (ref 20–33)
COMMENT 1642: NORMAL
CREAT SERPL-MCNC: 0.48 MG/DL (ref 0.5–1.4)
EOSINOPHIL # BLD AUTO: 0.28 K/UL (ref 0–0.51)
EOSINOPHIL NFR BLD: 5.8 % (ref 0–6.9)
ERYTHROCYTE [DISTWIDTH] IN BLOOD BY AUTOMATED COUNT: 69.2 FL (ref 35.9–50)
GLUCOSE SERPL-MCNC: 103 MG/DL (ref 65–99)
HCT VFR BLD AUTO: 48.8 % (ref 42–52)
HGB BLD-MCNC: 14 G/DL (ref 14–18)
HYPOCHROMIA BLD QL SMEAR: ABNORMAL
IMM GRANULOCYTES # BLD AUTO: 0.01 K/UL (ref 0–0.11)
IMM GRANULOCYTES NFR BLD AUTO: 0.2 % (ref 0–0.9)
INR PPP: 2.17 (ref 0.87–1.13)
LYMPHOCYTES # BLD AUTO: 1.87 K/UL (ref 1–4.8)
LYMPHOCYTES NFR BLD: 38.7 % (ref 22–41)
MAGNESIUM SERPL-MCNC: 2 MG/DL (ref 1.5–2.5)
MCH RBC QN AUTO: 23.9 PG (ref 27–33)
MCHC RBC AUTO-ENTMCNC: 28.7 G/DL (ref 33.7–35.3)
MCV RBC AUTO: 83.1 FL (ref 81.4–97.8)
MICROCYTES BLD QL SMEAR: ABNORMAL
MONOCYTES # BLD AUTO: 0.65 K/UL (ref 0–0.85)
MONOCYTES NFR BLD AUTO: 13.5 % (ref 0–13.4)
MORPHOLOGY BLD-IMP: NORMAL
NEUTROPHILS # BLD AUTO: 1.95 K/UL (ref 1.82–7.42)
NEUTROPHILS NFR BLD: 40.4 % (ref 44–72)
NRBC # BLD AUTO: 0 K/UL
NRBC BLD-RTO: 0 /100 WBC
PLATELET # BLD AUTO: 262 K/UL (ref 164–446)
PLATELET BLD QL SMEAR: NORMAL
POIKILOCYTOSIS BLD QL SMEAR: NORMAL
POLYCHROMASIA BLD QL SMEAR: NORMAL
POTASSIUM SERPL-SCNC: 4 MMOL/L (ref 3.6–5.5)
PROTHROMBIN TIME: 23.5 SEC (ref 12–14.6)
RBC # BLD AUTO: 5.87 M/UL (ref 4.7–6.1)
RBC BLD AUTO: PRESENT
SODIUM SERPL-SCNC: 133 MMOL/L (ref 135–145)
WBC # BLD AUTO: 4.8 K/UL (ref 4.8–10.8)

## 2021-11-19 PROCEDURE — 700102 HCHG RX REV CODE 250 W/ 637 OVERRIDE(OP): Performed by: INTERNAL MEDICINE

## 2021-11-19 PROCEDURE — 85610 PROTHROMBIN TIME: CPT

## 2021-11-19 PROCEDURE — A9270 NON-COVERED ITEM OR SERVICE: HCPCS | Performed by: STUDENT IN AN ORGANIZED HEALTH CARE EDUCATION/TRAINING PROGRAM

## 2021-11-19 PROCEDURE — 94660 CPAP INITIATION&MGMT: CPT

## 2021-11-19 PROCEDURE — 83735 ASSAY OF MAGNESIUM: CPT

## 2021-11-19 PROCEDURE — A9270 NON-COVERED ITEM OR SERVICE: HCPCS | Performed by: HOSPITALIST

## 2021-11-19 PROCEDURE — 99232 SBSQ HOSP IP/OBS MODERATE 35: CPT | Performed by: INTERNAL MEDICINE

## 2021-11-19 PROCEDURE — 770001 HCHG ROOM/CARE - MED/SURG/GYN PRIV*

## 2021-11-19 PROCEDURE — 80048 BASIC METABOLIC PNL TOTAL CA: CPT

## 2021-11-19 PROCEDURE — 85025 COMPLETE CBC W/AUTO DIFF WBC: CPT

## 2021-11-19 PROCEDURE — 700102 HCHG RX REV CODE 250 W/ 637 OVERRIDE(OP): Performed by: HOSPITALIST

## 2021-11-19 PROCEDURE — 36415 COLL VENOUS BLD VENIPUNCTURE: CPT

## 2021-11-19 PROCEDURE — A9270 NON-COVERED ITEM OR SERVICE: HCPCS | Performed by: INTERNAL MEDICINE

## 2021-11-19 PROCEDURE — 700102 HCHG RX REV CODE 250 W/ 637 OVERRIDE(OP): Performed by: STUDENT IN AN ORGANIZED HEALTH CARE EDUCATION/TRAINING PROGRAM

## 2021-11-19 RX ORDER — FUROSEMIDE 40 MG/1
40 TABLET ORAL 2 TIMES DAILY
Qty: 30 TABLET | Refills: 3 | Status: SHIPPED | OUTPATIENT
Start: 2021-11-19 | End: 2022-01-26 | Stop reason: SDUPTHER

## 2021-11-19 RX ORDER — ACETAMINOPHEN 325 MG/1
650 TABLET ORAL EVERY 6 HOURS PRN
Qty: 30 TABLET | Refills: 0 | Status: SHIPPED | OUTPATIENT
Start: 2021-11-19 | End: 2023-03-12

## 2021-11-19 RX ADMIN — MULTIPLE VITAMINS W/ MINERALS TAB 1 TABLET: TAB at 05:17

## 2021-11-19 RX ADMIN — FUROSEMIDE 40 MG: 40 TABLET ORAL at 15:57

## 2021-11-19 RX ADMIN — FUROSEMIDE 40 MG: 40 TABLET ORAL at 05:17

## 2021-11-19 RX ADMIN — HYDROCORTISONE: 10 CREAM TOPICAL at 05:26

## 2021-11-19 RX ADMIN — HYDROCORTISONE: 10 CREAM TOPICAL at 17:15

## 2021-11-19 RX ADMIN — TAMSULOSIN HYDROCHLORIDE 0.4 MG: 0.4 CAPSULE ORAL at 08:18

## 2021-11-19 RX ADMIN — WARFARIN SODIUM 5 MG: 5 TABLET ORAL at 17:16

## 2021-11-19 ASSESSMENT — ENCOUNTER SYMPTOMS
CHILLS: 0
ABDOMINAL PAIN: 0
FEVER: 0
SHORTNESS OF BREATH: 0
MYALGIAS: 0
VOMITING: 0
NAUSEA: 0
COUGH: 0

## 2021-11-19 ASSESSMENT — FIBROSIS 4 INDEX: FIB4 SCORE: 0.66

## 2021-11-19 ASSESSMENT — PAIN DESCRIPTION - PAIN TYPE
TYPE: ACUTE PAIN
TYPE: ACUTE PAIN

## 2021-11-19 NOTE — DISCHARGE PLANNING
@1500  Agency/Facility Name: [facility]   Outcome: Left message, awaiting call back.    @1340  Agency/Facility Name: [facility]   Outcome: Left message, awaiting call back.    @5637  Agency/Facility Name: [facility]   Outcome: Left message, awaiting call back.

## 2021-11-19 NOTE — FACE TO FACE
Face to Face Note  -  Durable Medical Equipment    Cisco Watkins M.D. - NPI: [id number]  I certify that this patient is under my care and that they have had a durable medical equipment(DME)face to face encounter by myself that meets the physician DME face-to-face encounter requirements with this patient on:    Date of encounter:   Patient:                    MRN:                       YOB: 2021  Michael Ontiveros  [id number]  1990     The encounter with the patient was in whole, or in part, for the following medical condition, which is the primary reason for durable medical equipment:  Other - JONES    I certify that, based on my findings, the following durable medical equipment is medically necessary:  Oxygen.    HOME O2 Saturation Measurements:(Values must be present for Home Oxygen orders)  Room air sat at rest: 95  Room air sat with amb: 93  With liters of O2: 0, O2 sat at rest with O2: 93  With Liters of O2: 6, O2 sat with amb with O2 : 90  Is the patient mobile?: Yes    My Clinical findings support the need for the above equipment due to:  Hypoxia    Supporting Symptoms: Dypnea      ------------------------------------------------------------------------------------------------------------------    Face to Face Supporting Documentation - Home Health    The encounter with this patient was in whole or in part the primary reason for home health admission.    Date of encounter:   Patient:                    MRN:                       YOB: 2021  Michael Ontiveros  [id number]  1990     Home health to see patient for:  Skilled Nursing care for assessment, interventions & education, Physical Therapy evaluation and treatment and Occupational therapy evaluation and treatment    Skilled need for:  Exacerbation of Chronic Disease State JONES    Skilled nursing interventions to include:  Line/Drain/Airway education and care    Homebound evidenced status  by:  Need the aid of supportive devices such as crutches, canes, wheelchairs or walkers. Leaving home must require a considerable and taxing effort. There must exist a normal inability to leave the home.    Community Physician to provide follow up care: Shawnee Junior P.A.-C.     Optional Interventions    Wound information & treatment:    Home Infusion Therapy orders:    Line/Drain/Airway:    I certify the face to face encounter for this home care referral meets the CMS requirements and the encounter/clinical assessment with the patient was, in whole, or in part, for the medical condition(s) listed above, which is the primary reason for home health care. Based on my clinical findings: the service(s) are medically necessary, support the need for home health care, and the homebound criteria are met.  I certify that this patient has had a face to face encounter by myself.  Cisco Watkins M.D. - NPI: [id number]    *Debility, frailty and advanced age in the absence of an acute deterioration or exacerbation of a condition do not qualify a patient for home health.

## 2021-11-19 NOTE — PROGRESS NOTES
Received bedside report from RN, pt care assumed, VSS, pt assessment complete. Pt AAOx4, with no c/o of pain at this time. No signs of acute distress noted at this time. Plan of care discussed with pt and verbalizes no questions. Pt denies any additional needs at this time. Bed locked/in lowest position, pt educated on fall risk and verbalized understanding, call light within reach, hourly rounding initiated.     COVID - 19 surge in effect, crisis charting.

## 2021-11-19 NOTE — CARE PLAN
Problem: Knowledge Deficit - Standard  Goal: Patient and family/care givers will demonstrate understanding of plan of care, disease process/condition, diagnostic tests and medications  Outcome: Progressing  Note: Patient is educated of disease process and condition. Treatment team has included patient in plan of care. All medications indications and side effects are explained. Patient is encouraged to ask questions. Patient indicates understanding.      Problem: Respiratory  Goal: Patient will achieve/maintain optimum respiratory ventilation and gas exchange  Outcome: Progressing  Flowsheets  Taken 11/19/2021 1220 by Donna Moctezuma RTysonNTyson  O2 Delivery Device: Nasal Cannula  Deep Breathe and Cough: Performs Correctly  Taken 11/16/2021 0800 by Glo Ruiz R.N.  Incentive Spirometer: Needs Assistance  Note: Patient respiratory status assessed. Patient educated on importance of coughing and deep breathing. Deep breaths are encouraged. Educated on how ambulation and movement can affect respiratory status. Patient indicates understanding.    The patient is Stable - Low risk of patient condition declining or worsening    Shift Goals  Clinical Goals: Monitor O2 demand   Patient Goals: rest, lower O2 needs at night   Family Goals: n/a    Progress made toward(s) clinical / shift goals:  ongoing      Patient is not progressing towards the following goals:

## 2021-11-19 NOTE — DISCHARGE PLANNING
Anticipated Discharge Disposition: Home with H.H., oxygen, and DME: ventilator/CPAP     Action:  Received signed and filled out CPAP order form MD Ross; faxed to Sintia at [facility] to [phone]. Placed call to Sintia who is reviewing. Awaiting call-back.    Barriers to Discharge: DME approval and delivery     Plan: Follow-up with [facility], MD's, leadership    UPDATES: Sintia returned my call stating the AVAP that MD Ross ordered is >$5,000 and not currently in stock. RNCM spoke to MD Watkins who reached out to MD Ross for alternate solution.     1450: MD Ross re-reviewed pt chart and states alternative solution possible; awaiting new order form faxed to this RNCM.     1500: Received. Sent new order to [facility]. Sintia getting edouard and stock availability and will call this RNCM back.

## 2021-11-19 NOTE — CARE PLAN
Problem: Knowledge Deficit - Standard  Goal: Patient and family/care givers will demonstrate understanding of plan of care, disease process/condition, diagnostic tests and medications  Outcome: Progressing     Problem: Respiratory  Goal: Patient will achieve/maintain optimum respiratory ventilation and gas exchange  Outcome: Progressing     Problem: Skin Integrity  Goal: Skin integrity is maintained or improved  Outcome: Progressing   The patient is Stable - Low risk of patient condition declining or worsening    Shift Goals  Clinical Goals: Monitor O2 demand   Patient Goals: rest, lower O2 needs at night   Family Goals: n/a    Progress made toward(s) clinical / shift goals: as expected    Patient is not progressing towards the following goals:

## 2021-11-19 NOTE — CARE PLAN
The patient is Watcher - Medium risk of patient condition declining or worsening    Shift Goals  Clinical Goals: Monitor O2 demand through the night  Patient Goals: rest  Family Goals: n/a    Progress made toward(s) clinical / shift goals:        Patient is not progressing towards the following goals:      Problem: Knowledge Deficit - Standard  Goal: Patient and family/care givers will demonstrate understanding of plan of care, disease process/condition, diagnostic tests and medications  Outcome: Progressing     Problem: Respiratory  Goal: Patient will achieve/maintain optimum respiratory ventilation and gas exchange  Outcome: Progressing     Problem: Skin Integrity  Goal: Skin integrity is maintained or improved  Outcome: Progressing

## 2021-11-19 NOTE — PROGRESS NOTES
Inpatient Anticoagulation Service Note    Date: 11/19/2021    Reason for Anticoagulation: Pulmonary Embolism,Deep Vein Thrombosis   Target INR: 2.0 to 3.0         Hemoglobin Value: 14  Hematocrit Value: 48.8  Lab Platelet Value: 262    INR from last 7 days     Date/Time INR Value    11/19/21 0315 2.17    11/18/21 0330 2.2    11/17/21 0231 2.24    11/16/21 0409 1.91    11/15/21 0343 1.76    11/14/21 0354 1.9    11/13/21 0311 2.08        Dose from last 7 days     Date/Time Dose (mg)    11/19/21 1352 5    11/18/21 1443 7.5    11/17/21 1420 5    11/16/21 1616 7.5    11/15/21 1654 10    11/14/21 1106 7.5    11/13/21 1407 5        Average Dose (mg):  (7.5 SunTuesThurs, 5mg AOD)  Significant Interactions: Not Applicable  Bridge Therapy: No   Reversal Agent Administered: Not Applicable  Comments: Continue with home regimen of alternating days of 5/7.5  Education Material Provided?: Yes  Pharmacist suggested discharge dosing: PTA regimen      Yuliet Gonzalez, AdyD  [phone]

## 2021-11-20 LAB
INR PPP: 2.22 (ref 0.87–1.13)
PROTHROMBIN TIME: 23.9 SEC (ref 12–14.6)

## 2021-11-20 PROCEDURE — 700102 HCHG RX REV CODE 250 W/ 637 OVERRIDE(OP): Performed by: INTERNAL MEDICINE

## 2021-11-20 PROCEDURE — 700102 HCHG RX REV CODE 250 W/ 637 OVERRIDE(OP): Performed by: STUDENT IN AN ORGANIZED HEALTH CARE EDUCATION/TRAINING PROGRAM

## 2021-11-20 PROCEDURE — 94660 CPAP INITIATION&MGMT: CPT

## 2021-11-20 PROCEDURE — A9270 NON-COVERED ITEM OR SERVICE: HCPCS | Performed by: INTERNAL MEDICINE

## 2021-11-20 PROCEDURE — 85610 PROTHROMBIN TIME: CPT

## 2021-11-20 PROCEDURE — 770001 HCHG ROOM/CARE - MED/SURG/GYN PRIV*

## 2021-11-20 PROCEDURE — 99232 SBSQ HOSP IP/OBS MODERATE 35: CPT | Performed by: INTERNAL MEDICINE

## 2021-11-20 PROCEDURE — A9270 NON-COVERED ITEM OR SERVICE: HCPCS | Performed by: HOSPITALIST

## 2021-11-20 PROCEDURE — 700102 HCHG RX REV CODE 250 W/ 637 OVERRIDE(OP): Performed by: HOSPITALIST

## 2021-11-20 PROCEDURE — 36415 COLL VENOUS BLD VENIPUNCTURE: CPT

## 2021-11-20 PROCEDURE — A9270 NON-COVERED ITEM OR SERVICE: HCPCS | Performed by: STUDENT IN AN ORGANIZED HEALTH CARE EDUCATION/TRAINING PROGRAM

## 2021-11-20 RX ADMIN — FUROSEMIDE 40 MG: 40 TABLET ORAL at 16:23

## 2021-11-20 RX ADMIN — HYDROCORTISONE: 10 CREAM TOPICAL at 05:43

## 2021-11-20 RX ADMIN — SENNOSIDES AND DOCUSATE SODIUM 2 TABLET: 50; 8.6 TABLET ORAL at 05:43

## 2021-11-20 RX ADMIN — HYDROCORTISONE: 10 CREAM TOPICAL at 17:11

## 2021-11-20 RX ADMIN — FUROSEMIDE 40 MG: 40 TABLET ORAL at 05:43

## 2021-11-20 RX ADMIN — WARFARIN SODIUM 5 MG: 5 TABLET ORAL at 17:10

## 2021-11-20 RX ADMIN — MULTIPLE VITAMINS W/ MINERALS TAB 1 TABLET: TAB at 05:43

## 2021-11-20 RX ADMIN — TAMSULOSIN HYDROCHLORIDE 0.4 MG: 0.4 CAPSULE ORAL at 10:19

## 2021-11-20 ASSESSMENT — ENCOUNTER SYMPTOMS
SHORTNESS OF BREATH: 0
VOMITING: 0
ABDOMINAL PAIN: 0
CHILLS: 0
MYALGIAS: 0
COUGH: 0
FEVER: 0
NAUSEA: 0

## 2021-11-20 NOTE — PROGRESS NOTES
Inpatient Anticoagulation Service Note    Date: 11/20/2021    Reason for Anticoagulation: Pulmonary Embolism,Deep Vein Thrombosis   Target INR: 2.0 to 3.0         Hemoglobin Value: 14  Hematocrit Value: 48.8  Lab Platelet Value: 262    INR from last 7 days     Date/Time INR Value    11/20/21 0317 2.22    11/19/21 0315 2.17    11/18/21 0330 2.2    11/17/21 0231 2.24    11/16/21 0409 1.91    11/15/21 0343 1.76    11/14/21 0354 1.9        Dose from last 7 days     Date/Time Dose (mg)    11/20/21 1227 7.5    11/19/21 1352 5    11/18/21 1443 7.5    11/17/21 1420 5    11/16/21 1616 7.5    11/15/21 1654 10    11/14/21 1106 7.5    11/13/21 1407 5        Average Dose (mg):  (7.5 SunTuesThurs, 5mg AOD)  Significant Interactions: Not Applicable  Bridge Therapy: No   Reversal Agent Administered: Not Applicable  Comments: Continue with home regimen of alternating days of 5/7.5  Education Material Provided?: Yes  Pharmacist suggested discharge dosing: home regimen     Yuliet Gonzalez, AdyD  [phone]

## 2021-11-20 NOTE — PROGRESS NOTES
Hospital Medicine Daily Progress Note    Date of Service  11/20/2021    Chief Complaint  Michael Ontiveros is a 31 y.o. male admitted 11/1/2021 with edema    Hospital Course:    31-year-old male with past medical history of pulmonary embolism and DVT on Coumadin, pulmonary hypertension, JONES, mild intermittent asthma, and morbid obesity with a BMI of 69 who was admitted on 11/1/2021 with bilateral lower extremity swelling, patient had same admission in 7/2021, patient was transferred to ICU for BiPAP however he ended intubated on 11/3 and extubated on 11/5, CTA on admission did not show any PE, echo showed normal ejection fraction of 60% with pulmonary hypertension and right ventricle pressure around 45 mmHg, patient also have hypercapnia, Lasix was started, patient was evaluated by pulmonologist and recommended CPAP.    Home oxygen eval showed the patient needs 24/7 oxygen therapy around 6 L especially during sleeping..    He had difficult Matthew placement requiring urology consultation with cystoscopy for placement, voiding trial was done on 11/16    Interval Problem Update  -Evaluate and examined the patient at bedside,   -No events last night  -Still working with  to get approval for CPAP machine before discharge.  The company is closed today  -Continue oral Lasix and oxygen therapy.    I have personally seen and examined the patient at bedside. I discussed the plan of care with patient, bedside RN, charge RN,  and pharmacy.    Consultants/Specialty  critical care and pulmonary    Code Status  Full Code    Disposition  Patient is not medically cleared.  Patient needs CPAP machine before discharge, still working with companies to get CPAP  Anticipate discharge to home after receiving CPAP machine.  I have placed the appropriate orders for post-discharge needs.    Review of Systems  Review of Systems   Constitutional: Negative for chills, fever and malaise/fatigue.   Respiratory:  Negative for cough and shortness of breath.    Cardiovascular: Positive for leg swelling (chronic).   Gastrointestinal: Negative for abdominal pain, nausea and vomiting.   Musculoskeletal: Negative for myalgias.   Skin: Positive for rash.   All other systems reviewed and are negative.       Physical Exam  Temp:  [35.9 °C (96.7 °F)-37.1 °C (98.8 °F)] 36.1 °C (96.9 °F)  Pulse:  [72-93] 72  Resp:  [12-18] 12  BP: (110-130)/(60-85) 125/85  SpO2:  [90 %-96 %] 96 %    Physical Exam  Vitals and nursing note reviewed.   Constitutional:       General: He is not in acute distress.     Appearance: He is well-developed. He is obese. He is not ill-appearing.   HENT:      Head: Normocephalic and atraumatic.   Eyes:      Conjunctiva/sclera: Conjunctivae normal.   Neck:      Vascular: No JVD.      Trachea: No tracheal deviation.   Cardiovascular:      Rate and Rhythm: Normal rate and regular rhythm.      Heart sounds: No murmur heard.      Pulmonary:      Effort: Pulmonary effort is normal. No respiratory distress.      Breath sounds: No wheezing or rales.      Comments: On oxymask   Abdominal:      General: Bowel sounds are normal. There is no distension.      Palpations: Abdomen is soft.      Tenderness: There is no abdominal tenderness.   Musculoskeletal:         General: Swelling present. No tenderness.      Cervical back: Neck supple.      Right lower leg: Edema present.      Left lower leg: Edema present.   Skin:     General: Skin is warm and dry.      Findings: Erythema (b/l LE, chronic ) and rash (right arm, improving) present.      Nails: There is no clubbing.   Neurological:      General: No focal deficit present.      Mental Status: He is alert and oriented to person, place, and time. Mental status is at baseline.      Cranial Nerves: No cranial nerve deficit.      Motor: No abnormal muscle tone.   Psychiatric:         Mood and Affect: Mood normal.         Behavior: Behavior normal.         Fluids    Intake/Output  Summary (Last 24 hours) at 11/20/2021 1429  Last data filed at 11/20/2021 1053  Gross per 24 hour   Intake 240 ml   Output --   Net 240 ml       Laboratory  Recent Labs     11/19/21  0525   WBC 4.8   RBC 5.87   HEMOGLOBIN 14.0   HEMATOCRIT 48.8   MCV 83.1   MCH 23.9*   MCHC 28.7*   RDW 69.2*   PLATELETCT 262     Recent Labs     11/19/21  0315   SODIUM 133*   POTASSIUM 4.0   CHLORIDE 94*   CO2 30   GLUCOSE 103*   BUN 18   CREATININE 0.48*   CALCIUM 10.0     Recent Labs     11/18/21  0330 11/19/21  0315 11/20/21  0317   INR 2.20* 2.17* 2.22*               Imaging  DX-CHEST-LIMITED (1 VIEW)   Final Result      Improving basilar aeration with increase in lung volumes following extubation      Continued moderate bilateral consolidation could be from edema and/or infection      DX-CHEST-PORTABLE (1 VIEW)   Final Result      1.  No significant interval change.         DX-CHEST-PORTABLE (1 VIEW)   Final Result      1.  Probable mild worsening pulmonary edema.   2.  Increased bibasilar atelectasis.   3.  Cannot exclude trace pleural effusions.      DX-ABDOMEN FOR TUBE PLACEMENT   Final Result      Enteric tube tip projects over the stomach      IR-PICC LINE PLACEMENT W/ GUIDANCE > AGE 5   Final Result                  Ultrasound-guided PICC placement performed by qualified nursing staff as    above.          DX-CHEST-PORTABLE (1 VIEW)   Final Result      1.  Interval intubation   2.  BILATERAL atelectasis with possible superimposed pulmonary edema or pneumonia, unchanged      DX-CHEST-LIMITED (1 VIEW)   Final Result      Worsening diffuse hazy opacity compatible with edema or infection      EC-ECHOCARDIOGRAM COMPLETE W/ CONT   Final Result      CT-ABDOMEN-PELVIS WITH   Final Result         Limited exam due to body habitus.      1. There is skin thickening and subcutaneous edema in the low anterior abdominal and pelvic wall panus. This could relate to fluid overload but infection and cellulitis can have similar appearance.  Correlate clinically.      2. Mild perihepatic ascites.      3. Diffuse scrotal edema and moderate hydrocele.         CT-CTA CHEST PULMONARY ARTERY W/ RECONS   Final Result         1. No CT evidence of pulmonary embolism.      2. Mild hazy groundglass opacity throughout both lungs could relate to mild fluid overload.      3. Cardiomegaly.      4. No airspace consolidation. Linear right midlung atelectasis.         DX-CHEST-PORTABLE (1 VIEW)   Final Result         Stable cardiomegaly.      Mild interstitial prominence could relate to mild fluid overload.           Assessment/Plan  * Pulmonary hypertension (HCC)- (present on admission)  Assessment & Plan  Likely related to obstructive sleep apnea   RVSP 45 mmHg  On anticoagulation for prior history of PE  CTA on 11/1/2021 - for acute PE  Continue BiPAP therapy at bedtime  Continue diuresis monitor intake and output and renal function electrolytes with diuresis    JONES (obstructive sleep apnea)- (present on admission)  Assessment & Plan  Continue nocturnal BiPAP  Pulmonology consulted, ordered noninvasive ventilator for home until patient can have outpatient sleep study    Cellulitis  Assessment & Plan  Versus chronic stasis dermatitis  Finished course of keflex   Continue skin care and clinical monitoring    History of pulmonary embolism- (present on admission)  Assessment & Plan  Continue warfarin    Chronic anticoagulation- (present on admission)  Assessment & Plan  History of PE and DVT, had a trial on Xarelto developed DVT  Continue anticoagulation with warfarin and monitor INR    Acute on chronic respiratory failure with hypoxia and hypercapnia (HCC)- (present on admission)  Assessment & Plan  Now on oxymask, continue to wean, home O2 eval when appropriate   Continue diuresis  Continue nocturnal BiPAP    Lower extremity edema- (present on admission)  Assessment & Plan  Continue diuresis    Morbid obesity (HCC)- (present on admission)  Assessment & Plan  Body mass  index is 69.25 kg/m².    Asthma- (present on admission)  Assessment & Plan  No acute exacerbation   bronchodilators as needed per RT protocol        Urinary retention  Assessment & Plan  With difficult Matthew catheter placement by urology  Started on Flomax  Voiding trial on 11/16 follow-up with bladder scan    Pulmonary embolism with acute cor pulmonale (HCC)- (present on admission)  Assessment & Plan  History of PE  Continue anticoagulation with warfarin and monitor INR       VTE prophylaxis: therapeutic anticoagulation with Warfarin    I have performed a physical exam and reviewed and updated ROS and Plan today (11/20/2021). In review of yesterday's note (11/19/2021), there are no changes except as documented above.

## 2021-11-20 NOTE — PROGRESS NOTES
Bedside report received from night RN; assumed pt care. Pt assessment complete. Pt A&Ox4 Reviewed plan of care with pt. Chart and labs reviewed. Bed in lowest position, and 2 side rails up. Pt educated on call light; call light with in reach.

## 2021-11-20 NOTE — DISCHARGE PLANNING
Agency/Facility Name: [facility]  Outcome: This DPA left a vmail. Awaiting call back.       Agency/Facility Name: [facility] ([phone])   Outcome: This DPA left a vmail. Awaiting call back       1107  Agency/Facility Name: [facility]   Spoke To: Philippe   Outcome: Per Philippe will call back with referral outcome.     RN MARGARET notified.

## 2021-11-20 NOTE — PROGRESS NOTES
Hospital Medicine Daily Progress Note    Date of Service  11/19/2021    Chief Complaint  Michael Ontiveros is a 31 y.o. male admitted 11/1/2021 with edema    Hospital Course:    31-year-old male with past medical history of pulmonary embolism and DVT on Coumadin, pulmonary hypertension, JONES, mild intermittent asthma, and morbid obesity with a BMI of 69 who was admitted on 11/1/2021 with bilateral lower extremity swelling, patient had same admission in 7/2021, patient was transferred to ICU for BiPAP however he ended intubated on 11/3 and extubated on 11/5, CTA on admission did not show any PE, echo showed normal ejection fraction of 60% with pulmonary hypertension and right ventricle pressure around 45 mmHg, patient also have hypercapnia, Lasix was started, patient was evaluated by pulmonologist and recommended CPAP.    Home oxygen eval showed the patient needs 24/7 oxygen therapy around 6 L.    He had difficult Matthew placement requiring urology consultation with cystoscopy for placement, voiding trial was done on 11/16    He is set up to have a sleep study on 11/8/2021 for evaluation for BiPAP therapy.     Interval Problem Update  -Evaluate and examined the patient at bedside,   -No events last night  -Still working with pulmonologist and  to get approval for CPAP machine before discharge.    I have personally seen and examined the patient at bedside. I discussed the plan of care with patient, bedside RN, charge RN,  and pharmacy.    Consultants/Specialty  critical care and pulmonary    Code Status  Full Code    Disposition  Patient is not medically cleared.  Patient needs CPAP machine before discharge, still working with companies to get CPAP  Anticipate discharge to home after receiving CPAP machine.  I have placed the appropriate orders for post-discharge needs.    Review of Systems  Review of Systems   Constitutional: Negative for chills, fever and malaise/fatigue.   Respiratory:  Negative for cough and shortness of breath.    Cardiovascular: Positive for leg swelling (chronic).   Gastrointestinal: Negative for abdominal pain, nausea and vomiting.   Musculoskeletal: Negative for myalgias.   Skin: Positive for rash.   All other systems reviewed and are negative.       Physical Exam  Temp:  [35.9 °C (96.7 °F)-37.1 °C (98.7 °F)] 36 °C (96.8 °F)  Pulse:  [80-90] 87  Resp:  [16-18] 18  BP: (115-132)/(65-85) 123/74  SpO2:  [91 %-96 %] 91 %    Physical Exam  Vitals and nursing note reviewed.   Constitutional:       General: He is not in acute distress.     Appearance: He is well-developed. He is obese. He is not ill-appearing.   HENT:      Head: Normocephalic and atraumatic.   Eyes:      Conjunctiva/sclera: Conjunctivae normal.   Neck:      Vascular: No JVD.      Trachea: No tracheal deviation.   Cardiovascular:      Rate and Rhythm: Normal rate and regular rhythm.      Heart sounds: No murmur heard.      Pulmonary:      Effort: Pulmonary effort is normal. No respiratory distress.      Breath sounds: No wheezing or rales.      Comments: On oxymask   Abdominal:      General: Bowel sounds are normal. There is no distension.      Palpations: Abdomen is soft.      Tenderness: There is no abdominal tenderness.   Musculoskeletal:         General: Swelling present. No tenderness.      Cervical back: Neck supple.      Right lower leg: Edema present.      Left lower leg: Edema present.   Skin:     General: Skin is warm and dry.      Findings: Erythema (b/l LE, chronic ) and rash (right arm, improving) present.      Nails: There is no clubbing.   Neurological:      General: No focal deficit present.      Mental Status: He is alert and oriented to person, place, and time. Mental status is at baseline.      Cranial Nerves: No cranial nerve deficit.      Motor: No abnormal muscle tone.   Psychiatric:         Mood and Affect: Mood normal.         Behavior: Behavior normal.         Fluids    Intake/Output Summary  (Last 24 hours) at 11/19/2021 1628  Last data filed at 11/19/2021 0954  Gross per 24 hour   Intake 720 ml   Output 301 ml   Net 419 ml       Laboratory  Recent Labs     11/19/21  0525   WBC 4.8   RBC 5.87   HEMOGLOBIN 14.0   HEMATOCRIT 48.8   MCV 83.1   MCH 23.9*   MCHC 28.7*   RDW 69.2*   PLATELETCT 262     Recent Labs     11/19/21  0315   SODIUM 133*   POTASSIUM 4.0   CHLORIDE 94*   CO2 30   GLUCOSE 103*   BUN 18   CREATININE 0.48*   CALCIUM 10.0     Recent Labs     11/17/21  0231 11/18/21  0330 11/19/21  0315   INR 2.24* 2.20* 2.17*               Imaging  DX-CHEST-LIMITED (1 VIEW)   Final Result      Improving basilar aeration with increase in lung volumes following extubation      Continued moderate bilateral consolidation could be from edema and/or infection      DX-CHEST-PORTABLE (1 VIEW)   Final Result      1.  No significant interval change.         DX-CHEST-PORTABLE (1 VIEW)   Final Result      1.  Probable mild worsening pulmonary edema.   2.  Increased bibasilar atelectasis.   3.  Cannot exclude trace pleural effusions.      DX-ABDOMEN FOR TUBE PLACEMENT   Final Result      Enteric tube tip projects over the stomach      IR-PICC LINE PLACEMENT W/ GUIDANCE > AGE 5   Final Result                  Ultrasound-guided PICC placement performed by qualified nursing staff as    above.          DX-CHEST-PORTABLE (1 VIEW)   Final Result      1.  Interval intubation   2.  BILATERAL atelectasis with possible superimposed pulmonary edema or pneumonia, unchanged      DX-CHEST-LIMITED (1 VIEW)   Final Result      Worsening diffuse hazy opacity compatible with edema or infection      EC-ECHOCARDIOGRAM COMPLETE W/ CONT   Final Result      CT-ABDOMEN-PELVIS WITH   Final Result         Limited exam due to body habitus.      1. There is skin thickening and subcutaneous edema in the low anterior abdominal and pelvic wall panus. This could relate to fluid overload but infection and cellulitis can have similar appearance.  Correlate clinically.      2. Mild perihepatic ascites.      3. Diffuse scrotal edema and moderate hydrocele.         CT-CTA CHEST PULMONARY ARTERY W/ RECONS   Final Result         1. No CT evidence of pulmonary embolism.      2. Mild hazy groundglass opacity throughout both lungs could relate to mild fluid overload.      3. Cardiomegaly.      4. No airspace consolidation. Linear right midlung atelectasis.         DX-CHEST-PORTABLE (1 VIEW)   Final Result         Stable cardiomegaly.      Mild interstitial prominence could relate to mild fluid overload.           Assessment/Plan  * Pulmonary hypertension (HCC)- (present on admission)  Assessment & Plan  Likely related to obstructive sleep apnea   RVSP 45 mmHg  On anticoagulation for prior history of PE  CTA on 11/1/2021 - for acute PE  Continue BiPAP therapy at bedtime  Continue diuresis monitor intake and output and renal function electrolytes with diuresis    JONES (obstructive sleep apnea)- (present on admission)  Assessment & Plan  Continue nocturnal BiPAP  Pulmonology consulted, ordered noninvasive ventilator for home until patient can have outpatient sleep study    Cellulitis  Assessment & Plan  Versus chronic stasis dermatitis  Finished course of keflex   Continue skin care and clinical monitoring    History of pulmonary embolism- (present on admission)  Assessment & Plan  Continue warfarin    Chronic anticoagulation- (present on admission)  Assessment & Plan  History of PE and DVT, had a trial on Xarelto developed DVT  Continue anticoagulation with warfarin and monitor INR    Acute on chronic respiratory failure with hypoxia and hypercapnia (HCC)- (present on admission)  Assessment & Plan  Now on oxymask, continue to wean, home O2 eval when appropriate   Continue diuresis  Continue nocturnal BiPAP    Lower extremity edema- (present on admission)  Assessment & Plan  Continue diuresis    Morbid obesity (HCC)- (present on admission)  Assessment & Plan  Body mass  index is 69.25 kg/m².    Asthma- (present on admission)  Assessment & Plan  No acute exacerbation   bronchodilators as needed per RT protocol        Urinary retention  Assessment & Plan  With difficult Matthew catheter placement by urology  Started on Flomax  Voiding trial on 11/16 follow-up with bladder scan    Pulmonary embolism with acute cor pulmonale (HCC)- (present on admission)  Assessment & Plan  History of PE  Continue anticoagulation with warfarin and monitor INR       VTE prophylaxis: therapeutic anticoagulation with Warfarin    I have performed a physical exam and reviewed and updated ROS and Plan today (11/19/2021). In review of yesterday's note (11/18/2021), there are no changes except as documented above.

## 2021-11-20 NOTE — CARE PLAN
"  Problem: Knowledge Deficit - Standard  Goal: Patient and family/care givers will demonstrate understanding of plan of care, disease process/condition, diagnostic tests and medications  Outcome: Progressing     Problem: Skin Integrity  Goal: Skin integrity is maintained or improved  11/20/2021 0305 by Cisco Bird RBIGG  Outcome: Progressing    Problem: Discharge Barriers/Planning  Goal: Patient's continuum of care needs are met  Outcome: Progressing slower than expected   Note: Pt is still pending CPAP approval for discharge      The patient is Stable - Low risk of patient condition declining or worsening    Shift Goals  Clinical Goals: Monitor O2 needs   Patient Goals: 'go home\"   Family Goals: N/A     Progress made toward(s) clinical / shift goals: as expected     Patient is not progressing towards the following goals:      "

## 2021-11-21 LAB
INR PPP: 2.25 (ref 0.87–1.13)
PROTHROMBIN TIME: 24.2 SEC (ref 12–14.6)

## 2021-11-21 PROCEDURE — 36415 COLL VENOUS BLD VENIPUNCTURE: CPT

## 2021-11-21 PROCEDURE — A9270 NON-COVERED ITEM OR SERVICE: HCPCS | Performed by: HOSPITALIST

## 2021-11-21 PROCEDURE — 94660 CPAP INITIATION&MGMT: CPT

## 2021-11-21 PROCEDURE — 700102 HCHG RX REV CODE 250 W/ 637 OVERRIDE(OP): Performed by: STUDENT IN AN ORGANIZED HEALTH CARE EDUCATION/TRAINING PROGRAM

## 2021-11-21 PROCEDURE — A9270 NON-COVERED ITEM OR SERVICE: HCPCS | Performed by: STUDENT IN AN ORGANIZED HEALTH CARE EDUCATION/TRAINING PROGRAM

## 2021-11-21 PROCEDURE — 700102 HCHG RX REV CODE 250 W/ 637 OVERRIDE(OP): Performed by: HOSPITALIST

## 2021-11-21 PROCEDURE — 770001 HCHG ROOM/CARE - MED/SURG/GYN PRIV*

## 2021-11-21 PROCEDURE — 700102 HCHG RX REV CODE 250 W/ 637 OVERRIDE(OP): Performed by: INTERNAL MEDICINE

## 2021-11-21 PROCEDURE — 85610 PROTHROMBIN TIME: CPT

## 2021-11-21 PROCEDURE — 94760 N-INVAS EAR/PLS OXIMETRY 1: CPT

## 2021-11-21 PROCEDURE — A9270 NON-COVERED ITEM OR SERVICE: HCPCS | Performed by: INTERNAL MEDICINE

## 2021-11-21 PROCEDURE — 99232 SBSQ HOSP IP/OBS MODERATE 35: CPT | Performed by: INTERNAL MEDICINE

## 2021-11-21 RX ADMIN — WARFARIN SODIUM 7.5 MG: 7.5 TABLET ORAL at 19:02

## 2021-11-21 RX ADMIN — TAMSULOSIN HYDROCHLORIDE 0.4 MG: 0.4 CAPSULE ORAL at 09:09

## 2021-11-21 RX ADMIN — FUROSEMIDE 40 MG: 40 TABLET ORAL at 19:02

## 2021-11-21 RX ADMIN — FUROSEMIDE 40 MG: 40 TABLET ORAL at 06:21

## 2021-11-21 RX ADMIN — HYDROCORTISONE: 10 CREAM TOPICAL at 06:25

## 2021-11-21 RX ADMIN — SENNOSIDES AND DOCUSATE SODIUM 2 TABLET: 50; 8.6 TABLET ORAL at 19:01

## 2021-11-21 RX ADMIN — MULTIPLE VITAMINS W/ MINERALS TAB 1 TABLET: TAB at 06:21

## 2021-11-21 ASSESSMENT — ENCOUNTER SYMPTOMS
VOMITING: 0
ABDOMINAL PAIN: 0
MYALGIAS: 0
NAUSEA: 0
COUGH: 0
BACK PAIN: 0
FEVER: 0
NECK PAIN: 0
CHILLS: 0
SHORTNESS OF BREATH: 0

## 2021-11-21 NOTE — PROGRESS NOTES
Inpatient Anticoagulation Service Note    Date: 11/21/2021    Reason for Anticoagulation: Pulmonary Embolism,Deep Vein Thrombosis   Target INR: 2.0 to 3.0         Hemoglobin Value: 14  Hematocrit Value: 48.8  Lab Platelet Value: 262    INR from last 7 days     Date/Time INR Value    11/21/21 0620 2.25    11/20/21 0317 2.22    11/19/21 0315 2.17    11/18/21 0330 2.2    11/17/21 0231 2.24    11/16/21 0409 1.91    11/15/21 0343 1.76        Dose from last 7 days     Date/Time Dose (mg)    11/21/21 1200 7.5    11/20/21 1227 5    11/19/21 1352 5    11/18/21 1443 7.5    11/17/21 1420 5    11/16/21 1616 7.5    11/15/21 1654 10        Average Dose (mg):  (7.5 SunTuesThurs, 5mg AOD)  Significant Interactions: Not Applicable  Bridge Therapy: No     Reversal Agent Administered: Not Applicable    Comments:   30 y/o M w/ PMHx of PE and DVT on Coumadin, pulmonary hypertension, JONES, mild intermittent asthma, and morbid obesity with a BMI of 69 who was admitted on 11/1/2021 with bilateral lower extremity swelling. DDI noted. No documented signs of overt bleeding.     Plan:  Warfarin 7.5mg po daily , INR in AM.    Education Material Provided?: Yes  Pharmacist suggested discharge dosing: Warfarin 7.5mg Sun,Tues,Thurs, 5mg AOD. Follow up with anticoagulation clinic after discharge.      Peter Dukes, PharmD, BCPS

## 2021-11-21 NOTE — CARE PLAN
Problem: Skin Integrity  Goal: Skin integrity is maintained or improved  Outcome: Progressing     Problem: Respiratory  Goal: Patient will achieve/maintain optimum respiratory ventilation and gas exchange  Outcome: Progressing     Problem: Knowledge Deficit - Standard  Goal: Patient and family/care givers will demonstrate understanding of plan of care, disease process/condition, diagnostic tests and medications  Outcome: Progressing   The patient is Stable - Low risk of patient condition declining or worsening    Shift Goals  Clinical Goals: Monitor 02 sats, comfort  Patient Goals: Discharge  Family Goals: n/a    Progress made toward(s) clinical / shift goals:  Educate patient of available PRN pain medication per MAR. Reinforce fall/safety precautions.     Patient is not progressing towards the following goals:

## 2021-11-21 NOTE — PROGRESS NOTES
1723 Received report from Lisa MARSHALL    1845 Patient arrived to unit. Patient states all belongings in possession, including; oxygen concentrator, home oxygen, phone, iPad, switch, the chargers, and the patients clothing.

## 2021-11-21 NOTE — CARE PLAN
Problem: Respiratory  Goal: Patient will achieve/maintain optimum respiratory ventilation and gas exchange  Outcome: Progressing     Problem: Skin Integrity  Goal: Skin integrity is maintained or improved  Outcome: Progressing   The patient is Stable - Low risk of patient condition declining or worsening    Shift Goals  Clinical Goals: Monitor 02 sats, comfort  Patient Goals: Discharge  Family Goals: n/a    Progress made toward(s) clinical / shift goals:  yes    Patient is not progressing towards the following goals: n/a

## 2021-11-21 NOTE — PROGRESS NOTES
Hospital Medicine Daily Progress Note    Date of Service  11/21/2021    Chief Complaint  Michael Ontiveros is a 31 y.o. male admitted 11/1/2021 with edema    Hospital Course:    31-year-old male with past medical history of pulmonary embolism and DVT on Coumadin, pulmonary hypertension, JONES, mild intermittent asthma, and morbid obesity with a BMI of 69 who was admitted on 11/1/2021 with bilateral lower extremity swelling, patient had same admission in 7/2021, patient was transferred to ICU for BiPAP however he ended intubated on 11/3 and extubated on 11/5, CTA on admission did not show any PE, echo showed normal ejection fraction of 60% with pulmonary hypertension and right ventricle pressure around 45 mmHg, patient also have hypercapnia, Lasix was started, patient was evaluated by pulmonologist and recommended CPAP.    Home oxygen eval showed the patient needs 24/7 oxygen therapy around 6 L especially during sleeping..    He had difficult Matthew placement requiring urology consultation with cystoscopy for placement, voiding trial was done on 11/16    Interval Problem Update  Patient seen and examined, afebrile, no overnight events, no nausea or vomiting   -Still working with  to get approval for CPAP machine before discharge. -Continue oral Lasix and oxygen therapy.    I have personally seen and examined the patient at bedside. I discussed the plan of care with patient, bedside RN, charge RN,  and pharmacy.    Consultants/Specialty  critical care and pulmonary    Code Status  Full Code    Disposition  Patient is not medically cleared.  Patient needs CPAP machine before discharge, still working with companies to get CPAP  Anticipate discharge to home after receiving CPAP machine.  I have placed the appropriate orders for post-discharge needs.    Review of Systems  Review of Systems   Constitutional: Negative for chills, fever and malaise/fatigue.   Respiratory: Negative for cough and  shortness of breath.    Cardiovascular: Positive for leg swelling (chronic).   Gastrointestinal: Negative for abdominal pain, nausea and vomiting.   Musculoskeletal: Negative for back pain, myalgias and neck pain.   Skin: Positive for rash.   All other systems reviewed and are negative.       Physical Exam  Temp:  [36.1 °C (96.9 °F)-36.5 °C (97.7 °F)] 36.3 °C (97.3 °F)  Pulse:  [76-98] 88  Resp:  [15-16] 16  BP: (115-123)/(63-79) 123/79  SpO2:  [92 %-98 %] 98 %    Physical Exam  Vitals and nursing note reviewed.   Constitutional:       General: He is not in acute distress.     Appearance: He is well-developed. He is obese. He is not ill-appearing.   HENT:      Head: Normocephalic and atraumatic.   Eyes:      Conjunctiva/sclera: Conjunctivae normal.   Neck:      Vascular: No JVD.      Trachea: No tracheal deviation.   Cardiovascular:      Rate and Rhythm: Normal rate and regular rhythm.      Heart sounds: No murmur heard.      Pulmonary:      Effort: Pulmonary effort is normal. No respiratory distress.      Breath sounds: No wheezing or rales.      Comments: On oxymask   Abdominal:      General: Bowel sounds are normal.      Palpations: Abdomen is soft.      Tenderness: There is no abdominal tenderness. There is no guarding or rebound.   Musculoskeletal:         General: Swelling present. No tenderness.      Cervical back: Neck supple.      Right lower leg: Edema present.      Left lower leg: Edema present.   Skin:     General: Skin is warm and dry.      Findings: Erythema (b/l LE, chronic ) and rash (right arm, improving) present.      Nails: There is no clubbing.   Neurological:      General: No focal deficit present.      Mental Status: He is alert and oriented to person, place, and time. Mental status is at baseline.      Cranial Nerves: No cranial nerve deficit.      Motor: No abnormal muscle tone.   Psychiatric:         Mood and Affect: Mood normal.         Behavior: Behavior normal.          Fluids    Intake/Output Summary (Last 24 hours) at 11/21/2021 1046  Last data filed at 11/20/2021 1053  Gross per 24 hour   Intake 240 ml   Output --   Net 240 ml       Laboratory  Recent Labs     11/19/21  0525   WBC 4.8   RBC 5.87   HEMOGLOBIN 14.0   HEMATOCRIT 48.8   MCV 83.1   MCH 23.9*   MCHC 28.7*   RDW 69.2*   PLATELETCT 262     Recent Labs     11/19/21  0315   SODIUM 133*   POTASSIUM 4.0   CHLORIDE 94*   CO2 30   GLUCOSE 103*   BUN 18   CREATININE 0.48*   CALCIUM 10.0     Recent Labs     11/19/21  0315 11/20/21  0317 11/21/21  0620   INR 2.17* 2.22* 2.25*               Imaging  DX-CHEST-LIMITED (1 VIEW)   Final Result      Improving basilar aeration with increase in lung volumes following extubation      Continued moderate bilateral consolidation could be from edema and/or infection      DX-CHEST-PORTABLE (1 VIEW)   Final Result      1.  No significant interval change.         DX-CHEST-PORTABLE (1 VIEW)   Final Result      1.  Probable mild worsening pulmonary edema.   2.  Increased bibasilar atelectasis.   3.  Cannot exclude trace pleural effusions.      DX-ABDOMEN FOR TUBE PLACEMENT   Final Result      Enteric tube tip projects over the stomach      IR-PICC LINE PLACEMENT W/ GUIDANCE > AGE 5   Final Result                  Ultrasound-guided PICC placement performed by qualified nursing staff as    above.          DX-CHEST-PORTABLE (1 VIEW)   Final Result      1.  Interval intubation   2.  BILATERAL atelectasis with possible superimposed pulmonary edema or pneumonia, unchanged      DX-CHEST-LIMITED (1 VIEW)   Final Result      Worsening diffuse hazy opacity compatible with edema or infection      EC-ECHOCARDIOGRAM COMPLETE W/ CONT   Final Result      CT-ABDOMEN-PELVIS WITH   Final Result         Limited exam due to body habitus.      1. There is skin thickening and subcutaneous edema in the low anterior abdominal and pelvic wall panus. This could relate to fluid overload but infection and cellulitis  can have similar appearance. Correlate clinically.      2. Mild perihepatic ascites.      3. Diffuse scrotal edema and moderate hydrocele.         CT-CTA CHEST PULMONARY ARTERY W/ RECONS   Final Result         1. No CT evidence of pulmonary embolism.      2. Mild hazy groundglass opacity throughout both lungs could relate to mild fluid overload.      3. Cardiomegaly.      4. No airspace consolidation. Linear right midlung atelectasis.         DX-CHEST-PORTABLE (1 VIEW)   Final Result         Stable cardiomegaly.      Mild interstitial prominence could relate to mild fluid overload.           Assessment/Plan  * Pulmonary hypertension (HCC)- (present on admission)  Assessment & Plan  Likely related to obstructive sleep apnea   RVSP 45 mmHg  On anticoagulation for prior history of PE  CTA on 11/1/2021 - for acute PE  Continue BiPAP therapy at bedtime  Continue diuresis monitor intake and output and renal function electrolytes with diuresis    Urinary retention  Assessment & Plan  With difficult Matthew catheter placement by urology  Started on Flomax  Voiding trial on 11/16 follow-up with bladder scan    Cellulitis  Assessment & Plan  Versus chronic stasis dermatitis  Finished course of keflex   Continue skin care and clinical monitoring    History of pulmonary embolism- (present on admission)  Assessment & Plan  Continue warfarin    Chronic anticoagulation- (present on admission)  Assessment & Plan  History of PE and DVT, had a trial on Xarelto developed DVT  Continue anticoagulation with warfarin and monitor INR    Pulmonary embolism with acute cor pulmonale (HCC)- (present on admission)  Assessment & Plan  History of PE  Continue anticoagulation with warfarin and monitor INR    JONES (obstructive sleep apnea)- (present on admission)  Assessment & Plan  Continue nocturnal BiPAP  Pulmonology consulted, ordered noninvasive ventilator for home until patient can have outpatient sleep study    Acute on chronic respiratory  failure with hypoxia and hypercapnia (HCC)- (present on admission)  Assessment & Plan  Now on oxymask, continue to wean, home O2 eval when appropriate   Continue diuresis  Continue nocturnal BiPAP    Lower extremity edema- (present on admission)  Assessment & Plan  Continue diuresis    Morbid obesity (HCC)- (present on admission)  Assessment & Plan  Body mass index is 69.25 kg/m².    Asthma- (present on admission)  Assessment & Plan  No acute exacerbation   bronchodilators as needed per RT protocol           VTE prophylaxis: therapeutic anticoagulation with Warfarin    I have performed a physical exam and reviewed and updated ROS and Plan today (11/21/2021). In review of yesterday's note (11/20/2021), there are no changes except as documented above.

## 2021-11-22 LAB
ANION GAP SERPL CALC-SCNC: 9 MMOL/L (ref 7–16)
BUN SERPL-MCNC: 18 MG/DL (ref 8–22)
CALCIUM SERPL-MCNC: 9.5 MG/DL (ref 8.5–10.5)
CHLORIDE SERPL-SCNC: 97 MMOL/L (ref 96–112)
CO2 SERPL-SCNC: 29 MMOL/L (ref 20–33)
CREAT SERPL-MCNC: 0.51 MG/DL (ref 0.5–1.4)
ERYTHROCYTE [DISTWIDTH] IN BLOOD BY AUTOMATED COUNT: 67.5 FL (ref 35.9–50)
GLUCOSE SERPL-MCNC: 102 MG/DL (ref 65–99)
HCT VFR BLD AUTO: 47 % (ref 42–52)
HGB BLD-MCNC: 13.6 G/DL (ref 14–18)
INR PPP: 2.03 (ref 0.87–1.13)
MCH RBC QN AUTO: 23.8 PG (ref 27–33)
MCHC RBC AUTO-ENTMCNC: 28.9 G/DL (ref 33.7–35.3)
MCV RBC AUTO: 82.3 FL (ref 81.4–97.8)
PLATELET # BLD AUTO: 303 K/UL (ref 164–446)
PMV BLD AUTO: 10.4 FL (ref 9–12.9)
POTASSIUM SERPL-SCNC: 4.2 MMOL/L (ref 3.6–5.5)
PROTHROMBIN TIME: 22.3 SEC (ref 12–14.6)
RBC # BLD AUTO: 5.71 M/UL (ref 4.7–6.1)
SODIUM SERPL-SCNC: 135 MMOL/L (ref 135–145)
WBC # BLD AUTO: 6.4 K/UL (ref 4.8–10.8)

## 2021-11-22 PROCEDURE — 770001 HCHG ROOM/CARE - MED/SURG/GYN PRIV*

## 2021-11-22 PROCEDURE — 94760 N-INVAS EAR/PLS OXIMETRY 1: CPT

## 2021-11-22 PROCEDURE — A9270 NON-COVERED ITEM OR SERVICE: HCPCS | Performed by: INTERNAL MEDICINE

## 2021-11-22 PROCEDURE — A9270 NON-COVERED ITEM OR SERVICE: HCPCS | Performed by: STUDENT IN AN ORGANIZED HEALTH CARE EDUCATION/TRAINING PROGRAM

## 2021-11-22 PROCEDURE — 36415 COLL VENOUS BLD VENIPUNCTURE: CPT

## 2021-11-22 PROCEDURE — A9270 NON-COVERED ITEM OR SERVICE: HCPCS | Performed by: HOSPITALIST

## 2021-11-22 PROCEDURE — 700102 HCHG RX REV CODE 250 W/ 637 OVERRIDE(OP): Performed by: INTERNAL MEDICINE

## 2021-11-22 PROCEDURE — 85610 PROTHROMBIN TIME: CPT

## 2021-11-22 PROCEDURE — 85027 COMPLETE CBC AUTOMATED: CPT

## 2021-11-22 PROCEDURE — 700102 HCHG RX REV CODE 250 W/ 637 OVERRIDE(OP): Performed by: HOSPITALIST

## 2021-11-22 PROCEDURE — 700102 HCHG RX REV CODE 250 W/ 637 OVERRIDE(OP): Performed by: STUDENT IN AN ORGANIZED HEALTH CARE EDUCATION/TRAINING PROGRAM

## 2021-11-22 PROCEDURE — 80048 BASIC METABOLIC PNL TOTAL CA: CPT

## 2021-11-22 PROCEDURE — 99232 SBSQ HOSP IP/OBS MODERATE 35: CPT | Performed by: INTERNAL MEDICINE

## 2021-11-22 RX ADMIN — WARFARIN SODIUM 5 MG: 5 TABLET ORAL at 18:25

## 2021-11-22 RX ADMIN — FUROSEMIDE 40 MG: 40 TABLET ORAL at 15:40

## 2021-11-22 RX ADMIN — TAMSULOSIN HYDROCHLORIDE 0.4 MG: 0.4 CAPSULE ORAL at 09:25

## 2021-11-22 RX ADMIN — FUROSEMIDE 40 MG: 40 TABLET ORAL at 05:30

## 2021-11-22 RX ADMIN — HYDROCORTISONE: 10 CREAM TOPICAL at 05:29

## 2021-11-22 RX ADMIN — MULTIPLE VITAMINS W/ MINERALS TAB 1 TABLET: TAB at 05:30

## 2021-11-22 ASSESSMENT — PAIN DESCRIPTION - PAIN TYPE: TYPE: ACUTE PAIN

## 2021-11-22 ASSESSMENT — ENCOUNTER SYMPTOMS
MYALGIAS: 0
COUGH: 0
NECK PAIN: 0
SHORTNESS OF BREATH: 0
NAUSEA: 0
VOMITING: 0
BACK PAIN: 0
CHILLS: 0
ABDOMINAL PAIN: 0
FEVER: 0

## 2021-11-22 NOTE — DISCHARGE PLANNING
Received Choice form at 1520  Agency/Facility Name: [facility]  Referral sent per Choice form @ 1520  CPAP order.

## 2021-11-22 NOTE — DISCHARGE PLANNING
Anticipated Discharge Disposition: Home with DME.    Action: Paper orders for CPAP order previously completed and sent to [facility]. Per DPA, it would take [facility] two weeks to process order. DPA tasked with expanding DME referral.    Barriers to Discharge: DME delivery.    Plan: CM to continue to follow for discharge needs.

## 2021-11-22 NOTE — PROGRESS NOTES
Hospital Medicine Daily Progress Note    Date of Service  11/22/2021    Chief Complaint  Michael Ontiveros is a 31 y.o. male admitted 11/1/2021 with edema    Hospital Course:    31-year-old male with past medical history of pulmonary embolism and DVT on Coumadin, pulmonary hypertension, JONES, mild intermittent asthma, and morbid obesity with a BMI of 69 who was admitted on 11/1/2021 with bilateral lower extremity swelling, patient had same admission in 7/2021, patient was transferred to ICU for BiPAP however he ended intubated on 11/3 and extubated on 11/5, CTA on admission did not show any PE, echo showed normal ejection fraction of 60% with pulmonary hypertension and right ventricle pressure around 45 mmHg, patient also have hypercapnia, Lasix was started, patient was evaluated by pulmonologist and recommended CPAP.    Home oxygen eval showed the patient needs 24/7 oxygen therapy around 6 L especially during sleeping..    He had difficult Matthew placement requiring urology consultation with cystoscopy for placement, voiding trial was done on 11/16    Interval Problem Update  Afebrile, no overnight events, no nausea or vomiting   -Still working with  to get approval for CPAP machine before discharge. -Continue oral Lasix and oxygen therapy.    I have personally seen and examined the patient at bedside. I discussed the plan of care with patient, bedside RN, charge RN,  and pharmacy.    Consultants/Specialty  critical care and pulmonary    Code Status  Full Code    Disposition  Patient is not medically cleared.  Patient needs CPAP machine before discharge, still working with companies to get CPAP  Anticipate discharge to home after receiving CPAP machine.  I have placed the appropriate orders for post-discharge needs.    Review of Systems  Review of Systems   Constitutional: Negative for chills, fever and malaise/fatigue.   Respiratory: Negative for cough and shortness of breath.     Cardiovascular: Positive for leg swelling (chronic).   Gastrointestinal: Negative for abdominal pain, nausea and vomiting.   Musculoskeletal: Negative for back pain, myalgias and neck pain.   Skin: Positive for rash.   All other systems reviewed and are negative.       Physical Exam  Temp:  [36.1 °C (96.9 °F)-36.9 °C (98.4 °F)] 36.7 °C (98 °F)  Pulse:  [92-98] 95  Resp:  [16-20] 16  BP: (107-125)/(57-77) 117/71  SpO2:  [90 %-95 %] 95 %    Physical Exam  Vitals and nursing note reviewed.   Constitutional:       General: He is not in acute distress.     Appearance: He is well-developed. He is obese. He is not ill-appearing.   HENT:      Head: Normocephalic and atraumatic.   Eyes:      Conjunctiva/sclera: Conjunctivae normal.   Neck:      Vascular: No JVD.      Trachea: No tracheal deviation.   Cardiovascular:      Rate and Rhythm: Normal rate and regular rhythm.      Heart sounds: No murmur heard.      Pulmonary:      Effort: Pulmonary effort is normal. No respiratory distress.      Breath sounds: No wheezing or rales.      Comments: On oxymask   Abdominal:      General: Bowel sounds are normal.      Palpations: Abdomen is soft.      Tenderness: There is no abdominal tenderness. There is no guarding or rebound.   Musculoskeletal:         General: Swelling present. No tenderness.      Cervical back: Neck supple.      Right lower leg: Edema present.      Left lower leg: Edema present.   Skin:     General: Skin is warm and dry.      Findings: Erythema (b/l LE, chronic ) and rash (right arm, improving) present.      Nails: There is no clubbing.   Neurological:      General: No focal deficit present.      Mental Status: He is alert and oriented to person, place, and time. Mental status is at baseline.      Cranial Nerves: No cranial nerve deficit.      Motor: No abnormal muscle tone.   Psychiatric:         Mood and Affect: Mood normal.         Behavior: Behavior normal.         Fluids  No intake or output data in the 24  hours ending 11/22/21 1223    Laboratory  Recent Labs     11/22/21  0552   WBC 6.4   RBC 5.71   HEMOGLOBIN 13.6*   HEMATOCRIT 47.0   MCV 82.3   MCH 23.8*   MCHC 28.9*   RDW 67.5*   PLATELETCT 303   MPV 10.4     Recent Labs     11/22/21  0552   SODIUM 135   POTASSIUM 4.2   CHLORIDE 97   CO2 29   GLUCOSE 102*   BUN 18   CREATININE 0.51   CALCIUM 9.5     Recent Labs     11/20/21  0317 11/21/21  0620 11/22/21  0552   INR 2.22* 2.25* 2.03*               Imaging  DX-CHEST-LIMITED (1 VIEW)   Final Result      Improving basilar aeration with increase in lung volumes following extubation      Continued moderate bilateral consolidation could be from edema and/or infection      DX-CHEST-PORTABLE (1 VIEW)   Final Result      1.  No significant interval change.         DX-CHEST-PORTABLE (1 VIEW)   Final Result      1.  Probable mild worsening pulmonary edema.   2.  Increased bibasilar atelectasis.   3.  Cannot exclude trace pleural effusions.      DX-ABDOMEN FOR TUBE PLACEMENT   Final Result      Enteric tube tip projects over the stomach      IR-PICC LINE PLACEMENT W/ GUIDANCE > AGE 5   Final Result                  Ultrasound-guided PICC placement performed by qualified nursing staff as    above.          DX-CHEST-PORTABLE (1 VIEW)   Final Result      1.  Interval intubation   2.  BILATERAL atelectasis with possible superimposed pulmonary edema or pneumonia, unchanged      DX-CHEST-LIMITED (1 VIEW)   Final Result      Worsening diffuse hazy opacity compatible with edema or infection      EC-ECHOCARDIOGRAM COMPLETE W/ CONT   Final Result      CT-ABDOMEN-PELVIS WITH   Final Result         Limited exam due to body habitus.      1. There is skin thickening and subcutaneous edema in the low anterior abdominal and pelvic wall panus. This could relate to fluid overload but infection and cellulitis can have similar appearance. Correlate clinically.      2. Mild perihepatic ascites.      3. Diffuse scrotal edema and moderate hydrocele.          CT-CTA CHEST PULMONARY ARTERY W/ RECONS   Final Result         1. No CT evidence of pulmonary embolism.      2. Mild hazy groundglass opacity throughout both lungs could relate to mild fluid overload.      3. Cardiomegaly.      4. No airspace consolidation. Linear right midlung atelectasis.         DX-CHEST-PORTABLE (1 VIEW)   Final Result         Stable cardiomegaly.      Mild interstitial prominence could relate to mild fluid overload.           Assessment/Plan  * Pulmonary hypertension (HCC)- (present on admission)  Assessment & Plan  Likely related to obstructive sleep apnea   RVSP 45 mmHg  On anticoagulation for prior history of PE  CTA on 11/1/2021 - for acute PE  Continue BiPAP therapy at bedtime  Continue diuresis monitor intake and output and renal function electrolytes with diuresis    Urinary retention  Assessment & Plan  With difficult Matthew catheter placement by urology  Started on Flomax  Voiding trial on 11/16 follow-up with bladder scan    Cellulitis  Assessment & Plan  Versus chronic stasis dermatitis  Finished course of keflex   Continue skin care and clinical monitoring    History of pulmonary embolism- (present on admission)  Assessment & Plan  Continue warfarin    Chronic anticoagulation- (present on admission)  Assessment & Plan  History of PE and DVT, had a trial on Xarelto developed DVT  Continue anticoagulation with warfarin and monitor INR    Pulmonary embolism with acute cor pulmonale (HCC)- (present on admission)  Assessment & Plan  History of PE  Continue anticoagulation with warfarin and monitor INR    JONES (obstructive sleep apnea)- (present on admission)  Assessment & Plan  Continue nocturnal BiPAP  Pulmonology consulted, ordered noninvasive ventilator for home until patient can have outpatient sleep study    Acute on chronic respiratory failure with hypoxia and hypercapnia (HCC)- (present on admission)  Assessment & Plan  Now on oxymask, continue to wean, home O2 eval when  appropriate   Continue diuresis  Continue nocturnal BiPAP    Lower extremity edema- (present on admission)  Assessment & Plan  Continue diuresis    Morbid obesity (HCC)- (present on admission)  Assessment & Plan  Body mass index is 69.25 kg/m².    Asthma- (present on admission)  Assessment & Plan  No acute exacerbation   bronchodilators as needed per RT protocol           VTE prophylaxis: therapeutic anticoagulation with Warfarin    I have performed a physical exam and reviewed and updated ROS and Plan today (11/22/2021). In review of yesterday's note (11/21/2021), there are no changes except as documented above.

## 2021-11-22 NOTE — CARE PLAN
Problem: Knowledge Deficit - Standard  Goal: Patient and family/care givers will demonstrate understanding of plan of care, disease process/condition, diagnostic tests and medications  Outcome: Progressing     Problem: Skin Integrity  Goal: Skin integrity is maintained or improved  Outcome: Progressing     Problem: Respiratory  Goal: Patient will achieve/maintain optimum respiratory ventilation and gas exchange  Outcome: Progressing   The patient is Stable - Low risk of patient condition declining or worsening    Shift Goals  Clinical Goals: Monitor 02 sats, comfort  Patient Goals: Discharge  Family Goals: n/a    Progress made toward(s) clinical / shift goals:  Educate patient of available PRN pain medication per MAR. Reinforce fall/safety precautions.     Patient is not progressing towards the following goals:

## 2021-11-22 NOTE — CARE PLAN
Problem: Knowledge Deficit - Standard  Goal: Patient and family/care givers will demonstrate understanding of plan of care, disease process/condition, diagnostic tests and medications  Outcome: Progressing     Problem: Skin Integrity  Goal: Skin integrity is maintained or improved  Outcome: Progressing   The patient is Stable - Low risk of patient condition declining or worsening    Shift Goals  Clinical Goals: Monitor 02 sats, comfort  Patient Goals: Discharge  Family Goals: n/a    Progress made toward(s) clinical / shift goals:  Patient educated on plan of care for today, needs CPAP for night- only barrier to discharge    Patient is not progressing towards the following goals:N/A  Assumed care of patient at change of shift.   Report received at bedside rounding  Patient is calm, in cardiac chair, with no distress noted.  Call light and patient belongings are in reach, bed is locked and in lowest position- hourly rounding is in place. See flow sheets for further assessment.

## 2021-11-22 NOTE — DISCHARGE PLANNING
Agency/Facility Name: [facility]  Spoke To: Intake  Outcome: Checking referrals, and will call back.

## 2021-11-23 LAB
INR PPP: 2.05 (ref 0.87–1.13)
PROTHROMBIN TIME: 22.5 SEC (ref 12–14.6)

## 2021-11-23 PROCEDURE — 770001 HCHG ROOM/CARE - MED/SURG/GYN PRIV*

## 2021-11-23 PROCEDURE — A9270 NON-COVERED ITEM OR SERVICE: HCPCS | Performed by: INTERNAL MEDICINE

## 2021-11-23 PROCEDURE — 700102 HCHG RX REV CODE 250 W/ 637 OVERRIDE(OP): Performed by: STUDENT IN AN ORGANIZED HEALTH CARE EDUCATION/TRAINING PROGRAM

## 2021-11-23 PROCEDURE — A9270 NON-COVERED ITEM OR SERVICE: HCPCS | Performed by: HOSPITALIST

## 2021-11-23 PROCEDURE — A9270 NON-COVERED ITEM OR SERVICE: HCPCS | Performed by: STUDENT IN AN ORGANIZED HEALTH CARE EDUCATION/TRAINING PROGRAM

## 2021-11-23 PROCEDURE — 700102 HCHG RX REV CODE 250 W/ 637 OVERRIDE(OP): Performed by: INTERNAL MEDICINE

## 2021-11-23 PROCEDURE — 36415 COLL VENOUS BLD VENIPUNCTURE: CPT

## 2021-11-23 PROCEDURE — 85610 PROTHROMBIN TIME: CPT

## 2021-11-23 PROCEDURE — 700102 HCHG RX REV CODE 250 W/ 637 OVERRIDE(OP): Performed by: HOSPITALIST

## 2021-11-23 PROCEDURE — 94660 CPAP INITIATION&MGMT: CPT

## 2021-11-23 PROCEDURE — 99233 SBSQ HOSP IP/OBS HIGH 50: CPT | Performed by: INTERNAL MEDICINE

## 2021-11-23 RX ORDER — WARFARIN SODIUM 7.5 MG/1
7.5 TABLET ORAL
Status: DISCONTINUED | OUTPATIENT
Start: 2021-11-23 | End: 2021-11-24 | Stop reason: HOSPADM

## 2021-11-23 RX ORDER — WARFARIN SODIUM 5 MG/1
5 TABLET ORAL
Status: DISCONTINUED | OUTPATIENT
Start: 2021-11-24 | End: 2021-11-24 | Stop reason: HOSPADM

## 2021-11-23 RX ADMIN — WARFARIN SODIUM 7.5 MG: 7.5 TABLET ORAL at 17:34

## 2021-11-23 RX ADMIN — FUROSEMIDE 40 MG: 40 TABLET ORAL at 17:33

## 2021-11-23 RX ADMIN — MULTIPLE VITAMINS W/ MINERALS TAB 1 TABLET: TAB at 04:26

## 2021-11-23 RX ADMIN — SENNOSIDES AND DOCUSATE SODIUM 2 TABLET: 50; 8.6 TABLET ORAL at 04:26

## 2021-11-23 RX ADMIN — FUROSEMIDE 40 MG: 40 TABLET ORAL at 04:26

## 2021-11-23 RX ADMIN — HYDROCORTISONE: 10 CREAM TOPICAL at 17:34

## 2021-11-23 RX ADMIN — TAMSULOSIN HYDROCHLORIDE 0.4 MG: 0.4 CAPSULE ORAL at 09:14

## 2021-11-23 ASSESSMENT — ENCOUNTER SYMPTOMS
RESPIRATORY NEGATIVE: 1
CONSTITUTIONAL NEGATIVE: 1
CARDIOVASCULAR NEGATIVE: 1
NEUROLOGICAL NEGATIVE: 1
MUSCULOSKELETAL NEGATIVE: 1
EYES NEGATIVE: 1
GASTROINTESTINAL NEGATIVE: 1
PSYCHIATRIC NEGATIVE: 1

## 2021-11-23 ASSESSMENT — PAIN DESCRIPTION - PAIN TYPE
TYPE: ACUTE PAIN

## 2021-11-23 NOTE — CARE PLAN
Problem: Respiratory  Goal: Patient will achieve/maintain optimum respiratory ventilation and gas exchange  Outcome: Progressing     Problem: Skin Integrity  Goal: Skin integrity is maintained or improved  Outcome: Progressing     Problem: Discharge Barriers/Planning  Goal: Patient's continuum of care needs are met  Outcome: Progressing   The patient is Stable - Low risk of patient condition declining or worsening    Shift Goals  Clinical Goals: monitor O2 sats  Patient Goals: walk, monitor O2 sat  Family Goals: n/a    Progress made toward(s) clinical / shift goals:  Patient educated about plan of care for today, tolerated 6L oximask at night instead of CPAP, possible help in barrier to discharge     Patient is not progressing towards the following goals:N/A     Assumed care of patient at change of shift.   Report received at bedside rounding  Patient is calm, in cardiac chair, with no distress noted.  Call light and patient belongings are in reach, bed is locked and in lowest position- hourly rounding is in place. See flow sheets for further assessment.

## 2021-11-23 NOTE — DISCHARGE PLANNING
Received Choice form at 0945  Agency/Facility Name: [facility]  Referral sent per Choice form @ 0945     Agency/Facility Name: [facility]  Spoke To: Intake  Outcome: Declined due to needs sleep study for CPAP.

## 2021-11-23 NOTE — CARE PLAN
The patient is Watcher - Medium risk of patient condition declining or worsening    Shift Goals  Clinical Goals: monitor O2 sats  Patient Goals: walk, monitor O2 sat  Family Goals: n/a    Progress made toward(s) clinical / shift goals:  yes    Problem: Care Map:  Admission Optimal Outcome for the Heart Failure Patient  Goal: Admission:  Optimal Care of the heart failure patient  Outcome: Progressing     Problem: Care Map:  Day 1 Optimal Outcome for the Heart Failure Patient  Goal: Day 1:  Optimal Care of the heart failure patient  Outcome: Progressing     Problem: Care Map:  Day 2 Optimal Outcome for the Heart Failure Patient  Goal: Day 2:  Optimal Care of the heart failure patient  Outcome: Progressing     Problem: Care Map:  Day 3 Optimal Outcome for the Heart Failure Patient  Goal: Day 3:  Optimal Care of the heart failure patient  Outcome: Progressing     Problem: Care Map:  Day Before Discharge Optimal Outcome for the Heart Failure Patient  Goal: Day Before Discharge:  Optimal Care of the heart failure patient  Outcome: Progressing     Problem: Care Map:  Day of Discharge Optimal Outcome for the Heart Failure Patient  Goal: Day of Discharge:  Optimal Care of the heart failure patient  Outcome: Progressing     Problem: Knowledge Deficit - Standard  Goal: Patient and family/care givers will demonstrate understanding of plan of care, disease process/condition, diagnostic tests and medications  Outcome: Progressing     Problem: Respiratory  Goal: Patient will achieve/maintain optimum respiratory ventilation and gas exchange  Outcome: Progressing     Problem: Skin Integrity  Goal: Skin integrity is maintained or improved  Outcome: Progressing     Problem: Discharge Barriers/Planning  Goal: Patient's continuum of care needs are met  Outcome: Progressing

## 2021-11-23 NOTE — PROGRESS NOTES
Hospital Medicine Daily Progress Note    Date of Service  11/23/2021    Chief Complaint  Michael Ontiveros is a morbidly obese 31 y.o. male admitted 11/1/2021 with bilateral lower extremity edema.  He has a history of primary embolism and DVT (on Coumadin), JONES, pulmonary hypertension, mild intermittent asthma. He requires 6 LPM oxygen via nasal cannula at home.  He was admitted in 7/2021 with similar presentation.    Hospital Course  Admission chest ray showed mild interstitial prominence, no pulmonary infiltrates or consolidations, no pleural effusion or pneumothorax.  Patient's oxygen requirement increased, he was transferred to the ICU for BiPAP.  Repeat chest x-ray showed worsening diffuse hazy opacity compatible with edema or infection.  Patient was intubated on 11/3/2021, and extubated on 11/5/2021. Echocardiogram showed LVEF of about 60% with right ventricular pressure of around 45 mmHg.     Patient required Matthew placement by urology due to difficult placement.  Voiding trial was done on 11/16/2021.    Interval Problem Update   assisting with CPAP machine prior to discharge.    I have personally seen and examined the patient at bedside. I discussed the plan of care with patient, bedside RN and .    Consultants/Specialty  critical care and pulmonary    Code Status  Full Code    Disposition  Patient is medically cleared. Discharged held due to CPAP machine  Anticipate discharge to to home with close outpatient follow-up.  I have placed the appropriate orders for post-discharge needs.    Review of Systems  Review of Systems   Constitutional: Negative.    HENT: Negative.    Eyes: Negative.    Respiratory: Negative.    Cardiovascular: Negative.    Gastrointestinal: Negative.    Genitourinary: Negative.    Musculoskeletal: Negative.    Skin: Negative.    Neurological: Negative.    Endo/Heme/Allergies: Negative.    Psychiatric/Behavioral: Negative.         Physical Exam  Temp:  [36.4  °C (97.5 °F)-37.7 °C (99.8 °F)] 36.9 °C (98.4 °F)  Pulse:  [81-88] 82  Resp:  [17-18] 18  BP: ()/(57-71) 106/65  SpO2:  [96 %-97 %] 96 %    Physical Exam  Constitutional:       General: He is not in acute distress.     Appearance: He is obese.   HENT:      Head: Normocephalic.      Nose: Nose normal.      Mouth/Throat:      Mouth: Mucous membranes are moist.   Eyes:      Pupils: Pupils are equal, round, and reactive to light.   Cardiovascular:      Rate and Rhythm: Normal rate.   Pulmonary:      Effort: Pulmonary effort is normal.   Abdominal:      Palpations: Abdomen is soft.   Musculoskeletal:      Cervical back: Normal range of motion.      Right lower leg: Edema present.      Left lower leg: Edema present.   Skin:     General: Skin is warm.   Neurological:      General: No focal deficit present.   Psychiatric:         Mood and Affect: Mood normal.         Fluids  No intake or output data in the 24 hours ending 11/23/21 1946    Laboratory  Recent Labs     11/22/21  0552   WBC 6.4   RBC 5.71   HEMOGLOBIN 13.6*   HEMATOCRIT 47.0   MCV 82.3   MCH 23.8*   MCHC 28.9*   RDW 67.5*   PLATELETCT 303   MPV 10.4     Recent Labs     11/22/21  0552   SODIUM 135   POTASSIUM 4.2   CHLORIDE 97   CO2 29   GLUCOSE 102*   BUN 18   CREATININE 0.51   CALCIUM 9.5     Recent Labs     11/21/21  0620 11/22/21  0552 11/23/21  0611   INR 2.25* 2.03* 2.05*               Imaging  DX-CHEST-LIMITED (1 VIEW)   Final Result      Improving basilar aeration with increase in lung volumes following extubation      Continued moderate bilateral consolidation could be from edema and/or infection      DX-CHEST-PORTABLE (1 VIEW)   Final Result      1.  No significant interval change.         DX-CHEST-PORTABLE (1 VIEW)   Final Result      1.  Probable mild worsening pulmonary edema.   2.  Increased bibasilar atelectasis.   3.  Cannot exclude trace pleural effusions.      DX-ABDOMEN FOR TUBE PLACEMENT   Final Result      Enteric tube tip projects  over the stomach      IR-PICC LINE PLACEMENT W/ GUIDANCE > AGE 5   Final Result                  Ultrasound-guided PICC placement performed by qualified nursing staff as    above.          DX-CHEST-PORTABLE (1 VIEW)   Final Result      1.  Interval intubation   2.  BILATERAL atelectasis with possible superimposed pulmonary edema or pneumonia, unchanged      DX-CHEST-LIMITED (1 VIEW)   Final Result      Worsening diffuse hazy opacity compatible with edema or infection      EC-ECHOCARDIOGRAM COMPLETE W/ CONT   Final Result      CT-ABDOMEN-PELVIS WITH   Final Result         Limited exam due to body habitus.      1. There is skin thickening and subcutaneous edema in the low anterior abdominal and pelvic wall panus. This could relate to fluid overload but infection and cellulitis can have similar appearance. Correlate clinically.      2. Mild perihepatic ascites.      3. Diffuse scrotal edema and moderate hydrocele.         CT-CTA CHEST PULMONARY ARTERY W/ RECONS   Final Result         1. No CT evidence of pulmonary embolism.      2. Mild hazy groundglass opacity throughout both lungs could relate to mild fluid overload.      3. Cardiomegaly.      4. No airspace consolidation. Linear right midlung atelectasis.         DX-CHEST-PORTABLE (1 VIEW)   Final Result         Stable cardiomegaly.      Mild interstitial prominence could relate to mild fluid overload.           Assessment/Plan  * Pulmonary hypertension (HCC)- (present on admission)  Assessment & Plan  Secondary to obstructive sleep apnea.  RVSP was 45 mmHg. patient has been unable to acquire a CPAP machine, resulting in worsening symptoms and recurrent hospitalizations.      JONES (obstructive sleep apnea)- (present on admission)  Assessment & Plan  Pulmonology were consulted.  Patient will need a trilogy CPAP machine, which has been difficult to obtain inpatient. Lack of nocturnal BiPAP/CPAP results and repeated hospitalizations and worsening pulmonary  hypertension.    Acute on chronic respiratory failure with hypoxia and hypercapnia (HCC)- (present on admission)  Assessment & Plan  Currently on Oxymask with nocturnal BiPAP.  Continue diuresis.    History of pulmonary embolism- (present on admission)  Assessment & Plan  Continue warfarin    Lower extremity edema- (present on admission)  Assessment & Plan  Continue diuresis    Asthma- (present on admission)  Assessment & Plan  Currently stable.  Continue as needed inhalers.  RT per protocol    Chronic anticoagulation- (present on admission)  Assessment & Plan  History of PE and DVT, had a trial on Xarelto developed DVT. Continue anticoagulation with warfarin and monitor INR    Pulmonary embolism with acute cor pulmonale (HCC)- (present on admission)  Assessment & Plan  History of PE. Continue anticoagulation with warfarin and monitor INR    Urinary retention  Assessment & Plan  On Flomax.    Cellulitis  Assessment & Plan  Likely chronic venous stasis dermatitis.  Completed course of Keflex.  Continue wound care and diuretics.    Morbid obesity (HCC)- (present on admission)  Assessment & Plan  Body mass index is 69.25 kg/m².  Patient will need aggressive outpatient weight management follow-up       VTE prophylaxis: therapeutic anticoagulation with Coumadin

## 2021-11-23 NOTE — PROGRESS NOTES
Inpatient Anticoagulation Service Note    Date: 11/22/2021    Reason for Anticoagulation: Pulmonary Embolism,Deep Vein Thrombosis   Target INR: 2.0 to 3.0         Hemoglobin Value: (!) 13.6  Hematocrit Value: 47  Lab Platelet Value: 303    INR from last 7 days     Date/Time INR Value    11/22/21 05:52:01 2.03    11/21/21 0620 2.25    11/20/21 0317 2.22    11/19/21 0315 2.17    11/18/21 0330 2.2    11/17/21 0231 2.24    11/16/21 0409 1.91        Dose from last 7 days     Date/Time Dose (mg)    11/22/21 1610 5    11/21/21 1200 7.5    11/20/21 1227 5    11/19/21 1352 5    11/18/21 1443 7.5    11/17/21 1420 5    11/16/21 1616 7.5    11/15/21 1654 10        Average Dose (mg):  (7.5 SunTuesThurs, 5mg AOD)  Significant Interactions: Not Applicable  Bridge Therapy: No  Reversal Agent Administered: Not Applicable    Assessment: Working on discharge DME supplies.  INR remains stable.  No interval changes in overall diet, DDI, or clinical status.      Plan:  Scheduled to receive 5 mg tonight.  Follow up INR in the AM.  Education Material Provided?: Yes  Pharmacist suggested discharge dosing: Warfarin 7.5mg Sun,Tues,Thurs, 5mg AOD. Follow up with anticoagulation clinic within 3 days discharge.      Ailin Pedraza, PharmD,  BCPS

## 2021-11-24 VITALS
WEIGHT: 315 LBS | TEMPERATURE: 98.5 F | HEART RATE: 83 BPM | HEIGHT: 68 IN | BODY MASS INDEX: 47.74 KG/M2 | RESPIRATION RATE: 18 BRPM | SYSTOLIC BLOOD PRESSURE: 102 MMHG | DIASTOLIC BLOOD PRESSURE: 71 MMHG | OXYGEN SATURATION: 92 %

## 2021-11-24 LAB
INR PPP: 2.04 (ref 0.87–1.13)
PROTHROMBIN TIME: 22.4 SEC (ref 12–14.6)

## 2021-11-24 PROCEDURE — A9270 NON-COVERED ITEM OR SERVICE: HCPCS | Performed by: INTERNAL MEDICINE

## 2021-11-24 PROCEDURE — 700102 HCHG RX REV CODE 250 W/ 637 OVERRIDE(OP): Performed by: INTERNAL MEDICINE

## 2021-11-24 PROCEDURE — 85610 PROTHROMBIN TIME: CPT

## 2021-11-24 PROCEDURE — 99239 HOSP IP/OBS DSCHRG MGMT >30: CPT | Performed by: INTERNAL MEDICINE

## 2021-11-24 PROCEDURE — 700102 HCHG RX REV CODE 250 W/ 637 OVERRIDE(OP): Performed by: STUDENT IN AN ORGANIZED HEALTH CARE EDUCATION/TRAINING PROGRAM

## 2021-11-24 PROCEDURE — A9270 NON-COVERED ITEM OR SERVICE: HCPCS | Performed by: STUDENT IN AN ORGANIZED HEALTH CARE EDUCATION/TRAINING PROGRAM

## 2021-11-24 PROCEDURE — 36415 COLL VENOUS BLD VENIPUNCTURE: CPT

## 2021-11-24 PROCEDURE — A9270 NON-COVERED ITEM OR SERVICE: HCPCS | Performed by: HOSPITALIST

## 2021-11-24 PROCEDURE — 700102 HCHG RX REV CODE 250 W/ 637 OVERRIDE(OP): Performed by: HOSPITALIST

## 2021-11-24 PROCEDURE — 94660 CPAP INITIATION&MGMT: CPT

## 2021-11-24 RX ADMIN — WARFARIN SODIUM 7.5 MG: 7.5 TABLET ORAL at 16:46

## 2021-11-24 RX ADMIN — TAMSULOSIN HYDROCHLORIDE 0.4 MG: 0.4 CAPSULE ORAL at 09:00

## 2021-11-24 RX ADMIN — FUROSEMIDE 40 MG: 40 TABLET ORAL at 16:46

## 2021-11-24 RX ADMIN — MULTIPLE VITAMINS W/ MINERALS TAB 1 TABLET: TAB at 04:11

## 2021-11-24 RX ADMIN — FUROSEMIDE 40 MG: 40 TABLET ORAL at 04:11

## 2021-11-24 ASSESSMENT — PAIN DESCRIPTION - PAIN TYPE
TYPE: ACUTE PAIN
TYPE: ACUTE PAIN

## 2021-11-24 NOTE — CARE PLAN
The patient is Stable - Low risk of patient condition declining or worsening    Shift Goals  Clinical Goals: CPAP  Patient Goals: walk  Family Goals: n/a    Progress made toward(s) clinical / shift goals:  yes    Problem: Care Map:  Admission Optimal Outcome for the Heart Failure Patient  Goal: Admission:  Optimal Care of the heart failure patient  Outcome: Progressing     Problem: Care Map:  Day 1 Optimal Outcome for the Heart Failure Patient  Goal: Day 1:  Optimal Care of the heart failure patient  Outcome: Progressing     Problem: Care Map:  Day 2 Optimal Outcome for the Heart Failure Patient  Goal: Day 2:  Optimal Care of the heart failure patient  Outcome: Progressing     Problem: Care Map:  Day 3 Optimal Outcome for the Heart Failure Patient  Goal: Day 3:  Optimal Care of the heart failure patient  Outcome: Progressing     Problem: Care Map:  Day Before Discharge Optimal Outcome for the Heart Failure Patient  Goal: Day Before Discharge:  Optimal Care of the heart failure patient  Outcome: Progressing     Problem: Care Map:  Day of Discharge Optimal Outcome for the Heart Failure Patient  Goal: Day of Discharge:  Optimal Care of the heart failure patient  Outcome: Progressing     Problem: Knowledge Deficit - Standard  Goal: Patient and family/care givers will demonstrate understanding of plan of care, disease process/condition, diagnostic tests and medications  Outcome: Progressing     Problem: Respiratory  Goal: Patient will achieve/maintain optimum respiratory ventilation and gas exchange  Outcome: Progressing     Problem: Skin Integrity  Goal: Skin integrity is maintained or improved  Outcome: Progressing     Problem: Discharge Barriers/Planning  Goal: Patient's continuum of care needs are met  Outcome: Progressing

## 2021-11-24 NOTE — PROGRESS NOTES
Inpatient Anticoagulation Service Note    Date: 11/24/2021    Reason for Anticoagulation: Pulmonary Embolism,Deep Vein Thrombosis   Target INR: 2.0 to 3.0     Hemoglobin Value: (!) 13.6  Hematocrit Value: 47  Lab Platelet Value: 303    INR from last 7 days     Date/Time INR Value    11/24/21 0400 2.04    11/23/21 0611 2.05    11/22/21 05:52:01 2.03    11/21/21 0620 2.25    11/20/21 0317 2.22    11/19/21 0315 2.17    11/18/21 0330 2.2        Dose from last 7 days     Date/Time Dose (mg)    11/24/21 1144 5    11/23/21 1557 7.5    11/22/21 1610 5    11/21/21 1200 7.5    11/20/21 1227 5    11/19/21 1352 5    11/18/21 1443 7.5    11/17/21 1420 5        Average Dose (mg): 6.4 (45 mg per week)    Significant Interactions: Other (multivitamin)    Bridge Therapy: No     Reversal Agent Administered: Not Applicable    Comments: INR remains within therapeutic range today as anticipated with minor dose adjustment made yesterday. Patient's warfarin-drug interactions unchanged since last evaluation and he remains on oral diet. No new CBC for review but no documented concerns for bleeding identified. Will continue current warfarin regimen and reduce frequency of INR checks to thrice weekly to allow stabilization with new warfarin dose.    Plan:  Warfarin 5 mg PO today. INR due 11/26.    Education Material Provided?: Yes    Pharmacist suggested discharge dosing: Warfarin 5 mg PO on Mon/Wed/Fri and 7.5 mg PO all other days of the week with follow-up INR within 1 week of discharge.     Pharmacy will continue to follow.     Roxanne Winters, PharmD, BCCCP

## 2021-11-24 NOTE — DISCHARGE PLANNING
Anticipated Discharge Disposition: Home with DME.     Action: Patient will require CPAP, but unable to obtain for patient until a sleep study has been completed. Attending provider to escalate need to pulmonology.      Barriers to Discharge: DME delivery.     Plan: CM to continue to follow for discharge needs

## 2021-11-24 NOTE — PROGRESS NOTES
Inpatient Anticoagulation Service Note  Date: 11/23/2021    Reason for Anticoagulation: Pulmonary Embolism,Deep Vein Thrombosis   Target INR: 2.0 to 3.0     Hemoglobin Value: (!) 13.6  Hematocrit Value: 47  Lab Platelet Value: 303    INR from last 7 days     Date/Time INR Value    11/23/21 0611 2.05    11/22/21 05:52:01 2.03    11/21/21 0620 2.25    11/20/21 0317 2.22    11/19/21 0315 2.17    11/18/21 0330 2.2    11/17/21 0231 2.24        Dose from last 7 days     Date/Time Dose (mg)    11/23/21 1557 7.5    11/22/21 1610 5    11/21/21 1200 7.5    11/20/21 1227 5    11/19/21 1352 5    11/18/21 1443 7.5    11/17/21 1420 5    11/16/21 1616 7.5        Average Dose: 6.4 mg/day (45 mg total per week)   Significant Interactions: (Multivitamin)  Bridge Therapy: NA  Education Material Provided?: Yes    Comments:   INR is therapeutic today. Hgb/hct are stable. No major DDIs noted. Cardiac diet order in place and good PO intake documented this morning.  INR is fairly stable, however on the lower end of therapeutic range, and trending down. Given his history of recurrent VTEs, will increase dose modestly (increasd  by 2.5 mg per week). INR in AM.    Plan: 5 mg Mon/Wed/Fri, and 7.5 mg all other days    Pharmacist suggested discharge dosing: consider 5 mg on MWF, 7.5 mg all other days with prompt outpatient INR check within ~1 week of hospital discharge.      Miri Hudson, PharmD, BCCCP

## 2021-11-24 NOTE — DISCHARGE PLANNING
Anticipated Discharge Disposition: Home with home oxygen.    Action: Patient has appointment with pulmonology on 12/8/21 at 1400 with plan to begin referral to sleep study for CPAP. Patient currently on 1 LPM during day and 6 LPM at night. LSW called Shelly (#[phone]) who had previously delivered home oxygen and concentrator to bedside. Shelly confirmed that concentrator can go up to 6 LPM and differences in need for oxygen during day is not barrier. [facility] sat[facility]. Notified request to attending nurse. DPA tasked with sending updated order to [facility].    Barriers to Discharge: None identified.     Plan: Home with home oxygen via [facility].

## 2021-11-25 NOTE — DISCHARGE SUMMARY
Discharge Summary    CHIEF COMPLAINT ON ADMISSION  Chief Complaint   Patient presents with   • Abdominal Swelling     increasing. pt not on any diuretics   • Groin Swelling   • Testicle Swelling       Reason for Admission  Abdominal Swelling     Admission Date  11/1/2021    CODE STATUS  Full Code    HPI & HOSPITAL COURSE  Michael Ontiveros is a morbidly obese 31 y.o. male admitted 11/1/2021 with bilateral lower extremity edema.  He has a history of primary embolism and DVT (on Coumadin), JONES, pulmonary hypertension, mild intermittent asthma. He requires 6 LPM oxygen via nasal cannula at home.  He was admitted in 7/2021 with similar presentation.     Admission chest ray showed mild interstitial prominence, no pulmonary infiltrates or consolidations, no pleural effusion or pneumothorax.  Patient's oxygen requirement increased, he was transferred to the ICU for BiPAP.  Repeat chest x-ray showed worsening diffuse hazy opacity compatible with edema or infection.  Patient was intubated on 11/3/2021, and extubated on 11/5/2021. Echocardiogram showed LVEF of about 60% with right ventricular pressure of around 45 mmHg.      Patient has been using 1 L/min oxygen during the day, and using 6 L/min at night.  He has been advised to continue this regimen until he is seen by his pulmonologist within the next few days and obtains a CPAP machine.  Patient is eager to go home, and agrees to follow-up with pulmonology and sleep specialist soon as possible.    Flomax was added for urine retention.  Patient has been therapeutic on Coumadin for history of PE and DVT.  He completed a course of Keflex for cellulitis.     Therefore, he is discharged in fair and stable condition to home with close outpatient follow-up.      Discharge Date  11/24/2021    FOLLOW UP ITEMS POST DISCHARGE  Pulmonologist and sleep study speicalist as possible within the next few days.  Primary care physician within 1 to 2 days    DISCHARGE  DIAGNOSES  Principal Problem:    Pulmonary hypertension (HCC) (Chronic) POA: Yes  Active Problems:    Acute on chronic respiratory failure with hypoxia and hypercapnia (HCC) POA: Yes    JONES (obstructive sleep apnea) (Chronic) POA: Yes    Asthma POA: Yes    Lower extremity edema POA: Yes      Overview: LE doppler study on 11/3/2017 shows:      Bilateral lower extremities -       Complete color filling and compressibility with normal venous flow       dynamics        including spontaneous flow, response to augmentation maneuvers, and        respiratory phasicity. No evidence of superficial or deep venous       thrombosis. The peroneal and posterior tibial veins are difficult to       assess for        compressibility, but flow response to augmentation is demonstrated.  Wall       thickness visualized in the left small saphenous vein.      No priors available for comparison.    History of pulmonary embolism POA: Yes    Pulmonary embolism with acute cor pulmonale (HCC) (Chronic) POA: Yes    Chronic anticoagulation POA: Yes      Overview: 3/16/20 - No DOAC due to BMI and potential failure on Xarelto.     Scrotal swelling POA: Yes    Cardiomegaly (Chronic) POA: Yes    Morbid obesity (HCC) (Chronic) POA: Yes    Cellulitis POA: Unknown    Urinary retention POA: Unknown  Resolved Problems:    Acute respiratory failure with hypoxia (HCC) POA: Unknown    Volume overload POA: Yes    Thrombocytopenia (HCC) POA: Unknown      FOLLOW UP  Future Appointments   Date Time Provider Department Center   12/1/2021  3:00 PM [facility]   12/8/2021  2:00 PM Kera Correa M.D. [facility] None     [facility] P[facility]  [address]  [address]  [facility] [address]  [phone]    In 1 week      [facility]  [address]  [facility] [address]  [phone]          MEDICATIONS ON DISCHARGE     Medication List      START taking these medications      Instructions   furosemide 40 MG  Tabs  Commonly known as: LASIX   Take 1 Tablet by mouth 2 times a day.  Dose: 40 mg        CHANGE how you take these medications      Instructions   acetaminophen 325 MG Tabs  What changed:   · medication strength  · how much to take  · reasons to take this  · additional instructions  Commonly known as: Tylenol   Take 2 Tablets by mouth every 6 hours as needed.  Dose: 650 mg        CONTINUE taking these medications      Instructions   albuterol 108 (90 Base) MCG/ACT Aers inhalation aerosol   Inhale 2 Puffs every 6 hours as needed for Shortness of Breath.  Dose: 2 Puff     Mens Multivitamin Tabs   Take 1 tablet by mouth every morning.  Dose: 1 Tablet     warfarin 7.5 MG Tabs  Commonly known as: COUMADIN   Doctor's comments: Patient needs follow up INR to discuss length of therapy thank you  Take one-half to one tablet by mouth daily or as directed by anticoagulation clinic        STOP taking these medications    enoxaparin 150 MG/ML Soln  Commonly known as: Lovenox            Allergies  Allergies   Allergen Reactions   • Joseph (Diagnostic) Anaphylaxis   • Pistachio Anaphylaxis   • Rosharon Anaphylaxis   • Tree Nuts Food Allergy Vomiting and Nausea     Other nuts aside from almond, walnut, pistachio       DIET  Orders Placed This Encounter   Procedures   • Diet Order Diet: Cardiac (ALLERGY TREE NUTS); Second Modifier: (optional): 2 Gram Sodium     Standing Status:   Standing     Number of Occurrences:   1     Order Specific Question:   Diet:     Answer:   Cardiac [6]     Comments:   ALLERGY TREE NUTS     Order Specific Question:   Second Modifier: (optional)     Answer:   2 Gram Sodium [7]       ACTIVITY  As tolerated.    CONSULTATIONS  Pulmonology    PROCEDURES  None    LABORATORY  Lab Results   Component Value Date    SODIUM 135 11/22/2021    POTASSIUM 4.2 11/22/2021    CHLORIDE 97 11/22/2021    CO2 29 11/22/2021    GLUCOSE 102 (H) 11/22/2021    BUN 18 11/22/2021    CREATININE 0.51 11/22/2021        Lab Results    Component Value Date    WBC 6.4 11/22/2021    HEMOGLOBIN 13.6 (L) 11/22/2021    HEMATOCRIT 47.0 11/22/2021    PLATELETCT 303 11/22/2021        Total time of the discharge process exceeds 35 minutes.

## 2021-11-25 NOTE — DISCHARGE INSTRUCTIONS
Discharge Instructions    Discharged to home by car with self. Discharged via wheelchair, hospital escort: Yes.  Special equipment needed: Not Applicable    Be sure to schedule a follow-up appointment with your primary care doctor or any specialists as instructed.     Discharge Plan:   Influenza Vaccine Indication: Patient Refuses    I understand that a diet low in cholesterol, fat, and sodium is recommended for good health. Unless I have been given specific instructions below for another diet, I accept this instruction as my diet prescription.   Other diet:   Pulmonary Hypertension  Pulmonary hypertension is a long-term (chronic) condition in which there is high blood pressure in the arteries in the lungs (pulmonary arteries). This condition occurs when pulmonary arteries become narrow and tight, making it harder for blood to flow through the lungs. This in turn makes the heart work harder to pump blood through the lungs, making it harder for you to breathe.  Over time, pulmonary hypertension can weaken and damage the heart muscle, specifically the right side of the heart. Pulmonary hypertension is a serious condition that can be life-threatening.  What are the causes?  This condition may be caused by different medical conditions. It can be categorized by cause into five groups:  · Group 1: Pulmonary hypertension that is caused by abnormal growth of small blood vessels in the lungs (pulmonary arterial hypertension). The abnormal blood vessel growth may have no known cause, or it may be:  ? Passed from parent to child (hereditary).  ? Caused by another disease, such as a connective tissue disease (including lupus or scleroderma), congenital heart disease, liver disease, or HIV.  ? Caused by certain medicines or poisons (toxins).  · Group 2: Pulmonary hypertension that is caused by weakness of the left chamber of the heart (left ventricle) or heart valve disease.  · Group 3: Pulmonary hypertension that is caused by  lung disease or low oxygen levels. Causes in this group include:  ? Emphysema or chronic obstructive pulmonary disease (COPD).  ? Untreated sleep apnea.  ? Pulmonary fibrosis.  ? Long-term exposure to high altitudes in certain people who may already be at higher risk for pulmonary hypertension.  · Group 4: Pulmonary hypertension that is caused by blood clots in the lungs (pulmonary emboli).  · Group 5: Other causes of pulmonary hypertension, such as sickle cell anemia, sarcoidosis, tumors pressing on the pulmonary arteries, and various other diseases.  What are the signs or symptoms?  Symptoms of this condition include:  · Shortness of breath. You may notice shortness of breath with:  ? Activity, such as walking.  ? Minimal activity, such as getting dressed.  ? No activity, like when you are sitting still.  · A cough. Sometimes, bloody mucus from the lungs may be coughed up (hemoptysis).  · Tiredness and fatigue.  · Dizziness, lightheadedness, or fainting, especially with physical activity.  · Rapid heartbeat, or feeling your heart flutter or skip a beat (palpitations).  · Veins in the neck getting larger.  · Swelling of the lower legs, abdomen, or both.  · Bluish color of the lips and fingertips.  · Chest pain or tightness in the chest.  · Abdominal pain, especially in the upper abdomen.  How is this diagnosed?  This condition may be diagnosed based on one or more of the following tests:  · Chest X-ray.  · Blood tests.  · CT scan.  · Pulmonary function test. This test measures how much air your lungs can hold. It also tests how well air moves in and out of your lungs.  · 6-minute walk test. This tests how severe your condition is in relation to your activity levels.  · Electrocardiogram (ECG). This test records the electrical impulses of the heart.  · Echocardiogram. This test uses sound waves (ultrasound) to produce an image of the heart.  · Cardiac catheterization. This is a procedure in which a thin tube  (catheter) is passed into the pulmonary artery and used to test the pressure in your pulmonary artery and the right side of your heart.  · Lung biopsy. This involves having a procedure to remove a small sample of lung tissue for testing. This may help determine an underlying cause of your pulmonary hypertension.  How is this treated?  There is no cure for this condition, but treatment can help to relieve symptoms and slow the progress of the condition. Treatment may include:  · Cardiac rehabilitation. This is a treatment program that includes exercise training, education, and counseling to help you get stronger and return to an active lifestyle.  · Oxygen therapy.  · Medicines that:  ? Lower blood pressure.  ? Relax (dilate) the pulmonary blood vessels.  ? Help the heart beat more efficiently and pump more blood.  ? Help the body get rid of extra fluid (diuretics).  ? Thin the blood in order to prevent blood clots in the lungs.  · Lung surgery to relieve pressure on the heart, for severe cases that do not respond to medical treatment.  · Heart-lung transplant, or lung transplant. This may be done in very severe cases.  Follow these instructions at home:  Eating and drinking    · Eat a healthy diet that includes plenty of fresh fruits and vegetables, whole grains, and beans.  · Limit your salt (sodium) intake to less than 2,300 mg a day.  Lifestyle  · Do not use any products that contain nicotine or tobacco, such as cigarettes and e-cigarettes. If you need help quitting, ask your health care provider.  · Avoid secondhand smoke.  Activity  · Get plenty of rest.  · Exercise as directed. Talk with your health care provider about what type of exercise is safe for you.  · Avoid hot tubs and saunas.  · Avoid high altitudes.  General instructions  · Take over-the-counter and prescription medicines only as told by your health care provider. Do not change or stop medicines without checking with your health care  provider.  · Stay up to date on your vaccines, especially yearly flu (influenza) and pneumonia vaccines.  · If you are a woman of child-bearing age, avoid becoming pregnant. Talk with your health care provider about birth control.  · Consider ways to get support for anxiety and stress of living with pulmonary hypertension. Talk with your health care provider about support groups and online resources.  · Use oxygen therapy at home as directed.  · Keep track of your weight. Weight gain could be a sign that your condition is getting worse.  · Keep all follow-up visits as told by your health care provider. This is important.  Contact a health care provider if:  · Your cough gets worse.  · You have more shortness of breath than usual, or you start to have trouble doing activities that you could do before.  · You need to use medicines or oxygen more frequently or in higher dosages than usual.  Get help right away if:  · You have severe shortness of breath.  · You have chest pain or pressure.  · You cough up blood.  · You have swelling of your feet or legs that gets worse.  · You have rapid weight gain over a period of 1-2 days.  · Your medicines or oxygen do not provide relief.  Summary  · Pulmonary hypertension is a chronic condition in which there is high blood pressure in the arteries in the lungs (pulmonary arteries).  · Pulmonary hypertension is a serious condition that can be life-threatening. It can be caused by a variety of illnesses.  · Treatment may involve taking medicines and using oxygen therapy. Severe cases may require surgery or a transplant.  This information is not intended to replace advice given to you by your health care provider. Make sure you discuss any questions you have with your health care provider.  Document Released: 10/14/2008 Document Revised: 11/30/2018 Document Reviewed: 03/13/2018  Elsevier Patient Education © 2020 [employer]    Acute Respiratory Failure, Adult    Acute respiratory  failure occurs when there is not enough oxygen passing from your lungs to your body. When this happens, your lungs have trouble removing carbon dioxide from the blood. This causes your blood oxygen level to drop too low as carbon dioxide builds up.  Acute respiratory failure is a medical emergency. It can develop quickly, but it is temporary if treated promptly. Your lung capacity, or how much air your lungs can hold, may improve with time, exercise, and treatment.  What are the causes?  There are many possible causes of acute respiratory failure, including:  · Lung injury.  · Chest injury or damage to the ribs or tissues near the lungs.  · Lung conditions that affect the flow of air and blood into and out of the lungs, such as pneumonia, acute respiratory distress syndrome, and cystic fibrosis.  · Medical conditions, such as strokes or spinal cord injuries, that affect the muscles and nerves that control breathing.  · Blood infection (sepsis).  · Inflammation of the pancreas (pancreatitis).  · A blood clot in the lungs (pulmonary embolism).  · A large-volume blood transfusion.  · Burns.  · Near-drowning.  · Seizure.  · Smoke inhalation.  · Reaction to medicines.  · Alcohol or drug overdose.  What increases the risk?  This condition is more likely to develop in people who have:  · A blocked airway.  · Asthma.  · A condition or disease that damages or weakens the muscles, nerves, bones, or tissues that are involved in breathing.  · A serious infection.  · A health problem that blocks the unconscious reflex that is involved in breathing, such as hypothyroidism or sleep apnea.  · A lung injury or trauma.  What are the signs or symptoms?  Trouble breathing is the main symptom of acute respiratory failure. Symptoms may also include:  · Rapid breathing.  · Restlessness or anxiety.  · Skin, lips, or fingernails that appear blue (cyanosis).  · Rapid heart rate.  · Abnormal heart rhythms (arrhythmias).  · Confusion or  changes in behavior.  · Tiredness or loss of energy.  · Feeling sleepy or having a loss of consciousness.  How is this diagnosed?  Your health care provider can diagnose acute respiratory failure with a medical history and physical exam. During the exam, your health care provider will listen to your heart and check for crackling or wheezing sounds in your lungs. Your may also have tests to confirm the diagnosis and determine what is causing respiratory failure. These tests may include:  · Measuring the amount of oxygen in your blood (pulse oximetry). The measurement comes from a small device that is placed on your finger, earlobe, or toe.  · Other blood tests to measure blood gases and to look for signs of infection.  · Sampling your cerebral spinal fluid or tracheal fluid to check for infections.  · Chest X-ray to look for fluid in spaces that should be filled with air.  · Electrocardiogram (ECG) to look at the heart's electrical activity.  How is this treated?  Treatment for this condition usually takes places in a hospital intensive care unit (ICU). Treatment depends on what is causing the condition. It may include one or more treatments until your symptoms improve. Treatment may include:  · Supplemental oxygen. Extra oxygen is given through a tube in the nose, a face mask, or a duque.  · A device such as a continuous positive airway pressure (CPAP) or bi-level positive airway pressure (BiPAP or BPAP) machine. This treatment uses mild air pressure to keep the airways open. A mask or other device will be placed over your nose or mouth. A tube that is connected to a motor will deliver oxygen through the mask.  · Ventilator. This treatment helps move air into and out of the lungs. This may be done with a bag and mask or a machine. For this treatment, a tube is placed in your windpipe (trachea) so air and oxygen can flow to the lungs.  · Extracorporeal membrane oxygenation (ECMO). This treatment temporarily takes over  the function of the heart and lungs, supplying oxygen and removing carbon dioxide. ECMO gives the lungs a chance to recover. It may be used if a ventilator is not effective.  · Tracheostomy. This is a procedure that creates a hole in the neck to insert a breathing tube.  · Receiving fluids and medicines.  · Rocking the bed to help breathing.  Follow these instructions at home:  · Take over-the-counter and prescription medicines only as told by your health care provider.  · Return to normal activities as told by your health care provider. Ask your health care provider what activities are safe for you.  · Keep all follow-up visits as told by your health care provider. This is important.  How is this prevented?  Treating infections and medical conditions that may lead to acute respiratory failure can help prevent the condition from developing.  Contact a health care provider if:  · You have a fever.  · Your symptoms do not improve or they get worse.  Get help right away if:  · You are having trouble breathing.  · You lose consciousness.  · Your have cyanosis or turn blue.  · You develop a rapid heart rate.  · You are confused.  These symptoms may represent a serious problem that is an emergency. Do not wait to see if the symptoms will go away. Get medical help right away. Call your local emergency services (911 in the [place] Do not drive yourself to the hospital.  This information is not intended to replace advice given to you by your health care provider. Make sure you discuss any questions you have with your health care provider.  Document Released: 12/23/2014 Document Revised: 11/30/2018 Document Reviewed: 07/05/2017  Elsevier Patient Education © 2020 [employer]      Special Instructions: None    · Is patient discharged on Warfarin / Coumadin?   Yes    You are receiving the drug warfarin. Please understand the importance of monitoring warfarin with scheduled PT/INR blood draws.  Follow-up with a call to your  "personal Doctor's office in 3 days to schedule a PT/INR. .    IMPORTANT: HOW TO USE THIS INFORMATION:  This is a summary and does NOT have all possible information about this product. This information does not assure that this product is safe, effective, or appropriate for you. This information is not individual medical advice and does not substitute for the advice of your health care professional. Always ask your health care professional for complete information about this product and your specific health needs.      WARFARIN - ORAL (WARF-uh-rin)      COMMON BRAND NAME(S): Coumadin      WARNING:  Warfarin can cause very serious (possibly fatal) bleeding. This is more likely to occur when you first start taking this medication or if you take too much warfarin. To decrease your risk for bleeding, your doctor or other health care provider will monitor you closely and check your lab results (INR test) to make sure you are not taking too much warfarin. Keep all medical and laboratory appointments. Tell your doctor right away if you notice any signs of serious bleeding. See also Side Effects section.      USES:  This medication is used to treat blood clots (such as in deep vein thrombosis-DVT or pulmonary embolus-PE) and/or to prevent new clots from forming in your body. Preventing harmful blood clots helps to reduce the risk of a stroke or heart attack. Conditions that increase your risk of developing blood clots include a certain type of irregular heart rhythm (atrial fibrillation), heart valve replacement, recent heart attack, and certain surgeries (such as hip/knee replacement). Warfarin is commonly called a \"blood thinner,\" but the more correct term is \"anticoagulant.\" It helps to keep blood flowing smoothly in your body by decreasing the amount of certain substances (clotting proteins) in your blood.      HOW TO USE:  Read the Medication Guide provided by your pharmacist before you start taking warfarin and each " time you get a refill. If you have any questions, ask your doctor or pharmacist. Take this medication by mouth with or without food as directed by your doctor or other health care professional, usually once a day. It is very important to take it exactly as directed. Do not increase the dose, take it more frequently, or stop using it unless directed by your doctor. Dosage is based on your medical condition, laboratory tests (such as INR), and response to treatment. Your doctor or other health care provider will monitor you closely while you are taking this medication to determine the right dose for you. Use this medication regularly to get the most benefit from it. To help you remember, take it at the same time each day. It is important to eat a balanced, consistent diet while taking warfarin. Some foods can affect how warfarin works in your body and may affect your treatment and dose. Avoid sudden large increases or decreases in your intake of foods high in vitamin K (such as broccoli, cauliflower, cabbage, brussels sprouts, kale, spinach, and other green leafy vegetables, liver, green tea, certain vitamin supplements). If you are trying to lose weight, check with your doctor before you try to go on a diet. Cranberry products may also affect how your warfarin works. Limit the amount of cranberry juice (16 ounces/480 milliliters a day) or other cranberry products you may drink or eat.      SIDE EFFECTS:  Nausea, loss of appetite, or stomach/abdominal pain may occur. If any of these effects persist or worsen, tell your doctor or pharmacist promptly. Remember that your doctor has prescribed this medication because he or she has judged that the benefit to you is greater than the risk of side effects. Many people using this medication do not have serious side effects. This medication can cause serious bleeding if it affects your blood clotting proteins too much (shown by unusually high INR lab results). Even if your  doctor stops your medication, this risk of bleeding can continue for up to a week. Tell your doctor right away if you have any signs of serious bleeding, including: unusual pain/swelling/discomfort, unusual/easy bruising, prolonged bleeding from cuts or gums, persistent/frequent nosebleeds, unusually heavy/prolonged menstrual flow, pink/dark urine, coughing up blood, vomit that is bloody or looks like coffee grounds, severe headache, dizziness/fainting, unusual or persistent tiredness/weakness, bloody/black/tarry stools, chest pain, shortness of breath, difficulty swallowing. Tell your doctor right away if any of these unlikely but serious side effects occur: persistent nausea/vomiting, severe stomach/abdominal pain, yellowing eyes/skin. This drug rarely has caused very serious (possibly fatal) problems if its effects lead to small blood clots (usually at the beginning of treatment). This can lead to severe skin/tissue damage that may require surgery or amputation if left untreated. Patients with certain blood conditions (protein C or S deficiency) may be at greater risk. Get medical help right away if any of these rare but serious side effects occur: painful/red/purplish patches on the skin (such as on the toe, breast, abdomen), change in the amount of urine, vision changes, confusion, slurred speech, weakness on one side of the body. A very serious allergic reaction to this drug is rare. However, get medical help right away if you notice any symptoms of a serious allergic reaction, including: rash, itching/swelling (especially of the face/tongue/throat), severe dizziness, trouble breathing. This is not a complete list of possible side effects. If you notice other effects not listed above, contact your doctor or pharmacist. In the [place] - Call your doctor for medical advice about side effects. You may report side effects to FDA at 3-371-FDA-3746. In [place] - Call your doctor for medical advice about side effects. You  may report side effects to [facility] [place] at 1-404.162.7071.      PRECAUTIONS:  Before taking warfarin, tell your doctor or pharmacist if you are allergic to it; or if you have any other allergies. This product may contain inactive ingredients, which can cause allergic reactions or other problems. Talk to your pharmacist for more details. Before using this medication, tell your doctor or pharmacist your medical history, especially of: blood disorders (such as anemia, hemophilia), bleeding problems (such as bleeding of the stomach/intestines, bleeding in the brain), blood vessel disorders (such as aneurysms), recent major injury/surgery, liver disease, alcohol use, mental/mood disorders (including memory problems), frequent falls/injuries. It is important that all your doctors and dentists know that you take warfarin. Before having surgery or any medical/dental procedures, tell your doctor or dentist that you are taking this medication and about all the products you use (including prescription drugs, nonprescription drugs, and herbal products). Avoid getting injections into the muscles. If you must have an injection into a muscle (for example, a flu shot), it should be given in the arm. This way, it will be easier to check for bleeding and/or apply pressure bandages. This medication may cause stomach bleeding. Daily use of alcohol while using this medicine will increase your risk for stomach bleeding and may also affect how this medication works. Limit or avoid alcoholic beverages. If you have not been eating well, if you have an illness or infection that causes fever, vomiting, or diarrhea for more than 2 days, or if you start using any antibiotic medications, contact your doctor or pharmacist immediately because these conditions can affect how warfarin works. This medication can cause heavy bleeding. To lower the chance of getting cut, bruised, or injured, use great caution with sharp objects like safety razors and  nail cutters. Use an electric razor when shaving and a soft toothbrush when brushing your teeth. Avoid activities such as contact sports. If you fall or injure yourself, especially if you hit your head, call your doctor immediately. Your doctor may need to check you. The Food & Drug Administration has stated that generic warfarin products are interchangeable. However, consult your doctor or pharmacist before switching warfarin products. Be careful not to take more than one medication that contains warfarin unless specifically directed by the doctor or health care provider who is monitoring your warfarin treatment. Older adults may be at greater risk for bleeding while using this drug. This medication is not recommended for use during pregnancy because of serious (possibly fatal) harm to an unborn baby. Discuss the use of reliable forms of birth control with your doctor. If you become pregnant or think you may be pregnant, tell your doctor immediately. If you are planning pregnancy, discuss a plan for managing your condition with your doctor before you become pregnant. Your doctor may switch the type of medication you use during pregnancy. Very small amounts of this medication may pass into breast milk but is unlikely to harm a nursing infant. Consult your doctor before breast-feeding.      DRUG INTERACTIONS:  Drug interactions may change how your medications work or increase your risk for serious side effects. This document does not contain all possible drug interactions. Keep a list of all the products you use (including prescription/nonprescription drugs and herbal products) and share it with your doctor and pharmacist. Do not start, stop, or change the dosage of any medicines without your doctor's approval. Warfarin interacts with many prescription, nonprescription, vitamin, and herbal products. This includes medications that are applied to the skin or inside the vagina or rectum. The interactions with warfarin  "usually result in an increase or decrease in the \"blood-thinning\" (anticoagulant) effect. Your doctor or other health care professional should closely monitor you to prevent serious bleeding or clotting problems. While taking warfarin, it is very important to tell your doctor or pharmacist of any changes in medications, vitamins, or herbal products that you are taking. Some products that may interact with this drug include: capecitabine, imatinib, mifepristone. Aspirin, aspirin-like drugs (salicylates), and nonsteroidal anti-inflammatory drugs (NSAIDs such as ibuprofen, naproxen, celecoxib) may have effects similar to warfarin. These drugs may increase the risk of bleeding problems if taken during treatment with warfarin. Carefully check all prescription/nonprescription product labels (including drugs applied to the skin such as pain-relieving creams) since the products may contain NSAIDs or salicylates. Talk to your doctor about using a different medication (such as acetaminophen) to treat pain/fever. Low-dose aspirin and related drugs (such as clopidogrel, ticlopidine) should be continued if prescribed by your doctor for specific medical reasons such as heart attack or stroke prevention. Consult your doctor or pharmacist for more details. Many herbal products interact with warfarin. Tell your doctor before taking any herbal products, especially bromelains, coenzyme Q10, cranberry, danshen, dong quai, fenugreek, garlic, ginkgo biloba, ginseng, and Maximus's wort, among others. This medication may interfere with a certain laboratory test to measure theophylline levels, possibly causing false test results. Make sure laboratory personnel and all your doctors know you use this drug.      OVERDOSE:  If overdose is suspected, contact a poison control center or emergency room immediately. [place] residents can call the [place] National Poison Hotline at 1-197.384.5481. [place] residents can call a provincial poison control center. " Symptoms of overdose may include: bloody/black/tarry stools, pink/dark urine, unusual/prolonged bleeding.      NOTES:  Do not share this medication with others. Laboratory and/or medical tests (such as INR, complete blood count) must be performed periodically to monitor your progress or check for side effects. Consult your doctor for more details.      MISSED DOSE:  For the best possible benefit, do not miss any doses. If you do miss a dose and remember on the same day, take it as soon as you remember. If you remember on the next day, skip the missed dose and resume your usual dosing schedule. Do not double the dose to catch up because this could increase your risk for bleeding. Keep a record of missed doses to give to your doctor or pharmacist. Contact your doctor or pharmacist if you miss 2 or more doses in a row.      STORAGE:  Store at room temperature away from light and moisture. Do not store in the bathroom. Keep all medications away from children and pets. Do not flush medications down the toilet or pour them into a drain unless instructed to do so. Properly discard this product when it is  or no longer needed. Consult your pharmacist or local waste disposal company for more details about how to safely discard your product.      MEDICAL ALERT:  Your condition and medication can cause complications in a medical emergency. For information about enrolling in MedicAlert, call 1-566.692.3167 ([place]) or 1-483.666.5174 ([place]).      Information last revised 2010 Copyright(c)  [employer]             Home Oxygen Use, Adult  When a medical condition keeps you from getting enough oxygen, your health care provider may instruct you to take extra oxygen at home. Your health care provider will let you know:  · When to take oxygen.  · For how long to take oxygen.  · How quickly oxygen should be delivered (flow rate), in liters per minute (LPM or L/M).  Home oxygen can be given through:  · A  mask.  · A nasal cannula. This is a device or tube that goes in the nostrils.  · A transtracheal catheter. This is a small, flexible tube placed in the trachea.  · A tracheostomy. This is a surgically made opening in the trachea.  These devices are connected with tubing to an oxygen source, such as:  · A tank. Tanks hold oxygen in gas form. They must be replaced when the oxygen is used up.  · A liquid oxygen device. This holds oxygen in liquid form. It must be replaced when the oxygen is used up.  · An oxygen concentrator machine. This filters oxygen in the room. It uses electricity, so you must have a backup cylinder of oxygen in case the power goes out.  Supplies needed:  To use oxygen, you will need:  · A mask, nasal cannula, transtracheal catheter, or tracheostomy.  · An oxygen tank, a liquid oxygen device, or an oxygen concentrator.  · The tape that your health care provider recommends (optional).  If you use a transtracheal catheter and your prescribed flow rate is 1 LPM or greater, you will also need a humidifier.  Risks and complications  · Fire. This can happen if the oxygen is exposed to a heat source, flame, or spark.  · Injury to skin. This can happen if liquid oxygen touches your skin.  · Organ damage. This can happen if you get too little oxygen.  How to use oxygen  Your health care provider or a representative from your medical device company will show you how to use your oxygen device. Follow her or his instructions. The instructions may look something like this:  1. Wash your hands.  2. If you use an oxygen concentrator, make sure it is plugged in.  3. Place one end of the tube into the port on the tank, device, or machine.  4. Place the mask over your nose and mouth. Or, place the nasal cannula and secure it with tape if instructed. If you use a tracheostomy or transtracheal catheter, connect it to the oxygen source as directed.  5. Make sure the liter-flow setting on the machine is at the level  "prescribed by your health care provider.  6. Turn on the machine or adjust the knob on the tank or device to the correct liter-flow setting.  7. When you are done, turn off and unplug the machine, or turn the knob to OFF.  How to clean and care for the oxygen supplies  Nasal cannula  · Clean it with a warm, wet cloth daily or as needed.  · Wash it with a liquid soap once a week.  · Rinse it thoroughly once or twice a week.  · Replace it every 2-4 weeks.  · If you have an infection, such as a cold or pneumonia, change the cannula when you get better.  Mask  · Replace it every 2-4 weeks.  · If you have an infection, such as a cold or pneumonia, change the mask when you get better.  Humidifier bottle  · Wash the bottle between each refill:  ? Wash it with soap and warm water.  ? Rinse it thoroughly.  ? Disinfect it and its top.  ? Air-dry it.  · Make sure it is dry before you refill it.  Oxygen concentrator  · Clean the air filter at least twice a week according to directions from your home medical equipment and service company.  · Wipe down the cabinet every day. To do this:  ? Unplug the unit.  ? Wipe down the cabinet with a damp cloth.  ? Dry the cabinet.  Other equipment  · Change any extra tubing every 1-3 months.  · Follow instructions from your health care provider about taking care of any other equipment.  Safety tips  Fire safety tips    · Keep your oxygen and oxygen supplies at least 5 ft away from sources of heat, flames, and lubin at all times.  · Do not allow smoking near your oxygen. Put up \"no smoking\" signs in your home. Avoid smoking areas when in public.  · Do not use materials that can burn (are flammable) while you use oxygen.  · When you go to a restaurant with portable oxygen, ask to be seated in the nonsmoking section.  · Keep a fire extinguisher close by. Let your fire department know that you have oxygen in your home.  · Test your home smoke detectors regularly.  Traveling  · Secure your " oxygen tank in the vehicle so that it does not move around. Follow instructions from your medical device company about how to safely secure your tank.  · Make sure you have enough oxygen for the amount of time you will be away from home.  · If you are planning air travel, contact the airline to find out if they allow the use of an approved portable oxygen concentrator. You may also need documents from your health care provider and medical device company before you travel.  General safety tips  · If you use an oxygen cylinder, make sure it is in a stand or secured to an object that will not move (fixed object).  · If you use liquid oxygen, make sure its container is kept upright.  · If you use an oxygen concentrator:  ? Tell your electric company. Make sure you are given priority service in the event that your power goes out.  ? Avoid using extension cords, if possible.  Follow these instructions at home:  · Use oxygen only as told by your health care provider.  · Do not use alcohol or other drugs that make you relax (sedating drugs) unless instructed. They can slow down your breathing rate and make it hard to get in enough oxygen.  · Know how and when to order a refill of oxygen.  · Always keep a spare tank of oxygen. Plan ahead for holidays when you may not be able to get a prescription filled.  · Use water-based lubricants on your lips or nostrils. Do not use oil-based products like petroleum jelly.  · To prevent skin irritation on your cheeks or behind your ears, tuck some gauze under the tubing.  Contact a health care provider if:  · You get headaches often.  · You have shortness of breath.  · You have a lasting cough.  · You have anxiety.  · You are sleepy all the time.  · You develop an illness that affects your breathing.  · You cannot exercise at your regular level.  · You are restless.  · You have difficult or irregular breathing, and it is getting worse.  · You have a fever.  · You have persistent redness  under your nose.  Get help right away if:  · You are confused.  · You have blue lips or fingernails.  · You are struggling to breathe.  Summary  · Your health care provider or a representative from your medical device company will show you how to use your oxygen device. Follow her or his instructions.  · If you use an oxygen concentrator, make sure it is plugged in.  · Make sure the liter-flow setting on the machine is at the level prescribed by your health care provider.  · Keep your oxygen and oxygen supplies at least 5 ft away from sources of heat, flames, and lubin at all times.  This information is not intended to replace advice given to you by your health care provider. Make sure you discuss any questions you have with your health care provider.  Document Released: 03/09/2005 Document Revised: 06/06/2019 Document Reviewed: 07/11/2017  [employer] Patient Education © 2020 [employer]    Depression / Suicide Risk    As you are discharged from this [facility] facility, it is important to learn how to keep safe from harming yourself.    Recognize the warning signs:  · Abrupt changes in personality, positive or negative- including increase in energy   · Giving away possessions  · Change in eating patterns- significant weight changes-  positive or negative  · Change in sleeping patterns- unable to sleep or sleeping all the time   · Unwillingness or inability to communicate  · Depression  · Unusual sadness, discouragement and loneliness  · Talk of wanting to die  · Neglect of personal appearance   · Rebelliousness- reckless behavior  · Withdrawal from people/activities they love  · Confusion- inability to concentrate     If you or a loved one observes any of these behaviors or has concerns about self-harm, here's what you can do:  · Talk about it- your feelings and reasons for harming yourself  · Remove any means that you might use to hurt yourself (examples: pills, rope, extension cords, firearm)  · Get  professional help from the community (Mental Health, Substance Abuse, psychological counseling)  · Do not be alone:Call your Safe Contact- someone whom you trust who will be there for you.  · Call your local CRISIS HOTLINE 893-7061 or 244-991-8654  · Call your local Children's Mobile Crisis Response Team [place] (251) 964-1668 or www.[employer]  · Call the toll free National Suicide Prevention Hotlines   · National Suicide Prevention Lifeline 008-061-LEDI (5288)  · National Hope Line Network 800-SUICIDE (286-2690)

## 2021-11-27 DIAGNOSIS — Z79.01 CHRONIC ANTICOAGULATION: ICD-10-CM

## 2021-12-01 ENCOUNTER — OFFICE VISIT (OUTPATIENT)
Dept: MEDICAL GROUP | Facility: MEDICAL CENTER | Age: 31
End: 2021-12-01
Payer: COMMERCIAL

## 2021-12-01 ENCOUNTER — PATIENT MESSAGE (OUTPATIENT)
Dept: MEDICAL GROUP | Facility: MEDICAL CENTER | Age: 31
End: 2021-12-01

## 2021-12-01 VITALS
TEMPERATURE: 97.9 F | HEART RATE: 93 BPM | WEIGHT: 315 LBS | HEIGHT: 68 IN | BODY MASS INDEX: 47.74 KG/M2 | OXYGEN SATURATION: 98 % | DIASTOLIC BLOOD PRESSURE: 64 MMHG | SYSTOLIC BLOOD PRESSURE: 124 MMHG | RESPIRATION RATE: 20 BRPM

## 2021-12-01 DIAGNOSIS — G47.33 OSA (OBSTRUCTIVE SLEEP APNEA): Chronic | ICD-10-CM

## 2021-12-01 DIAGNOSIS — J96.21 ACUTE ON CHRONIC RESPIRATORY FAILURE WITH HYPOXIA AND HYPERCAPNIA (HCC): ICD-10-CM

## 2021-12-01 DIAGNOSIS — R35.89 DIURESIS: ICD-10-CM

## 2021-12-01 DIAGNOSIS — E66.01 MORBID OBESITY (HCC): Chronic | ICD-10-CM

## 2021-12-01 DIAGNOSIS — J96.22 ACUTE ON CHRONIC RESPIRATORY FAILURE WITH HYPOXIA AND HYPERCAPNIA (HCC): ICD-10-CM

## 2021-12-01 PROCEDURE — 99213 OFFICE O/P EST LOW 20 MIN: CPT | Performed by: PHYSICIAN ASSISTANT

## 2021-12-01 RX ORDER — WARFARIN SODIUM 7.5 MG/1
TABLET ORAL
Qty: 90 TABLET | Refills: 0 | Status: SHIPPED | OUTPATIENT
Start: 2021-12-01 | End: 2022-01-04 | Stop reason: SDUPTHER

## 2021-12-01 ASSESSMENT — FIBROSIS 4 INDEX: FIB4 SCORE: 0.57

## 2021-12-01 NOTE — PROGRESS NOTES
"Chief Complaint   Patient presents with   • [facility] Follow-up       HPI  Michael Ontiveros is a 31 y.o. male here today for hospital follow-up.    Patient was in hospital for lower lower extremity edema and shortness of breath.  Patient has prior history of DVT pulmonary hypertension and sleep apnea.  Patient was negative for PE or pneumonia.  Patient was intubated for 2 days.   Patient was released on oxygen, 6 L at night and 1 L during the day.  Currently takes furosemide 40 mg daily without any potassium supplement.  Has appointment with pulmonology in a week.     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  Once released back to work form.      Exam:  /64 (BP Location: Right arm, Patient Position: Sitting)   Pulse 93   Temp 36.6 °C (97.9 °F) (Temporal)   Resp 20   Ht 1.727 m (5' 8\")   Wt (!) 162 kg (356 lb 9.6 oz)   SpO2 98%       Constitutional: Alert, oriented in no acute distress.  Psych: Eye contact is good, speech goal directed, affect calm  Eyes: Conjunctiva non-injected, sclera non-icteric.  Lungs: Diminished breath sounds   CV: regular rate and rhythm.       A/P:    1. Diuresis  Discussed patient might need potassium supplement if he is going to continue furosemide 40 mg for months.  Will recheck his potassium levels  - Basic Metabolic Panel; Future    2. Acute on chronic respiratory failure with hypoxia and hypercapnia (HCC)      3. JONES (obstructive sleep apnea)      4. Morbid obesity (HCC)      F/U: prn   "

## 2021-12-08 ENCOUNTER — OFFICE VISIT (OUTPATIENT)
Dept: SLEEP MEDICINE | Facility: MEDICAL CENTER | Age: 31
End: 2021-12-08
Payer: COMMERCIAL

## 2021-12-08 VITALS
DIASTOLIC BLOOD PRESSURE: 86 MMHG | SYSTOLIC BLOOD PRESSURE: 124 MMHG | WEIGHT: 315 LBS | OXYGEN SATURATION: 91 % | HEART RATE: 86 BPM | BODY MASS INDEX: 47.74 KG/M2 | RESPIRATION RATE: 16 BRPM | HEIGHT: 68 IN

## 2021-12-08 DIAGNOSIS — J96.11 CHRONIC RESPIRATORY FAILURE WITH HYPOXIA (HCC): ICD-10-CM

## 2021-12-08 DIAGNOSIS — G47.30 SLEEP DISORDER BREATHING: ICD-10-CM

## 2021-12-08 DIAGNOSIS — I27.20 PULMONARY HYPERTENSION (HCC): Chronic | ICD-10-CM

## 2021-12-08 PROCEDURE — 99214 OFFICE O/P EST MOD 30 MIN: CPT | Performed by: FAMILY MEDICINE

## 2021-12-08 ASSESSMENT — FIBROSIS 4 INDEX: FIB4 SCORE: 0.57

## 2021-12-08 NOTE — PROGRESS NOTES
"  [facility]  Consult Note     Date: 12/8/2021 / Time: 1:48 PM    Patient ID:   Name:             Michael Ontiveros   YOB: 1990  Age:                 31 y.o.  male   MRN:               [id number]      Thank you for requesting a sleep medicine consultation on Michael Ontiveros at the sleep center. She presents today with the chief complaints of snoring, gasping in sleep and excessive daytime sleepiness.  He has history of chronic respiratory failure and uses oxygen 24/7.  He is currently on 1-2 LPM in the day time and 6-8 LPM at nighttime.  He is referred by Shayy stuart for evaluation and treatment of sleep disorder breathing.     HISTORY OF PRESENT ILLNESS:       At night,  Michael Ontiveros goes to bed around 9-10 pm on weekdays and around 1-2 am on the weekends. He gets out of bed at 6:30 am on weekdays and at 10:30 am on the weekends.  He averages about 6-7 hrs of sleep on a good night and 4.5-5 hrs on a bad night. He falls asleep within 10 minutes. He wakes up about 1 times during the night due to bathroom use and on average It takes him few min to fall back asleep.He is aware of snoring and breathing pauses in sleep.  He  denies any symptoms of restless legs syndrome such as an \"urge to move\"  He  legs in the evening or bedtime. He  denies any symptoms of narcolepsy such as sleep paralysis or cataplexy, or any symptoms to suggest parasomnias such as sleep walking or acting out of dreams. He  has not used any medications for the sleep problem.Overall, he does not finds his sleep refreshing. In terms of  excessive daytime sleepiness,  He complains of sleepiness while  at work, while watching however denies while driving. Kin[place] sleepiness scale score is 8/24.He does take regular naps. The naps are usually 1-2 hrs long.He drinks about 1 caffeinated beverages per day.    His other comorbid condition include MI, pulmonary hypertension, PE with cor pulmonale, morbid obesity, " hyperglycemia, chronic anticoagulation, asthma and cardiomegaly.  He was recently admitted to the hospital on 11/1/2021 with bilateral lower extremity edema.  He was intubated 7/3/2021 and extubated on 11/5/2021.  Echocardiogram showed EF of 60% with right ventricular pressure of 45 mmHg.  His current medications include warfarin, furosemide and albuterol.    REVIEW OF SYSTEMS:       Constitutional: Denies fevers, Denies weight changes  Eyes: Denies changes in vision, no eye pain  Ears/Nose/Throat/Mouth: Denies nasal congestion or sore throat   Cardiovascular: Denies chest pain or palpitations   Respiratory: Denies shortness of breath , Denies cough  Gastrointestinal/Hepatic: Denies abdominal pain, nausea, vomiting, diarrhea, constipation or GI bleeding   Genitourinary: Denies bladder dysfunction, dysuria or frequency  Musculoskeletal/Rheum: Denies  joint pain and swelling   Skin/Breast: Denies rash  Neurological: Denies headache, confusion, memory loss or focal weakness/parasthesias  Psychiatric: denies mood disorder     Comprehensive review of systems form is reviewed with the patient and is attached in the EMR.     PMH:  has a past medical history of Asthma, Blood clotting disorder (HCC), Chickenpox, Eczema, Hypertension, Influenza, and Tonsillitis.  MEDS:   Current Outpatient Medications:   •  warfarin (COUMADIN) 7.5 MG Tab, Take one tablet by mouth daily or as directed by anticoagulation clinic, Disp: 90 Tablet, Rfl: 0  •  furosemide (LASIX) 40 MG Tab, Take 1 Tablet by mouth 2 times a day., Disp: 30 Tablet, Rfl: 3  •  acetaminophen (TYLENOL) 325 MG Tab, Take 2 Tablets by mouth every 6 hours as needed., Disp: 30 Tablet, Rfl: 0  •  albuterol 108 (90 Base) MCG/ACT Aero Soln inhalation aerosol, Inhale 2 Puffs every 6 hours as needed for Shortness of Breath., Disp: , Rfl:   •  Multiple Vitamins-Minerals (MENS MULTIVITAMIN) Tab, Take 1 tablet by mouth every morning., Disp: , Rfl:   ALLERGIES:   Allergies   Allergen  "Reactions   • [place] (Diagnostic) Anaphylaxis   • P[place] Anaphylaxis   • [place] Anaphylaxis   • Tree Nuts Food Allergy Vomiting and Nausea     Other nuts aside from almond, walnut, pistachio     SURGHX:   Past Surgical History:   Procedure Laterality Date   • DENTAL EXTRACTION(S)  11/3/2017    Procedure: DENTAL EXTRACTION(S);  Surgeon: Olman Sparks D.D.S.;  Location: SURGERY [facility];  Service: Oral Surgery     SOCHX:  reports that he has never smoked. He has never used smokeless tobacco. He reports previous alcohol use. He reports that he does not use drugs.   FH:   Family History   Problem Relation Age of Onset   • Respiratory Disease Mother    • Diabetes Paternal Aunt        Physical Exam:  Vitals/ General Appearance:   Weight/BMI: Body mass index is 53.22 kg/m².  /86 (BP Location: Left arm, Patient Position: Sitting, BP Cuff Size: Adult)   Pulse 86   Resp 16   Ht 1.727 m (5' 8\")   Wt (!) 159 kg (350 lb)   SpO2 91%   Vitals:    12/08/21 1353   BP: 124/86   BP Location: Left arm   Patient Position: Sitting   BP Cuff Size: Adult   Pulse: 86   Resp: 16   SpO2: 91%   Weight: (!) 159 kg (350 lb)   Height: 1.727 m (5' 8\")           Constitutional: Alert, no distress, well-groomed.  Skin: No rashes in visible areas.  Eye: Round. Conjunctiva clear, lids normal. No icterus.   ENMT: Lips pink without lesions, good dentition, moist mucous membranes. Phonation normal.  Neck: No masses, no thyromegaly. Moves freely without pain.  CV: Pulse as reported by patient  Respiratory: Unlabored respiratory effort, no cough or audible wheeze  Psych: Alert and oriented x3, normal affect and mood.   INVESTIGATIONS:       ASSESSMENT AND PLAN     1. He  has symptoms of Obstructive Sleep Apnea (JONES). He  has excessive daytime sleepiness that  interferes with activites of daily living. He  risk factors for JONES include obesity, thick neck, and crowded oropharynx.     The pathophysiology of JONES and the increased " risk of cardiovascular morbidity from untreated JONES is discussed in detail with the patient. He  also has HTN, heart disease which can be worsened by her JONES.     We have discussed diagnostic options including in-laboratory, attended polysomnography and home sleep testing. We have also discussed treatment options including airway pressurization, reconstructive otolaryngologic surgery, dental appliances and weight management.       Subsequently,treatment options will be discussed based on the diagnostic study. Meanwhile, He is urged to avoid supine sleep, weight gain and alcoholic beverages since all of these can worsen JONES. He is cautioned against drowsy driving. If He feels sleepy while driving, He must pull over for a break/nap, rather than persist on the road, in the interest of He own safety and that of others on the road.    Plan  -  He  will be scheduled for an overnight PSG with EtCO2 to assess sleep related  breathing disorder.   -In meantime continue using supplemental O2 at nighttime  2.Regarding treatment of other past medical problems and general health maintenance,  He is urged to follow up with PCP.

## 2021-12-17 ENCOUNTER — TELEPHONE (OUTPATIENT)
Dept: SLEEP MEDICINE | Facility: MEDICAL CENTER | Age: 31
End: 2021-12-17

## 2021-12-18 NOTE — TELEPHONE ENCOUNTER
Vero from [facility] calling (left voice message) needing more information regarding authorization for polysomnography/in lab sleep study.  Needs: name of facility that will be doing the testing and clarification on study/split night and more clinical notes. Reference # [id number] and phone number [phone] and fax number [phone].    Message routed to our Specialist Migdalia Maguire in prior authorization for help.

## 2021-12-21 ENCOUNTER — TELEPHONE (OUTPATIENT)
Dept: VASCULAR LAB | Facility: MEDICAL CENTER | Age: 31
End: 2021-12-21

## 2021-12-21 NOTE — TELEPHONE ENCOUNTER
Left message for pt to have INR checked  Scheduled for 12/30 @ 10:15  Norah Valladares, Clinical Pharmacist, CDE, CACP

## 2021-12-22 ENCOUNTER — SLEEP STUDY (OUTPATIENT)
Dept: SLEEP MEDICINE | Facility: MEDICAL CENTER | Age: 31
End: 2021-12-22
Payer: COMMERCIAL

## 2021-12-22 DIAGNOSIS — J96.11 CHRONIC RESPIRATORY FAILURE WITH HYPOXIA (HCC): ICD-10-CM

## 2021-12-22 DIAGNOSIS — G47.30 SLEEP DISORDER BREATHING: ICD-10-CM

## 2021-12-22 PROCEDURE — 95811 POLYSOM 6/>YRS CPAP 4/> PARM: CPT | Performed by: FAMILY MEDICINE

## 2021-12-23 NOTE — PROCEDURES
MONTAGE: Standard    STUDY TYPE: Split Night  RECORDING TECHNIQUE:   After the scalp was prepared, gold plated electrodes were applied to the scalp according to the International 10-20 System. EEG (electroencephalogram) was continuously monitored from the O1-M2, O2-M1, C3-M2, C4-M1, F3-M2, and F4-M1. EOGs (electrooculograms) were monitored by electrodes placed at the left and right outer canthi. Chin EMG (electromyogram) was monitored by electrodes placed on the mentalis and sub-mentalis muscles. Nasal and oral airflow were monitored using a triple port thermocouple as well as oronasal pressure transducer. Respiratory effort was measured by inductive plethysmography technology employing abdominal and thoracic belts. Blood oxygen saturation and pulse were monitored by pulse oximetry. Heart rhythm was monitored by surface electrocardiogram. Leg EMG was studied using surface electrodes placed on left and right anterior tibialis. A microphone was used to monitor tracheal sounds and snoring. Body position was monitored and documented by technician observation.     SCORING CRITERIA:   A modification of the AASM manual for scoring of sleep and associated events was used. Obstructive apneas were scored by cessation of airflow for at least 10 seconds with continuing respiratory effort. Central apneas were scored by cessation of airflow for at least 10 seconds with no respiratory effort. Hypopneas were scored by a 30% or more reduction in airflow for at least 10 seconds accompanied by arterial oxygen desaturation of 3% or an arousal. For CMS (Medicare) patients, per AASM rule 1B, hypopneas are scored by 30% with mild reduction in airflow for at least 10 seconds accompanied by arterial saturation decreased at 4%.    DIAGNOSTIC  Study start time was 08:52:59 PM. Diagnostic recording time was 272 minutes with a total sleep time of 130 minutes resulting in a sleep efficiency of 47.79%%. Sleep latency from the start of the study  was 45 minutes and the latency from sleep to REM was 00 minutes. In total,122 arousals were scored for an arousal index of 56.3.    Respiratory:  There were a total of 108 apneas consisting of 108 obstructive apneas, 0 mixed apneas, and 0 central apneas. A total of 180 hypopneas were scored. The apnea index was 49.85 per hour and the hypopnea index was 83.08 per hour resulting in an overall AHI of 132.92. AHI during rem was 0.0 and AHI while supine was 145.16.    Oximetry:  There was a mean oxygen saturation of 82.0%. The minimum oxygen saturation during NREM sleep was50.0% and in REM was --. Time spent during sleep with oxygen saturations <88% was 118.7 minutes.     Cardiac:  The highest heart rate seen while awake was 122 BPM while the highest heart rate during sleep was 122 BPM with an average sleeping heart rate of 96 BPM.    Limb Movements:  There were a total of 62 PLMs during sleep, which resulted in a PLM index of 28.6. There were 27 PLMs associated with arousals which resulted in a PLMS arousal index of 12.5.    TREATMENT:  Treatment recording time was 3h 53.5m (233 minutes) with a total sleep time of 3h 26.5m (206 minutes) resulting in a sleep efficiency of 88.4%. Sleep latency from the start of treatment was 01 minutes and REM latency from sleep onset was 1h 40.0m. The patient had 57 arousals in total for an arousal index of 16.6.    Respiratory:   There were 16 apneas in total consisting of 15 obstructive apneas, 1 central apneas, and 0 mixed apneas for an apnea index of 4.65. The patient had 144 hypopneas in total, which resulted in a hypopnea index of 41.84. The overall AHI was 46.49, with a REM AHI of 38.18, and a supine AHI of 46.67.     Oximetry:  The mean SaO2 during treatment was 74.0%. The minimum oxygen saturation in NREM was50.0 % and in REM was 67.0%. Patient spent 226.0 minutes of TST with SaO2 <88%.    Cardiac:  The highest heart rate during sleep was 122 BPM with an average sleeping heart  rate of 94BPM.    Limb Movements:  There were a total of 82 PLMS during titration sleep time that resulted in an index of 23.8. There were 15 PLMS associated with arousals. This resulted in a PLM arousal index of 4.4.    Titration:   CPAP was tried from 6 to 14cm H2O. BiPAP was tried from 18/14 to 19/15cm H2O    CPAP Titration:  The PAP titration was initiated with CPAP 6 cm of water and the pressure which was slowly titrated up in an attempt to eliminate sleep disordered breathing and snoring. CPAP was increased to 14  Cm before switching to BiPAP. The BiPAP was titrated between 18/14 cm to 19/15 cm. At this final pressure the patient was observed in the supine but not REM sleep stage.The apnea hypopnea index improved to 23.45 per hour and O2 jose 67%. The average O2 stauration was 81%. He spent 206 min of sleep time below 88% O2 saturation. The patient utilized medium vitera mask with heated humidification. The PAP was well-tolerated and there were minimal air leaks.     Impression:  1.  Severe obstructive sleep apnea with AHI of 132.9/hr and O2 jose 50 %. Due to severity of the disease he met the split study protocol. The titration started with CPAP 5 cm and the highest tested pressure was BiPAP 19/15 cm. The AHI improved to 23.45/hr with improved O2 jose of 67% and average O2 saturation of 81 %. Supplemental O2 was tried for brief period as well.    2.  Sleep related hypoxia    Recommendations:  No definitive pressure can be extrapolated from the titration, however auto BiPAP EPAP 16 cm IPAP max 25 cm PS 4 cm with O2 bleed in can be considered. In some cases alternative treatment options may be proven effective in resolving sleep apnea. These options include upper airway surgery, the use of a dental orthotic, weight loss orpositional therapy. Clinical correlation is required. In general patients with sleep apnea are advised to avoid alcohol, sedatives and not to operate a motor vehicle while drowsy.   Untreated sleep apnea increases the risk for cardiovascular and neurovascular disease.

## 2021-12-30 ENCOUNTER — ANTICOAGULATION VISIT (OUTPATIENT)
Dept: VASCULAR LAB | Facility: MEDICAL CENTER | Age: 31
End: 2021-12-30
Attending: INTERNAL MEDICINE
Payer: COMMERCIAL

## 2021-12-30 DIAGNOSIS — Z79.01 CHRONIC ANTICOAGULATION: ICD-10-CM

## 2021-12-30 LAB
INR BLD: 1.1 (ref 0.9–1.2)
INR PPP: 1.1 (ref 2–3.5)

## 2021-12-30 PROCEDURE — 99212 OFFICE O/P EST SF 10 MIN: CPT

## 2021-12-30 PROCEDURE — 85610 PROTHROMBIN TIME: CPT

## 2021-12-30 NOTE — PROGRESS NOTES
Anticoagulation Summary  As of 2021    INR goal:  2.0-3.0   TTR:  49.0 % (1.6 y)   INR used for dosin.10 (2021)   Warfarin maintenance plan:  7.5 mg (7.5 mg x 1) every day   Weekly warfarin total:  52.5 mg   Plan last modified:  Krupa Alfred (10/22/2021)   Next INR check:  2022   Target end date:  Indefinite    Indications    Pulmonary embolism with acute cor pulmonale (HCC) [I26.09]             Anticoagulation Episode Summary     INR check location:      Preferred lab:      Send INR reminders to:      Comments:        Anticoagulation Care Providers     Provider Role Specialty Phone number    [facility] Responsible  [phone]                Refer to Patient Findings for HPI:  Patient Findings     Positives:  Missed doses, Change in diet/appetite, Hospital admission    Negatives:  Signs/symptoms of thrombosis, Signs/symptoms of bleeding, Laboratory test error suspected, Change in health, Change in alcohol use, Change in activity, Upcoming invasive procedure, Emergency department visit, Upcoming dental procedure, Extra doses, Change in medications, Bruising, Other complaints          There were no vitals filed for this visit.   pt declined vitals    Verified current warfarin dosing schedule.    Medications reconciled   Pt is not on antiplatelet therapy      A/P   INR  SUB-therapeutic.   - Pt skipped doses when taking a muscle relaxer though no interaction noted that may cause increase in INR - advised pt to contact clinic if unsure about taking a medication.  - Discussed option to bridge as INR is 1.1- pt declined at this time though will consider if INR remains low at follow up appointment.  - Pt agreed to 5 day follow up. Advised patient to seek emergency services if experiencing any signs/symptoms of thrombosis or bleeding.      Warfarin dosing recommendation: Instructed pt to BOLUS 2x to 15mg today, 11.25mg tomorrow, then Pt is to continue with current  warfarin dosing regimen.      Pt educated to contact our clinic with any changes in medications or s/s of bleeding or thrombosis. Pt is aware to seek immediate medical attention for falls, head injury or deep cuts.    Follow up appointment in 5 days per pt pref    Ady ClarosD

## 2022-01-04 ENCOUNTER — ANTICOAGULATION VISIT (OUTPATIENT)
Dept: VASCULAR LAB | Facility: MEDICAL CENTER | Age: 32
End: 2022-01-04
Attending: INTERNAL MEDICINE
Payer: COMMERCIAL

## 2022-01-04 DIAGNOSIS — Z79.01 CHRONIC ANTICOAGULATION: ICD-10-CM

## 2022-01-04 LAB
INR BLD: 2.8 (ref 0.9–1.2)
INR PPP: 2.8 (ref 2–3.5)

## 2022-01-04 PROCEDURE — 99211 OFF/OP EST MAY X REQ PHY/QHP: CPT

## 2022-01-04 PROCEDURE — 85610 PROTHROMBIN TIME: CPT

## 2022-01-04 RX ORDER — WARFARIN SODIUM 7.5 MG/1
TABLET ORAL
Qty: 90 TABLET | Refills: 1 | Status: SHIPPED | OUTPATIENT
Start: 2022-01-04 | End: 2022-03-23 | Stop reason: SDUPTHER

## 2022-01-05 NOTE — PROGRESS NOTES
Anticoagulation Summary  As of 2022    INR goal:  2.0-3.0   TTR:  49.0 % (1.6 y)   INR used for dosin.80 (2022)   Warfarin maintenance plan:  7.5 mg (7.5 mg x 1) every day   Weekly warfarin total:  52.5 mg   Plan last modified:  Krupa Alfred (10/22/2021)   Next INR check:  2022   Target end date:  Indefinite    Indications    Pulmonary embolism with acute cor pulmonale (HCC) [I26.09]             Anticoagulation Episode Summary     INR check location:      Preferred lab:      Send INR reminders to:      Comments:        Anticoagulation Care Providers     Provider Role Specialty Phone number    [facility] Responsible  [phone]                Refer to Patient Findings for HPI:  Patient Findings     Negatives:  Signs/symptoms of thrombosis, Signs/symptoms of bleeding, Laboratory test error suspected, Change in health, Change in alcohol use, Change in activity, Upcoming invasive procedure, Emergency department visit, Upcoming dental procedure, Missed doses, Extra doses, Change in medications, Change in diet/appetite, Hospital admission, Bruising, Other complaints          There were no vitals filed for this visit.   pt declined vitals    Verified current warfarin dosing schedule.    Medications reconciled   Pt is not on antiplatelet therapy      A/P   INR  now therapeutic.     Warfarin dosing recommendation: Pt is to continue with previously established warfarin dosing regimen.      Pt educated to contact our clinic with any changes in medications or s/s of bleeding or thrombosis. Pt is aware to seek immediate medical attention for falls, head injury or deep cuts.    Follow up appointment in 3 week(s) before pulmonology appt per pt preference    Vamsi Shah, AdyD

## 2022-01-25 ENCOUNTER — PATIENT MESSAGE (OUTPATIENT)
Dept: MEDICAL GROUP | Facility: MEDICAL CENTER | Age: 32
End: 2022-01-25

## 2022-01-25 DIAGNOSIS — J96.22 ACUTE ON CHRONIC RESPIRATORY FAILURE WITH HYPOXIA AND HYPERCAPNIA (HCC): ICD-10-CM

## 2022-01-25 DIAGNOSIS — J96.21 ACUTE ON CHRONIC RESPIRATORY FAILURE WITH HYPOXIA AND HYPERCAPNIA (HCC): ICD-10-CM

## 2022-01-26 RX ORDER — FUROSEMIDE 40 MG/1
40 TABLET ORAL 2 TIMES DAILY
Qty: 30 TABLET | Refills: 0 | Status: SHIPPED | OUTPATIENT
Start: 2022-01-26 | End: 2022-02-25 | Stop reason: SDUPTHER

## 2022-01-26 NOTE — TELEPHONE ENCOUNTER
From: Michael Ontiveros  To: Physician Assistant Shawnee Junior  Sent: 1/25/2022 7:49 PM PST  Subject: Lasix    I still don’t have my cpap or bipap yet but my lasix runs out in 8 days. Do I need to be prescribed more?

## 2022-01-31 ENCOUNTER — ANTICOAGULATION VISIT (OUTPATIENT)
Dept: VASCULAR LAB | Facility: MEDICAL CENTER | Age: 32
End: 2022-01-31
Attending: INTERNAL MEDICINE
Payer: COMMERCIAL

## 2022-01-31 ENCOUNTER — OFFICE VISIT (OUTPATIENT)
Dept: SLEEP MEDICINE | Facility: MEDICAL CENTER | Age: 32
End: 2022-01-31
Payer: COMMERCIAL

## 2022-01-31 VITALS
HEART RATE: 84 BPM | SYSTOLIC BLOOD PRESSURE: 128 MMHG | BODY MASS INDEX: 47.74 KG/M2 | HEIGHT: 68 IN | DIASTOLIC BLOOD PRESSURE: 88 MMHG | OXYGEN SATURATION: 92 % | WEIGHT: 315 LBS | RESPIRATION RATE: 16 BRPM

## 2022-01-31 DIAGNOSIS — G47.33 OSA (OBSTRUCTIVE SLEEP APNEA): ICD-10-CM

## 2022-01-31 DIAGNOSIS — I26.09 PULMONARY EMBOLISM WITH ACUTE COR PULMONALE, UNSPECIFIED CHRONICITY, UNSPECIFIED PULMONARY EMBOLISM TYPE (HCC): ICD-10-CM

## 2022-01-31 DIAGNOSIS — G47.34 SLEEP RELATED HYPOXIA: ICD-10-CM

## 2022-01-31 LAB
INR BLD: 1.7 (ref 0.9–1.2)
INR PPP: 1.7 (ref 2–3.5)

## 2022-01-31 PROCEDURE — 99212 OFFICE O/P EST SF 10 MIN: CPT | Performed by: NURSE PRACTITIONER

## 2022-01-31 PROCEDURE — 85610 PROTHROMBIN TIME: CPT

## 2022-01-31 PROCEDURE — 99214 OFFICE O/P EST MOD 30 MIN: CPT | Performed by: FAMILY MEDICINE

## 2022-01-31 ASSESSMENT — PATIENT HEALTH QUESTIONNAIRE - PHQ9: CLINICAL INTERPRETATION OF PHQ2 SCORE: 0

## 2022-01-31 ASSESSMENT — FIBROSIS 4 INDEX: FIB4 SCORE: 0.57

## 2022-01-31 NOTE — PROGRESS NOTES
Anticoagulation Summary  As of 2022    INR goal:  2.0-3.0   TTR:  50.0 % (1.7 y)   INR used for dosin.70 (2022)   Warfarin maintenance plan:  7.5 mg (7.5 mg x 1) every day   Weekly warfarin total:  52.5 mg   Plan last modified:  Krupa Alfred (10/22/2021)   Next INR check:  2022   Target end date:  Indefinite    Indications    Pulmonary embolism with acute cor pulmonale (HCC) [I26.09]             Anticoagulation Episode Summary     INR check location:      Preferred lab:      Send INR reminders to:      Comments:        Anticoagulation Care Providers     Provider Role Specialty Phone number    [facility] Responsible  [phone]                Refer to Patient Findings for HPI:  Patient Findings     Positives:  Missed doses    Negatives:  Signs/symptoms of thrombosis, Signs/symptoms of bleeding, Laboratory test error suspected, Change in health, Change in alcohol use, Change in activity, Upcoming invasive procedure, Emergency department visit, Upcoming dental procedure, Extra doses, Change in medications, Change in diet/appetite, Hospital admission, Bruising, Other complaints          There were no vitals filed for this visit.   pt declined vitals    Verified current warfarin dosing schedule.    Medications reconciled   Pt is not on antiplatelet therapy      A/P   INR  sub-therapeutic. No recent VTEs.    Warfarin dosing recommendation: take 11.25 mg (1.5 tabs tonight) then resume 7.5 mg daily.    Pt educated to contact our clinic with any changes in medications or s/s of bleeding or thrombosis. Pt is aware to seek immediate medical attention for falls, head injury or deep cuts.    Follow up appointment in 2 week(s).    JASEN Shahid

## 2022-01-31 NOTE — PROGRESS NOTES
[facility] Follow Up Note     Date: 1/31/2022 / Time: 11:03 AM    Patient ID:   Name:             Michael Ontiveros   YOB: 1990  Age:                 31 y.o.  male   MRN:               [id number]      Thank you for requesting a sleep medicine consultation on Michael Ontiveros at the sleep center. He presents today with the chief complaints of JONES follow up.     HISTORY OF PRESENT ILLNESS:       Pt is currently on supplemental O2 at 8 LPM. He goes to sleep around 9-10 pm on weekdays and around 1-2 am on the weekends. He gets out of bed at 6:30 am on weekdays and at 10:30 am on the weekends.  He averages about 6-7 hrs of sleep on a good night and 4.5-5 hrs on a bad night. He falls asleep within 10 minutes.Overall,he doesnot finds his sleep refreshing. He denies any symptoms of RLS, narcolepsy or any symptoms to suggest parasomnias such as nightmares, sleep walking or acting out of dreams. The symptoms of excessive daytime, snoring and gasping has contiued .     Severe obstructive sleep apnea with AHI of 132.9/hr and O2 jose 50 %. Due to severity of the disease he met the split study protocol. The titration started with CPAP 5 cm and the highest tested pressure was BiPAP 19/15 cm. The AHI improved to 23.45/hr with improved O2 jose of 67% and average O2 saturation of 81 %. Supplemental O2 was tried for brief period as well.    His other comorbid condition include MI, pulmonary hypertension, PE with cor pulmonale, morbid obesity, hyperglycemia, chronic anticoagulation, asthma and cardiomegaly.  He was recently admitted to the hospital on 11/1/2021 with bilateral lower extremity edema.  He was intubated 7/3/2021 and extubated on 11/5/2021.  Echocardiogram showed EF of 60% with right ventricular pressure of 45 mmHg.  His current medications include warfarin, furosemide and albuterol.    REVIEW OF SYSTEMS:       Constitutional: Denies fevers, Denies weight changes  Eyes: Denies  changes in vision, no eye pain  Ears/Nose/Throat/Mouth: Denies nasal congestion or sore throat   Cardiovascular: Denies chest pain or palpitations   Respiratory: Denies shortness of breath , Denies cough  Gastrointestinal/Hepatic: Denies abdominal pain, nausea, vomiting, diarrhea, constipation or GI bleeding   Genitourinary: Deniesdysuria or frequency  Musculoskeletal/Rheum: Denies  joint pain and swelling   Skin/Breast: Denies rash,   Neurological: Denies headache, confusion, memory loss or focal weakness/parasthesias  Psychiatric: denies mood disorder   Sleep: + snoring and witnessed apnea     Comprehensive review of systems form is reviewed with the patient and is attached in the EMR.     PMH:  has a past medical history of Asthma, Blood clotting disorder (HCC), Chickenpox, Eczema, Hypertension, Influenza, and Tonsillitis.  MEDS:   Current Outpatient Medications:   •  furosemide (LASIX) 40 MG Tab, Take 1 Tablet by mouth 2 times a day., Disp: 30 Tablet, Rfl: 0  •  warfarin (COUMADIN) 7.5 MG Tab, Take one tablet by mouth daily or as directed by anticoagulation clinic, Disp: 90 Tablet, Rfl: 1  •  acetaminophen (TYLENOL) 325 MG Tab, Take 2 Tablets by mouth every 6 hours as needed., Disp: 30 Tablet, Rfl: 0  •  albuterol 108 (90 Base) MCG/ACT Aero Soln inhalation aerosol, Inhale 2 Puffs every 6 hours as needed for Shortness of Breath., Disp: , Rfl:   •  Multiple Vitamins-Minerals (MENS MULTIVITAMIN) Tab, Take 1 tablet by mouth every morning., Disp: , Rfl:   ALLERGIES:   Allergies   Allergen Reactions   • Hornitos (Diagnostic) Anaphylaxis   • Pistachio Anaphylaxis   • [place] Anaphylaxis   • Tree Nuts Food Allergy Vomiting and Nausea     Other nuts aside from almond, walnut, pistachio     SURGHX:   Past Surgical History:   Procedure Laterality Date   • DENTAL EXTRACTION(S)  11/3/2017    Procedure: DENTAL EXTRACTION(S);  Surgeon: Olman Sparks D.D.S.;  Location: SURGERY [facility];  Service: Oral Surgery  "    SOCHX:  reports that he has never smoked. He has never used smokeless tobacco. He reports previous alcohol use. He reports that he does not use drugs..  FH:   Family History   Problem Relation Age of Onset   • Respiratory Disease Mother    • Diabetes Paternal Aunt          Physical Exam:  Vitals/ General Appearance:   Weight/BMI: Body mass index is 53.83 kg/m².  /88 (BP Location: Left arm, Patient Position: Sitting, BP Cuff Size: Adult)   Pulse 84   Resp 16   Ht 1.727 m (5' 8\")   Wt (!) 161 kg (354 lb)   SpO2 92%   Vitals:    01/31/22 1042   BP: 128/88   BP Location: Left arm   Patient Position: Sitting   BP Cuff Size: Adult   Pulse: 84   Resp: 16   SpO2: 92%   Weight: (!) 161 kg (354 lb)   Height: 1.727 m (5' 8\")       Pt. is alert and oriented to time, place and person. Cooperative and in no apparent distress.       Constitutional: Alert, no distress, well-groomed.  Skin: No rashes in visible areas.  Eye: Round. Conjunctiva clear, lids normal. No icterus.   ENMT: Lips pink without lesions, good dentition, moist mucous membranes. Phonation normal.  Neck: No masses, no thyromegaly. Moves freely without pain.  CV: Pulse as reported by patient  Respiratory: Unlabored respiratory effort, no cough or audible wheeze  Psych: Alert and oriented x3, normal affect and mood.     ASSESSMENT AND PLAN     1.Obstructive Sleep Apnea      The pathophysiology of sleep anea and the increased risk of cardiovascular morbidity from untreated sleep apnea is discussed in detail with the patient. He is urged to avoid supine sleep, weight gain and alcoholic beverages since all of these can worsen sleep apnea. He is cautioned against drowsy driving. If He feels sleepy while driving, He must pull over for a break/nap, rather than persist on the road, in the interest of He own safety and that of others on the road.   Plan   -  overnight BiPAP titration vs dental appliance and surgeries was dicussed in detail. After informed " discussion Auto BiPAP  EPAP 15 cm IPAP 25 cm PS 4 cm with O2 bleed 3 LPM    - In meantime continue supplemental O2    - F/u in 8-10 weeks to assess the efficiacy of recommended pressure    - SS was reviewed and discussed with the pt   - compliance was reinforced     2. Regarding treatment of other past medical problems and general health maintenance,  He is urged to follow up with PCP.

## 2022-02-14 ENCOUNTER — APPOINTMENT (OUTPATIENT)
Dept: VASCULAR LAB | Facility: MEDICAL CENTER | Age: 32
End: 2022-02-14
Attending: INTERNAL MEDICINE
Payer: COMMERCIAL

## 2022-02-15 ENCOUNTER — TELEPHONE (OUTPATIENT)
Dept: VASCULAR LAB | Facility: MEDICAL CENTER | Age: 32
End: 2022-02-15

## 2022-02-15 NOTE — TELEPHONE ENCOUNTER
Left VM message to reschedule cancelled anticoagulation services appointment  Norah Valladares, Clinical Pharmacist, CDE, CACP

## 2022-02-16 DIAGNOSIS — Z79.01 CHRONIC ANTICOAGULATION: ICD-10-CM

## 2022-02-21 NOTE — PROCEDURES
Non selective debridement with no rinse foam cleanser and washcloth to remove non viable tissue from wound bed and tianna wounds.   
no

## 2022-02-25 ENCOUNTER — PATIENT MESSAGE (OUTPATIENT)
Dept: MEDICAL GROUP | Facility: MEDICAL CENTER | Age: 32
End: 2022-02-25
Payer: COMMERCIAL

## 2022-02-25 DIAGNOSIS — J96.22 ACUTE ON CHRONIC RESPIRATORY FAILURE WITH HYPOXIA AND HYPERCAPNIA (HCC): ICD-10-CM

## 2022-02-25 DIAGNOSIS — J96.21 ACUTE ON CHRONIC RESPIRATORY FAILURE WITH HYPOXIA AND HYPERCAPNIA (HCC): ICD-10-CM

## 2022-02-25 RX ORDER — FUROSEMIDE 40 MG/1
40 TABLET ORAL 2 TIMES DAILY
Qty: 30 TABLET | Refills: 0 | Status: SHIPPED | OUTPATIENT
Start: 2022-02-25 | End: 2022-03-29 | Stop reason: SDUPTHER

## 2022-03-08 ENCOUNTER — DOCUMENTATION (OUTPATIENT)
Dept: VASCULAR LAB | Facility: MEDICAL CENTER | Age: 32
End: 2022-03-08
Payer: COMMERCIAL

## 2022-03-23 ENCOUNTER — PATIENT MESSAGE (OUTPATIENT)
Dept: MEDICAL GROUP | Facility: MEDICAL CENTER | Age: 32
End: 2022-03-23
Payer: COMMERCIAL

## 2022-03-23 DIAGNOSIS — J96.21 ACUTE ON CHRONIC RESPIRATORY FAILURE WITH HYPOXIA AND HYPERCAPNIA (HCC): ICD-10-CM

## 2022-03-23 DIAGNOSIS — Z79.01 CHRONIC ANTICOAGULATION: ICD-10-CM

## 2022-03-23 DIAGNOSIS — J96.22 ACUTE ON CHRONIC RESPIRATORY FAILURE WITH HYPOXIA AND HYPERCAPNIA (HCC): ICD-10-CM

## 2022-03-23 RX ORDER — WARFARIN SODIUM 7.5 MG/1
TABLET ORAL
Qty: 30 TABLET | Refills: 0 | Status: SHIPPED | OUTPATIENT
Start: 2022-03-23 | End: 2022-05-01 | Stop reason: SDUPTHER

## 2022-03-24 RX ORDER — FUROSEMIDE 40 MG/1
40 TABLET ORAL 2 TIMES DAILY
Qty: 30 TABLET | Refills: 0 | OUTPATIENT
Start: 2022-03-24

## 2022-03-24 NOTE — TELEPHONE ENCOUNTER
Records reviewed.  Patient was supposed to get labs 3 months ago to make sure his potassium was doing well on the Lasix.  He has not gotten the labs yet.  He has not been seen by his PCP in 3 months.  Recommend that he be seen and evaluated within 24 to 48 hours to determine whether or not refill of Lasix is appropriate.

## 2022-03-25 ENCOUNTER — ANTICOAGULATION MONITORING (OUTPATIENT)
Dept: VASCULAR LAB | Facility: MEDICAL CENTER | Age: 32
End: 2022-03-25
Payer: COMMERCIAL

## 2022-03-25 NOTE — LETTER
Michael Ontiveros  [address] [address]    03/25/22    Dear Michael Ontiveros ,    We have been unsuccessful in our attempts to contact you regarding your Anticoagulation Service appointments. Warfarin is a potent blood-thinning agent that requires monitoring to ensure that the dosage is correct for your body.  If it isn't, you could develop serious, sometimes life-threatening bleeding problems or life-threatening blood clots or stroke could result.    To monitor you effectively, we need to be able to communicate with you.  This is a requirement to be followed by our Service.       If you repeatedly fail to keep your lab appointments, you are at risk of being discharged from the Anticoagulation Service.    It is extremely important that you contact the clinic as soon as possible to arrange appropriate follow up.  We are open Monday-Friday 8 am until 5 pm.  You may reach our Service at [phone].           Sincerely,           Charles Lazaro PharmD, [facility]  Clinic Supervisor  [facility]  Outpatient Anticoagulation Service

## 2022-03-25 NOTE — TELEPHONE ENCOUNTER
Phone Number Called: [phone] (home)     Call outcome: Spoke with pt and informed of Dr. Roy's mess. Pt. verbalized understanding.    Shawnee, please note...

## 2022-03-29 DIAGNOSIS — J96.22 ACUTE ON CHRONIC RESPIRATORY FAILURE WITH HYPOXIA AND HYPERCAPNIA (HCC): ICD-10-CM

## 2022-03-29 DIAGNOSIS — J96.21 ACUTE ON CHRONIC RESPIRATORY FAILURE WITH HYPOXIA AND HYPERCAPNIA (HCC): ICD-10-CM

## 2022-03-30 ENCOUNTER — PATIENT MESSAGE (OUTPATIENT)
Dept: MEDICAL GROUP | Facility: MEDICAL CENTER | Age: 32
End: 2022-03-30
Payer: COMMERCIAL

## 2022-03-30 ENCOUNTER — TELEPHONE (OUTPATIENT)
Dept: MEDICAL GROUP | Facility: MEDICAL CENTER | Age: 32
End: 2022-03-30
Payer: COMMERCIAL

## 2022-03-30 DIAGNOSIS — R35.89 DIURESIS: ICD-10-CM

## 2022-03-30 RX ORDER — FUROSEMIDE 40 MG/1
40 TABLET ORAL 2 TIMES DAILY
Qty: 30 TABLET | Refills: 0 | Status: SHIPPED | OUTPATIENT
Start: 2022-03-30 | End: 2022-04-13 | Stop reason: SDUPTHER

## 2022-03-30 NOTE — TELEPHONE ENCOUNTER
Please inform patient that   He needs to do blood work if he is taking furosemide daily to make sure his electrolytes are stable. BMp was ordered. Not fasting.     Shawnee Junior P.A.-C.

## 2022-04-13 DIAGNOSIS — J96.22 ACUTE ON CHRONIC RESPIRATORY FAILURE WITH HYPOXIA AND HYPERCAPNIA (HCC): ICD-10-CM

## 2022-04-13 DIAGNOSIS — J96.21 ACUTE ON CHRONIC RESPIRATORY FAILURE WITH HYPOXIA AND HYPERCAPNIA (HCC): ICD-10-CM

## 2022-04-13 RX ORDER — FUROSEMIDE 40 MG/1
40 TABLET ORAL 2 TIMES DAILY
Qty: 30 TABLET | Refills: 0 | Status: SHIPPED | OUTPATIENT
Start: 2022-04-13 | End: 2022-05-02 | Stop reason: SDUPTHER

## 2022-04-17 NOTE — DISCHARGE PLANNING
Care Transition Team Discharge Planning    Anticipated Discharge Disposition: TBD     Action: Per chart review, pt remains in ICU level of care.      Barriers to Discharge: not medically cleared/stable    Plan: LSW will continue to follow, and assist with d/c planning.    Labor Progress Note:   S:  Patient resting comfortably in bed. Denies feeling pain or contractions.     O:  Vitals:    22 1000   BP: 126/78   Pulse: 86   Resp: 17   Temp:      Gen: awake, alert, no acute distress  CV: well perfused   Resp: no increased work of breathing   Abd: gravid  Ext: no edema     SVE: 1.5/60/-3 previously, deferred for now    FHT: baseline 125, moderate variability, + accels, - decels  [place]: q 2-5 minutes    Pit at 14 mU/minute    Recent Labs   Lab 22  0208   WBC 12.4*   RBC 5.14   HGB 11.9*   HCT 38.7            A&P: Felipe David is a 20 year old  at 36w5d  d/b LMP consistent with an 11 week US admitted for induction of labor secondary to PPROM.    Labor:   Pitocin initiated at 0305   Limit SVE due to PPROM to decrease infection risk.     pain: epidural PRN     FWB:   Cephalic/anterior placenta  CEFM/[place]   GBS pos, ampicillin prophylaxis started at 0206   Cat 1  Betamethasone 12 mg IM given @ 0955  for fetal lung development. Ordered for 2 doses if pt does not deliver prior to second dose.     Significant Hx:  #Sensorineural Hearing Loss   - Strong family history   - Patient does not require hearing aid     #Marijuana Use During Pregnancy   - Previous Utox positive     Dr. Lozada aware via phone call.     Lottie Smith MD PGY1  Obstetrics and Gynecology   2022 10:24 AM

## 2022-05-02 ENCOUNTER — HOSPITAL ENCOUNTER (OUTPATIENT)
Dept: LAB | Facility: MEDICAL CENTER | Age: 32
End: 2022-05-02
Attending: PHYSICIAN ASSISTANT
Payer: COMMERCIAL

## 2022-05-02 DIAGNOSIS — R35.89 DIURESIS: ICD-10-CM

## 2022-05-02 DIAGNOSIS — J96.21 ACUTE ON CHRONIC RESPIRATORY FAILURE WITH HYPOXIA AND HYPERCAPNIA (HCC): ICD-10-CM

## 2022-05-02 DIAGNOSIS — J96.22 ACUTE ON CHRONIC RESPIRATORY FAILURE WITH HYPOXIA AND HYPERCAPNIA (HCC): ICD-10-CM

## 2022-05-02 LAB
ANION GAP SERPL CALC-SCNC: 10 MMOL/L (ref 7–16)
BUN SERPL-MCNC: 17 MG/DL (ref 8–22)
CALCIUM SERPL-MCNC: 9.4 MG/DL (ref 8.5–10.5)
CHLORIDE SERPL-SCNC: 104 MMOL/L (ref 96–112)
CO2 SERPL-SCNC: 26 MMOL/L (ref 20–33)
CREAT SERPL-MCNC: 0.71 MG/DL (ref 0.5–1.4)
FASTING STATUS PATIENT QL REPORTED: NORMAL
GFR SERPLBLD CREATININE-BSD FMLA CKD-EPI: 125 ML/MIN/1.73 M 2
GLUCOSE SERPL-MCNC: 88 MG/DL (ref 65–99)
POTASSIUM SERPL-SCNC: 4.6 MMOL/L (ref 3.6–5.5)
SODIUM SERPL-SCNC: 140 MMOL/L (ref 135–145)

## 2022-05-02 PROCEDURE — 36415 COLL VENOUS BLD VENIPUNCTURE: CPT

## 2022-05-02 PROCEDURE — 80048 BASIC METABOLIC PNL TOTAL CA: CPT

## 2022-05-03 RX ORDER — FUROSEMIDE 40 MG/1
40 TABLET ORAL 2 TIMES DAILY
Qty: 90 TABLET | Refills: 0 | Status: SHIPPED | OUTPATIENT
Start: 2022-05-03 | End: 2022-07-06 | Stop reason: SDUPTHER

## 2022-05-04 ENCOUNTER — TELEPHONE (OUTPATIENT)
Dept: VASCULAR LAB | Facility: MEDICAL CENTER | Age: 32
End: 2022-05-04
Payer: COMMERCIAL

## 2022-05-04 NOTE — LETTER
Michael Ontiveros  [address] [place] [address]    05/04/22    Dear Michael Ontiveros ,    We have been unsuccessful in our attempts to contact you regarding your Anticoagulation Service appointments. Warfarin is a potent blood-thinning agent that requires monitoring to ensure that the dosage is correct for your body.  If it isn't, you could develop serious, sometimes life-threatening bleeding problems or life-threatening blood clots or stroke could result.    To monitor you effectively, we need to be able to communicate with you.  This is a requirement to be followed by our Service.       If you repeatedly fail to keep your lab appointments, you are at risk of being discharged from the Anticoagulation Service.    It is extremely important that you contact the clinic as soon as possible to arrange appropriate follow up.  We are open Monday-Friday 8 am until 5 pm.  You may reach our Service at [phone].           Sincerely,           Charles Lazaro PharmD, BCPS  Clinic Supervisor  [facility]  Outpatient Anticoagulation Service

## 2022-05-04 NOTE — TELEPHONE ENCOUNTER
Left message for pt to have INR checked  3rd call  2nd letter sent    INR   Date Value Ref Range Status   01/31/2022 1.70 (A) 2 - 3.5 Final     POC INR   Date Value Ref Range Status   01/31/2022 1.7 (H) 0.9 - 1.2 Final     Comment:     INR - Non-therapeutic Reference Range: 0.9-1.2  INR - Therapeutic Reference Range: 2.0-4.0       Norah Valladares, Clinical Pharmacist, CDE, CACP

## 2022-06-09 ENCOUNTER — TELEPHONE (OUTPATIENT)
Dept: VASCULAR LAB | Facility: MEDICAL CENTER | Age: 32
End: 2022-06-09
Payer: COMMERCIAL

## 2022-06-09 NOTE — LETTER
Michael Ontiveros  [address] [facility] [address]    06/09/22    Dear Michael Ontiveros ,    We have been unsuccessful in our attempts to contact you regarding your Anticoagulation Service appointments. Warfarin is a potent blood-thinning agent that requires monitoring to ensure that the dosage is correct for your body.  If it isn't, you could develop serious, sometimes life-threatening bleeding problems or life-threatening blood clots or stroke could result.    To monitor you effectively, we need to be able to communicate with you.  This is a requirement to be followed by our Service.       If you repeatedly fail to keep your lab appointments, you are at risk of being discharged from the Anticoagulation Service.    It is extremely important that you contact the clinic as soon as possible to arrange appropriate follow up.  We are open Monday-Friday 8 am until 5 pm.  You may reach our Service at [phone].           Sincerely,           Charles Lazaro PharmD, BCPS  Clinic Supervisor  [facility]  Outpatient Anticoagulation Service

## 2022-06-09 NOTE — TELEPHONE ENCOUNTER
Left message for pt to have INR checked  4th call  3rd letter sent  INR   Date Value Ref Range Status   01/31/2022 1.70 (A) 2 - 3.5 Final     POC INR   Date Value Ref Range Status   01/31/2022 1.7 (H) 0.9 - 1.2 Final     Comment:     INR - Non-therapeutic Reference Range: 0.9-1.2  INR - Therapeutic Reference Range: 2.0-4.0       Norah Valladares, Clinical Pharmacist, CDE, CACP

## 2022-06-10 ENCOUNTER — ANTICOAGULATION MONITORING (OUTPATIENT)
Dept: VASCULAR LAB | Facility: MEDICAL CENTER | Age: 32
End: 2022-06-10
Payer: COMMERCIAL

## 2022-06-10 NOTE — PROGRESS NOTES
Discharged from [facility].  Secondary to non compliance/non adherence  Norah Valladares, Clinical Pharmacist, CDE, CACP        
06-Nov-2020 06:30
06-Nov-2020 12:33
06-Nov-2020 19:53

## 2022-07-06 DIAGNOSIS — J96.21 ACUTE ON CHRONIC RESPIRATORY FAILURE WITH HYPOXIA AND HYPERCAPNIA (HCC): ICD-10-CM

## 2022-07-06 DIAGNOSIS — J96.22 ACUTE ON CHRONIC RESPIRATORY FAILURE WITH HYPOXIA AND HYPERCAPNIA (HCC): ICD-10-CM

## 2022-07-06 RX ORDER — FUROSEMIDE 40 MG/1
40 TABLET ORAL 2 TIMES DAILY
Qty: 90 TABLET | Refills: 0 | Status: SHIPPED | OUTPATIENT
Start: 2022-07-06 | End: 2022-08-07 | Stop reason: SDUPTHER

## 2022-07-06 NOTE — TELEPHONE ENCOUNTER
Received request via: Pharmacy    Was the patient seen in the last year in this department? Yes    Does the patient have an active prescription (recently filled or refills available) for medication(s) requested? No     Future Appointments       Provider Department [place]    7/18/2022 8:40 AM ASAF Aguayo [facility] Pulmonary Medicine

## 2022-07-18 ENCOUNTER — OFFICE VISIT (OUTPATIENT)
Dept: SLEEP MEDICINE | Facility: MEDICAL CENTER | Age: 32
End: 2022-07-18
Payer: COMMERCIAL

## 2022-07-18 VITALS
HEIGHT: 69 IN | WEIGHT: 315 LBS | HEART RATE: 81 BPM | DIASTOLIC BLOOD PRESSURE: 62 MMHG | BODY MASS INDEX: 46.65 KG/M2 | OXYGEN SATURATION: 94 % | SYSTOLIC BLOOD PRESSURE: 118 MMHG | RESPIRATION RATE: 20 BRPM

## 2022-07-18 DIAGNOSIS — G47.34 SLEEP RELATED HYPOXIA: ICD-10-CM

## 2022-07-18 DIAGNOSIS — G47.33 OSA (OBSTRUCTIVE SLEEP APNEA): ICD-10-CM

## 2022-07-18 DIAGNOSIS — I27.20 PULMONARY HYPERTENSION (HCC): ICD-10-CM

## 2022-07-18 PROCEDURE — 99214 OFFICE O/P EST MOD 30 MIN: CPT | Performed by: NURSE PRACTITIONER

## 2022-07-18 RX ORDER — COVID-19 MOLECULAR TEST ASSAY
KIT MISCELLANEOUS
COMMUNITY
Start: 2022-06-10 | End: 2023-03-12

## 2022-07-18 RX ORDER — ACETAMINOPHEN 500 MG
TABLET ORAL
COMMUNITY
End: 2023-03-12

## 2022-07-18 RX ORDER — IBUPROFEN 600 MG/1
600 TABLET ORAL
COMMUNITY
Start: 2022-07-02 | End: 2023-03-12

## 2022-07-18 RX ORDER — AMOXICILLIN 500 MG/1
CAPSULE ORAL
COMMUNITY
Start: 2022-07-02 | End: 2023-03-12

## 2022-07-18 RX ORDER — IBUPROFEN 600 MG/1
TABLET ORAL
COMMUNITY
Start: 2022-07-02 | End: 2023-03-12

## 2022-07-18 ASSESSMENT — FIBROSIS 4 INDEX: FIB4 SCORE: 0.57

## 2022-07-18 NOTE — PROGRESS NOTES
Chief Complaint   Patient presents with   • Apnea       HPI:  Michael Ontiveros is a 31 y.o. year old male here today for follow-up on JONES with first compliance visit. Chief complaints of snoring, gasping in sleep and excessive daytime sleepiness.  He has history of chronic respiratory failure and uses oxygen 24/7  Patient has a past medical history of previous MI, pulmonary hypertension, PE with cor pulmonale, morbid obesity, hyperglycemia, chronic anticoagulation, asthma, and cardiomegaly.  At previous visit, sleep study results were reviewed and an auto BiPAP EPAP 16 cm IPAP max 25 cm PS 4 cm with O2 bleed was ordered.    Patient denies any difficulty with mask fit or pressures at this time.  He did have initial problems adjusting to the feel of the mask.  He is currently using a full facemask.  He denies any difficulty falling or staying asleep.  He states he sleeps tween 6 to 9 hours with an average of 7.  He denies any excessive daytime sleepiness, morning headaches, palpitations, concentration or memory problems.  He states that his snoring and excessive sleepiness have improved after starting on BiPAP with supplemental oxygen.  Also of note his supplemental oxygen needs have also improved since starting on BiPAP.  Currently he is exercising 4 times a week with walking.    Compliance was reviewed with patient and does show 70% use for the past month with an average time of 7 hours and 37 minutes and a resultant AHI of 0.7.  Machine is currently set to auto BiPAP with a max IPAP of 25 and a minimum EPAP of 15 with a pressure support of 4 cm/H2O.  There is some evidence of leakage on compliance, but it is not interfering with the events per hour.    Sleep history:  Split-night sleep study (12/22/2021):  Severe obstructive sleep apnea with AHI of 132.9/hr and O2 jose 50 %. Due to severity of the disease he met the split study protocol. The titration started with CPAP 5 cm and the highest tested  "pressure was BiPAP 19/15 cm. The AHI improved to 23.45/hr with improved O2 jose of 67% and average O2 saturation of 81 %. Supplemental O2 was tried for brief period as well.    He was admitted to the hospital on 11/1/2021 with bilateral lower extremity edema.  He was intubated 7/3/2021 and extubated on 11/5/2021.  Echocardiogram showed EF of 60% with right ventricular pressure of 45 mmHg.  His current medications include warfarin, furosemide and albuterol.    ROS: As per HPI and otherwise negative if not stated.    Past Medical History:   Diagnosis Date   • Asthma    • Blood clotting disorder (HCC)    • Chickenpox    • Eczema    • Hypertension    • Influenza    • Tonsillitis        Past Surgical History:   Procedure Laterality Date   • DENTAL EXTRACTION(S)  11/3/2017    Procedure: DENTAL EXTRACTION(S);  Surgeon: Olman Sparks D.D.S.;  Location: SURGERY [facility];  Service: Oral Surgery       Family History   Problem Relation Age of Onset   • Respiratory Disease Mother    • Diabetes Paternal Aunt        Allergies as of 07/18/2022 - Reviewed 07/18/2022   Allergen Reaction Noted   • [place] (diagnostic) Anaphylaxis 07/11/2021   • Pistachio Anaphylaxis 05/05/2019   • [place] Anaphylaxis 05/05/2019   • Tree nuts food allergy Vomiting and Nausea 03/05/2020        Vitals:  /62 (BP Location: Left arm, Patient Position: Sitting, BP Cuff Size: Large adult)   Pulse 81   Resp 20   Ht 1.74 m (5' 8.5\")   Wt (!) 173 kg (380 lb 9.6 oz)   SpO2 94%     Current medications as of today   Current Outpatient Medications   Medication Sig Dispense Refill   • acetaminophen (TYLENOL) 500 MG Tab      • ibuprofen (MOTRIN) 600 MG Tab Take 600 mg by mouth.     • ibuprofen (MOTRIN) 600 MG Tab TAKE 1 TABLET BY MOUTH EVERY 6 HOURS AS NEEDED FOR MILD PAIN     • furosemide (LASIX) 40 MG Tab Take 1 Tablet by mouth 2 times a day. 90 Tablet 0   • acetaminophen (TYLENOL) 325 MG Tab Take 2 Tablets by mouth every 6 hours as needed. " 30 Tablet 0   • albuterol 108 (90 Base) MCG/ACT Aero Soln inhalation aerosol Inhale 2 Puffs every 6 hours as needed for Shortness of Breath.     • amoxicillin (AMOXIL) 500 MG Cap TAKE 1 CAPSULE BY MOUTH EVERY 8 HOURS FOR 7 DAYS (Patient not taking: Reported on 7/18/2022)     • COVID-19 Test (ID NOW COVID-19) Kit TEST AS DIRECTED TODAY (Patient not taking: Reported on 7/18/2022)     • ID NOW COVID-19 Kit TEST AS DIRECTED TODAY (Patient not taking: Reported on 7/18/2022)     • warfarin (COUMADIN) 7.5 MG Tab Take one tablet by mouth daily or as directed. Patient overdue for INR f/u, please have him contact clinic to schedule (Patient not taking: Reported on 7/18/2022) 14 Tablet 0   • Multiple Vitamins-Minerals (MENS MULTIVITAMIN) Tab Take 1 tablet by mouth every morning. (Patient not taking: Reported on 7/18/2022)       No current facility-administered medications for this visit.         Physical Exam:   Gen:           Alert and oriented, No apparent distress. Mood and affect appropriate, normal interaction with examiner.  Eyes:          PERRL, EOM intact, sclere white, conjunctive moist.  Ears:          Not examined.   Hearing:     Grossly intact.  Nose:          Normal, no lesions or deformities.  Dentition:    Good dentition.  Oropharynx:   Tongue normal, posterior pharynx without erythema or exudate  Neck:        Supple, trachea midline, no masses.  Respiratory Effort: No intercostal retractions or use of accessory muscles.   Lung Auscultation:      Clear to auscultation bilaterally; no rales, rhonchi or wheezing.  CV:            Regular rate and rhythm. No murmurs, rubs or gallops.  Abd:           Not examined.   Lymphadenopathy: Not examined.  Gait and Station: Normal.  Digits and Nails: No clubbing, cyanosis, petechiae, or nodes.   Cranial Nerves: II-XII grossly intact.  Skin:        No rashes, lesions or ulcers noted.               Ext:           No cyanosis or edema.      Assessment:  1. JONES (obstructive sleep  apnea)  Overnight Oximetry   2. Sleep related hypoxia  Overnight Oximetry   3. Pulmonary hypertension (HCC)         Plan:  1.  Patient is using and benefiting from BiPAP therapy with supplemental oxygen at this time.  Compliance shows adequate use and control of JONES with a resultant AHI of 0.7.  Initial AHI on polysomnogram was 132.7.  Will check overnight pulse oximetry to ensure that the 3 days/min of supplemental oxygen bleed and is sufficient.  Follow-up will be January 2023 for annual JONES compliance check.  At that time we will order echocardiogram scheduled for April 2023 as that will be the 1 year guera that patient has been on BiPAP therapy.  Vies patient to reach out via phone or MyChart with any questions or concerns.  Patient was also advised to use CPAP mask sealant gel with aloe if he continues to experience leaks.  Leaks are expected with higher pressures on BiPAP.    Please note that this dictation was created using voice recognition software. I have made every reasonable attempt to correct obvious errors, but it is possible there are errors of grammar and possibly content that I did not discover before finalizing the note.

## 2022-07-31 NOTE — TELEPHONE ENCOUNTER
Closing encounter as pt has not called back.  
Pt called and left vm stating that he needed to reschedule today's appt as he has not completed ss. Called pt back and left vm advising to call back to get things rescheduled for him.  
no

## 2022-08-07 DIAGNOSIS — J96.21 ACUTE ON CHRONIC RESPIRATORY FAILURE WITH HYPOXIA AND HYPERCAPNIA (HCC): ICD-10-CM

## 2022-08-07 DIAGNOSIS — J96.22 ACUTE ON CHRONIC RESPIRATORY FAILURE WITH HYPOXIA AND HYPERCAPNIA (HCC): ICD-10-CM

## 2022-08-08 NOTE — TELEPHONE ENCOUNTER
Received request via: Pharmacy    Was the patient seen in the last year in this department? Yes    Does the patient have an active prescription (recently filled or refills available) for medication(s) requested? No     Future Appointments       Provider Department Center    12/14/2022 1:00 PM PULMONARY CLINIC [facility] Pulmonary Medicine

## 2022-08-09 RX ORDER — FUROSEMIDE 40 MG/1
40 TABLET ORAL 2 TIMES DAILY
Qty: 90 TABLET | Refills: 0 | Status: SHIPPED | OUTPATIENT
Start: 2022-08-09 | End: 2022-10-05 | Stop reason: SDUPTHER

## 2022-10-05 DIAGNOSIS — J96.22 ACUTE ON CHRONIC RESPIRATORY FAILURE WITH HYPOXIA AND HYPERCAPNIA (HCC): ICD-10-CM

## 2022-10-05 DIAGNOSIS — J96.21 ACUTE ON CHRONIC RESPIRATORY FAILURE WITH HYPOXIA AND HYPERCAPNIA (HCC): ICD-10-CM

## 2022-10-05 RX ORDER — FUROSEMIDE 40 MG/1
40 TABLET ORAL 2 TIMES DAILY
Qty: 90 TABLET | Refills: 3 | Status: SHIPPED | OUTPATIENT
Start: 2022-10-05 | End: 2022-11-24 | Stop reason: SDUPTHER

## 2022-10-05 NOTE — TELEPHONE ENCOUNTER
Received request via: Pharmacy    Was the patient seen in the last year in this department? Yes    Does the patient have an active prescription (recently filled or refills available) for medication(s) requested? No    Future Appointments         Provider Department Center    12/14/2022 1:00 PM PULMONARY CLINIC [facility] Pulmonary Medicine

## 2022-11-24 DIAGNOSIS — J96.21 ACUTE ON CHRONIC RESPIRATORY FAILURE WITH HYPOXIA AND HYPERCAPNIA (HCC): ICD-10-CM

## 2022-11-24 DIAGNOSIS — J96.22 ACUTE ON CHRONIC RESPIRATORY FAILURE WITH HYPOXIA AND HYPERCAPNIA (HCC): ICD-10-CM

## 2022-11-29 RX ORDER — FUROSEMIDE 40 MG/1
40 TABLET ORAL 2 TIMES DAILY
Qty: 90 TABLET | Refills: 3 | Status: SHIPPED | OUTPATIENT
Start: 2022-11-29 | End: 2023-02-13 | Stop reason: SDUPTHER

## 2022-12-14 ENCOUNTER — HOME STUDY (OUTPATIENT)
Dept: SLEEP MEDICINE | Facility: MEDICAL CENTER | Age: 32
End: 2022-12-14
Attending: NURSE PRACTITIONER
Payer: COMMERCIAL

## 2022-12-14 DIAGNOSIS — G47.34 SLEEP RELATED HYPOXIA: ICD-10-CM

## 2022-12-14 DIAGNOSIS — G47.33 OSA (OBSTRUCTIVE SLEEP APNEA): ICD-10-CM

## 2022-12-15 PROCEDURE — 94762 N-INVAS EAR/PLS OXIMTRY CONT: CPT | Performed by: STUDENT IN AN ORGANIZED HEALTH CARE EDUCATION/TRAINING PROGRAM

## 2022-12-20 NOTE — PROCEDURES
Overnight Pulse Oximetry    Interpretation:    This overnight oximetry was recorded on 12/15/2022 with the patient on Auto BiPAP 25/16 with 3L/min supplemental oxygen.  The analysis duration was 8hrs 57min. 18 total oximetric events occurred encompassing 15.5 minutes .  The average event duration was  51.6 seconds .  The saturations were less than 90% for 4.2% of the recording.  Basal oxygen saturation of 95.3%. The jose saturation was 83% .  The patient spent 10.9 minutes with saturations < 88%.  Overall, this continuous nocturnal oximetry demonstrates mild nocturnal hypoxia  while on Auto BiPAP 25/16 with 3L/min supplemental oxygen.    Suspect findings due to artifact. Generally oxygen saturation in 90s for majority of study. Signal disruption likely the cause for decreased oxygen levels.     Recommendation:  Continue current therapy.   Clinical correlation is needed.

## 2023-03-12 ENCOUNTER — HOSPITAL ENCOUNTER (OUTPATIENT)
Facility: MEDICAL CENTER | Age: 33
End: 2023-03-13
Attending: EMERGENCY MEDICINE | Admitting: INTERNAL MEDICINE
Payer: COMMERCIAL

## 2023-03-12 ENCOUNTER — OFFICE VISIT (OUTPATIENT)
Dept: URGENT CARE | Facility: CLINIC | Age: 33
End: 2023-03-12
Payer: COMMERCIAL

## 2023-03-12 ENCOUNTER — APPOINTMENT (OUTPATIENT)
Dept: RADIOLOGY | Facility: MEDICAL CENTER | Age: 33
End: 2023-03-12
Attending: EMERGENCY MEDICINE
Payer: COMMERCIAL

## 2023-03-12 VITALS
HEIGHT: 66 IN | TEMPERATURE: 97.7 F | WEIGHT: 315 LBS | OXYGEN SATURATION: 96 % | RESPIRATION RATE: 16 BRPM | HEART RATE: 73 BPM | SYSTOLIC BLOOD PRESSURE: 104 MMHG | DIASTOLIC BLOOD PRESSURE: 64 MMHG | BODY MASS INDEX: 50.62 KG/M2

## 2023-03-12 DIAGNOSIS — E87.6 HYPOKALEMIA: ICD-10-CM

## 2023-03-12 DIAGNOSIS — Z86.718 HISTORY OF DVT (DEEP VEIN THROMBOSIS): ICD-10-CM

## 2023-03-12 DIAGNOSIS — M79.89 RIGHT LEG SWELLING: ICD-10-CM

## 2023-03-12 DIAGNOSIS — L03.115 CELLULITIS OF RIGHT LEG: ICD-10-CM

## 2023-03-12 DIAGNOSIS — L03.115 CELLULITIS OF RIGHT LOWER EXTREMITY: ICD-10-CM

## 2023-03-12 DIAGNOSIS — M79.604 RIGHT LEG PAIN: ICD-10-CM

## 2023-03-12 LAB
ALBUMIN SERPL BCP-MCNC: 4 G/DL (ref 3.2–4.9)
ALBUMIN/GLOB SERPL: 0.9 G/DL
ALP SERPL-CCNC: 91 U/L (ref 30–99)
ALT SERPL-CCNC: 20 U/L (ref 2–50)
ANION GAP SERPL CALC-SCNC: 13 MMOL/L (ref 7–16)
AST SERPL-CCNC: 18 U/L (ref 12–45)
BASOPHILS # BLD AUTO: 0.4 % (ref 0–1.8)
BASOPHILS # BLD: 0.03 K/UL (ref 0–0.12)
BILIRUB SERPL-MCNC: 0.7 MG/DL (ref 0.1–1.5)
BUN SERPL-MCNC: 12 MG/DL (ref 8–22)
CALCIUM ALBUM COR SERPL-MCNC: 9.4 MG/DL (ref 8.5–10.5)
CALCIUM SERPL-MCNC: 9.4 MG/DL (ref 8.5–10.5)
CHLORIDE SERPL-SCNC: 96 MMOL/L (ref 96–112)
CO2 SERPL-SCNC: 24 MMOL/L (ref 20–33)
CREAT SERPL-MCNC: 0.83 MG/DL (ref 0.5–1.4)
EKG IMPRESSION: NORMAL
EOSINOPHIL # BLD AUTO: 0.06 K/UL (ref 0–0.51)
EOSINOPHIL NFR BLD: 0.7 % (ref 0–6.9)
ERYTHROCYTE [DISTWIDTH] IN BLOOD BY AUTOMATED COUNT: 46.2 FL (ref 35.9–50)
GFR SERPLBLD CREATININE-BSD FMLA CKD-EPI: 119 ML/MIN/1.73 M 2
GLOBULIN SER CALC-MCNC: 4.5 G/DL (ref 1.9–3.5)
GLUCOSE SERPL-MCNC: 114 MG/DL (ref 65–99)
HCT VFR BLD AUTO: 41.5 % (ref 42–52)
HGB BLD-MCNC: 13.6 G/DL (ref 14–18)
IMM GRANULOCYTES # BLD AUTO: 0.05 K/UL (ref 0–0.11)
IMM GRANULOCYTES NFR BLD AUTO: 0.6 % (ref 0–0.9)
LACTATE SERPL-SCNC: 1.6 MMOL/L (ref 0.5–2)
LYMPHOCYTES # BLD AUTO: 2 K/UL (ref 1–4.8)
LYMPHOCYTES NFR BLD: 24.4 % (ref 22–41)
MCH RBC QN AUTO: 29.8 PG (ref 27–33)
MCHC RBC AUTO-ENTMCNC: 32.8 G/DL (ref 33.7–35.3)
MCV RBC AUTO: 91 FL (ref 81.4–97.8)
MONOCYTES # BLD AUTO: 0.78 K/UL (ref 0–0.85)
MONOCYTES NFR BLD AUTO: 9.5 % (ref 0–13.4)
NEUTROPHILS # BLD AUTO: 5.26 K/UL (ref 1.82–7.42)
NEUTROPHILS NFR BLD: 64.4 % (ref 44–72)
NRBC # BLD AUTO: 0 K/UL
NRBC BLD-RTO: 0 /100 WBC
NT-PROBNP SERPL IA-MCNC: 28 PG/ML (ref 0–125)
PLATELET # BLD AUTO: 206 K/UL (ref 164–446)
PMV BLD AUTO: 10.5 FL (ref 9–12.9)
POTASSIUM SERPL-SCNC: 3.1 MMOL/L (ref 3.6–5.5)
PROT SERPL-MCNC: 8.5 G/DL (ref 6–8.2)
RBC # BLD AUTO: 4.56 M/UL (ref 4.7–6.1)
SODIUM SERPL-SCNC: 133 MMOL/L (ref 135–145)
TROPONIN T SERPL-MCNC: 8 NG/L (ref 6–19)
WBC # BLD AUTO: 8.2 K/UL (ref 4.8–10.8)

## 2023-03-12 PROCEDURE — 700102 HCHG RX REV CODE 250 W/ 637 OVERRIDE(OP): Performed by: INTERNAL MEDICINE

## 2023-03-12 PROCEDURE — 85025 COMPLETE CBC W/AUTO DIFF WBC: CPT

## 2023-03-12 PROCEDURE — 93005 ELECTROCARDIOGRAM TRACING: CPT | Performed by: EMERGENCY MEDICINE

## 2023-03-12 PROCEDURE — 700111 HCHG RX REV CODE 636 W/ 250 OVERRIDE (IP): Performed by: EMERGENCY MEDICINE

## 2023-03-12 PROCEDURE — G0378 HOSPITAL OBSERVATION PER HR: HCPCS

## 2023-03-12 PROCEDURE — 99285 EMERGENCY DEPT VISIT HI MDM: CPT

## 2023-03-12 PROCEDURE — 99215 OFFICE O/P EST HI 40 MIN: CPT | Performed by: PHYSICIAN ASSISTANT

## 2023-03-12 PROCEDURE — 80053 COMPREHEN METABOLIC PANEL: CPT

## 2023-03-12 PROCEDURE — 700102 HCHG RX REV CODE 250 W/ 637 OVERRIDE(OP)

## 2023-03-12 PROCEDURE — 99222 1ST HOSP IP/OBS MODERATE 55: CPT | Performed by: INTERNAL MEDICINE

## 2023-03-12 PROCEDURE — 96365 THER/PROPH/DIAG IV INF INIT: CPT

## 2023-03-12 PROCEDURE — 87040 BLOOD CULTURE FOR BACTERIA: CPT | Mod: 91

## 2023-03-12 PROCEDURE — 700111 HCHG RX REV CODE 636 W/ 250 OVERRIDE (IP): Performed by: INTERNAL MEDICINE

## 2023-03-12 PROCEDURE — A9270 NON-COVERED ITEM OR SERVICE: HCPCS | Performed by: INTERNAL MEDICINE

## 2023-03-12 PROCEDURE — 83880 ASSAY OF NATRIURETIC PEPTIDE: CPT

## 2023-03-12 PROCEDURE — 83605 ASSAY OF LACTIC ACID: CPT

## 2023-03-12 PROCEDURE — A9270 NON-COVERED ITEM OR SERVICE: HCPCS

## 2023-03-12 PROCEDURE — 84484 ASSAY OF TROPONIN QUANT: CPT

## 2023-03-12 PROCEDURE — 700105 HCHG RX REV CODE 258: Performed by: INTERNAL MEDICINE

## 2023-03-12 PROCEDURE — 96375 TX/PRO/DX INJ NEW DRUG ADDON: CPT

## 2023-03-12 PROCEDURE — 93971 EXTREMITY STUDY: CPT | Mod: RT

## 2023-03-12 PROCEDURE — 36415 COLL VENOUS BLD VENIPUNCTURE: CPT

## 2023-03-12 RX ORDER — BISACODYL 10 MG
10 SUPPOSITORY, RECTAL RECTAL
Status: DISCONTINUED | OUTPATIENT
Start: 2023-03-12 | End: 2023-03-13 | Stop reason: HOSPADM

## 2023-03-12 RX ORDER — ASPIRIN 81 MG/1
81 TABLET, CHEWABLE ORAL DAILY
Status: DISCONTINUED | OUTPATIENT
Start: 2023-03-13 | End: 2023-03-13 | Stop reason: HOSPADM

## 2023-03-12 RX ORDER — FUROSEMIDE 40 MG/1
40 TABLET ORAL 2 TIMES DAILY
Status: DISCONTINUED | OUTPATIENT
Start: 2023-03-12 | End: 2023-03-13 | Stop reason: HOSPADM

## 2023-03-12 RX ORDER — CALCIUM CARBONATE 500 MG/1
500 TABLET, CHEWABLE ORAL ONCE
Status: COMPLETED | OUTPATIENT
Start: 2023-03-12 | End: 2023-03-12

## 2023-03-12 RX ORDER — ACETAMINOPHEN 325 MG/1
650 TABLET ORAL EVERY 6 HOURS PRN
Status: DISCONTINUED | OUTPATIENT
Start: 2023-03-12 | End: 2023-03-13 | Stop reason: HOSPADM

## 2023-03-12 RX ORDER — POLYETHYLENE GLYCOL 3350 17 G/17G
1 POWDER, FOR SOLUTION ORAL
Status: DISCONTINUED | OUTPATIENT
Start: 2023-03-12 | End: 2023-03-13 | Stop reason: HOSPADM

## 2023-03-12 RX ORDER — CEFAZOLIN 2 G/1
2 INJECTION, POWDER, FOR SOLUTION INTRAMUSCULAR; INTRAVENOUS ONCE
Status: COMPLETED | OUTPATIENT
Start: 2023-03-12 | End: 2023-03-12

## 2023-03-12 RX ORDER — ASPIRIN 81 MG/1
81 TABLET, CHEWABLE ORAL DAILY
COMMUNITY

## 2023-03-12 RX ORDER — SODIUM CHLORIDE, SODIUM LACTATE, POTASSIUM CHLORIDE, AND CALCIUM CHLORIDE .6; .31; .03; .02 G/100ML; G/100ML; G/100ML; G/100ML
500 INJECTION, SOLUTION INTRAVENOUS
Status: DISCONTINUED | OUTPATIENT
Start: 2023-03-12 | End: 2023-03-13 | Stop reason: HOSPADM

## 2023-03-12 RX ORDER — ENOXAPARIN SODIUM 100 MG/ML
40 INJECTION SUBCUTANEOUS DAILY
Status: DISCONTINUED | OUTPATIENT
Start: 2023-03-13 | End: 2023-03-13 | Stop reason: HOSPADM

## 2023-03-12 RX ORDER — AMOXICILLIN 250 MG
2 CAPSULE ORAL 2 TIMES DAILY
Status: DISCONTINUED | OUTPATIENT
Start: 2023-03-12 | End: 2023-03-13 | Stop reason: HOSPADM

## 2023-03-12 RX ADMIN — CEFAZOLIN 2 G: 2 INJECTION, POWDER, FOR SOLUTION INTRAMUSCULAR; INTRAVENOUS at 15:04

## 2023-03-12 RX ADMIN — AMPICILLIN AND SULBACTAM 3 G: 1; 2 INJECTION, POWDER, FOR SOLUTION INTRAMUSCULAR; INTRAVENOUS at 17:32

## 2023-03-12 RX ADMIN — DOCUSATE SODIUM 50 MG AND SENNOSIDES 8.6 MG 2 TABLET: 8.6; 5 TABLET, FILM COATED ORAL at 17:32

## 2023-03-12 RX ADMIN — FUROSEMIDE 40 MG: 40 TABLET ORAL at 17:31

## 2023-03-12 RX ADMIN — CALCIUM CARBONATE 500 MG: 500 TABLET, CHEWABLE ORAL at 21:32

## 2023-03-12 ASSESSMENT — ENCOUNTER SYMPTOMS
NERVOUS/ANXIOUS: 0
EYE REDNESS: 0
DIARRHEA: 0
FEVER: 1
SHORTNESS OF BREATH: 0
FOCAL WEAKNESS: 0
BLOOD IN STOOL: 0
HEMOPTYSIS: 0
WHEEZING: 0
VOMITING: 0
COUGH: 0
MYALGIAS: 0
HEADACHES: 0
PALPITATIONS: 0
TREMORS: 0
NAUSEA: 0
SEIZURES: 0
WEAKNESS: 0
LOSS OF CONSCIOUSNESS: 0
EYE PAIN: 0
ABDOMINAL PAIN: 0
CHILLS: 1
FALLS: 0
INSOMNIA: 0
DIZZINESS: 0
CONSTIPATION: 0

## 2023-03-12 ASSESSMENT — LIFESTYLE VARIABLES
ALCOHOL_USE: YES
TOTAL SCORE: 0
DOES PATIENT WANT TO STOP DRINKING: NO
AVERAGE NUMBER OF DAYS PER WEEK YOU HAVE A DRINK CONTAINING ALCOHOL: 0
HOW MANY TIMES IN THE PAST YEAR HAVE YOU HAD 5 OR MORE DRINKS IN A DAY: 0
EVER HAD A DRINK FIRST THING IN THE MORNING TO STEADY YOUR NERVES TO GET RID OF A HANGOVER: NO
TOTAL SCORE: 0
TOTAL SCORE: 0
CONSUMPTION TOTAL: NEGATIVE
HAVE PEOPLE ANNOYED YOU BY CRITICIZING YOUR DRINKING: NO
ON A TYPICAL DAY WHEN YOU DRINK ALCOHOL HOW MANY DRINKS DO YOU HAVE: 0
HAVE YOU EVER FELT YOU SHOULD CUT DOWN ON YOUR DRINKING: NO
EVER FELT BAD OR GUILTY ABOUT YOUR DRINKING: NO

## 2023-03-12 ASSESSMENT — FIBROSIS 4 INDEX
FIB4 SCORE: 0.63
FIB4 SCORE: 0.59
FIB4 SCORE: 0.59

## 2023-03-12 ASSESSMENT — PAIN DESCRIPTION - PAIN TYPE
TYPE: ACUTE PAIN
TYPE: ACUTE PAIN

## 2023-03-12 NOTE — PROGRESS NOTES
"Subjective:   Michael Ontiveros is a 32 y.o. male who presents for Chills (X 4 days, that has gotten better, but still have shoulder discomfort and tenderness in right leg)      HPI  The patient is a 32-year-old male with a history of DVT and PE presents to the urgent care with complaints of right leg swelling, redness, pain onset 2 days ago.  Currently not taking anticoagulant or antiplatelet therapy.  Symptoms initially started 3 days ago with body aches and diarrhea.  Redness and swelling and pain primarily to the right upper medial leg.  History of chronic venous stasis dermatitis and eczema.  Reports of a fever Tmax of 103.  Reports of heart palpitations with his Apple Watch monitor. Denies any chest pain, shortness of breath, abdominal pain, nausea, vomiting.           Medications:    acetaminophen Tabs  albuterol Aers  amoxicillin Caps  ASPIRIN PO  furosemide Tabs  ibuprofen Tabs  ID Now COVID-19 Kit  Mens Multivitamin Tabs  warfarin Tabs    Allergies: [place] (diagnostic), Pistachio, [place], and Tree nuts food allergy    Problem List: Michael Ontiveros does not have any pertinent problems on file.    Surgical History:  Past Surgical History:   Procedure Laterality Date    DENTAL EXTRACTION(S)  11/3/2017    Procedure: DENTAL EXTRACTION(S);  Surgeon: Olman Sparks D.D.S.;  Location: SURGERY [facility];  Service: Oral Surgery       Past Social Hx: Michael Ontiveros  reports that he has never smoked. He has never used smokeless tobacco. He reports that he does not currently use alcohol. He reports current drug use. Drug: Marijuana.     Past Family Hx:  Michael Ontiveros family history includes Diabetes in his paternal aunt; Respiratory Disease in his mother.     Problem list, medications, and allergies reviewed by myself today in Epic.     Objective:     /64   Pulse 73   Temp 36.5 °C (97.7 °F) (Temporal)   Resp 16   Ht 1.676 m (5' 6\")   Wt (!) 165 kg (363 lb 3.2 " oz)   SpO2 96%   BMI 58.62 kg/m²     Physical Exam  Vitals reviewed.   Constitutional:       General: He is not in acute distress.     Appearance: Normal appearance. He is not ill-appearing or toxic-appearing.   Eyes:      Conjunctiva/sclera: Conjunctivae normal.      Pupils: Pupils are equal, round, and reactive to light.   Cardiovascular:      Rate and Rhythm: Normal rate and regular rhythm.      Heart sounds: Normal heart sounds.   Pulmonary:      Effort: Pulmonary effort is normal.      Breath sounds: Normal breath sounds.   Musculoskeletal:      Cervical back: Neck supple.        Legs:       Comments: Right lower extremity: Significant erythema, edema, induration, tenderness to the medial portion of the distal upper leg.  Positive induration to this area.  Positive calf tenderness.  Chronic venous dermatitis.  No palpable cords.  1+ pitting edema to the lower extremity.    Left lower extremity: Negative significant erythema, edema, tenderness.   Skin:     General: Skin is warm and dry.   Neurological:      General: No focal deficit present.      Mental Status: He is alert and oriented to person, place, and time.   Psychiatric:         Mood and Affect: Mood normal.         Behavior: Behavior normal.       Diagnosis and associated orders:     1. Right leg swelling  - US-EXTREMITY VENOUS LOWER UNILAT RIGHT; Future    2. Cellulitis of right leg  - US-EXTREMITY VENOUS LOWER UNILAT RIGHT; Future    3. Right leg pain  - US-EXTREMITY VENOUS LOWER UNILAT RIGHT; Future    4. History of DVT (deep vein thrombosis)  - US-EXTREMITY VENOUS LOWER UNILAT RIGHT; Future    Other orders  - ASPIRIN PO; Take  by mouth.       Comments/MDM:     Concern for DVT.  Concern for cellulitis.  Due to history of DVT and PE it was agreed to further evaluate with ultrasound.  Unable to obtain outpatient ultrasound today or tomorrow.  I feel patient requires immediate evaluation.  Therefore, it was highly recommended he present immediately to  the emergency room for higher level of evaluation and care.  Patient verbalized understanding and agreed to plan.  He is going to present across the street to the [facility] ER.  I called transfer center and report given awaiting patient arrival.         Please note that this dictation was created using voice recognition software. I have made a reasonable attempt to correct obvious errors, but I expect that there are errors of grammar and possibly content that I did not discover before finalizing the note.    This note was electronically signed by Skyler Stephens PA-C

## 2023-03-12 NOTE — H&P
Hospital Medicine History & Physical Note    Date of Service  3/12/2023    Primary Care Physician  Shawnee Junior P.A.-C.    Consultants      Code Status  Prior    Chief Complaint  Chief Complaint   Patient presents with    Sent from Urgent Care       History of Presenting Illness  Michael Ontiveros is a 32 y.o. male who presented 3/12/2023 with Sent from Urgent Care  He is obese. He has a history of DVT 3yrs ago comleted course of anticoagulation. He has on and off history of leg swelling. He presents with subjective fever, chills and wordsening redness RLE started today.   At the ED, he is afebrile, normotensive.  US Dopplers no obvious DVT  No leukocytosis  He was started on antibiotics. When I saw him no acute distress. RLE erythema, warmth and cellulitic area creeping up inner thigh.  .  I discussed the plan of care withpatient and RN.    Review of Systems  Review of Systems   Constitutional:  Positive for chills and fever.   HENT:  Negative for congestion, hearing loss and nosebleeds.    Eyes:  Negative for pain and redness.   Respiratory:  Negative for cough, hemoptysis, shortness of breath and wheezing.    Cardiovascular:  Negative for chest pain and palpitations.   Gastrointestinal:  Negative for abdominal pain, blood in stool, constipation, diarrhea, nausea and vomiting.   Genitourinary:  Negative for dysuria, frequency and hematuria.   Musculoskeletal:  Negative for falls, joint pain and myalgias.   Skin:  Positive for rash.   Neurological:  Negative for dizziness, tremors, focal weakness, seizures, loss of consciousness, weakness and headaches.   Psychiatric/Behavioral:  The patient is not nervous/anxious and does not have insomnia.    All other systems reviewed and are negative.    Past Medical History   has a past medical history of Asthma, Blood clotting disorder (HCC), Chickenpox, Eczema, Hypertension, Influenza, and Tonsillitis.    Surgical History   has a past surgical history that  includes dental extraction(s) (11/3/2017).     Family History  family history includes Diabetes in his paternal aunt; Respiratory Disease in his mother.   Family history reviewed with patient. There is no family history that is pertinent to the chief complaint.     Social History   reports that he has never smoked. He has never used smokeless tobacco. He reports that he does not currently use alcohol. He reports current drug use. Drug: Marijuana.    Allergies  Allergies   Allergen Reactions    [place] (Diagnostic) Anaphylaxis    Pistachio Anaphylaxis    [place] Anaphylaxis    Tree Nuts Food Allergy Vomiting and Nausea     Other nuts aside from almond, walnut, pistachio       Medications  Prior to Admission Medications   Prescriptions Last Dose Informant Patient Reported? Taking?   aspirin (ASA) 81 MG Chew Tab chewable tablet 3/12/2023 at AM Patient Yes Yes   Sig: Chew 81 mg every day.   furosemide (LASIX) 40 MG Tab 3/11/2023 at PM Patient No No   Sig: Take 1 Tablet by mouth 2 times a day.      Facility-Administered Medications: None       Physical Exam  Temp:  [36.4 °C (97.6 °F)-36.5 °C (97.7 °F)] 36.4 °C (97.6 °F)  Pulse:  [73-96] 84  Resp:  [16-20] 20  BP: (100-121)/(55-72) 121/60  SpO2:  [92 %-98 %] 93 %  Blood Pressure: 121/60   Temperature: 36.4 °C (97.6 °F)   Pulse: 84   Respiration: 20   Pulse Oximetry: 93 %       Physical Exam  Vitals and nursing note reviewed.   Constitutional:       Appearance: He is obese.   HENT:      Head: Normocephalic and atraumatic.      Right Ear: External ear normal.      Left Ear: External ear normal.      Nose: Nose normal.      Mouth/Throat:      Mouth: Mucous membranes are moist.   Eyes:      General: No scleral icterus.     Conjunctiva/sclera: Conjunctivae normal.   Cardiovascular:      Rate and Rhythm: Normal rate and regular rhythm.      Heart sounds: No murmur heard.    No friction rub. No gallop.   Pulmonary:      Effort: Pulmonary effort is normal.      Breath sounds:  Normal breath sounds.   Abdominal:      General: Abdomen is flat. Bowel sounds are normal. There is no distension.      Palpations: Abdomen is soft.      Tenderness: There is no abdominal tenderness. There is no guarding.   Musculoskeletal:         General: Normal range of motion.      Cervical back: Normal range of motion and neck supple.   Skin:     General: Skin is warm.      Findings: Erythema and rash present.      Comments: RLE erythema, warmth and cellulitic area creeping up inner thigh.   Neurological:      Mental Status: He is alert and oriented to person, place, and time. Mental status is at baseline.   Psychiatric:         Mood and Affect: Mood normal.         Behavior: Behavior normal.         Thought Content: Thought content normal.         Judgment: Judgment normal.       Laboratory:  Recent Labs     03/12/23  1331   WBC 8.2   RBC 4.56*   HEMOGLOBIN 13.6*   HEMATOCRIT 41.5*   MCV 91.0   MCH 29.8   MCHC 32.8*   RDW 46.2   PLATELETCT 206   MPV 10.5     Recent Labs     03/12/23  1331   SODIUM 133*   POTASSIUM 3.1*   CHLORIDE 96   CO2 24   GLUCOSE 114*   BUN 12   CREATININE 0.83   CALCIUM 9.4     Recent Labs     03/12/23  1331   ALTSGPT 20   ASTSGOT 18   ALKPHOSPHAT 91   TBILIRUBIN 0.7   GLUCOSE 114*         Recent Labs     03/12/23  1331   NTPROBNP 28         Recent Labs     03/12/23  1331   TROPONINT 8       Imaging:  US-EXTREMITY VENOUS LOWER UNILAT RIGHT               Assessment/Plan:  Justification for Admission Status  I anticipate this patient is appropriate for observation status at this time because if cellulitis improves at discharge he can be transitioned to PO antibiotics.    * Cellulitis of right lower extremity, h/o R DVT- (present on admission)  Assessment & Plan  Antibiotics  No DVT on US    History of DVT (deep vein thrombosis)  Assessment & Plan  Last Dopplers were negative  Completed a course of anticoagulation    Morbid obesity (HCC)- (present on admission)  Assessment &  Plan  Recommended weight loss advised, 5% through reduced calorie, low carb diet and 150 mins of exercise a week once better  Recommend bariatric surgery evaluation if morbidly obese  Educated on the increase of morbidity and mortality associated with excess weight including DM, Heart Disease, HTN, stroke, and sleep apnea. Recommended outpatient monitoring  of blood sugars, lipid panel, sleep study evaluation for metabolic syndrome.          VTE prophylaxis: enoxaparin ppx

## 2023-03-12 NOTE — ED PROVIDER NOTES
ER Provider Note    Scribed for Marilyn Brody M.d. by Gonzalo Yoon. 3/12/2023  12:54 PM    Primary Care Provider: Shawnee Junior P.A.-C.    CHIEF COMPLAINT  Chief Complaint   Patient presents with    Sent from Urgent Care     EXTERNAL RECORDS REVIEWED  Inpatient Notes Patient has a history of PE and DVT. He is not on any blood thinners.    HPI/ROS  LIMITATION TO HISTORY   Select: : None  OUTSIDE HISTORIAN(S):  None    Michael Ontiveros is a 32 y.o. male who presents to the ED complaining of right leg swelling onset yesterday. He has associated right lower leg redness. He denies any chest pain, shortness of breath, or vomiting. He had fever, chills, and diarrhea 2 days ago, but they have since resolved. No alleviating or exacerbating factors noted. He has a history of blood clots, and has had 2 in the past. He is not allergic to any medications. He denies any history of diabetes.     PAST MEDICAL HISTORY  Past Medical History:   Diagnosis Date    Asthma     Blood clotting disorder (HCC)     Chickenpox     Eczema     Hypertension     Influenza     Tonsillitis        SURGICAL HISTORY  Past Surgical History:   Procedure Laterality Date    DENTAL EXTRACTION(S)  11/3/2017    Procedure: DENTAL EXTRACTION(S);  Surgeon: Olman Sparks D.D.S.;  Location: SURGERY [facility];  Service: Oral Surgery       FAMILY HISTORY  Family History   Problem Relation Age of Onset    Respiratory Disease Mother     Diabetes Paternal Aunt        SOCIAL HISTORY   reports that he has never smoked. He has never used smokeless tobacco. He reports that he does not currently use alcohol. He reports current drug use. Drug: Marijuana.    CURRENT MEDICATIONS  Previous Medications    ASPIRIN (ASA) 81 MG CHEW TAB CHEWABLE TABLET    Chew 81 mg every day.    FUROSEMIDE (LASIX) 40 MG TAB    Take 1 Tablet by mouth 2 times a day.       ALLERGIES  [place] (diagnostic), [place], [place], and Tree nuts food allergy    PHYSICAL  EXAM  /72   Pulse 94   Temp 36.4 °C (97.6 °F) (Temporal)   Resp 18   Wt (!) 165 kg (362 lb 14 oz)   SpO2 98%   BMI 58.57 kg/m²   Constitutional: Well developed, No acute distress, Non-toxic appearance.   HENT: Normocephalic, Atraumatic, Bilateral external ears normal, Nose normal.   Eyes: PERRL, EOMI, Conjunctiva normal.  Neck: Normal range of motion, No tenderness, Supple.  Cardiovascular: Normal heart rate, Normal rhythm.  Thorax & Lungs: Normal breath sounds, No respiratory distress.  Abdomen: Benign abdominal exam, no guarding no rebound, no tenderness, no distention.  Skin: Warm, Dry, Right leg with purplish discoloration of lower leg with warmth and erythema extending up into the thigh.  Back: No tenderness, No CVA tenderness.   Extremities: Intact distal pulses, No edema, No tenderness   Neurologic: Alert & oriented x 3, Normal motor function, Normal sensory function, No focal deficits noted.  Psychiatric: appropriate     DIAGNOSTIC STUDIES    Labs:   Results for orders placed or performed during the hospital encounter of 03/12/23   CBC WITH DIFFERENTIAL   Result Value Ref Range    WBC 8.2 4.8 - 10.8 K/uL    RBC 4.56 (L) 4.70 - 6.10 M/uL    Hemoglobin 13.6 (L) 14.0 - 18.0 g/dL    Hematocrit 41.5 (L) 42.0 - 52.0 %    MCV 91.0 81.4 - 97.8 fL    MCH 29.8 27.0 - 33.0 pg    MCHC 32.8 (L) 33.7 - 35.3 g/dL    RDW 46.2 35.9 - 50.0 fL    Platelet Count 206 164 - 446 K/uL    MPV 10.5 9.0 - 12.9 fL    Neutrophils-Polys 64.40 44.00 - 72.00 %    Lymphocytes 24.40 22.00 - 41.00 %    Monocytes 9.50 0.00 - 13.40 %    Eosinophils 0.70 0.00 - 6.90 %    Basophils 0.40 0.00 - 1.80 %    Immature Granulocytes 0.60 0.00 - 0.90 %    Nucleated RBC 0.00 /100 WBC    Neutrophils (Absolute) 5.26 1.82 - 7.42 K/uL    Lymphs (Absolute) 2.00 1.00 - 4.80 K/uL    Monos (Absolute) 0.78 0.00 - 0.85 K/uL    Eos (Absolute) 0.06 0.00 - 0.51 K/uL    Baso (Absolute) 0.03 0.00 - 0.12 K/uL    Immature Granulocytes (abs) 0.05 0.00 - 0.11  K/uL    NRBC (Absolute) 0.00 K/uL   COMP METABOLIC PANEL   Result Value Ref Range    Sodium 133 (L) 135 - 145 mmol/L    Potassium 3.1 (L) 3.6 - 5.5 mmol/L    Chloride 96 96 - 112 mmol/L    Co2 24 20 - 33 mmol/L    Anion Gap 13.0 7.0 - 16.0    Glucose 114 (H) 65 - 99 mg/dL    Bun 12 8 - 22 mg/dL    Creatinine 0.83 0.50 - 1.40 mg/dL    Calcium 9.4 8.5 - 10.5 mg/dL    AST(SGOT) 18 12 - 45 U/L    ALT(SGPT) 20 2 - 50 U/L    Alkaline Phosphatase 91 30 - 99 U/L    Total Bilirubin 0.7 0.1 - 1.5 mg/dL    Albumin 4.0 3.2 - 4.9 g/dL    Total Protein 8.5 (H) 6.0 - 8.2 g/dL    Globulin 4.5 (H) 1.9 - 3.5 g/dL    A-G Ratio 0.9 g/dL   TROPONIN   Result Value Ref Range    Troponin T 8 6 - 19 ng/L   proBrain Natriuretic Peptide, NT   Result Value Ref Range    NT-proBNP 28 0 - 125 pg/mL   Lactic Acid   Result Value Ref Range    Lactic Acid 1.6 0.5 - 2.0 mmol/L   ESTIMATED GFR   Result Value Ref Range    GFR (CKD-EPI) 119 >60 mL/min/1.73 m 2   CORRECTED CALCIUM   Result Value Ref Range    Correct Calcium 9.4 8.5 - 10.5 mg/dL   EKG   Result Value Ref Range    Report       [facility] Emergency Dept.    Test Date:  2023  Pt Name:    VENR STARR                Department: ER  MRN:        [id number]                      Room:       RD 11  Gender:     Male                         Technician: [id number]  :        1990                   Requested By:MARILYN CASTLE  Order #:    [id number]                    Reading MD: Marilyn Maguire MD    Measurements  Intervals                                Axis  Rate:       90                           P:          73  WV:         174                          QRS:        61  QRSD:       95                           T:          20  QT:         351  QTc:        430    Interpretive Statements  Sinus rhythm  Ventricular trigeminy  Inferior infarct, old  Compared to ECG 2021 05:57:44  Ventricular premature complex(es) now present  Electronically Signed On 3- 15:00:02 PDT  by Marilyn Maguire MD         EKG:   I have independently interpreted this EKG as noted above.     Radiology:   The attending emergency physician has independently interpreted the diagnostic imaging associated with this visit and am waiting the final reading from the radiologist.   Preliminary interpretation is a follows: no DVT but limited study  Radiologist interpretation:   US-EXTREMITY VENOUS LOWER UNILAT RIGHT              COURSE & MEDICAL DECISION MAKING     ED Observation Status? Yes; I am placing the patient in to an observation status due to a diagnostic uncertainty as well as therapeutic intensity. Patient placed in observation status at 1:05 PM, 3/12/2023.     Observation plan is as follows: labs, us and monitoring for worsening infection.    Upon Reevaluation, the patient's condition has: not improved; and will be escalated to hospitalization.    Patient discharged from ED Observation status at 3:51 PM (Time) 3/12/2023 (Date).     INITIAL ASSESSMENT, COURSE AND PLAN    Care Narrative: Patient presents to the emergency department for evaluation of right leg swelling and redness.  He does have significant discoloration and redness and warmth to his leg.  He has a good pulse distally.  There is no vesicles or bullae.  Patient is afebrile here.  Will evaluate for sepsis.  Doubt necrotizing fasciitis but will evaluate the labs.  Will give dose of antibiotics.  History of DVT and PE in the past.  No active chest pain or shortness of breath.  He is not hypoxic here.  Not on anticoagulation currently.  We will however add an ultrasound to evaluate for new DVT.    1:01 PM - Patient seen and examined at bedside. Ordered for US-Extremity Venous Lower right, Troponin, proBrain Natriuretic Peptide, CBC with diff., CMP, and EKG to evaluate. Differential diagnoses include but are not limited to: Cellulitis, DVT, Sepsis.    Patient is a normal white count.  Sodium is 133.  Potassium is 3.1.  Glucose was 114.  Lactic  acid was normal.  Troponin was normal.  BNP was normal.  Ultrasound shows no evidence of DVT although it was a limited study due to his body habitus.  I do not suspect excising fasciitis at this time.  However I do suspect likely strep infection with the redness extending up into the groin.  Patient be hospitalized for IV antibiotics and monitoring.    3:36 PM - Patient was reevaluated at bedside. Discussed lab and radiology results with the patient. I informed him that his ultrasound showed no obvious clot. His lab work also looks good, however I still recommended he be hospitalized for IV antibiotic treatment.     3:50 PM - I discussed the patient's case and the above findings with Dr. Gallegos (Hospitalist) who will hospitalize the patient.          DISPOSITION AND DISCUSSIONS  I have discussed management of the patient with the following physicians and DUONG's:  Dr. Gallegos (Hospitalist)      Patient will be hospitalized by Dr. Gallegos (Hospitalist)     FINAL DIANGOSIS  1. Cellulitis of right lower extremity    2. Hypokalemia           The note accurately reflects work and decisions made by me.  Marilyn Brody M.D.  3/12/2023  6:15 PM     Gonzalo SILVA (Otf), am scribing for, and in the presence of, Marilyn Brody M.D..    Electronically signed by: Gonzalo Yoon (Otf), 3/12/2023    Marilyn SILVA M.D. personally performed the services described in this documentation, as scribed by Gonzalo Yoon in my presence, and it is both accurate and complete.

## 2023-03-12 NOTE — ED TRIAGE NOTES
Pt comes in complaining of chills since Thursday. Pt then reporting R leg swelling and redness for the past 2 days. Pt stating diarrhea at times. Pt went to urgent care and was sent here to R/O DVT. Pt with hx of PE and DVT but currently not taking any blood thinners.

## 2023-03-12 NOTE — ED NOTES
Patient ambulated without assistance to room. Patient in gown, on monitor, with call light in reach. Chart up for next available ERP.

## 2023-03-13 VITALS
RESPIRATION RATE: 20 BRPM | OXYGEN SATURATION: 91 % | DIASTOLIC BLOOD PRESSURE: 63 MMHG | SYSTOLIC BLOOD PRESSURE: 113 MMHG | TEMPERATURE: 98.3 F | HEIGHT: 67 IN | WEIGHT: 315 LBS | HEART RATE: 85 BPM | BODY MASS INDEX: 49.44 KG/M2

## 2023-03-13 PROBLEM — E87.6 HYPOKALEMIA: Status: ACTIVE | Noted: 2023-03-13

## 2023-03-13 LAB
ANION GAP SERPL CALC-SCNC: 13 MMOL/L (ref 7–16)
BUN SERPL-MCNC: 13 MG/DL (ref 8–22)
CALCIUM SERPL-MCNC: 9.2 MG/DL (ref 8.5–10.5)
CHLORIDE SERPL-SCNC: 99 MMOL/L (ref 96–112)
CO2 SERPL-SCNC: 21 MMOL/L (ref 20–33)
CREAT SERPL-MCNC: 0.69 MG/DL (ref 0.5–1.4)
ERYTHROCYTE [DISTWIDTH] IN BLOOD BY AUTOMATED COUNT: 43.8 FL (ref 35.9–50)
GFR SERPLBLD CREATININE-BSD FMLA CKD-EPI: 126 ML/MIN/1.73 M 2
GLUCOSE SERPL-MCNC: 105 MG/DL (ref 65–99)
HCT VFR BLD AUTO: 40.7 % (ref 42–52)
HGB BLD-MCNC: 13.7 G/DL (ref 14–18)
MAGNESIUM SERPL-MCNC: 2.1 MG/DL (ref 1.5–2.5)
MCH RBC QN AUTO: 29.3 PG (ref 27–33)
MCHC RBC AUTO-ENTMCNC: 33.7 G/DL (ref 33.7–35.3)
MCV RBC AUTO: 87.2 FL (ref 81.4–97.8)
PLATELET # BLD AUTO: 206 K/UL (ref 164–446)
PMV BLD AUTO: 10.9 FL (ref 9–12.9)
POTASSIUM SERPL-SCNC: 3.3 MMOL/L (ref 3.6–5.5)
RBC # BLD AUTO: 4.67 M/UL (ref 4.7–6.1)
SODIUM SERPL-SCNC: 133 MMOL/L (ref 135–145)
WBC # BLD AUTO: 8.4 K/UL (ref 4.8–10.8)

## 2023-03-13 PROCEDURE — A9270 NON-COVERED ITEM OR SERVICE: HCPCS | Performed by: INTERNAL MEDICINE

## 2023-03-13 PROCEDURE — 700111 HCHG RX REV CODE 636 W/ 250 OVERRIDE (IP): Performed by: INTERNAL MEDICINE

## 2023-03-13 PROCEDURE — 85027 COMPLETE CBC AUTOMATED: CPT

## 2023-03-13 PROCEDURE — 80048 BASIC METABOLIC PNL TOTAL CA: CPT

## 2023-03-13 PROCEDURE — 700105 HCHG RX REV CODE 258: Performed by: INTERNAL MEDICINE

## 2023-03-13 PROCEDURE — 99239 HOSP IP/OBS DSCHRG MGMT >30: CPT | Performed by: INTERNAL MEDICINE

## 2023-03-13 PROCEDURE — 83735 ASSAY OF MAGNESIUM: CPT

## 2023-03-13 PROCEDURE — 700102 HCHG RX REV CODE 250 W/ 637 OVERRIDE(OP): Performed by: INTERNAL MEDICINE

## 2023-03-13 PROCEDURE — G0378 HOSPITAL OBSERVATION PER HR: HCPCS

## 2023-03-13 RX ORDER — POTASSIUM CHLORIDE 20 MEQ/1
40 TABLET, EXTENDED RELEASE ORAL ONCE
Status: COMPLETED | OUTPATIENT
Start: 2023-03-13 | End: 2023-03-13

## 2023-03-13 RX ORDER — AMOXICILLIN AND CLAVULANATE POTASSIUM 875; 125 MG/1; MG/1
1 TABLET, FILM COATED ORAL 2 TIMES DAILY
Qty: 20 TABLET | Refills: 0 | Status: SHIPPED | OUTPATIENT
Start: 2023-03-13 | End: 2023-03-23

## 2023-03-13 RX ADMIN — AMPICILLIN AND SULBACTAM 3 G: 1; 2 INJECTION, POWDER, FOR SOLUTION INTRAMUSCULAR; INTRAVENOUS at 00:00

## 2023-03-13 RX ADMIN — POTASSIUM CHLORIDE 40 MEQ: 1500 TABLET, EXTENDED RELEASE ORAL at 08:52

## 2023-03-13 NOTE — PROGRESS NOTES
Bedside report received 0650. POC discussed with pt; No overnight events; Assessment unchanged from previous RN assessments; all questions answered at this time.

## 2023-03-13 NOTE — PROGRESS NOTES
4 Eyes Skin Assessment Completed by JOANNA Horvath and NASRIN Dewitt.    Head WDL  Ears WDL  Nose WDL  Mouth WDL  Neck WDL  Breast/Chest Scab from scratching hx of Eczema  Shoulder Blades scabs from scratching  Spine WDL  (R) Arm/Elbow/Hand Scab from scratching  (L) Arm/Elbow/Hand Scab from scratching  Abdomen Scab from scratching  Groin Scab from scratching  Scrotum/Coccyx/Buttocks WDL  (R) Leg Redness, Swelling, and Edema discoloration  (L) Leg Redness, Swelling, and Edema discoloration   (R) Heel/Foot/Toe Redness, Discoloration, Swelling, and Edema  (L) Heel/Foot/Toe Redness, Discoloration, Swelling, and Edema          Devices In Places NA      Interventions In Place N/A    Possible Skin Injury No    Pictures Uploaded Into Epic N/A  Wound Consult Placed N/A  RN Wound Prevention Protocol Ordered No

## 2023-03-13 NOTE — PROGRESS NOTES
Pr arrived from ED, Pt AOx4 able to make needs known. PIV patent, Skin check completed. Pt denies any pain or discomfort at this time. Call light with in reach of pt, stable on feet.

## 2023-03-13 NOTE — DISCHARGE SUMMARY
Discharge Summary    CHIEF COMPLAINT ON ADMISSION  Chief Complaint   Patient presents with    Sent from Urgent Care       Reason for Admission  Sent by      Admission Date  3/12/2023    CODE STATUS  Full Code    HPI & HOSPITAL COURSE  Michael Ontiveros is a 32 y.o. male with hypertension, JONES, morbid obesity, history of DVT 2 years ago and not on anticoagulation anymore, admitted 3/12/2023 with right lower extremity erythema, swelling, and cellulitis.  On evaluation, vital signs are stable.  No leukocytosis.  Venous duplex ultrasound was negative for DVT.  Patient started on antibiotics for right lower extremity cellulitis.  He did have low potassium which was replaced.    He clinically improved.  He had significant improvement in his lower extremity swelling, pain, and erythema.  Blood cultures remain negative to date.  Renal function remained normal.  He had improved sense of wellbeing.  He remained hemodynamically stable and afebrile.     I have personally seen and examined the patient on the day of discharge. With his clinical improvement, he was deemed ready to discharge from the hospital as he did not have any further hospitalization needs. Patient felt comfortable going home. The discharge plan was discussed with the patient, with which he was agreeable to.     Therefore, he is discharged in good and stable condition to home with close outpatient follow-up.      Discharge Date  3/13/2023      FOLLOW UP ITEMS POST DISCHARGE  -He will complete 10 days course of oral Augmentin.  -Follow for improvement in lower extremity cellulitis.  -Follow-up with PCP.  - counseled to seek immediate medical attention, or return to the ED for recurrent or worsening symptoms.      DISCHARGE DIAGNOSES  Principal Problem:    Cellulitis of right lower extremity POA: Yes  Active Problems:    Hypokalemia POA: Yes    Morbid obesity (HCC) (Chronic) POA: Yes    JONES (obstructive sleep apnea) (Chronic) POA: Yes    History of DVT  (deep vein thrombosis) POA: Yes  Resolved Problems:    * No resolved hospital problems. *      FOLLOW UP  No future appointments.  No follow-up provider specified.    MEDICATIONS ON DISCHARGE     Medication List        START taking these medications        Instructions   amoxicillin-clavulanate 875-125 MG Tabs  Commonly known as: AUGMENTIN   Take 1 Tablet by mouth 2 times a day for 10 days.  Dose: 1 Tablet            CONTINUE taking these medications        Instructions   aspirin 81 MG Chew chewable tablet  Commonly known as: ASA   Chew 81 mg every day.  Dose: 81 mg     furosemide 40 MG Tabs  Commonly known as: LASIX   Take 1 Tablet by mouth 2 times a day.  Dose: 40 mg              Allergies  Allergies   Allergen Reactions    [place] (Diagnostic) Anaphylaxis    [place] Anaphylaxis    [place] Anaphylaxis    Tree Nuts Food Allergy Vomiting and Nausea     Other nuts aside from almond, walnut, pistachio       DIET  Orders Placed This Encounter   Procedures    Diet Order Diet: Regular     Standing Status:   Standing     Number of Occurrences:   1     Order Specific Question:   Diet:     Answer:   Regular [1]       ACTIVITY  As tolerated.  Weight bearing as tolerated    CONSULTATIONS  None    PROCEDURES  None    LABORATORY  Lab Results   Component Value Date    SODIUM 133 (L) 03/13/2023    POTASSIUM 3.3 (L) 03/13/2023    CHLORIDE 99 03/13/2023    CO2 21 03/13/2023    GLUCOSE 105 (H) 03/13/2023    BUN 13 03/13/2023    CREATININE 0.69 03/13/2023        Lab Results   Component Value Date    WBC 8.4 03/13/2023    HEMOGLOBIN 13.7 (L) 03/13/2023    HEMATOCRIT 40.7 (L) 03/13/2023    PLATELETCT 206 03/13/2023        Total time of the discharge process = 40 minutes.

## 2023-03-13 NOTE — DISCHARGE INSTRUCTIONS
Cellulitis, Adult    Cellulitis is a skin infection. The infected area is often warm, red, swollen, and sore. It occurs most often in the arms and lower legs. It is very important to get treated for this condition.  What are the causes?  This condition is caused by bacteria. The bacteria enter through a break in the skin, such as a cut, burn, insect bite, open sore, or crack.  What increases the risk?  This condition is more likely to occur in people who:  Have a weak body defense system (immune system).  Have open cuts, burns, bites, or scrapes on the skin.  Are older than 60 years of age.  Have a blood sugar problem (diabetes).  Have a long-lasting (chronic) liver disease (cirrhosis) or kidney disease.  Are very overweight (obese).  Have a skin problem, such as:  Itchy rash (eczema).  Slow movement of blood in the veins (venous stasis).  Fluid buildup below the skin (edema).  Have been treated with high-energy rays (radiation).  Use IV drugs.  What are the signs or symptoms?  Symptoms of this condition include:  Skin that is:  Red.  Streaking.  Spotting.  Swollen.  Sore or painful when you touch it.  Warm.  A fever.  Chills.  Blisters.  How is this diagnosed?  This condition is diagnosed based on:  Medical history.  Physical exam.  Blood tests.  Imaging tests.  How is this treated?  Treatment for this condition may include:  Medicines to treat infections or allergies.  Home care, such as:  Rest.  Placing cold or warm cloths (compresses) on the skin.  Hospital care, if the condition is very bad.  Follow these instructions at home:  Medicines  Take over-the-counter and prescription medicines only as told by your doctor.  If you were prescribed an antibiotic medicine, take it as told by your doctor. Do not stop taking it even if you start to feel better.  General instructions    Drink enough fluid to keep your pee (urine) pale yellow.  Do not touch or rub the infected area.  Raise (elevate) the infected area above  the level of your heart while you are sitting or lying down.  Place cold or warm cloths on the area as told by your doctor.  Keep all follow-up visits as told by your doctor. This is important.  Contact a doctor if:  You have a fever.  You do not start to get better after 1-2 days of treatment.  Your bone or joint under the infected area starts to hurt after the skin has healed.  Your infection comes back. This can happen in the same area or another area.  You have a swollen bump in the area.  You have new symptoms.  You feel ill and have muscle aches and pains.  Get help right away if:  Your symptoms get worse.  You feel very sleepy.  You throw up (vomit) or have watery poop (diarrhea) for a long time.  You see red streaks coming from the area.  Your red area gets larger.  Your red area turns dark in color.  These symptoms may represent a serious problem that is an emergency. Do not wait to see if the symptoms will go away. Get medical help right away. Call your local emergency services (911 in the [place] Do not drive yourself to the hospital.  Summary  Cellulitis is a skin infection. The area is often warm, red, swollen, and sore.  This condition is treated with medicines, rest, and cold and warm cloths.  Take all medicines only as told by your doctor.  Tell your doctor if symptoms do not start to get better after 1-2 days of treatment.  This information is not intended to replace advice given to you by your health care provider. Make sure you discuss any questions you have with your health care provider.  Document Released: 06/05/2009 Document Revised: 05/09/2019 Document Reviewed: 05/09/2019  [employer] Patient Education © 2020 [employer]

## 2023-03-13 NOTE — PROGRESS NOTES
MD a bedside, Pt to discharge. Drove self, RX to personal pharmacy. DC Lounge order placed. Pt refused AM meds, would like to take at home.

## 2023-03-13 NOTE — PROGRESS NOTES
Patient to be discharged today - patient aware and agreeable to plan. D/c instructions reviewed with patient - ?'s/concerns answered. No iv per patient, no meds to beds, no home meds locked up per patient.

## 2023-05-01 DIAGNOSIS — J96.22 ACUTE ON CHRONIC RESPIRATORY FAILURE WITH HYPOXIA AND HYPERCAPNIA (HCC): ICD-10-CM

## 2023-05-01 DIAGNOSIS — J96.21 ACUTE ON CHRONIC RESPIRATORY FAILURE WITH HYPOXIA AND HYPERCAPNIA (HCC): ICD-10-CM

## 2023-05-02 RX ORDER — FUROSEMIDE 40 MG/1
40 TABLET ORAL 2 TIMES DAILY
Qty: 90 TABLET | Refills: 0 | Status: SHIPPED | OUTPATIENT
Start: 2023-05-02 | End: 2023-07-17 | Stop reason: SDUPTHER

## 2023-05-15 ENCOUNTER — OFFICE VISIT (OUTPATIENT)
Dept: URGENT CARE | Facility: CLINIC | Age: 33
End: 2023-05-15
Payer: COMMERCIAL

## 2023-05-15 VITALS
TEMPERATURE: 98.4 F | HEIGHT: 67 IN | OXYGEN SATURATION: 95 % | RESPIRATION RATE: 9 BRPM | DIASTOLIC BLOOD PRESSURE: 42 MMHG | HEART RATE: 97 BPM | BODY MASS INDEX: 49.44 KG/M2 | SYSTOLIC BLOOD PRESSURE: 104 MMHG | WEIGHT: 315 LBS

## 2023-05-15 DIAGNOSIS — L21.9 SEBORRHEIC DERMATITIS, UNSPECIFIED: ICD-10-CM

## 2023-05-15 DIAGNOSIS — L20.9 ATOPIC DERMATITIS IN ADULT: ICD-10-CM

## 2023-05-15 PROCEDURE — 3078F DIAST BP <80 MM HG: CPT | Performed by: NURSE PRACTITIONER

## 2023-05-15 PROCEDURE — 3074F SYST BP LT 130 MM HG: CPT | Performed by: NURSE PRACTITIONER

## 2023-05-15 PROCEDURE — 99214 OFFICE O/P EST MOD 30 MIN: CPT | Performed by: NURSE PRACTITIONER

## 2023-05-15 RX ORDER — TRIAMCINOLONE ACETONIDE 0.25 MG/G
1 CREAM TOPICAL 2 TIMES DAILY
Qty: 80 G | Refills: 1 | Status: SHIPPED | OUTPATIENT
Start: 2023-05-15 | End: 2023-05-25

## 2023-05-15 RX ORDER — TRIAMCINOLONE ACETONIDE 1 MG/G
1 OINTMENT TOPICAL 2 TIMES DAILY
Qty: 453.6 G | Refills: 1 | Status: SHIPPED | OUTPATIENT
Start: 2023-05-15 | End: 2023-05-25

## 2023-05-15 RX ORDER — PREDNISONE 20 MG/1
40 TABLET ORAL DAILY
Qty: 10 TABLET | Refills: 0 | Status: SHIPPED | OUTPATIENT
Start: 2023-05-15 | End: 2023-05-20

## 2023-05-15 ASSESSMENT — FIBROSIS 4 INDEX: FIB4 SCORE: 0.63

## 2023-05-16 NOTE — PROGRESS NOTES
Patient has consented to treatment and for use of patient information for treatment and billing purposes.    Date: 05/15/23     Arrival Mode: Private Vehicle    Chief Complaint:    Chief Complaint   Patient presents with    Rash     X 2 weeks, face and torso        History of Present Illness: 32 y.o.  male presents to clinic with 2-week history of worsening rash.  Patient does have a history of eczema and states over the past several years his eczema has been worsening.  He also has a history of asthma.  He denies any known triggers.  No new medications.  He states that the eczema is on his face and torso it is extremely pruritic.  He denies any shortness of breath chest pain or lower leg swelling.      ROS:    As stated in HPI     Pertinent Medical History:  Past Medical History:   Diagnosis Date    Asthma     Blood clotting disorder (HCC)     Chickenpox     Eczema     Hypertension     Influenza     Tonsillitis         Pertinent Surgical History:  Past Surgical History:   Procedure Laterality Date    DENTAL EXTRACTION(S)  11/3/2017    Procedure: DENTAL EXTRACTION(S);  Surgeon: Olman Sparks D.D.S.;  Location: SURGERY [facility];  Service: Oral Surgery        Pertinent Medications:    Current Outpatient Medications on File Prior to Visit   Medication Sig Dispense Refill    furosemide (LASIX) 40 MG Tab Take 1 Tablet by mouth 2 times a day. 90 Tablet 0    aspirin (ASA) 81 MG Chew Tab chewable tablet Chew 81 mg every day.       No current facility-administered medications on file prior to visit.        Allergies:    Jenera (diagnostic), Pistachio, [place], and Tree nuts food allergy     Social History:  Social History     Tobacco Use    Smoking status: Never    Smokeless tobacco: Never   Vaping Use    Vaping Use: Never used   Substance Use Topics    Alcohol use: Not Currently     Comment: maybe twice a year if that    Drug use: Yes     Types: Marijuana     Comment: occ gummy        No LMP for male patient.            Physical Exam:    Vitals:    05/15/23 1846   BP: 104/42   Pulse: 97   Resp: (!) 9   Temp: 36.9 °C (98.4 °F)   SpO2: 95%             Physical Exam  Constitutional:       General: He is awake.      Appearance: Normal appearance. He is not ill-appearing, toxic-appearing or diaphoretic.   HENT:      Head: Normocephalic and atraumatic.      Right Ear: Tympanic membrane, ear canal and external ear normal.      Left Ear: Tympanic membrane, ear canal and external ear normal.   Eyes:      General: Lids are normal. Gaze aligned appropriately. No allergic shiner or scleral icterus.     Extraocular Movements: Extraocular movements intact.      Conjunctiva/sclera: Conjunctivae normal.      Pupils: Pupils are equal, round, and reactive to light.   Cardiovascular:      Rate and Rhythm: Normal rate and regular rhythm.      Pulses:           Radial pulses are 2+ on the right side and 2+ on the left side.      Heart sounds: Normal heart sounds.   Pulmonary:      Effort: Pulmonary effort is normal.      Breath sounds: Normal breath sounds and air entry. No decreased breath sounds, wheezing, rhonchi or rales.   Musculoskeletal:      Right lower leg: No edema.      Left lower leg: No edema.   Lymphadenopathy:      Cervical: No cervical adenopathy.   Skin:     General: Skin is warm.      Capillary Refill: Capillary refill takes less than 2 seconds.      Coloration: Skin is not cyanotic or pale.      Findings: Rash present. Rash is crusting and macular.      Comments: Rash consistent with atopic dermatitis, blanchable on patient's forehead cheeks neck covering almost his entire chest and stomach and wrapping around to the back.  Rash is also present on the flexor surfaces of elbow.   Neurological:      Mental Status: He is alert and oriented to person, place, and time.      Gait: Gait is intact.   Psychiatric:         Behavior: Behavior normal. Behavior is cooperative.                  Diagnostics:      None     Medical Decision  making and clinic course :  I personally reviewed prior external notes and test results pertinent to today's visit. Pt is clinically stable at today's acute urgent care visit.  No acute distress noted. Appropriate for outpatient care at this time. Shared decision-making was utilized with patient for treatment plan.    Pleasant nontoxic-appearing 32-year-old male presenting clinic with chronic eczema with acute exacerbation and symptoms.  Due to the widespread nature of the rash will place patient on burst of prednisone.  High-dose steroids for the body low-dose for the face.  Discussed conservative measures with patient.  Patient verbalized understanding agree with plan        The patient remained stable during the urgent care visit.    Plan:    Medication discussed included indication for use and the potential  benefits and side effects.       1. Atopic dermatitis in adult    - predniSONE (DELTASONE) 20 MG Tab; Take 2 Tablets by mouth every day for 5 days.  Dispense: 10 Tablet; Refill: 0  - triamcinolone acetonide (KENALOG) 0.1 % Ointment; Apply 1 Application topically 2 times a day for 10 days.  Dispense: 453.6 g; Refill: 1    2. Seborrheic dermatitis, unspecified    - triamcinolone acetonide (KENALOG) 0.025 % Cream; Apply 1 Application. topically 2 times a day for 10 days.  Dispense: 80 g; Refill: 1     All of the patient's questions were answered to their satisfaction at the time of discharge.    Follow up:        Patient was encouraged to monitor symptoms closely. Those signs and symptoms which would warrant concern and mandate seeking a higher level of service through the emergency department discussed at length and included in discharge papers.  Patient stated agreement and understanding of this plan of care.    Disposition:  Home in stable condition       Voice Recognition Disclaimer:  Portions of this document were created using voice recognition software. The software does have a chance of producing errors  of grammar and possibly content. I have made every reasonable attempt to correct obvious errors, but there may be errors of grammar and possibly content that I did not discover before finalizing the documentation.    LESA Lobo.

## 2023-06-22 ENCOUNTER — TELEPHONE (OUTPATIENT)
Dept: MEDICAL GROUP | Facility: MEDICAL CENTER | Age: 33
End: 2023-06-22
Payer: COMMERCIAL

## 2023-06-23 NOTE — TELEPHONE ENCOUNTER
----- Message from Radha Avery M.D. sent at 6/22/2023  4:58 PM PDT -----    I have called and left a VM for this patient. I want to talk to him before sending lasix refill as his previous labs from March were abnormal for Potassium and I do not see he is on supplement.   I have asked him to give us a call. Let me know if he calls back.       Thank you

## 2023-07-07 NOTE — PROGRESS NOTES
[facility]     Pt is over due for PT/INR for warfarin monitoring. Left a voice message requesting patient recheck their INR as soon as possible and to give us a call upon receiving this message.      [facility]   [phone]     Fortino Coe, AdyD     Consistent Carbohydrate Diabetic Diets

## 2023-07-17 DIAGNOSIS — J96.22 ACUTE ON CHRONIC RESPIRATORY FAILURE WITH HYPOXIA AND HYPERCAPNIA (HCC): ICD-10-CM

## 2023-07-17 DIAGNOSIS — J96.21 ACUTE ON CHRONIC RESPIRATORY FAILURE WITH HYPOXIA AND HYPERCAPNIA (HCC): ICD-10-CM

## 2023-07-17 NOTE — TELEPHONE ENCOUNTER
Future Appointments         Provider Department Center    9/7/2023 3:20 PM (Arrive by 3:05 PM) Shawnee Junior P.A.-C. [facility]

## 2023-07-18 RX ORDER — FUROSEMIDE 40 MG/1
40 TABLET ORAL 2 TIMES DAILY
Qty: 90 TABLET | Refills: 1 | Status: SHIPPED | OUTPATIENT
Start: 2023-07-18 | End: 2023-10-04 | Stop reason: SDUPTHER

## 2023-09-07 ENCOUNTER — OFFICE VISIT (OUTPATIENT)
Dept: MEDICAL GROUP | Facility: MEDICAL CENTER | Age: 33
End: 2023-09-07
Payer: COMMERCIAL

## 2023-09-07 VITALS
WEIGHT: 315 LBS | OXYGEN SATURATION: 95 % | HEART RATE: 80 BPM | DIASTOLIC BLOOD PRESSURE: 62 MMHG | BODY MASS INDEX: 49.44 KG/M2 | HEIGHT: 67 IN | TEMPERATURE: 98.9 F | SYSTOLIC BLOOD PRESSURE: 100 MMHG

## 2023-09-07 DIAGNOSIS — I27.20 PULMONARY HYPERTENSION (HCC): Chronic | ICD-10-CM

## 2023-09-07 DIAGNOSIS — Z86.711 HISTORY OF PULMONARY EMBOLISM: ICD-10-CM

## 2023-09-07 DIAGNOSIS — G47.33 OSA (OBSTRUCTIVE SLEEP APNEA): Chronic | ICD-10-CM

## 2023-09-07 DIAGNOSIS — I87.2 VENOUS STASIS DERMATITIS OF BOTH LOWER EXTREMITIES: ICD-10-CM

## 2023-09-07 DIAGNOSIS — Z00.00 PREVENTATIVE HEALTH CARE: ICD-10-CM

## 2023-09-07 DIAGNOSIS — I51.7 CARDIOMEGALY: Chronic | ICD-10-CM

## 2023-09-07 DIAGNOSIS — E66.01 MORBID OBESITY (HCC): ICD-10-CM

## 2023-09-07 PROBLEM — I26.94 MULTIPLE SUBSEGMENTAL PULMONARY EMBOLI WITHOUT ACUTE COR PULMONALE (HCC): Status: RESOLVED | Noted: 2020-03-05 | Resolved: 2023-09-07

## 2023-09-07 PROBLEM — L03.115 CELLULITIS OF RIGHT LOWER EXTREMITY: Status: RESOLVED | Noted: 2021-07-11 | Resolved: 2023-09-07

## 2023-09-07 PROBLEM — R60.0 LOWER EXTREMITY EDEMA: Status: RESOLVED | Noted: 2017-11-03 | Resolved: 2023-09-07

## 2023-09-07 PROBLEM — J45.901 ASTHMA WITH EXACERBATION: Status: RESOLVED | Noted: 2019-08-22 | Resolved: 2023-09-07

## 2023-09-07 PROCEDURE — 99214 OFFICE O/P EST MOD 30 MIN: CPT | Performed by: PHYSICIAN ASSISTANT

## 2023-09-07 PROCEDURE — 3078F DIAST BP <80 MM HG: CPT | Performed by: PHYSICIAN ASSISTANT

## 2023-09-07 PROCEDURE — 3074F SYST BP LT 130 MM HG: CPT | Performed by: PHYSICIAN ASSISTANT

## 2023-09-07 ASSESSMENT — FIBROSIS 4 INDEX: FIB4 SCORE: 0.63

## 2023-09-07 NOTE — PROGRESS NOTES
"Chief Complaint   Patient presents with    Medication Follow-up       HPI  Michael Ontiveros is a 32 y.o. male here today forf/u:    Morbidly obese patient with history of DVT, PE, acute on chronic respiratory failure with hypoxia with JONES on CPAP and 6 L of oxygen at night,  Patient currently continues on furosemide 40 mg twice daily.  States he has been taking this since hospital discharge 2021 when he had fluid in his lungs and acute on chronic respiratory failure.  He does not take any potassium supplement and just consumes potassium rich foods such as bananas and potatoes and tomatoes.            Exam:  /62 (BP Location: Left arm, Patient Position: Sitting)   Pulse 80   Temp 37.2 °C (98.9 °F) (Temporal)   Ht 1.699 m (5' 6.89\")   Wt (!) 188 kg (414 lb)   SpO2 95%       Constitutional: Alert, oriented in no acute distress.  Psych: Eye contact is good, speech goal directed, affect calm  Eyes: Conjunctiva non-injected, sclera non-icteric.  Lungs: Unlabored respiratory effort, clear to auscultation bilaterally with good excursion, no wheez or rhonci  CV: Distant heart sounds, pos bilateral lower leg swelling with venous stasis dermatitis      A/P:        1. Preventative health care    - Comp Metabolic Panel; Future  - CBC WITH DIFFERENTIAL; Future  - Lipid Profile; Future    2. History of pulmonary embolism    - Referral to Pulmonary and Sleep Medicine    3. Morbid obesity (HCC)    - Referral to Bariatric Surgery    4. Cardiomegaly  - REFERRAL TO CARDIOLOGY    5. Pulmonary hypertension (HCC)    - Referral to Pulmonary and Sleep Medicine  - REFERRAL TO CARDIOLOGY    6. JONES (obstructive sleep apnea)    - Referral to Pulmonary and Sleep Medicine    7. Venous stasis dermatitis of both lower extremities        Morbidly obese patient, BMI has increased and is currently at 65.  Had a discussion with patient he is hesitant to do bariatric surgery however after conversation with patient he agreed to explore the idea, " referral done.    80 mg of furosemide daily without taking potassium supplement can be concerning and cause hypokalemia.  Patient states he has to take it otherwise he is going to get edema and swelling everywhere including his lungs.patient has obstructive sleep apnea with history of pulmonary embolism and hypertension, advised patient to follow-up with pulmonology,       F/U: 3-6 months

## 2023-09-25 ENCOUNTER — TELEPHONE (OUTPATIENT)
Dept: HEALTH INFORMATION MANAGEMENT | Facility: OTHER | Age: 33
End: 2023-09-25
Payer: COMMERCIAL

## 2023-09-26 DIAGNOSIS — G47.33 OSA (OBSTRUCTIVE SLEEP APNEA): ICD-10-CM
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
## 2023-09-28 ENCOUNTER — TELEPHONE (OUTPATIENT)
Dept: HEALTH INFORMATION MANAGEMENT | Facility: OTHER | Age: 33
End: 2023-09-28
Payer: COMMERCIAL

## 2023-10-04 DIAGNOSIS — J96.22 ACUTE ON CHRONIC RESPIRATORY FAILURE WITH HYPOXIA AND HYPERCAPNIA (HCC): ICD-10-CM

## 2023-10-04 DIAGNOSIS — J96.21 ACUTE ON CHRONIC RESPIRATORY FAILURE WITH HYPOXIA AND HYPERCAPNIA (HCC): ICD-10-CM

## 2023-10-04 RX ORDER — FUROSEMIDE 40 MG/1
40 TABLET ORAL 2 TIMES DAILY
Qty: 180 TABLET | Refills: 0 | Status: SHIPPED | OUTPATIENT
Start: 2023-10-04 | End: 2024-01-02 | Stop reason: SDUPTHER

## 2023-10-04 NOTE — TELEPHONE ENCOUNTER
Received request via: Pharmacy    Was the patient seen in the last year in this department? Yes    Does the patient have an active prescription (recently filled or refills available) for medication(s) requested? No    Does the patient have retirement Plus and need 100 day supply (blood pressure, diabetes and cholesterol meds only)? Patient does not have SCP    Future Appointments         Provider Department Center    12/20/2023 3:40 PM Carrie Costa P.A.-C[facility] Medicine - Operated by [facility]     12/22/2023 7:40 AM James Arzate D.O. [facility] - Operated by [facility]  Arrive at: Cardiology [facility] - Arrival     12/26/2023 1:00 PM ASAF Aguayo [facility] - Operated by [facility]

## 2023-11-28 ENCOUNTER — HOSPITAL ENCOUNTER (EMERGENCY)
Facility: MEDICAL CENTER | Age: 33
End: 2023-11-28
Attending: STUDENT IN AN ORGANIZED HEALTH CARE EDUCATION/TRAINING PROGRAM
Payer: COMMERCIAL

## 2023-11-28 VITALS
TEMPERATURE: 97.7 F | RESPIRATION RATE: 16 BRPM | DIASTOLIC BLOOD PRESSURE: 61 MMHG | OXYGEN SATURATION: 99 % | HEART RATE: 96 BPM | BODY MASS INDEX: 47.74 KG/M2 | WEIGHT: 315 LBS | HEIGHT: 68 IN | SYSTOLIC BLOOD PRESSURE: 132 MMHG

## 2023-11-28 DIAGNOSIS — I83.899 BLEEDING FROM VARICOSE VEIN: ICD-10-CM

## 2023-11-28 PROCEDURE — 700111 HCHG RX REV CODE 636 W/ 250 OVERRIDE (IP): Mod: JZ | Performed by: STUDENT IN AN ORGANIZED HEALTH CARE EDUCATION/TRAINING PROGRAM

## 2023-11-28 PROCEDURE — 99282 EMERGENCY DEPT VISIT SF MDM: CPT

## 2023-11-28 RX ADMIN — LIDOCAINE HYDROCHLORIDE 10 ML: 10 INJECTION, SOLUTION EPIDURAL; INFILTRATION; INTRACAUDAL; PERINEURAL at 01:33

## 2023-11-28 ASSESSMENT — FIBROSIS 4 INDEX: FIB4 SCORE: 0.64

## 2023-11-28 NOTE — ED NOTES
R leg cleared of blood, site assessed and bandaged. Pt able to bear weight with no blood coming from vein site. Pt ambulatory, states he is ready to go home. Pt placed up for dc.

## 2023-11-28 NOTE — ED NOTES
Patient discharged from [facility] ED to home with family. Discharge teaching completed at bedside and patient signature obtained. All questions and concerns addressed. All pt belongings with pt at time of discharge.

## 2023-11-28 NOTE — ED TRIAGE NOTES
Chief Complaint   Patient presents with    Bleeding/Bruising     Pt states he was picking at a scab on the side/back of his right foot when it started bleeding profusely from a varicose vein; pressure dressing applied in lobby so unable to visualize; pt denies injury     Pt to triage via wheelchair with girlfriend for above complaint.  Pt is alert/oriented and follows commands. Pt speaking in full sentences and responds appropriately to questions. No acute distress noted in triage and respirations are even and unlabored.     Pt placed in lobby and educated on triage process. Pt encouraged to alert staff for any changes in condition.

## 2023-12-20 ENCOUNTER — TELEMEDICINE (OUTPATIENT)
Dept: SLEEP MEDICINE | Facility: MEDICAL CENTER | Age: 33
End: 2023-12-20
Attending: PHYSICIAN ASSISTANT
Payer: COMMERCIAL

## 2023-12-20 VITALS
BODY MASS INDEX: 47.74 KG/M2 | WEIGHT: 315 LBS | RESPIRATION RATE: 16 BRPM | HEIGHT: 68 IN | OXYGEN SATURATION: 93 % | HEART RATE: 80 BPM

## 2023-12-20 DIAGNOSIS — G47.33 OSA (OBSTRUCTIVE SLEEP APNEA): ICD-10-CM

## 2023-12-20 DIAGNOSIS — E66.01 MORBID OBESITY (HCC): Chronic | ICD-10-CM

## 2023-12-20 DIAGNOSIS — J96.11 CHRONIC RESPIRATORY FAILURE WITH HYPOXIA (HCC): ICD-10-CM

## 2023-12-20 PROCEDURE — 99213 OFFICE O/P EST LOW 20 MIN: CPT | Mod: 95 | Performed by: PHYSICIAN ASSISTANT

## 2023-12-20 ASSESSMENT — ENCOUNTER SYMPTOMS
COUGH: 0
SPUTUM PRODUCTION: 0
CHILLS: 0
SINUS PAIN: 0
WEIGHT LOSS: 0
HEARTBURN: 0
DIZZINESS: 0
SORE THROAT: 0
WHEEZING: 0
FEVER: 0
PALPITATIONS: 0
HEADACHES: 0
SHORTNESS OF BREATH: 0
TREMORS: 0
INSOMNIA: 0
ORTHOPNEA: 0

## 2023-12-20 ASSESSMENT — FIBROSIS 4 INDEX: FIB4 SCORE: 0.64

## 2023-12-20 NOTE — PROGRESS NOTES
"Virtual Visit: Established Patient   This visit was conducted via Zoom using secure and encrypted videoconferencing technology.   The patient was in their home in the [facility].    The patient's identity was confirmed and verbal consent was obtained for this virtual visit.     Subjective:     Chief Complaint   Patient presents with    Apnea    Results     OPO       HPI:  Michael Ontiveros is a 33 y.o. year old male here today for follow-up on obstructive sleep apnea.  Patient last seen in clinic 7/18/2022 by KERRI Hwang.  Previously evaluated by Dr. Correa 1/31/2022.    Past Medical History: MI, chronic respiratory failure, pulmonary hypertension, PE with cor pulmonale, morbid obesity, chronic anticoagulation, asthma, cardiomegaly.  He did require intubation for respiratory failure in November 2021.    Vitals:  Pulse 80   Resp 16   Ht 1.727 m (5' 8\")   Wt (!) 163 kg (360 lb)   SpO2 93%   BMI 54.74 kg/m²     Recent Imaging: Echocardiogram obtained 11/1/2021 demonstrated normal left ventricular chamber size, wall thickness, systolic and diastolic function.  Flattened septum in diastole consistent with right ventricular volume overload.  Severely dilated right ventricle, normal right ventricular systolic function.  Mild to moderate mitral regurgitation.  Markedly enlarged right atrium, mild tricuspid regurgitation with estimated RVSP of 45 mmHg.    Currently using  Resmed auto Bi-PAP @25/15/4 cm H20 pressure; compliance reviewed for 11/20/2023 through 12/19/2023, days used 30/30, average daily usage 7 hours 31 minutes, 100% of days greater than or equal to 4 hours, mask leak at 26.4 LPM at 95th percentile, AHI 2.3 per hour.  See media for full report.    Device obtained February 2022  DME provider [facility]  Mask interface fullface mask    Polysomnogram split-night study obtained 12/22/2021 demonstrated severe obstructive sleep apnea with AHI of 132.9 events per hour and low O2 sat of 60%.  " "Patient required BiPAP therapy however ideal pressure could not be extrapolated, demonstrated improvement but not resolution of sleep apnea.  Recommendation to trial auto BiPAP at 25/16/pressure support 4 with oxygen bleed in.     Overnight oximetry obtained 12/15/2022 on auto BiPAP with 3 L O2 bleed in.  Low O2 sat of 83% with sats less than 88% for 10.9 minutes indicating mild hypoxia while on the auto BiPAP with 3 L O2 bleed in.  Per pulmonologist interpretation suspects findings due to artifact/signal disruption as generally oxygen saturation was in the 90s for majority of study.  Patient was advised to continue current therapy.    Sleep schedule goes to bed 11 PM-1 AM and wakens 730-8 AM   Symptoms denies daytime somnolence, morning headache     [place] Sleepiness Scale reported as 8/24 on 12/8/2021          Review of Systems   Constitutional:  Negative for chills, fever, malaise/fatigue and weight loss.   HENT:  Negative for congestion, hearing loss, nosebleeds, sinus pain, sore throat and tinnitus.    Eyes:         Presc glasses   Respiratory:  Negative for cough, sputum production, shortness of breath and wheezing.    Cardiovascular:  Negative for chest pain, palpitations, orthopnea and leg swelling.   Gastrointestinal:  Negative for heartburn.        No dentures, missing two teeth upper right and bottom right, no difficulty swallow    Neurological:  Negative for dizziness, tremors and headaches.   Psychiatric/Behavioral:  The patient does not have insomnia.        Past Medical History:   Diagnosis Date    Asthma     Blood clotting disorder (HCC)     PE in the past, on blood thinners x1.5 years after, no longer taking    Chickenpox     Eczema     Hypertension     \"not a thing anymore\" per pt    Influenza     Tonsillitis        Past Surgical History:   Procedure Laterality Date    DENTAL EXTRACTION(S)  11/3/2017    Procedure: DENTAL EXTRACTION(S);  Surgeon: Olman Sparks D.D.S.;  [facility]: [facility];  Service: Oral Surgery       Family History   Problem Relation Age of Onset    Respiratory Disease Mother     Diabetes Paternal Aunt        Social History     Socioeconomic History    Marital status: Single     Spouse name: Not on file    Number of children: Not on file    Years of education: Not on file    Highest education level: Associate degree: occupational, technical, or vocational program   Occupational History    Not on file   Tobacco Use    Smoking status: Never    Smokeless tobacco: Never   Vaping Use    Vaping Use: Never used   Substance and Sexual Activity    Alcohol use: Yes     Comment: \"maybe twice a year if that\"    Drug use: Yes     Types: Marijuana     Comment: occ gummy    Sexual activity: Yes     Partners: Female     Comment: no kids   Other Topics Concern    Not on file   Social History Narrative    Not on file     Social Determinants of Health     Financial Resource Strain: Low Risk  (11/1/2021)    Overall Financial Resource Strain (CARDIA)     Difficulty of Paying Living Expenses: Not very hard   Food Insecurity: No Food Insecurity (11/1/2021)    Hunger Vital Sign     Worried About Running Out of Food in the Last Year: Never true     Ran Out of Food in the Last Year: Never true   Transportation Needs: No Transportation Needs (11/1/2021)    PRAPARE - Transportation     Lack of Transportation (Medical): No     Lack of Transportation (Non-Medical): No   Physical Activity: Insufficiently Active (11/1/2021)    Exercise Vital Sign     Days of Exercise per Week: 3 days     Minutes of Exercise per Session: 30 min   Stress: No Stress Concern Present (11/1/2021)    [place] [employer] - Occupational Stress Questionnaire     Feeling of Stress : Only a little   Social Connections: Unknown (11/1/2021)    Social Connection and Isolation Panel [NHANES]     Frequency of Communication with Friends and Family: More than three times a week     Frequency of Social Gatherings " with Friends and Family: Never     Attends Anabaptism Services: Patient refused     Active Member of Clubs or Organizations: Yes     Attends Club or Organization Meetings: More than 4 times per year     Marital Status: Never    Intimate Partner Violence: Not on file   Housing Stability: Low Risk  (11/1/2021)    Housing Stability Vital Sign     Unable to Pay for Housing in the Last Year: No     Number of Places Lived in the Last Year: 1     Unstable Housing in the Last Year: No       Allergies as of 12/20/2023 - Reviewed 12/20/2023   Allergen Reaction Noted    [place] (diagnostic) Anaphylaxis 07/11/2021    Pistachio Anaphylaxis 05/05/2019    [place] Anaphylaxis 05/05/2019    Tree nuts food allergy Vomiting and Nausea 03/05/2020          Current medications as of today   Current Outpatient Medications   Medication Sig Dispense Refill    furosemide (LASIX) 40 MG Tab Take 1 Tablet by mouth 2 times a day. 180 Tablet 0    aspirin (ASA) 81 MG Chew Tab chewable tablet Chew 81 mg every day.       No current facility-administered medications for this visit.          Objective:   Physical Exam:  Constitutional: Alert, no distress, well-groomed.  Skin: No rashes in visible areas.  Eye: Round. Conjunctiva clear, lids normal. No icterus.   ENMT: Lips pink without lesions, good dentition, moist mucous membranes. Phonation normal.  Neck: No masses, no thyromegaly. Moves freely without pain.  Respiratory: Unlabored respiratory effort, no cough or audible wheeze  Psych: Alert and oriented x3, normal affect and mood.     Assessment and Plan:   The following treatment plan was discussed:     1. JONES (obstructive sleep apnea)  - DME Mask and Supplies    Reviewed compliance, patient demonstrates ongoing use and benefit.  Will send order for mask and supplies to [facility] [place].  Reviewed equipment cleaning, reminded to use only distilled water in humidifier chamber, reviewed equipment replacement schedule.  Follow-up in 1 year, sooner if  needed.    2. Chronic respiratory failure with hypoxia (HCC)    Reviewed overnight oximetry, patient continues to need delayed in O2 and benefit from oxygen use.    3. Morbid obesity (HCC)    Elevated BMI: Discussion regarding impact central adiposity on pulmonary function.  Encouraged to increase activity as tolerated and monitor nutritional intake.        Follow-up:   Return in about 1 year (around 12/20/2024) for Return with Carrie Costa PA-C.

## 2023-12-21 NOTE — PATIENT INSTRUCTIONS
1-reviewed compliance which is excellent  2-send order for mask and supplies to [facility]  3-reviewed overnight oximetry  4-patient continues to need bleed in oxygen and benefit from oxygen use  5-Today we reviewed equipment cleaning  once weekly minimum  mask, tubing and water chamber  use dedicated container  use mild soap and water  SoClean or other ozone  are not recommended  white vinegar and water solution is no longer recommended  hang tubing to dry  mask sanitizing wipes are an option for use   6-As a reminder use distilled water only in humidifier chamber.    7-Equipment replacement schedule : Mask every 6 months, Head gear every 6 months, Tubing every 3 months, Ultra-fine filters 2 times per month, Humidifier chamber every 6 months  8-follow up in one year, sooner if needed

## 2024-01-02 ENCOUNTER — TELEMEDICINE (OUTPATIENT)
Dept: CARDIOLOGY | Facility: MEDICAL CENTER | Age: 34
End: 2024-01-02
Attending: PHYSICIAN ASSISTANT
Payer: COMMERCIAL

## 2024-01-02 DIAGNOSIS — I27.20 PULMONARY HYPERTENSION (HCC): Chronic | ICD-10-CM

## 2024-01-02 DIAGNOSIS — J96.21 ACUTE ON CHRONIC RESPIRATORY FAILURE WITH HYPOXIA AND HYPERCAPNIA (HCC): ICD-10-CM

## 2024-01-02 DIAGNOSIS — J96.22 ACUTE ON CHRONIC RESPIRATORY FAILURE WITH HYPOXIA AND HYPERCAPNIA (HCC): ICD-10-CM

## 2024-01-02 PROCEDURE — 99204 OFFICE O/P NEW MOD 45 MIN: CPT | Mod: 95 | Performed by: INTERNAL MEDICINE

## 2024-01-02 RX ORDER — FUROSEMIDE 40 MG/1
40 TABLET ORAL DAILY
Qty: 90 TABLET | Refills: 3 | Status: SHIPPED | OUTPATIENT
Start: 2024-01-02

## 2024-01-02 ASSESSMENT — ENCOUNTER SYMPTOMS
CONSTIPATION: 0
BACK PAIN: 0
HEARTBURN: 0
DIARRHEA: 0
FEVER: 0
FLANK PAIN: 0
DYSPNEA ON EXERTION: 0
NAUSEA: 0
COUGH: 0
SHORTNESS OF BREATH: 0
NEAR-SYNCOPE: 0
WEIGHT GAIN: 0
CLAUDICATION: 0
ALTERED MENTAL STATUS: 0
DIZZINESS: 0
DEPRESSION: 0
ORTHOPNEA: 0
VOMITING: 0
IRREGULAR HEARTBEAT: 0
BLURRED VISION: 0
DECREASED APPETITE: 0
PALPITATIONS: 0
PND: 0
WEIGHT LOSS: 0
ABDOMINAL PAIN: 0
SYNCOPE: 0

## 2024-01-02 NOTE — PROGRESS NOTES
This evaluation was conducted via Zoom using secure and encrypted videoconferencing technology. The patient was in a private location in the state of [place] at home.     The patient's identity was confirmed and verbal consent was obtained for this virtual visit.       Cardiology Note  PE hx     History of Present Illness: Michael Ontiveros is a 33 y.o. male PMH morbid obesity, JONES on cpap, hx DVT, PE w cor pulmonale 2019 s/p ekos, pulmonary hypertension venous insufficiency who presents for initial visit. Referred by Shawnee Junior PA-C for cardiomegaly and pulmonary hypertension.      Doing well. No cardiac complaints. He does walk and does cardiovascular exercise through VR. He describes these as calisthenics type exercises and he performs 3x weekly about 30 min. Recalls at time of 2019 PE had multiple back to back flights going to [place]; recalls flights 2h rajesh then 4h then another flight 2h with rapid layovers. He was told suspected provoked. Leg swelling only with prolonged inactivity. This resolves with reduced salt intake and elevation. Rare alcohol and marijuana. Grandparents with vascular disease later in life.      Review of Systems   Constitutional: Negative for decreased appetite, fever, malaise/fatigue, weight gain and weight loss.   HENT:  Negative for congestion and nosebleeds.    Eyes:  Negative for blurred vision.   Cardiovascular:  Negative for chest pain, claudication, dyspnea on exertion, irregular heartbeat, leg swelling, near-syncope, orthopnea, palpitations, paroxysmal nocturnal dyspnea and syncope.   Respiratory:  Negative for cough and shortness of breath.    Endocrine: Negative for cold intolerance and heat intolerance.   Skin:  Negative for rash.   Musculoskeletal:  Negative for back pain.   Gastrointestinal:  Negative for abdominal pain, constipation, diarrhea, heartburn, melena, nausea and vomiting.   Genitourinary:  Negative for dysuria, flank pain and hematuria.  "  Neurological:  Negative for dizziness.   Psychiatric/Behavioral:  Negative for altered mental status and depression.             Past Medical History        Past Medical History:   Diagnosis Date    Asthma      Blood clotting disorder (HCC)       PE in the past, on blood thinners x1.5 years after, no longer taking    Chickenpox      Eczema      Hypertension       \"not a thing anymore\" per pt    Influenza      Tonsillitis                 Past Surgical History         Past Surgical History:   Procedure Laterality Date    DENTAL EXTRACTION(S)   11/3/2017     Procedure: DENTAL EXTRACTION(S);  Surgeon: Olman Sparks D.D.S.;  Location: SURGERY [facility];  Service: Oral Surgery               Current Medications          Current Outpatient Medications   Medication Sig Dispense Refill    furosemide (LASIX) 40 MG Tab Take 1 Tablet by mouth every day. 90 Tablet 3    acetaminophen (TYLENOL) 325 MG Tab 0 mg by oral route.        albuterol 108 (90 Base) MCG/ACT Aero Soln inhalation aerosol 2 puffs by inhalation route.        aspirin (ASA) 81 MG Chew Tab chewable tablet Chew 81 mg every day.          No current facility-administered medications for this visit.                     Allergies   Allergen Reactions    [place] (Diagnostic) Anaphylaxis    Pistachio Anaphylaxis    [place] Anaphylaxis    Tree Nuts Food Allergy Vomiting and Nausea       Other nuts aside from almond, walnut, pistachio            Family History         Family History   Problem Relation Age of Onset    Respiratory Disease Mother      Diabetes Paternal Aunt                 Social History               Socioeconomic History    Marital status: Single       Spouse name: Not on file    Number of children: Not on file    Years of education: Not on file    Highest education level: Associate degree: occupational, technical, or vocational program   Occupational History    Not on file   Tobacco Use    Smoking status: Never    Smokeless tobacco: Never " "  Vaping Use    Vaping Use: Never used   Substance and Sexual Activity    Alcohol use: Yes       Comment: \"maybe twice a year if that\"    Drug use: Yes       Types: Marijuana       Comment: occ gummy    Sexual activity: Yes       Partners: Female       Comment: no kids   Other Topics Concern    Not on file   Social History Narrative    Not on file      Social Determinants of Health           Financial Resource Strain: Low Risk  (11/1/2021)     Overall Financial Resource Strain (CARDIA)      Difficulty of Paying Living Expenses: Not very hard   Food Insecurity: No Food Insecurity (11/1/2021)     Hunger Vital Sign      Worried About Running Out of Food in the Last Year: Never true      Ran Out of Food in the Last Year: Never true   Transportation Needs: No Transportation Needs (11/1/2021)     PRAPARE - Transportation      Lack of Transportation (Medical): No      Lack of Transportation (Non-Medical): No   Physical Activity: Insufficiently Active (11/1/2021)     Exercise Vital Sign      Days of Exercise per Week: 3 days      Minutes of Exercise per Session: 30 min   Stress: No Stress Concern Present (11/1/2021)     Citizen of [place] of Occupational Health - Occupational Stress Questionnaire      Feeling of Stress : Only a little   Social Connections: Unknown (11/1/2021)     Social Connection and Isolation Panel [NHANES]      Frequency of Communication with Friends and Family: More than three times a week      Frequency of Social Gatherings with Friends and Family: Never      Attends Jew Services: Patient refused      Active Member of Clubs or Organizations: Yes      Attends Club or Organization Meetings: More than 4 times per year      Marital Status: Never    Intimate Partner Violence: Not on file   Housing Stability: Low Risk  (11/1/2021)     Housing Stability Vital Sign      Unable to Pay for Housing in the Last Year: No      Number of Places Lived in the Last Year: 1      Unstable Housing in the Last " Year: No               Physical Exam:  Ambulatory Vitals  There were no vitals taken for this visit.       BP Readings from Last 4 Encounters:   11/28/23 132/61   09/07/23 100/62   05/15/23 104/42   03/13/23 113/63      Weight/BMI: There were no vitals filed for this visit. There is no height or weight on file to calculate BMI.      Wt Readings from Last 4 Encounters:   12/26/23 (!) 161 kg (356 lb)   12/20/23 (!) 163 kg (360 lb)   11/28/23 (!) 163 kg (360 lb)   09/07/23 (!) 188 kg (414 lb)         Physical Exam:  Constitutional: Alert, no distress, well-groomed.  Skin: No rashes in visible areas.  Eye: Round. Conjunctiva clear, lids normal. No icterus.   ENMT: Lips pink without lesions, good dentition, moist mucous membranes. Phonation normal.  Neck: No masses, no thyromegaly. Moves freely without pain.  CV: Pulse as reported by patient  Respiratory: Unlabored respiratory effort, no cough or audible wheeze  Psych: Alert and oriented x3, normal affect and mood.       Lab Data Review:        Lab Results   Component Value Date/Time     CHOLSTRLTOT 90 (L) 11/03/2021 02:11 AM     LDL 55 11/03/2021 02:11 AM     HDL 16 (A) 11/03/2021 02:11 AM     TRIGLYCERIDE 95 11/03/2021 02:11 AM             Lab Results   Component Value Date/Time     SODIUM 133 (L) 03/13/2023 12:13 AM     POTASSIUM 3.3 (L) 03/13/2023 12:13 AM     CHLORIDE 99 03/13/2023 12:13 AM     CO2 21 03/13/2023 12:13 AM     GLUCOSE 105 (H) 03/13/2023 12:13 AM     BUN 13 03/13/2023 12:13 AM     CREATININE 0.69 03/13/2023 12:13 AM      CrCl cannot be calculated (Patient's most recent lab result is older than the maximum 7 days allowed.).        Lab Results   Component Value Date/Time     ALKPHOSPHAT 91 03/12/2023 01:31 PM     ASTSGOT 18 03/12/2023 01:31 PM     ALTSGPT 20 03/12/2023 01:31 PM     TBILIRUBIN 0.7 03/12/2023 01:31 PM            Lab Results   Component Value Date/Time     WBC 8.4 03/13/2023 12:13 AM            Lab Results   Component Value Date/Time      "HBA1C 5.9 (H) 11/03/2021 02:11 AM      No components found for: \"TROP\"        Cardiac Imaging and Procedures Review:       CTA PE 8/22/2019  1.  Right-sided lobar, segmental and subsegmental pulmonary emboli. Suspected subsegmental left-sided pulmonary emboli. Associated right heart strain.  2.  Scattered pulmonary infarcts. Dependent opacities in the lower lobes may represent a combination of atelectasis and infarcts, however, superimposed pneumonia is also possible.  3.  Cardiomegaly.  4.  Large right thyroid nodule measuring 2.9 cm. It can be followed on an outpatient basis.  5.  Splenomegaly.     CTA PE 3/2020  IMPRESSION:  1.  Filling defects within right lower lobe segmental coronary arteries consistent with pulmonary emboli. No evidence of right heart strain.  2.  Consolidation within the right lung base.  3.  Patchy bilateral groundglass opacities.  4.  Small right pleural effusion.     TTE 11/2021  CONCLUSIONS  Prior echo on 07/12/2021, compared to the report of the prior study,   valvular abnormalities now noted.   Normal left ventricular systolic function.  The left ventricular ejection fraction is visually estimated to be 60%.  Mild to moderate mitral regurgitation.  Mild tricuspid regurgitation.  Estimated right ventricular systolic pressure is 45 mmHg.     Medical Decision Making:  Problem List Items Addressed This Visit         Acute on chronic respiratory failure with hypoxia and hypercapnia (HCC)     Relevant Medications     furosemide (LASIX) 40 MG Tab     Pulmonary hypertension (HCC) (Chronic)     Relevant Medications     furosemide (LASIX) 40 MG Tab     Other Relevant Orders     EC-ECHOCARDIOGRAM COMPLETE W/O CONT     Basic Metabolic Panel     proBrain Natriuretic Peptide, NT      Pulmonary hypertension - multiple probably etiologies including sleep apnea, obesity, history PE. Repeat TTE reassess estimated pressures. If remains elevated consider further imaging especially v/q scan. Further " management pending results.      Venous insufficiency - Reduce lasix to alleviate hyponatremia. Monitor leg swelling. Consider vascular surgery for venous intervention if remains symptomatic.      It was my pleasure to meet with Mr. Ontiveros.

## 2024-01-02 NOTE — PROGRESS NOTES
This evaluation was conducted via Zoom using secure and encrypted videoconferencing technology. The patient was in a private location in the state of [place] at home.     The patient's identity was confirmed and verbal consent was obtained for this virtual visit.      Cardiology Note  PE hx    History of Present Illness: Michael Ontiveros is a 33 y.o. male PMH morbid obesity, JONES on cpap, hx DVT, PE w cor pulmonale 2019 s/p ekos, pulmonary hypertension venous insufficiency who presents for initial visit. Referred by Shawnee Junior PA-C for cardiomegaly and pulmonary hypertension.     Doing well. No cardiac complaints. He does walk and does cardiovascular exercise through VR. He describes these as calisthenics type exercises and he performs 3x weekly about 30 min. Recalls at time of 2019 PE had multiple back to back flights going to [place]; recalls flights 2h rajesh then 4h then another flight 2h with rapid layovers. He was told suspected provoked. Leg swelling only with prolonged inactivity. This resolves with reduced salt intake and elevation. Rare alcohol and marijuana. Grandparents with vascular disease later in life.     Review of Systems   Constitutional: Negative for decreased appetite, fever, malaise/fatigue, weight gain and weight loss.   HENT:  Negative for congestion and nosebleeds.    Eyes:  Negative for blurred vision.   Cardiovascular:  Negative for chest pain, claudication, dyspnea on exertion, irregular heartbeat, leg swelling, near-syncope, orthopnea, palpitations, paroxysmal nocturnal dyspnea and syncope.   Respiratory:  Negative for cough and shortness of breath.    Endocrine: Negative for cold intolerance and heat intolerance.   Skin:  Negative for rash.   Musculoskeletal:  Negative for back pain.   Gastrointestinal:  Negative for abdominal pain, constipation, diarrhea, heartburn, melena, nausea and vomiting.   Genitourinary:  Negative for dysuria, flank pain and hematuria.  "  Neurological:  Negative for dizziness.   Psychiatric/Behavioral:  Negative for altered mental status and depression.          Past Medical History:   Diagnosis Date    Asthma     Blood clotting disorder (HCC)     PE in the past, on blood thinners x1.5 years after, no longer taking    Chickenpox     Eczema     Hypertension     \"not a thing anymore\" per pt    Influenza     Tonsillitis          Past Surgical History:   Procedure Laterality Date    DENTAL EXTRACTION(S)  11/3/2017    Procedure: DENTAL EXTRACTION(S);  Surgeon: Olman Sparks D.D.S.;  Location: SURGERY [facility];  Service: Oral Surgery         Current Outpatient Medications   Medication Sig Dispense Refill    furosemide (LASIX) 40 MG Tab Take 1 Tablet by mouth every day. 90 Tablet 3    acetaminophen (TYLENOL) 325 MG Tab 0 mg by oral route.      albuterol 108 (90 Base) MCG/ACT Aero Soln inhalation aerosol 2 {puff}s by inhalation route.      aspirin (ASA) 81 MG Chew Tab chewable tablet Chew 81 mg every day.       No current facility-administered medications for this visit.         Allergies   Allergen Reactions    [place] (Diagnostic) Anaphylaxis    Pistachio Anaphylaxis    [place] Anaphylaxis    Tree Nuts Food Allergy Vomiting and Nausea     Other nuts aside from almond, walnut, pistachio         Family History   Problem Relation Age of Onset    Respiratory Disease Mother     Diabetes Paternal Aunt          Social History     Socioeconomic History    Marital status: Single     Spouse name: Not on file    Number of children: Not on file    Years of education: Not on file    Highest education level: Associate degree: occupational, technical, or vocational program   Occupational History    Not on file   Tobacco Use    Smoking status: Never    Smokeless tobacco: Never   Vaping Use    Vaping Use: Never used   Substance and Sexual Activity    Alcohol use: Yes     Comment: \"maybe twice a year if that\"    Drug use: Yes     Types: Marijuana     Comment: " occ gummy    Sexual activity: Yes     Partners: Female     Comment: no kids   Other Topics Concern    Not on file   Social History Narrative    Not on file     Social Determinants of Health     Financial Resource Strain: Low Risk  (11/1/2021)    Overall Financial Resource Strain (CARDIA)     Difficulty of Paying Living Expenses: Not very hard   Food Insecurity: No Food Insecurity (11/1/2021)    Hunger Vital Sign     Worried About Running Out of Food in the Last Year: Never true     Ran Out of Food in the Last Year: Never true   Transportation Needs: No Transportation Needs (11/1/2021)    PRAPARE - Transportation     Lack of Transportation (Medical): No     Lack of Transportation (Non-Medical): No   Physical Activity: Insufficiently Active (11/1/2021)    Exercise Vital Sign     Days of Exercise per Week: 3 days     Minutes of Exercise per Session: 30 min   Stress: No Stress Concern Present (11/1/2021)    [place] [employer] - Occupational Stress Questionnaire     Feeling of Stress : Only a little   Social Connections: Unknown (11/1/2021)    Social Connection and Isolation Panel [NHANES]     Frequency of Communication with Friends and Family: More than three times a week     Frequency of Social Gatherings with Friends and Family: Never     Attends [employer]ism Services: Patient refused     Active Member of Clubs or Organizations: Yes     Attends Club or Organization Meetings: More than 4 times per year     Marital Status: Never    Intimate Partner Violence: Not on file   Housing Stability: Low Risk  (11/1/2021)    Housing Stability Vital Sign     Unable to Pay for Housing in the Last Year: No     Number of Places Lived in the Last Year: 1     Unstable Housing in the Last Year: No         Physical Exam:  Ambulatory Vitals  There were no vitals taken for this visit.   BP Readings from Last 4 Encounters:   11/28/23 132/61   09/07/23 100/62   05/15/23 104/42   03/13/23 113/63     Weight/BMI: There  "were no vitals filed for this visit. There is no height or weight on file to calculate BMI.  Wt Readings from Last 4 Encounters:   12/26/23 (!) 161 kg (356 lb)   12/20/23 (!) 163 kg (360 lb)   11/28/23 (!) 163 kg (360 lb)   09/07/23 (!) 188 kg (414 lb)       Physical Exam:  Constitutional: Alert, no distress, well-groomed.  Skin: No rashes in visible areas.  Eye: Round. Conjunctiva clear, lids normal. No icterus.   ENMT: Lips pink without lesions, good dentition, moist mucous membranes. Phonation normal.  Neck: No masses, no thyromegaly. Moves freely without pain.  CV: Pulse as reported by patient  Respiratory: Unlabored respiratory effort, no cough or audible wheeze  Psych: Alert and oriented x3, normal affect and mood.      Lab Data Review:  Lab Results   Component Value Date/Time    CHOLSTRLTOT 90 (L) 11/03/2021 02:11 AM    LDL 55 11/03/2021 02:11 AM    HDL 16 (A) 11/03/2021 02:11 AM    TRIGLYCERIDE 95 11/03/2021 02:11 AM       Lab Results   Component Value Date/Time    SODIUM 133 (L) 03/13/2023 12:13 AM    POTASSIUM 3.3 (L) 03/13/2023 12:13 AM    CHLORIDE 99 03/13/2023 12:13 AM    CO2 21 03/13/2023 12:13 AM    GLUCOSE 105 (H) 03/13/2023 12:13 AM    BUN 13 03/13/2023 12:13 AM    CREATININE 0.69 03/13/2023 12:13 AM     CrCl cannot be calculated (Patient's most recent lab result is older than the maximum 7 days allowed.).  Lab Results   Component Value Date/Time    ALKPHOSPHAT 91 03/12/2023 01:31 PM    ASTSGOT 18 03/12/2023 01:31 PM    ALTSGPT 20 03/12/2023 01:31 PM    TBILIRUBIN 0.7 03/12/2023 01:31 PM      Lab Results   Component Value Date/Time    WBC 8.4 03/13/2023 12:13 AM     Lab Results   Component Value Date/Time    HBA1C 5.9 (H) 11/03/2021 02:11 AM     No components found for: \"TROP\"      Cardiac Imaging and Procedures Review:      CTA PE 8/22/2019  1.  Right-sided lobar, segmental and subsegmental pulmonary emboli. Suspected subsegmental left-sided pulmonary emboli. Associated right heart strain.  2.  " Scattered pulmonary infarcts. Dependent opacities in the lower lobes may represent a combination of atelectasis and infarcts, however, superimposed pneumonia is also possible.  3.  Cardiomegaly.  4.  Large right thyroid nodule measuring 2.9 cm. It can be followed on an outpatient basis.  5.  Splenomegaly.    CTA PE 3/2020  IMPRESSION:  1.  Filling defects within right lower lobe segmental coronary arteries consistent with pulmonary emboli. No evidence of right heart strain.  2.  Consolidation within the right lung base.  3.  Patchy bilateral groundglass opacities.  4.  Small right pleural effusion.    TTE 11/2021  CONCLUSIONS  Prior echo on 07/12/2021, compared to the report of the prior study,   valvular abnormalities now noted.   Normal left ventricular systolic function.  The left ventricular ejection fraction is visually estimated to be 60%.  Mild to moderate mitral regurgitation.  Mild tricuspid regurgitation.  Estimated right ventricular systolic pressure is 45 mmHg.    Medical Decision Making:  Problem List Items Addressed This Visit       Acute on chronic respiratory failure with hypoxia and hypercapnia (HCC)    Relevant Medications    furosemide (LASIX) 40 MG Tab    Pulmonary hypertension (HCC) (Chronic)    Relevant Medications    furosemide (LASIX) 40 MG Tab    Other Relevant Orders    EC-ECHOCARDIOGRAM COMPLETE W/O CONT    Basic Metabolic Panel    proBrain Natriuretic Peptide, NT     Pulmonary hypertension - multiple probably etiologies including sleep apnea, obesity, history PE. Repeat TTE reassess estimated pressures. If remains elevated consider further imaging especially v/q scan. Further management pending results.     Venous insufficiency - Reduce lasix to alleviate hyponatremia. Monitor leg swelling. Consider vascular surgery for venous intervention if remains symptomatic.     It was my pleasure to meet with Mr. Ontiveros.

## 2024-02-23 ENCOUNTER — HOSPITAL ENCOUNTER (OUTPATIENT)
Dept: CARDIOLOGY | Facility: MEDICAL CENTER | Age: 34
End: 2024-02-23
Attending: INTERNAL MEDICINE
Payer: COMMERCIAL

## 2024-02-23 DIAGNOSIS — I27.20 PULMONARY HYPERTENSION (HCC): Chronic | ICD-10-CM

## 2024-02-23 LAB
LV EJECT FRACT  99904: 60
LV EJECT FRACT MOD 2C 99903: 60.96
LV EJECT FRACT MOD 4C 99902: 55.14
LV EJECT FRACT MOD BP 99901: 57.6

## 2024-02-23 PROCEDURE — 700117 HCHG RX CONTRAST REV CODE 255: Performed by: INTERNAL MEDICINE

## 2024-02-23 PROCEDURE — 93306 TTE W/DOPPLER COMPLETE: CPT

## 2024-02-23 PROCEDURE — 93306 TTE W/DOPPLER COMPLETE: CPT | Mod: 26 | Performed by: INTERNAL MEDICINE

## 2024-02-23 RX ADMIN — HUMAN ALBUMIN MICROSPHERES AND PERFLUTREN 3 ML: 10; .22 INJECTION, SOLUTION INTRAVENOUS at 10:45

## 2024-06-06 DIAGNOSIS — J96.21 ACUTE ON CHRONIC RESPIRATORY FAILURE WITH HYPOXIA AND HYPERCAPNIA (HCC): ICD-10-CM

## 2024-06-06 DIAGNOSIS — J96.22 ACUTE ON CHRONIC RESPIRATORY FAILURE WITH HYPOXIA AND HYPERCAPNIA (HCC): ICD-10-CM

## 2024-06-06 RX ORDER — FUROSEMIDE 40 MG/1
40 TABLET ORAL DAILY
Qty: 90 TABLET | Refills: 0 | Status: SHIPPED | OUTPATIENT
Start: 2024-06-06

## 2024-06-06 NOTE — TELEPHONE ENCOUNTER
VR : Patient K from 03/2023 was 3.3, Na 133  Please advise.    MyChart sent to patient asking to complete labs and schedule follow up visit

## 2024-08-01 ENCOUNTER — TELEPHONE (OUTPATIENT)
Dept: HEALTH INFORMATION MANAGEMENT | Facility: OTHER | Age: 34
End: 2024-08-01

## 2024-09-13 ENCOUNTER — OFFICE VISIT (OUTPATIENT)
Dept: URGENT CARE | Facility: CLINIC | Age: 34
End: 2024-09-13
Payer: COMMERCIAL

## 2024-09-13 ENCOUNTER — APPOINTMENT (OUTPATIENT)
Dept: URGENT CARE | Facility: CLINIC | Age: 34
End: 2024-09-13
Payer: COMMERCIAL

## 2024-09-13 VITALS
OXYGEN SATURATION: 95 % | RESPIRATION RATE: 18 BRPM | BODY MASS INDEX: 49.44 KG/M2 | HEART RATE: 105 BPM | WEIGHT: 315 LBS | HEIGHT: 67 IN | DIASTOLIC BLOOD PRESSURE: 62 MMHG | TEMPERATURE: 98.2 F | SYSTOLIC BLOOD PRESSURE: 118 MMHG

## 2024-09-13 DIAGNOSIS — L03.115 CELLULITIS OF RIGHT LOWER EXTREMITY: ICD-10-CM

## 2024-09-13 PROCEDURE — 3074F SYST BP LT 130 MM HG: CPT | Performed by: NURSE PRACTITIONER

## 2024-09-13 PROCEDURE — 3078F DIAST BP <80 MM HG: CPT | Performed by: NURSE PRACTITIONER

## 2024-09-13 PROCEDURE — 99214 OFFICE O/P EST MOD 30 MIN: CPT | Mod: 25 | Performed by: NURSE PRACTITIONER

## 2024-09-13 RX ORDER — DOXYCYCLINE 100 MG/1
100 CAPSULE ORAL 2 TIMES DAILY
Qty: 14 CAPSULE | Refills: 0 | Status: SHIPPED | OUTPATIENT
Start: 2024-09-13 | End: 2024-09-20

## 2024-09-13 ASSESSMENT — ENCOUNTER SYMPTOMS
FEVER: 0
CHILLS: 0

## 2024-09-13 ASSESSMENT — FIBROSIS 4 INDEX: FIB4 SCORE: 0.64

## 2024-09-14 NOTE — PROGRESS NOTES
Subjective:   Michael Ontiveros is a 33 y.o. male who presents for Leg Swelling (Sx started 2 days ago, right leg red and swollen, painful. Pt thinks its infected, feels hot)      HPI  Patient is a 33-year-old male presents urgent care for evaluation of his right lower leg being red, swollen, painful.  Patient initially had a blister 2 weeks ago that has turned into an open wound and progressively worsened as of 2 days ago.  Patient having increased redness warmth the lower leg prompting his follow-up today.  Patient denies history of diabetes.  Patient denies any fever, chills.  Does have pain when you touch the skin.  No discharge.      Review of Systems   Constitutional:  Negative for chills, fever and malaise/fatigue.   Musculoskeletal:         Right lower leg swelling      Skin:  Positive for rash.       Medications:    acetaminophen Tabs  albuterol Aers  amoxicillin-clavulanate Tabs  aspirin Chew  ceftriaxone (ROCEPHIN) 1g - lidocaine 1% 3.6 mL for IM use  doxycycline  furosemide Tabs    Allergies: [place] (diagnostic), Pistachio, [place], and Tree nuts food allergy    Problem List: Michael Ontiveros does not have any pertinent problems on file.    Surgical History:  Past Surgical History:   Procedure Laterality Date    DENTAL EXTRACTION(S)  11/3/2017    Procedure: DENTAL EXTRACTION(S);  Surgeon: Olman Sparks D.D.STyson;  Location: SURGERY [facility];  Service: Oral Surgery       Past Social Hx: Michael Ontiveros  reports that he has never smoked. He has never used smokeless tobacco. He reports that he does not currently use alcohol. He reports current drug use. Drug: Marijuana.     Past Family Hx:  Michael Ontiveros family history includes Diabetes in his paternal aunt; Respiratory Disease in his mother.     Problem list, medications, and allergies reviewed by myself today in Epic.     Objective:     /62 (BP Location: Left arm, Patient Position: Sitting, BP Cuff Size:  "Large adult)   Pulse (!) 105   Temp 36.8 °C (98.2 °F) (Temporal)   Resp 18   Ht 1.702 m (5' 7\")   Wt (!) 203 kg (447 lb)   SpO2 95%   BMI 70.01 kg/m²     Physical Exam  Constitutional:       Appearance: Normal appearance. He is morbidly obese. He is not ill-appearing or toxic-appearing.   HENT:      Head: Normocephalic.      Right Ear: External ear normal.      Left Ear: External ear normal.      Nose: Nose normal.      Mouth/Throat:      Lips: Pink.   Eyes:      General: Lids are normal.   Pulmonary:      Effort: Pulmonary effort is normal. No accessory muscle usage.   Musculoskeletal:      Cervical back: Full passive range of motion without pain.      Right lower leg: Swelling and tenderness present. No deformity, lacerations or bony tenderness.   Skin:     Findings: Erythema and wound present.   Neurological:      Mental Status: He is alert and oriented to person, place, and time.   Psychiatric:         Mood and Affect: Mood normal.         Thought Content: Thought content normal.             Assessment/Plan:     Diagnosis and associated orders:     1. Cellulitis of right lower extremity  cefTRIAXone (Rocephin) 1 g in lidocaine (Xylocaine) 1 % 4 mL for IM use    amoxicillin-clavulanate (AUGMENTIN) 875-125 MG Tab    doxycycline (MONODOX) 100 MG capsule    DISCONTINUED: cefTRIAXone (Rocephin) 1 g in lidocaine (Xylocaine) 1 % 4 mL for IM use         Comments/MDM:     I personally reviewed prior external notes and prior test results pertinent to today's visit.  Patient refusing to go to the emergency room this evening.  Discussed the risks of not doing so patient verbalizing understanding would like to trial outpatient antibiotics.  Patient denied history of diabetes however does have documented history of hyperglycemia and very concerned of this wound will treat as he will not go to the ER today.  Rocephin administered in clinic will start an oral antibiotics.  Discussed management options, risks and " benefits, and alternatives to treatment plan agreed upon.   Red flags discussed and indications to immediately call 911 or present to the Emergency Department.   Supportive care, differential diagnoses, and indications for immediate follow-up discussed with patient.    Patient expresses understanding and agrees to plan. Patient denies any other questions or concerns.              Please note that this dictation was created using voice recognition software. I have made a reasonable attempt to correct obvious errors, but I expect that there are errors of grammar and possibly content that I did not discover before finalizing the note.    This note was electronically signed by Gabriele MANNING.

## 2024-10-18 ENCOUNTER — HOSPITAL ENCOUNTER (OUTPATIENT)
Dept: LAB | Facility: MEDICAL CENTER | Age: 34
End: 2024-10-18
Attending: INTERNAL MEDICINE
Payer: COMMERCIAL

## 2024-10-18 DIAGNOSIS — J96.22 ACUTE ON CHRONIC RESPIRATORY FAILURE WITH HYPOXIA AND HYPERCAPNIA (HCC): ICD-10-CM

## 2024-10-18 DIAGNOSIS — I27.20 PULMONARY HYPERTENSION (HCC): Chronic | ICD-10-CM

## 2024-10-18 DIAGNOSIS — J96.21 ACUTE ON CHRONIC RESPIRATORY FAILURE WITH HYPOXIA AND HYPERCAPNIA (HCC): ICD-10-CM

## 2024-10-18 LAB
ANION GAP SERPL CALC-SCNC: 13 MMOL/L (ref 7–16)
BUN SERPL-MCNC: 13 MG/DL (ref 8–22)
CALCIUM SERPL-MCNC: 9.9 MG/DL (ref 8.5–10.5)
CHLORIDE SERPL-SCNC: 102 MMOL/L (ref 96–112)
CO2 SERPL-SCNC: 24 MMOL/L (ref 20–33)
CREAT SERPL-MCNC: 0.63 MG/DL (ref 0.5–1.4)
GFR SERPLBLD CREATININE-BSD FMLA CKD-EPI: 128 ML/MIN/1.73 M 2
GLUCOSE SERPL-MCNC: 81 MG/DL (ref 65–99)
NT-PROBNP SERPL IA-MCNC: <36 PG/ML (ref 0–125)
POTASSIUM SERPL-SCNC: 3.9 MMOL/L (ref 3.6–5.5)
SODIUM SERPL-SCNC: 139 MMOL/L (ref 135–145)

## 2024-10-18 PROCEDURE — 36415 COLL VENOUS BLD VENIPUNCTURE: CPT

## 2024-10-18 PROCEDURE — 83880 ASSAY OF NATRIURETIC PEPTIDE: CPT

## 2024-10-18 PROCEDURE — 80048 BASIC METABOLIC PNL TOTAL CA: CPT

## 2024-10-21 RX ORDER — FUROSEMIDE 40 MG/1
40 TABLET ORAL DAILY
Qty: 90 TABLET | Refills: 0 | Status: SHIPPED | OUTPATIENT
Start: 2024-10-21

## 2024-10-28 ENCOUNTER — OFFICE VISIT (OUTPATIENT)
Dept: URGENT CARE | Facility: CLINIC | Age: 34
End: 2024-10-28
Payer: COMMERCIAL

## 2024-10-28 VITALS
TEMPERATURE: 97.6 F | WEIGHT: 315 LBS | HEIGHT: 68 IN | RESPIRATION RATE: 24 BRPM | BODY MASS INDEX: 47.74 KG/M2 | HEART RATE: 109 BPM | DIASTOLIC BLOOD PRESSURE: 88 MMHG | OXYGEN SATURATION: 98 % | SYSTOLIC BLOOD PRESSURE: 138 MMHG

## 2024-10-28 DIAGNOSIS — S80.821A BLISTER OF RIGHT LOWER LEG, INITIAL ENCOUNTER: ICD-10-CM

## 2024-10-28 DIAGNOSIS — I87.2 VENOUS STASIS DERMATITIS: ICD-10-CM

## 2024-10-28 PROCEDURE — 99213 OFFICE O/P EST LOW 20 MIN: CPT | Performed by: PHYSICIAN ASSISTANT

## 2024-10-28 PROCEDURE — 3075F SYST BP GE 130 - 139MM HG: CPT | Performed by: PHYSICIAN ASSISTANT

## 2024-10-28 PROCEDURE — 3079F DIAST BP 80-89 MM HG: CPT | Performed by: PHYSICIAN ASSISTANT

## 2024-10-28 RX ORDER — DOXYCYCLINE HYCLATE 100 MG
100 TABLET ORAL 2 TIMES DAILY
Qty: 14 TABLET | Refills: 0 | Status: SHIPPED | OUTPATIENT
Start: 2024-10-28 | End: 2024-11-04

## 2024-10-28 ASSESSMENT — FIBROSIS 4 INDEX: FIB4 SCORE: 0.66

## 2024-12-11 DIAGNOSIS — J96.22 ACUTE ON CHRONIC RESPIRATORY FAILURE WITH HYPOXIA AND HYPERCAPNIA (HCC): ICD-10-CM

## 2024-12-11 DIAGNOSIS — J96.21 ACUTE ON CHRONIC RESPIRATORY FAILURE WITH HYPOXIA AND HYPERCAPNIA (HCC): ICD-10-CM

## 2024-12-11 RX ORDER — FUROSEMIDE 40 MG/1
40 TABLET ORAL DAILY
Qty: 90 TABLET | Refills: 0 | Status: SHIPPED | OUTPATIENT
Start: 2024-12-11

## 2024-12-19 ENCOUNTER — OFFICE VISIT (OUTPATIENT)
Dept: URGENT CARE | Facility: CLINIC | Age: 34
End: 2024-12-19
Payer: COMMERCIAL

## 2024-12-19 VITALS
OXYGEN SATURATION: 93 % | RESPIRATION RATE: 24 BRPM | HEART RATE: 110 BPM | SYSTOLIC BLOOD PRESSURE: 138 MMHG | HEIGHT: 69 IN | TEMPERATURE: 98.5 F | BODY MASS INDEX: 46.65 KG/M2 | WEIGHT: 315 LBS | DIASTOLIC BLOOD PRESSURE: 86 MMHG

## 2024-12-19 DIAGNOSIS — L03.116 CELLULITIS OF LEFT LOWER EXTREMITY: ICD-10-CM

## 2024-12-19 DIAGNOSIS — T14.8XXA OPEN WOUND: ICD-10-CM

## 2024-12-19 PROCEDURE — 3079F DIAST BP 80-89 MM HG: CPT

## 2024-12-19 PROCEDURE — 99213 OFFICE O/P EST LOW 20 MIN: CPT

## 2024-12-19 PROCEDURE — 3075F SYST BP GE 130 - 139MM HG: CPT

## 2024-12-19 RX ORDER — CEPHALEXIN 500 MG/1
500 CAPSULE ORAL 3 TIMES DAILY
Qty: 30 CAPSULE | Refills: 0 | Status: SHIPPED | OUTPATIENT
Start: 2024-12-19 | End: 2024-12-29

## 2024-12-19 ASSESSMENT — FIBROSIS 4 INDEX: FIB4 SCORE: 0.66

## 2024-12-19 ASSESSMENT — ENCOUNTER SYMPTOMS: FEVER: 0

## 2024-12-20 NOTE — PROGRESS NOTES
"Verbal consent was acquired by the patient to use [employer] ambient listening note generation during this visit   Subjective:   Michael Ontiveros is a 34 y.o. male who presents for Wound Infection (X 2 weeks/ L lower calf/ discharge from wound )      HPI:  History of Present Illness  The patient is a 34-year-old male who presents for evaluation of a  left leg infection.    He reports the onset of an infection in his leg, which became open and started draining yesterday. He also experienced soreness in the anterior aspect of his leg on the same day. He has not had any systemic symptoms such as fevers, chills, or body aches but notes a general feeling of fatigue. He recalls a minor scrape to the affected area while performing household chores.         Review of Systems   Constitutional:  Negative for fever.           Allergies:   [place] (diagnostic), Pistachio, [place], and Tree nuts food allergy    Problem List:   Patient Active Problem List   Diagnosis    Asthma    Morbid obesity (HCC)    Acute on chronic respiratory failure with hypoxia and hypercapnia (HCC)    Hyperglycemia    JONES (obstructive sleep apnea)    Type 2 myocardial infarction (HCC)    Pulmonary hypertension (HCC)    Pulmonary embolism with acute cor pulmonale (HCC)    Chronic anticoagulation    History of pulmonary embolism    Scrotal swelling    Cardiomegaly    Urinary retention    History of DVT (deep vein thrombosis)    Hypokalemia    Venous stasis dermatitis        Surgical History:  Past Surgical History:   Procedure Laterality Date    DENTAL EXTRACTION(S)  11/3/2017    Procedure: DENTAL EXTRACTION(S);  Surgeon: Olman Sparks D.D.S.;  Location: SURGERY [facility];  Service: Oral Surgery       Past Social Hx:   Social History     Tobacco Use    Smoking status: Never    Smokeless tobacco: Never   Vaping Use    Vaping status: Never Used   Substance Use Topics    Alcohol use: Not Currently     Comment: \"maybe twice a year if that\" " "   Drug use: Yes     Types: Marijuana     Comment: occ gummy          Problem list, medications, and allergies reviewed by myself today in Epic.     Objective:     /86   Pulse (!) 110   Temp 36.9 °C (98.5 °F) (Temporal)   Resp (!) 24   Ht 1.753 m (5' 9\")   Wt (!) 209 kg (460 lb)   SpO2 93%   BMI 67.93 kg/m²     Physical Exam  Vitals and nursing note reviewed.   Constitutional:       General: He is not in acute distress.     Appearance: Normal appearance. He is obese. He is not ill-appearing, toxic-appearing or diaphoretic.   HENT:      Head: Normocephalic and atraumatic.      Nose: Nose normal.   Cardiovascular:      Pulses: Normal pulses.   Pulmonary:      Effort: Pulmonary effort is normal.      Breath sounds: Normal breath sounds.   Skin:     General: Skin is warm and dry.      Capillary Refill: Capillary refill takes less than 2 seconds.      Findings: Rash and wound present.             Comments: Wound present to posterior aspect of left lower extremity. +surround erythema. Dusky color scaling to bilateral lower extremities    Neurological:      General: No focal deficit present.      Mental Status: He is alert and oriented to person, place, and time. Mental status is at baseline.      Gait: Gait normal.   Psychiatric:         Mood and Affect: Mood normal.         Behavior: Behavior normal.         Thought Content: Thought content normal.         Judgment: Judgment normal.             Assessment/Plan:     Diagnosis and associated orders:   1. Cellulitis of left lower extremity  cephALEXin (KEFLEX) 500 MG Cap      2. Open wound  cephALEXin (KEFLEX) 500 MG Cap             Comments/MDM:   Pt is clinically stable at today's acute urgent care visit.  No acute distress noted. Appropriate for outpatient management at this time.     Assessment & Plan    The patient reports an infection on his leg that started yesterday, with drainage and soreness. There are no associated fevers, chills, or body aches. He " has no history of peripheral vascular disease or diabetes.  He will be prescribed a course of antibiotics for one week. He is advised to clean the area with gentle soap and water and to leave it open to air for drainage and healing. If the condition worsens, he should return for further evaluation.            Discussed DDx, management options (risks,benefits, and alternatives to planned treatment), natural progression and supportive care.  Expressed understanding and the treatment plan was agreed upon. Questions were encouraged and answered   Return to urgent care prn if new or worsening sx or if there is no improvement in condition prn.    Educated in Red flags and indications to immediately call 911 or present to the Emergency Department.   Advised the patient to follow-up with the primary care physician for recheck, reevaluation, and consideration of further management.    I personally reviewed prior external notes and test results pertinent to today's visit.  I have independently reviewed and interpreted all diagnostics ordered during this urgent care acute visit.       Please note that this dictation was created using voice recognition software. I have made a reasonable attempt to correct obvious errors, but I expect that there are errors of grammar and possibly content that I did not discover before finalizing the note.    This note was electronically signed by KERRI Zepeda

## 2025-03-31 ENCOUNTER — OFFICE VISIT (OUTPATIENT)
Dept: URGENT CARE | Facility: CLINIC | Age: 35
End: 2025-03-31
Payer: COMMERCIAL

## 2025-03-31 VITALS
OXYGEN SATURATION: 94 % | WEIGHT: 315 LBS | TEMPERATURE: 97.3 F | HEART RATE: 94 BPM | DIASTOLIC BLOOD PRESSURE: 80 MMHG | HEIGHT: 68 IN | BODY MASS INDEX: 47.74 KG/M2 | SYSTOLIC BLOOD PRESSURE: 134 MMHG

## 2025-03-31 DIAGNOSIS — I87.2 VENOUS STASIS DERMATITIS: ICD-10-CM

## 2025-03-31 DIAGNOSIS — L03.116 CELLULITIS OF LEFT LOWER EXTREMITY: ICD-10-CM

## 2025-03-31 DIAGNOSIS — T14.8XXA OPEN WOUND: ICD-10-CM

## 2025-03-31 RX ORDER — CEPHALEXIN 500 MG/1
500 CAPSULE ORAL 4 TIMES DAILY
Qty: 28 CAPSULE | Refills: 0 | Status: SHIPPED | OUTPATIENT
Start: 2025-03-31

## 2025-03-31 ASSESSMENT — ENCOUNTER SYMPTOMS
CONSTITUTIONAL NEGATIVE: 1
RESPIRATORY NEGATIVE: 1
PALPITATIONS: 0
NEUROLOGICAL NEGATIVE: 1
GASTROINTESTINAL NEGATIVE: 1

## 2025-04-01 NOTE — PROGRESS NOTES
Subjective     Michael Ontiveros is a very pleasant 34 y.o. male who presents with Wound Infection (Patient is here for possible leg infection, had a blister that was healing and then stopped. Patient states that this has been going on for about a month)            HPI  Recurrent open wounds and cellulitis of his lower extremities.  He does have underlying venous stasis dermatitis.  He also has previous DVT with PE.  He states most recently wound opened up a few days ago and has been red swollen and tender.  No chest pain, fever, shortness of breath or cough.      PMH:  has a past medical history of Asthma, Blood clotting disorder (HCC), Chickenpox, Eczema, Hypertension, Influenza, and Tonsillitis.  MEDS:   Current Outpatient Medications:     cephALEXin (KEFLEX) 500 MG Cap, Take 1 Capsule by mouth 4 times a day., Disp: 28 Capsule, Rfl: 0    furosemide (LASIX) 40 MG Tab, Take 1 Tablet by mouth every day. Please call [phone] to schedule a follow up appointment to continue receiving further refills. Thank you!, Disp: 90 Tablet, Rfl: 0    albuterol 108 (90 Base) MCG/ACT Aero Soln inhalation aerosol, Inhale 2 Puffs every four hours as needed for Shortness of Breath., Disp: , Rfl:     aspirin (ASA) 81 MG Chew Tab chewable tablet, Chew 81 mg every day., Disp: , Rfl:   ALLERGIES:   Allergies   Allergen Reactions    [place] (Diagnostic) Anaphylaxis    Pistachio Anaphylaxis    [place] Anaphylaxis    Tree Nuts Food Allergy Vomiting and Nausea     Other nuts aside from almond, walnut, pistachio     SURGHX:   Past Surgical History:   Procedure Laterality Date    DENTAL EXTRACTION(S)  11/3/2017    Procedure: DENTAL EXTRACTION(S);  Surgeon: Olman Sparks D.D.S.;  Location: SURGERY [facility];  Service: Oral Surgery     SOCHX:  reports that he has never smoked. He has never used smokeless tobacco. He reports that he does not currently use alcohol. He reports current drug use. Drug: Marijuana.  FH: family  "history includes Diabetes in his paternal aunt; Respiratory Disease in his mother.    Review of Systems   Constitutional: Negative.    Respiratory: Negative.     Cardiovascular:  Positive for leg swelling (Chronic). Negative for chest pain and palpitations.   Gastrointestinal: Negative.    Skin:  Positive for rash.   Neurological: Negative.      Medications, Allergies, and current problem list reviewed today in Epic             Objective     /80   Pulse 94   Temp 36.3 °C (97.3 °F) (Temporal)   Ht 1.727 m (5' 8\")   Wt (!) 219 kg (483 lb)   SpO2 94%   BMI 73.44 kg/m²      Physical Exam  Vitals and nursing note reviewed.   Constitutional:       General: He is not in acute distress.     Appearance: Normal appearance. He is well-developed. He is not ill-appearing, toxic-appearing or diaphoretic.   HENT:      Head: Normocephalic and atraumatic.      Right Ear: Tympanic membrane, ear canal and external ear normal.      Left Ear: Tympanic membrane, ear canal and external ear normal.      Nose: Nose normal. No congestion or rhinorrhea.      Mouth/Throat:      Mouth: Mucous membranes are moist.      Pharynx: Oropharynx is clear. No oropharyngeal exudate or posterior oropharyngeal erythema.   Eyes:      General:         Right eye: No discharge.         Left eye: No discharge.      Conjunctiva/sclera: Conjunctivae normal.   Cardiovascular:      Rate and Rhythm: Normal rate and regular rhythm.      Pulses: Normal pulses.      Heart sounds: Normal heart sounds. No murmur heard.  Pulmonary:      Effort: Pulmonary effort is normal. No respiratory distress.      Breath sounds: Normal breath sounds. No wheezing, rhonchi or rales.   Chest:      Chest wall: No tenderness.   Musculoskeletal:         General: No swelling or tenderness.      Cervical back: Normal range of motion and neck supple.      Right lower leg: No edema.      Left lower leg: No edema.   Lymphadenopathy:      Cervical: No cervical adenopathy.   Skin:     " General: Skin is warm and dry.      Comments: Chronic venous stasis dermatitis of the bilateral lower extremities with peripheral edema.  On the left side there are several open wounds on the lateral and posterior lower leg.  Surrounding erythema edema with crusty overlying discharge noted.  No joint pain.   Neurological:      General: No focal deficit present.      Mental Status: He is alert and oriented to person, place, and time. Mental status is at baseline.   Psychiatric:         Mood and Affect: Mood normal.         Behavior: Behavior normal.         Thought Content: Thought content normal.         Judgment: Judgment normal.                                  Assessment & Plan  Cellulitis of left lower extremity  This is a very pleasant 34-year-old male multiple open wounds with developing cellulitis of his left lower extremity.  Underlying venous stasis dermatitis and peripheral edema.  Fortunately no systemic symptoms.  Does have history of DVT/PE but denies chest pain cough or fever.    Orders:    cephALEXin (KEFLEX) 500 MG Cap; Take 1 Capsule by mouth 4 times a day.    Referral to Wound Clinic    Open wound    Orders:    cephALEXin (KEFLEX) 500 MG Cap; Take 1 Capsule by mouth 4 times a day.    Referral to Wound Clinic    Venous stasis dermatitis    Orders:    cephALEXin (KEFLEX) 500 MG Cap; Take 1 Capsule by mouth 4 times a day.    Referral to Wound Clinic           I personally reviewed prior external notes and test results pertinent to today's visit. Return to clinic or go to ED if symptoms worsen or persist. Red flag symptoms and indications for ED discussed at length. Patient/Parent/Guardian voices understanding.  AVS with post-visit instructions printed and provided or given verbally.  Follow-up with your primary care provider in 3-5 days. All side effects and potential interactions of prescribed medication discussed including allergic response, GI upset, tendon injury, rash, sedation, OCP  effectiveness, etc.    Please note that this dictation was created using voice recognition software. I have made every reasonable attempt to correct obvious errors, but I expect that there are errors of grammar and possibly content that I did not discover before finalizing the note.  You requested pain

## 2025-04-01 NOTE — ASSESSMENT & PLAN NOTE
Orders:    cephALEXin (KEFLEX) 500 MG Cap; Take 1 Capsule by mouth 4 times a day.    Referral to Wound Clinic

## 2025-04-01 NOTE — Clinical Note
REFERRAL APPROVAL NOTICE         Sent on April 1, 2025                   Michael Ontiveros  [address] [facility] [address]                   Dear Mr. Ontiveros,    After a careful review of the medical information and benefit coverage, [facility] has processed your referral. See below for additional details.    If applicable, you must be actively enrolled with your insurance for coverage of the authorized service. If you have any questions regarding your coverage, please contact your insurance directly.    REFERRAL INFORMATION   Referral #:  [id number]  Referred-To Department    Referred-By Provider:  Wound Care    Satya Carrasquillo P.A.-C.   Wound Care [facility] [place]      [address]  [address]  [facility] [id number] [phone] [address] [facility] [address]  [phone]    Referral Start Date:  03/31/2025  Referral End Date:   03/31/2026             SCHEDULING  If you do not already have an appointment, please call [phone] to make an appointment.     MORE INFORMATION  If you do not already have a [employer] account, sign up at: [email or web address]  You can access your medical information, make appointments, see lab results, billing information, and more.  If you have questions regarding this referral, please contact  the [facility] Referrals department at:             [phone]. Monday - Friday 8:00AM - 5:00PM.     Sincerely,    [facility]

## 2025-04-14 ENCOUNTER — OFFICE VISIT (OUTPATIENT)
Dept: URGENT CARE | Facility: CLINIC | Age: 35
End: 2025-04-14
Payer: COMMERCIAL

## 2025-04-14 VITALS
DIASTOLIC BLOOD PRESSURE: 70 MMHG | WEIGHT: 315 LBS | OXYGEN SATURATION: 94 % | BODY MASS INDEX: 47.74 KG/M2 | HEIGHT: 68 IN | TEMPERATURE: 98 F | RESPIRATION RATE: 26 BRPM | HEART RATE: 102 BPM | SYSTOLIC BLOOD PRESSURE: 120 MMHG

## 2025-04-14 DIAGNOSIS — J22 LRTI (LOWER RESPIRATORY TRACT INFECTION): ICD-10-CM

## 2025-04-14 DIAGNOSIS — R05.1 ACUTE COUGH: ICD-10-CM

## 2025-04-14 PROCEDURE — 99214 OFFICE O/P EST MOD 30 MIN: CPT | Performed by: NURSE PRACTITIONER

## 2025-04-14 PROCEDURE — 3078F DIAST BP <80 MM HG: CPT | Performed by: NURSE PRACTITIONER

## 2025-04-14 PROCEDURE — 3074F SYST BP LT 130 MM HG: CPT | Performed by: NURSE PRACTITIONER

## 2025-04-14 RX ORDER — DOXYCYCLINE HYCLATE 100 MG
100 TABLET ORAL 2 TIMES DAILY
Qty: 14 TABLET | Refills: 0 | Status: SHIPPED | OUTPATIENT
Start: 2025-04-14 | End: 2025-04-21

## 2025-04-14 RX ORDER — ALBUTEROL SULFATE 90 UG/1
2 INHALANT RESPIRATORY (INHALATION) EVERY 6 HOURS PRN
Qty: 8.5 G | Refills: 0 | Status: SHIPPED | OUTPATIENT
Start: 2025-04-14

## 2025-04-14 RX ORDER — PREDNISONE 10 MG/1
40 TABLET ORAL DAILY
Qty: 20 TABLET | Refills: 0 | Status: SHIPPED | OUTPATIENT
Start: 2025-04-14 | End: 2025-04-19

## 2025-04-14 ASSESSMENT — ENCOUNTER SYMPTOMS
CHILLS: 0
SORE THROAT: 0
COUGH: 1
DIZZINESS: 0
RHINORRHEA: 1
SHORTNESS OF BREATH: 0
EYE REDNESS: 0
MYALGIAS: 0
HEADACHES: 0
VOMITING: 0
FEVER: 0
NAUSEA: 0

## 2025-04-15 NOTE — PROGRESS NOTES
"Subjective:   Michael Ontiveros is a 34 y.o. male who presents for Cough (X1 week Pt states \"I think I have pneumonia\", sore throat, wheezing, cough/)      URI   This is a new problem. The current episode started in the past 7 days. The problem has been gradually worsening. Associated symptoms include congestion, coughing and rhinorrhea. Pertinent negatives include no chest pain, dysuria, ear pain, headaches, nausea, plugged ear sensation, rash, sore throat or vomiting. He has tried acetaminophen for the symptoms.       Review of Systems   Constitutional:  Positive for malaise/fatigue. Negative for chills and fever.   HENT:  Positive for congestion and rhinorrhea. Negative for ear pain and sore throat.    Eyes:  Negative for redness.   Respiratory:  Positive for cough. Negative for shortness of breath.    Cardiovascular:  Negative for chest pain.   Gastrointestinal:  Negative for nausea and vomiting.   Genitourinary:  Negative for dysuria.   Musculoskeletal:  Negative for myalgias.   Skin:  Negative for rash.   Neurological:  Negative for dizziness and headaches.       Medications:    albuterol Aers  aspirin Chew  cephALEXin Caps  doxycycline Tabs  furosemide Tabs  predniSONE Tabs    Allergies: [place] (diagnostic), Pistachio, [place], and Tree nuts food allergy    Problem List: Michael Ontiveros does not have any pertinent problems on file.    Surgical History:  Past Surgical History:   Procedure Laterality Date    DENTAL EXTRACTION(S)  11/3/2017    Procedure: DENTAL EXTRACTION(S);  Surgeon: Olman Sparks D.D.S.;  Location: SURGERY [facility];  Service: Oral Surgery       Past Social Hx: Michael Ontiveros  reports that he has never smoked. He has never used smokeless tobacco. He reports that he does not currently use alcohol. He reports current drug use. Drug: Marijuana.     Past Family Hx:  Michael Ontiveros family history includes Diabetes in his paternal aunt; Respiratory " "Disease in his mother.     Problem list, medications, and allergies reviewed by myself today in Epic.     Objective:     /70 (BP Location: Left arm, Patient Position: Sitting, BP Cuff Size: Adult)   Pulse (!) 102   Temp 36.7 °C (98 °F) (Temporal)   Resp (!) 26   Ht 1.727 m (5' 8\")   Wt (!) 217 kg (478 lb 4.8 oz)   SpO2 94%   BMI 72.73 kg/m²     Physical Exam  Vitals and nursing note reviewed.   Constitutional:       General: He is not in acute distress.     Appearance: He is well-developed. He is morbidly obese.   HENT:      Head: Normocephalic and atraumatic.      Right Ear: Tympanic membrane and external ear normal.      Left Ear: Tympanic membrane and external ear normal.      Nose: Nose normal.      Right Sinus: No maxillary sinus tenderness or frontal sinus tenderness.      Left Sinus: No maxillary sinus tenderness or frontal sinus tenderness.      Mouth/Throat:      Mouth: Mucous membranes are moist.      Pharynx: Uvula midline. No posterior oropharyngeal erythema.      Tonsils: No tonsillar exudate or tonsillar abscesses.   Eyes:      General:         Right eye: No discharge.         Left eye: No discharge.      Conjunctiva/sclera: Conjunctivae normal.   Cardiovascular:      Rate and Rhythm: Tachycardia present.      Heart sounds: Normal heart sounds, S1 normal and S2 normal.   Pulmonary:      Effort: Pulmonary effort is normal. Tachypnea present. No respiratory distress.      Breath sounds: Normal breath sounds.   Abdominal:      General: There is no distension.   Musculoskeletal:         General: Normal range of motion.   Skin:     General: Skin is warm and dry.   Neurological:      General: No focal deficit present.      Mental Status: He is alert and oriented to person, place, and time. Mental status is at baseline.      Gait: Gait (gait at baseline) normal.   Psychiatric:         Judgment: Judgment normal.         Assessment/Plan:     Diagnosis and associated orders:     1. LRTI (lower " respiratory tract infection)  predniSONE (DELTASONE) 10 MG Tab    doxycycline (VIBRAMYCIN) 100 MG Tab    albuterol 108 (90 Base) MCG/ACT Aero Soln inhalation aerosol      2. Acute cough  predniSONE (DELTASONE) 10 MG Tab    doxycycline (VIBRAMYCIN) 100 MG Tab    albuterol 108 (90 Base) MCG/ACT Aero Soln inhalation aerosol         Comments/MDM:       I personally reviewed prior external notes and prior test results pertinent to today's visit.   Imaging unavailable at clinical location this evening.  Patient with a worsening symptoms x 1 week tachypneic, tachycardic will treat for suspected bacterial etiology discussed management options, risks and benefits, and alternatives to treatment plan agreed upon.   Red flags discussed and indications to immediately call 911 or present to the Emergency Department.   Supportive care, differential diagnoses, and indications for immediate follow-up discussed with patient.    Patient expresses understanding and agrees to plan. Patient denies any other questions or concerns.            Please note that this dictation was created using voice recognition software. I have made a reasonable attempt to correct obvious errors, but I expect that there are errors of grammar and possibly content that I did not discover before finalizing the note.    This note was electronically signed by Gabriele MANNING.

## 2025-04-17 ENCOUNTER — APPOINTMENT (OUTPATIENT)
Dept: WOUND CARE | Facility: MEDICAL CENTER | Age: 35
End: 2025-04-17
Attending: NURSE PRACTITIONER
Payer: COMMERCIAL

## 2025-04-18 ENCOUNTER — OFFICE VISIT (OUTPATIENT)
Dept: WOUND CARE | Facility: MEDICAL CENTER | Age: 35
End: 2025-04-18
Attending: NURSE PRACTITIONER
Payer: COMMERCIAL

## 2025-04-18 DIAGNOSIS — E66.01 MORBID OBESITY (HCC): Chronic | ICD-10-CM

## 2025-04-18 DIAGNOSIS — I83.028 VENOUS STASIS ULCER OF LEFT LOWER LEG (HCC): ICD-10-CM

## 2025-04-18 DIAGNOSIS — L08.9 WOUND INFECTION: ICD-10-CM

## 2025-04-18 DIAGNOSIS — T14.8XXA WOUND INFECTION: ICD-10-CM

## 2025-04-18 DIAGNOSIS — T14.8XXA PAIN ASSOCIATED WITH WOUND: ICD-10-CM

## 2025-04-18 DIAGNOSIS — R52 PAIN ASSOCIATED WITH WOUND: ICD-10-CM

## 2025-04-18 PROCEDURE — 11042 DBRDMT SUBQ TIS 1ST 20SQCM/<: CPT

## 2025-04-18 PROCEDURE — 99214 OFFICE O/P EST MOD 30 MIN: CPT

## 2025-04-18 PROCEDURE — 11042 DBRDMT SUBQ TIS 1ST 20SQCM/<: CPT | Performed by: NURSE PRACTITIONER

## 2025-04-18 PROCEDURE — 99214 OFFICE O/P EST MOD 30 MIN: CPT | Mod: 25 | Performed by: NURSE PRACTITIONER

## 2025-04-18 ASSESSMENT — ENCOUNTER SYMPTOMS
BACK PAIN: 1
VOMITING: 0
CHILLS: 0
COUGH: 1
NAUSEA: 0
CONSTIPATION: 0
DIARRHEA: 0
SHORTNESS OF BREATH: 1
FEVER: 0

## 2025-04-18 NOTE — PROGRESS NOTES
Provider Encounter- Lower Extremity Ulcer      HISTORY OF PRESENT ILLNESS  Wound History:    START OF CARE IN CLINIC: 4/18/2025    REFERRING PROVIDER: Satya Carrasquillo      WOUND ETIOLOGY: Venous (venous, arterial, mixed)   LOCATION: Posterior lower leg, cluster     HISTORY: 34-year-old morbidly obese male referred to the clinic for ulcers to his left lower leg.  These wounds started a few months prior to coming to the clinic during an episode of increased swelling of his leg.  He has had similar wounds in the past for which he was treated in the clinic, though not since 2020.  He has not been able to tolerate any significant compression.  He and his girlfriend have been managing wounds with cleansing and simple dressings and then wrapping lower leg with Ace wrap.  He was prescribed a course of Keflex by his PCP, which he completed.  Just prior to starting treatment in the clinic he was prescribed a course of doxycycline for treatment of pneumonia.    Pertinent Medical History: Morbidly obese, cardiomegaly, history of DVT, history of PE, JONES, leg ulcers           TOBACCO USE: He has never smoked to smokeless tobacco    Patient's problem list, allergies, and current medications reviewed and updated in Epic    Interval History:  4/18/2025 Initial clinic visit with KERRI Lo, FNKETTY-BC, CWLAZARUSN, CFCN.   Patient states overall he is feeling well,denies fevers, chills, nausea, vomiting, cough or shortness of breath.  He presents today with a cluster of full-thickness wounds to his posterior calf, thick slough to wound beds.  He has difficulty reaching these wounds himself, but his girlfriend is able to assist him.  He would not agree to application of compression wrap, but did inquire about other types of garment such as Velcro type.  He was provided with handout describing various garments.   Quite a bit of time spent today discussing etiology of chronic venous insufficiency, and the effect of morbid obesity on  lower extremity hypertension.  Patient states he is motivated to lose weight because he and his girlfriend would like to travel.  He has an appointment with his PCP on May 1 and is planning to ask about counseling.  He believes he has ADHD and that this is contributing to his compulsive eating behaviors.      REVIEW OF SYSTEMS:   Review of Systems   Constitutional:  Negative for chills and fever.   Respiratory:  Positive for cough and shortness of breath.         Currently being treated for pneumonia   Cardiovascular:  Negative for chest pain.   Gastrointestinal:  Negative for constipation, diarrhea, nausea and vomiting.   Musculoskeletal:  Positive for back pain.        Occasional back pain         PHYSICAL EXAMINATION:   There were no vitals taken for this visit.    Physical Exam  Constitutional:       Appearance: He is obese.      Comments: Severely obese   Cardiovascular:      Pulses: Normal pulses.      Comments: DP pulses palpable, +2.  Feet warm  DP and PT pulses brisk and multiphasic by Doppler  Pulmonary:      Effort: Pulmonary effort is normal.   Musculoskeletal:      Right lower leg: Edema present.      Left lower leg: Edema present.      Comments: 2+ edema bilateral lower extremities   Skin:     Comments: Full-thickness wounds to left posterolateral lower extremity, cluster: Initial assessment.  Thick slough to wound beds.  Moderate serosanguineous drainage, no odor.  No periwound erythema duration    Chronic bilateral lower extremity edema, varicose veins, hemosiderin staining-findings consistent with CVI   Neurological:      Mental Status: He is alert and oriented to person, place, and time.         WOUND ASSESSMENT  Moisture Associated Skin Damage 11/02/21 (Active)   Number of days: 1263       Wound 11/02/21 Other (comment) Leg Circumferential;Lower Left (Active)   Number of days: 1263       Wound 11/02/21 Other (comment) Leg Circumferential;Lower Right (Active)   Number of days: 1263       Wound  11/02/21 Sacrum (Active)   Number of days: 1263       Wound 11/06/21 Heel re-occuring wound on right foot (Active)   Number of days: 1259       Wound 11/06/21 skin tear (Active)   Number of days: 1259       Wound 04/18/25 Venous Ulcer Posterior;Lateral Left LLE Posterolateral cluster (Active)   Wound Image   04/18/25 0900   Site Assessment Yellow;Pink;Callus;Crusted;Edema;Fragile 04/18/25 0900   Periwound Assessment Hemosiderin Staining;Edema 04/18/25 0900   Margins Attached edges 04/18/25 0900   Drainage Amount Small 04/18/25 0900   Drainage Description Serosanguineous 04/18/25 0900   Treatments Cleansed;Topical Lidocaine;Provider debridement 04/18/25 0900   Wound Cleansing Hypochlorus Acid 04/18/25 0900   Periwound Protectant Barrier Paste 04/18/25 0900   Dressing Status Intact 04/18/25 0900   Dressing Changed New 04/18/25 0900   Dressing Cleansing/Solutions Not Applicable 04/18/25 0900   Dressing Options Honey Gel;Silicone Adhesive Foam;Tubigrip 04/18/25 0900   Wound Team Following Weekly 04/18/25 0900   Non-staged Wound Description Full thickness 04/18/25 0900   Wound Length (cm) 7.3 cm 04/18/25 0900   Wound Width (cm) 6.2 cm 04/18/25 0900   Wound Depth (cm) 0.1 cm 04/18/25 0900   Wound Surface Area (cm^2) 45.26 cm^2 04/18/25 0900   Wound Volume (cm^3) 4.526 cm^3 04/18/25 0900   Post-Procedure Length (cm) 7.5 cm 04/18/25 0900   Post-Procedure Width (cm) 6.2 cm 04/18/25 0900   Post-Procedure Depth (cm) 0.2 cm 04/18/25 0900   Post-Procedure Surface Area (cm^2) 46.5 cm^2 04/18/25 0900   Post-Procedure Volume (cm^3) 9.3 cm^3 04/18/25 0900   Tunneling (cm) 0 cm 04/18/25 0900   Undermining (cm) 0 cm 04/18/25 0900   Wound Odor None 04/18/25 0900   Pulses Doppler;DP;PT 04/18/25 0900   Exposed Structures None 04/18/25 0900   Number of days: 0           PROCEDURE: Excisional debridement of left posterolateral lower extremity wound cluster  -2% viscous lidocaine applied topically to wound beds for approximately 5 minutes  prior to debridement  -Curette used to excise nonviable tissue from wound beds.  Excisional debridement was performed to remove devitalized tissue until healthy, bleeding tissue was visualized.  Total area debrided was approximately 10 cm² d.  Tissue debrided into the subcutaneous layer.    -Bleeding controlled with manual pressure.   -Wound care completed by wound RN, refer to wound flowsheet   -Patient tolerated the procedure well, without c/o of significant pain or discomfort.       Pertinent Labs and Diagnostics:    Labs:   A1c:   Lab Results   Component Value Date/Time    HBA1C 5.9 (H) 11/03/2021 02:11 AM            IMAGING: N/A    VASCULAR STUDIES:   No results found.    LAST  WOUND CULTURE:  DATE :    Lab Results   Component Value Date/Time    CULTRSULT No growth after 5 days of incubation. 03/12/2023 02:52 PM              ASSESSMENT AND PLAN:     1. Venous stasis ulcer of left lower leg (HCC)    4/18/2025: Patient developed ulcers to his posterior leg after episode of increased edema.  Has not been able to tolerate significant compression.  -Excisional debridement of wound in clinic today, medically necessary to promote wound healing.  -Patient to return to clinic weekly for assessment and debridement  -Patient to change dressing 1-2 times per week in between clinic visits.  He states his girlfriend can assist him  - Tubigrip to manage edema as patient states he cannot tolerate higher levels of compression  - He did inquire about other types of garment such as Velcro type.  He was provided with handout today.  - Consider placing order for [employer] ready wrap next visit if patient willing.  - Patient advised to elevate his legs periodically throughout the day and at night while in bed    Wound care: Honey gel, foam cover dressing, Tubigrip    2. Pain associated with wound    4/18/2025:  -2% viscous lidocaine applied topically to wound bed for approximately 5 minutes prior to debridement  - Despite topical  anesthesia, patient had significant discomfort with debridement today  - Consider injectable lidocaine for future debridements    3. Wound infection    4/18/2025: Patient was prescribed a course of Keflex by his PCP due to suspected wound infection.  He is currently on doxycycline for treatment of pneumonia  - His wounds do not appear to be infected today  - Monitor for signs and symptoms infection each clinic visit  -Pt advised to go to ER for any increased redness, swelling, drainage or odor, or if he develops fever, chills, nausea or vomiting.     4. Morbid obesity (HCC)    4/18/2025: Patient is severely obese with a BMI > 70.  - Role of obesity and chronic venous insufficiency discussed in clinic today.  - Patient states he is motivated to lose weight and has an appointment with his PCP on May 1.  He would like to inquire about available programs, including counseling.  - He has made significant lifestyle changes, joined a gym in February and has been trying to work out more.  - Applauded for his efforts.          PATIENT EDUCATION  - Etiology of  venous stasis ulceration discussed with patient  - Importance of managing edema for healing of ulcer, and for prevention of new ulcer development  -Need for lifelong compression of lower legs   -Elevate legs above the level of the heart periodically throughout the day.  - Importance of adequate nutrition for wound healing  -Advised to go to ER for any increased redness, swelling, drainage or odor, or if patient develops fever, chills, nausea or vomiting.      My total time spent caring for the patient on the day of the encounter was 30 minutes.   This does not include time spent on separately billable procedures/tests.       Please note that this note may have been created using voice recognition software. I have worked with technical experts from [employer] to optimize the interface.  I have made every reasonable attempt to correct obvious errors, but there may be  errors of grammar and possibly     N

## 2025-04-18 NOTE — WOUND TEAM
[facility] supply order sent through portal with facesheet and APRN note attached. Pt also provided with rest of honey gel small tube from visit today.

## 2025-04-25 ENCOUNTER — NON-PROVIDER VISIT (OUTPATIENT)
Dept: WOUND CARE | Facility: MEDICAL CENTER | Age: 35
End: 2025-04-25
Attending: STUDENT IN AN ORGANIZED HEALTH CARE EDUCATION/TRAINING PROGRAM
Payer: COMMERCIAL

## 2025-04-25 PROCEDURE — 97602 WOUND(S) CARE NON-SELECTIVE: CPT

## 2025-04-25 NOTE — WOUND TEAM
Non selective debridement with gauze and hypochlorous acid to remove non viable tissue and biofilm, patient tolerated well. Advised patient to return call to [facility] regarding wound care supplies. Per [employer] online portal, patient's insurance does not cover DME and they would like to discuss out of pocket pricing with patient. Patient also given [employer] educational handout for supply buying resource. Patient verbalized understanding to all instructions.

## 2025-04-25 NOTE — PATIENT INSTRUCTIONS
-Change your dressing as instructed and immediately if it becomes soiled, soaked, or falls off.    -Should you experience any significant changes in your wound(s), such as infection (redness, swelling, localized heat, increased pain, fever > 101 F, chills) or have any questions regarding your home care instructions, please contact the wound center at [phone]. If after hours, contact your primary care physician or go to the hospital emergency room.

## 2025-05-01 ENCOUNTER — APPOINTMENT (OUTPATIENT)
Dept: MEDICAL GROUP | Facility: MEDICAL CENTER | Age: 35
End: 2025-05-01
Payer: COMMERCIAL

## 2025-05-01 VITALS
BODY MASS INDEX: 47.74 KG/M2 | TEMPERATURE: 98.5 F | SYSTOLIC BLOOD PRESSURE: 132 MMHG | HEIGHT: 68 IN | WEIGHT: 315 LBS | HEART RATE: 92 BPM | OXYGEN SATURATION: 94 % | DIASTOLIC BLOOD PRESSURE: 80 MMHG

## 2025-05-01 DIAGNOSIS — I87.2 VENOUS STASIS DERMATITIS: ICD-10-CM

## 2025-05-01 DIAGNOSIS — R41.840 ATTENTION DEFICIT: ICD-10-CM

## 2025-05-01 DIAGNOSIS — Z00.00 PREVENTATIVE HEALTH CARE: ICD-10-CM

## 2025-05-01 DIAGNOSIS — E66.01 MORBID OBESITY (HCC): Chronic | ICD-10-CM

## 2025-05-01 PROCEDURE — 3079F DIAST BP 80-89 MM HG: CPT | Performed by: PHYSICIAN ASSISTANT

## 2025-05-01 PROCEDURE — 3075F SYST BP GE 130 - 139MM HG: CPT | Performed by: PHYSICIAN ASSISTANT

## 2025-05-01 PROCEDURE — 99214 OFFICE O/P EST MOD 30 MIN: CPT | Performed by: PHYSICIAN ASSISTANT

## 2025-05-01 RX ORDER — FUROSEMIDE 20 MG/1
20 TABLET ORAL 2 TIMES DAILY
Status: SHIPPED
Start: 2025-05-01

## 2025-05-01 ASSESSMENT — PATIENT HEALTH QUESTIONNAIRE - PHQ9: CLINICAL INTERPRETATION OF PHQ2 SCORE: 0

## 2025-05-01 NOTE — PROGRESS NOTES
"Chief Complaint   Patient presents with    Referral Needed     Psychiatrist  Bariatric  surgery     Other     Would like to get Thyroid check. - having trouble losing weight        Other      Michael Ontiveros is a 34 y.o. male here today for    Patient with morbid obesity BMI over 69, is open to bariatric surgery at this point  He wonders if he has ADD as he has lack of attention and focus, and wonders if this contributes to him having a hard time losing weight.  Patient has bilateral lower leg swelling and venous stasis which results in lower extremity  Wounds, currently under care of wound care, has restarted furosemide 20 Mg daily.  Does not take potassium at          Exam:  /80 (BP Location: Other (Comment), Patient Position: Sitting, BP Cuff Size: Adult)   Pulse 92   Temp 36.9 °C (98.5 °F) (Temporal)   Ht 1.727 m (5' 8\")   Wt (!) 209 kg (460 lb)   SpO2 94%       Constitutional: Alert, oriented in no acute distress.  Psych: Eye contact is good, speech goal directed, affect calm        A/P:  1. BMI 60.0-69.9, adult (HCC)  Chronic, not improving, patient agreed to bariatric surgery  - Referral to Bariatric Surgery    2. Attention deficit  This has been a chronic problem for patient  - Referral to Behavioral Health    3. Preventative health care    - CBC WITH DIFFERENTIAL; Future  - Comp Metabolic Panel; Future  - Lipid Profile; Future  - VITAMIN D,25 HYDROXY (DEFICIENCY); Future  - TSH; Future  - FREE THYROXINE; Future  - HEMOGLOBIN A1C; Future    4. Morbid obesity (HCC)      5. Venous stasis dermatitis  Chronic, continue torsemide 10 Mg daily, if potassium is still low consider potassium supplement.        F/U: prn     "

## 2025-05-02 ENCOUNTER — NON-PROVIDER VISIT (OUTPATIENT)
Dept: WOUND CARE | Facility: MEDICAL CENTER | Age: 35
End: 2025-05-02
Attending: STUDENT IN AN ORGANIZED HEALTH CARE EDUCATION/TRAINING PROGRAM
Payer: COMMERCIAL

## 2025-05-02 PROCEDURE — 97597 DBRDMT OPN WND 1ST 20 CM/<: CPT

## 2025-05-02 NOTE — WOUND TEAM
CSWD using curette to remove ~5cm2 of slough and biofilm.  Pt tolerated well.  No s/s of complications noted. (See wound care flowsheet). Applied urea moisturizer to all dry areas especially on bilateral heels, plantar foot, and legs. Pt agreed to moisturize dry areas twice a day.

## 2025-05-07 NOTE — Clinical Note
REFERRAL APPROVAL NOTICE         Sent on May 7, 2025                   Michael Ontiveros  [address] [facility] [place] [address]                   Dear Mr. Ontiveros,    After a careful review of the medical information and benefit coverage, [facility] has processed your referral. See below for additional details.    If applicable, you must be actively enrolled with your insurance for coverage of the authorized service. If you have any questions regarding your coverage, please contact your insurance directly.    REFERRAL INFORMATION   Referral #:  [id number]  Referred-To Department    Referred-By Provider:  Behavioral Health    Shawnee Junior P.A.-C.   Behavioral Health Outpatient      [address]  [address]  [place] [id number] [phone] [address]  [place] [id number] [phone]    Referral Start Date:  05/01/2025  Referral End Date:   05/01/2026             SCHEDULING  If you do not already have an appointment, please call [phone] to make an appointment.     MORE INFORMATION  If you do not already have a [facility] account, sign up at: [email or web address]  You can access your medical information, make appointments, see lab results, billing information, and more.  If you have questions regarding this referral, please contact  the [facility] Referrals department at:             [phone]. Monday - Friday 8:00AM - 5:00PM.     Sincerely,    [facility]

## 2025-05-07 NOTE — Clinical Note
REFERRAL APPROVAL NOTICE         Sent on May 7, 2025                   Michael Ontiveros  [address] [facility] [place] [address]                   Dear Mr. Ontiveros,    After a careful review of the medical information and benefit coverage, [facility] has processed your referral. See below for additional details.    If applicable, you must be actively enrolled with your insurance for coverage of the authorized service. If you have any questions regarding your coverage, please contact your insurance directly.    REFERRAL INFORMATION   Referral #:  [id number]  Referred-To Department    Referred-By Provider:  Bariatrics    Shawnee Junior P.A.-C.   Department Of Surgery      [address]  [address]  [place] [address]  [phone] [address] [place] [address], [address]  [place] [id number] [phone]    Referral Start Date:  05/01/2025  Referral End Date:   05/01/2026             SCHEDULING  If you do not already have an appointment, please call [phone] to make an appointment.     MORE INFORMATION  If you do not already have a [facility] account, sign up at: [email or web address]  You can access your medical information, make appointments, see lab results, billing information, and more.  If you have questions regarding this referral, please contact  the [facility] Referrals department at:             [phone]. Monday - Friday 8:00AM - 5:00PM.     Sincerely,    [facility]

## 2025-05-09 ENCOUNTER — NON-PROVIDER VISIT (OUTPATIENT)
Dept: WOUND CARE | Facility: MEDICAL CENTER | Age: 35
End: 2025-05-09
Attending: STUDENT IN AN ORGANIZED HEALTH CARE EDUCATION/TRAINING PROGRAM
Payer: COMMERCIAL

## 2025-05-09 PROCEDURE — 97602 WOUND(S) CARE NON-SELECTIVE: CPT

## 2025-05-09 NOTE — PATIENT INSTRUCTIONS
"The following information is a summary of the education provided in the clinic today. This is not an exhaustive list of the education provided during your appointment.       DRESSING CHANGES    -Keep your wound dressing clean, dry, and intact. Change your dressing every 2-3 days AND/OR if it's, soiled, leaks, gets wet, or falls off.      -You may  shower with the dressing(s) off. Please do not take baths, or swim in the ocean, lakes, rivers, pools, or hot tubs.      -Wounds do not need to \"air out\" or \"breathe\". Gently dry your wound before placing a new dressing.     -After you get out of the shower, wash the wound a second time (with soap and water, wound cleanser, or saline). Gently dry the wound before you place a new dressing.       -If you need to change your dressings at home, you should wash your wound. Use normal saline, wound cleanser, hypochlorous acid, or unscented soap and water. Do not use hydrogen peroxide or rubbing alcohol to clean your wounds. Hydrogen peroxide and rubbing alcohol will damage new cells and tissue. Do not use betadine or iodine unless told to by your wound care team.    -If you do not have home health, the clinic will give you with leftover supplies from your appointment. We do not give out extra dressing supplies. We will order you supplies through a DME company. Your insurance may or may not pay for all these supplies. The company will reach out to you if insurance does not cover supplies. These supplies will be sent to your home within a few days. If you do have home health, they will provide wound care supplies.     -The dressings we use may change as your wound changes.       COMPRESSION   -Compression helps reduce swelling and helps wounds heal quicker. It is still important to elevate your feet several times daily to reduce swelling, even when you are wearing compression. It is important to wear compression every day, to help your wound heal, and to prevent new wounds from " developing.         GENERAL HEALTH ADVICE   -Nutrition is important for wound healing. Unless told otherwise by your doctor, eat more protein and consider supplementing with a multi-vitamin, zinc, and vitamin C.         CLINIC INFORMATION  -The clinic's hours are Monday-Friday, 7:30 AM to 5:00 PM. We are closed most holidays and on weekends. If you leave us a message, please allow 24 hours for someone to return your call. If you have concerns or are having a medical emergency, call 911 or go to the hospital emergency room.     -You might not see the same nurse or provider every visit.     -If you notice any large changes in your wound(s), or signs of infection (redness, swelling, localized heat, increased pain, fever > 101 F, chills, nausea/vomiting) or have any questions about your home care instructions, please call the wound center at [phone]. If it's after hours, contact your primary care physician or go to the hospital emergency room. If you are admitted to any hospital, you will need a new referral to come back to the wound clinic. Any wound care appointments that you already have may be cancelled.    -If you are 5 or more minutes late for an appointment, we reserve the right to cancel and reschedule that appointment. For example, if your appointment is at 1:00 PM, and you arrive at 1:06 PM, you are more than five minutes late and might not be seen. If you are consistently late or not coming to your appointments (typically 3 late cancellations and/or no shows), we reserve the right to cancel your future appointments or discharge you from the clinic. It is then your responsibility to obtain a new referral if wound care is still needed.

## 2025-05-09 NOTE — WOUND TEAM
Non selective debridement with gauze and hypochlorous acid to remove non viable tissue and biofilm, patient tolerated well. Patient states that he cannot tolerate double layer of Tensogrip compression sock and removed second one last week. Patient educated on need for compression and elevation of legs to reduce edema, see AVS for details. Patient verbalized understanding to all instructions.

## 2025-05-16 ENCOUNTER — NON-PROVIDER VISIT (OUTPATIENT)
Dept: WOUND CARE | Facility: MEDICAL CENTER | Age: 35
End: 2025-05-16
Attending: STUDENT IN AN ORGANIZED HEALTH CARE EDUCATION/TRAINING PROGRAM
Payer: COMMERCIAL

## 2025-05-16 ENCOUNTER — HOSPITAL ENCOUNTER (OUTPATIENT)
Facility: MEDICAL CENTER | Age: 35
End: 2025-05-16
Attending: NURSE PRACTITIONER
Payer: COMMERCIAL

## 2025-05-16 DIAGNOSIS — I83.028 VENOUS STASIS ULCER OF LEFT LOWER LEG (HCC): ICD-10-CM

## 2025-05-16 DIAGNOSIS — I83.028 VENOUS STASIS ULCER OF LEFT LOWER LEG (HCC): Primary | ICD-10-CM

## 2025-05-16 LAB
GRAM STN SPEC: NORMAL
SIGNIFICANT IND 70042: NORMAL
SITE SITE: NORMAL
SOURCE SOURCE: NORMAL

## 2025-05-16 PROCEDURE — 87205 SMEAR GRAM STAIN: CPT

## 2025-05-16 PROCEDURE — 97602 WOUND(S) CARE NON-SELECTIVE: CPT

## 2025-05-16 PROCEDURE — 87070 CULTURE OTHR SPECIMN AEROBIC: CPT

## 2025-05-16 PROCEDURE — 99213 OFFICE O/P EST LOW 20 MIN: CPT

## 2025-05-16 PROCEDURE — 87186 SC STD MICRODIL/AGAR DIL: CPT

## 2025-05-16 PROCEDURE — 87077 CULTURE AEROBIC IDENTIFY: CPT | Mod: 91

## 2025-05-16 NOTE — PROGRESS NOTES
2% lidocaine allowed to dwell on wound bed for five minutes. Nonselective debridement performed using puracyn-soaked gauze to remove slough and nonviable tissue. Refer to flowsheets and AVS for further details.     Patient presents today with significant increase in drainage and maceration,  increased wound size, and increased pain. He denies fevers or chills. Wound culture taken from left lower extremity posterolateral wound bed and sent to lab via pneumatic tube. Patient counseled on the signs and symptoms of infection and when it is appropriate to go to ER or urgent care. He verbalized understanding of education. He denies need for supplies order at this time as he is purchasing them independently.

## 2025-05-16 NOTE — PATIENT INSTRUCTIONS
"The following information is a summary of the education provided in the clinic today. This is not an exhaustive list of the education provided during your appointment.       DRESSING CHANGES    Keep your wound dressing clean, dry, and intact. Change your dressing every 2-3 days AND/OR if the dressing becomes, soiled, leaks, gets wet, or falls off.      You may  shower with the dressing(s) off. Please do not take baths, or swim in the ocean, lakes, rivers, pools, or hot tubs.      Wounds do not need to \"air out\" or \"breathe\". Gently dry your wound before placing a new dressing.     After you get out of the shower, wash the wound a second time (with soap and water, wound cleanser, or saline). Gently dry the wound before you place a new dressing.       If you need to change your dressings at home, you should wash your wound. Use normal saline, wound cleanser, hypochlorous acid, or unscented soap and water. Do not use hydrogen peroxide or rubbing alcohol to clean your wounds. Hydrogen peroxide and rubbing alcohol will damage new cells and tissue. Do not use betadine or iodine unless told to by your wound care team.    Do not soak your wounds in epsom salt baths. This can worsen your wound(s) or delay wound healing. It can also lead to infection or maceration (tissue is too wet).     If you do not have home health, the clinic will give you with leftover supplies from your appointment. We do not give out extra dressing supplies. We will order you supplies through a [employer] company. Your insurance may or may not pay for all these supplies. The company will reach out to you if insurance does not cover supplies. These supplies will be sent to your home within a few days. If you do have home health, they will provide wound care supplies.     The dressings we use may change as your wound changes.       CLINIC INFORMATION  The clinic's hours are Monday-Friday, 7:30 AM to 5:00 PM. We are closed most holidays and on weekends. If you " leave us a message, please allow 24 hours for someone to return your call. If you have concerns or are having a medical emergency, call 911 or go to the hospital emergency room.     You might not see the same nurse or provider every visit.     If you notice any large changes in your wound(s), or signs of infection (redness, swelling, localized heat, increased pain, fever > 101 F, chills, nausea/vomiting) or have any questions about your home care instructions, please call the wound center at [phone]. If it's after hours, contact your primary care physician or go to the hospital emergency room. If you are admitted to any hospital, you will need a new referral to come back to the wound clinic. Any wound care appointments that you already have may be cancelled.    If you are 5 or more minutes late for an appointment, we reserve the right to cancel and reschedule that appointment. For example, if your appointment is at 1:00 PM, and you arrive at 1:06 PM, you are more than five minutes late and might not be seen. If you are consistently late or not coming to your appointments (typically 3 late cancellations and/or no shows), we reserve the right to cancel your future appointments or discharge you from the clinic. It is then your responsibility to obtain a new referral if wound care is still needed.

## 2025-05-19 ENCOUNTER — DOCUMENTATION (OUTPATIENT)
Dept: WOUND CARE | Facility: MEDICAL CENTER | Age: 35
End: 2025-05-19
Payer: COMMERCIAL

## 2025-05-19 DIAGNOSIS — I83.028 VENOUS STASIS ULCER OF LEFT LOWER LEG (HCC): ICD-10-CM

## 2025-05-19 DIAGNOSIS — T14.8XXA WOUND INFECTION: Primary | ICD-10-CM

## 2025-05-19 DIAGNOSIS — L08.9 WOUND INFECTION: Primary | ICD-10-CM

## 2025-05-19 LAB
BACTERIA WND AEROBE CULT: ABNORMAL
GRAM STN SPEC: ABNORMAL
SIGNIFICANT IND 70042: ABNORMAL
SITE SITE: ABNORMAL
SOURCE SOURCE: ABNORMAL

## 2025-05-20 NOTE — PROGRESS NOTES
Wound culture results positive for MSSA, strep mitis.  Contacted patient.  Continues to have increased drainage.  Change dressing twice since his last wound care visit.  Reviewed allergies, medications, labs, photos.  Rx for Augmentin 875/125 mg 3 times daily for 10 days.  Patient has follow-up on 5/23/2025.

## 2025-05-23 ENCOUNTER — APPOINTMENT (OUTPATIENT)
Dept: WOUND CARE | Facility: MEDICAL CENTER | Age: 35
End: 2025-05-23
Attending: STUDENT IN AN ORGANIZED HEALTH CARE EDUCATION/TRAINING PROGRAM
Payer: COMMERCIAL

## 2025-05-23 PROCEDURE — 99213 OFFICE O/P EST LOW 20 MIN: CPT

## 2025-05-23 PROCEDURE — 97597 DBRDMT OPN WND 1ST 20 CM/<: CPT

## 2025-05-23 NOTE — PROGRESS NOTES
Patient has been taking his antibiotics as prescribed. Patient stated that prior to antibiotics he had to change dressings every day. Now he said he can go 2 days without needing to change it. Periwound excoriated so educated patient on using zinc to help protect skin from drainage. Legs continues to appear red but pain has decreased. Educated patient on the importance of compression and elevation and how it aids with wound healing. Was able to place double layer tubigrip E today in clinic. Advised patient that he try to keep both layers on for as much as possible as it will aid in wound healing. Patient stated that he will try.    2% viscous lidocaine applied to wound bed with dwell time of ~5 minutes. CSWD using curette to remove nonviable tissue from wound bed. Patient tolerated well.

## 2025-05-23 NOTE — PATIENT INSTRUCTIONS
"The following information is a summary of the education provided in the clinic today. This is not an exhaustive list of the education provided during your appointment.       DRESSING CHANGES    Keep your wound dressing clean, dry, and intact. Change your dressing every 2 days AND/OR if the dressing becomes, soiled, leaks, gets wet, or falls off.      You may  not shower with the dressing(s) on. Please do not take baths, or swim in the ocean, lakes, rivers, pools, or hot tubs.      Wounds do not need to \"air out\" or \"breathe\". Gently dry your wound before placing a new dressing.     After you get out of the shower, wash the wound a second time (with soap and water, wound cleanser, or saline). Gently dry the wound before you place a new dressing.       If you need to change your dressings at home, you should wash your wound. Use normal saline, wound cleanser, hypochlorous acid, or unscented soap and water. Do not use hydrogen peroxide or rubbing alcohol to clean your wounds. Hydrogen peroxide and rubbing alcohol will damage new cells and tissue. Do not use betadine or iodine unless told to by your wound care team.    Do not soak your wounds in epsom salt baths. This can worsen your wound(s) or delay wound healing. It can also lead to infection or maceration (tissue is too wet).     If you do not have home health, the clinic will give you with leftover supplies from your appointment. We do not give out extra dressing supplies. We will order you supplies through a [employer] company. Your insurance may or may not pay for all these supplies. The company will reach out to you if insurance does not cover supplies. These supplies will be sent to your home within a few days. If you do have home health, they will provide wound care supplies.     The dressings we use may change as your wound changes.       COMPRESSION   -Compression helps reduce swelling and helps wounds heal quicker. It is still important to elevate your feet " several times daily to reduce swelling, even when you are wearing compression. It is important to wear compression every day, to help your wound heal, and to prevent new wounds from developing.         GENERAL HEALTH ADVICE   -Nutrition is important for wound healing. Unless told otherwise by your doctor, eat more protein and consider supplementing with a multi-vitamin, zinc, and vitamin C.           CLINIC INFORMATION  The clinic's hours are Monday-Friday, 7:30 AM to 5:00 PM. We are closed most holidays and on weekends. If you leave us a message, please allow 24 hours for someone to return your call. If you have concerns or are having a medical emergency, call 911 or go to the hospital emergency room.     You might not see the same nurse or provider every visit.     If you notice any large changes in your wound(s), or signs of infection (redness, swelling, localized heat, increased pain, fever > 101 F, chills, nausea/vomiting) or have any questions about your home care instructions, please call the wound center at [phone]. If it's after hours, contact your primary care physician or go to the hospital emergency room. If you are admitted to any hospital, you will need a new referral to come back to the wound clinic. Any wound care appointments that you already have may be cancelled.    If you are 5 or more minutes late for an appointment, we reserve the right to cancel and reschedule that appointment. For example, if your appointment is at 1:00 PM, and you arrive at 1:06 PM, you are more than five minutes late and might not be seen. If you are consistently late or not coming to your appointments (typically 3 late cancellations and/or no shows), we reserve the right to cancel your future appointments or discharge you from the clinic. It is then your responsibility to obtain a new referral if wound care is still needed.

## 2025-05-30 ENCOUNTER — NON-PROVIDER VISIT (OUTPATIENT)
Dept: WOUND CARE | Facility: MEDICAL CENTER | Age: 35
End: 2025-05-30
Attending: STUDENT IN AN ORGANIZED HEALTH CARE EDUCATION/TRAINING PROGRAM
Payer: COMMERCIAL

## 2025-05-30 PROCEDURE — 97602 WOUND(S) CARE NON-SELECTIVE: CPT

## 2025-05-30 PROCEDURE — 99213 OFFICE O/P EST LOW 20 MIN: CPT

## 2025-05-30 NOTE — PROGRESS NOTES
Wound cleansed, assessed, and photo taken for chart. Hypochlorous Acid soak to wound bed allowed to dwell for approximately 3 minutes. Non-selective debridement to wound and tianna-wound using hypochlorous acid and gauze to remove biofilm and non-viable tissue. Patient tolerated well without complaint during care.     Patient states he was able to tolerate 2 layer compression  throughout week.     Dressing change completed. See flowsheet for full details. After visit summary includes education regarding dressing changes, nutrition needs, compression education, and care.        05/30/25 0800   Wound 04/18/25 Venous Ulcer Posterior;Lateral Left LLE Posterolateral cluster   Date First Assessed/Time First Assessed: 04/18/25 0915   Wound Approximate Age at First Assessment (Weeks): 8 weeks  Hand Hygiene Completed: Yes  Primary Wound Type: Venous Ulcer  Wound Orientation: Posterior;Lateral  Laterality: Left  Wound Descripti...   Wound Image     Site Assessment Red;Yellow   Periwound Assessment Hemosiderin Staining;Dry;Flaky;Fragile;Edema;Denuded   Margins Attached edges   Drainage Amount Moderate   Drainage Description Serosanguineous   Treatments Cleansed;Nonselective debridement;Site care   Wound Cleansing Foam Cleanser/Washcloth;Hypochlorus Acid   Periwound Protectant TRIAD paste;Urea lotion  (Triad paste to immediate periwound and denuded areas on dorsal foot and shin; Urea lotion to intact skin and callus on foot)   Dressing Cleansing/Solutions Not Applicable   Dressing Options Hydrofiber Silver;Super Absorbent Pad;Dry Roll Gauze;Hypafix Tape;Elastic tubular bandage  (Tubi E x2)   Dressing Change/Treatment Frequency Twice Weekly   Wound Team Following Weekly   Non-staged Wound Description Full thickness   Wound Length (cm) 5.3 cm   Wound Width (cm) 4.5 cm   Wound Depth (cm) 0.1 cm   Wound Surface Area (cm^2) 18.73 cm^2   Wound Volume (cm^3) 1.249 cm^3   Post-Procedure Length (cm) 5.3 cm   Post-Procedure Width (cm)  4.5 cm   Post-Procedure Depth (cm) 0.1 cm   Post-Procedure Surface Area (cm^2) 18.73 cm^2   Post-Procedure Volume (cm^3) 1.249 cm^3   Wound Healing % 72   Tunneling (cm) 0 cm   Undermining (cm) 0 cm   Wound Odor None   Exposed Structures None

## 2025-05-30 NOTE — PATIENT INSTRUCTIONS
"The following information is a summary of the education provided in the clinic today. This is not an exhaustive list of the education provided during your appointment.       DRESSING CHANGES    Keep your wound dressing clean, dry, and intact. Change your dressing every 3-4 days AND/OR if the dressing becomes, soiled, leaks, gets wet, or falls off.      You may not shower with the dressing(s) off. Please do not take baths, or swim in the ocean, lakes, rivers, pools, or hot tubs.      Wounds do not need to \"air out\" or \"breathe\". Gently dry your wound before placing a new dressing.     If you need to change your dressings at home, you should wash your wound. Use normal saline, wound cleanser, hypochlorous acid, or unscented soap and water. Do not use hydrogen peroxide or rubbing alcohol to clean your wounds. Hydrogen peroxide and rubbing alcohol will damage new cells and tissue. Do not use betadine or iodine unless told to by your wound care team.    Do not soak your wounds in epsom salt baths. This can worsen your wound(s) or delay wound healing. It can also lead to infection or maceration (tissue is too wet).     If you do not have home health, the clinic will give you with leftover supplies from your appointment. We do not give out extra dressing supplies. We will order you supplies through a [employer] company. Your insurance may or may not pay for all these supplies. The company will reach out to you if insurance does not cover supplies. These supplies will be sent to your home within a few days. If you do have home health, they will provide wound care supplies.     The dressings we use may change as your wound changes.     COMPRESSION   -Today, a compression sock was applied. If you have pain, extreme swelling, new numbness or tingling in your feet, or a change in the color or temperature of your feet, please remove the garments. You should wear your compression garments every day. You can take them off at night, but " they should be put back on every morning before you begin walking around.       GENERAL HEALTH ADVICE   -Nutrition is important for wound healing. Unless told otherwise by your doctor, eat more protein and consider supplementing with a multi-vitamin, zinc, and vitamin C.       CLINIC INFORMATION  The clinic's hours are Monday-Friday, 7:30 AM to 5:00 PM. We are closed most holidays and on weekends. If you leave us a message, please allow 24 hours for someone to return your call. If you have concerns or are having a medical emergency, call 911 or go to the hospital emergency room.     You might not see the same nurse or provider every visit.     If you notice any large changes in your wound(s), or signs of infection (redness, swelling, localized heat, increased pain, fever > 101 F, chills, nausea/vomiting) or have any questions about your home care instructions, please call the wound center at [phone]. If it's after hours, contact your primary care physician or go to the hospital emergency room. If you are admitted to any hospital, you will need a new referral to come back to the wound clinic. Any wound care appointments that you already have may be cancelled.    If you are 5 or more minutes late for an appointment, we reserve the right to cancel and reschedule that appointment. For example, if your appointment is at 1:00 PM, and you arrive at 1:06 PM, you are more than five minutes late and might not be seen. If you are consistently late or not coming to your appointments (typically 3 late cancellations and/or no shows), we reserve the right to cancel your future appointments or discharge you from the clinic. It is then your responsibility to obtain a new referral if wound care is still needed.

## 2025-06-02 ENCOUNTER — OFFICE VISIT (OUTPATIENT)
Dept: CARDIOLOGY | Facility: MEDICAL CENTER | Age: 35
End: 2025-06-02
Attending: INTERNAL MEDICINE
Payer: COMMERCIAL

## 2025-06-02 VITALS
BODY MASS INDEX: 47.74 KG/M2 | HEIGHT: 68 IN | DIASTOLIC BLOOD PRESSURE: 70 MMHG | RESPIRATION RATE: 20 BRPM | SYSTOLIC BLOOD PRESSURE: 138 MMHG | HEART RATE: 99 BPM | OXYGEN SATURATION: 93 % | WEIGHT: 315 LBS

## 2025-06-02 DIAGNOSIS — E66.01 MORBID OBESITY (HCC): Chronic | ICD-10-CM

## 2025-06-02 DIAGNOSIS — I87.2 VENOUS STASIS DERMATITIS: Primary | ICD-10-CM

## 2025-06-02 DIAGNOSIS — I27.20 PULMONARY HYPERTENSION (HCC): Chronic | ICD-10-CM

## 2025-06-02 PROCEDURE — 99214 OFFICE O/P EST MOD 30 MIN: CPT | Performed by: INTERNAL MEDICINE

## 2025-06-02 PROCEDURE — 99212 OFFICE O/P EST SF 10 MIN: CPT | Performed by: INTERNAL MEDICINE

## 2025-06-02 PROCEDURE — 3075F SYST BP GE 130 - 139MM HG: CPT | Performed by: INTERNAL MEDICINE

## 2025-06-02 PROCEDURE — 3078F DIAST BP <80 MM HG: CPT | Performed by: INTERNAL MEDICINE

## 2025-06-02 RX ORDER — FUROSEMIDE 20 MG/1
20 TABLET ORAL 2 TIMES DAILY
Qty: 60 TABLET | Refills: 4 | Status: SHIPPED | OUTPATIENT
Start: 2025-06-02

## 2025-06-02 NOTE — PATIENT INSTRUCTIONS
Bariatric Surgery Information  Bariatric surgery, also called weight-loss surgery, is a procedure that helps you lose weight. You may have bariatric surgery if:  You have been unable to lose weight through diet and exercise.  You have health problems caused by obesity, such as:  Type 2 diabetes.  Heart disease.  Lung disease.  How does bariatric surgery help me lose weight?  Bariatric surgery helps you lose weight by:  Decreasing how much food your body absorbs. This is done by closing off part of your stomach to make it smaller. This limits the amount of food your stomach can hold.  Changing your body's regular digestive process so that food bypasses the parts of your body that absorb calories and nutrients.  If you decide to have bariatric surgery, it is important to continue to eat a healthy diet and to exercise regularly after the surgery.  What are the risks of bariatric surgery?  As with any surgical procedure, each type of bariatric surgery has its own risks. These risks also depend on your age, your overall health, and any other medical conditions you may have. Risks of bariatric surgery can be divided into two groups. There are short-term risks and long-term risks.  Short-term risks include:  Infection.  Bleeding.  Allergic reactions to medicines or dyes.  A blood clot that forms in the leg and travels to the heart or lungs.  Leaking of digestive juices into the abdomen.  Long-term risks and complications include:  Not getting enough nutrients for your body (malnutrition).  Narrowing of the digestive tract (stricture or stenosis).  Diarrhea, nausea, or vomiting after eating (dumping syndrome).  Failure of the device or procedure. This may require another surgery to correct the problem.  When deciding on bariatric surgery, it is very important that you:  Talk to your health care provider and choose the surgery that is best for you.  Ask your health care provider about specific risks for the surgery you  choose.  What are the different kinds of bariatric surgery?  There are two kinds of bariatric surgeries:  Restrictive surgery. This procedure makes your stomach smaller. It does not change your digestive process. The smaller the size of your new stomach, the less food you can eat. There are different types of restrictive surgeries.  Malabsorptive surgery. This procedure makes your stomach smaller and alters your digestive process so that your body processes fewer calories and nutrients. These are the most common kind of bariatric surgery. There are different types of malabsorptive surgeries.  What are the different types of restrictive surgery?  Adjustable gastric banding  In this procedure, an inflatable band is placed around your stomach near the upper end. This makes the passageway for food into the rest of your stomach much smaller. The band can be adjusted, making it tighter or looser, by filling it with salt solution. Your surgeon can adjust the band based on how you are feeling and how much weight you are losing. The band can be removed in the future. This requires another surgery.  Sleeve gastrectomy  In this procedure, your stomach is made smaller. This is done by surgically removing a large part of your stomach. When your stomach is smaller, you feel full more quickly and reduce how much you eat.  What are the different types of malabsorptive surgery?  Cayetano-en-Y gastric bypass (RGB)  This is the most common weight-loss surgery. In this procedure, a small stomach pouch (gastric pouch) is created in the upper part of your stomach. Next, this gastric pouch is attached directly to the middle part of your small intestine. The farther down your small intestine the new connection is made, the fewer calories and nutrients you will absorb. This surgery has the highest rate of complications.  Biliopancreatic diversion with duodenal switch (BPD/DS)  This is a multi-step procedure. First, a large part of your stomach  is removed, making your stomach smaller. Next, this smaller stomach is attached to the lower part of your small intestine. Like the RGB surgery, you absorb fewer calories and nutrients if your stomach is attached farther down the small intestine.  Where to find more information  American Society for Metabolic and Bariatric Surgery: www.asmbs.org  National [place] of Diabetes and Digestive and Kidney Diseases: www.niddk.nih.gov  Summary  Bariatric surgery, also called weight-loss surgery, is a procedure that helps you lose weight.  This surgery may be recommended if you have been unable to lose weight through diet and exercise, or you have health problems caused by obesity, such as type 2 diabetes, heart disease, or lung disease.  Generally, risks of bariatric surgery include infection, bleeding, and failure of the surgery or device. Failure of the surgery or device may require another surgery to correct the problem.  This information is not intended to replace advice given to you by your health care provider. Make sure you discuss any questions you have with your health care provider.  Document Revised: 03/16/2022 Document Reviewed: 03/16/2022  [employer] Patient Education © 2023 [employer]

## 2025-06-02 NOTE — PROGRESS NOTES
"     Cardiology Note  PE hx     History of Present Illness: Michael Ontiveros is a 34 y.o. male PMH morbid obesity, JONES on cpap, hx DVT, PE w cor pulmonale 2019 s/p ekos, pulmonary hypertension venous insufficiency who presents for initial visit. Referred by Shawnee Junior PA-C for cardiomegaly and pulmonary hypertension.      Continues to feel baseline. Can walk about a football field without stopping due to shortness of breath. No chest pain/angina. No other cardiac complaints.        Review of Systems   Constitutional: Negative for decreased appetite, fever, malaise/fatigue, weight gain and weight loss.   HENT:  Negative for congestion and nosebleeds.    Eyes:  Negative for blurred vision.   Cardiovascular:  Negative for chest pain, claudication, dyspnea on exertion, irregular heartbeat, leg swelling, near-syncope, orthopnea, palpitations, paroxysmal nocturnal dyspnea and syncope.   Respiratory:  Negative for cough and shortness of breath.    Endocrine: Negative for cold intolerance and heat intolerance.   Skin:  Negative for rash.   Musculoskeletal:  Negative for back pain.   Gastrointestinal:  Negative for abdominal pain, constipation, diarrhea, heartburn, melena, nausea and vomiting.   Genitourinary:  Negative for dysuria, flank pain and hematuria.   Neurological:  Negative for dizziness.   Psychiatric/Behavioral:  Negative for altered mental status and depression.             Past Medical History        Past Medical History:   Diagnosis Date    Asthma      Blood clotting disorder (HCC)       PE in the past, on blood thinners x1.5 years after, no longer taking    Chickenpox      Eczema      Hypertension       \"not a thing anymore\" per pt    Influenza      Tonsillitis                 Past Surgical History         Past Surgical History:   Procedure Laterality Date    DENTAL EXTRACTION(S)   11/3/2017     Procedure: DENTAL EXTRACTION(S);  Surgeon: Olman Sparks D.D.S.;  Location: SURGERY [facility]" "[facility] ORS;  Service: Oral Surgery               Current Medications          Current Outpatient Medications   Medication Sig Dispense Refill    furosemide (LASIX) 40 MG Tab Take 1 Tablet by mouth every day. 90 Tablet 3    acetaminophen (TYLENOL) 325 MG Tab 0 mg by oral route.        albuterol 108 (90 Base) MCG/ACT Aero Soln inhalation aerosol 2 puffs by inhalation route.        aspirin (ASA) 81 MG Chew Tab chewable tablet Chew 81 mg every day.          No current facility-administered medications for this visit.                     Allergies   Allergen Reactions    [place] (Diagnostic) Anaphylaxis    Pistachio Anaphylaxis    [place] Anaphylaxis    Tree Nuts Food Allergy Vomiting and Nausea       Other nuts aside from almond, walnut, pistachio            Family History         Family History   Problem Relation Age of Onset    Respiratory Disease Mother      Diabetes Paternal Aunt                 Social History               Socioeconomic History    Marital status: Single       Spouse name: Not on file    Number of children: Not on file    Years of education: Not on file    Highest education level: Associate degree: occupational, technical, or vocational program   Occupational History    Not on file   Tobacco Use    Smoking status: Never    Smokeless tobacco: Never   Vaping Use    Vaping Use: Never used   Substance and Sexual Activity    Alcohol use: Yes       Comment: \"maybe twice a year if that\"    Drug use: Yes       Types: Marijuana       Comment: occ gummy    Sexual activity: Yes       Partners: Female       Comment: no kids   Other Topics Concern    Not on file   Social History Narrative    Not on file      Social Determinants of Health           Financial Resource Strain: Low Risk  (11/1/2021)     Overall Financial Resource Strain (CARDIA)      Difficulty of Paying Living Expenses: Not very hard   Food Insecurity: No Food Insecurity (11/1/2021)     Hunger Vital Sign      Worried About Running Out of Food in the " Last Year: Never true      Ran Out of Food in the Last Year: Never true   Transportation Needs: No Transportation Needs (11/1/2021)     PRAPARE - Transportation      Lack of Transportation (Medical): No      Lack of Transportation (Non-Medical): No   Physical Activity: Insufficiently Active (11/1/2021)     Exercise Vital Sign      Days of Exercise per Week: 3 days      Minutes of Exercise per Session: 30 min   Stress: No Stress Concern Present (11/1/2021)     [place] [place] [employer] - Occupational Stress Questionnaire      Feeling of Stress : Only a little   Social Connections: Unknown (11/1/2021)     Social Connection and Isolation Panel [NHANES]      Frequency of Communication with Friends and Family: More than three times a week      Frequency of Social Gatherings with Friends and Family: Never      Attends L[employer] day Services: Patient refused      Active Member of Clubs or Organizations: Yes      Attends Club or Organization Meetings: More than 4 times per year      Marital Status: Never    Intimate Partner Violence: Not on file   Housing Stability: Low Risk  (11/1/2021)     Housing Stability Vital Sign      Unable to Pay for Housing in the Last Year: No      Number of Places Lived in the Last Year: 1      Unstable Housing in the Last Year: No               Physical Exam:  Ambulatory Vitals  There were no vitals taken for this visit.       BP Readings from Last 4 Encounters:   11/28/23 132/61   09/07/23 100/62   05/15/23 104/42   03/13/23 113/63      Weight/BMI: There were no vitals filed for this visit. There is no height or weight on file to calculate BMI.      Wt Readings from Last 4 Encounters:   12/26/23 (!) 161 kg (356 lb)   12/20/23 (!) 163 kg (360 lb)   11/28/23 (!) 163 kg (360 lb)   09/07/23 (!) 188 kg (414 lb)         Physical Exam:  Constitutional: Alert, no distress, well-groomed.  Skin: No rashes in visible areas.  Eye: Round. Conjunctiva clear, lids normal. No icterus.  "  ENMT: Lips pink without lesions, good dentition, moist mucous membranes. Phonation normal.  Neck: No masses, no thyromegaly. Moves freely without pain.  CV: Pulse as reported by patient  Respiratory: Unlabored respiratory effort, no cough or audible wheeze  Psych: Alert and oriented x3, normal affect and mood.       Lab Data Review:        Lab Results   Component Value Date/Time     CHOLSTRLTOT 90 (L) 11/03/2021 02:11 AM     LDL 55 11/03/2021 02:11 AM     HDL 16 (A) 11/03/2021 02:11 AM     TRIGLYCERIDE 95 11/03/2021 02:11 AM             Lab Results   Component Value Date/Time     SODIUM 133 (L) 03/13/2023 12:13 AM     POTASSIUM 3.3 (L) 03/13/2023 12:13 AM     CHLORIDE 99 03/13/2023 12:13 AM     CO2 21 03/13/2023 12:13 AM     GLUCOSE 105 (H) 03/13/2023 12:13 AM     BUN 13 03/13/2023 12:13 AM     CREATININE 0.69 03/13/2023 12:13 AM      CrCl cannot be calculated (Patient's most recent lab result is older than the maximum 7 days allowed.).        Lab Results   Component Value Date/Time     ALKPHOSPHAT 91 03/12/2023 01:31 PM     ASTSGOT 18 03/12/2023 01:31 PM     ALTSGPT 20 03/12/2023 01:31 PM     TBILIRUBIN 0.7 03/12/2023 01:31 PM            Lab Results   Component Value Date/Time     WBC 8.4 03/13/2023 12:13 AM            Lab Results   Component Value Date/Time     HBA1C 5.9 (H) 11/03/2021 02:11 AM      No components found for: \"TROP\"        Cardiac Imaging and Procedures Review:       CTA PE 8/22/2019  1.  Right-sided lobar, segmental and subsegmental pulmonary emboli. Suspected subsegmental left-sided pulmonary emboli. Associated right heart strain.  2.  Scattered pulmonary infarcts. Dependent opacities in the lower lobes may represent a combination of atelectasis and infarcts, however, superimposed pneumonia is also possible.  3.  Cardiomegaly.  4.  Large right thyroid nodule measuring 2.9 cm. It can be followed on an outpatient basis.  5.  Splenomegaly.     CTA PE 3/2020  IMPRESSION:  1.  Filling defects within " right lower lobe segmental coronary arteries consistent with pulmonary emboli. No evidence of right heart strain.  2.  Consolidation within the right lung base.  3.  Patchy bilateral groundglass opacities.  4.  Small right pleural effusion.     TTE 11/2021  CONCLUSIONS  Prior echo on 07/12/2021, compared to the report of the prior study,   valvular abnormalities now noted.   Normal left ventricular systolic function.  The left ventricular ejection fraction is visually estimated to be 60%.  Mild to moderate mitral regurgitation.  Mild tricuspid regurgitation.  Estimated right ventricular systolic pressure is 45 mmHg.     TTE 2/2024  CONCLUSIONS  Technically difficult study.   Normal left ventricular size and systolic function.  No pericardial effusion.  Compared to the prior study on 11/2/21, current study technically   difficult.    Medical Decision Making:  Problem List Items Addressed This Visit         Acute on chronic respiratory failure with hypoxia and hypercapnia (HCC)     Relevant Medications     furosemide (LASIX) 40 MG Tab     Pulmonary hypertension (HCC) (Chronic)     Relevant Medications     furosemide (LASIX) 40 MG Tab     Other Relevant Orders     EC-ECHOCARDIOGRAM COMPLETE W/O CONT     Basic Metabolic Panel     proBrain Natriuretic Peptide, NT      Pulmonary hypertension - unable to adequately assess due to limitations related to body habitus. Stable for now. Will focus on pursuing weight loss surgery. He has pending appointment with bariatric in August he says.       Venous insufficiency - continue lasix as did not tolerate reducing. Monitor leg swelling. Consider vascular surgery for venous intervention if symptomatic.      It was my pleasure to meet with Mr. Ontiveros.

## 2025-06-06 ENCOUNTER — OFFICE VISIT (OUTPATIENT)
Dept: WOUND CARE | Facility: MEDICAL CENTER | Age: 35
End: 2025-06-06
Attending: STUDENT IN AN ORGANIZED HEALTH CARE EDUCATION/TRAINING PROGRAM
Payer: COMMERCIAL

## 2025-06-06 DIAGNOSIS — L08.9 WOUND INFECTION: ICD-10-CM

## 2025-06-06 DIAGNOSIS — R52 PAIN ASSOCIATED WITH WOUND: ICD-10-CM

## 2025-06-06 DIAGNOSIS — T14.8XXA PAIN ASSOCIATED WITH WOUND: ICD-10-CM

## 2025-06-06 DIAGNOSIS — T14.8XXA WOUND INFECTION: ICD-10-CM

## 2025-06-06 DIAGNOSIS — I83.028 VENOUS STASIS ULCER OF LEFT LOWER LEG (HCC): Primary | ICD-10-CM

## 2025-06-06 DIAGNOSIS — E66.01 MORBID OBESITY (HCC): ICD-10-CM

## 2025-06-06 PROCEDURE — 99213 OFFICE O/P EST LOW 20 MIN: CPT

## 2025-06-06 PROCEDURE — 11042 DBRDMT SUBQ TIS 1ST 20SQCM/<: CPT | Performed by: NURSE PRACTITIONER

## 2025-06-06 PROCEDURE — 11042 DBRDMT SUBQ TIS 1ST 20SQCM/<: CPT

## 2025-06-06 NOTE — PATIENT INSTRUCTIONS
"The following information is a summary of the education provided in the clinic today. This is not an exhaustive list of the education provided during your appointment.       DRESSING CHANGES    Keep your wound dressing clean, dry, and intact.     You may  shower with the dressing(s) off. Please do not take baths, or swim in the ocean, lakes, rivers, pools, or hot tubs.      Wounds do not need to \"air out\" or \"breathe\". Gently dry your wound before placing a new dressing.     After you get out of the shower, wash the wound a second time (with soap and water, wound cleanser, or saline). Gently dry the wound before you place a new dressing.       If you need to change your dressings at home, you should wash your wound. Use normal saline, wound cleanser, hypochlorous acid, or unscented soap and water. Do not use hydrogen peroxide or rubbing alcohol to clean your wounds. Hydrogen peroxide and rubbing alcohol will damage new cells and tissue. Do not use betadine or iodine unless told to by your wound care team.    Do not soak your wounds in epsom salt baths. This can worsen your wound(s) or delay wound healing. It can also lead to infection or maceration (tissue is too wet).     If you do not have home health, the clinic will give you with leftover supplies from your appointment. We do not give out extra dressing supplies. We will order you supplies through a [employer] company. Your insurance may or may not pay for all these supplies. The company will reach out to you if insurance does not cover supplies. These supplies will be sent to your home within a few days. If you do have home health, they will provide wound care supplies.     The dressings we use may change as your wound changes.     COMPRESSION   -Today, a 2 layer compression wrap was applied. Please take off the wrap if you have pain, extreme swelling, new numbness or tingling, a change in the color or temperature of your feet. Take off the wrap if the wrap slides " down/bunches up Take off the wrap if it gets wet or leaks through.  If you have to take off the wrap, please do not cut it off with scissors or other sharp tools. Do not trim the wrap. Unravel the wrap one layer at a time. Place a clean and dry dressing over the wound. Do not leave wrap on for more than seven days at a time. You can wear a cast protector over your wrap so you can take a shower. Cast protectors can be found at most [employer] and [employer]. [employer] does not endorse any brand of cast protector.        GENERAL HEALTH ADVICE   -Nutrition is important for wound healing. Unless told otherwise by your doctor, eat more protein and consider supplementing with a multi-vitamin, zinc, and vitamin C.             CLINIC INFORMATION  The clinic's hours are Monday-Friday, 7:30 AM to 5:00 PM. We are closed most holidays and on weekends. If you leave us a message, please allow 24 hours for someone to return your call. If you have concerns or are having a medical emergency, call 911 or go to the hospital emergency room.     You might not see the same nurse or provider every visit.     If you notice any large changes in your wound(s), or signs of infection (redness, swelling, localized heat, increased pain, fever > 101 F, chills, nausea/vomiting) or have any questions about your home care instructions, please call the wound center at [phone]. If it's after hours, contact your primary care physician or go to the hospital emergency room. If you are admitted to any hospital, you will need a new referral to come back to the wound clinic. Any wound care appointments that you already have may be cancelled.    If you are 5 or more minutes late for an appointment, we reserve the right to cancel and reschedule that appointment. For example, if your appointment is at 1:00 PM, and you arrive at 1:06 PM, you are more than five minutes late and might not be seen. If you are consistently late or not coming to your appointments  (typically 3 late cancellations and/or no shows), we reserve the right to cancel your future appointments or discharge you from the clinic. It is then your responsibility to obtain a new referral if wound care is still needed.

## 2025-06-06 NOTE — PROGRESS NOTES
Provider Encounter- Lower Extremity Ulcer      HISTORY OF PRESENT ILLNESS  Wound History:    START OF CARE IN CLINIC: 4/18/2025    REFERRING PROVIDER: Satya Carrasquillo      WOUND ETIOLOGY: Venous    LOCATION: Posterior lower leg, cluster     HISTORY: 34-year-old morbidly obese male referred to the clinic for ulcers to his left lower leg.  These wounds started a few months prior to coming to the clinic during an episode of increased swelling of his leg.  He has had similar wounds in the past for which he was treated in the clinic, though not since 2020.  He has not been able to tolerate any significant compression.  He and his girlfriend have been managing wounds with cleansing and simple dressings and then wrapping lower leg with Ace wrap.  He was prescribed a course of Keflex by his PCP, which he completed.  Just prior to starting treatment in the clinic he was prescribed a course of doxycycline for treatment of pneumonia.    Pertinent Medical History: Morbidly obese, cardiomegaly, history of DVT, history of PE, JONES, leg ulcers           TOBACCO USE: He has never smoked to smokeless tobacco    Patient's problem list, allergies, and current medications reviewed and updated in Epic    Interval History:  4/18/2025 Initial clinic visit with KERRI Lo, WESTLEY, DARRELLN, DAE.   Patient states overall he is feeling well,denies fevers, chills, nausea, vomiting, cough or shortness of breath.  He presents today with a cluster of full-thickness wounds to his posterior calf, thick slough to wound beds.  He has difficulty reaching these wounds himself, but his girlfriend is able to assist him.  He would not agree to application of compression wrap, but did inquire about other types of garment such as Velcro type.  He was provided with handout describing various garments.   Quite a bit of time spent today discussing etiology of chronic venous insufficiency, and the effect of morbid obesity on lower extremity hypertension.   Patient states he is motivated to lose weight because he and his girlfriend would like to travel.  He has an appointment with his PCP on May 1 and is planning to ask about counseling.  He believes he has ADHD and that this is contributing to his compulsive eating behaviors.      6/6/2025: Clinic visit with Cintia MANNING, WESTLEY, KATHLEEN, DAE.  Pt denies fevers, chills, nausea, vomiting.  Cluster remains.  Ulcer overall area slightly decreased since last provider debridement.  Increase to 2 layer compression wrap.      REVIEW OF SYSTEMS:   Unchanged from previous wound clinic assessment on 4/18/25, except as noted in interval history above        PHYSICAL EXAMINATION:   There were no vitals taken for this visit.    Physical Exam  Constitutional:       Appearance: He is obese.      Comments: Severely obese   Cardiovascular:      Pulses: Normal pulses.      Comments: DP pulses palpable, +2.  Feet warm, left PT pulse palpable, left DP pulse palpable  DP and PT pulses brisk and multiphasic by Doppler  Pulmonary:      Effort: Pulmonary effort is normal.   Musculoskeletal:      Right lower leg: Edema present.      Left lower leg: Edema present.      Comments: 3+ edema bilateral lower extremities   Skin:     Comments: Full-thickness wounds to left posterolateral lower extremity, cluster x 3: Area decreased slightly, thick slough to wound beds.  Moderate serosanguineous drainage, no odor.  No periwound erythema or induration     Chronic bilateral lower extremity edema, varicose veins, hemosiderin staining-findings consistent with CVI   Neurological:      Mental Status: He is alert and oriented to person, place, and time.         WOUND ASSESSMENT  Moisture Associated Skin Damage 11/02/21 (Active)   Number of days: 1312       Wound 11/02/21 Other (comment) Leg Circumferential;Lower Left (Active)   Number of days: 1312       Wound 11/02/21 Other (comment) Leg Circumferential;Lower Right (Active)   Number of days: 1312        Wound 11/02/21 Sacrum (Active)   Number of days: 1312       Wound 11/06/21 Heel re-occuring wound on right foot (Active)   Number of days: 1308       Wound 11/06/21 skin tear (Active)   Number of days: 1308       Wound 04/18/25 Venous Ulcer Posterior;Lateral Left LLE Posterolateral cluster (Active)   Wound Image    06/06/25 0905   Site Assessment Red;Yellow 06/06/25 0905   Periwound Assessment Dry;Edema;Flaky;Red 06/06/25 0905   Margins Attached edges 06/06/25 0905   Drainage Amount Moderate 06/06/25 0905   Drainage Description Serosanguineous;Thick 06/06/25 0905   Treatments Cleansed;Topical Lidocaine 06/06/25 0905   ** Retired** Wound Cleansing Hypochlorus Acid 05/09/25 0730   Wound Cleansing Foam Cleanser/Washcloth;Hypochlorus Acid 06/06/25 0905   Periwound Protectant Urea lotion;Silicone barrier cream 06/06/25 0905   Dressing Status Intact 05/02/25 0700   Dressing Changed Changed 05/09/25 0730   Dressing Cleansing/Solutions Not Applicable 06/06/25 0905   Dressing Options Hydrofiber Silver;Super Absorbent Pad;Compression Wrap Two Layer 06/06/25 0905   Dressing Change/Treatment Frequency Twice Weekly 06/06/25 0905   Wound Team Following Weekly 06/06/25 0905   Non-staged Wound Description Full thickness 06/06/25 0905   Wound Length (cm) 6 cm 06/06/25 0905   Wound Width (cm) 6.1 cm 06/06/25 0905   Wound Depth (cm) 0.1 cm 06/06/25 0905   Wound Surface Area (cm^2) 28.75 cm^2 06/06/25 0905   Wound Volume (cm^3) 1.916 cm^3 06/06/25 0905   Post-Procedure Length (cm) 6.3 cm 06/06/25 0905   Post-Procedure Width (cm) 6.1 cm 06/06/25 0905   Post-Procedure Depth (cm) 0.1 cm 06/06/25 0905   Post-Procedure Surface Area (cm^2) 30.18 cm^2 06/06/25 0905   Post-Procedure Volume (cm^3) 2.012 cm^3 06/06/25 0905   Wound Healing % 58 06/06/25 0905   Tunneling (cm) 0 cm 06/06/25 0905   Undermining (cm) 0 cm 06/06/25 0905   Wound Odor None 06/06/25 0905   Pulses Left;DP;2+ 05/02/25 0700   Exposed Structures None 06/06/25 0905    Number of days: 49           PROCEDURE: Excisional debridement of left posterolateral lower extremity wound cluster  -2% viscous lidocaine applied topically to wound beds for approximately 5 minutes prior to debridement  -Curette used to excise nonviable tissue from wound beds.  Excisional debridement was performed to remove devitalized tissue until healthy, bleeding tissue was visualized.  Total area debrided was approximately 9 cm².  Tissue debrided into the subcutaneous layer.    -Bleeding controlled with manual pressure.   -Wound care completed by wound RN, refer to wound flowsheet   -Patient tolerated the procedure well, without c/o of significant pain or discomfort.       Pertinent Labs and Diagnostics:    Labs:   A1c:   Lab Results   Component Value Date/Time    HBA1C 5.9 (H) 11/03/2021 02:11 AM            IMAGING: N/A    VASCULAR STUDIES:   No results found.    LAST  WOUND CULTURE:  DATE :    Lab Results   Component Value Date/Time    CULTRSULT Moderate growth usual skin chito. (A) 05/16/2025 07:50 AM    CULTRSULT Staphylococcus aureus  Moderate growth   (A) 05/16/2025 07:50 AM    CULTRSULT (A) 05/16/2025 07:50 AM     Streptococcus mitis/oralis group  Moderate growth                ASSESSMENT AND PLAN:     1. Venous stasis ulcer of left lower leg (HCC)  Patient developed ulcers to his posterior leg after episode of increased edema.      6/6/2025: Ulcer area slightly decreased.  Continues to have cluster of wounds x 3, moderate slough.  Drainage managed.  -Excisional debridement of wound in clinic today, medically necessary to promote wound healing.  -Patient to return to clinic weekly for assessment and debridement  -Currently doing double layer Tubigrip.  - Initiate 2 layer compression wrap to control drainage and edema.  Patient to keep wrap dry in between clinic appointments.  Recommend obtaining cast protector to keep wrap dry during showering.  If unable to tolerate wrap, patient to remove and apply  Tubigrip which was provided to patient.    -Previously provided with handout regarding alternate Velcro compression garments.  - Measure for compression garments at subsequent visit once edema is improved.  -Consider [employer].  - Patient advised to elevate his legs periodically throughout the day and at night while in bed    Wound care: Hydrofiber Silver;Super Absorbent Pad;Compression Wrap Two Layer    2. Pain associated with wound    6/6/2025:  -2% viscous lidocaine applied topically to wound bed for approximately 5 minutes prior to debridement  - Tolerated majority of debridement with topical lidocaine.    3. Wound infection    6/6/2025: Patient was prescribed a course of Keflex by his PCP due to suspected wound infection.   -Completed doxycycline for treatment of pneumonia  - His wounds do not appear to be infected today  - Monitor for signs and symptoms infection each clinic visit  -Pt advised to go to ER for any increased redness, swelling, drainage or odor, or if he develops fever, chills, nausea or vomiting.     4. Morbid obesity (HCC)    6/6/2025: Patient is severely obese with a BMI > 70.  - Role of obesity and chronic venous insufficiency discussed in clinic today.  - Patient states he is motivated to lose weight and is established with his PCP regarding this.  Patient plan to follow-up with PCP regarding available programs, including counseling.  - He has made significant lifestyle changes, joined a gym in February 2025 and has been trying to work out more.            PATIENT EDUCATION  - Etiology of  venous stasis ulceration discussed with patient  - Importance of managing edema for healing of ulcer, and for prevention of new ulcer development  -Need for lifelong compression of lower legs   -Elevate legs above the level of the heart periodically throughout the day.  - Importance of adequate nutrition for wound healing  -Advised to go to ER for any increased redness, swelling, drainage or odor, or if  patient develops fever, chills, nausea or vomiting.            Please note that this note may have been created using voice recognition software. I have worked with technical experts from [facility] to optimize the interface.  I have made every reasonable attempt to correct obvious errors, but there may be errors of grammar and possibly     N

## 2025-06-06 NOTE — PROGRESS NOTES
2 layer compression wrap placed to left lower extremity. Education provided on when it is appropriate to remove the wrap and how to do so. Patient tolerated well and verbalized understanding of all education.

## 2025-06-13 ENCOUNTER — NON-PROVIDER VISIT (OUTPATIENT)
Dept: WOUND CARE | Facility: MEDICAL CENTER | Age: 35
End: 2025-06-13
Attending: STUDENT IN AN ORGANIZED HEALTH CARE EDUCATION/TRAINING PROGRAM
Payer: COMMERCIAL

## 2025-06-13 PROCEDURE — 97597 DBRDMT OPN WND 1ST 20 CM/<: CPT

## 2025-06-13 NOTE — PATIENT INSTRUCTIONS
"The following information is a summary of the education provided in the clinic today. This is not an exhaustive list of the education provided during your appointment.       DRESSING CHANGES    Keep your wound dressing clean, dry, and intact. Change your dressing every week at the wound clinic AND/OR if the dressing becomes, soiled, leaks, gets wet, or falls off.      You may not shower with the dressing(s) on. Please do not take baths, or swim in the ocean, lakes, rivers, pools, or hot tubs.      Wounds do not need to \"air out\" or \"breathe\". Gently dry your wound before placing a new dressing.     After you get out of the shower, wash the wound a second time (with soap and water, wound cleanser, or saline). Gently dry the wound before you place a new dressing.       If you need to change your dressings at home, you should wash your wound. Use normal saline, wound cleanser, hypochlorous acid, or unscented soap and water. Do not use hydrogen peroxide or rubbing alcohol to clean your wounds. Hydrogen peroxide and rubbing alcohol will damage new cells and tissue. Do not use betadine or iodine unless told to by your wound care team.    Do not soak your wounds in epsom salt baths. This can worsen your wound(s) or delay wound healing. It can also lead to infection or maceration (tissue is too wet).     If you do not have home health, the clinic will give you with leftover supplies from your appointment. We do not give out extra dressing supplies. We will order you supplies through a [employer] company. Your insurance may or may not pay for all these supplies. The company will reach out to you if insurance does not cover supplies. These supplies will be sent to your home within a few days. If you do have home health, they will provide wound care supplies.     The dressings we use may change as your wound changes.         COMPRESSION   -Compression helps reduce swelling and helps wounds heal quicker. It is still important to " elevate your feet several times daily to reduce swelling, even when you are wearing compression. It is important to wear compression every day, to help your wound heal, and to prevent new wounds from developing.      -Today, a 2 layer compression wrap was applied. Please take off the wrap if you have pain, extreme swelling, new numbness or tingling, a change in the color or temperature of your feet. Take off the wrap if the wrap slides down/bunches up Take off the wrap if it gets wet or leaks through.  If you have to take off the wrap, please do not cut it off with scissors or other sharp tools. Do not trim the wrap. Unravel the wrap one layer at a time. Place a clean and dry dressing over the wound. Do not leave wrap on for more than seven days at a time. You can wear a cast protector over your wrap so you can take a shower. Cast protectors can be found at most [employer] and [employer]. [employer] does not endorse any brand of cast protector.        GENERAL HEALTH ADVICE   -Nutrition is important for wound healing. Unless told otherwise by your doctor, eat more protein and consider supplementing with a multi-vitamin, zinc, and vitamin C.         CLINIC INFORMATION  The clinic's hours are Monday-Friday, 7:30 AM to 5:00 PM. We are closed most holidays and on weekends. If you leave us a message, please allow 24 hours for someone to return your call. If you have concerns or are having a medical emergency, call 911 or go to the hospital emergency room.     You might not see the same nurse or provider every visit.     If you notice any large changes in your wound(s), or signs of infection (redness, swelling, localized heat, increased pain, fever > 101 F, chills, nausea/vomiting) or have any questions about your home care instructions, please call the wound center at [phone]. If it's after hours, contact your primary care physician or go to the hospital emergency room. If you are admitted to any hospital, you will need a  new referral to come back to the wound clinic. Any wound care appointments that you already have may be cancelled.    If you are 5 or more minutes late for an appointment, we reserve the right to cancel and reschedule that appointment. For example, if your appointment is at 1:00 PM, and you arrive at 1:06 PM, you are more than five minutes late and might not be seen. If you are consistently late or not coming to your appointments (typically 3 late cancellations and/or no shows), we reserve the right to cancel your future appointments or discharge you from the clinic. It is then your responsibility to obtain a new referral if wound care is still needed.

## 2025-06-13 NOTE — PROGRESS NOTES
CSWD using curette to remove 5cm2 nonviable tissue from Posterior LLE wound bed. 2% topical Lidocaine applied prior to debridement with ~5 minute dwell time. Patient tolerated well; denied pain. 2 layer compression wrap applied; pt request a bit tighter as it had rolled down the day before. Consider implementating viscopaste under wrap to help with flaking skin and periwound.

## 2025-06-20 ENCOUNTER — NON-PROVIDER VISIT (OUTPATIENT)
Dept: WOUND CARE | Facility: MEDICAL CENTER | Age: 35
End: 2025-06-20
Attending: STUDENT IN AN ORGANIZED HEALTH CARE EDUCATION/TRAINING PROGRAM
Payer: COMMERCIAL

## 2025-06-20 PROCEDURE — 97597 DBRDMT OPN WND 1ST 20 CM/<: CPT

## 2025-06-20 PROCEDURE — 99213 OFFICE O/P EST LOW 20 MIN: CPT

## 2025-06-20 NOTE — PATIENT INSTRUCTIONS
"The following information is a summary of the education provided in the clinic today. This is not an exhaustive list of the education provided during your appointment.       DRESSING CHANGES- updated on dressing care regimen    Keep your wound dressing clean, dry, and intact. Change your dressing weekly at the wound clinic AND/OR if the dressing becomes, soiled, leaks, gets wet, or falls off.      You may not shower with the dressing(s) on. Please do not take baths, or swim in the ocean, lakes, rivers, pools, or hot tubs.      Wounds do not need to \"air out\" or \"breathe\". Gently dry your wound before placing a new dressing.     After you get out of the shower, wash the wound a second time (with soap and water, wound cleanser, or saline). Gently dry the wound before you place a new dressing.       If you need to change your dressings at home, you should wash your wound. Use normal saline, wound cleanser, hypochlorous acid, or unscented soap and water. Do not use hydrogen peroxide or rubbing alcohol to clean your wounds. Hydrogen peroxide and rubbing alcohol will damage new cells and tissue. Do not use betadine or iodine unless told to by your wound care team.    Do not soak your wounds in epsom salt baths. This can worsen your wound(s) or delay wound healing. It can also lead to infection or maceration (tissue is too wet).     If you do not have home health, the clinic will give you with leftover supplies from your appointment. We do not give out extra dressing supplies. We will order you supplies through a [employer] company. Your insurance may or may not pay for all these supplies. The company will reach out to you if insurance does not cover supplies. These supplies will be sent to your home within a few days. If you do have home health, they will provide wound care supplies.     The dressings we use may change as your wound changes.         COMPRESSION   -Compression helps reduce swelling and helps wounds heal quicker. " It is still important to elevate your feet several times daily to reduce swelling, even when you are wearing compression. It is important to wear compression every day, to help your wound heal, and to prevent new wounds from developing.      -Today, a 2 layer compression wrap was applied. Please take off the wrap if you have pain, extreme swelling, new numbness or tingling, a change in the color or temperature of your feet. Take off the wrap if the wrap slides down/bunches up Take off the wrap if it gets wet or leaks through.  If you have to take off the wrap, please do not cut it off with scissors or other sharp tools. Do not trim the wrap. Unravel the wrap one layer at a time. Place a clean and dry dressing over the wound. Do not leave wrap on for more than seven days at a time. You can wear a cast protector over your wrap so you can take a shower. Cast protectors can be found at most [employer] and [employer]. [employer] does not endorse any brand of cast protector.        GENERAL HEALTH ADVICE   -Nutrition is important for wound healing. Unless told otherwise by your doctor, eat more protein and consider supplementing with a multi-vitamin, zinc, and vitamin C.           CLINIC INFORMATION  The clinic's hours are Monday-Friday, 7:30 AM to 5:00 PM. We are closed most holidays and on weekends. If you leave us a message, please allow 24 hours for someone to return your call. If you have concerns or are having a medical emergency, call 911 or go to the hospital emergency room.     You might not see the same nurse or provider every visit.     If you notice any large changes in your wound(s), or signs of infection (redness, swelling, localized heat, increased pain, fever > 101 F, chills, nausea/vomiting) or have any questions about your home care instructions, please call the wound center at [phone]. If it's after hours, contact your primary care physician or go to the hospital emergency room. If you are admitted to  any hospital, you will need a new referral to come back to the wound clinic. Any wound care appointments that you already have may be cancelled.    If you are 5 or more minutes late for an appointment, we reserve the right to cancel and reschedule that appointment. For example, if your appointment is at 1:00 PM, and you arrive at 1:06 PM, you are more than five minutes late and might not be seen. If you are consistently late or not coming to your appointments (typically 3 late cancellations and/or no shows), we reserve the right to cancel your future appointments or discharge you from the clinic. It is then your responsibility to obtain a new referral if wound care is still needed.

## 2025-06-20 NOTE — PROGRESS NOTES
CSWD using curette to remove 5cm2 nonviable tissue from Posterior LLE wound bed. 2% topical Lidocaine applied prior to debridement with ~5 minute dwell time. Patient tolerated well; denied pain. 2 layer compression wrap applied; tolerated well. Viscopaste layer implemented today to aid periwound.

## 2025-06-27 ENCOUNTER — NON-PROVIDER VISIT (OUTPATIENT)
Dept: WOUND CARE | Facility: MEDICAL CENTER | Age: 35
End: 2025-06-27
Attending: STUDENT IN AN ORGANIZED HEALTH CARE EDUCATION/TRAINING PROGRAM
Payer: COMMERCIAL

## 2025-06-27 PROCEDURE — 97602 WOUND(S) CARE NON-SELECTIVE: CPT

## 2025-06-27 PROCEDURE — 99213 OFFICE O/P EST LOW 20 MIN: CPT

## 2025-06-27 NOTE — PROGRESS NOTES
Pt took compression wrap off 3 days ago and stated that wound drainage was seeping through the wrap.  He placed ABD and tape to control drainage.    Non-selective debridement of wound using warm washcloth and  no rinse cleanser to remove old drainage and flaky skin from entire leg.  Urea lotion applied to bilateral legs.   Left leg dressing applied and 2 layer compression wrap.  Right leg elastic  tubulare (F) bandage applied.  See wound flowsheet for dressing details.    Educated Pt on importance of leaving wrap on for full week, keep cland and dry, elevation of both legs.

## 2025-06-27 NOTE — PATIENT INSTRUCTIONS
-Keep your wound dressing clean, dry, and intact. Only change dressing if it's over saturated, soiled or falls off.     -Change your dressing if it becomes soiled, soaked, or falls off.    -Remove your compression wrap if you have severe pain, severe swelling, numbness, color change, or temperature change in your toes. If you need to remove your compression wrap, do so by unrolling it. Do not cut the compression wrap off to prevent cutting yourself on accident.    -Should you experience any significant changes in your wound(s), such as infection (redness, swelling, localized heat, increased pain, fever > 101 F, chills) or have any questions regarding your home care instructions, please contact the wound center at [phone]. If after hours, contact your primary care physician or go to the hospital emergency room.     If you are admitted to any hospital, you will need a new referral to come back to the wound clinic and any scheduled appointments that you already have, may be cancelled.

## 2025-07-03 ENCOUNTER — NON-PROVIDER VISIT (OUTPATIENT)
Dept: WOUND CARE | Facility: MEDICAL CENTER | Age: 35
End: 2025-07-03
Attending: STUDENT IN AN ORGANIZED HEALTH CARE EDUCATION/TRAINING PROGRAM
Payer: COMMERCIAL

## 2025-07-03 PROCEDURE — 97597 DBRDMT OPN WND 1ST 20 CM/<: CPT

## 2025-07-03 PROCEDURE — 99214 OFFICE O/P EST MOD 30 MIN: CPT

## 2025-07-03 NOTE — PATIENT INSTRUCTIONS
"The following information is a summary of the education provided in the clinic today. This is not an exhaustive list of the education provided during your appointment.       DRESSING CHANGES    Keep your wound dressing clean, dry, and intact. Change your dressing if the dressing becomes soiled, leaks, gets wet, or falls off.      You may  shower with the dressing(s) off the night before your wound care appointment. Please do not take baths, or swim in the ocean, lakes, rivers, pools, or hot tubs.      Wounds do not need to \"air out\" or \"breathe\". Gently dry your wound before placing a new dressing.     After you get out of the shower, wash the wound a second time (with soap and water, wound cleanser, or saline). Gently dry the wound before you place a new dressing.       If you need to change your dressings at home, you should wash your wound. Use normal saline, wound cleanser, hypochlorous acid, or unscented soap and water. Do not use hydrogen peroxide or rubbing alcohol to clean your wounds. Hydrogen peroxide and rubbing alcohol will damage new cells and tissue. Do not use betadine or iodine unless told to by your wound care team.    Do not soak your wounds in epsom salt baths. This can worsen your wound(s) or delay wound healing. It can also lead to infection or maceration (tissue is too wet).     If you do not have home health, the clinic will give you with leftover supplies from your appointment. We do not give out extra dressing supplies. We will order you supplies through a [employer] company. Your insurance may or may not pay for all these supplies. The company will reach out to you if insurance does not cover supplies. These supplies will be sent to your home within a few days. If you do have home health, they will provide wound care supplies.     The dressings we use may change as your wound changes.     COMPRESSION   -Compression helps reduce swelling and helps wounds heal quicker. It is still important to " elevate your feet several times daily to reduce swelling, even when you are wearing compression. It is important to wear compression every day, to help your wound heal, and to prevent new wounds from developing.      -Today, a 2 layer compression wrap was applied. Please take off the wrap if you have pain, extreme swelling, new numbness or tingling, a change in the color or temperature of your feet. Take off the wrap if the wrap slides down/bunches up Take off the wrap if it gets wet or leaks through.  If you have to take off the wrap, please do not cut it off with scissors or other sharp tools. Do not trim the wrap. Unravel the wrap one layer at a time. Place a clean and dry dressing over the wound. Do not leave wrap on for more than seven days at a time. You can wear a cast protector over your wrap so you can take a shower. Cast protectors can be found at most [employer] and [employer]. [employer] does not endorse any brand of cast protector.      GENERAL HEALTH ADVICE   -Nutrition is important for wound healing. Unless told otherwise by your doctor, eat more protein and consider supplementing with a multi-vitamin, zinc, and vitamin C.       CLINIC INFORMATION  The clinic's hours are Monday-Friday, 7:30 AM to 5:00 PM. We are closed most holidays and on weekends. If you leave us a message, please allow 24 hours for someone to return your call. If you have concerns or are having a medical emergency, call 911 or go to the hospital emergency room.     You might not see the same nurse or provider every visit.     If you notice any large changes in your wound(s), or signs of infection (redness, swelling, localized heat, increased pain, fever > 101 F, chills, nausea/vomiting) or have any questions about your home care instructions, please call the wound center at [phone]. If it's after hours, contact your primary care physician or go to the hospital emergency room. If you are admitted to any hospital, you will need a new  referral to come back to the wound clinic. Any wound care appointments that you already have may be cancelled.    If you are 5 or more minutes late for an appointment, we reserve the right to cancel and reschedule that appointment. For example, if your appointment is at 1:00 PM, and you arrive at 1:06 PM, you are more than five minutes late and might not be seen. If you are consistently late or not coming to your appointments (typically 3 late cancellations and/or no shows), we reserve the right to cancel your future appointments or discharge you from the clinic. It is then your responsibility to obtain a new referral if wound care is still needed.

## 2025-07-03 NOTE — PROGRESS NOTES
Advanced Wound [facility]   [facility]   [address]   [address]   [place] [address]   [phone] Fax: [phone]        DME: [facility]  Duration of Supply Order:   Dispense as written.    OPWND Compression Wrap Measurement: Right Calf: 48 cm and Right Ankle: 27 cm Right leg length 30 cm    Wound 04/18/25 Venous Ulcer Posterior;Lateral Left LLE Posterolateral cluster (Active)   Wound Image   07/03/25 0730   Site Assessment Red;Pink;Yellow 07/03/25 0730   Periwound Assessment Flaky;Edema 07/03/25 0730   Margins Attached edges 07/03/25 0730   Drainage Amount Moderate 07/03/25 0730   Drainage Description Serosanguineous 07/03/25 0730   Treatments Topical Lidocaine;Cleansed;CSWD - Conservative Sharp Wound Debridement;Site care 07/03/25 0730   ** Retired** Wound Cleansing Hypochlorus Acid (hypochlorous acid soak x 5 mins; no rinse foam cleanser/moist washcloth to LE) 05/09/25 0730   Wound Cleansing Foam Cleanser/Washcloth;Hypochlorus Acid (Hypochlorous acid soak x 5 mins; no rinse foam cleanser/moist washcloth to LE) 07/03/25 0730   Periwound Protectant Urea lotion 07/03/25 0730   Dressing Status Old drainage 07/03/25 0730   Dressing Changed Changed 07/03/25 0730   Dressing Cleansing/Solutions Not Applicable 07/03/25 0730   Dressing Options Hydrofiber Silver;Super Absorbent Pad;Unna Boot;Compression Wrap Two Layer 07/03/25 0730   Dressing Change/Treatment Frequency Weekly, and As Needed 07/03/25 0730   Wound Team Following Weekly 07/03/25 0730   Non-staged Wound Description Full thickness 07/03/25 0730   Wound Length (cm) 2.5 cm 07/03/25 0730   Wound Width (cm) 1.5 cm 07/03/25 0730   Wound Depth (cm) 0.1 cm 07/03/25 0730   Wound Surface Area (cm^2) 2.95 cm^2 07/03/25 0730   Wound Volume (cm^3) 0.196 cm^3 07/03/25 0730   Post-Procedure Length (cm) 2.7 cm 06/27/25 0700   Post-Procedure Width (cm) 2.1 cm 06/27/25 0700   Post-Procedure Depth (cm) 0.1 cm 06/27/25 0700    Post-Procedure Surface Area (cm^2) 4.45 cm^2 06/27/25 0700   Post-Procedure Volume (cm^3) 0.297 cm^3 06/27/25 0700   Wound Healing % 96 07/03/25 0730   Tunneling (cm) 0 cm 07/03/25 0730   Undermining (cm) 0 cm 07/03/25 0730   Wound Odor None 07/03/25 0730   Pulses Left;DP;2+ 05/02/25 0700   Exposed Structures None 07/03/25 0730       PROCEDURE: Conservative sharp wound debridement using curette to remove nonviable tissue from wound bed(s). 2% topical Lidocaine applied prior to debridement with ~5 minute dwell time. Patient tolerated well; denied pain.    ASSESSMENT AND PLAN:   There are no diagnoses linked to this encounter.

## 2025-07-08 NOTE — CARE PLAN
Problem: Respiratory:  Goal: Respiratory status will improve  8/25/2019 0538 by Enmanuel Donohue R.N.  Outcome: PROGRESSING AS EXPECTED  8/25/2019 0538 by Enmanuel Donohue R.N.  Reactivated  Intervention: Assess and monitor pulmonary status  Flowsheets  Taken 8/24/2019 1800 by Carito Kline R.N.  Resp: 23 !  SpO2: 96 %  Taken 8/25/2019 0600 by Enmanuel Donohue R.N.  O2 (LPM): 6  Taken 8/25/2019 0400 by Enmanuel Donohue R.N.  Respiratory Pattern: Dyspnea with Exertion  Work Of Breathing / Effort: Mild  Cough: Moist;Productive  RUL Breath Sounds: Clear  RML Breath Sounds: Clear  RLL Breath Sounds: Diminished  FARZANEH Breath Sounds: Clear  LLL Breath Sounds: Diminished  Taken 8/24/2019 0227 by Maurice Leal RRT  Pre/Post Intervention: Pre Intervention Assessment  Intervention: Administer and titrate oxygen therapy  Flowsheets (Taken 8/25/2019 0600)  O2 (LPM): 6     Problem: Risk for Impaired Mobility--Activity Intolerance  Goal: Mobilize and/or Transfer Safely with [employer] [place]  Note:   Pt ambulated around the unit using walker. Pt currently sitting up in chair.      Resolving

## 2025-07-11 ENCOUNTER — NON-PROVIDER VISIT (OUTPATIENT)
Dept: WOUND CARE | Facility: MEDICAL CENTER | Age: 35
End: 2025-07-11
Attending: STUDENT IN AN ORGANIZED HEALTH CARE EDUCATION/TRAINING PROGRAM
Payer: COMMERCIAL

## 2025-07-11 PROCEDURE — 97597 DBRDMT OPN WND 1ST 20 CM/<: CPT

## 2025-07-11 PROCEDURE — 99213 OFFICE O/P EST LOW 20 MIN: CPT

## 2025-07-11 NOTE — PATIENT INSTRUCTIONS
"The following information is a summary of the education provided in the clinic today. It is not an exhaustive list..       DRESSING CHANGES    -Keep your wound dressing clean, dry, and intact. Change your dressing only if it's soiled, leaks, gets wet, or falls off.      -You may not shower with the dressing(s) off. Please do not take baths, or swim in the ocean, lakes, rivers, pools, or hot tubs.      -Wounds do not need to \"air out\" or \"breathe\". Gently dry your wound before placing a new dressing.     -If you need to change your dressings at home, you should wash your wound. Use normal saline, wound cleanser, hypochlorous acid, or unscented soap and water. Do not use hydrogen peroxide or rubbing alcohol to clean your wounds. Hydrogen peroxide and rubbing alcohol will damage new cells and tissue. Do not use betadine or iodine unless told to by your wound care team.    COMPRESSION   -Compression helps reduce swelling and helps wounds heal quicker. It is still important to elevate your feet several times daily to reduce swelling, even when you are wearing compression. It is important to wear compression every day, to help your wound heal, and to prevent new wounds from developing.      -Today, a 2 layer compression wrap was applied. Please take off the wrap if you have pain, extreme swelling, new numbness or tingling, a change in the color or temperature of your feet. Take off the wrap if the wrap slides down/bunches up Take off the wrap if it gets wet or leaks through.  If you have to take off the wrap, please do not cut it off with scissors or other sharp tools. Do not trim the wrap. Unravel the wrap one layer at a time. Place a clean and dry dressing over the wound. Do not leave wrap on for more than seven days at a time. You can wear a cast protector over your wrap so you can take a shower. Cast protectors can be found at most drug [employer] and [employer]. [employer] does not endorse any brand of cast protector.  "     CLINIC INFORMATION  -The clinic hours are Monday-Friday, 7:30 AM to 5:00 PM. We are closed most holidays and on weekends. If you leave us a message, please allow 24 hours for someone to return your call. If you are having a medical emergency, call 911 or go to the hospital emergency room.     -You may not see the same nurse or provider every visit.     -If you notice any large changes in your wound(s), or signs of infection (redness, swelling, localized heat, increased pain, fever > 101 F, chills, nausea/vomiting) or have any questions about your home care instructions, please call the wound center at [phone]. If it's after hours, contact your primary care physician or go to the hospital emergency room. If you are admitted to any hospital, you will need a new referral to come back to the wound clinic. Any wound care appointments that you already have may be cancelled.    -If you are 5 or more minutes late for an appointment, we reserve the right to cancel and reschedule that appointment. For example, if your appointment is at 1:00 PM, and you arrive at 1:06 PM, you are more than five minutes late and might not be seen. If you are consistently late or not coming to your appointments (typically 3 late cancellations and/or no shows), we reserve the right to cancel your future appointments or discharge you from the clinic. It is then your responsibility to obtain a new referral if wound care is still needed.

## 2025-07-18 ENCOUNTER — NON-PROVIDER VISIT (OUTPATIENT)
Dept: WOUND CARE | Facility: MEDICAL CENTER | Age: 35
End: 2025-07-18
Attending: STUDENT IN AN ORGANIZED HEALTH CARE EDUCATION/TRAINING PROGRAM
Payer: COMMERCIAL

## 2025-07-18 PROCEDURE — 97602 WOUND(S) CARE NON-SELECTIVE: CPT

## 2025-07-18 PROCEDURE — 99213 OFFICE O/P EST LOW 20 MIN: CPT

## 2025-07-18 NOTE — PATIENT INSTRUCTIONS
"The following information is a summary of the education provided in the clinic today. This is not an exhaustive list of the education provided during your appointment.       DRESSING CHANGES    Keep your wound dressing clean, dry, and intact. Change your dressing if the dressing becomes soiled, leaks, gets wet, or falls off.      You may not shower with the dressing(s) off. Please do not take baths, or swim in the ocean, lakes, rivers, pools, or hot tubs.      Wounds do not need to \"air out\" or \"breathe\". Gently dry your wound before placing a new dressing.     After you get out of the shower, wash the wound a second time (with soap and water, wound cleanser, or saline). Gently dry the wound before you place a new dressing.       If you need to change your dressings at home, you should wash your wound. Use normal saline, wound cleanser, hypochlorous acid, or unscented soap and water. Do not use hydrogen peroxide or rubbing alcohol to clean your wounds. Hydrogen peroxide and rubbing alcohol will damage new cells and tissue. Do not use betadine or iodine unless told to by your wound care team.    Do not soak your wounds in epsom salt baths. This can worsen your wound(s) or delay wound healing. It can also lead to infection or maceration (tissue is too wet).     If you do not have home health, the clinic will give you with leftover supplies from your appointment. We do not give out extra dressing supplies. We will order you supplies through a [employer] company. Your insurance may or may not pay for all these supplies. The company will reach out to you if insurance does not cover supplies. These supplies will be sent to your home within a few days. If you do have home health, they will provide wound care supplies.     The dressings we use may change as your wound changes.     COMPRESSION   -Compression helps reduce swelling and helps wounds heal quicker. It is still important to elevate your feet several times daily to reduce " swelling, even when you are wearing compression. It is important to wear compression every day, to help your wound heal, and to prevent new wounds from developing.      -Today, a 2 layer compression wrap was applied. Please take off the wrap if you have pain, extreme swelling, new numbness or tingling, a change in the color or temperature of your feet. Take off the wrap if the wrap slides down/bunches up Take off the wrap if it gets wet or leaks through.  If you have to take off the wrap, please do not cut it off with scissors or other sharp tools. Do not trim the wrap. Unravel the wrap one layer at a time. Place a clean and dry dressing over the wound. Do not leave wrap on for more than seven days at a time. You can wear a cast protector over your wrap so you can take a shower. Cast protectors can be found at most [employer] and [employer]. [employer] does not endorse any brand of cast protector.        CLINIC INFORMATION  The clinic's hours are Monday-Friday, 7:30 AM to 5:00 PM. We are closed most holidays and on weekends. If you leave us a message, please allow 24 hours for someone to return your call. If you have concerns or are having a medical emergency, call 911 or go to the hospital emergency room.     You might not see the same nurse or provider every visit.     If you notice any large changes in your wound(s), or signs of infection (redness, swelling, localized heat, increased pain, fever > 101 F, chills, nausea/vomiting) or have any questions about your home care instructions, please call the wound center at [phone]. If it's after hours, contact your primary care physician or go to the hospital emergency room. If you are admitted to any hospital, you will need a new referral to come back to the wound clinic. Any wound care appointments that you already have may be cancelled.    If you are 5 or more minutes late for an appointment, we reserve the right to cancel and reschedule that appointment. For  example, if your appointment is at 1:00 PM, and you arrive at 1:06 PM, you are more than five minutes late and might not be seen. If you are consistently late or not coming to your appointments (typically 3 late cancellations and/or no shows), we reserve the right to cancel your future appointments or discharge you from the clinic. It is then your responsibility to obtain a new referral if wound care is still needed.

## 2025-07-18 NOTE — PROGRESS NOTES
Nonselective debridement with puracyn spray and gauze to remove nonviable tissue from wound bed.    Wound are measurement increased however wound bed with some granulation tissue and pt reports feeling much better and pain is improved. Continued current plan of care. Reinforced education on compression wrap and what to do if it needs to be removed.

## 2025-07-25 ENCOUNTER — NON-PROVIDER VISIT (OUTPATIENT)
Dept: WOUND CARE | Facility: MEDICAL CENTER | Age: 35
End: 2025-07-25
Attending: STUDENT IN AN ORGANIZED HEALTH CARE EDUCATION/TRAINING PROGRAM
Payer: COMMERCIAL

## 2025-07-25 ENCOUNTER — OFFICE VISIT (OUTPATIENT)
Dept: URGENT CARE | Facility: CLINIC | Age: 35
End: 2025-07-25
Payer: COMMERCIAL

## 2025-07-25 VITALS
OXYGEN SATURATION: 95 % | BODY MASS INDEX: 47.74 KG/M2 | DIASTOLIC BLOOD PRESSURE: 64 MMHG | HEART RATE: 83 BPM | RESPIRATION RATE: 18 BRPM | WEIGHT: 315 LBS | TEMPERATURE: 97.8 F | SYSTOLIC BLOOD PRESSURE: 110 MMHG | HEIGHT: 68 IN

## 2025-07-25 DIAGNOSIS — J20.9 ACUTE BRONCHITIS, UNSPECIFIED ORGANISM: Primary | ICD-10-CM

## 2025-07-25 DIAGNOSIS — B96.89 ACUTE BACTERIAL SINUSITIS: ICD-10-CM

## 2025-07-25 DIAGNOSIS — J01.90 ACUTE BACTERIAL SINUSITIS: ICD-10-CM

## 2025-07-25 PROCEDURE — 3078F DIAST BP <80 MM HG: CPT | Performed by: STUDENT IN AN ORGANIZED HEALTH CARE EDUCATION/TRAINING PROGRAM

## 2025-07-25 PROCEDURE — 3074F SYST BP LT 130 MM HG: CPT | Performed by: STUDENT IN AN ORGANIZED HEALTH CARE EDUCATION/TRAINING PROGRAM

## 2025-07-25 PROCEDURE — 99213 OFFICE O/P EST LOW 20 MIN: CPT | Performed by: STUDENT IN AN ORGANIZED HEALTH CARE EDUCATION/TRAINING PROGRAM

## 2025-07-25 PROCEDURE — 99213 OFFICE O/P EST LOW 20 MIN: CPT

## 2025-07-25 PROCEDURE — 97602 WOUND(S) CARE NON-SELECTIVE: CPT

## 2025-07-25 RX ORDER — PREDNISONE 20 MG/1
20 TABLET ORAL DAILY
Qty: 5 TABLET | Refills: 0 | Status: SHIPPED | OUTPATIENT
Start: 2025-07-25 | End: 2025-07-30

## 2025-07-25 NOTE — PROGRESS NOTES
Nonselective debridement with puracyn spray and gauze to remove nonviable tissue from wound bed. Pt will be going out of town and will be back to the clinic on 8/6/25. Instructed pt to remove the wrap in about a week, clean wound, and apply silver hydrofiber and foam dressing, then apply his compression garment. 1 extra supply provided to pt.

## 2025-07-25 NOTE — PATIENT INSTRUCTIONS
"The following information is a summary of the education provided in the clinic today. This is not an exhaustive list of the education provided during your appointment.       DRESSING CHANGES    Keep your wound dressing clean, dry, and intact. Change your dressing every 7 days AND/OR if the dressing becomes soiled, leaks, gets wet, or falls off.      You may shower with the dressing(s) off. Please do not take baths, or swim in the ocean, lakes, rivers, pools, or hot tubs.      Wounds do not need to \"air out\" or \"breathe\". Gently dry your wound before placing a new dressing.     After you get out of the shower, wash the wound a second time (with soap and water, wound cleanser, or saline). Gently dry the wound before you place a new dressing.       If you need to change your dressings at home, you should wash your wound. Use normal saline, wound cleanser, hypochlorous acid, or unscented soap and water. Do not use hydrogen peroxide or rubbing alcohol to clean your wounds. Hydrogen peroxide and rubbing alcohol will damage new cells and tissue. Do not use betadine or iodine unless told to by your wound care team.    Do not soak your wounds in epsom salt baths. This can worsen your wound(s) or delay wound healing. It can also lead to infection or maceration (tissue is too wet).     If you do not have home health, the clinic will give you with leftover supplies from your appointment. We do not give out extra dressing supplies. We will order you supplies through a [employer] company. Your insurance may or may not pay for all these supplies. The company will reach out to you if insurance does not cover supplies. These supplies will be sent to your home within a few days. If you do have home health, they will provide wound care supplies.     The dressings we use may change as your wound changes.     COMPRESSION   -Compression helps reduce swelling and helps wounds heal quicker. It is still important to elevate your feet several times " daily to reduce swelling, even when you are wearing compression. It is important to wear compression every day, to help your wound heal, and to prevent new wounds from developing.      -Today, a 2 layer compression wrap was applied. Please take off the wrap if you have pain, extreme swelling, new numbness or tingling, a change in the color or temperature of your feet. Take off the wrap if the wrap slides down/bunches up Take off the wrap if it gets wet or leaks through.  If you have to take off the wrap, please do not cut it off with scissors or other sharp tools. Do not trim the wrap. Unravel the wrap one layer at a time. Place a clean and dry dressing over the wound. Do not leave wrap on for more than seven days at a time. You can wear a cast protector over your wrap so you can take a shower. Cast protectors can be found at most drug [employer] and [employer]. [employer] does not endorse any brand of cast protector.        CLINIC INFORMATION  The clinic's hours are Monday-Friday, 7:30 AM to 5:00 PM. We are closed most holidays and on weekends. If you leave us a message, please allow 24 hours for someone to return your call. If you have concerns or are having a medical emergency, call 911 or go to the hospital emergency room.     You might not see the same nurse or provider every visit.     If you notice any large changes in your wound(s), or signs of infection (redness, swelling, localized heat, increased pain, fever > 101 F, chills, nausea/vomiting) or have any questions about your home care instructions, please call the wound center at [phone]. If it's after hours, contact your primary care physician or go to the hospital emergency room. If you are admitted to any hospital, you will need a new referral to come back to the wound clinic. Any wound care appointments that you already have may be cancelled.    If you are 5 or more minutes late for an appointment, we reserve the right to cancel and reschedule that  appointment. For example, if your appointment is at 1:00 PM, and you arrive at 1:06 PM, you are more than five minutes late and might not be seen. If you are consistently late or not coming to your appointments (typically 3 late cancellations and/or no shows), we reserve the right to cancel your future appointments or discharge you from the clinic. It is then your responsibility to obtain a new referral if wound care is still needed.

## 2025-07-26 NOTE — PROGRESS NOTES
"Subjective:   Michael Ontiveros is a 34 y.o. male who presents for Pharyngitis (X1week nasal congestion/white/yellow phlegm/cough)      HPI:  34-year-old male presents to the urgent care for approximately 10 days of initially nasal congestion, runny nose, sore throat, productive cough, fatigue, and mild fever.  States that the fever has resolved and his nasal congestion has somewhat improved but he is having more sinus pressure at this point.  The cough is still somewhat productive but more so dry at this point.  No shortness of breath or chest pressure.  No new onset fever.      Medications:    albuterol Aers  aspirin Chew  furosemide Tabs    Allergies: [place] (diagnostic), Pistachio, [place], and Tree nuts food allergy    Problem List: Michael Ontiveros does not have any pertinent problems on file.    Surgical History:  Past Surgical History:   Procedure Laterality Date    DENTAL EXTRACTION(S)  11/3/2017    Procedure: DENTAL EXTRACTION(S);  Surgeon: Olman Sparks D.D.S.;  Location: SURGERY [facility];  Service: Oral Surgery       Past Social Hx: Michael Ontiveros  reports that he has never smoked. He has never used smokeless tobacco. He reports that he does not currently use alcohol. He reports that he does not currently use drugs after having used the following drugs: Marijuana.     Past Family Hx:  Michael Ontiveros family history includes Diabetes in his paternal aunt; Respiratory Disease in his mother.     Problem list, medications, and allergies reviewed by myself today in Epic.     Objective:     /64   Pulse 83   Temp 36.6 °C (97.8 °F) (Temporal)   Resp 18   Ht 1.727 m (5' 8\")   Wt (!) 215 kg (474 lb 6.4 oz)   SpO2 95%   BMI 72.13 kg/m²     Physical Exam  Vitals reviewed.   Constitutional:       Appearance: He is not toxic-appearing.   HENT:      Right Ear: Tympanic membrane, ear canal and external ear normal.      Left Ear: Tympanic membrane, ear canal and " external ear normal.      Nose: Congestion present.      Right Sinus: Maxillary sinus tenderness present.      Left Sinus: Maxillary sinus tenderness present.   Eyes:      Conjunctiva/sclera: Conjunctivae normal.   Cardiovascular:      Rate and Rhythm: Normal rate and regular rhythm.      Pulses: Normal pulses.      Heart sounds: Normal heart sounds. No murmur heard.  Pulmonary:      Effort: Pulmonary effort is normal. No respiratory distress.      Breath sounds: Normal breath sounds. No stridor. No wheezing, rhonchi or rales.         Assessment/Plan:     Diagnosis and associated orders:     1. Acute bronchitis, unspecified organism  predniSONE (DELTASONE) 20 MG Tab      2. Acute bacterial sinusitis  amoxicillin-clavulanate (AUGMENTIN) 875-125 MG Tab         Comments/MDM:     Pulmonary exam today is reassuring and shows no wheezing, rales, rhonchi.  Patient's cough is likely postviral versus acute bronchitis.  Discussed prednisone course for possible bronchitis which she is agreeable to.  Does have a history of asthma but no wheezing at this time.  Does have continued sinus pressure with some nasal congestion.  Nasal congestion is somewhat improving.  May continue to monitor her sinus symptoms and if improving this should be self-limited.  If symptoms worsening or no improvement shortly may start his Augmentin course.  Vitals are stable.         Differential diagnosis, natural history, supportive care, and indications for immediate follow-up discussed.    Advised the patient to follow-up with the primary care physician for recheck, reevaluation, and consideration of further management.    Please note that this dictation was created using voice recognition software. I have made a reasonable attempt to correct obvious errors, but I expect that there are errors of grammar and possibly content that I did not discover before finalizing the note.    Electronically signed by Rodolfo Sykes PA-C.

## 2025-08-06 ENCOUNTER — NON-PROVIDER VISIT (OUTPATIENT)
Dept: WOUND CARE | Facility: MEDICAL CENTER | Age: 35
End: 2025-08-06
Attending: STUDENT IN AN ORGANIZED HEALTH CARE EDUCATION/TRAINING PROGRAM
Payer: COMMERCIAL

## 2025-08-06 PROCEDURE — 99213 OFFICE O/P EST LOW 20 MIN: CPT

## 2025-08-06 PROCEDURE — 97602 WOUND(S) CARE NON-SELECTIVE: CPT

## 2025-08-13 ENCOUNTER — APPOINTMENT (OUTPATIENT)
Facility: MEDICAL CENTER | Age: 35
End: 2025-08-13
Payer: COMMERCIAL

## 2025-08-15 ENCOUNTER — NON-PROVIDER VISIT (OUTPATIENT)
Dept: WOUND CARE | Facility: MEDICAL CENTER | Age: 35
End: 2025-08-15
Attending: STUDENT IN AN ORGANIZED HEALTH CARE EDUCATION/TRAINING PROGRAM
Payer: COMMERCIAL

## 2025-08-15 PROCEDURE — 97602 WOUND(S) CARE NON-SELECTIVE: CPT

## 2025-08-15 PROCEDURE — 99211 OFF/OP EST MAY X REQ PHY/QHP: CPT

## 2025-08-22 ENCOUNTER — NON-PROVIDER VISIT (OUTPATIENT)
Dept: WOUND CARE | Facility: MEDICAL CENTER | Age: 35
End: 2025-08-22
Attending: STUDENT IN AN ORGANIZED HEALTH CARE EDUCATION/TRAINING PROGRAM
Payer: COMMERCIAL

## 2025-08-22 PROCEDURE — 99213 OFFICE O/P EST LOW 20 MIN: CPT

## 2025-08-22 PROCEDURE — 97602 WOUND(S) CARE NON-SELECTIVE: CPT

## 2025-08-29 ENCOUNTER — OFFICE VISIT (OUTPATIENT)
Dept: WOUND CARE | Facility: MEDICAL CENTER | Age: 35
End: 2025-08-29
Attending: STUDENT IN AN ORGANIZED HEALTH CARE EDUCATION/TRAINING PROGRAM
Payer: COMMERCIAL

## 2025-08-29 DIAGNOSIS — R52 PAIN ASSOCIATED WITH WOUND: ICD-10-CM

## 2025-08-29 DIAGNOSIS — T14.8XXA PAIN ASSOCIATED WITH WOUND: ICD-10-CM

## 2025-08-29 DIAGNOSIS — I83.028 VENOUS STASIS ULCER OF LEFT LOWER LEG (HCC): Primary | ICD-10-CM

## 2025-08-29 DIAGNOSIS — T14.8XXA WOUND INFECTION: ICD-10-CM

## 2025-08-29 DIAGNOSIS — E66.01 MORBID OBESITY (HCC): ICD-10-CM

## 2025-08-29 DIAGNOSIS — L08.9 WOUND INFECTION: ICD-10-CM

## 2025-08-29 PROCEDURE — 11042 DBRDMT SUBQ TIS 1ST 20SQCM/<: CPT

## 2025-08-29 PROCEDURE — 99213 OFFICE O/P EST LOW 20 MIN: CPT

## (undated) DEVICE — PROTECTOR ULNA NERVE - (36PR/CA)

## (undated) DEVICE — CATHETER IV 14 GA X 2 ---SURG.& SDS ONLY---(200EA/CA)

## (undated) DEVICE — TOWELS CLOTH SURGICAL - (4/PK 20PK/CA)

## (undated) DEVICE — KIT ANESTHESIA W/CIRCUIT & 3/LT BAG W/FILTER (20EA/CA)

## (undated) DEVICE — SUCTION INSTRUMENT YANKAUER BULBOUS TIP W/O VENT (50EA/CA)

## (undated) DEVICE — GOWN WARMING STANDARD FLEX - (30/CA)

## (undated) DEVICE — NEPTUNE 4 PORT MANIFOLD - (20/PK)

## (undated) DEVICE — SODIUM CHL IRRIGATION 0.9% 1000ML (12EA/CA)

## (undated) DEVICE — DRAPE SURGICAL U 77X120 - (10/CA)

## (undated) DEVICE — SUTURE 4-0 CHROMIC FS-2 (36EA/BX)

## (undated) DEVICE — SWAB ANAEROBIC SPEC.COLLECTOR - (25/PK 4PK/CA 100EA/CA)

## (undated) DEVICE — Device

## (undated) DEVICE — CONTAINER SPECIMEN BAG OR - STERILE 4 OZ W/LID (100EA/CA)

## (undated) DEVICE — SET LEADWIRE 5 LEAD BEDSIDE DISPOSABLE ECG (1SET OF 5/EA)

## (undated) DEVICE — CANISTER SUCTION 3000ML MECHANICAL FILTER AUTO SHUTOFF MEDI-VAC NONSTERILE LF DISP  (40EA/CA)

## (undated) DEVICE — SET EXTENSION WITH 2 PORTS (48EA/CA) ***PART #2C8610 IS A SUBSTITUTE*****

## (undated) DEVICE — SUTURE GENERAL

## (undated) DEVICE — GLOVE BIOGEL INDICATOR SZ 7.5 SURGICAL PF LTX - (50PR/BX 4BX/CA)

## (undated) DEVICE — BLADE SURGICAL #15 - (50/BX 3BX/CA)

## (undated) DEVICE — KIT ROOM DECONTAMINATION

## (undated) DEVICE — HEAD HOLDER JUNIOR/ADULT

## (undated) DEVICE — BOVIE NEEDLE TIP INSULATD NON-SAFETY 2CM (50/PK)

## (undated) DEVICE — GLOVE, BIOGEL ECLIPSE, SZ 7.0, PF LTX (50/BX)

## (undated) DEVICE — ELECTRODE DUAL RETURN W/ CORD - (50/PK)

## (undated) DEVICE — SLEEVE, VASO, THIGH, MED

## (undated) DEVICE — PACK MINOR BASIN - (2EA/CA)

## (undated) DEVICE — GLOVE BIOGEL SZ 7.5 SURGICAL PF LTX - (50PR/BX 4BX/CA)

## (undated) DEVICE — SENSOR SPO2 NEO LNCS ADHESIVE (20/BX) SEE USER NOTES

## (undated) DEVICE — ELECTRODE 850 FOAM ADHESIVE - HYDROGEL RADIOTRNSPRNT (50/PK)

## (undated) DEVICE — MASK ANESTHESIA ADULT  - (100/CA)

## (undated) DEVICE — TUBE, CULTURE AEROBIC

## (undated) DEVICE — LACTATED RINGERS INJ 1000 ML - (14EA/CA 60CA/PF)

## (undated) DEVICE — TUBING CLEARLINK DUO-VENT - C-FLO (48EA/CA)